# Patient Record
Sex: MALE | Race: BLACK OR AFRICAN AMERICAN | NOT HISPANIC OR LATINO | Employment: UNEMPLOYED | URBAN - METROPOLITAN AREA
[De-identification: names, ages, dates, MRNs, and addresses within clinical notes are randomized per-mention and may not be internally consistent; named-entity substitution may affect disease eponyms.]

---

## 2021-09-15 ENCOUNTER — HISTORICAL (OUTPATIENT)
Dept: RADIOLOGY | Facility: HOSPITAL | Age: 26
End: 2021-09-15

## 2023-05-15 ENCOUNTER — HOSPITAL ENCOUNTER (INPATIENT)
Facility: HOSPITAL | Age: 28
LOS: 24 days | Discharge: NURSING, REHAB OR LONG-TERM CARE | DRG: 003 | End: 2023-06-08
Attending: EMERGENCY MEDICINE | Admitting: INTERNAL MEDICINE
Payer: MEDICAID

## 2023-05-15 DIAGNOSIS — L89.154 STAGE IV PRESSURE ULCER OF SACRAL REGION: ICD-10-CM

## 2023-05-15 DIAGNOSIS — L89.314 STAGE IV PRESSURE ULCER OF RIGHT BUTTOCK: ICD-10-CM

## 2023-05-15 DIAGNOSIS — L89.324 STAGE IV PRESSURE ULCER OF LEFT BUTTOCK: ICD-10-CM

## 2023-05-15 DIAGNOSIS — A41.9 SEPSIS: ICD-10-CM

## 2023-05-15 DIAGNOSIS — I38 ENDOCARDITIS: ICD-10-CM

## 2023-05-15 DIAGNOSIS — E87.20 LACTIC ACIDOSIS: ICD-10-CM

## 2023-05-15 DIAGNOSIS — M86.60 CHRONIC OSTEOMYELITIS: ICD-10-CM

## 2023-05-15 DIAGNOSIS — R00.0 TACHYCARDIA: ICD-10-CM

## 2023-05-15 DIAGNOSIS — T80.818A: ICD-10-CM

## 2023-05-15 DIAGNOSIS — I46.9 CARDIAC ARREST: Primary | ICD-10-CM

## 2023-05-15 DIAGNOSIS — G82.50 QUADRIPLEGIA: ICD-10-CM

## 2023-05-15 DIAGNOSIS — E83.42 HYPOMAGNESEMIA: ICD-10-CM

## 2023-05-15 DIAGNOSIS — R53.1 WEAKNESS: ICD-10-CM

## 2023-05-15 DIAGNOSIS — S31.000S WOUND OF SACRAL REGION, SEQUELA: ICD-10-CM

## 2023-05-15 DIAGNOSIS — E83.51 HYPOCALCEMIA: ICD-10-CM

## 2023-05-15 DIAGNOSIS — S61.402A: ICD-10-CM

## 2023-05-15 DIAGNOSIS — D64.9 ANEMIA, UNSPECIFIED TYPE: ICD-10-CM

## 2023-05-15 DIAGNOSIS — J96.00 ACUTE RESPIRATORY FAILURE, UNSPECIFIED WHETHER WITH HYPOXIA OR HYPERCAPNIA: ICD-10-CM

## 2023-05-15 LAB
ABS NEUT (OLG): 21.5 X10(3)/MCL (ref 2.1–9.2)
ALBUMIN SERPL-MCNC: 1.6 G/DL (ref 3.5–5)
ALBUMIN SERPL-MCNC: 1.7 G/DL (ref 3.5–5)
ALBUMIN SERPL-MCNC: 1.7 G/DL (ref 3.5–5)
ALBUMIN/GLOB SERPL: 0.3 RATIO (ref 1.1–2)
ALBUMIN/GLOB SERPL: 0.3 RATIO (ref 1.1–2)
ALBUMIN/GLOB SERPL: 0.4 RATIO (ref 1.1–2)
ALLENS TEST BLOOD GAS (OHS): YES
ALP SERPL-CCNC: 179 UNIT/L (ref 40–150)
ALP SERPL-CCNC: 208 UNIT/L (ref 40–150)
ALP SERPL-CCNC: 231 UNIT/L (ref 40–150)
ALT SERPL-CCNC: 39 UNIT/L (ref 0–55)
ALT SERPL-CCNC: 42 UNIT/L (ref 0–55)
ALT SERPL-CCNC: 48 UNIT/L (ref 0–55)
ANISOCYTOSIS BLD QL SMEAR: ABNORMAL
ANISOCYTOSIS BLD QL SMEAR: ABNORMAL
APPEARANCE UR: ABNORMAL
APTT PPP: 31.1 SECONDS (ref 23.2–33.7)
AST SERPL-CCNC: 103 UNIT/L (ref 5–34)
AST SERPL-CCNC: 64 UNIT/L (ref 5–34)
AST SERPL-CCNC: 71 UNIT/L (ref 5–34)
AV INDEX (PROSTH): 0.79
AV MEAN GRADIENT: 2 MMHG
AV PEAK GRADIENT: 3 MMHG
AV VALVE AREA: 2.49 CM2
AV VELOCITY RATIO: 0.84
BACTERIA #/AREA URNS AUTO: ABNORMAL /HPF
BASE EXCESS BLD CALC-SCNC: -2.6 MMOL/L (ref -2–2)
BASE EXCESS BLD CALC-SCNC: -2.9 MMOL/L (ref -2–2)
BASOPHILS # BLD AUTO: 0.01 X10(3)/MCL
BASOPHILS # BLD AUTO: 0.04 X10(3)/MCL
BASOPHILS NFR BLD AUTO: 0.1 %
BASOPHILS NFR BLD AUTO: 0.2 %
BILIRUB UR QL STRIP.AUTO: NEGATIVE MG/DL
BILIRUBIN DIRECT+TOT PNL SERPL-MCNC: 0.2 MG/DL
BILIRUBIN DIRECT+TOT PNL SERPL-MCNC: 0.5 MG/DL
BILIRUBIN DIRECT+TOT PNL SERPL-MCNC: 0.6 MG/DL
BLOOD GAS SAMPLE TYPE (OHS): ABNORMAL
BLOOD GAS SAMPLE TYPE (OHS): ABNORMAL
BNP BLD-MCNC: 1533.2 PG/ML
BUN SERPL-MCNC: 30.8 MG/DL (ref 8.9–20.6)
BUN SERPL-MCNC: 32.6 MG/DL (ref 8.9–20.6)
BUN SERPL-MCNC: 34.1 MG/DL (ref 8.9–20.6)
BURR CELLS (OLG): ABNORMAL
BURR CELLS (OLG): ABNORMAL
CA-I BLD-SCNC: 0.4 MMOL/L (ref 1.12–1.23)
CA-I BLD-SCNC: 0.51 MMOL/L (ref 1.12–1.23)
CALCIUM SERPL-MCNC: 4 MG/DL (ref 8.4–10.2)
CALCIUM SERPL-MCNC: 4 MG/DL (ref 8.4–10.2)
CALCIUM SERPL-MCNC: 4.5 MG/DL (ref 8.4–10.2)
CHLORIDE SERPL-SCNC: 96 MMOL/L (ref 98–107)
CHLORIDE SERPL-SCNC: 96 MMOL/L (ref 98–107)
CHLORIDE SERPL-SCNC: 97 MMOL/L (ref 98–107)
CK SERPL-CCNC: 765 U/L (ref 30–200)
CO2 BLDA-SCNC: 22.2 MMOL/L
CO2 BLDA-SCNC: 22.2 MMOL/L
CO2 SERPL-SCNC: 18 MMOL/L (ref 22–29)
CO2 SERPL-SCNC: 18 MMOL/L (ref 22–29)
CO2 SERPL-SCNC: 19 MMOL/L (ref 22–29)
COHGB MFR BLDA: 1.2 % (ref 0.5–1.5)
COHGB MFR BLDA: 2.8 % (ref 0.5–1.5)
COLOR UR: YELLOW
CREAT SERPL-MCNC: 1.22 MG/DL (ref 0.73–1.18)
CREAT SERPL-MCNC: 1.29 MG/DL (ref 0.73–1.18)
CREAT SERPL-MCNC: 1.33 MG/DL (ref 0.73–1.18)
CV ECHO LV RWT: 0.25 CM
DOP CALC AO PEAK VEL: 0.87 M/S
DOP CALC AO VTI: 12 CM
DOP CALC LVOT AREA: 3.1 CM2
DOP CALC LVOT DIAMETER: 2 CM
DOP CALC LVOT PEAK VEL: 0.73 M/S
DOP CALC LVOT STROKE VOLUME: 29.83 CM3
DOP CALC MV VTI: 19.6 CM
DOP CALCLVOT PEAK VEL VTI: 9.5 CM
DRAWN BY BLOOD GAS (OHS): ABNORMAL
DRAWN BY BLOOD GAS (OHS): ABNORMAL
ECHO LV POSTERIOR WALL: 0.76 CM (ref 0.6–1.1)
EJECTION FRACTION: 20 %
EOSINOPHIL # BLD AUTO: 0 X10(3)/MCL (ref 0–0.9)
EOSINOPHIL # BLD AUTO: 0.03 X10(3)/MCL (ref 0–0.9)
EOSINOPHIL NFR BLD AUTO: 0 %
EOSINOPHIL NFR BLD AUTO: 0.3 %
ERYTHROCYTE [DISTWIDTH] IN BLOOD BY AUTOMATED COUNT: 19.3 % (ref 11.5–17)
ERYTHROCYTE [DISTWIDTH] IN BLOOD BY AUTOMATED COUNT: 19.5 % (ref 11.5–17)
ERYTHROCYTE [DISTWIDTH] IN BLOOD BY AUTOMATED COUNT: 19.7 % (ref 11.5–17)
FERRITIN SERPL-MCNC: 2751.23 NG/ML (ref 21.81–274.66)
FIO2 BLOOD GAS (OHS): 100 %
FLUAV AG UPPER RESP QL IA.RAPID: NOT DETECTED
FLUBV AG UPPER RESP QL IA.RAPID: NOT DETECTED
FOLATE SERPL-MCNC: 6.3 NG/ML (ref 7–31.4)
FRACTIONAL SHORTENING: 13 % (ref 28–44)
GFR SERPLBLD CREATININE-BSD FMLA CKD-EPI: >60 MLS/MIN/1.73/M2
GLOBULIN SER-MCNC: 4.6 GM/DL (ref 2.4–3.5)
GLOBULIN SER-MCNC: 4.9 GM/DL (ref 2.4–3.5)
GLOBULIN SER-MCNC: 5 GM/DL (ref 2.4–3.5)
GLUCOSE SERPL-MCNC: 130 MG/DL (ref 74–100)
GLUCOSE SERPL-MCNC: 165 MG/DL (ref 74–100)
GLUCOSE SERPL-MCNC: 91 MG/DL (ref 74–100)
GLUCOSE UR QL STRIP.AUTO: NEGATIVE MG/DL
GROUP & RH: NORMAL
HCO3 BLDA-SCNC: 21.2 MMOL/L (ref 22–26)
HCO3 BLDA-SCNC: 21.2 MMOL/L (ref 22–26)
HCT VFR BLD AUTO: 16.3 % (ref 42–52)
HCT VFR BLD AUTO: 24.4 % (ref 42–52)
HCT VFR BLD AUTO: 27.1 % (ref 42–52)
HGB BLD-MCNC: 5 G/DL (ref 14–18)
HGB BLD-MCNC: 8.1 G/DL (ref 14–18)
HGB BLD-MCNC: 9.2 G/DL (ref 14–18)
HYPOCHROMIA BLD QL SMEAR: ABNORMAL
IMM GRANULOCYTES # BLD AUTO: 0.14 X10(3)/MCL (ref 0–0.04)
IMM GRANULOCYTES # BLD AUTO: 0.22 X10(3)/MCL (ref 0–0.04)
IMM GRANULOCYTES NFR BLD AUTO: 1.1 %
IMM GRANULOCYTES NFR BLD AUTO: 1.5 %
INDIRECT COOMBS GEL: NORMAL
INR BLD: 1.44 (ref 0–1.3)
INSTRUMENT WBC (OLG): 23.63 X10(3)/MCL
INTERVENTRICULAR SEPTUM: 0.81 CM (ref 0.6–1.1)
IRON SATN MFR SERPL: 59 % (ref 20–50)
IRON SERPL-MCNC: 87 UG/DL (ref 65–175)
KETONES UR QL STRIP.AUTO: NEGATIVE MG/DL
LACTATE SERPL-SCNC: 0.9 MMOL/L (ref 0.5–2.2)
LACTATE SERPL-SCNC: 1.4 MMOL/L (ref 0.5–2.2)
LACTATE SERPL-SCNC: 3.1 MMOL/L (ref 0.5–2.2)
LACTATE SERPL-SCNC: 4.2 MMOL/L (ref 0.5–2.2)
LEFT ATRIUM SIZE: 4.4 CM
LEFT ATRIUM VOLUME INDEX MOD: 45.4 ML/M2
LEFT ATRIUM VOLUME MOD: 87.6 CM3
LEFT INTERNAL DIMENSION IN SYSTOLE: 5.3 CM (ref 2.1–4)
LEFT VENTRICLE DIASTOLIC VOLUME INDEX: 95.85 ML/M2
LEFT VENTRICLE DIASTOLIC VOLUME: 185 ML
LEFT VENTRICLE MASS INDEX: 96 G/M2
LEFT VENTRICLE SYSTOLIC VOLUME INDEX: 69.9 ML/M2
LEFT VENTRICLE SYSTOLIC VOLUME: 135 ML
LEFT VENTRICULAR INTERNAL DIMENSION IN DIASTOLE: 6.07 CM (ref 3.5–6)
LEFT VENTRICULAR MASS: 185.55 G
LEUKOCYTE ESTERASE UR QL STRIP.AUTO: 1 UNIT/L
LPM (OHS): 4
LVOT MG: 1 MMHG
LVOT MV: 0.49 CM/S
LYMPHOCYTES # BLD AUTO: 1.84 X10(3)/MCL (ref 0.6–4.6)
LYMPHOCYTES # BLD AUTO: 2.15 X10(3)/MCL (ref 0.6–4.6)
LYMPHOCYTES NFR BLD AUTO: 10.4 %
LYMPHOCYTES NFR BLD AUTO: 19.2 %
LYMPHOCYTES NFR BLD MANUAL: 1.65 X10(3)/MCL
LYMPHOCYTES NFR BLD MANUAL: 7 %
MACROCYTES BLD QL SMEAR: ABNORMAL
MACROCYTES BLD QL SMEAR: ABNORMAL
MAGNESIUM SERPL-MCNC: 1.1 MG/DL (ref 1.6–2.6)
MAGNESIUM SERPL-MCNC: 1.5 MG/DL (ref 1.6–2.6)
MCH RBC QN AUTO: 23.3 PG (ref 27–31)
MCH RBC QN AUTO: 25.1 PG (ref 27–31)
MCH RBC QN AUTO: 25.2 PG (ref 27–31)
MCHC RBC AUTO-ENTMCNC: 30.7 G/DL (ref 33–36)
MCHC RBC AUTO-ENTMCNC: 33.2 G/DL (ref 33–36)
MCHC RBC AUTO-ENTMCNC: 33.9 G/DL (ref 33–36)
MCV RBC AUTO: 74.2 FL (ref 80–94)
MCV RBC AUTO: 75.5 FL (ref 80–94)
MCV RBC AUTO: 75.8 FL (ref 80–94)
MECH RR (OHS): 24 B/MIN
MECH VT (OHS): 500 ML
METHGB MFR BLDA: 0.1 % (ref 0.4–1.5)
METHGB MFR BLDA: 0.4 % (ref 0.4–1.5)
MODE (OHS): AC
MONOCYTES # BLD AUTO: 0.49 X10(3)/MCL (ref 0.1–1.3)
MONOCYTES # BLD AUTO: 0.88 X10(3)/MCL (ref 0.1–1.3)
MONOCYTES NFR BLD AUTO: 4.3 %
MONOCYTES NFR BLD AUTO: 5.1 %
MONOCYTES NFR BLD MANUAL: 0.47 X10(3)/MCL (ref 0.1–1.3)
MONOCYTES NFR BLD MANUAL: 2 %
MR PISA EROA: 0.78 CM2
MV MEAN GRADIENT: 2 MMHG
MV PEAK GRADIENT: 4 MMHG
MV STENOSIS PRESSURE HALF TIME: 58 MS
MV VALVE AREA BY CONTINUITY EQUATION: 1.52 CM2
MV VALVE AREA P 1/2 METHOD: 3.79 CM2
NEUTROPHILS # BLD AUTO: 17.31 X10(3)/MCL (ref 2.1–9.2)
NEUTROPHILS # BLD AUTO: 7.07 X10(3)/MCL (ref 2.1–9.2)
NEUTROPHILS NFR BLD AUTO: 73.8 %
NEUTROPHILS NFR BLD AUTO: 84 %
NEUTROPHILS NFR BLD MANUAL: 91 %
NITRITE UR QL STRIP.AUTO: NEGATIVE
NRBC BLD AUTO-RTO: 0.3 %
NRBC BLD AUTO-RTO: 0.4 %
NRBC BLD AUTO-RTO: 0.5 %
NRBC BLD MANUAL-RTO: 2 %
O2 HB BLOOD GAS (OHS): 93 % (ref 94–97)
O2 HB BLOOD GAS (OHS): 96.3 % (ref 94–97)
OHS LV EJECTION FRACTION SIMPSONS BIPLANE MOD: 2 %
OXYGEN DEVICE BLOOD GAS (OHS): ABNORMAL
OXYHGB MFR BLDA: 5.1 G/DL (ref 12–16)
OXYHGB MFR BLDA: 8.5 G/DL (ref 12–16)
PCO2 BLDA: 32 MMHG (ref 35–45)
PCO2 BLDA: 32 MMHG (ref 35–45)
PCO2 BLOOD GAS TEMP CORR (OHS): 28 MMHG (ref 35–45)
PEEP (OHS): 5 CMH2O
PH BLDA: 7.43 [PH] (ref 7.35–7.45)
PH BLDA: 7.43 [PH] (ref 7.35–7.45)
PH BLOOD GAS TEMP CORR (OHS): 7.47 (ref 7.35–7.45)
PH UR STRIP.AUTO: 6 [PH]
PHOSPHATE SERPL-MCNC: 8.6 MG/DL (ref 2.3–4.7)
PHOSPHATE SERPL-MCNC: 9.4 MG/DL (ref 2.3–4.7)
PISA MRMAX VEL: 4.05 M/S
PISA RADIUS: 1.2 CM
PISA TR MAX VEL: 3.01 M/S
PISA VN NYQUIST MS: 0.35 M/S
PISA VN NYQUIST: 0.35 M/S
PLATELET # BLD AUTO: 534 X10(3)/MCL (ref 130–400)
PLATELET # BLD AUTO: 613 X10(3)/MCL (ref 130–400)
PLATELET # BLD AUTO: 663 X10(3)/MCL (ref 130–400)
PLATELET # BLD EST: ABNORMAL 10*3/UL
PLATELET # BLD EST: ABNORMAL 10*3/UL
PMV BLD AUTO: 8 FL (ref 7.4–10.4)
PMV BLD AUTO: 8.2 FL (ref 7.4–10.4)
PMV BLD AUTO: 8.4 FL (ref 7.4–10.4)
PO2 BLDA: 67 MMHG (ref 80–100)
PO2 BLDA: 85 MMHG (ref 80–100)
PO2 BLOOD GAS TEMP CORR (OHS): 55 MMHG (ref 80–100)
POIKILOCYTOSIS BLD QL SMEAR: ABNORMAL
POIKILOCYTOSIS BLD QL SMEAR: ABNORMAL
POLYCHROMASIA BLD QL SMEAR: SLIGHT
POTASSIUM BLOOD GAS (OHS): 4.3 MMOL/L (ref 3.5–5)
POTASSIUM BLOOD GAS (OHS): 4.6 MMOL/L (ref 3.5–5)
POTASSIUM SERPL-SCNC: 4.5 MMOL/L (ref 3.5–5.1)
POTASSIUM SERPL-SCNC: 4.7 MMOL/L (ref 3.5–5.1)
POTASSIUM SERPL-SCNC: 5.3 MMOL/L (ref 3.5–5.1)
PROLACTIN LEVEL (OHS): 29.11 NG/ML (ref 3.46–19.4)
PROT SERPL-MCNC: 6.3 GM/DL (ref 6.4–8.3)
PROT SERPL-MCNC: 6.5 GM/DL (ref 6.4–8.3)
PROT SERPL-MCNC: 6.7 GM/DL (ref 6.4–8.3)
PROT UR QL STRIP.AUTO: 3 MG/DL
PROTHROMBIN TIME: 17.3 SECONDS (ref 12.5–14.5)
PV PEAK VELOCITY: 0.73 CM/S
RBC # BLD AUTO: 2.15 X10(6)/MCL (ref 4.7–6.1)
RBC # BLD AUTO: 3.23 X10(6)/MCL (ref 4.7–6.1)
RBC # BLD AUTO: 3.65 X10(6)/MCL (ref 4.7–6.1)
RBC #/AREA URNS AUTO: 9 /HPF
RBC MORPH BLD: ABNORMAL
RBC MORPH BLD: ABNORMAL
RBC UR QL AUTO: 1 UNIT/L
RET# (OHS): 0.07 (ref 0.03–0.1)
RETICULOCYTE COUNT AUTOMATED (OLG): 2.3 % (ref 1.1–2.1)
ROULEAUX BLD QL SMEAR: ABNORMAL
SAMPLE SITE BLOOD GAS (OHS): ABNORMAL
SAMPLE SITE BLOOD GAS (OHS): ABNORMAL
SAO2 % BLDA: 93.6 %
SAO2 % BLDA: 96.7 %
SARS-COV-2 RNA RESP QL NAA+PROBE: NOT DETECTED
SODIUM BLOOD GAS (OHS): 126 MMOL/L (ref 137–145)
SODIUM BLOOD GAS (OHS): 127 MMOL/L (ref 137–145)
SODIUM SERPL-SCNC: 131 MMOL/L (ref 136–145)
SODIUM SERPL-SCNC: 131 MMOL/L (ref 136–145)
SODIUM SERPL-SCNC: 132 MMOL/L (ref 136–145)
SP GR UR STRIP.AUTO: 1.02 (ref 1–1.03)
SPECIMEN OUTDATE: NORMAL
SQUAMOUS #/AREA URNS AUTO: 11 /HPF
TDI LATERAL: 0.21 M/S
TDI SEPTAL: 0.09 M/S
TDI: 0.15 M/S
TEMPERATURE BLOOD GAS (OHS): 34 DEGREES CELSIUS
TIBC SERPL-MCNC: 148 UG/DL (ref 250–450)
TIBC SERPL-MCNC: 61 UG/DL (ref 69–240)
TR MAX PG: 36 MMHG
TRANSFERRIN SERPL-MCNC: 129 MG/DL (ref 174–364)
TRICUSPID ANNULAR PLANE SYSTOLIC EXCURSION: 1.87 CM
TROPONIN I SERPL-MCNC: 0.02 NG/ML (ref 0–0.04)
TROPONIN I SERPL-MCNC: 0.05 NG/ML (ref 0–0.04)
TROPONIN I SERPL-MCNC: 0.08 NG/ML (ref 0–0.04)
TROPONIN I SERPL-MCNC: <0.01 NG/ML (ref 0–0.04)
UROBILINOGEN UR STRIP-ACNC: 1 MG/DL
VIT B12 SERPL-MCNC: 801 PG/ML (ref 213–816)
WBC # SPEC AUTO: 20.6 X10(3)/MCL (ref 4.5–11.5)
WBC # SPEC AUTO: 23.63 X10(3)/MCL (ref 4.5–11.5)
WBC # SPEC AUTO: 9.58 X10(3)/MCL (ref 4.5–11.5)
WBC #/AREA URNS AUTO: 498 /HPF

## 2023-05-15 PROCEDURE — 25000003 PHARM REV CODE 250: Performed by: EMERGENCY MEDICINE

## 2023-05-15 PROCEDURE — 25000003 PHARM REV CODE 250

## 2023-05-15 PROCEDURE — 27000221 HC OXYGEN, UP TO 24 HOURS

## 2023-05-15 PROCEDURE — 83735 ASSAY OF MAGNESIUM: CPT | Performed by: EMERGENCY MEDICINE

## 2023-05-15 PROCEDURE — 82728 ASSAY OF FERRITIN: CPT

## 2023-05-15 PROCEDURE — 96365 THER/PROPH/DIAG IV INF INIT: CPT

## 2023-05-15 PROCEDURE — 63600175 PHARM REV CODE 636 W HCPCS: Performed by: INTERNAL MEDICINE

## 2023-05-15 PROCEDURE — 99291 CRITICAL CARE FIRST HOUR: CPT

## 2023-05-15 PROCEDURE — 93010 EKG 12-LEAD: ICD-10-PCS | Mod: ,,, | Performed by: STUDENT IN AN ORGANIZED HEALTH CARE EDUCATION/TRAINING PROGRAM

## 2023-05-15 PROCEDURE — 63600175 PHARM REV CODE 636 W HCPCS

## 2023-05-15 PROCEDURE — 85045 AUTOMATED RETICULOCYTE COUNT: CPT

## 2023-05-15 PROCEDURE — 86923 COMPATIBILITY TEST ELECTRIC: CPT | Mod: 91 | Performed by: EMERGENCY MEDICINE

## 2023-05-15 PROCEDURE — 83735 ASSAY OF MAGNESIUM: CPT

## 2023-05-15 PROCEDURE — 85025 COMPLETE CBC W/AUTO DIFF WBC: CPT | Performed by: EMERGENCY MEDICINE

## 2023-05-15 PROCEDURE — 87040 BLOOD CULTURE FOR BACTERIA: CPT | Performed by: EMERGENCY MEDICINE

## 2023-05-15 PROCEDURE — 25000003 PHARM REV CODE 250: Performed by: INTERNAL MEDICINE

## 2023-05-15 PROCEDURE — 83880 ASSAY OF NATRIURETIC PEPTIDE: CPT | Performed by: EMERGENCY MEDICINE

## 2023-05-15 PROCEDURE — 99900035 HC TECH TIME PER 15 MIN (STAT)

## 2023-05-15 PROCEDURE — 96367 TX/PROPH/DG ADDL SEQ IV INF: CPT

## 2023-05-15 PROCEDURE — 85027 COMPLETE CBC AUTOMATED: CPT | Performed by: EMERGENCY MEDICINE

## 2023-05-15 PROCEDURE — 83605 ASSAY OF LACTIC ACID: CPT | Performed by: EMERGENCY MEDICINE

## 2023-05-15 PROCEDURE — 36556 INSERT NON-TUNNEL CV CATH: CPT

## 2023-05-15 PROCEDURE — 27200966 HC CLOSED SUCTION SYSTEM

## 2023-05-15 PROCEDURE — 85025 COMPLETE CBC W/AUTO DIFF WBC: CPT

## 2023-05-15 PROCEDURE — 25000003 PHARM REV CODE 250: Performed by: STUDENT IN AN ORGANIZED HEALTH CARE EDUCATION/TRAINING PROGRAM

## 2023-05-15 PROCEDURE — 84484 ASSAY OF TROPONIN QUANT: CPT | Performed by: STUDENT IN AN ORGANIZED HEALTH CARE EDUCATION/TRAINING PROGRAM

## 2023-05-15 PROCEDURE — 84100 ASSAY OF PHOSPHORUS: CPT | Performed by: STUDENT IN AN ORGANIZED HEALTH CARE EDUCATION/TRAINING PROGRAM

## 2023-05-15 PROCEDURE — 63600175 PHARM REV CODE 636 W HCPCS: Performed by: STUDENT IN AN ORGANIZED HEALTH CARE EDUCATION/TRAINING PROGRAM

## 2023-05-15 PROCEDURE — 20000000 HC ICU ROOM

## 2023-05-15 PROCEDURE — 93010 ELECTROCARDIOGRAM REPORT: CPT | Mod: ,,, | Performed by: STUDENT IN AN ORGANIZED HEALTH CARE EDUCATION/TRAINING PROGRAM

## 2023-05-15 PROCEDURE — 85027 COMPLETE CBC AUTOMATED: CPT

## 2023-05-15 PROCEDURE — 81001 URINALYSIS AUTO W/SCOPE: CPT | Performed by: EMERGENCY MEDICINE

## 2023-05-15 PROCEDURE — 96361 HYDRATE IV INFUSION ADD-ON: CPT

## 2023-05-15 PROCEDURE — 82550 ASSAY OF CK (CPK): CPT

## 2023-05-15 PROCEDURE — 84100 ASSAY OF PHOSPHORUS: CPT

## 2023-05-15 PROCEDURE — 83550 IRON BINDING TEST: CPT

## 2023-05-15 PROCEDURE — 82746 ASSAY OF FOLIC ACID SERUM: CPT

## 2023-05-15 PROCEDURE — 80053 COMPREHEN METABOLIC PANEL: CPT | Performed by: EMERGENCY MEDICINE

## 2023-05-15 PROCEDURE — 85027 COMPLETE CBC AUTOMATED: CPT | Performed by: STUDENT IN AN ORGANIZED HEALTH CARE EDUCATION/TRAINING PROGRAM

## 2023-05-15 PROCEDURE — 82803 BLOOD GASES ANY COMBINATION: CPT

## 2023-05-15 PROCEDURE — 51702 INSERT TEMP BLADDER CATH: CPT

## 2023-05-15 PROCEDURE — 82607 VITAMIN B-12: CPT

## 2023-05-15 PROCEDURE — 85610 PROTHROMBIN TIME: CPT | Performed by: EMERGENCY MEDICINE

## 2023-05-15 PROCEDURE — 87088 URINE BACTERIA CULTURE: CPT | Performed by: EMERGENCY MEDICINE

## 2023-05-15 PROCEDURE — 0240U COVID/FLU A&B PCR: CPT | Performed by: EMERGENCY MEDICINE

## 2023-05-15 PROCEDURE — 36600 WITHDRAWAL OF ARTERIAL BLOOD: CPT

## 2023-05-15 PROCEDURE — 80053 COMPREHEN METABOLIC PANEL: CPT | Performed by: STUDENT IN AN ORGANIZED HEALTH CARE EDUCATION/TRAINING PROGRAM

## 2023-05-15 PROCEDURE — 84484 ASSAY OF TROPONIN QUANT: CPT

## 2023-05-15 PROCEDURE — 93005 ELECTROCARDIOGRAM TRACING: CPT

## 2023-05-15 PROCEDURE — 86900 BLOOD TYPING SEROLOGIC ABO: CPT | Performed by: EMERGENCY MEDICINE

## 2023-05-15 PROCEDURE — 87070 CULTURE OTHR SPECIMN AEROBIC: CPT

## 2023-05-15 PROCEDURE — 36430 TRANSFUSION BLD/BLD COMPNT: CPT

## 2023-05-15 PROCEDURE — 80053 COMPREHEN METABOLIC PANEL: CPT

## 2023-05-15 PROCEDURE — 37799 UNLISTED PX VASCULAR SURGERY: CPT

## 2023-05-15 PROCEDURE — 85730 THROMBOPLASTIN TIME PARTIAL: CPT | Performed by: EMERGENCY MEDICINE

## 2023-05-15 PROCEDURE — P9016 RBC LEUKOCYTES REDUCED: HCPCS | Performed by: EMERGENCY MEDICINE

## 2023-05-15 PROCEDURE — 96368 THER/DIAG CONCURRENT INF: CPT

## 2023-05-15 PROCEDURE — 83605 ASSAY OF LACTIC ACID: CPT

## 2023-05-15 PROCEDURE — 36620 INSERTION CATHETER ARTERY: CPT

## 2023-05-15 PROCEDURE — C1751 CATH, INF, PER/CENT/MIDLINE: HCPCS

## 2023-05-15 PROCEDURE — 63600175 PHARM REV CODE 636 W HCPCS: Performed by: EMERGENCY MEDICINE

## 2023-05-15 PROCEDURE — 84146 ASSAY OF PROLACTIN: CPT

## 2023-05-15 PROCEDURE — 86920 COMPATIBILITY TEST SPIN: CPT | Performed by: EMERGENCY MEDICINE

## 2023-05-15 PROCEDURE — 84484 ASSAY OF TROPONIN QUANT: CPT | Performed by: EMERGENCY MEDICINE

## 2023-05-15 RX ORDER — HYDROCODONE BITARTRATE AND ACETAMINOPHEN 500; 5 MG/1; MG/1
TABLET ORAL
Status: DISCONTINUED | OUTPATIENT
Start: 2023-05-15 | End: 2023-05-29 | Stop reason: SDUPTHER

## 2023-05-15 RX ORDER — ROCURONIUM BROMIDE 10 MG/ML
90 INJECTION, SOLUTION INTRAVENOUS ONCE
Status: COMPLETED | OUTPATIENT
Start: 2023-05-15 | End: 2023-05-15

## 2023-05-15 RX ORDER — NOREPINEPHRINE BITARTRATE/D5W 8 MG/250ML
0-3 PLASTIC BAG, INJECTION (ML) INTRAVENOUS CONTINUOUS
Status: DISCONTINUED | OUTPATIENT
Start: 2023-05-15 | End: 2023-06-08 | Stop reason: HOSPADM

## 2023-05-15 RX ORDER — PROPOFOL 10 MG/ML
0-50 INJECTION, EMULSION INTRAVENOUS CONTINUOUS
Status: DISCONTINUED | OUTPATIENT
Start: 2023-05-15 | End: 2023-05-31

## 2023-05-15 RX ORDER — SODIUM CHLORIDE, SODIUM LACTATE, POTASSIUM CHLORIDE, CALCIUM CHLORIDE 600; 310; 30; 20 MG/100ML; MG/100ML; MG/100ML; MG/100ML
INJECTION, SOLUTION INTRAVENOUS CONTINUOUS
Status: DISCONTINUED | OUTPATIENT
Start: 2023-05-15 | End: 2023-05-15

## 2023-05-15 RX ORDER — MAGNESIUM SULFATE 1 G/100ML
1 INJECTION INTRAVENOUS ONCE
Status: COMPLETED | OUTPATIENT
Start: 2023-05-15 | End: 2023-05-16

## 2023-05-15 RX ORDER — CALCIUM GLUCONATE 20 MG/ML
1 INJECTION, SOLUTION INTRAVENOUS
Status: COMPLETED | OUTPATIENT
Start: 2023-05-15 | End: 2023-05-15

## 2023-05-15 RX ORDER — LORAZEPAM 2 MG/ML
INJECTION INTRAMUSCULAR
Status: COMPLETED
Start: 2023-05-15 | End: 2023-05-15

## 2023-05-15 RX ORDER — PROPOFOL 10 MG/ML
100 VIAL (ML) INTRAVENOUS ONCE
Status: COMPLETED | OUTPATIENT
Start: 2023-05-15 | End: 2023-05-15

## 2023-05-15 RX ORDER — CALCIUM GLUCONATE 20 MG/ML
1 INJECTION, SOLUTION INTRAVENOUS
Status: COMPLETED | OUTPATIENT
Start: 2023-05-15 | End: 2023-05-16

## 2023-05-15 RX ORDER — NOREPINEPHRINE BITARTRATE/D5W 8 MG/250ML
0-3 PLASTIC BAG, INJECTION (ML) INTRAVENOUS CONTINUOUS
Status: DISCONTINUED | OUTPATIENT
Start: 2023-05-15 | End: 2023-05-15 | Stop reason: SDUPTHER

## 2023-05-15 RX ORDER — ROCURONIUM BROMIDE 10 MG/ML
90 INJECTION, SOLUTION INTRAVENOUS ONCE
Status: DISCONTINUED | OUTPATIENT
Start: 2023-05-15 | End: 2023-05-15

## 2023-05-15 RX ORDER — SODIUM CHLORIDE, SODIUM LACTATE, POTASSIUM CHLORIDE, CALCIUM CHLORIDE 600; 310; 30; 20 MG/100ML; MG/100ML; MG/100ML; MG/100ML
2000 INJECTION, SOLUTION INTRAVENOUS CONTINUOUS
Status: ACTIVE | OUTPATIENT
Start: 2023-05-15 | End: 2023-05-17

## 2023-05-15 RX ORDER — SODIUM CHLORIDE 0.9 % (FLUSH) 0.9 %
10 SYRINGE (ML) INJECTION
Status: DISCONTINUED | OUTPATIENT
Start: 2023-05-15 | End: 2023-05-31

## 2023-05-15 RX ORDER — ENOXAPARIN SODIUM 100 MG/ML
40 INJECTION SUBCUTANEOUS EVERY 24 HOURS
Status: DISCONTINUED | OUTPATIENT
Start: 2023-05-15 | End: 2023-05-30

## 2023-05-15 RX ORDER — LEVETIRACETAM 10 MG/ML
1000 INJECTION INTRAVASCULAR
Status: COMPLETED | OUTPATIENT
Start: 2023-05-15 | End: 2023-05-15

## 2023-05-15 RX ORDER — MAGNESIUM SULFATE HEPTAHYDRATE 40 MG/ML
2 INJECTION, SOLUTION INTRAVENOUS
Status: COMPLETED | OUTPATIENT
Start: 2023-05-15 | End: 2023-05-15

## 2023-05-15 RX ORDER — NOREPINEPHRINE BITARTRATE/D5W 8 MG/250ML
PLASTIC BAG, INJECTION (ML) INTRAVENOUS
Status: COMPLETED
Start: 2023-05-15 | End: 2023-05-15

## 2023-05-15 RX ADMIN — CALCIUM GLUCONATE 1 G: 20 INJECTION, SOLUTION INTRAVENOUS at 11:05

## 2023-05-15 RX ADMIN — PROPOFOL 100 MG: 10 INJECTION, EMULSION INTRAVENOUS at 01:05

## 2023-05-15 RX ADMIN — LEVETIRACETAM INJECTION 1000 MG: 10 INJECTION INTRAVENOUS at 12:05

## 2023-05-15 RX ADMIN — PROPOFOL 20 MCG/KG/MIN: 10 INJECTION, EMULSION INTRAVENOUS at 02:05

## 2023-05-15 RX ADMIN — SODIUM CHLORIDE, POTASSIUM CHLORIDE, SODIUM LACTATE AND CALCIUM CHLORIDE 2000 ML: 600; 310; 30; 20 INJECTION, SOLUTION INTRAVENOUS at 07:05

## 2023-05-15 RX ADMIN — CALCIUM GLUCONATE 1 G: 20 INJECTION, SOLUTION INTRAVENOUS at 05:05

## 2023-05-15 RX ADMIN — SODIUM CHLORIDE, POTASSIUM CHLORIDE, SODIUM LACTATE AND CALCIUM CHLORIDE: 600; 310; 30; 20 INJECTION, SOLUTION INTRAVENOUS at 04:05

## 2023-05-15 RX ADMIN — Medication 0.2 MCG/KG/MIN: at 12:05

## 2023-05-15 RX ADMIN — PIPERACILLIN AND TAZOBACTAM 4.5 G: 4; .5 INJECTION, POWDER, LYOPHILIZED, FOR SOLUTION INTRAVENOUS; PARENTERAL at 10:05

## 2023-05-15 RX ADMIN — MAGNESIUM SULFATE IN DEXTROSE 1 G: 10 INJECTION, SOLUTION INTRAVENOUS at 11:05

## 2023-05-15 RX ADMIN — ROCURONIUM BROMIDE 90 MG: 10 INJECTION, SOLUTION INTRAVENOUS at 01:05

## 2023-05-15 RX ADMIN — CALCIUM GLUCONATE 1 G: 20 INJECTION, SOLUTION INTRAVENOUS at 04:05

## 2023-05-15 RX ADMIN — MAGNESIUM SULFATE HEPTAHYDRATE 2 G: 40 INJECTION, SOLUTION INTRAVENOUS at 11:05

## 2023-05-15 RX ADMIN — SODIUM CHLORIDE, POTASSIUM CHLORIDE, SODIUM LACTATE AND CALCIUM CHLORIDE 1000 ML: 600; 310; 30; 20 INJECTION, SOLUTION INTRAVENOUS at 10:05

## 2023-05-15 RX ADMIN — SODIUM CHLORIDE, POTASSIUM CHLORIDE, SODIUM LACTATE AND CALCIUM CHLORIDE 2313 ML: 600; 310; 30; 20 INJECTION, SOLUTION INTRAVENOUS at 11:05

## 2023-05-15 RX ADMIN — PROPOFOL 40 MCG/KG/MIN: 10 INJECTION, EMULSION INTRAVENOUS at 07:05

## 2023-05-15 RX ADMIN — PIPERACILLIN AND TAZOBACTAM 4.5 G: 4; .5 INJECTION, POWDER, LYOPHILIZED, FOR SOLUTION INTRAVENOUS; PARENTERAL at 07:05

## 2023-05-15 RX ADMIN — VANCOMYCIN HYDROCHLORIDE 1500 MG: 1.5 INJECTION, POWDER, LYOPHILIZED, FOR SOLUTION INTRAVENOUS at 04:05

## 2023-05-15 RX ADMIN — NOREPINEPHRINE BITARTRATE 0.22 MCG/KG/MIN: 8 INJECTION, SOLUTION INTRAVENOUS at 02:05

## 2023-05-15 RX ADMIN — NOREPINEPHRINE BITARTRATE 0.2 MCG/KG/MIN: 8 INJECTION, SOLUTION INTRAVENOUS at 12:05

## 2023-05-15 RX ADMIN — ENOXAPARIN SODIUM 40 MG: 40 INJECTION SUBCUTANEOUS at 04:05

## 2023-05-15 NOTE — PROCEDURES
[facility]   Endotracheal Intubation  Procedure Note    SUMMARY     Date of Procedure: 5/15/2023    Procedure: Endotracheal Intubation    Perfomed by: Adama Love DO        Indication: impending respiratory failure and respiratory failure.    Pre-Procedure Diagnosis:  Hypoxemic respiratory failure    Post-Procedure Diagnosis:  Hypoxemic respiratory failure        Description of the Findings of the Procedure:     Sedation:  Propofol .  Paralytic: rocuronium.  Lidocaine: no.  Atropine: no.  Equipment:  Glidscope blade size 4 .  Cricoid Pressure: no.  Number of attempts: 1.  ETT location confirmed by by auscultation, by CXR, and ETCO2 monitor.        Complications: None; patient tolerated the procedure well.       Condition: Critical        Disposition: ICU - intubated and critically ill.    Attestation: I performed the procedure supervised by Dr. Townsend \\

## 2023-05-15 NOTE — ED PROVIDER NOTES
Encounter Date: 5/15/2023    SCRIBE #1 NOTE: I, Kimananth Cisneros, am scribing for, and in the presence of,  Ranulfo Minor MD. I have scribed the following portions of the note - the EKG reading. Other sections scribed: HPI, ROS, PE.     History     Chief Complaint   Patient presents with    Shortness of Breath     C/o SOB for the last 3 days. Denies cough. Mom reports giving nebs at home with no relief. Denies fever. Initial O2 sat - 91% RA with ems. 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  Pt's mother, at bedside, says that he has been having trouble breathing for about a week, but that yesterday it got a lot worse.  She also says that breathing treatments were not helping him.  Pt also has sacral wounds that he says have been there for about 2 years.  He sees Dr. Thurston for wound care.  Pt's mother denies any recent UTIs or pneumonia.    The history is provided by the patient and a parent.   Shortness of Breath  This is a new problem. The problem occurs continuously.The current episode started yesterday. The problem has not changed since onset.Pertinent negatives include no fever, no rhinorrhea, no neck pain, no wheezing, no chest pain, no vomiting, no abdominal pain and no rash.   Review of patient's allergies indicates:  No Known Allergies  History reviewed. No pertinent past medical history.  History reviewed. No pertinent surgical history.  History reviewed. No pertinent family history.     Review of Systems   Constitutional:  Negative for fatigue, fever and unexpected weight change.   HENT:  Negative for congestion and rhinorrhea.    Eyes:  Negative for pain.   Respiratory:  Positive for shortness of breath. Negative for chest tightness and wheezing.    Cardiovascular:  Negative for chest pain.   Gastrointestinal:  Negative for abdominal pain,  constipation, diarrhea, nausea and vomiting.   Genitourinary:  Negative for dysuria.   Musculoskeletal:  Negative for back pain and neck pain.   Skin:  Negative for rash.   Allergic/Immunologic: Negative for environmental allergies, food allergies and immunocompromised state.   Neurological:  Negative for dizziness and speech difficulty.   Hematological:  Does not bruise/bleed easily.   Psychiatric/Behavioral:  Negative for sleep disturbance and suicidal ideas.      Physical Exam     Initial Vitals [05/15/23 0900]   BP Pulse Resp Temp SpO2   118/80 88 16 96.8 °F (36 °C) 98 %      MAP       --         Physical Exam    Nursing note and vitals reviewed.  Constitutional: He appears distressed (moderate).   General malodorous smell when entering room   HENT:   Head: Normocephalic and atraumatic.   Right Ear: Tympanic membrane normal.   Left Ear: Tympanic membrane normal.   Mouth/Throat: Oropharynx is clear and moist.   Eyes: Conjunctivae and EOM are normal. Pupils are equal, round, and reactive to light.   Neck: Trachea normal. Neck supple. Carotid bruit is not present. No JVD present.   Normal range of motion.  Cardiovascular:  Normal rate and regular rhythm.           No murmur heard.  Pulmonary/Chest: Tachypnea noted. No respiratory distress. He exhibits no tenderness.   Abdominal breathing; diminished breathing on the right, fine crackles at the base on the left   Abdominal: Abdomen is soft. Bowel sounds are normal. He exhibits distension (mild). There is no abdominal tenderness.   Genitourinary:    Genitourinary Comments: Shen in place     Musculoskeletal:         General: Normal range of motion.      Cervical back: Normal range of motion and neck supple.      Lumbar back: Normal. No tenderness. Normal range of motion.     Neurological: He is alert and oriented to person, place, and time. No cranial nerve deficit or sensory deficit.   Partial bilateral paralysis of upper extremities; paralysis of bilateral lower  extremities   Skin:   Dressed wound on left wrist, sacral decubitus wounds   Psychiatric: He has a normal mood and affect. Judgment normal.       ED Course   Central Line    Date/Time: 5/15/2023 12:50 PM  Performed by: Ranulfo Minor MD  Authorized by: Adis العلي Jr., MD, [facility]     Location procedure was performed:  [facility] EMERGENCY DEPARTMENT  Consent Done ?:  Emergent Situation  Indications:  Vascular access  Anesthesia:  Local infiltration  Local anesthetic:  Lidocaine 1% without epinephrine  Preparation:  Skin prepped with chlorhexidine (without alcohol)  Skin prep agent dried: Skin prep agent completely dried prior to procedure    Sterile barriers: All five maximal sterile barriers used - gloves, gown, cap, mask and large sterile sheet    Hand hygiene: Hand hygiene performed immediately prior to central venous catheter insertion    Location:  Right femoral  Site selection rationale:  Emergent, patient with EJ IV access already  Catheter type:  Triple lumen  Catheter size:  7 Fr  Ultrasound guidance: Yes    Manometry: No    Number of attempts:  2  Securement:  Line sutured, sterile dressing applied and blood return through all ports  Adverse Events:  None  Critical Care    Date/Time: 5/15/2023 12:51 PM  Performed by: Ranulfo Minor MD  Authorized by: Adis العلي Jr., MD, [facility]   Direct patient critical care time: 40 minutes  Additional history critical care time: 10 minutes  Ordering / reviewing critical care time: 15 minutes  Documentation critical care time: 15 minutes  Consulting other physicians critical care time: 5 minutes  Consult with family critical care time: 5 minutes  Total critical care time (exclusive of procedural time) : 90 minutes  Critical care time was exclusive of separately billable procedures and treating other patients.  Critical care was necessary to treat or prevent imminent or life-threatening deterioration of the following conditions: circulatory failure and metabolic  crisis.  Critical care was time spent personally by me on the following activities: discussions with consultants, discussions with primary provider, evaluation of patient's response to treatment, examination of patient, obtaining history from patient or surrogate, ordering and performing treatments and interventions, ordering and review of laboratory studies, ordering and review of radiographic studies, pulse oximetry, re-evaluation of patient's condition and vascular access procedures.      Labs Reviewed   COMPREHENSIVE METABOLIC PANEL - Abnormal; Notable for the following components:       Result Value    Sodium Level 131 (*)     Chloride 96 (*)     Carbon Dioxide 18 (*)     Blood Urea Nitrogen 34.1 (*)     Creatinine 1.33 (*)     Calcium Level Total 4.0 (*)     Albumin Level 1.7 (*)     Globulin 5.0 (*)     Albumin/Globulin Ratio 0.3 (*)     Alkaline Phosphatase 179 (*)     Aspartate Aminotransferase 71 (*)     All other components within normal limits   B-TYPE NATRIURETIC PEPTIDE - Abnormal; Notable for the following components:    Natriuretic Peptide 1,533.2 (*)     All other components within normal limits   LACTIC ACID, PLASMA - Abnormal; Notable for the following components:    Lactic Acid Level 4.2 (*)     All other components within normal limits   CBC WITH DIFFERENTIAL - Abnormal; Notable for the following components:    RBC 2.15 (*)     Hgb 5.0 (*)     Hct 16.3 (*)     MCV 75.8 (*)     MCH 23.3 (*)     MCHC 30.7 (*)     RDW 19.7 (*)     Platelet 534 (*)     IG# 0.14 (*)     All other components within normal limits   BLOOD GAS - Abnormal; Notable for the following components:    pCO2, Blood gas 32.0 (*)     pO2, Blood gas 67.0 (*)     pH, Blood gas (Temp Mari) 7.470 (*)     pCO2, Blood gas (Temp Mari) 28.0 (*)     pO2, Blood gas (Temp Mari) 55.0 (*)     Sodium, Blood Gas 127 (*)     Calcium Level Ionized 0.40 (*)     Base Excess, Blood gas -2.90 (*)     HCO3, Blood gas 21.2 (*)     THb, Blood gas 5.1 (*)      O2 Hb, Blood Gas 93.0 (*)     Met Hgb 0.1 (*)     All other components within normal limits   MAGNESIUM - Abnormal; Notable for the following components:    Magnesium Level 1.10 (*)     All other components within normal limits   PROTIME-INR - Abnormal; Notable for the following components:    PT 17.3 (*)     INR 1.44 (*)     All other components within normal limits   BLOOD SMEAR MICROSCOPIC EXAM (OLG) - Abnormal; Notable for the following components:    RBC Morph Abnormal (*)     Anisocyte 2+ (*)     [place] Cells 1+ (*)     Hypochrom 2+ (*)     Macrocyte 1+ (*)     Poik 1+ (*)     Polychrom Slight (*)     Rouleaux 1+ (*)     Platelet Est Increased (*)     All other components within normal limits   LACTIC ACID, PLASMA - Abnormal; Notable for the following components:    Lactic Acid Level 3.1 (*)     All other components within normal limits   COVID/FLU A&B PCR - Normal    Narrative:     The Xpert Xpress SARS-CoV-2/FLU/RSV plus is a rapid, multiplexed real-time PCR test intended for the simultaneous qualitative detection and differentiation of SARS-CoV-2, Influenza A, Influenza B, and respiratory syncytial virus (RSV) viral RNA in either nasopharyngeal swab or nasal swab specimens.         TROPONIN I - Normal   APTT - Normal   BLOOD CULTURE [facility]   BLOOD CULTURE [facility]   CBC W/ AUTO DIFFERENTIAL    Narrative:     The following orders were created for panel order CBC auto differential.  Procedure                               Abnormality         Status                     ---------                               -----------         ------                     CBC with Differential[[id number]]        Abnormal            Final result                 Please view results for these tests on the individual orders.   URINALYSIS, REFLEX TO URINE CULTURE   TYPE & SCREEN   PREPARE RBC SOFT     EKG Readings: (Independently Interpreted)   Initial Reading: No STEMI. Rhythm: Normal Sinus Rhythm. Other Impression: non-specific ST  "changes, prologed QT   Time: 1000     Imaging Results    None          Medications   0.9%  NaCl infusion (for blood administration) (has no administration in time range)   NORepinephrine 8 mg in dextrose 5% 250 mL infusion (0.2 mcg/kg/min × 77.1 kg Intravenous Rate/Dose Change 5/15/23 1237)   levETIRAcetam in NaCl (iso-os) IVPB 1,000 mg (has no administration in time range)   LORazepam (ATIVAN) 2 mg/mL injection (has no administration in time range)   lactated ringers bolus 1,000 mL (0 mLs Intravenous Stopped 5/15/23 1045)   lactated ringers bolus 2,313 mL (0 mLs Intravenous Stopped 5/15/23 1111)   piperacillin-tazobactam (ZOSYN) 4.5 g in dextrose 5 % in water (D5W) 5 % 100 mL IVPB (MB+) (0 g Intravenous Stopped 5/15/23 1120)   calcium gluconate 1 g in NS IVPB (premixed) (0 g Intravenous Stopped 5/15/23 1250)   magnesium sulfate 2g in water 50mL IVPB (premix) (0 g Intravenous Stopped 5/15/23 1205)     Medical Decision Making:   Clinical Tests:   Sepsis Perfusion Assessment: "I attest a sepsis perfusion exam was performed within 6 hours of sepsis, severe sepsis, or septic shock presentation, following fluid resuscitation."        Scribe Attestation:   Scribe #1: I performed the above scribed service and the documentation accurately describes the services I performed. I attest to the accuracy of the note.    Attending Attestation:           Physician Attestation for Scribe:  Physician Attestation Statement for Scribe #1: I, Ranulfo Minor MD, reviewed documentation, as scribed by Kim Cisneros in my presence, and it is both accurate and complete.       Medical Decision Making  Sepsis, GI bleed, septic shock, cardiogenic shock, MI, PE, pneumonia, UTI, wound infection, metabolic disorder    Amount and/or Complexity of Data Reviewed  Independent Historian: parent     Details: Pt's mother, at bedside, says that he has been having trouble breathing for about a week, but that yesterday it got a lot worse.  She also " says that breathing treatments were not helping him.  Labs:  Decision-making details documented in ED Course.  Radiology: ordered.  ECG/medicine tests:  Decision-making details documented in ED Course.  Discussion of management or test interpretation with external provider(s): Discussed with ICU attending    Risk  Decision regarding hospitalization.  Risk Details: I discussed with the patient's mom after the patient had a cardiac arrest, but then became responsive again about his critical nature, including the rationale for not intubating at this time, but certainly still might be necessary she verbalizes understanding of how critical the patient has as well as all the ongoing resuscitation efforts    Critical Care  Total time providing critical care: 90 minutes         ED Course as of 05/15/23 1256   Mon May 15, 2023   1206 Consulted Dr. Munoz in ICU. [BP]   1254 Patient comes back severely anemic, will order blood transfusion   Patient with critical magnesium and calcium, replacement ordered.  At this point in time it might be patient's anemia as well as metabolic issues rather than sepsis, but he has already received IV antibiotics, not sure blood cultures were able to be obtained, secondary to the patient's poor access prior to administration of the antibiotics however.    Patient with elevated BNP, therefore patient's IV fluids pause he may not receive the full 30 cc/kilogram, but we are waiting to evaluate chest x-ray for volume overload.    Patient requiring central line placement please see procedure note   Patient had what mom describes as a seizure.  He does not have history of such was not able take his seizure medicines this morning.  Will order IV Keppra.    Nurses report patient became agonal, lost a pulse.  Please see nurse's notes, but patient received approximately 60-90 seconds of chest compressions when epinephrine, he then regained spontaneous pulse, along with open his eyes and answered  questions.  Had a long discussion with the mom about the events.  ICU residents present in the ED.  At this point in time will attempt to avoid intubation, and certainly with his tenuous blood pressures I do worry about this further worsening his low blood pressures.  Ordered Levophed is being hung as well. [MP]      ED Course User Index  [BP] Kim Kevinzach  [MP] Ranulfo Minor MD                 Clinical Impression:   Final diagnoses:  [R53.1] Weakness  [I46.9] Cardiac arrest (Primary)  [E83.51] Hypocalcemia  [E83.42] Hypomagnesemia  [D64.9] Anemia, unspecified type  [E87.20] Lactic acidosis        ED Disposition Condition    Admit Stable                Ranulfo Minor MD  05/15/23 1256       Ranulfo Minor MD  05/15/23 1311

## 2023-05-15 NOTE — PROGRESS NOTES
Pharmacokinetic Initial Assessment: IV Vancomycin    Assessment/Plan:    Initiate intravenous vancomycin with loading dose of 1500 mg once with subsequent doses when random concentrations are less than 25 mcg/mL  Desired empiric serum trough concentration is 15 to 20 mcg/mL  Draw vancomycin random level on 05/16 at 0500.  Pharmacy will continue to follow and monitor vancomycin.      Please contact pharmacy at extension [phone] with any questions regarding this assessment.     Thank you for the consult,   Noemy Billings       Patient brief summary:  Jyoti Mayberry is a 27 y.o. male initiated on antimicrobial therapy with IV Vancomycin for treatment of suspected bacteremia    Drug Allergies:   Review of patient's allergies indicates:  No Known Allergies    Actual Body Weight:   77.1 kg    Renal Function:   Estimated Creatinine Clearance: 84.2 mL/min (A) (based on SCr of 1.33 mg/dL (H)).,     Dialysis Method (if applicable):  N/A (Scr increased from 0.71--> 1.33)    CBC (last 72 hours):  Recent Labs   Lab Result Units 05/15/23  1027   WBC x10(3)/mcL 9.58   Hgb g/dL 5.0*   Hct % 16.3*   Platelet x10(3)/mcL 534*   Mono % % 5.1   Eos % % 0.3   Basophil % % 0.1       Metabolic Panel (last 72 hours):  Recent Labs   Lab Result Units 05/15/23  1027 05/15/23  1100   Sodium Level mmol/L 131*  --    Sodium, Blood Gas mmol/L  --  127*   Potassium Level mmol/L 4.5  --    Potassium, Blood Gas mmol/L  --  4.3   Chloride mmol/L 96*  --    Carbon Dioxide mmol/L 18*  --    Glucose Level mg/dL 91  --    Blood Urea Nitrogen mg/dL 34.1*  --    Creatinine mg/dL 1.33*  --    Albumin Level g/dL 1.7*  --    Bilirubin Total mg/dL 0.2  --    Alkaline Phosphatase unit/L 179*  --    Aspartate Aminotransferase unit/L 71*  --    Alanine Aminotransferase unit/L 39  --    Magnesium Level mg/dL 1.10*  --        Drug levels (last 3 results):  No results for input(s): VANCOMYCINRA, VANCORANDOM, VANCOMYCINPE, VANCOPEAK, VANCOMYCINTR, VANCOTROUGH in the last  72 hours.    Microbiologic Results:  Microbiology Results (last 7 days)       Procedure Component Value Units Date/Time    Blood culture #2 **CANNOT BE ORDERED STAT** [[id number]] Collected: 05/15/23 1027    Order Status: Resulted Specimen: Blood Updated: 05/15/23 1034    Blood culture #1 **CANNOT BE ORDERED STAT** [[id number]] Collected: 05/15/23 1027    Order Status: Resulted Specimen: Blood Updated: 05/15/23 1034

## 2023-05-15 NOTE — ED NOTES
Assumed care of pt. Monitors applied. Pt reports SOB x 3 days. Denies cough/congestion. Mom states has given multiple neb treatments at home with no relief. Pt not normally on O2 at home. Arrived to ED on 2L NC sat 98%. Pt is a quadriplegic/bed bound. Multiple wounds to sacral area, pictures documented in chart. Pt states sees wound care once a week.

## 2023-05-15 NOTE — ED NOTES
1215 pt became unresponsive, agonal breaths, mom at bedside verbalized suspicion of seizure, states hx seizure and has been unable to take seizure meds. no femoral pulse palpated, CPR intiated and Jerri DREW notified Dr. Minor. Dr. Minor at head of bed, jaw thrust and supporting airway. 1mg epi given IV to R femoral central line. Approx 1 min CPR and pt became responsive again. Continued difficulty obtaining BP. Pt has received 1.5L NS and 1LR, 2 units emergent release PRBCs completed. See chart for further critical care

## 2023-05-15 NOTE — OP NOTE
Arterial Catheter Insertion Procedure Note     Procedure: Insertion of Arterial Catheter     Indications: Hemodynamic Monitoring     Procedure Details     Maximum sterile technique was used including antiseptics, cap, sterile gloves, sterile gown, hand hygiene, mask and sterile maximum barrier drape.     Under sterile conditions the skin above the right radial  artery was prepped with Chloroprep and covered with a sterile drape. Local anesthesia was applied to the skin and subcutaneous tissues. Ultrasound guidance was used to identify the artery. A 20 -gauge catheter over a needle was then inserted into the artery. A guide wire was then passed easily through the needle. The needle was then withdrawn. A arterial catheter was then inserted into the vessel over the guide wire. The needle was then withdrawn and was connected to the monitor to ensure a proper waveform. The catheter was sutured into place and a sterile dressing was applied.     Ultrasound/Sonosite was used during the procedure.      Estimated blood loss:  Less than 1 ml    Patient tolerated procedure well.    Tish العلي MD, [facility]  5/15/2023
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
## 2023-05-15 NOTE — NURSING
Nurses Note -- 4 Eyes      5/15/2023   1300 PM      Skin assessed during: Admit      [] No Altered Skin Integrity Present    []Prevention Measures Documented      [x] Yes- Altered Skin Integrity Present or Discovered   [x] LDA Added if Not in Epic (Describe Wound)   [x] New Altered Skin Integrity was Present on Admit and Documented in LDA   [x] Wound Image Taken    Wound Care Consulted? Yes    Attending Nurse:  Ady Hearn RN     Second RN/Staff Member:  Bina Helms RN

## 2023-05-15 NOTE — NURSING
Attempted to visit regarding consult for sacrum and left hand, assigned nurse Torres RN at bedside reports not appropriate to redress wounds at this time, as wound care just completed per ICU staff, photos uploaded to EMR, recommended local wound care and Torres to discuss necrotic wounds with intensivist for potential surgical evaluation. Patient is currently resting on a low air loss mattress with pressure injury prevention measures in place.

## 2023-05-15 NOTE — PLAN OF CARE
Problem: Adult Inpatient Plan of Care  Goal: Plan of Care Review  Outcome: Ongoing, Progressing  Goal: Patient-Specific Goal (Individualized)  Outcome: Ongoing, Progressing  Goal: Absence of Hospital-Acquired Illness or Injury  Outcome: Ongoing, Progressing     Problem: Ventilator-Induced Lung Injury (Mechanical Ventilation, Invasive)  Goal: Absence of Ventilator-Induced Lung Injury  Outcome: Ongoing, Progressing     Problem: Device-Related Complication Risk (Artificial Airway)  Goal: Optimal Device Function  Outcome: Ongoing, Progressing
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
## 2023-05-16 LAB
ALBUMIN SERPL-MCNC: 1.5 G/DL (ref 3.5–5)
ALBUMIN SERPL-MCNC: 1.6 G/DL (ref 3.5–5)
ALBUMIN/GLOB SERPL: 0.3 RATIO (ref 1.1–2)
ALBUMIN/GLOB SERPL: 0.4 RATIO (ref 1.1–2)
ALP SERPL-CCNC: 196 UNIT/L (ref 40–150)
ALP SERPL-CCNC: 198 UNIT/L (ref 40–150)
ALT SERPL-CCNC: 36 UNIT/L (ref 0–55)
ALT SERPL-CCNC: 40 UNIT/L (ref 0–55)
ANION GAP SERPL CALC-SCNC: 17 MEQ/L
AST SERPL-CCNC: 36 UNIT/L (ref 5–34)
AST SERPL-CCNC: 53 UNIT/L (ref 5–34)
BASOPHILS # BLD AUTO: 0.02 X10(3)/MCL
BASOPHILS NFR BLD AUTO: 0.1 %
BILIRUBIN DIRECT+TOT PNL SERPL-MCNC: 0.5 MG/DL
BILIRUBIN DIRECT+TOT PNL SERPL-MCNC: 0.5 MG/DL
BUN SERPL-MCNC: 22.2 MG/DL (ref 8.9–20.6)
BUN SERPL-MCNC: 28 MG/DL (ref 8.9–20.6)
BUN SERPL-MCNC: 30.5 MG/DL (ref 8.9–20.6)
CALCIUM SERPL-MCNC: 5.1 MG/DL (ref 8.4–10.2)
CALCIUM SERPL-MCNC: 5.1 MG/DL (ref 8.4–10.2)
CALCIUM SERPL-MCNC: 5.3 MG/DL (ref 8.4–10.2)
CALCIUM UR-MCNC: <2 MG/DL
CHLORIDE SERPL-SCNC: 101 MMOL/L (ref 98–107)
CHLORIDE SERPL-SCNC: 97 MMOL/L (ref 98–107)
CHLORIDE SERPL-SCNC: 98 MMOL/L (ref 98–107)
CO2 SERPL-SCNC: 18 MMOL/L (ref 22–29)
CO2 SERPL-SCNC: 20 MMOL/L (ref 22–29)
CO2 SERPL-SCNC: 21 MMOL/L (ref 22–29)
CREAT SERPL-MCNC: 1.02 MG/DL (ref 0.73–1.18)
CREAT SERPL-MCNC: 1.24 MG/DL (ref 0.73–1.18)
CREAT SERPL-MCNC: 1.34 MG/DL (ref 0.73–1.18)
CREAT UR-MCNC: 8 MG/DL (ref 63–166)
CREAT/UREA NIT SERPL: 22
DEPRECATED CALCIDIOL+CALCIFEROL SERPL-MC: 22.1 NG/ML (ref 30–80)
EOSINOPHIL # BLD AUTO: 0.02 X10(3)/MCL (ref 0–0.9)
EOSINOPHIL NFR BLD AUTO: 0.1 %
ERYTHROCYTE [DISTWIDTH] IN BLOOD BY AUTOMATED COUNT: 19.7 % (ref 11.5–17)
GFR SERPLBLD CREATININE-BSD FMLA CKD-EPI: >60 MLS/MIN/1.73/M2
GLOBULIN SER-MCNC: 4.5 GM/DL (ref 2.4–3.5)
GLOBULIN SER-MCNC: 5.1 GM/DL (ref 2.4–3.5)
GLUCOSE SERPL-MCNC: 105 MG/DL (ref 74–100)
GLUCOSE SERPL-MCNC: 105 MG/DL (ref 74–100)
GLUCOSE SERPL-MCNC: 116 MG/DL (ref 74–100)
HCT VFR BLD AUTO: 27.9 % (ref 42–52)
HGB BLD-MCNC: 9.3 G/DL (ref 14–18)
IMM GRANULOCYTES # BLD AUTO: 0.2 X10(3)/MCL (ref 0–0.04)
IMM GRANULOCYTES NFR BLD AUTO: 1.3 %
LACTATE SERPL-SCNC: 0.8 MMOL/L (ref 0.5–2.2)
LYMPHOCYTES # BLD AUTO: 2.91 X10(3)/MCL (ref 0.6–4.6)
LYMPHOCYTES NFR BLD AUTO: 18.9 %
MAGNESIUM SERPL-MCNC: 1.6 MG/DL (ref 1.6–2.6)
MAGNESIUM SERPL-MCNC: 1.9 MG/DL (ref 1.6–2.6)
MAGNESIUM SERPL-MCNC: 2 MG/DL (ref 1.6–2.6)
MCH RBC QN AUTO: 24.7 PG (ref 27–31)
MCHC RBC AUTO-ENTMCNC: 33.3 G/DL (ref 33–36)
MCV RBC AUTO: 74 FL (ref 80–94)
MONOCYTES # BLD AUTO: 0.85 X10(3)/MCL (ref 0.1–1.3)
MONOCYTES NFR BLD AUTO: 5.5 %
MRSA PCR SCRN (OHS): NOT DETECTED
NEUTROPHILS # BLD AUTO: 11.38 X10(3)/MCL (ref 2.1–9.2)
NEUTROPHILS NFR BLD AUTO: 74.1 %
NRBC BLD AUTO-RTO: 0.8 %
OSMOLALITY SERPL: 289 MOSM/KG (ref 280–300)
OSMOLALITY UR: 113 MOSM/KG (ref 300–1300)
PHOSPHATE SERPL-MCNC: 8.6 MG/DL (ref 2.3–4.7)
PHOSPHATE SERPL-MCNC: 8.6 MG/DL (ref 2.3–4.7)
PHOSPHATE SERPL-MCNC: 8.9 MG/DL (ref 2.3–4.7)
PLATELET # BLD AUTO: 711 X10(3)/MCL (ref 130–400)
PMV BLD AUTO: 8.2 FL (ref 7.4–10.4)
POTASSIUM SERPL-SCNC: 3.3 MMOL/L (ref 3.5–5.1)
POTASSIUM SERPL-SCNC: 4.2 MMOL/L (ref 3.5–5.1)
POTASSIUM SERPL-SCNC: 4.3 MMOL/L (ref 3.5–5.1)
PREALB SERPL-MCNC: 12.7 MG/DL (ref 18–45)
PROT SERPL-MCNC: 6.1 GM/DL (ref 6.4–8.3)
PROT SERPL-MCNC: 6.6 GM/DL (ref 6.4–8.3)
PROT UR STRIP-MCNC: <6.8 MG/DL
PTH-INTACT SERPL-MCNC: <4 PG/ML (ref 8.7–77)
RBC # BLD AUTO: 3.77 X10(6)/MCL (ref 4.7–6.1)
SODIUM SERPL-SCNC: 131 MMOL/L (ref 136–145)
SODIUM SERPL-SCNC: 134 MMOL/L (ref 136–145)
SODIUM SERPL-SCNC: 139 MMOL/L (ref 136–145)
SODIUM UR-SCNC: 33 MMOL/L
SP GR UR STRIP.AUTO: 1.01 (ref 1–1.03)
T3FREE SERPL-MCNC: 1.18 PG/ML (ref 1.57–3.91)
T4 FREE SERPL-MCNC: 0.93 NG/DL (ref 0.7–1.48)
TROPONIN I SERPL-MCNC: 0.09 NG/ML (ref 0–0.04)
TSH SERPL-ACNC: 11.85 UIU/ML (ref 0.35–4.94)
VANCOMYCIN SERPL-MCNC: 21.2 UG/ML (ref 15–20)
WBC # SPEC AUTO: 15.38 X10(3)/MCL (ref 4.5–11.5)

## 2023-05-16 PROCEDURE — 85025 COMPLETE CBC W/AUTO DIFF WBC: CPT

## 2023-05-16 PROCEDURE — 84315 BODY FLUID SPECIFIC GRAVITY: CPT | Performed by: HOSPITALIST

## 2023-05-16 PROCEDURE — 20000000 HC ICU ROOM

## 2023-05-16 PROCEDURE — 83930 ASSAY OF BLOOD OSMOLALITY: CPT | Performed by: HOSPITALIST

## 2023-05-16 PROCEDURE — 84439 ASSAY OF FREE THYROXINE: CPT | Performed by: STUDENT IN AN ORGANIZED HEALTH CARE EDUCATION/TRAINING PROGRAM

## 2023-05-16 PROCEDURE — 82652 VIT D 1 25-DIHYDROXY: CPT | Mod: 90 | Performed by: STUDENT IN AN ORGANIZED HEALTH CARE EDUCATION/TRAINING PROGRAM

## 2023-05-16 PROCEDURE — 84443 ASSAY THYROID STIM HORMONE: CPT | Performed by: STUDENT IN AN ORGANIZED HEALTH CARE EDUCATION/TRAINING PROGRAM

## 2023-05-16 PROCEDURE — 99900025 HC BRONCHOSCOPY-ASST (STAT)

## 2023-05-16 PROCEDURE — 83970 ASSAY OF PARATHORMONE: CPT | Performed by: HOSPITALIST

## 2023-05-16 PROCEDURE — 25000003 PHARM REV CODE 250: Performed by: STUDENT IN AN ORGANIZED HEALTH CARE EDUCATION/TRAINING PROGRAM

## 2023-05-16 PROCEDURE — 27202055 HC BRONCHOSCOPE, DISP

## 2023-05-16 PROCEDURE — 83735 ASSAY OF MAGNESIUM: CPT | Performed by: STUDENT IN AN ORGANIZED HEALTH CARE EDUCATION/TRAINING PROGRAM

## 2023-05-16 PROCEDURE — 63600175 PHARM REV CODE 636 W HCPCS: Performed by: STUDENT IN AN ORGANIZED HEALTH CARE EDUCATION/TRAINING PROGRAM

## 2023-05-16 PROCEDURE — 84100 ASSAY OF PHOSPHORUS: CPT

## 2023-05-16 PROCEDURE — 63600175 PHARM REV CODE 636 W HCPCS: Performed by: INTERNAL MEDICINE

## 2023-05-16 PROCEDURE — 84100 ASSAY OF PHOSPHORUS: CPT | Performed by: STUDENT IN AN ORGANIZED HEALTH CARE EDUCATION/TRAINING PROGRAM

## 2023-05-16 PROCEDURE — 83935 ASSAY OF URINE OSMOLALITY: CPT | Performed by: HOSPITALIST

## 2023-05-16 PROCEDURE — 31622 DX BRONCHOSCOPE/WASH: CPT

## 2023-05-16 PROCEDURE — 80053 COMPREHEN METABOLIC PANEL: CPT

## 2023-05-16 PROCEDURE — 82306 VITAMIN D 25 HYDROXY: CPT | Performed by: STUDENT IN AN ORGANIZED HEALTH CARE EDUCATION/TRAINING PROGRAM

## 2023-05-16 PROCEDURE — 83735 ASSAY OF MAGNESIUM: CPT

## 2023-05-16 PROCEDURE — 83735 ASSAY OF MAGNESIUM: CPT | Performed by: HOSPITALIST

## 2023-05-16 PROCEDURE — 82340 ASSAY OF CALCIUM IN URINE: CPT

## 2023-05-16 PROCEDURE — 87641 MR-STAPH DNA AMP PROBE: CPT | Performed by: INTERNAL MEDICINE

## 2023-05-16 PROCEDURE — 25000003 PHARM REV CODE 250: Performed by: NURSE PRACTITIONER

## 2023-05-16 PROCEDURE — C9113 INJ PANTOPRAZOLE SODIUM, VIA: HCPCS | Performed by: NURSE PRACTITIONER

## 2023-05-16 PROCEDURE — 84484 ASSAY OF TROPONIN QUANT: CPT

## 2023-05-16 PROCEDURE — 83605 ASSAY OF LACTIC ACID: CPT

## 2023-05-16 PROCEDURE — 99900035 HC TECH TIME PER 15 MIN (STAT)

## 2023-05-16 PROCEDURE — 84100 ASSAY OF PHOSPHORUS: CPT | Performed by: HOSPITALIST

## 2023-05-16 PROCEDURE — 63600175 PHARM REV CODE 636 W HCPCS: Performed by: NURSE PRACTITIONER

## 2023-05-16 PROCEDURE — 63600175 PHARM REV CODE 636 W HCPCS

## 2023-05-16 PROCEDURE — 25000003 PHARM REV CODE 250

## 2023-05-16 PROCEDURE — 25000003 PHARM REV CODE 250: Performed by: INTERNAL MEDICINE

## 2023-05-16 PROCEDURE — 87070 CULTURE OTHR SPECIMN AEROBIC: CPT | Performed by: HOSPITALIST

## 2023-05-16 PROCEDURE — 94761 N-INVAS EAR/PLS OXIMETRY MLT: CPT

## 2023-05-16 PROCEDURE — 80053 COMPREHEN METABOLIC PANEL: CPT | Performed by: STUDENT IN AN ORGANIZED HEALTH CARE EDUCATION/TRAINING PROGRAM

## 2023-05-16 PROCEDURE — 99900026 HC AIRWAY MAINTENANCE (STAT)

## 2023-05-16 PROCEDURE — 94003 VENT MGMT INPAT SUBQ DAY: CPT

## 2023-05-16 PROCEDURE — 84481 FREE ASSAY (FT-3): CPT | Performed by: STUDENT IN AN ORGANIZED HEALTH CARE EDUCATION/TRAINING PROGRAM

## 2023-05-16 PROCEDURE — 82570 ASSAY OF URINE CREATININE: CPT

## 2023-05-16 PROCEDURE — 80202 ASSAY OF VANCOMYCIN: CPT | Performed by: INTERNAL MEDICINE

## 2023-05-16 PROCEDURE — 27000221 HC OXYGEN, UP TO 24 HOURS

## 2023-05-16 PROCEDURE — 84134 ASSAY OF PREALBUMIN: CPT | Performed by: SURGERY

## 2023-05-16 PROCEDURE — 84300 ASSAY OF URINE SODIUM: CPT | Performed by: HOSPITALIST

## 2023-05-16 RX ORDER — CALCIUM GLUCONATE 20 MG/ML
1 INJECTION, SOLUTION INTRAVENOUS
Status: COMPLETED | OUTPATIENT
Start: 2023-05-16 | End: 2023-05-16

## 2023-05-16 RX ORDER — MAGNESIUM SULFATE HEPTAHYDRATE 40 MG/ML
2 INJECTION, SOLUTION INTRAVENOUS ONCE
Status: COMPLETED | OUTPATIENT
Start: 2023-05-16 | End: 2023-05-16

## 2023-05-16 RX ORDER — PANTOPRAZOLE SODIUM 40 MG/10ML
40 INJECTION, POWDER, LYOPHILIZED, FOR SOLUTION INTRAVENOUS DAILY
Status: DISCONTINUED | OUTPATIENT
Start: 2023-05-16 | End: 2023-06-08 | Stop reason: HOSPADM

## 2023-05-16 RX ORDER — MAGNESIUM SULFATE 1 G/100ML
1 INJECTION INTRAVENOUS ONCE
Status: COMPLETED | OUTPATIENT
Start: 2023-05-16 | End: 2023-05-16

## 2023-05-16 RX ORDER — CALCITRIOL 0.25 UG/1
0.5 CAPSULE ORAL DAILY
Status: DISCONTINUED | OUTPATIENT
Start: 2023-05-16 | End: 2023-06-08 | Stop reason: HOSPADM

## 2023-05-16 RX ORDER — CALCIUM GLUCONATE 20 MG/ML
1 INJECTION, SOLUTION INTRAVENOUS ONCE
Status: COMPLETED | OUTPATIENT
Start: 2023-05-16 | End: 2023-05-16

## 2023-05-16 RX ORDER — CALCIUM ACETATE 667 MG/1
667 CAPSULE ORAL
Status: DISCONTINUED | OUTPATIENT
Start: 2023-05-16 | End: 2023-06-08 | Stop reason: HOSPADM

## 2023-05-16 RX ORDER — POTASSIUM CHLORIDE 7.45 MG/ML
10 INJECTION INTRAVENOUS
Status: DISCONTINUED | OUTPATIENT
Start: 2023-05-16 | End: 2023-05-16

## 2023-05-16 RX ORDER — POTASSIUM CHLORIDE 14.9 MG/ML
40 INJECTION INTRAVENOUS ONCE
Status: COMPLETED | OUTPATIENT
Start: 2023-05-16 | End: 2023-05-16

## 2023-05-16 RX ADMIN — PROPOFOL 50 MCG/KG/MIN: 10 INJECTION, EMULSION INTRAVENOUS at 11:05

## 2023-05-16 RX ADMIN — CALCIUM GLUCONATE 1 G: 20 INJECTION, SOLUTION INTRAVENOUS at 10:05

## 2023-05-16 RX ADMIN — CALCIUM GLUCONATE 1 G: 20 INJECTION, SOLUTION INTRAVENOUS at 09:05

## 2023-05-16 RX ADMIN — CALCIUM GLUCONATE 1 G: 20 INJECTION, SOLUTION INTRAVENOUS at 04:05

## 2023-05-16 RX ADMIN — NOREPINEPHRINE BITARTRATE 0.16 MCG/KG/MIN: 8 INJECTION, SOLUTION INTRAVENOUS at 05:05

## 2023-05-16 RX ADMIN — CALCITRIOL CAPSULES 0.25 MCG 0.5 MCG: 0.25 CAPSULE ORAL at 01:05

## 2023-05-16 RX ADMIN — PIPERACILLIN AND TAZOBACTAM 4.5 G: 4; .5 INJECTION, POWDER, LYOPHILIZED, FOR SOLUTION INTRAVENOUS; PARENTERAL at 11:05

## 2023-05-16 RX ADMIN — CALCIUM GLUCONATE 1 G: 20 INJECTION, SOLUTION INTRAVENOUS at 01:05

## 2023-05-16 RX ADMIN — VANCOMYCIN HYDROCHLORIDE 750 MG: 750 INJECTION, POWDER, LYOPHILIZED, FOR SOLUTION INTRAVENOUS at 03:05

## 2023-05-16 RX ADMIN — CALCIUM ACETATE 667 MG: 667 CAPSULE ORAL at 05:05

## 2023-05-16 RX ADMIN — NOREPINEPHRINE BITARTRATE 0.16 MCG/KG/MIN: 8 INJECTION, SOLUTION INTRAVENOUS at 03:05

## 2023-05-16 RX ADMIN — PIPERACILLIN AND TAZOBACTAM 4.5 G: 4; .5 INJECTION, POWDER, LYOPHILIZED, FOR SOLUTION INTRAVENOUS; PARENTERAL at 04:05

## 2023-05-16 RX ADMIN — ENOXAPARIN SODIUM 40 MG: 40 INJECTION SUBCUTANEOUS at 05:05

## 2023-05-16 RX ADMIN — MAGNESIUM SULFATE IN DEXTROSE 1 G: 10 INJECTION, SOLUTION INTRAVENOUS at 01:05

## 2023-05-16 RX ADMIN — PROPOFOL 40 MCG/KG/MIN: 10 INJECTION, EMULSION INTRAVENOUS at 05:05

## 2023-05-16 RX ADMIN — CALCIUM GLUCONATE 1 G: 20 INJECTION, SOLUTION INTRAVENOUS at 05:05

## 2023-05-16 RX ADMIN — PROPOFOL 70 MCG/KG/MIN: 10 INJECTION, EMULSION INTRAVENOUS at 10:05

## 2023-05-16 RX ADMIN — PIPERACILLIN AND TAZOBACTAM 4.5 G: 4; .5 INJECTION, POWDER, LYOPHILIZED, FOR SOLUTION INTRAVENOUS; PARENTERAL at 06:05

## 2023-05-16 RX ADMIN — PROPOFOL 50 MCG/KG/MIN: 10 INJECTION, EMULSION INTRAVENOUS at 06:05

## 2023-05-16 RX ADMIN — PANTOPRAZOLE SODIUM 40 MG: 40 INJECTION, POWDER, FOR SOLUTION INTRAVENOUS at 11:05

## 2023-05-16 RX ADMIN — MAGNESIUM SULFATE HEPTAHYDRATE 2 G: 40 INJECTION, SOLUTION INTRAVENOUS at 05:05

## 2023-05-16 RX ADMIN — POTASSIUM CHLORIDE 40 MEQ: 14.9 INJECTION, SOLUTION INTRAVENOUS at 10:05

## 2023-05-16 RX ADMIN — CALCIUM GLUCONATE 1 G: 20 INJECTION, SOLUTION INTRAVENOUS at 11:05

## 2023-05-16 RX ADMIN — PROPOFOL 40 MCG/KG/MIN: 10 INJECTION, EMULSION INTRAVENOUS at 01:05

## 2023-05-16 NOTE — PROCEDURES
[facility] - 7th Floor ICU  Bronchoscopy   Procedure Note    SUMMARY     Date of Procedure: 5/16/2023    Procedure: Flexible fiberoptic bronchoscopy    Performed by: Chester Velazquez MD    Pre-Procedure Diagnosis:  Left lung collapse    Post-Procedure Diagnosis:  Same    Anesthesia:  Propofol drip      Indications and Consent:  The patient is a 27 y.o. male with  left lung collapse    The risks, benefits, potential complications, treatment options and expected outcomes were discussed with the patient and/or family.  The possibilities of reaction to medication, pulmonary aspiration, perforation of a viscus, bleeding, failure to diagnose a condition and creating a complication requiring transfusion or operation were discussed.  Verbal consent was granted from mother.      Description of the Findings of the Procedure:   A Time Out was held and the above information confirmed.     The fiberoptic bronchoscope was inserted via the endotracheal tube.  Upper airway therefore was not examined.  The distal trachea appeared normal as did the main frances.  Scope was advanced to the left mainstem bronchus.  Immediately entering the left mainstem bronchus there was a moderate amount of thick white secretions.  This was lavaged until clear.  Scope was advanced and the left upper and left lower lung were visualized.  Moderate amount of thick yellow secretions were suctioned clear.  Postprocedure left airway system was patent.  Scope was brought back to the main frances and advanced to the right mainstem bronchus.  The right upper, right middle and right lower lung with patent free of lesions.  Bronchoscope was subsequently removed and cultures were obtained.    The patient recovered from the procedure and anesthesia in satisfactory condition.      Complications:  None    Estimated Blood Loss (EBL): Minimal    Specimens:  Bronchial wash for culture
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
## 2023-05-16 NOTE — PROGRESS NOTES
Pharmacokinetic Assessment Follow Up: IV Vancomycin    Vancomycin serum concentration assessment(s):    The trough level was drawn correctly and can be used to guide therapy at this time. The measurement is above the desired definitive target range of 15 to 20 mcg/mL.    Vancomycin Regimen Plan:    Change regimen to Vancomycin 750 mg IV every 24 hours with next serum trough concentration measured at 1500 on 05/18    Scheduled Administration Times    1600    Drug levels (last 3 results):  Recent Labs   Lab Result Units 05/16/23  0252   Vanc Lvl Random ug/ml 21.2*       Vancomycin Administrations:  vancomycin given in the last 96 hours                     vancomycin 1.5 g in dextrose 5 % 250 mL IVPB (ready to mix) (mg) 1,500 mg New Bag 05/15/23 1613                    Pharmacy will continue to follow and monitor vancomycin.    Please contact pharmacy at extension [phone] for questions regarding this assessment.    Thank you for the consult,   Darlin Messer       Patient brief summary:  Jyoti Mayberry is a 27 y.o. male initiated on antimicrobial therapy with IV Vancomycin for treatment of bacteremia    The patient's current regimen is 750 mg every 24 hours    Drug Allergies:   Review of patient's allergies indicates:  No Known Allergies    Actual Body Weight:   77.1 kg    Renal Function:   Estimated Creatinine Clearance: 83.5 mL/min (A) (based on SCr of 1.34 mg/dL (H)).,     Dialysis Method (if applicable):  N/A    CBC (last 72 hours):  Recent Labs   Lab Result Units 05/15/23  1027 05/15/23  1624 05/15/23  2123 05/16/23  0252   WBC x10(3)/mcL 9.58 23.63* 20.60* 15.38*   Hgb g/dL 5.0* 8.1* 9.2* 9.3*   Hct % 16.3* 24.4* 27.1* 27.9*   Platelet x10(3)/mcL 534* 613* 663* 711*   Mono % % 5.1  --  4.3 5.5   Monocyte Man %  --  2  --   --    Eos % % 0.3  --  0.0 0.1   Basophil % % 0.1  --  0.2 0.1       Metabolic Panel (last 72 hours):  Recent Labs   Lab Result Units 05/15/23  1027 05/15/23  1100 05/15/23  1434 05/15/23  1624  05/15/23  1841 05/15/23  2256 05/16/23  0252   Sodium Level mmol/L 131*  --   --  132*  --  131* 131*   Sodium, Blood Gas mmol/L  --  127*  --   --  126*  --   --    Potassium Level mmol/L 4.5  --   --  5.3*  --  4.7 4.3   Potassium, Blood Gas mmol/L  --  4.3  --   --  4.6  --   --    Chloride mmol/L 96*  --   --  96*  --  97* 97*   Carbon Dioxide mmol/L 18*  --   --  18*  --  19* 18*   Glucose Level mg/dL 91  --   --  165*  --  130* 116*   Glucose, UA mg/dL  --   --  Negative  --   --   --   --    Blood Urea Nitrogen mg/dL 34.1*  --   --  32.6*  --  30.8* 30.5*   Creatinine mg/dL 1.33*  --   --  1.29*  --  1.22* 1.34*   Albumin Level g/dL 1.7*  --   --  1.7*  --  1.6* 1.5*   Bilirubin Total mg/dL 0.2  --   --  0.6  --  0.5 0.5   Alkaline Phosphatase unit/L 179*  --   --  231*  --  208* 198*   Aspartate Aminotransferase unit/L 71*  --   --  103*  --  64* 53*   Alanine Aminotransferase unit/L 39  --   --  48  --  42 40   Magnesium Level mg/dL 1.10*  --   --  1.50*  --   --  1.60   Phosphorus Level mg/dL  --   --   --  9.4*  --  8.6* 8.6*       Microbiologic Results:  Microbiology Results (last 7 days)       Procedure Component Value Units Date/Time    Urine culture [[id number]] Collected: 05/15/23 1434    Order Status: Completed Specimen: Urine Updated: 05/16/23 0630     Urine Culture No Growth At 24 Hours    Respiratory Culture [[id number]] Collected: 05/15/23 1439    Order Status: Sent Specimen: Sputum from Endotracheal Aspirate Updated: 05/15/23 1446    Blood culture #2 **CANNOT BE ORDERED STAT** [[id number]] Collected: 05/15/23 1027    Order Status: Resulted Specimen: Blood Updated: 05/15/23 1034    Blood culture #1 **CANNOT BE ORDERED STAT** [[id number]] Collected: 05/15/23 1027    Order Status: Resulted Specimen: Blood Updated: 05/15/23 1034

## 2023-05-16 NOTE — PROGRESS NOTES
"   Acute Care Surgery   Progress Note  Admit Date: 5/15/2023  HD#1  POD#* No surgery found *    Subjective:   Interval history:  Hypothermic to 93.1, hypotensive to 70s/40s  Intubated, sedated, on levophed  Echo showed EF 15-20% yesterday  Left sided pleural effusion with white out lung on CXR. Plans for bronch today.   UTI on UA  Blood, urine, and respiratory cultures pending    Home Meds:No current outpatient medications   Scheduled Meds:   enoxaparin  40 mg Subcutaneous Daily    piperacillin-tazobactam (ZOSYN) IVPB  4.5 g Intravenous Q8H    vancomycin (VANCOCIN) IVPB  750 mg Intravenous Q24H     Continuous Infusions:   lactated ringers 2,000 mL (05/15/23 1950)    NORepinephrine bitartrate-D5W 0.18 mcg/kg/min (05/16/23 0545)    propofoL 50 mcg/kg/min (05/16/23 0646)     PRN Meds:sodium chloride, sodium chloride 0.9%, Pharmacy to dose Vancomycin consult **AND** vancomycin - pharmacy to dose     Objective:     VITAL SIGNS: 24 HR MIN & MAX LAST   Temp  Min: 93.1 °F (33.9 °C)  Max: 99 °F (37.2 °C)  98.9 °F (37.2 °C)   BP  Min: 75/42  Max: 161/84  (!) 86/71    Pulse  Min: 82  Max: 130  93    Resp  Min: 19  Max: 29  (!) 24    SpO2  Min: 29 %  Max: 100 %  100 %      HT: 5' 9.02" (175.3 cm)  WT: 77.1 kg (170 lb)  BMI:       Intake/output:  Intake/Output - Last 3 Shifts         05/14 0700  05/15 0659 05/15 0700 05/16 0659 05/16 0700 05/17 0659    I.V. (mL/kg)  2002 (26)     Total Intake(mL/kg)  2002 (26)     Urine (mL/kg/hr)  1130 400 (2.4)    Drains  700     Total Output  1830 400    Net  +172 -400                   Intake/Output Summary (Last 24 hours) at 5/16/2023 0910  Last data filed at 5/16/2023 0731  Gross per 24 hour   Intake 2002 ml   Output 2230 ml   Net -228 ml           Lines/drains/airway:  Percutaneous Central Line Insertion/Assessment - Triple Lumen  05/15/23 Femoral Vein Right;Femoral Right (Active)   Line Necessity Review Hemodynamic instability;Medication caustic to vasculature 05/16/23 0700   $ Central " Line Charges (Upon insertion) Bedside Insertion Performed Pt > 5 Years Old;Catheter - Triple Lumen (Supply) 05/15/23 1500   Site Assessment No drainage;No redness;No swelling;No warmth 05/16/23 0700   Line Securement Device Secured with sutures 05/16/23 0700   Dressing Type CHG impregnated dressing/sponge;Central line dressing 05/16/23 0700   Dressing Status Clean;Dry;Intact 05/16/23 0700   Dressing Intervention First dressing 05/16/23 0700   Date on Dressing 05/15/23 05/16/23 0700   Dressing Due to be Changed 05/17/23 05/16/23 0700   Distal Patency/Care flushed w/o difficulty;infusing 05/16/23 0700   Medial 1 Patency/Care flushed w/o difficulty;infusing 05/16/23 0700   Proximal 1 Patency/Care flushed w/o difficulty;infusing 05/16/23 0700   Waveform Not being transduced 05/16/23 0700   Number of days: 1            Peripheral IV - Single Lumen 05/15/23 0930 18 G Right  (Active)   Site Assessment Clean;Dry;Intact;No redness;No swelling;No warmth;No drainage 05/16/23 0700   Extremity Assessment Distal to IV No abnormal discoloration;No redness;No swelling;No warmth 05/16/23 0700   Line Status Blood return noted;Flushed;Saline locked 05/16/23 0700   Dressing Status Clean;Dry;Intact 05/16/23 0700   Dressing Intervention First dressing 05/16/23 0700   Number of days: 0       Arterial Line 05/15/23 1500 Right Radial (Active)   $ Arterial Line Charges (Upon Insertion) Bedside Insertion Performed 05/15/23 1500   Site Assessment Clean;Dry;Intact;No redness;No swelling;No warmth;No drainage 05/16/23 0700   Line Status Pulsatile blood flow 05/16/23 0700   Art Line Waveform Appropriate 05/16/23 0700   Arterial Line Interventions Zeroed and calibrated;Leveled 05/16/23 0700   Color/Movement/Sensation Capillary refill less than 3 sec 05/16/23 0700   Dressing Type CHG impregnated dressing/sponge;Central line dressing 05/16/23 0700   Dressing Status Clean;Dry;Intact 05/16/23 0700   Dressing Intervention First dressing 05/16/23 0700  "  Number of days: 0            NG/OG Tube 05/15/23 1325 Right nostril (Active)   Placement Check placement verified by aspirate characteristics;placement verified by distal tube length measurement 05/16/23 0700   Distal Tube Length (cm) 65 05/16/23 0700   Tolerance no signs/symptoms of discomfort 05/16/23 0700   Securement secured to nostril center w/ adhesive device 05/16/23 0700   Clamp Status/Tolerance unclamped;no abdominal discomfort;no emesis;no abdominal distention;no restlessness 05/16/23 0700   Suction Setting/Drainage Method suction initiated at;low;intermittent setting 05/16/23 0700   Insertion Site Appearance no redness, warmth, tenderness, skin breakdown, drainage 05/16/23 0700   Drainage Bile;Brown 05/16/23 0700   Tube Output(mL)(Include Discarded Residual) 700 mL 05/16/23 0600   Number of days: 0            Urethral Catheter 05/15/23 1015 Straight-tip (Active)   $ Shen Insertion Bedside Insertion Performed 05/15/23 1500   Site Assessment Clean;Intact;Dry 05/16/23 0700   Collection Container Urimeter 05/16/23 0700   Securement Method secured to top of thigh w/ adhesive device 05/16/23 0700   Catheter Care Performed yes 05/16/23 0700   Reason for Continuing Urinary Catheterization Critically ill in ICU and requiring hourly monitoring of intake/output 05/16/23 0700   CAUTI Prevention Bundle Securement Device in place with 1" slack;Drainage bag/urimeter off the floor;Intact seal between catheter & drainage tubing;Sheeting clip in use;No dependent loops or kinks;Drainage bag/urimeter not overfilled (<2/3 full);Drainage bag/urimeter below bladder 05/16/23 0700   Output (mL) 325 mL 05/16/23 0600   Number of days: 0       Physical examination:  Gen: Intubated and sedated  HEENT: Intubated  CV: RR  Resp: NWOB  Abd: S/NT/ND  Ext: R forearm wound with clean and dry bandage in place. Bilateral heel ulcers with bandages in place.   : Deferred  Neuro: CN II-XII grossly intact    Labs:  Renal:  Recent Labs     " 05/15/23  1027 05/15/23  1624 05/15/23  2256 05/16/23  0252   BUN 34.1* 32.6* 30.8* 30.5*   CREATININE 1.33* 1.29* 1.22* 1.34*     Recent Labs     05/15/23  1027 05/15/23  1149 05/15/23  1624 05/15/23  2256 05/16/23  0553   LACTIC 4.2* 3.1* 1.4 0.9 0.8     FENGI:  Recent Labs     05/15/23  1027 05/15/23  1624 05/15/23  2256 05/16/23  0252   * 132* 131* 131*   K 4.5 5.3* 4.7 4.3   CO2 18* 18* 19* 18*   CALCIUM 4.0* 4.0* 4.5* 5.1*   MG 1.10* 1.50*  --  1.60   PHOS  --  9.4* 8.6* 8.6*   ALBUMIN 1.7* 1.7* 1.6* 1.5*   BILITOT 0.2 0.6 0.5 0.5   AST 71* 103* 64* 53*   ALKPHOS 179* 231* 208* 198*   ALT 39 48 42 40     Heme:  Recent Labs     05/15/23  1027 05/15/23  1624 05/15/23  2123 05/16/23  0252   HGB 5.0* 8.1* 9.2* 9.3*   HCT 16.3* 24.4* 27.1* 27.9*   * 613* 663* 711*   PTT 31.1  --   --   --    INR 1.44*  --   --   --      ID:  Recent Labs     05/15/23  1027 05/15/23  1624 05/15/23  2123 05/16/23  0252   WBC 9.58 23.63* 20.60* 15.38*     CBG:  Recent Labs     05/15/23  1027 05/15/23  1624 05/15/23  2256 05/16/23  0252   GLUCOSE 91 165* 130* 116*      Cardiovascular:  Recent Labs   Lab 05/15/23  1027 05/15/23  1624 05/15/23  2019 05/15/23  2256 05/16/23  0252   TROPONINI <0.010   < > 0.020 0.076* 0.087*   BNP 1,533.2*  --   --   --   --     < > = values in this interval not displayed.     I have reviewed all pertinent lab results within the past 24 hours.    Imaging:  X-Ray Chest 1 View   Final Result      1. Endotracheal tube with tip 4.4 cm above the frances.   2. New complete opacification of the left hemithorax.         Electronically signed by: Eleanor Farley   Date:    05/16/2023   Time:    06:15      CT Chest Without Contrast   Final Result      Multifocal bilateral consolidation.  Pneumonia possible, but favor pulmonary edema given other findings.      Moderate bilateral pleural effusions with small volume ascites.         Electronically signed by: Jesús Corey   Date:    05/15/2023    Time:    17:31      X-Ray Chest AP Portable   Final Result      Cardiomegaly.      Increase interstitial and pulmonary vascular markings.      Bilateral pleural effusions.      Increased left retrocardiac density and silhouetting of the left hemidiaphragm as above         Electronically signed by: Bull Valentin   Date:    05/15/2023   Time:    13:45         I have reviewed all pertinent imaging results/findings within the past 24 hours.    Micro/Path/Other:  Microbiology Results (last 7 days)       Procedure Component Value Units Date/Time    Respiratory Culture [[id number]] Collected: 05/15/23 1439    Order Status: Completed Specimen: Sputum from Endotracheal Aspirate Updated: 05/16/23 0831     Respiratory Culture Normal respiratory diana     GRAM STAIN Quality 3+      No bacteria seen    Urine culture [[id number]] Collected: 05/15/23 1434    Order Status: Completed Specimen: Urine Updated: 05/16/23 0630     Urine Culture No Growth At 24 Hours    Blood culture #2 **CANNOT BE ORDERED STAT** [[id number]] Collected: 05/15/23 1027    Order Status: Resulted Specimen: Blood Updated: 05/15/23 1034    Blood culture #1 **CANNOT BE ORDERED STAT** [[id number]] Collected: 05/15/23 1027    Order Status: Resulted Specimen: Blood Updated: 05/15/23 1034           Specimen (168h ago, onward)      None             Assessment & Plan:   27 year old quadriplegic man with extensive decubitus ulcers of LUE, sacrum, bilateral ischia, and bilateral heels. Presented in sepsis with UTI and left sided pleural effusion.    - Recommend plastics/hand consult for evaluation of right forearm wound   - Recommend podiatry consult for evaluation of bilateral heel wounds   - Will tentatively schedule patient for excisional debridement of sacral decubitus ulcers tomorrow. Will discuss with patient's family members.   - NPO at midnight.  - Patient may benefit from diverting colostomy to heal extensive sacral wounds. Will discuss with family.  -  Remainder of plan of care per primary team     Mary Greene MD  [employer] General Surgery, PGY-1

## 2023-05-16 NOTE — NURSING
[facility] notified per my concern with the state of pt's wounds and his critical condition upon arrival of our ED. Please see notes and photos for further information.

## 2023-05-16 NOTE — PLAN OF CARE
Problem: Infection  Goal: Absence of Infection Signs and Symptoms  Outcome: Ongoing, Progressing     Problem: Adult Inpatient Plan of Care  Goal: Plan of Care Review  Outcome: Ongoing, Progressing  Goal: Patient-Specific Goal (Individualized)  Outcome: Ongoing, Progressing  Goal: Absence of Hospital-Acquired Illness or Injury  Outcome: Ongoing, Progressing     Problem: Communication Impairment (Mechanical Ventilation, Invasive)  Goal: Effective Communication  Outcome: Ongoing, Progressing     Problem: Device-Related Complication Risk (Mechanical Ventilation, Invasive)  Goal: Optimal Device Function  Outcome: Ongoing, Progressing     Problem: Inability to Wean (Mechanical Ventilation, Invasive)  Goal: Mechanical Ventilation Liberation  Outcome: Ongoing, Progressing     Problem: Ventilator-Induced Lung Injury (Mechanical Ventilation, Invasive)  Goal: Absence of Ventilator-Induced Lung Injury  Outcome: Ongoing, Progressing
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
## 2023-05-17 LAB
ALBUMIN SERPL-MCNC: 1.5 G/DL (ref 3.5–5)
ALBUMIN SERPL-MCNC: 1.5 G/DL (ref 3.5–5)
ALBUMIN/GLOB SERPL: 0.4 RATIO (ref 1.1–2)
ALBUMIN/GLOB SERPL: 0.4 RATIO (ref 1.1–2)
ALLENS TEST BLOOD GAS (OHS): ABNORMAL
ALP SERPL-CCNC: 155 UNIT/L (ref 40–150)
ALP SERPL-CCNC: 167 UNIT/L (ref 40–150)
ALT SERPL-CCNC: 24 UNIT/L (ref 0–55)
ALT SERPL-CCNC: 27 UNIT/L (ref 0–55)
AST SERPL-CCNC: 17 UNIT/L (ref 5–34)
AST SERPL-CCNC: 19 UNIT/L (ref 5–34)
BACTERIA SPT CULT: ABNORMAL
BASE EXCESS BLD CALC-SCNC: 1.9 MMOL/L (ref -2–2)
BASOPHILS # BLD AUTO: 0.05 X10(3)/MCL
BASOPHILS NFR BLD AUTO: 0.5 %
BILIRUBIN DIRECT+TOT PNL SERPL-MCNC: 0.4 MG/DL
BILIRUBIN DIRECT+TOT PNL SERPL-MCNC: 0.4 MG/DL
BLOOD GAS SAMPLE TYPE (OHS): ABNORMAL
BUN SERPL-MCNC: 19 MG/DL (ref 8.9–20.6)
BUN SERPL-MCNC: 20.5 MG/DL (ref 8.9–20.6)
CA-I BLD-SCNC: 0.62 MMOL/L (ref 1.12–1.23)
CALCIUM SERPL-MCNC: 5.3 MG/DL (ref 8.4–10.2)
CALCIUM SERPL-MCNC: 5.3 MG/DL (ref 8.4–10.2)
CHLORIDE SERPL-SCNC: 103 MMOL/L (ref 98–107)
CHLORIDE SERPL-SCNC: 105 MMOL/L (ref 98–107)
CO2 BLDA-SCNC: 23.7 MMOL/L
CO2 SERPL-SCNC: 19 MMOL/L (ref 22–29)
CO2 SERPL-SCNC: 22 MMOL/L (ref 22–29)
COHGB MFR BLDA: 1.8 % (ref 0.5–1.5)
CREAT SERPL-MCNC: 1.01 MG/DL (ref 0.73–1.18)
CREAT SERPL-MCNC: 1.07 MG/DL (ref 0.73–1.18)
DRAWN BY BLOOD GAS (OHS): ABNORMAL
EOSINOPHIL # BLD AUTO: 0.05 X10(3)/MCL (ref 0–0.9)
EOSINOPHIL NFR BLD AUTO: 0.5 %
ERYTHROCYTE [DISTWIDTH] IN BLOOD BY AUTOMATED COUNT: 21.1 % (ref 11.5–17)
FIO2 BLOOD GAS (OHS): 45 %
GFR SERPLBLD CREATININE-BSD FMLA CKD-EPI: >60 MLS/MIN/1.73/M2
GFR SERPLBLD CREATININE-BSD FMLA CKD-EPI: >60 MLS/MIN/1.73/M2
GLOBULIN SER-MCNC: 4.1 GM/DL (ref 2.4–3.5)
GLOBULIN SER-MCNC: 4.2 GM/DL (ref 2.4–3.5)
GLUCOSE SERPL-MCNC: 79 MG/DL (ref 74–100)
GLUCOSE SERPL-MCNC: 84 MG/DL (ref 74–100)
GRAM STN SPEC: ABNORMAL
GRAM STN SPEC: ABNORMAL
HCO3 BLDA-SCNC: 23 MMOL/L (ref 22–26)
HCT VFR BLD AUTO: 28 % (ref 42–52)
HGB BLD-MCNC: 9.1 G/DL (ref 14–18)
IMM GRANULOCYTES # BLD AUTO: 0.08 X10(3)/MCL (ref 0–0.04)
IMM GRANULOCYTES NFR BLD AUTO: 0.7 %
LYMPHOCYTES # BLD AUTO: 2.57 X10(3)/MCL (ref 0.6–4.6)
LYMPHOCYTES NFR BLD AUTO: 23.1 %
MAGNESIUM SERPL-MCNC: 1.7 MG/DL (ref 1.6–2.6)
MAGNESIUM SERPL-MCNC: 1.8 MG/DL (ref 1.6–2.6)
MCH RBC QN AUTO: 24.1 PG (ref 27–31)
MCHC RBC AUTO-ENTMCNC: 32.5 G/DL (ref 33–36)
MCV RBC AUTO: 74.3 FL (ref 80–94)
MECH RR (OHS): 24 B/MIN
MECH VT (OHS): 500 ML
METHGB MFR BLDA: 0.6 % (ref 0.4–1.5)
MONOCYTES # BLD AUTO: 0.79 X10(3)/MCL (ref 0.1–1.3)
MONOCYTES NFR BLD AUTO: 7.1 %
NEUTROPHILS # BLD AUTO: 7.57 X10(3)/MCL (ref 2.1–9.2)
NEUTROPHILS NFR BLD AUTO: 68.1 %
NRBC BLD AUTO-RTO: 0.5 %
O2 HB BLOOD GAS (OHS): 92.8 % (ref 94–97)
OXYHGB MFR BLDA: 9.2 G/DL (ref 12–16)
PCO2 BLDA: 24 MMHG (ref 35–45)
PEEP (OHS): 5 CMH2O
PH BLDA: 7.59 [PH] (ref 7.35–7.45)
PHOSPHATE SERPL-MCNC: 9.2 MG/DL (ref 2.3–4.7)
PHOSPHATE SERPL-MCNC: 9.3 MG/DL (ref 2.3–4.7)
PLATELET # BLD AUTO: 699 X10(3)/MCL (ref 130–400)
PMV BLD AUTO: 8 FL (ref 7.4–10.4)
PO2 BLDA: 68 MMHG (ref 80–100)
POTASSIUM BLOOD GAS (OHS): 3.7 MMOL/L (ref 3.5–5)
POTASSIUM SERPL-SCNC: 4 MMOL/L (ref 3.5–5.1)
POTASSIUM SERPL-SCNC: 4.3 MMOL/L (ref 3.5–5.1)
PROT SERPL-MCNC: 5.6 GM/DL (ref 6.4–8.3)
PROT SERPL-MCNC: 5.7 GM/DL (ref 6.4–8.3)
RBC # BLD AUTO: 3.77 X10(6)/MCL (ref 4.7–6.1)
SAMPLE SITE BLOOD GAS (OHS): ABNORMAL
SAO2 % BLDA: 96.1 %
SODIUM BLOOD GAS (OHS): 138 MMOL/L (ref 137–145)
SODIUM SERPL-SCNC: 141 MMOL/L (ref 136–145)
SODIUM SERPL-SCNC: 141 MMOL/L (ref 136–145)
VANCOMYCIN TROUGH SERPL-MCNC: 14.1 UG/ML (ref 15–20)
WBC # SPEC AUTO: 11.11 X10(3)/MCL (ref 4.5–11.5)

## 2023-05-17 PROCEDURE — 63600175 PHARM REV CODE 636 W HCPCS: Performed by: STUDENT IN AN ORGANIZED HEALTH CARE EDUCATION/TRAINING PROGRAM

## 2023-05-17 PROCEDURE — 94667 MNPJ CHEST WALL 1ST: CPT

## 2023-05-17 PROCEDURE — 83735 ASSAY OF MAGNESIUM: CPT

## 2023-05-17 PROCEDURE — 25000003 PHARM REV CODE 250: Performed by: STUDENT IN AN ORGANIZED HEALTH CARE EDUCATION/TRAINING PROGRAM

## 2023-05-17 PROCEDURE — 99900031 HC PATIENT EDUCATION (STAT)

## 2023-05-17 PROCEDURE — 25000003 PHARM REV CODE 250: Performed by: HOSPITALIST

## 2023-05-17 PROCEDURE — 94761 N-INVAS EAR/PLS OXIMETRY MLT: CPT

## 2023-05-17 PROCEDURE — 99900026 HC AIRWAY MAINTENANCE (STAT)

## 2023-05-17 PROCEDURE — 99900035 HC TECH TIME PER 15 MIN (STAT)

## 2023-05-17 PROCEDURE — 99223 1ST HOSP IP/OBS HIGH 75: CPT | Mod: ,,, | Performed by: SURGERY

## 2023-05-17 PROCEDURE — 85025 COMPLETE CBC W/AUTO DIFF WBC: CPT

## 2023-05-17 PROCEDURE — 63600175 PHARM REV CODE 636 W HCPCS: Performed by: INTERNAL MEDICINE

## 2023-05-17 PROCEDURE — 63600175 PHARM REV CODE 636 W HCPCS: Performed by: NURSE PRACTITIONER

## 2023-05-17 PROCEDURE — 25000003 PHARM REV CODE 250: Performed by: INTERNAL MEDICINE

## 2023-05-17 PROCEDURE — 27000221 HC OXYGEN, UP TO 24 HOURS

## 2023-05-17 PROCEDURE — 25000242 PHARM REV CODE 250 ALT 637 W/ HCPCS: Performed by: NURSE PRACTITIONER

## 2023-05-17 PROCEDURE — 94640 AIRWAY INHALATION TREATMENT: CPT

## 2023-05-17 PROCEDURE — 80053 COMPREHEN METABOLIC PANEL: CPT

## 2023-05-17 PROCEDURE — 84100 ASSAY OF PHOSPHORUS: CPT | Performed by: STUDENT IN AN ORGANIZED HEALTH CARE EDUCATION/TRAINING PROGRAM

## 2023-05-17 PROCEDURE — 99223 PR INITIAL HOSPITAL CARE,LEVL III: ICD-10-PCS | Mod: ,,, | Performed by: SURGERY

## 2023-05-17 PROCEDURE — 80202 ASSAY OF VANCOMYCIN: CPT | Performed by: HOSPITALIST

## 2023-05-17 PROCEDURE — C9113 INJ PANTOPRAZOLE SODIUM, VIA: HCPCS | Performed by: NURSE PRACTITIONER

## 2023-05-17 PROCEDURE — 37799 UNLISTED PX VASCULAR SURGERY: CPT

## 2023-05-17 PROCEDURE — 82803 BLOOD GASES ANY COMBINATION: CPT

## 2023-05-17 PROCEDURE — 63600175 PHARM REV CODE 636 W HCPCS

## 2023-05-17 PROCEDURE — 25000003 PHARM REV CODE 250

## 2023-05-17 PROCEDURE — 94003 VENT MGMT INPAT SUBQ DAY: CPT

## 2023-05-17 PROCEDURE — 80053 COMPREHEN METABOLIC PANEL: CPT | Performed by: STUDENT IN AN ORGANIZED HEALTH CARE EDUCATION/TRAINING PROGRAM

## 2023-05-17 PROCEDURE — 83735 ASSAY OF MAGNESIUM: CPT | Performed by: STUDENT IN AN ORGANIZED HEALTH CARE EDUCATION/TRAINING PROGRAM

## 2023-05-17 PROCEDURE — 20000000 HC ICU ROOM

## 2023-05-17 PROCEDURE — 84100 ASSAY OF PHOSPHORUS: CPT | Performed by: NURSE PRACTITIONER

## 2023-05-17 PROCEDURE — 27200966 HC CLOSED SUCTION SYSTEM

## 2023-05-17 RX ORDER — GUAIFENESIN 100 MG/5ML
400 SOLUTION ORAL EVERY 4 HOURS
Status: DISCONTINUED | OUTPATIENT
Start: 2023-05-17 | End: 2023-05-27

## 2023-05-17 RX ORDER — DEXMEDETOMIDINE HYDROCHLORIDE 4 UG/ML
0-1.4 INJECTION, SOLUTION INTRAVENOUS CONTINUOUS
Status: DISCONTINUED | OUTPATIENT
Start: 2023-05-17 | End: 2023-06-08 | Stop reason: HOSPADM

## 2023-05-17 RX ORDER — HYDROCODONE BITARTRATE AND ACETAMINOPHEN 10; 325 MG/1; MG/1
1 TABLET ORAL EVERY 6 HOURS PRN
Status: DISCONTINUED | OUTPATIENT
Start: 2023-05-18 | End: 2023-06-08 | Stop reason: HOSPADM

## 2023-05-17 RX ORDER — ACETYLCYSTEINE 200 MG/ML
2 SOLUTION ORAL; RESPIRATORY (INHALATION) 3 TIMES DAILY
Status: DISCONTINUED | OUTPATIENT
Start: 2023-05-17 | End: 2023-05-20

## 2023-05-17 RX ORDER — CALCIUM GLUCONATE 20 MG/ML
1 INJECTION, SOLUTION INTRAVENOUS ONCE
Status: COMPLETED | OUTPATIENT
Start: 2023-05-17 | End: 2023-05-17

## 2023-05-17 RX ADMIN — PROPOFOL 50 MCG/KG/MIN: 10 INJECTION, EMULSION INTRAVENOUS at 08:05

## 2023-05-17 RX ADMIN — PIPERACILLIN AND TAZOBACTAM 4.5 G: 4; .5 INJECTION, POWDER, LYOPHILIZED, FOR SOLUTION INTRAVENOUS; PARENTERAL at 06:05

## 2023-05-17 RX ADMIN — PROPOFOL 50 MCG/KG/MIN: 10 INJECTION, EMULSION INTRAVENOUS at 05:05

## 2023-05-17 RX ADMIN — CALCIUM GLUCONATE 1 G: 20 INJECTION, SOLUTION INTRAVENOUS at 04:05

## 2023-05-17 RX ADMIN — CALCITRIOL CAPSULES 0.25 MCG 0.5 MCG: 0.25 CAPSULE ORAL at 08:05

## 2023-05-17 RX ADMIN — CALCIUM ACETATE 667 MG: 667 CAPSULE ORAL at 05:05

## 2023-05-17 RX ADMIN — GUAIFENESIN 400 MG: 200 SOLUTION ORAL at 02:05

## 2023-05-17 RX ADMIN — NOREPINEPHRINE BITARTRATE 0.08 MCG/KG/MIN: 8 INJECTION, SOLUTION INTRAVENOUS at 10:05

## 2023-05-17 RX ADMIN — PIPERACILLIN AND TAZOBACTAM 4.5 G: 4; .5 INJECTION, POWDER, LYOPHILIZED, FOR SOLUTION INTRAVENOUS; PARENTERAL at 03:05

## 2023-05-17 RX ADMIN — CALCIUM ACETATE 667 MG: 667 CAPSULE ORAL at 08:05

## 2023-05-17 RX ADMIN — PROPOFOL 70 MCG/KG/MIN: 10 INJECTION, EMULSION INTRAVENOUS at 02:05

## 2023-05-17 RX ADMIN — PIPERACILLIN AND TAZOBACTAM 4.5 G: 4; .5 INJECTION, POWDER, LYOPHILIZED, FOR SOLUTION INTRAVENOUS; PARENTERAL at 11:05

## 2023-05-17 RX ADMIN — GUAIFENESIN 400 MG: 200 SOLUTION ORAL at 05:05

## 2023-05-17 RX ADMIN — PROPOFOL 50 MCG/KG/MIN: 10 INJECTION, EMULSION INTRAVENOUS at 11:05

## 2023-05-17 RX ADMIN — ACETYLCYSTEINE 2 ML: 200 INHALANT RESPIRATORY (INHALATION) at 02:05

## 2023-05-17 RX ADMIN — PANTOPRAZOLE SODIUM 40 MG: 40 INJECTION, POWDER, FOR SOLUTION INTRAVENOUS at 08:05

## 2023-05-17 RX ADMIN — ACETYLCYSTEINE 2 ML: 200 INHALANT RESPIRATORY (INHALATION) at 08:05

## 2023-05-17 RX ADMIN — DEXMEDETOMIDINE HYDROCHLORIDE 1 MCG/KG/HR: 400 INJECTION INTRAVENOUS at 10:05

## 2023-05-17 RX ADMIN — PROPOFOL 50 MCG/KG/MIN: 10 INJECTION, EMULSION INTRAVENOUS at 02:05

## 2023-05-17 RX ADMIN — ENOXAPARIN SODIUM 40 MG: 40 INJECTION SUBCUTANEOUS at 05:05

## 2023-05-17 RX ADMIN — CALCIUM ACETATE 667 MG: 667 CAPSULE ORAL at 11:05

## 2023-05-17 RX ADMIN — VANCOMYCIN HYDROCHLORIDE 750 MG: 750 INJECTION, POWDER, LYOPHILIZED, FOR SOLUTION INTRAVENOUS at 05:05

## 2023-05-17 RX ADMIN — DEXMEDETOMIDINE HYDROCHLORIDE 0.2 MCG/KG/HR: 400 INJECTION INTRAVENOUS at 08:05

## 2023-05-17 RX ADMIN — PROPOFOL 70 MCG/KG/MIN: 10 INJECTION, EMULSION INTRAVENOUS at 05:05

## 2023-05-17 RX ADMIN — NOREPINEPHRINE BITARTRATE 0.12 MCG/KG/MIN: 8 INJECTION, SOLUTION INTRAVENOUS at 05:05

## 2023-05-17 RX ADMIN — GUAIFENESIN 400 MG: 200 SOLUTION ORAL at 10:05

## 2023-05-17 RX ADMIN — LEVETIRACETAM 1000 MG: 100 INJECTION, SOLUTION INTRAVENOUS at 08:05

## 2023-05-17 RX ADMIN — HYDROCODONE BITARTRATE AND ACETAMINOPHEN 1 TABLET: 10; 325 TABLET ORAL at 11:05
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
## 2023-05-17 NOTE — PLAN OF CARE
Problem: Infection  Goal: Absence of Infection Signs and Symptoms  Outcome: Ongoing, Progressing     Problem: Adult Inpatient Plan of Care  Goal: Plan of Care Review  Outcome: Ongoing, Progressing  Goal: Patient-Specific Goal (Individualized)  Outcome: Ongoing, Progressing  Goal: Absence of Hospital-Acquired Illness or Injury  Outcome: Ongoing, Progressing  Goal: Optimal Comfort and Wellbeing  Outcome: Ongoing, Progressing  Goal: Readiness for Transition of Care  Outcome: Ongoing, Progressing     Problem: Communication Impairment (Mechanical Ventilation, Invasive)  Goal: Effective Communication  Outcome: Ongoing, Progressing     Problem: Device-Related Complication Risk (Mechanical Ventilation, Invasive)  Goal: Optimal Device Function  Outcome: Ongoing, Progressing     Problem: Inability to Wean (Mechanical Ventilation, Invasive)  Goal: Mechanical Ventilation Liberation  Outcome: Ongoing, Progressing     Problem: Nutrition Impairment (Mechanical Ventilation, Invasive)  Goal: Optimal Nutrition Delivery  Outcome: Ongoing, Progressing     Problem: Skin and Tissue Injury (Mechanical Ventilation, Invasive)  Goal: Absence of Device-Related Skin and Tissue Injury  Outcome: Ongoing, Progressing     Problem: Ventilator-Induced Lung Injury (Mechanical Ventilation, Invasive)  Goal: Absence of Ventilator-Induced Lung Injury  Outcome: Ongoing, Progressing     Problem: Communication Impairment (Artificial Airway)  Goal: Effective Communication  Outcome: Ongoing, Progressing     Problem: Device-Related Complication Risk (Artificial Airway)  Goal: Optimal Device Function  Outcome: Ongoing, Progressing     Problem: Skin and Tissue Injury (Artificial Airway)  Goal: Absence of Device-Related Skin or Tissue Injury  Outcome: Ongoing, Progressing
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
## 2023-05-17 NOTE — PROGRESS NOTES
"   Acute Care Surgery   Progress Note  Admit Date: 5/15/2023  HD#2  POD#Day of Surgery    Subjective:   Interval history:  Afebrile  Intubated, sedated, on levophed at 0.12  L pleural effusion resolved after bronchoscopy yesterday, however L lung once again jr out     Blood cx show NGTD at 48h  Respiratory cx with yeast  Urine cx with Gram negative rods    Home Meds:No current outpatient medications   Scheduled Meds:   acetylcysteine 200 mg/ml (20%)  2 mL Nebulization TID    calcitRIOL  0.5 mcg Oral Daily    calcium acetate(phosphat bind)  667 mg Oral TID WM    enoxaparin  40 mg Subcutaneous Daily    guaiFENesin 100 mg/5 ml  400 mg Oral Q4H    pantoprazole  40 mg Intravenous Daily    piperacillin-tazobactam (ZOSYN) IVPB  4.5 g Intravenous Q8H    vancomycin (VANCOCIN) IVPB  750 mg Intravenous Q24H     Continuous Infusions:   NORepinephrine bitartrate-D5W 0.12 mcg/kg/min (05/17/23 0527)    propofoL 50 mcg/kg/min (05/17/23 1157)     PRN Meds:sodium chloride, sodium chloride 0.9%, Pharmacy to dose Vancomycin consult **AND** vancomycin - pharmacy to dose     Objective:     VITAL SIGNS: 24 HR MIN & MAX LAST   Temp  Min: 97.6 °F (36.4 °C)  Max: 99.2 °F (37.3 °C)  99.2 °F (37.3 °C)   BP  Min: 88/47  Max: 106/61  103/68    Pulse  Min: 78  Max: 111  79    Resp  Min: 22  Max: 24  (!) 24    SpO2  Min: 66 %  Max: 100 %  97 %      HT: 5' 9" (175.3 cm)  WT: 77.1 kg (170 lb)  BMI: 25.1     Intake/output:  Intake/Output - Last 3 Shifts         05/15 0700  05/16 0659 05/16 0700 05/17 0659 05/17 0700 05/18 0659    I.V. (mL/kg) 2002 (26) 2835 (36.8)     Total Intake(mL/kg) 2002 (26) 2835 (36.8)     Urine (mL/kg/hr) 1130 6375 (3.4) 1000 (1.8)    Drains 700      Total Output 1830 6375 1000    Net +172 -3540 -1000                   Intake/Output Summary (Last 24 hours) at 5/17/2023 1409  Last data filed at 5/17/2023 1102  Gross per 24 hour   Intake 2835 ml   Output 5375 ml   Net -2540 ml       "       Lines/drains/airway:  Percutaneous Central Line Insertion/Assessment - Triple Lumen  05/15/23 Femoral Vein Right;Femoral Right (Active)   Line Necessity Review Hemodynamic instability;Medication caustic to vasculature 05/16/23 0700   $ Central Line Charges (Upon insertion) Bedside Insertion Performed Pt > 5 Years Old;Catheter - Triple Lumen (Supply) 05/15/23 1500   Site Assessment No drainage;No redness;No swelling;No warmth 05/16/23 0700   Line Securement Device Secured with sutures 05/16/23 0700   Dressing Type CHG impregnated dressing/sponge;Central line dressing 05/16/23 0700   Dressing Status Clean;Dry;Intact 05/16/23 0700   Dressing Intervention First dressing 05/16/23 0700   Date on Dressing 05/15/23 05/16/23 0700   Dressing Due to be Changed 05/17/23 05/16/23 0700   Distal Patency/Care flushed w/o difficulty;infusing 05/16/23 0700   Medial 1 Patency/Care flushed w/o difficulty;infusing 05/16/23 0700   Proximal 1 Patency/Care flushed w/o difficulty;infusing 05/16/23 0700   Waveform Not being transduced 05/16/23 0700   Number of days: 1            Peripheral IV - Single Lumen 05/15/23 0930 18 G Right  (Active)   Site Assessment Clean;Dry;Intact;No redness;No swelling;No warmth;No drainage 05/16/23 0700   Extremity Assessment Distal to IV No abnormal discoloration;No redness;No swelling;No warmth 05/16/23 0700   Line Status Blood return noted;Flushed;Saline locked 05/16/23 0700   Dressing Status Clean;Dry;Intact 05/16/23 0700   Dressing Intervention First dressing 05/16/23 0700   Number of days: 0       Arterial Line 05/15/23 1500 Right Radial (Active)   $ Arterial Line Charges (Upon Insertion) Bedside Insertion Performed 05/15/23 1500   Site Assessment Clean;Dry;Intact;No redness;No swelling;No warmth;No drainage 05/16/23 0700   Line Status Pulsatile blood flow 05/16/23 0700   Art Line Waveform Appropriate 05/16/23 0700   Arterial Line Interventions Zeroed and calibrated;Leveled 05/16/23 0700  "  Color/Movement/Sensation Capillary refill less than 3 sec 05/16/23 0700   Dressing Type CHG impregnated dressing/sponge;Central line dressing 05/16/23 0700   Dressing Status Clean;Dry;Intact 05/16/23 0700   Dressing Intervention First dressing 05/16/23 0700   Number of days: 0            NG/OG Tube 05/15/23 1325 Right nostril (Active)   Placement Check placement verified by aspirate characteristics;placement verified by distal tube length measurement 05/16/23 0700   Distal Tube Length (cm) 65 05/16/23 0700   Tolerance no signs/symptoms of discomfort 05/16/23 0700   Securement secured to nostril center w/ adhesive device 05/16/23 0700   Clamp Status/Tolerance unclamped;no abdominal discomfort;no emesis;no abdominal distention;no restlessness 05/16/23 0700   Suction Setting/Drainage Method suction initiated at;low;intermittent setting 05/16/23 0700   Insertion Site Appearance no redness, warmth, tenderness, skin breakdown, drainage 05/16/23 0700   Drainage Bile;Brown 05/16/23 0700   Tube Output(mL)(Include Discarded Residual) 700 mL 05/16/23 0600   Number of days: 0            Urethral Catheter 05/15/23 1015 Straight-tip (Active)   $ Shen Insertion Bedside Insertion Performed 05/15/23 1500   Site Assessment Clean;Intact;Dry 05/16/23 0700   Collection Container Urimeter 05/16/23 0700   Securement Method secured to top of thigh w/ adhesive device 05/16/23 0700   Catheter Care Performed yes 05/16/23 0700   Reason for Continuing Urinary Catheterization Critically ill in ICU and requiring hourly monitoring of intake/output 05/16/23 0700   CAUTI Prevention Bundle Securement Device in place with 1" slack;Drainage bag/urimeter off the floor;Intact seal between catheter & drainage tubing;Sheeting clip in use;No dependent loops or kinks;Drainage bag/urimeter not overfilled (<2/3 full);Drainage bag/urimeter below bladder 05/16/23 0700   Output (mL) 325 mL 05/16/23 0600   Number of days: 0       Physical examination:  Gen: " Intubated and sedated  HEENT: Intubated  CV: RR  Resp: NWOB on ventilator  Abd: S/NT/ND  Ext: R forearm wound with clean bandage in place. Bilateral heel ulcers with bandages in place.   : Deferred  Neuro: CN II-XII grossly intact    Labs:  Renal:  Recent Labs     05/16/23  0814 05/16/23  1836 05/17/23  0212 05/17/23  0813   BUN 28.0* 22.2* 20.5 19.0   CREATININE 1.24* 1.02 1.07 1.01       Recent Labs     05/15/23  1027 05/15/23  1149 05/15/23  1624 05/15/23  2256 05/16/23  0553   LACTIC 4.2* 3.1* 1.4 0.9 0.8       FENGI:  Recent Labs     05/16/23  0252 05/16/23  0814 05/16/23  1836 05/17/23  0212 05/17/23  0813   * 134* 139 141 141   K 4.3 4.2 3.3* 4.3 4.0   CO2 18* 20* 21* 19* 22   CALCIUM 5.1* 5.1* 5.3* 5.3* 5.3*   MG 1.60 1.90 2.00 1.80 1.70   PHOS 8.6* 8.9* 8.6* 9.2* 9.3*   ALBUMIN 1.5* 1.6*  --  1.5* 1.5*   BILITOT 0.5 0.5  --  0.4 0.4   AST 53* 36*  --  19 17   ALKPHOS 198* 196*  --  167* 155*   ALT 40 36  --  27 24       Heme:  Recent Labs     05/15/23  1027 05/15/23  1624 05/15/23  2123 05/16/23  0252 05/17/23  0212   HGB 5.0* 8.1* 9.2* 9.3* 9.1*   HCT 16.3* 24.4* 27.1* 27.9* 28.0*   * 613* 663* 711* 699*   PTT 31.1  --   --   --   --    INR 1.44*  --   --   --   --        ID:  Recent Labs     05/15/23  1624 05/15/23  2123 05/16/23  0252 05/17/23  0212   WBC 23.63* 20.60* 15.38* 11.11       CBG:  Recent Labs     05/16/23  0814 05/16/23  1836 05/17/23  0212 05/17/23  0813   GLUCOSE 105* 105* 79 84        Cardiovascular:  Recent Labs   Lab 05/15/23  1027 05/15/23  1624 05/15/23  2019 05/15/23  2256 05/16/23  0252   TROPONINI <0.010   < > 0.020 0.076* 0.087*   BNP 1,533.2*  --   --   --   --     < > = values in this interval not displayed.       I have reviewed all pertinent lab results within the past 24 hours.    Imaging:  X-Ray Chest 1 View   Final Result      Some worsening of opacification in the left hemithorax with only small aerated portion in the upper lobe.      Otherwise  pleuroparenchymal changes are unchanged as compared with previous exam      Support catheters remain in place         Electronically signed by: Bull Valentin   Date:    05/17/2023   Time:    07:08      X-Ray Chest 1 View   Final Result      Some resolution of the complete opacification of the left hemithorax with markedly improved aeration.      Persistent blunting of the left costophrenic angle indicating the presence of left-sided pleural effusion.      Increased left retrocardiac density and silhouetting of the left hemidiaphragm which might be related to pleural fluid and or represent an infiltrate/atelectasis.      No other significant change support catheters remain in place         Electronically signed by: Bull Valentin   Date:    05/17/2023   Time:    06:53      X-Ray Chest 1 View   Final Result      1. Endotracheal tube with tip 4.4 cm above the frances.   2. New complete opacification of the left hemithorax.         Electronically signed by: Eleanor Farley   Date:    05/16/2023   Time:    06:15      CT Chest Without Contrast   Final Result      Multifocal bilateral consolidation.  Pneumonia possible, but favor pulmonary edema given other findings.      Moderate bilateral pleural effusions with small volume ascites.         Electronically signed by: Jesús Corey   Date:    05/15/2023   Time:    17:31      X-Ray Chest AP Portable   Final Result      Cardiomegaly.      Increase interstitial and pulmonary vascular markings.      Bilateral pleural effusions.      Increased left retrocardiac density and silhouetting of the left hemidiaphragm as above         Electronically signed by: Bull Valentin   Date:    05/15/2023   Time:    13:45         I have reviewed all pertinent imaging results/findings within the past 24 hours.    Micro/Path/Other:  Microbiology Results (last 7 days)       Procedure Component Value Units Date/Time    Blood culture #1 **CANNOT BE ORDERED STAT** [[id number]]  (Normal)  Collected: 05/15/23 1027    Order Status: Completed Specimen: Blood Updated: 05/17/23 1100     CULTURE, BLOOD ([facility]) No Growth At 48 Hours    Blood culture #2 **CANNOT BE ORDERED STAT** [[id number]]  (Normal) Collected: 05/15/23 1027    Order Status: Completed Specimen: Blood Updated: 05/17/23 1100     CULTURE, BLOOD ([facility]) No Growth At 48 Hours    Respiratory Culture [[id number]]  (Abnormal) Collected: 05/15/23 1439    Order Status: Completed Specimen: Sputum from Endotracheal Aspirate Updated: 05/17/23 1042     Respiratory Culture Moderate Yeast     Comment: with normal respiratory diana        GRAM STAIN Quality 3+      No bacteria seen    Urine culture [[id number]]  (Abnormal) Collected: 05/15/23 1434    Order Status: Completed Specimen: Urine Updated: 05/17/23 1018     Urine Culture Less than 10,000 colonies/ml Gram-negative Rods      Less than 10,000 colonies/ml Gram-negative Rods     Comment: [place] counts <10,000/ml are of questionable significance and may or may not indicate contamination.  Therefore organisms are identified only.  If further workup is desired please notify Microbiology        Respiratory Culture [[id number]]  (Abnormal) Collected: 05/16/23 1138    Order Status: Completed Specimen: Bronchial Wash from Endotracheal Aspirate Updated: 05/17/23 0857     Respiratory Culture Moderate Yeast     Comment: with normal respiratory diana        GRAM STAIN Quality 2+      Many Gram positive cocci           Specimen (168h ago, onward)      None             Assessment & Plan:   27 year old quadriplegic man with extensive decubitus ulcers of LUE, sacrum, bilateral ischia, and bilateral heels. Presented in sepsis with UTI and left sided pleural effusion.    - Per plastics, no surgical intervention for L hand/wrist wound  - Per podiatry, can debride heel wounds at bedside or in conjunction with general surgery buttock wound debridement.   - Will plan for operative management of sacral decubitus ulcers once patient  is off of pressors  - Remainder of plan of care per primary team     Mary Greene MD  [employer] General Surgery, PGY-1

## 2023-05-17 NOTE — PROGRESS NOTES
Pharmacokinetic Assessment Follow Up: IV Vancomycin    Vancomycin serum concentration assessment(s):    Vancomycin trough = 14.1 mcg;ml    Vancomycin Regimen Plan:    Continue 750 mg q24h and check trough on 5/19/23 at 1400.    Drug levels (last 3 results):  Recent Labs   Lab Result Units 05/16/23  0252 05/17/23  1436   Vanc Lvl Random ug/ml 21.2*  --    Vancomycin Trough ug/ml  --  14.1*       Pharmacy will continue to follow and monitor vancomycin.      Thank you for the consult,   Abbey Jalloh    Drug Allergies:   Review of patient's allergies indicates:  No Known Allergies    Actual Body Weight:   77.1 kg    Renal Function:   Estimated Creatinine Clearance: 110.8 mL/min (based on SCr of 1.01 mg/dL).,     Dialysis Method (if applicable):  N/A    CBC (last 72 hours):  Recent Labs   Lab Result Units 05/15/23  1027 05/15/23  1624 05/15/23  2123 05/16/23  0252 05/17/23  0212   WBC x10(3)/mcL 9.58 23.63* 20.60* 15.38* 11.11   Hgb g/dL 5.0* 8.1* 9.2* 9.3* 9.1*   Hct % 16.3* 24.4* 27.1* 27.9* 28.0*   Platelet x10(3)/mcL 534* 613* 663* 711* 699*   Mono % % 5.1  --  4.3 5.5 7.1   Monocyte Man %  --  2  --   --   --    Eos % % 0.3  --  0.0 0.1 0.5   Basophil % % 0.1  --  0.2 0.1 0.5       Metabolic Panel (last 72 hours):  Recent Labs   Lab Result Units 05/15/23  1027 05/15/23  1100 05/15/23  1434 05/15/23  1624 05/15/23  1841 05/15/23  2256 05/16/23  0252 05/16/23  0814 05/16/23  1814 05/16/23  1836 05/17/23  0212 05/17/23  0741 05/17/23  0813   Sodium Level mmol/L 131*  --   --  132*  --  131* 131* 134*  --  139 141  --  141   Sodium, Blood Gas mmol/L  --  127*  --   --  126*  --   --   --   --   --   --  138  --    Urine Sodium mmol/L  --   --   --   --   --   --   --   --  33.0  --   --   --   --    Potassium Level mmol/L 4.5  --   --  5.3*  --  4.7 4.3 4.2  --  3.3* 4.3  --  4.0   Potassium, Blood Gas mmol/L  --  4.3  --   --  4.6  --   --   --   --   --   --  3.7  --    Chloride mmol/L 96*  --   --  96*  --  97* 97*  98  --  101 103  --  105   Carbon Dioxide mmol/L 18*  --   --  18*  --  19* 18* 20*  --  21* 19*  --  22   Glucose Level mg/dL 91  --   --  165*  --  130* 116* 105*  --  105* 79  --  84   Glucose, UA mg/dL  --   --  Negative  --   --   --   --   --   --   --   --   --   --    Blood Urea Nitrogen mg/dL 34.1*  --   --  32.6*  --  30.8* 30.5* 28.0*  --  22.2* 20.5  --  19.0   Creatinine mg/dL 1.33*  --   --  1.29*  --  1.22* 1.34* 1.24*  --  1.02 1.07  --  1.01   Urine Creatinine mg/dL  --   --   --   --   --   --   --   --  8.0*  --   --   --   --    Albumin Level g/dL 1.7*  --   --  1.7*  --  1.6* 1.5* 1.6*  --   --  1.5*  --  1.5*   Bilirubin Total mg/dL 0.2  --   --  0.6  --  0.5 0.5 0.5  --   --  0.4  --  0.4   Alkaline Phosphatase unit/L 179*  --   --  231*  --  208* 198* 196*  --   --  167*  --  155*   Aspartate Aminotransferase unit/L 71*  --   --  103*  --  64* 53* 36*  --   --  19  --  17   Alanine Aminotransferase unit/L 39  --   --  48  --  42 40 36  --   --  27  --  24   Magnesium Level mg/dL 1.10*  --   --  1.50*  --   --  1.60 1.90  --  2.00 1.80  --  1.70   Phosphorus Level mg/dL  --   --   --  9.4*  --  8.6* 8.6* 8.9*  --  8.6* 9.2*  --  9.3*       Vancomycin Administrations:  vancomycin given in the last 96 hours                     vancomycin 750 mg in dextrose 5 % 250 mL IVPB (ready to mix) (mg) 750 mg New Bag 05/16/23 1546    vancomycin 1.5 g in dextrose 5 % 250 mL IVPB (ready to mix) (mg) 1,500 mg New Bag 05/15/23 1613                    Microbiologic Results:  Microbiology Results (last 7 days)       Procedure Component Value Units Date/Time    Blood culture #1 **CANNOT BE ORDERED STAT** [[id number]]  (Normal) Collected: 05/15/23 1027    Order Status: Completed Specimen: Blood Updated: 05/17/23 1100     CULTURE, BLOOD ([facility]) No Growth At 48 Hours    Blood culture #2 **CANNOT BE ORDERED STAT** [[id number]]  (Normal) Collected: 05/15/23 1027    Order Status: Completed Specimen: Blood Updated: 05/17/23  1100     CULTURE, BLOOD ([facility]) No Growth At 48 Hours    Respiratory Culture [[id number]]  (Abnormal) Collected: 05/15/23 1439    Order Status: Completed Specimen: Sputum from Endotracheal Aspirate Updated: 05/17/23 1042     Respiratory Culture Moderate Yeast     Comment: with normal respiratory diana        GRAM STAIN Quality 3+      No bacteria seen    Urine culture [[id number]]  (Abnormal) Collected: 05/15/23 1434    Order Status: Completed Specimen: Urine Updated: 05/17/23 1018     Urine Culture Less than 10,000 colonies/ml Gram-negative Rods      Less than 10,000 colonies/ml Gram-negative Rods     Comment: [place] counts <10,000/ml are of questionable significance and may or may not indicate contamination.  Therefore organisms are identified only.  If further workup is desired please notify Microbiology        Respiratory Culture [[id number]]  (Abnormal) Collected: 05/16/23 1138    Order Status: Completed Specimen: Bronchial Wash from Endotracheal Aspirate Updated: 05/17/23 0857     Respiratory Culture Moderate Yeast     Comment: with normal respiratory diana        GRAM STAIN Quality 2+      Many Gram positive cocci
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
## 2023-05-17 NOTE — CONSULTS
Consult to start tube feeding, will order:  Peptamen Intense VHP goal rate 65 ml/hr to provide  1300 kcal/d, 107% needs with kcals from [facility] considered  120 g protein/d, 100% needs  1092 ml free water/d  (calculations based on estimated 20 hr/d run time)

## 2023-05-18 PROBLEM — L89.314 STAGE IV PRESSURE ULCER OF RIGHT BUTTOCK: Status: ACTIVE | Noted: 2023-05-18

## 2023-05-18 PROBLEM — L89.324 STAGE IV PRESSURE ULCER OF LEFT BUTTOCK: Status: ACTIVE | Noted: 2023-05-18

## 2023-05-18 PROBLEM — L89.154 STAGE IV PRESSURE ULCER OF SACRAL REGION: Status: ACTIVE | Noted: 2023-05-18

## 2023-05-18 PROBLEM — S61.402A: Status: ACTIVE | Noted: 2023-05-18

## 2023-05-18 PROBLEM — M86.60 CHRONIC OSTEOMYELITIS: Status: ACTIVE | Noted: 2023-05-18

## 2023-05-18 PROBLEM — G82.50 QUADRIPLEGIA: Status: ACTIVE | Noted: 2023-05-18

## 2023-05-18 PROBLEM — T80.818A: Status: ACTIVE | Noted: 2023-05-18

## 2023-05-18 LAB
ALBUMIN SERPL-MCNC: 1.4 G/DL (ref 3.5–5)
ALBUMIN/GLOB SERPL: 0.4 RATIO (ref 1.1–2)
ALP SERPL-CCNC: 151 UNIT/L (ref 40–150)
ALT SERPL-CCNC: 19 UNIT/L (ref 0–55)
AST SERPL-CCNC: 16 UNIT/L (ref 5–34)
BACTERIA SPT CULT: ABNORMAL
BACTERIA UR CULT: ABNORMAL
BACTERIA UR CULT: ABNORMAL
BASE EXCESS BLD CALC-SCNC: 3.5 MMOL/L
BASOPHILS # BLD AUTO: 0.07 X10(3)/MCL
BASOPHILS NFR BLD AUTO: 0.5 %
BILIRUBIN DIRECT+TOT PNL SERPL-MCNC: 0.4 MG/DL
BLOOD GAS SAMPLE TYPE (OHS): ABNORMAL
BSA FOR ECHO PROCEDURE: 1.94 M2
BUN SERPL-MCNC: 14 MG/DL (ref 8.9–20.6)
CA-I BLD-SCNC: 0.61 MMOL/L (ref 1.12–1.23)
CALCIUM SERPL-MCNC: 4.9 MG/DL (ref 8.4–10.2)
CHLORIDE SERPL-SCNC: 107 MMOL/L (ref 98–107)
CO2 BLDA-SCNC: 25.2 MMOL/L
CO2 SERPL-SCNC: 21 MMOL/L (ref 22–29)
CREAT SERPL-MCNC: 0.84 MG/DL (ref 0.73–1.18)
DRAWN BY BLOOD GAS (OHS): ABNORMAL
EOSINOPHIL # BLD AUTO: 0.15 X10(3)/MCL (ref 0–0.9)
EOSINOPHIL NFR BLD AUTO: 1.1 %
ERYTHROCYTE [DISTWIDTH] IN BLOOD BY AUTOMATED COUNT: 22 % (ref 11.5–17)
GFR SERPLBLD CREATININE-BSD FMLA CKD-EPI: >60 MLS/MIN/1.73/M2
GLOBULIN SER-MCNC: 4 GM/DL (ref 2.4–3.5)
GLUCOSE SERPL-MCNC: 91 MG/DL (ref 74–100)
GRAM STN SPEC: ABNORMAL
GRAM STN SPEC: ABNORMAL
HCO3 BLDA-SCNC: 24.4 MMOL/L
HCT VFR BLD AUTO: 24.3 % (ref 42–52)
HGB BLD-MCNC: 8 G/DL (ref 14–18)
IMM GRANULOCYTES # BLD AUTO: 0.06 X10(3)/MCL (ref 0–0.04)
IMM GRANULOCYTES NFR BLD AUTO: 0.4 %
LYMPHOCYTES # BLD AUTO: 1.69 X10(3)/MCL (ref 0.6–4.6)
LYMPHOCYTES NFR BLD AUTO: 12.7 %
MAGNESIUM SERPL-MCNC: 1.4 MG/DL (ref 1.6–2.6)
MCH RBC QN AUTO: 25 PG (ref 27–31)
MCHC RBC AUTO-ENTMCNC: 32.9 G/DL (ref 33–36)
MCV RBC AUTO: 75.9 FL (ref 80–94)
MONOCYTES # BLD AUTO: 0.44 X10(3)/MCL (ref 0.1–1.3)
MONOCYTES NFR BLD AUTO: 3.3 %
NEUTROPHILS # BLD AUTO: 10.94 X10(3)/MCL (ref 2.1–9.2)
NEUTROPHILS NFR BLD AUTO: 82 %
NRBC BLD AUTO-RTO: 0 %
PCO2 BLDA: 26 MMHG (ref 35–45)
PH BLDA: 7.58 [PH] (ref 7.35–7.45)
PHOSPHATE SERPL-MCNC: 9.6 MG/DL (ref 2.3–4.7)
PLATELET # BLD AUTO: 615 X10(3)/MCL (ref 130–400)
PMV BLD AUTO: 7.8 FL (ref 7.4–10.4)
PO2 BLDA: 85 MMHG (ref 80–100)
POTASSIUM BLOOD GAS (OHS): 3.4 MMOL/L (ref 3.5–5)
POTASSIUM SERPL-SCNC: 3.9 MMOL/L (ref 3.5–5.1)
PROT SERPL-MCNC: 5.4 GM/DL (ref 6.4–8.3)
RBC # BLD AUTO: 3.2 X10(6)/MCL (ref 4.7–6.1)
SAMPLE SITE BLOOD GAS (OHS): ABNORMAL
SAO2 % BLDA: 98 %
SODIUM BLOOD GAS (OHS): 140 MMOL/L (ref 137–145)
SODIUM SERPL-SCNC: 143 MMOL/L (ref 136–145)
VANCOMYCIN TROUGH SERPL-MCNC: 13.9 UG/ML (ref 15–20)
VANCOMYCIN TROUGH SERPL-MCNC: 18.6 UG/ML (ref 15–20)
WBC # SPEC AUTO: 13.35 X10(3)/MCL (ref 4.5–11.5)

## 2023-05-18 PROCEDURE — 84100 ASSAY OF PHOSPHORUS: CPT

## 2023-05-18 PROCEDURE — 99223 1ST HOSP IP/OBS HIGH 75: CPT | Mod: ,,, | Performed by: EMERGENCY MEDICINE

## 2023-05-18 PROCEDURE — 63600175 PHARM REV CODE 636 W HCPCS: Performed by: NURSE PRACTITIONER

## 2023-05-18 PROCEDURE — 63600175 PHARM REV CODE 636 W HCPCS: Performed by: INTERNAL MEDICINE

## 2023-05-18 PROCEDURE — 94761 N-INVAS EAR/PLS OXIMETRY MLT: CPT

## 2023-05-18 PROCEDURE — 99900031 HC PATIENT EDUCATION (STAT)

## 2023-05-18 PROCEDURE — 25000003 PHARM REV CODE 250: Performed by: STUDENT IN AN ORGANIZED HEALTH CARE EDUCATION/TRAINING PROGRAM

## 2023-05-18 PROCEDURE — 63600175 PHARM REV CODE 636 W HCPCS: Performed by: STUDENT IN AN ORGANIZED HEALTH CARE EDUCATION/TRAINING PROGRAM

## 2023-05-18 PROCEDURE — 63600175 PHARM REV CODE 636 W HCPCS

## 2023-05-18 PROCEDURE — 93005 ELECTROCARDIOGRAM TRACING: CPT

## 2023-05-18 PROCEDURE — 94668 MNPJ CHEST WALL SBSQ: CPT

## 2023-05-18 PROCEDURE — 85025 COMPLETE CBC W/AUTO DIFF WBC: CPT

## 2023-05-18 PROCEDURE — 25000003 PHARM REV CODE 250: Performed by: HOSPITALIST

## 2023-05-18 PROCEDURE — 80202 ASSAY OF VANCOMYCIN: CPT | Performed by: HOSPITALIST

## 2023-05-18 PROCEDURE — C9113 INJ PANTOPRAZOLE SODIUM, VIA: HCPCS | Performed by: NURSE PRACTITIONER

## 2023-05-18 PROCEDURE — 25000003 PHARM REV CODE 250

## 2023-05-18 PROCEDURE — 25000003 PHARM REV CODE 250: Performed by: INTERNAL MEDICINE

## 2023-05-18 PROCEDURE — 94640 AIRWAY INHALATION TREATMENT: CPT

## 2023-05-18 PROCEDURE — 99900026 HC AIRWAY MAINTENANCE (STAT)

## 2023-05-18 PROCEDURE — 83735 ASSAY OF MAGNESIUM: CPT

## 2023-05-18 PROCEDURE — 99900035 HC TECH TIME PER 15 MIN (STAT)

## 2023-05-18 PROCEDURE — 99223 PR INITIAL HOSPITAL CARE,LEVL III: ICD-10-PCS | Mod: ,,, | Performed by: EMERGENCY MEDICINE

## 2023-05-18 PROCEDURE — 94003 VENT MGMT INPAT SUBQ DAY: CPT

## 2023-05-18 PROCEDURE — 20000000 HC ICU ROOM

## 2023-05-18 PROCEDURE — 80053 COMPREHEN METABOLIC PANEL: CPT

## 2023-05-18 PROCEDURE — 25000242 PHARM REV CODE 250 ALT 637 W/ HCPCS: Performed by: NURSE PRACTITIONER

## 2023-05-18 PROCEDURE — 27000221 HC OXYGEN, UP TO 24 HOURS

## 2023-05-18 RX ORDER — FENTANYL CITRATE 50 UG/ML
50 INJECTION, SOLUTION INTRAMUSCULAR; INTRAVENOUS ONCE
Status: DISCONTINUED | OUTPATIENT
Start: 2023-05-18 | End: 2023-05-18

## 2023-05-18 RX ORDER — CALCIUM GLUCONATE 20 MG/ML
1 INJECTION, SOLUTION INTRAVENOUS
Status: COMPLETED | OUTPATIENT
Start: 2023-05-18 | End: 2023-05-18

## 2023-05-18 RX ORDER — FENTANYL CITRATE 50 UG/ML
100 INJECTION, SOLUTION INTRAMUSCULAR; INTRAVENOUS ONCE
Status: COMPLETED | OUTPATIENT
Start: 2023-05-18 | End: 2023-05-18

## 2023-05-18 RX ORDER — MIDAZOLAM HYDROCHLORIDE 1 MG/ML
INJECTION INTRAMUSCULAR; INTRAVENOUS
Status: COMPLETED
Start: 2023-05-18 | End: 2023-05-18

## 2023-05-18 RX ORDER — MIDAZOLAM HYDROCHLORIDE 1 MG/ML
2 INJECTION INTRAMUSCULAR; INTRAVENOUS ONCE
Status: DISCONTINUED | OUTPATIENT
Start: 2023-05-18 | End: 2023-05-18

## 2023-05-18 RX ORDER — FENTANYL CITRATE 50 UG/ML
INJECTION, SOLUTION INTRAMUSCULAR; INTRAVENOUS
Status: COMPLETED
Start: 2023-05-18 | End: 2023-05-18

## 2023-05-18 RX ORDER — MAGNESIUM SULFATE HEPTAHYDRATE 40 MG/ML
2 INJECTION, SOLUTION INTRAVENOUS ONCE
Status: COMPLETED | OUTPATIENT
Start: 2023-05-18 | End: 2023-05-18

## 2023-05-18 RX ORDER — MIDAZOLAM HYDROCHLORIDE 1 MG/ML
4 INJECTION INTRAMUSCULAR; INTRAVENOUS ONCE
Status: COMPLETED | OUTPATIENT
Start: 2023-05-18 | End: 2023-05-18

## 2023-05-18 RX ADMIN — PANTOPRAZOLE SODIUM 40 MG: 40 INJECTION, POWDER, FOR SOLUTION INTRAVENOUS at 09:05

## 2023-05-18 RX ADMIN — GUAIFENESIN 400 MG: 200 SOLUTION ORAL at 05:05

## 2023-05-18 RX ADMIN — CALCIUM ACETATE 667 MG: 667 CAPSULE ORAL at 12:05

## 2023-05-18 RX ADMIN — DEXMEDETOMIDINE HYDROCHLORIDE 1.4 MCG/KG/HR: 400 INJECTION INTRAVENOUS at 03:05

## 2023-05-18 RX ADMIN — DEXMEDETOMIDINE HYDROCHLORIDE 1.4 MCG/KG/HR: 400 INJECTION INTRAVENOUS at 05:05

## 2023-05-18 RX ADMIN — LEVETIRACETAM 1000 MG: 100 INJECTION, SOLUTION INTRAVENOUS at 09:05

## 2023-05-18 RX ADMIN — GUAIFENESIN 400 MG: 200 SOLUTION ORAL at 09:05

## 2023-05-18 RX ADMIN — VANCOMYCIN HYDROCHLORIDE 750 MG: 750 INJECTION, POWDER, LYOPHILIZED, FOR SOLUTION INTRAVENOUS at 03:05

## 2023-05-18 RX ADMIN — CALCIUM ACETATE 667 MG: 667 CAPSULE ORAL at 05:05

## 2023-05-18 RX ADMIN — PIPERACILLIN AND TAZOBACTAM 4.5 G: 4; .5 INJECTION, POWDER, LYOPHILIZED, FOR SOLUTION INTRAVENOUS; PARENTERAL at 06:05

## 2023-05-18 RX ADMIN — PIPERACILLIN AND TAZOBACTAM 4.5 G: 4; .5 INJECTION, POWDER, LYOPHILIZED, FOR SOLUTION INTRAVENOUS; PARENTERAL at 02:05

## 2023-05-18 RX ADMIN — PIPERACILLIN AND TAZOBACTAM 4.5 G: 4; .5 INJECTION, POWDER, LYOPHILIZED, FOR SOLUTION INTRAVENOUS; PARENTERAL at 11:05

## 2023-05-18 RX ADMIN — ACETYLCYSTEINE 2 ML: 200 INHALANT RESPIRATORY (INHALATION) at 08:05

## 2023-05-18 RX ADMIN — GUAIFENESIN 400 MG: 200 SOLUTION ORAL at 03:05

## 2023-05-18 RX ADMIN — DEXMEDETOMIDINE HYDROCHLORIDE 1.2 MCG/KG/HR: 400 INJECTION INTRAVENOUS at 12:05

## 2023-05-18 RX ADMIN — CALCIUM GLUCONATE 1 G: 20 INJECTION, SOLUTION INTRAVENOUS at 05:05

## 2023-05-18 RX ADMIN — MAGNESIUM SULFATE HEPTAHYDRATE 2 G: 40 INJECTION, SOLUTION INTRAVENOUS at 03:05

## 2023-05-18 RX ADMIN — DEXMEDETOMIDINE HYDROCHLORIDE 1.4 MCG/KG/HR: 400 INJECTION INTRAVENOUS at 09:05

## 2023-05-18 RX ADMIN — MIDAZOLAM HYDROCHLORIDE 4 MG: 1 INJECTION, SOLUTION INTRAMUSCULAR; INTRAVENOUS at 09:05

## 2023-05-18 RX ADMIN — CALCIUM GLUCONATE 1 G: 20 INJECTION, SOLUTION INTRAVENOUS at 03:05

## 2023-05-18 RX ADMIN — MIDAZOLAM HYDROCHLORIDE 4 MG: 1 INJECTION INTRAMUSCULAR; INTRAVENOUS at 09:05

## 2023-05-18 RX ADMIN — FENTANYL CITRATE 100 MCG: 50 INJECTION, SOLUTION INTRAMUSCULAR; INTRAVENOUS at 09:05

## 2023-05-18 RX ADMIN — CALCITRIOL CAPSULES 0.25 MCG 0.5 MCG: 0.25 CAPSULE ORAL at 09:05

## 2023-05-18 RX ADMIN — LEVETIRACETAM 1000 MG: 100 INJECTION, SOLUTION INTRAVENOUS at 08:05

## 2023-05-18 RX ADMIN — ACETYLCYSTEINE 2 ML: 200 INHALANT RESPIRATORY (INHALATION) at 01:05

## 2023-05-18 RX ADMIN — ENOXAPARIN SODIUM 40 MG: 40 INJECTION SUBCUTANEOUS at 05:05

## 2023-05-18 RX ADMIN — GUAIFENESIN 400 MG: 200 SOLUTION ORAL at 01:05

## 2023-05-18 NOTE — NURSING
Nurses Note -- 4 Eyes      5/18/2023   6:32 AM      Skin assessed during: Daily Assessment      [] No Altered Skin Integrity Present    []Prevention Measures Documented      [x] Yes- Altered Skin Integrity Present or Discovered   [] LDA Added if Not in Epic (Describe Wound)   [] New Altered Skin Integrity was Present on Admit and Documented in LDA   [] Wound Image Taken    Wound Care Consulted? Yes    Attending Nurse:  Maryanne Naylor RN     Second RN/Staff Member:  Kate Cruz RN
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
## 2023-05-18 NOTE — CONSULTS
[facility]                       [address] [place] [id number]    PATIENT NAME:       TOM GARCIA   YOB: 1995  CSN:                [id number]   MRN:                [id number]  ADMIT DATE:         05/15/2023 09:07:00  PHYSICIAN:          Darrell Layton DPM                            CONSULTATION    DATE OF CONSULT:  05/17/2023 00:00:00    HISTORY OF PRESENT ILLNESS:  The patient re-evaluated today.  He still remains   on pressors.  Chest x-ray shows the left lung white out.  No other issues at   this point.    PHYSICAL EXAMINATION:  VITAL SIGNS:  Reviewed.    Dressings are intact.  Extremities remain unchanged.  Wounds to the heels and   legs.  No angel necrosis to the sites.    ASSESSMENT:  Chronic lower extremity wounds, stable.    PLAN:  Continue with current care for now.  Debridements in the future.        ______________________________  Darrell Layton DPM    GAS/AQS  DD:  05/17/2023  Time:  04:16PM  DT:  05/17/2023  Time:  07:57PM  Job #:  [id number]      CONSULTATION

## 2023-05-18 NOTE — SUBJECTIVE & OBJECTIVE
Scheduled Meds:   acetylcysteine 200 mg/ml (20%)  2 mL Nebulization TID    calcitRIOL  0.5 mcg Oral Daily    calcium acetate(phosphat bind)  667 mg Oral TID WM    enoxaparin  40 mg Subcutaneous Daily    guaiFENesin 100 mg/5 ml  400 mg Oral Q4H    levetiracetam IV  1,000 mg Intravenous Q12H    pantoprazole  40 mg Intravenous Daily    piperacillin-tazobactam (ZOSYN) IVPB  4.5 g Intravenous Q8H    vancomycin (VANCOCIN) IVPB  750 mg Intravenous Q24H     Continuous Infusions:   dexmedeTOMIDine (Precedex) infusion (titrating) 1 mcg/kg/hr (05/18/23 1515)    NORepinephrine bitartrate-D5W 0.01 mcg/kg/min (05/18/23 1527)    propofoL Stopped (05/18/23 0054)     PRN Meds:sodium chloride, HYDROcodone-acetaminophen, sodium chloride 0.9%, Pharmacy to dose Vancomycin consult **AND** vancomycin - pharmacy to dose    Review of patient's allergies indicates:  No Known Allergies     History reviewed. No pertinent past medical history.  History reviewed. No pertinent surgical history.    Family History    None       Tobacco Use    Smoking status: Not on file    Smokeless tobacco: Not on file   Substance and Sexual Activity    Alcohol use: Not on file    Drug use: Not on file    Sexual activity: Not on file     Review of Systems   Unable to perform ROS: Intubated     Objective:     Vital Signs (Most Recent):  Temp: 99.2 °F (37.3 °C) (05/17/23 0000)  Pulse: 77 (05/18/23 1600)  Resp: 18 (05/18/23 1500)  BP: (!) 87/64 (05/18/23 1600)  SpO2: 99 % (05/18/23 1600) Vital Signs (24h Range):  Pulse:  [60-94] 77  Resp:  [0-25] 18  SpO2:  [87 %-100 %] 99 %  BP: ()/(47-82) 87/64  Arterial Line BP: ()/(47-72) 95/58     Weight: 77.1 kg (170 lb)  Body mass index is 25.1 kg/m².     Physical Exam  Vitals reviewed.   Constitutional:       Interventions: He is intubated.      Comments: He is awake and nods   In Envella air fluidized bed  Central line right groin; A line RUE   HENT:      Head: Normocephalic and atraumatic.      Nose:       Comments: NGT with feeds     Mouth/Throat:      Comments: Oral ETT  Pulmonary:      Effort: He is intubated.   Genitourinary:     Comments: +sterling    Musculoskeletal:        Hands:         Legs:       Comments: Both legs wrapped per podiatrist   Skin:     General: Skin is warm and dry.      Capillary Refill: Capillary refill takes less than 2 seconds.   Neurological:      Comments: Quadriplegic; arms with muscle atrophy, flexed at elbows but has ability to reach up to ETT  He is awake, intubated     Whole posterior view      sacrum:  9.5 x 18 x 4 cm with exposed bone.  Positive areas of necrosis of the tissue in various locations especially on the right lateral side.  No odor.  No gross signs of acute infectious process    Left buttock ulcer:  10 x 7.5 x 2.5 cm.  Some areas of a bruised wound bed.  No exposed bone.  No odor.     Right lower buttock pressure ulcer:  10.5 x 9 x 2.5 cm.  Not connected to the sacral wound: No exposed bone.  No angel drainage.  Smooth wound bed      Left dorsal hand and dorsal wrist: Hand wound measures 3.5 x 2.2 x 0.2 cm, wrist wound measures 6 x 3.5 x 0.1 cm with scant tendon exposure           Laboratory:  CBC:   Recent Labs   Lab 05/18/23  0200   WBC 13.35*   RBC 3.20*   HGB 8.0*   HCT 24.3*   *   MCV 75.9*   MCH 25.0*   MCHC 32.9*     CMP:   Recent Labs   Lab 05/18/23  0200   CALCIUM 4.9*   ALBUMIN 1.4*      K 3.9   CO2 21*   BUN 14.0   CREATININE 0.84   ALKPHOS 151*   ALT 19   AST 16   BILITOT 0.4      Latest Reference Range & Units 05/15/23 16:24 05/15/23 21:23 05/16/23 02:52 05/17/23 02:12 05/18/23 02:00   WBC 4.50 - 11.50 x10(3)/mcL 23.63 (H) 20.60 (H) 15.38 (H) 11.11 13.35 (H)      Latest Reference Range & Units 05/15/23 10:27   Hemoglobin 14.0 - 18.0 g/dL 5.0 (LL)   (LL): Data is critically low  (L): Data is abnormally low
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
## 2023-05-19 LAB
ABO + RH BLD: NORMAL
ALBUMIN SERPL-MCNC: 1.4 G/DL (ref 3.5–5)
ALBUMIN/GLOB SERPL: 0.3 RATIO (ref 1.1–2)
ALLENS TEST BLOOD GAS (OHS): ABNORMAL
ALP SERPL-CCNC: 142 UNIT/L (ref 40–150)
ALT SERPL-CCNC: 17 UNIT/L (ref 0–55)
AST SERPL-CCNC: 18 UNIT/L (ref 5–34)
BASE EXCESS BLD CALC-SCNC: 2.1 MMOL/L
BASOPHILS # BLD AUTO: 0.07 X10(3)/MCL
BASOPHILS NFR BLD AUTO: 0.5 %
BILIRUBIN DIRECT+TOT PNL SERPL-MCNC: 0.3 MG/DL
BLD PROD TYP BPU: NORMAL
BLOOD GAS SAMPLE TYPE (OHS): ABNORMAL
BLOOD UNIT EXPIRATION DATE: NORMAL
BLOOD UNIT TYPE CODE: 5100
BLOOD UNIT TYPE CODE: 5100
BLOOD UNIT TYPE CODE: 9500
BLOOD UNIT TYPE CODE: 9500
BUN SERPL-MCNC: 11.9 MG/DL (ref 8.9–20.6)
CA-I BLD-SCNC: 0.7 MMOL/L (ref 1.12–1.23)
CALCIUM SERPL-MCNC: 5.2 MG/DL (ref 8.4–10.2)
CHLORIDE SERPL-SCNC: 106 MMOL/L (ref 98–107)
CO2 BLDA-SCNC: 26.3 MMOL/L
CO2 SERPL-SCNC: 19 MMOL/L (ref 22–29)
CREAT SERPL-MCNC: 0.81 MG/DL (ref 0.73–1.18)
CROSSMATCH INTERPRETATION: NORMAL
DISPENSE STATUS: NORMAL
DRAWN BY BLOOD GAS (OHS): ABNORMAL
EOSINOPHIL # BLD AUTO: 0.48 X10(3)/MCL (ref 0–0.9)
EOSINOPHIL NFR BLD AUTO: 3.7 %
ERYTHROCYTE [DISTWIDTH] IN BLOOD BY AUTOMATED COUNT: 22.8 % (ref 11.5–17)
FIO2 BLOOD GAS (OHS): 40 %
FLOW (OHS): 50 LPM
GFR SERPLBLD CREATININE-BSD FMLA CKD-EPI: >60 MLS/MIN/1.73/M2
GLOBULIN SER-MCNC: 4.9 GM/DL (ref 2.4–3.5)
GLUCOSE SERPL-MCNC: 130 MG/DL (ref 74–100)
HCO3 BLDA-SCNC: 25.3 MMOL/L
HCT VFR BLD AUTO: 28.1 % (ref 42–52)
HGB BLD-MCNC: 8.9 G/DL (ref 14–18)
IMM GRANULOCYTES # BLD AUTO: 0.07 X10(3)/MCL (ref 0–0.04)
IMM GRANULOCYTES NFR BLD AUTO: 0.5 %
LYMPHOCYTES # BLD AUTO: 2.36 X10(3)/MCL (ref 0.6–4.6)
LYMPHOCYTES NFR BLD AUTO: 18 %
MAGNESIUM SERPL-MCNC: 1.3 MG/DL (ref 1.6–2.6)
MCH RBC QN AUTO: 25.2 PG (ref 27–31)
MCHC RBC AUTO-ENTMCNC: 31.7 G/DL (ref 33–36)
MCV RBC AUTO: 79.6 FL (ref 80–94)
MECH RR (OHS): 18 B/MIN
MECH VT (OHS): 450 ML
MODE (OHS): AC
MONOCYTES # BLD AUTO: 0.56 X10(3)/MCL (ref 0.1–1.3)
MONOCYTES NFR BLD AUTO: 4.3 %
NEUTROPHILS # BLD AUTO: 9.58 X10(3)/MCL (ref 2.1–9.2)
NEUTROPHILS NFR BLD AUTO: 73 %
NRBC BLD AUTO-RTO: 0.2 %
OXYGEN DEVICE BLOOD GAS (OHS): ABNORMAL
PCO2 BLDA: 34 MMHG (ref 35–45)
PEEP (OHS): 5 CMH2O
PH BLDA: 7.48 [PH] (ref 7.35–7.45)
PHOSPHATE SERPL-MCNC: 7.6 MG/DL (ref 2.3–4.7)
PLATELET # BLD AUTO: 591 X10(3)/MCL (ref 130–400)
PMV BLD AUTO: 7.8 FL (ref 7.4–10.4)
PO2 BLDA: 76 MMHG (ref 80–100)
POTASSIUM BLOOD GAS (OHS): 3.2 MMOL/L (ref 3.5–5)
POTASSIUM SERPL-SCNC: 3.6 MMOL/L (ref 3.5–5.1)
PREALB SERPL-MCNC: 10.5 MG/DL (ref 18–45)
PROT SERPL-MCNC: 6.3 GM/DL (ref 6.4–8.3)
RBC # BLD AUTO: 3.53 X10(6)/MCL (ref 4.7–6.1)
SAMPLE SITE BLOOD GAS (OHS): ABNORMAL
SAO2 % BLDA: 96 %
SODIUM BLOOD GAS (OHS): 141 MMOL/L (ref 137–145)
SODIUM SERPL-SCNC: 141 MMOL/L (ref 136–145)
UNIT NUMBER: NORMAL
VANCOMYCIN TROUGH SERPL-MCNC: 13.4 UG/ML (ref 15–20)
WBC # SPEC AUTO: 13.12 X10(3)/MCL (ref 4.5–11.5)

## 2023-05-19 PROCEDURE — 27000221 HC OXYGEN, UP TO 24 HOURS

## 2023-05-19 PROCEDURE — 25000003 PHARM REV CODE 250: Performed by: STUDENT IN AN ORGANIZED HEALTH CARE EDUCATION/TRAINING PROGRAM

## 2023-05-19 PROCEDURE — 36600 WITHDRAWAL OF ARTERIAL BLOOD: CPT

## 2023-05-19 PROCEDURE — 25000242 PHARM REV CODE 250 ALT 637 W/ HCPCS: Performed by: NURSE PRACTITIONER

## 2023-05-19 PROCEDURE — 20000000 HC ICU ROOM

## 2023-05-19 PROCEDURE — 25000003 PHARM REV CODE 250

## 2023-05-19 PROCEDURE — 63600175 PHARM REV CODE 636 W HCPCS: Performed by: NURSE PRACTITIONER

## 2023-05-19 PROCEDURE — 63600175 PHARM REV CODE 636 W HCPCS: Performed by: INTERNAL MEDICINE

## 2023-05-19 PROCEDURE — 27200966 HC CLOSED SUCTION SYSTEM

## 2023-05-19 PROCEDURE — 99900026 HC AIRWAY MAINTENANCE (STAT)

## 2023-05-19 PROCEDURE — 94003 VENT MGMT INPAT SUBQ DAY: CPT

## 2023-05-19 PROCEDURE — 63600175 PHARM REV CODE 636 W HCPCS: Performed by: STUDENT IN AN ORGANIZED HEALTH CARE EDUCATION/TRAINING PROGRAM

## 2023-05-19 PROCEDURE — 25000003 PHARM REV CODE 250: Performed by: HOSPITALIST

## 2023-05-19 PROCEDURE — 99900031 HC PATIENT EDUCATION (STAT)

## 2023-05-19 PROCEDURE — C9113 INJ PANTOPRAZOLE SODIUM, VIA: HCPCS | Performed by: NURSE PRACTITIONER

## 2023-05-19 PROCEDURE — 80202 ASSAY OF VANCOMYCIN: CPT | Performed by: HOSPITALIST

## 2023-05-19 PROCEDURE — 83735 ASSAY OF MAGNESIUM: CPT

## 2023-05-19 PROCEDURE — 63600175 PHARM REV CODE 636 W HCPCS

## 2023-05-19 PROCEDURE — 37799 UNLISTED PX VASCULAR SURGERY: CPT

## 2023-05-19 PROCEDURE — 82803 BLOOD GASES ANY COMBINATION: CPT

## 2023-05-19 PROCEDURE — 94640 AIRWAY INHALATION TREATMENT: CPT

## 2023-05-19 PROCEDURE — 84100 ASSAY OF PHOSPHORUS: CPT

## 2023-05-19 PROCEDURE — 85025 COMPLETE CBC W/AUTO DIFF WBC: CPT

## 2023-05-19 PROCEDURE — 80053 COMPREHEN METABOLIC PANEL: CPT

## 2023-05-19 PROCEDURE — 94761 N-INVAS EAR/PLS OXIMETRY MLT: CPT

## 2023-05-19 PROCEDURE — 99900035 HC TECH TIME PER 15 MIN (STAT)

## 2023-05-19 PROCEDURE — 25000003 PHARM REV CODE 250: Performed by: INTERNAL MEDICINE

## 2023-05-19 PROCEDURE — 84134 ASSAY OF PREALBUMIN: CPT | Performed by: EMERGENCY MEDICINE

## 2023-05-19 RX ORDER — MAGNESIUM SULFATE HEPTAHYDRATE 40 MG/ML
4 INJECTION, SOLUTION INTRAVENOUS ONCE
Status: COMPLETED | OUTPATIENT
Start: 2023-05-19 | End: 2023-05-19

## 2023-05-19 RX ORDER — CALCIUM GLUCONATE 20 MG/ML
1 INJECTION, SOLUTION INTRAVENOUS
Status: COMPLETED | OUTPATIENT
Start: 2023-05-19 | End: 2023-05-19

## 2023-05-19 RX ORDER — MIDODRINE HYDROCHLORIDE 5 MG/1
5 TABLET ORAL EVERY 12 HOURS
Status: DISCONTINUED | OUTPATIENT
Start: 2023-05-19 | End: 2023-05-19

## 2023-05-19 RX ORDER — MIDODRINE HYDROCHLORIDE 5 MG/1
5 TABLET ORAL EVERY 8 HOURS
Status: DISCONTINUED | OUTPATIENT
Start: 2023-05-19 | End: 2023-05-21

## 2023-05-19 RX ORDER — POTASSIUM CHLORIDE 14.9 MG/ML
60 INJECTION INTRAVENOUS ONCE
Status: COMPLETED | OUTPATIENT
Start: 2023-05-19 | End: 2023-05-19

## 2023-05-19 RX ORDER — ALPRAZOLAM 0.5 MG/1
1 TABLET ORAL 3 TIMES DAILY PRN
Status: DISCONTINUED | OUTPATIENT
Start: 2023-05-19 | End: 2023-06-08 | Stop reason: HOSPADM

## 2023-05-19 RX ORDER — MUPIROCIN 20 MG/G
OINTMENT TOPICAL 2 TIMES DAILY
Status: COMPLETED | OUTPATIENT
Start: 2023-05-19 | End: 2023-05-23

## 2023-05-19 RX ADMIN — GUAIFENESIN 400 MG: 200 SOLUTION ORAL at 03:05

## 2023-05-19 RX ADMIN — CALCITRIOL CAPSULES 0.25 MCG 0.5 MCG: 0.25 CAPSULE ORAL at 08:05

## 2023-05-19 RX ADMIN — LEVETIRACETAM 1000 MG: 100 INJECTION, SOLUTION INTRAVENOUS at 08:05

## 2023-05-19 RX ADMIN — CALCIUM ACETATE 667 MG: 667 CAPSULE ORAL at 12:05

## 2023-05-19 RX ADMIN — ACETYLCYSTEINE 2 ML: 200 INHALANT RESPIRATORY (INHALATION) at 12:05

## 2023-05-19 RX ADMIN — POTASSIUM CHLORIDE 60 MEQ: 14.9 INJECTION, SOLUTION INTRAVENOUS at 03:05

## 2023-05-19 RX ADMIN — MAGNESIUM SULFATE HEPTAHYDRATE 4 G: 40 INJECTION, SOLUTION INTRAVENOUS at 03:05

## 2023-05-19 RX ADMIN — VANCOMYCIN HYDROCHLORIDE 750 MG: 750 INJECTION, POWDER, LYOPHILIZED, FOR SOLUTION INTRAVENOUS at 05:05

## 2023-05-19 RX ADMIN — CALCIUM GLUCONATE 1 G: 20 INJECTION, SOLUTION INTRAVENOUS at 03:05

## 2023-05-19 RX ADMIN — GUAIFENESIN 400 MG: 200 SOLUTION ORAL at 11:05

## 2023-05-19 RX ADMIN — ALPRAZOLAM 1 MG: 0.5 TABLET ORAL at 07:05

## 2023-05-19 RX ADMIN — MIDODRINE HYDROCHLORIDE 5 MG: 5 TABLET ORAL at 09:05

## 2023-05-19 RX ADMIN — DEXMEDETOMIDINE HYDROCHLORIDE 1.4 MCG/KG/HR: 400 INJECTION INTRAVENOUS at 03:05

## 2023-05-19 RX ADMIN — ENOXAPARIN SODIUM 40 MG: 40 INJECTION SUBCUTANEOUS at 05:05

## 2023-05-19 RX ADMIN — PROPOFOL 25 MCG/KG/MIN: 10 INJECTION, EMULSION INTRAVENOUS at 12:05

## 2023-05-19 RX ADMIN — CALCIUM ACETATE 667 MG: 667 CAPSULE ORAL at 07:05

## 2023-05-19 RX ADMIN — DEXMEDETOMIDINE HYDROCHLORIDE 1.4 MCG/KG/HR: 400 INJECTION INTRAVENOUS at 05:05

## 2023-05-19 RX ADMIN — MUPIROCIN: 20 OINTMENT TOPICAL at 09:05

## 2023-05-19 RX ADMIN — GUAIFENESIN 400 MG: 200 SOLUTION ORAL at 09:05

## 2023-05-19 RX ADMIN — CALCIUM ACETATE 667 MG: 667 CAPSULE ORAL at 05:05

## 2023-05-19 RX ADMIN — CALCIUM GLUCONATE 1 G: 20 INJECTION, SOLUTION INTRAVENOUS at 06:05

## 2023-05-19 RX ADMIN — GUAIFENESIN 400 MG: 200 SOLUTION ORAL at 01:05

## 2023-05-19 RX ADMIN — MUPIROCIN: 20 OINTMENT TOPICAL at 12:05

## 2023-05-19 RX ADMIN — NOREPINEPHRINE BITARTRATE 0.03 MCG/KG/MIN: 8 INJECTION, SOLUTION INTRAVENOUS at 07:05

## 2023-05-19 RX ADMIN — LEVETIRACETAM 1000 MG: 100 INJECTION, SOLUTION INTRAVENOUS at 09:05

## 2023-05-19 RX ADMIN — GUAIFENESIN 400 MG: 200 SOLUTION ORAL at 06:05

## 2023-05-19 RX ADMIN — GUAIFENESIN 400 MG: 200 SOLUTION ORAL at 05:05

## 2023-05-19 RX ADMIN — DEXMEDETOMIDINE HYDROCHLORIDE 1.4 MCG/KG/HR: 400 INJECTION INTRAVENOUS at 01:05

## 2023-05-19 RX ADMIN — PANTOPRAZOLE SODIUM 40 MG: 40 INJECTION, POWDER, FOR SOLUTION INTRAVENOUS at 08:05

## 2023-05-19 RX ADMIN — DEXMEDETOMIDINE HYDROCHLORIDE 1.4 MCG/KG/HR: 400 INJECTION INTRAVENOUS at 12:05

## 2023-05-19 RX ADMIN — PIPERACILLIN AND TAZOBACTAM 4.5 G: 4; .5 INJECTION, POWDER, LYOPHILIZED, FOR SOLUTION INTRAVENOUS; PARENTERAL at 03:05

## 2023-05-19 RX ADMIN — ACETYLCYSTEINE 2 ML: 200 INHALANT RESPIRATORY (INHALATION) at 08:05

## 2023-05-19 RX ADMIN — PROPOFOL 50 MCG/KG/MIN: 10 INJECTION, EMULSION INTRAVENOUS at 09:05

## 2023-05-19 RX ADMIN — PROPOFOL 30 MCG/KG/MIN: 10 INJECTION, EMULSION INTRAVENOUS at 05:05

## 2023-05-19 RX ADMIN — PIPERACILLIN AND TAZOBACTAM 4.5 G: 4; .5 INJECTION, POWDER, LYOPHILIZED, FOR SOLUTION INTRAVENOUS; PARENTERAL at 12:05

## 2023-05-19 RX ADMIN — DEXMEDETOMIDINE HYDROCHLORIDE 1.4 MCG/KG/HR: 400 INJECTION INTRAVENOUS at 07:05

## 2023-05-19 RX ADMIN — MIDODRINE HYDROCHLORIDE 5 MG: 5 TABLET ORAL at 12:05

## 2023-05-19 RX ADMIN — DEXMEDETOMIDINE HYDROCHLORIDE 1.4 MCG/KG/HR: 400 INJECTION INTRAVENOUS at 09:05

## 2023-05-19 RX ADMIN — PIPERACILLIN AND TAZOBACTAM 4.5 G: 4; .5 INJECTION, POWDER, LYOPHILIZED, FOR SOLUTION INTRAVENOUS; PARENTERAL at 07:05

## 2023-05-19 NOTE — PLAN OF CARE
Problem: Infection  Goal: Absence of Infection Signs and Symptoms  Outcome: Ongoing, Progressing     Problem: Adult Inpatient Plan of Care  Goal: Plan of Care Review  Outcome: Ongoing, Progressing  Goal: Patient-Specific Goal (Individualized)  Outcome: Ongoing, Progressing  Goal: Absence of Hospital-Acquired Illness or Injury  Outcome: Ongoing, Progressing  Goal: Optimal Comfort and Wellbeing  Outcome: Ongoing, Progressing     Problem: Device-Related Complication Risk (Mechanical Ventilation, Invasive)  Goal: Optimal Device Function  Outcome: Ongoing, Progressing     Problem: Nutrition Impairment (Mechanical Ventilation, Invasive)  Goal: Optimal Nutrition Delivery  Outcome: Ongoing, Progressing     Problem: Skin and Tissue Injury (Mechanical Ventilation, Invasive)  Goal: Absence of Device-Related Skin and Tissue Injury  Outcome: Ongoing, Progressing     Problem: Ventilator-Induced Lung Injury (Mechanical Ventilation, Invasive)  Goal: Absence of Ventilator-Induced Lung Injury  Outcome: Ongoing, Progressing     Problem: Device-Related Complication Risk (Artificial Airway)  Goal: Optimal Device Function  Outcome: Ongoing, Progressing     Problem: Skin and Tissue Injury (Artificial Airway)  Goal: Absence of Device-Related Skin or Tissue Injury  Outcome: Ongoing, Progressing     Problem: Impaired Wound Healing  Goal: Optimal Wound Healing  Outcome: Ongoing, Progressing     Problem: Skin Injury Risk Increased  Goal: Skin Health and Integrity  Outcome: Ongoing, Progressing     Problem: Adult Inpatient Plan of Care  Goal: Readiness for Transition of Care  Outcome: Ongoing, Not Progressing     Problem: Communication Impairment (Mechanical Ventilation, Invasive)  Goal: Effective Communication  Outcome: Ongoing, Not Progressing     Problem: Inability to Wean (Mechanical Ventilation, Invasive)  Goal: Mechanical Ventilation Liberation  Outcome: Ongoing, Not Progressing     Problem: Communication Impairment (Artificial  Airway)  Goal: Effective Communication  Outcome: Ongoing, Not Progressing     Problem: Noninvasive Ventilation Acute  Goal: Effective Unassisted Ventilation and Oxygenation  Outcome: Ongoing, Not Progressing

## 2023-05-19 NOTE — NURSING
Nurses Note -- 4 Eyes      5/18/2023   7:01 PM      Skin assessed during: Daily Assessment      [] No Altered Skin Integrity Present    [x]Prevention Measures Documented      [x] Yes- Altered Skin Integrity Present or Discovered   [] LDA Added if Not in Epic (Describe Wound)   [] New Altered Skin Integrity was Present on Admit and Documented in LDA   [] Wound Image Taken    Wound Care Consulted? Yes    Attending Nurse:  Fide Bowman RN     Second RN/Staff Member:  peg Oswald
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
## 2023-05-19 NOTE — PLAN OF CARE
05/19/23 1538   Discharge Assessment   Assessment Type Discharge Planning Assessment   Confirmed/corrected address, phone number and insurance Yes   Confirmed Demographics Correct on Facesheet   Source of Information family   Does patient/caregiver understand observation status Yes   Communicated ANDRÉS with patient/caregiver Yes;Date not available/Unable to determine   People in Home parent(s)   Prior to hospitilization cognitive status: Unable to Assess   Current cognitive status: Unable to Assess   Walking or Climbing Stairs transferring difficulty, requires equipment   Dressing/Bathing bathing difficulty, assistance 1 person;dressing difficulty, assistance 1 person   Equipment Currently Used at Home power chair;hospital bed;shower chair   Readmission within 30 days? No   Do you currently have service(s) that help you manage your care at home? No   Do you take prescription medications? Yes   Do you have prescription coverage? Yes   Coverage Medicaid/[employer]   How do you get to doctors appointments? family or friend will provide   Are you on dialysis? No   Do you take coumadin? No   Discharge Plan A   (To be determined)   Discharge Plan B   (To be determined)   Discharge Plan discussed with: Parent(s)   Name(s) and Number(s) Ann Mayberry   Mother   [phone]

## 2023-05-19 NOTE — PROGRESS NOTES
Pharmacokinetic Assessment Follow Up: IV Vancomycin    Vancomycin serum concentration assessment(s):    The trough level was drawn correctly and can be used to guide therapy at this time. The measurement is within the desired definitive target range of 10 to 20 mcg/mL.    Vancomycin Regimen Plan:    Continue 750 mg q24h and check trough on 5/22/23 at 1500.    Drug levels (last 3 results):  Recent Labs   Lab Result Units 05/18/23  0200 05/18/23  1504 05/19/23  1545   Vancomycin Trough ug/ml 18.6 13.9* 13.4*       Pharmacy will continue to follow and monitor vancomycin.    Please contact pharmacy at extension [phone] for questions regarding this assessment.    Thank you for the consult,   Abbey Jalloh       Patient brief summary:  Jyoti Mayberry is a 27 y.o. male initiated on antimicrobial therapy with IV Vancomycin for treatment of bacteremia      Drug Allergies:   Review of patient's allergies indicates:  No Known Allergies    Actual Body Weight:   77.1 kg    Renal Function:   Estimated Creatinine Clearance: 138.2 mL/min (based on SCr of 0.81 mg/dL).,     Dialysis Method (if applicable):  N/A    CBC (last 72 hours):  Recent Labs   Lab Result Units 05/17/23  0212 05/18/23  0200 05/19/23  0147   WBC x10(3)/mcL 11.11 13.35* 13.12*   Hgb g/dL 9.1* 8.0* 8.9*   Hct % 28.0* 24.3* 28.1*   Platelet x10(3)/mcL 699* 615* 591*   Mono % % 7.1 3.3 4.3   Eos % % 0.5 1.1 3.7   Basophil % % 0.5 0.5 0.5       Metabolic Panel (last 72 hours):  Recent Labs   Lab Result Units 05/16/23  1814 05/16/23  1836 05/17/23  0212 05/17/23  0741 05/17/23  0813 05/18/23  0200 05/18/23  0454 05/19/23  0147 05/19/23  1423   Sodium Level mmol/L  --  139 141  --  141 143  --  141  --    Sodium, Blood Gas mmol/L  --   --   --  138  --   --  140  --  141   Urine Sodium mmol/L 33.0  --   --   --   --   --   --   --   --    Potassium Level mmol/L  --  3.3* 4.3  --  4.0 3.9  --  3.6  --    Potassium, Blood Gas mmol/L  --   --   --  3.7  --   --  3.4*  --   3.2*   Chloride mmol/L  --  101 103  --  105 107  --  106  --    Carbon Dioxide mmol/L  --  21* 19*  --  22 21*  --  19*  --    Glucose Level mg/dL  --  105* 79  --  84 91  --  130*  --    Blood Urea Nitrogen mg/dL  --  22.2* 20.5  --  19.0 14.0  --  11.9  --    Creatinine mg/dL  --  1.02 1.07  --  1.01 0.84  --  0.81  --    Urine Creatinine mg/dL 8.0*  --   --   --   --   --   --   --   --    Albumin Level g/dL  --   --  1.5*  --  1.5* 1.4*  --  1.4*  --    Bilirubin Total mg/dL  --   --  0.4  --  0.4 0.4  --  0.3  --    Alkaline Phosphatase unit/L  --   --  167*  --  155* 151*  --  142  --    Aspartate Aminotransferase unit/L  --   --  19  --  17 16 --  18  --    Alanine Aminotransferase unit/L  --   --  27  --  24 19 -- 17  --    Magnesium Level mg/dL  --  2.00 1.80  --  1.70 1.40*  --  1.30*  --    Phosphorus Level mg/dL  --  8.6* 9.2*  --  9.3* 9.6*  --  7.6*  --        Vancomycin Administrations:  vancomycin given in the last 96 hours                     vancomycin 750 mg in dextrose 5 % 250 mL IVPB (ready to mix) (mg) 750 mg New Bag 05/18/23 1526     750 mg New Bag 05/17/23 1701     750 mg New Bag 05/16/23 1546                    Microbiologic Results:  Microbiology Results (last 7 days)       Procedure Component Value Units Date/Time    Blood culture #1 **CANNOT BE ORDERED STAT** [[id number]]  (Normal) Collected: 05/15/23 1027    Order Status: Completed Specimen: Blood Updated: 05/19/23 1100     CULTURE, BLOOD ([facility]) No Growth At 96 Hours    Blood culture #2 **CANNOT BE ORDERED STAT** [[id number]]  (Normal) Collected: 05/15/23 1027    Order Status: Completed Specimen: Blood Updated: 05/19/23 1100     CULTURE, BLOOD ([facility]) No Growth At 96 Hours    Respiratory Culture [[id number]]  (Abnormal) Collected: 05/16/23 1138    Order Status: Completed Specimen: Bronchial Wash from Endotracheal Aspirate Updated: 05/18/23 0800     Respiratory Culture Moderate Yeast     Comment: with normal respiratory diana        GRAM  STAIN Quality 2+      Many Gram positive cocci    Urine culture [[id number]]  (Abnormal) Collected: 05/15/23 1434    Order Status: Completed Specimen: Urine Updated: 05/18/23 0654     Urine Culture Less than 10,000 colonies/ml Klebsiella pneumoniae ssp pneumoniae      Less than 10,000 colonies/ml Providencia stuartii     Comment: [place] counts <10,000/ml are of questionable significance and may or may not indicate contamination.  Therefore organisms are identified only.  If further workup is desired please notify Microbiology        Respiratory Culture [[id number]]  (Abnormal) Collected: 05/15/23 1439    Order Status: Completed Specimen: Sputum from Endotracheal Aspirate Updated: 05/17/23 1042     Respiratory Culture Moderate Yeast     Comment: with normal respiratory diana        GRAM STAIN Quality 3+      No bacteria seen

## 2023-05-19 NOTE — NURSING
Nurses Note -- 4 Eyes      5/19/2023   6:14 PM      Skin assessed during: Q Shift Change      [] No Altered Skin Integrity Present    []Prevention Measures Documented      [x] Yes- Altered Skin Integrity Present or Discovered   [] LDA Added if Not in Epic (Describe Wound)   [] New Altered Skin Integrity was Present on Admit and Documented in LDA   [] Wound Image Taken    Wound Care Consulted? Yes    Attending Nurse:  Jerri Valle RN     Second RN/Staff Member:  Lilliam Fry CNA
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
## 2023-05-20 LAB
ALBUMIN SERPL-MCNC: 1.6 G/DL (ref 3.5–5)
ALBUMIN/GLOB SERPL: 0.3 RATIO (ref 1.1–2)
ALLENS TEST BLOOD GAS (OHS): ABNORMAL
ALP SERPL-CCNC: 140 UNIT/L (ref 40–150)
ALT SERPL-CCNC: 15 UNIT/L (ref 0–55)
AST SERPL-CCNC: 19 UNIT/L (ref 5–34)
BACTERIA BLD CULT: NORMAL
BACTERIA BLD CULT: NORMAL
BASE EXCESS BLD CALC-SCNC: 0.4 MMOL/L (ref -2–2)
BASE EXCESS BLD CALC-SCNC: 0.7 MMOL/L
BASE EXCESS BLD CALC-SCNC: 1.8 MMOL/L (ref -2–2)
BASOPHILS # BLD AUTO: 0.06 X10(3)/MCL
BASOPHILS NFR BLD AUTO: 0.3 %
BILIRUBIN DIRECT+TOT PNL SERPL-MCNC: 0.2 MG/DL
BLOOD GAS SAMPLE TYPE (OHS): ABNORMAL
BUN SERPL-MCNC: 9.9 MG/DL (ref 8.9–20.6)
CA-I BLD-SCNC: 0.74 MMOL/L (ref 1.12–1.23)
CA-I BLD-SCNC: 0.75 MMOL/L (ref 1.12–1.23)
CA-I BLD-SCNC: 0.76 MMOL/L (ref 1.12–1.23)
CALCIUM SERPL-MCNC: 5.7 MG/DL (ref 8.4–10.2)
CHLORIDE SERPL-SCNC: 108 MMOL/L (ref 98–107)
CO2 BLDA-SCNC: 24.4 MMOL/L
CO2 BLDA-SCNC: 25.4 MMOL/L
CO2 BLDA-SCNC: 26.6 MMOL/L
CO2 SERPL-SCNC: 20 MMOL/L (ref 22–29)
COHGB MFR BLDA: 1.7 % (ref 0.5–1.5)
CREAT SERPL-MCNC: 0.73 MG/DL (ref 0.73–1.18)
DRAWN BY BLOOD GAS (OHS): ABNORMAL
DRAWN BY BLOOD GAS (OHS): ABNORMAL
EOSINOPHIL # BLD AUTO: 0.42 X10(3)/MCL (ref 0–0.9)
EOSINOPHIL NFR BLD AUTO: 2 %
ERYTHROCYTE [DISTWIDTH] IN BLOOD BY AUTOMATED COUNT: 23.2 % (ref 11.5–17)
FIO2 BLOOD GAS (OHS): 100 %
FIO2 BLOOD GAS (OHS): 40 %
FIO2 BLOOD GAS (OHS): 60 %
FLOW (OHS): 2 LPM
GFR SERPLBLD CREATININE-BSD FMLA CKD-EPI: >60 MLS/MIN/1.73/M2
GLOBULIN SER-MCNC: 4.6 GM/DL (ref 2.4–3.5)
GLUCOSE SERPL-MCNC: 99 MG/DL (ref 74–100)
HCO3 BLDA-SCNC: 23.6 MMOL/L (ref 22–26)
HCO3 BLDA-SCNC: 24.3 MMOL/L (ref 22–26)
HCO3 BLDA-SCNC: 25.4 MMOL/L
HCT VFR BLD AUTO: 31.2 % (ref 42–52)
HGB BLD-MCNC: 9.7 G/DL (ref 14–18)
IMM GRANULOCYTES # BLD AUTO: 0.09 X10(3)/MCL (ref 0–0.04)
IMM GRANULOCYTES NFR BLD AUTO: 0.4 %
LYMPHOCYTES # BLD AUTO: 2.09 X10(3)/MCL (ref 0.6–4.6)
LYMPHOCYTES NFR BLD AUTO: 9.8 %
MAGNESIUM SERPL-MCNC: 1.8 MG/DL (ref 1.6–2.6)
MCH RBC QN AUTO: 25.1 PG (ref 27–31)
MCHC RBC AUTO-ENTMCNC: 31.1 G/DL (ref 33–36)
MCV RBC AUTO: 80.6 FL (ref 80–94)
MECH RR (OHS): 18 B/MIN
MECH VT (OHS): 450 ML
METHGB MFR BLDA: 0.4 % (ref 0.4–1.5)
MODE (OHS): AC
MONOCYTES # BLD AUTO: 0.46 X10(3)/MCL (ref 0.1–1.3)
MONOCYTES NFR BLD AUTO: 2.1 %
NEUTROPHILS # BLD AUTO: 18.3 X10(3)/MCL (ref 2.1–9.2)
NEUTROPHILS NFR BLD AUTO: 85.4 %
NRBC BLD AUTO-RTO: 0 %
O2 HB BLOOD GAS (OHS): 97.4 % (ref 94–97)
OXYGEN DEVICE BLOOD GAS (OHS): ABNORMAL
OXYHGB MFR BLDA: 8.2 G/DL (ref 12–16)
PCO2 BLDA: 27 MMHG (ref 35–45)
PCO2 BLDA: 35 MMHG (ref 35–45)
PCO2 BLDA: 40 MMHG (ref 35–45)
PEEP (OHS): 10 CMH2O
PEEP (OHS): 10 CMH2O
PEEP (OHS): 5 CMH2O
PH BLDA: 7.41 [PH] (ref 7.35–7.45)
PH BLDA: 7.45 [PH] (ref 7.35–7.45)
PH BLDA: 7.55 [PH] (ref 7.35–7.45)
PHOSPHATE SERPL-MCNC: 5.7 MG/DL (ref 2.3–4.7)
PLATELET # BLD AUTO: 640 X10(3)/MCL (ref 130–400)
PMV BLD AUTO: 8 FL (ref 7.4–10.4)
PO2 BLDA: 141 MMHG (ref 80–100)
PO2 BLDA: 151 MMHG (ref 80–100)
PO2 BLDA: 50 MMHG (ref 80–100)
POTASSIUM BLOOD GAS (OHS): 3.7 MMOL/L (ref 3.5–5)
POTASSIUM BLOOD GAS (OHS): 3.8 MMOL/L (ref 3.5–5)
POTASSIUM BLOOD GAS (OHS): 4.2 MMOL/L (ref 3.5–5)
POTASSIUM SERPL-SCNC: 4.4 MMOL/L (ref 3.5–5.1)
PROT SERPL-MCNC: 6.2 GM/DL (ref 6.4–8.3)
RBC # BLD AUTO: 3.87 X10(6)/MCL (ref 4.7–6.1)
SAMPLE SITE BLOOD GAS (OHS): ABNORMAL
SAMPLE SITE BLOOD GAS (OHS): ABNORMAL
SAO2 % BLDA: 90 %
SAO2 % BLDA: 99 %
SAO2 % BLDA: 99.4 %
SODIUM BLOOD GAS (OHS): 138 MMOL/L (ref 137–145)
SODIUM BLOOD GAS (OHS): 140 MMOL/L (ref 137–145)
SODIUM BLOOD GAS (OHS): 143 MMOL/L (ref 137–145)
SODIUM SERPL-SCNC: 143 MMOL/L (ref 136–145)
WBC # SPEC AUTO: 21.42 X10(3)/MCL (ref 4.5–11.5)

## 2023-05-20 PROCEDURE — 63600175 PHARM REV CODE 636 W HCPCS: Performed by: INTERNAL MEDICINE

## 2023-05-20 PROCEDURE — 94640 AIRWAY INHALATION TREATMENT: CPT

## 2023-05-20 PROCEDURE — 84100 ASSAY OF PHOSPHORUS: CPT

## 2023-05-20 PROCEDURE — C9113 INJ PANTOPRAZOLE SODIUM, VIA: HCPCS | Performed by: NURSE PRACTITIONER

## 2023-05-20 PROCEDURE — 27202055 HC BRONCHOSCOPE, DISP

## 2023-05-20 PROCEDURE — 83735 ASSAY OF MAGNESIUM: CPT

## 2023-05-20 PROCEDURE — 63600175 PHARM REV CODE 636 W HCPCS

## 2023-05-20 PROCEDURE — 25000003 PHARM REV CODE 250: Performed by: INTERNAL MEDICINE

## 2023-05-20 PROCEDURE — 25000003 PHARM REV CODE 250: Performed by: STUDENT IN AN ORGANIZED HEALTH CARE EDUCATION/TRAINING PROGRAM

## 2023-05-20 PROCEDURE — 82803 BLOOD GASES ANY COMBINATION: CPT

## 2023-05-20 PROCEDURE — 99900025 HC BRONCHOSCOPY-ASST (STAT)

## 2023-05-20 PROCEDURE — 25000003 PHARM REV CODE 250: Performed by: HOSPITALIST

## 2023-05-20 PROCEDURE — 63600175 PHARM REV CODE 636 W HCPCS: Performed by: STUDENT IN AN ORGANIZED HEALTH CARE EDUCATION/TRAINING PROGRAM

## 2023-05-20 PROCEDURE — 20000000 HC ICU ROOM

## 2023-05-20 PROCEDURE — 25000003 PHARM REV CODE 250

## 2023-05-20 PROCEDURE — 99900035 HC TECH TIME PER 15 MIN (STAT)

## 2023-05-20 PROCEDURE — 99900026 HC AIRWAY MAINTENANCE (STAT)

## 2023-05-20 PROCEDURE — 99900031 HC PATIENT EDUCATION (STAT)

## 2023-05-20 PROCEDURE — 94761 N-INVAS EAR/PLS OXIMETRY MLT: CPT

## 2023-05-20 PROCEDURE — 94003 VENT MGMT INPAT SUBQ DAY: CPT

## 2023-05-20 PROCEDURE — 27000221 HC OXYGEN, UP TO 24 HOURS

## 2023-05-20 PROCEDURE — 85025 COMPLETE CBC W/AUTO DIFF WBC: CPT

## 2023-05-20 PROCEDURE — 27200966 HC CLOSED SUCTION SYSTEM

## 2023-05-20 PROCEDURE — 80053 COMPREHEN METABOLIC PANEL: CPT

## 2023-05-20 PROCEDURE — 25000242 PHARM REV CODE 250 ALT 637 W/ HCPCS: Performed by: INTERNAL MEDICINE

## 2023-05-20 PROCEDURE — 37799 UNLISTED PX VASCULAR SURGERY: CPT

## 2023-05-20 PROCEDURE — 36600 WITHDRAWAL OF ARTERIAL BLOOD: CPT

## 2023-05-20 PROCEDURE — 63600175 PHARM REV CODE 636 W HCPCS: Performed by: NURSE PRACTITIONER

## 2023-05-20 RX ORDER — SODIUM CHLORIDE FOR INHALATION 3 %
4 VIAL, NEBULIZER (ML) INHALATION EVERY 6 HOURS
Status: DISCONTINUED | OUTPATIENT
Start: 2023-05-20 | End: 2023-05-29

## 2023-05-20 RX ORDER — FENTANYL CITRATE 50 UG/ML
INJECTION, SOLUTION INTRAMUSCULAR; INTRAVENOUS
Status: COMPLETED
Start: 2023-05-20 | End: 2023-05-20

## 2023-05-20 RX ORDER — CALCIUM GLUCONATE 20 MG/ML
1 INJECTION, SOLUTION INTRAVENOUS ONCE
Status: COMPLETED | OUTPATIENT
Start: 2023-05-20 | End: 2023-05-20

## 2023-05-20 RX ORDER — FENTANYL CITRATE 50 UG/ML
200 INJECTION, SOLUTION INTRAMUSCULAR; INTRAVENOUS ONCE
Status: COMPLETED | OUTPATIENT
Start: 2023-05-20 | End: 2023-05-20

## 2023-05-20 RX ADMIN — CALCIUM GLUCONATE 1 G: 20 INJECTION, SOLUTION INTRAVENOUS at 05:05

## 2023-05-20 RX ADMIN — DEXMEDETOMIDINE HYDROCHLORIDE 1.4 MCG/KG/HR: 400 INJECTION INTRAVENOUS at 10:05

## 2023-05-20 RX ADMIN — LEVETIRACETAM 1000 MG: 100 INJECTION, SOLUTION INTRAVENOUS at 09:05

## 2023-05-20 RX ADMIN — CALCIUM ACETATE 667 MG: 667 CAPSULE ORAL at 12:05

## 2023-05-20 RX ADMIN — MIDODRINE HYDROCHLORIDE 5 MG: 5 TABLET ORAL at 05:05

## 2023-05-20 RX ADMIN — ENOXAPARIN SODIUM 40 MG: 40 INJECTION SUBCUTANEOUS at 04:05

## 2023-05-20 RX ADMIN — FENTANYL CITRATE 200 MCG: 50 INJECTION, SOLUTION INTRAMUSCULAR; INTRAVENOUS at 08:05

## 2023-05-20 RX ADMIN — GUAIFENESIN 400 MG: 200 SOLUTION ORAL at 09:05

## 2023-05-20 RX ADMIN — GUAIFENESIN 400 MG: 200 SOLUTION ORAL at 02:05

## 2023-05-20 RX ADMIN — PROPOFOL 50 MCG/KG/MIN: 10 INJECTION, EMULSION INTRAVENOUS at 10:05

## 2023-05-20 RX ADMIN — PROPOFOL 50 MCG/KG/MIN: 10 INJECTION, EMULSION INTRAVENOUS at 01:05

## 2023-05-20 RX ADMIN — DEXMEDETOMIDINE HYDROCHLORIDE 1.4 MCG/KG/HR: 400 INJECTION INTRAVENOUS at 06:05

## 2023-05-20 RX ADMIN — GUAIFENESIN 400 MG: 200 SOLUTION ORAL at 01:05

## 2023-05-20 RX ADMIN — PIPERACILLIN AND TAZOBACTAM 4.5 G: 4; .5 INJECTION, POWDER, LYOPHILIZED, FOR SOLUTION INTRAVENOUS; PARENTERAL at 10:05

## 2023-05-20 RX ADMIN — DEXMEDETOMIDINE HYDROCHLORIDE 1.4 MCG/KG/HR: 400 INJECTION INTRAVENOUS at 04:05

## 2023-05-20 RX ADMIN — PROPOFOL 50 MCG/KG/MIN: 10 INJECTION, EMULSION INTRAVENOUS at 08:05

## 2023-05-20 RX ADMIN — CALCITRIOL CAPSULES 0.25 MCG 0.5 MCG: 0.25 CAPSULE ORAL at 09:05

## 2023-05-20 RX ADMIN — SODIUM CHLORIDE 30 MG/ML INHALATION SOLUTION 4 ML: 30 SOLUTION INHALANT at 08:05

## 2023-05-20 RX ADMIN — PIPERACILLIN AND TAZOBACTAM 4.5 G: 4; .5 INJECTION, POWDER, LYOPHILIZED, FOR SOLUTION INTRAVENOUS; PARENTERAL at 06:05

## 2023-05-20 RX ADMIN — VANCOMYCIN HYDROCHLORIDE 750 MG: 750 INJECTION, POWDER, LYOPHILIZED, FOR SOLUTION INTRAVENOUS at 05:05

## 2023-05-20 RX ADMIN — GUAIFENESIN 400 MG: 200 SOLUTION ORAL at 05:05

## 2023-05-20 RX ADMIN — PANTOPRAZOLE SODIUM 40 MG: 40 INJECTION, POWDER, FOR SOLUTION INTRAVENOUS at 09:05

## 2023-05-20 RX ADMIN — CALCIUM ACETATE 667 MG: 667 CAPSULE ORAL at 04:05

## 2023-05-20 RX ADMIN — CALCIUM ACETATE 667 MG: 667 CAPSULE ORAL at 08:05

## 2023-05-20 RX ADMIN — MUPIROCIN: 20 OINTMENT TOPICAL at 08:05

## 2023-05-20 RX ADMIN — DEXMEDETOMIDINE HYDROCHLORIDE 1.4 MCG/KG/HR: 400 INJECTION INTRAVENOUS at 01:05

## 2023-05-20 RX ADMIN — PIPERACILLIN AND TAZOBACTAM 4.5 G: 4; .5 INJECTION, POWDER, LYOPHILIZED, FOR SOLUTION INTRAVENOUS; PARENTERAL at 03:05

## 2023-05-20 RX ADMIN — PROPOFOL 50 MCG/KG/MIN: 10 INJECTION, EMULSION INTRAVENOUS at 04:05

## 2023-05-20 RX ADMIN — MIDODRINE HYDROCHLORIDE 5 MG: 5 TABLET ORAL at 01:05

## 2023-05-20 RX ADMIN — SODIUM CHLORIDE 30 MG/ML INHALATION SOLUTION 4 ML: 30 SOLUTION INHALANT at 01:05

## 2023-05-20 RX ADMIN — DEXMEDETOMIDINE HYDROCHLORIDE 1.4 MCG/KG/HR: 400 INJECTION INTRAVENOUS at 02:05

## 2023-05-20 RX ADMIN — LEVETIRACETAM 1000 MG: 100 INJECTION, SOLUTION INTRAVENOUS at 08:05

## 2023-05-20 RX ADMIN — NOREPINEPHRINE BITARTRATE 0.06 MCG/KG/MIN: 8 INJECTION, SOLUTION INTRAVENOUS at 08:05

## 2023-05-20 RX ADMIN — MIDODRINE HYDROCHLORIDE 5 MG: 5 TABLET ORAL at 09:05

## 2023-05-20 NOTE — PLAN OF CARE
Problem: Adult Inpatient Plan of Care  Goal: Plan of Care Review  Outcome: Ongoing, Progressing  Goal: Patient-Specific Goal (Individualized)  Outcome: Ongoing, Progressing  Goal: Absence of Hospital-Acquired Illness or Injury  Outcome: Ongoing, Progressing  Goal: Optimal Comfort and Wellbeing  Outcome: Ongoing, Progressing     Problem: Device-Related Complication Risk (Mechanical Ventilation, Invasive)  Goal: Optimal Device Function  Outcome: Ongoing, Progressing     Problem: Nutrition Impairment (Mechanical Ventilation, Invasive)  Goal: Optimal Nutrition Delivery  Outcome: Ongoing, Progressing     Problem: Skin and Tissue Injury (Mechanical Ventilation, Invasive)  Goal: Absence of Device-Related Skin and Tissue Injury  Outcome: Ongoing, Progressing     Problem: Ventilator-Induced Lung Injury (Mechanical Ventilation, Invasive)  Goal: Absence of Ventilator-Induced Lung Injury  Outcome: Ongoing, Progressing     Problem: Device-Related Complication Risk (Artificial Airway)  Goal: Optimal Device Function  Outcome: Ongoing, Progressing     Problem: Skin and Tissue Injury (Artificial Airway)  Goal: Absence of Device-Related Skin or Tissue Injury  Outcome: Ongoing, Progressing     Problem: Impaired Wound Healing  Goal: Optimal Wound Healing  Outcome: Ongoing, Progressing     Problem: Skin Injury Risk Increased  Goal: Skin Health and Integrity  Outcome: Ongoing, Progressing     Problem: Sleep Disturbance (Delirium)  Goal: Improved Sleep  Outcome: Ongoing, Progressing     Problem: Infection  Goal: Absence of Infection Signs and Symptoms  Outcome: Ongoing, Not Progressing     Problem: Adult Inpatient Plan of Care  Goal: Readiness for Transition of Care  Outcome: Ongoing, Not Progressing     Problem: Communication Impairment (Mechanical Ventilation, Invasive)  Goal: Effective Communication  Outcome: Ongoing, Not Progressing     Problem: Inability to Wean (Mechanical Ventilation, Invasive)  Goal: Mechanical Ventilation  Liberation  Outcome: Ongoing, Not Progressing     Problem: Communication Impairment (Artificial Airway)  Goal: Effective Communication  Outcome: Ongoing, Not Progressing     Problem: Noninvasive Ventilation Acute  Goal: Effective Unassisted Ventilation and Oxygenation  Outcome: Ongoing, Not Progressing     Problem: Adjustment to Illness (Delirium)  Goal: Optimal Coping  Outcome: Ongoing, Not Progressing     Problem: Altered Behavior (Delirium)  Goal: Improved Behavioral Control  Outcome: Ongoing, Not Progressing     Problem: Attention and Thought Clarity Impairment (Delirium)  Goal: Improved Attention and Thought Clarity  Outcome: Ongoing, Not Progressing

## 2023-05-20 NOTE — PROCEDURES
"Jyoti Mayberry is a 27 y.o. male patient.    Temp: 98.2 °F (36.8 °C) (05/20/23 0400)  Pulse: 86 (05/20/23 1215)  Resp: 20 (05/20/23 1215)  BP: 104/71 (05/20/23 1200)  SpO2: 100 % (05/20/23 1215)  Weight: 77.1 kg (170 lb) (05/16/23 1028)  Height: 5' 9" (175.3 cm) (05/16/23 1028)       Procedures    5/20/2023    [facility]  Bronchoscopy Procedure Note    SUMMARY     Date of Procedure: 5/20/2023    Procedure: Bronchoscopy, Diagnostic  Bronchoscopy, Therapeutic  Bronchoalveolar lavage, BAL    Performed by: Ranulfo Miguel MD    Pre-Procedure Diagnosis:  Mucous plugging of the right middle and right lower lobe with worsening acute hypoxemic respiratory failure on mechanical ventilation, oxygen saturations is close the 60% range requiring manual bagging with PEEP valve    Post-Procedure Diagnosis:  Same    Anesthesia:  Patient intubated on mechanical ventilation on propofol drip, 200 mcg fentanyl used for procedure        Description of the Findings of the Procedure:     Patient was not consented for the procedure due to the emergent nature of his level hypoxia.   The bronchocope was inserted into the main airway via the endotracheal tube. An anatomical survey was done of the main airways and the subsegmental bronchus.  Distal trachea was visualized to be widely patent without any signs of mucosal abnormalities or obstructions.  Zuleika was visualized to be sharp without any signs of mucosal abnormalities or obstructions.  Left mainstem bronchus was visualized to be widely patent without any signs of mucosal abnormalities or obstructions.  Complete survey of the left lung including left upper lobe lingula lower lobe and all subsegments was performed demonstrating widely patent airways without any mucosal abnormalities or obstructions.  Bronchoscope was then placed into the right mainstem bronchus with visualization of complete occlusion of the bronchus intermedius and right lower lobe takeoff with partial " occlusion of the right upper lobe with thick mucus plugging.  Vigorous aspiration was performed in all segments of the right lung including right upper lobe right middle lobe bronchus intermedius and right lower lobe bronchus with removal of significant tenacious mucus plugging with continued suctioning required in the superior segment right lower lobe with removal of thick mucus plugging.  Attended procedure the right lung was visualized to be widely patent with the subsegments free of any occlusions or mucus plugging.  Bronchoscope was withdrawn from the airway with no signs of active bleeding seen.  Patient was placed back on mechanical ventilatory support via closed circuit.  Oxygenation and ventilation provided during the procedure upper with a Ambu bag with PEEP valve attached with setting of 20 cm of water.      Complications: None; patient tolerated the procedure well.    Estimated Blood Loss (EBL):  0           Specimens: None       Condition: Critical        Disposition: ICU - intubated and critically ill.    Ranulfo Miguel MD  [facility]

## 2023-05-20 NOTE — NURSING
0810: pts o2 saturation declined to 62%, pt was in-line suctioned with scant secretions returned and was put to 100% FIO2 on ventilator with minimal increase in pt's O2 sat. began bagging pt with ambu bag. Dr Romo at bedside, 200mcg fentanyl iv push ordered and given at 0822, levophed increased accordingly, bronch performed at bedside by Dr Romo. After bronch, ventilator settings adjusted and pt's O2 sat is currently 95%. VSS. I called pt's mom, Ann, and updated her on pt's status-all questions asked and answered.

## 2023-05-20 NOTE — NURSING
Nurses Note -- 4 Eyes      5/20/2023   9:11 AM      Skin assessed during: Daily Assessment      [] No Altered Skin Integrity Present    [x]Prevention Measures Documented      [x] Yes- Altered Skin Integrity Present or Discovered   [] LDA Added if Not in Epic (Describe Wound)   [] New Altered Skin Integrity was Present on Admit and Documented in LDA   [] Wound Image Taken    Wound Care Consulted? Yes    Attending Nurse:  Fide Bowman RN     Second RN/Staff Member:  Naomi ARMSTRONG RN
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
## 2023-05-20 NOTE — PLAN OF CARE
Plan of Care    Mr. Mayberry had 2 episodes of desatting to the 60s overnight, requiring bagging. Currently on 100% FiO2 and levo 0.06.     Given his current clinical status and need to prone patient in order to debride sacral wound, we will cancel his case for today and reschedule at a later date.     Okay to resume tube feeds.     Mary Greene MD   [facility], PGY-1

## 2023-05-21 LAB
ALBUMIN SERPL-MCNC: 1.6 G/DL (ref 3.5–5)
ALBUMIN/GLOB SERPL: 0.4 RATIO (ref 1.1–2)
ALP SERPL-CCNC: 142 UNIT/L (ref 40–150)
ALT SERPL-CCNC: 13 UNIT/L (ref 0–55)
AST SERPL-CCNC: 14 UNIT/L (ref 5–34)
BASOPHILS # BLD AUTO: 0.06 X10(3)/MCL
BASOPHILS NFR BLD AUTO: 0.5 %
BILIRUBIN DIRECT+TOT PNL SERPL-MCNC: 0.2 MG/DL
BUN SERPL-MCNC: 6.3 MG/DL (ref 8.9–20.6)
CALCIUM SERPL-MCNC: 5.9 MG/DL (ref 8.4–10.2)
CHLORIDE SERPL-SCNC: 110 MMOL/L (ref 98–107)
CO2 SERPL-SCNC: 21 MMOL/L (ref 22–29)
CREAT SERPL-MCNC: 0.73 MG/DL (ref 0.73–1.18)
EOSINOPHIL # BLD AUTO: 0.57 X10(3)/MCL (ref 0–0.9)
EOSINOPHIL NFR BLD AUTO: 4.4 %
ERYTHROCYTE [DISTWIDTH] IN BLOOD BY AUTOMATED COUNT: 23.4 % (ref 11.5–17)
GFR SERPLBLD CREATININE-BSD FMLA CKD-EPI: >60 MLS/MIN/1.73/M2
GLOBULIN SER-MCNC: 4.5 GM/DL (ref 2.4–3.5)
GLUCOSE SERPL-MCNC: 116 MG/DL (ref 74–100)
HCT VFR BLD AUTO: 28.8 % (ref 42–52)
HCT VFR BLD AUTO: 29.7 % (ref 42–52)
HGB BLD-MCNC: 8.9 G/DL (ref 14–18)
HGB BLD-MCNC: 9 G/DL (ref 14–18)
IMM GRANULOCYTES # BLD AUTO: 0.07 X10(3)/MCL (ref 0–0.04)
IMM GRANULOCYTES NFR BLD AUTO: 0.5 %
LYMPHOCYTES # BLD AUTO: 2.86 X10(3)/MCL (ref 0.6–4.6)
LYMPHOCYTES NFR BLD AUTO: 22.3 %
MAGNESIUM SERPL-MCNC: 1.4 MG/DL (ref 1.6–2.6)
MCH RBC QN AUTO: 24.9 PG (ref 27–31)
MCHC RBC AUTO-ENTMCNC: 30.9 G/DL (ref 33–36)
MCV RBC AUTO: 80.7 FL (ref 80–94)
MONOCYTES # BLD AUTO: 0.58 X10(3)/MCL (ref 0.1–1.3)
MONOCYTES NFR BLD AUTO: 4.5 %
NEUTROPHILS # BLD AUTO: 8.67 X10(3)/MCL (ref 2.1–9.2)
NEUTROPHILS NFR BLD AUTO: 67.8 %
NRBC BLD AUTO-RTO: 0 %
PHOSPHATE SERPL-MCNC: 5.3 MG/DL (ref 2.3–4.7)
PLATELET # BLD AUTO: 493 X10(3)/MCL (ref 130–400)
PMV BLD AUTO: 7.8 FL (ref 7.4–10.4)
POTASSIUM SERPL-SCNC: 4 MMOL/L (ref 3.5–5.1)
PROT SERPL-MCNC: 6.1 GM/DL (ref 6.4–8.3)
RBC # BLD AUTO: 3.57 X10(6)/MCL (ref 4.7–6.1)
SODIUM SERPL-SCNC: 145 MMOL/L (ref 136–145)
VANCOMYCIN TROUGH SERPL-MCNC: 18.1 UG/ML (ref 15–20)
WBC # SPEC AUTO: 12.81 X10(3)/MCL (ref 4.5–11.5)

## 2023-05-21 PROCEDURE — 25000003 PHARM REV CODE 250: Performed by: INTERNAL MEDICINE

## 2023-05-21 PROCEDURE — 63600175 PHARM REV CODE 636 W HCPCS: Performed by: STUDENT IN AN ORGANIZED HEALTH CARE EDUCATION/TRAINING PROGRAM

## 2023-05-21 PROCEDURE — 94003 VENT MGMT INPAT SUBQ DAY: CPT

## 2023-05-21 PROCEDURE — 63600175 PHARM REV CODE 636 W HCPCS: Performed by: NURSE PRACTITIONER

## 2023-05-21 PROCEDURE — 85025 COMPLETE CBC W/AUTO DIFF WBC: CPT

## 2023-05-21 PROCEDURE — 80053 COMPREHEN METABOLIC PANEL: CPT

## 2023-05-21 PROCEDURE — 99900031 HC PATIENT EDUCATION (STAT)

## 2023-05-21 PROCEDURE — 25000003 PHARM REV CODE 250

## 2023-05-21 PROCEDURE — 63600175 PHARM REV CODE 636 W HCPCS

## 2023-05-21 PROCEDURE — 85014 HEMATOCRIT: CPT | Performed by: INTERNAL MEDICINE

## 2023-05-21 PROCEDURE — C9113 INJ PANTOPRAZOLE SODIUM, VIA: HCPCS | Performed by: NURSE PRACTITIONER

## 2023-05-21 PROCEDURE — 27000221 HC OXYGEN, UP TO 24 HOURS

## 2023-05-21 PROCEDURE — 80202 ASSAY OF VANCOMYCIN: CPT | Performed by: HOSPITALIST

## 2023-05-21 PROCEDURE — 99900026 HC AIRWAY MAINTENANCE (STAT)

## 2023-05-21 PROCEDURE — 20000000 HC ICU ROOM

## 2023-05-21 PROCEDURE — 99900035 HC TECH TIME PER 15 MIN (STAT)

## 2023-05-21 PROCEDURE — 83735 ASSAY OF MAGNESIUM: CPT | Performed by: STUDENT IN AN ORGANIZED HEALTH CARE EDUCATION/TRAINING PROGRAM

## 2023-05-21 PROCEDURE — 25000242 PHARM REV CODE 250 ALT 637 W/ HCPCS: Performed by: INTERNAL MEDICINE

## 2023-05-21 PROCEDURE — 25000003 PHARM REV CODE 250: Performed by: STUDENT IN AN ORGANIZED HEALTH CARE EDUCATION/TRAINING PROGRAM

## 2023-05-21 PROCEDURE — 84100 ASSAY OF PHOSPHORUS: CPT

## 2023-05-21 PROCEDURE — 94640 AIRWAY INHALATION TREATMENT: CPT

## 2023-05-21 PROCEDURE — 94761 N-INVAS EAR/PLS OXIMETRY MLT: CPT

## 2023-05-21 PROCEDURE — 63600175 PHARM REV CODE 636 W HCPCS: Performed by: INTERNAL MEDICINE

## 2023-05-21 PROCEDURE — 25000003 PHARM REV CODE 250: Performed by: HOSPITALIST

## 2023-05-21 RX ORDER — CALCIUM GLUCONATE 20 MG/ML
1 INJECTION, SOLUTION INTRAVENOUS ONCE
Status: COMPLETED | OUTPATIENT
Start: 2023-05-21 | End: 2023-05-21

## 2023-05-21 RX ORDER — MIDODRINE HYDROCHLORIDE 5 MG/1
10 TABLET ORAL EVERY 8 HOURS
Status: DISCONTINUED | OUTPATIENT
Start: 2023-05-21 | End: 2023-05-23

## 2023-05-21 RX ORDER — MAGNESIUM SULFATE HEPTAHYDRATE 40 MG/ML
2 INJECTION, SOLUTION INTRAVENOUS ONCE
Status: COMPLETED | OUTPATIENT
Start: 2023-05-21 | End: 2023-05-21

## 2023-05-21 RX ADMIN — NOREPINEPHRINE BITARTRATE 0.08 MCG/KG/MIN: 8 INJECTION, SOLUTION INTRAVENOUS at 11:05

## 2023-05-21 RX ADMIN — GUAIFENESIN 400 MG: 200 SOLUTION ORAL at 02:05

## 2023-05-21 RX ADMIN — SODIUM CHLORIDE 30 MG/ML INHALATION SOLUTION 4 ML: 30 SOLUTION INHALANT at 08:05

## 2023-05-21 RX ADMIN — MIDODRINE HYDROCHLORIDE 5 MG: 5 TABLET ORAL at 05:05

## 2023-05-21 RX ADMIN — PIPERACILLIN AND TAZOBACTAM 4.5 G: 4; .5 INJECTION, POWDER, LYOPHILIZED, FOR SOLUTION INTRAVENOUS; PARENTERAL at 11:05

## 2023-05-21 RX ADMIN — PANTOPRAZOLE SODIUM 40 MG: 40 INJECTION, POWDER, FOR SOLUTION INTRAVENOUS at 09:05

## 2023-05-21 RX ADMIN — MIDODRINE HYDROCHLORIDE 10 MG: 5 TABLET ORAL at 03:05

## 2023-05-21 RX ADMIN — DEXMEDETOMIDINE HYDROCHLORIDE 1.4 MCG/KG/HR: 400 INJECTION INTRAVENOUS at 03:05

## 2023-05-21 RX ADMIN — CALCITRIOL CAPSULES 0.25 MCG 0.5 MCG: 0.25 CAPSULE ORAL at 09:05

## 2023-05-21 RX ADMIN — PROPOFOL 50 MCG/KG/MIN: 10 INJECTION, EMULSION INTRAVENOUS at 06:05

## 2023-05-21 RX ADMIN — CALCIUM ACETATE 667 MG: 667 CAPSULE ORAL at 01:05

## 2023-05-21 RX ADMIN — GUAIFENESIN 400 MG: 200 SOLUTION ORAL at 09:05

## 2023-05-21 RX ADMIN — VANCOMYCIN HYDROCHLORIDE 750 MG: 750 INJECTION, POWDER, LYOPHILIZED, FOR SOLUTION INTRAVENOUS at 05:05

## 2023-05-21 RX ADMIN — PIPERACILLIN AND TAZOBACTAM 4.5 G: 4; .5 INJECTION, POWDER, LYOPHILIZED, FOR SOLUTION INTRAVENOUS; PARENTERAL at 02:05

## 2023-05-21 RX ADMIN — PROPOFOL 50 MCG/KG/MIN: 10 INJECTION, EMULSION INTRAVENOUS at 11:05

## 2023-05-21 RX ADMIN — SODIUM CHLORIDE 30 MG/ML INHALATION SOLUTION 4 ML: 30 SOLUTION INHALANT at 12:05

## 2023-05-21 RX ADMIN — CALCIUM ACETATE 667 MG: 667 CAPSULE ORAL at 06:05

## 2023-05-21 RX ADMIN — DEXMEDETOMIDINE HYDROCHLORIDE 1.4 MCG/KG/HR: 400 INJECTION INTRAVENOUS at 02:05

## 2023-05-21 RX ADMIN — MUPIROCIN: 20 OINTMENT TOPICAL at 09:05

## 2023-05-21 RX ADMIN — MAGNESIUM SULFATE HEPTAHYDRATE 2 G: 40 INJECTION, SOLUTION INTRAVENOUS at 08:05

## 2023-05-21 RX ADMIN — PIPERACILLIN AND TAZOBACTAM 4.5 G: 4; .5 INJECTION, POWDER, LYOPHILIZED, FOR SOLUTION INTRAVENOUS; PARENTERAL at 06:05

## 2023-05-21 RX ADMIN — PROPOFOL 50 MCG/KG/MIN: 10 INJECTION, EMULSION INTRAVENOUS at 08:05

## 2023-05-21 RX ADMIN — PROPOFOL 50 MCG/KG/MIN: 10 INJECTION, EMULSION INTRAVENOUS at 03:05

## 2023-05-21 RX ADMIN — DEXMEDETOMIDINE HYDROCHLORIDE 1.4 MCG/KG/HR: 400 INJECTION INTRAVENOUS at 06:05

## 2023-05-21 RX ADMIN — LEVETIRACETAM 1000 MG: 100 INJECTION, SOLUTION INTRAVENOUS at 08:05

## 2023-05-21 RX ADMIN — MIDODRINE HYDROCHLORIDE 10 MG: 5 TABLET ORAL at 09:05

## 2023-05-21 RX ADMIN — MUPIROCIN: 20 OINTMENT TOPICAL at 08:05

## 2023-05-21 RX ADMIN — GUAIFENESIN 400 MG: 200 SOLUTION ORAL at 03:05

## 2023-05-21 RX ADMIN — CALCIUM ACETATE 667 MG: 667 CAPSULE ORAL at 08:05

## 2023-05-21 RX ADMIN — GUAIFENESIN 400 MG: 200 SOLUTION ORAL at 05:05

## 2023-05-21 RX ADMIN — DEXMEDETOMIDINE HYDROCHLORIDE 1.4 MCG/KG/HR: 400 INJECTION INTRAVENOUS at 10:05

## 2023-05-21 RX ADMIN — GUAIFENESIN 400 MG: 200 SOLUTION ORAL at 06:05

## 2023-05-21 RX ADMIN — PROPOFOL 50 MCG/KG/MIN: 10 INJECTION, EMULSION INTRAVENOUS at 02:05

## 2023-05-21 RX ADMIN — LEVETIRACETAM 1000 MG: 100 INJECTION, SOLUTION INTRAVENOUS at 09:05

## 2023-05-21 RX ADMIN — ENOXAPARIN SODIUM 40 MG: 40 INJECTION SUBCUTANEOUS at 06:05

## 2023-05-21 RX ADMIN — CALCIUM GLUCONATE 1 G: 20 INJECTION, SOLUTION INTRAVENOUS at 05:05

## 2023-05-21 RX ADMIN — DEXMEDETOMIDINE HYDROCHLORIDE 1.4 MCG/KG/HR: 400 INJECTION INTRAVENOUS at 11:05

## 2023-05-21 NOTE — PLAN OF CARE
Problem: Infection  Goal: Absence of Infection Signs and Symptoms  Outcome: Ongoing, Progressing     Problem: Adult Inpatient Plan of Care  Goal: Plan of Care Review  Outcome: Ongoing, Progressing  Goal: Patient-Specific Goal (Individualized)  Outcome: Ongoing, Progressing  Goal: Absence of Hospital-Acquired Illness or Injury  Outcome: Ongoing, Progressing  Goal: Optimal Comfort and Wellbeing  Outcome: Ongoing, Progressing     Problem: Device-Related Complication Risk (Mechanical Ventilation, Invasive)  Goal: Optimal Device Function  Outcome: Ongoing, Progressing     Problem: Nutrition Impairment (Mechanical Ventilation, Invasive)  Goal: Optimal Nutrition Delivery  Outcome: Ongoing, Progressing     Problem: Skin and Tissue Injury (Mechanical Ventilation, Invasive)  Goal: Absence of Device-Related Skin and Tissue Injury  Outcome: Ongoing, Progressing     Problem: Ventilator-Induced Lung Injury (Mechanical Ventilation, Invasive)  Goal: Absence of Ventilator-Induced Lung Injury  Outcome: Ongoing, Progressing     Problem: Device-Related Complication Risk (Artificial Airway)  Goal: Optimal Device Function  Outcome: Ongoing, Progressing     Problem: Skin and Tissue Injury (Artificial Airway)  Goal: Absence of Device-Related Skin or Tissue Injury  Outcome: Ongoing, Progressing     Problem: Impaired Wound Healing  Goal: Optimal Wound Healing  Outcome: Ongoing, Progressing     Problem: Skin Injury Risk Increased  Goal: Skin Health and Integrity  Outcome: Ongoing, Progressing     Problem: Adjustment to Illness (Delirium)  Goal: Optimal Coping  Outcome: Ongoing, Progressing     Problem: Altered Behavior (Delirium)  Goal: Improved Behavioral Control  Outcome: Ongoing, Progressing     Problem: Sleep Disturbance (Delirium)  Goal: Improved Sleep  Outcome: Ongoing, Progressing     Problem: Coping Ineffective  Goal: Effective Coping  Outcome: Ongoing, Progressing     Problem: Adult Inpatient Plan of Care  Goal: Readiness for  Transition of Care  Outcome: Ongoing, Not Progressing     Problem: Communication Impairment (Mechanical Ventilation, Invasive)  Goal: Effective Communication  Outcome: Ongoing, Not Progressing     Problem: Inability to Wean (Mechanical Ventilation, Invasive)  Goal: Mechanical Ventilation Liberation  Outcome: Ongoing, Not Progressing     Problem: Communication Impairment (Artificial Airway)  Goal: Effective Communication  Outcome: Ongoing, Not Progressing     Problem: Noninvasive Ventilation Acute  Goal: Effective Unassisted Ventilation and Oxygenation  Outcome: Ongoing, Not Progressing     Problem: Attention and Thought Clarity Impairment (Delirium)  Goal: Improved Attention and Thought Clarity  Outcome: Ongoing, Not Progressing

## 2023-05-21 NOTE — NURSING
Nurses Note -- 4 Eyes      5/21/2023   6:23 PM      Skin assessed during: Daily Assessment      [] No Altered Skin Integrity Present    [x]Prevention Measures Documented      [x] Yes- Altered Skin Integrity Present or Discovered   [] LDA Added if Not in Epic (Describe Wound)   [] New Altered Skin Integrity was Present on Admit and Documented in LDA   [] Wound Image Taken    Wound Care Consulted? Yes    Attending Nurse:  Fide Bowman RN     Second RN/Staff Member:  peg Pride
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
## 2023-05-22 LAB
1,25(OH)2D SERPL-MCNC: 25 PG/ML (ref 18–64)
ALBUMIN SERPL-MCNC: 1.7 G/DL (ref 3.5–5)
ALBUMIN/GLOB SERPL: 0.4 RATIO (ref 1.1–2)
ALP SERPL-CCNC: 147 UNIT/L (ref 40–150)
ALT SERPL-CCNC: 11 UNIT/L (ref 0–55)
AST SERPL-CCNC: 13 UNIT/L (ref 5–34)
BASOPHILS # BLD AUTO: 0.07 X10(3)/MCL
BASOPHILS NFR BLD AUTO: 0.4 %
BILIRUBIN DIRECT+TOT PNL SERPL-MCNC: 0.2 MG/DL
BUN SERPL-MCNC: 8.6 MG/DL (ref 8.9–20.6)
CALCIUM SERPL-MCNC: 6.1 MG/DL (ref 8.4–10.2)
CHLORIDE SERPL-SCNC: 110 MMOL/L (ref 98–107)
CO2 SERPL-SCNC: 23 MMOL/L (ref 22–29)
CREAT SERPL-MCNC: 0.63 MG/DL (ref 0.73–1.18)
EOSINOPHIL # BLD AUTO: 0.65 X10(3)/MCL (ref 0–0.9)
EOSINOPHIL NFR BLD AUTO: 3.7 %
ERYTHROCYTE [DISTWIDTH] IN BLOOD BY AUTOMATED COUNT: 23.5 % (ref 11.5–17)
GFR SERPLBLD CREATININE-BSD FMLA CKD-EPI: >60 MLS/MIN/1.73/M2
GLOBULIN SER-MCNC: 4.4 GM/DL (ref 2.4–3.5)
GLUCOSE SERPL-MCNC: 110 MG/DL (ref 74–100)
HCT VFR BLD AUTO: 26.9 % (ref 42–52)
HGB BLD-MCNC: 8.4 G/DL (ref 14–18)
IMM GRANULOCYTES # BLD AUTO: 0.08 X10(3)/MCL (ref 0–0.04)
IMM GRANULOCYTES NFR BLD AUTO: 0.5 %
LYMPHOCYTES # BLD AUTO: 2.65 X10(3)/MCL (ref 0.6–4.6)
LYMPHOCYTES NFR BLD AUTO: 15.3 %
MAGNESIUM SERPL-MCNC: 1.6 MG/DL (ref 1.6–2.6)
MCH RBC QN AUTO: 25.4 PG (ref 27–31)
MCHC RBC AUTO-ENTMCNC: 31.2 G/DL (ref 33–36)
MCV RBC AUTO: 81.3 FL (ref 80–94)
MONOCYTES # BLD AUTO: 0.86 X10(3)/MCL (ref 0.1–1.3)
MONOCYTES NFR BLD AUTO: 5 %
NEUTROPHILS # BLD AUTO: 13.03 X10(3)/MCL (ref 2.1–9.2)
NEUTROPHILS NFR BLD AUTO: 75.1 %
NRBC BLD AUTO-RTO: 0 %
PHOSPHATE SERPL-MCNC: 5.3 MG/DL (ref 2.3–4.7)
PLATELET # BLD AUTO: 433 X10(3)/MCL (ref 130–400)
PMV BLD AUTO: 8 FL (ref 7.4–10.4)
POCT GLUCOSE: 111 MG/DL (ref 70–110)
POCT GLUCOSE: 79 MG/DL (ref 70–110)
POTASSIUM SERPL-SCNC: 4.1 MMOL/L (ref 3.5–5.1)
PROT SERPL-MCNC: 6.1 GM/DL (ref 6.4–8.3)
RBC # BLD AUTO: 3.31 X10(6)/MCL (ref 4.7–6.1)
SODIUM SERPL-SCNC: 145 MMOL/L (ref 136–145)
WBC # SPEC AUTO: 17.34 X10(3)/MCL (ref 4.5–11.5)

## 2023-05-22 PROCEDURE — 27200966 HC CLOSED SUCTION SYSTEM

## 2023-05-22 PROCEDURE — 27000221 HC OXYGEN, UP TO 24 HOURS

## 2023-05-22 PROCEDURE — 94640 AIRWAY INHALATION TREATMENT: CPT

## 2023-05-22 PROCEDURE — 25000003 PHARM REV CODE 250: Performed by: STUDENT IN AN ORGANIZED HEALTH CARE EDUCATION/TRAINING PROGRAM

## 2023-05-22 PROCEDURE — 85025 COMPLETE CBC W/AUTO DIFF WBC: CPT

## 2023-05-22 PROCEDURE — 63600175 PHARM REV CODE 636 W HCPCS: Performed by: NURSE PRACTITIONER

## 2023-05-22 PROCEDURE — 94761 N-INVAS EAR/PLS OXIMETRY MLT: CPT

## 2023-05-22 PROCEDURE — 25000003 PHARM REV CODE 250: Performed by: HOSPITALIST

## 2023-05-22 PROCEDURE — 25000003 PHARM REV CODE 250

## 2023-05-22 PROCEDURE — 99222 PR INITIAL HOSPITAL CARE,LEVL II: ICD-10-PCS | Mod: ,,, | Performed by: NURSE PRACTITIONER

## 2023-05-22 PROCEDURE — 94003 VENT MGMT INPAT SUBQ DAY: CPT

## 2023-05-22 PROCEDURE — 20000000 HC ICU ROOM

## 2023-05-22 PROCEDURE — 99900035 HC TECH TIME PER 15 MIN (STAT)

## 2023-05-22 PROCEDURE — 99222 1ST HOSP IP/OBS MODERATE 55: CPT | Mod: ,,, | Performed by: NURSE PRACTITIONER

## 2023-05-22 PROCEDURE — 80053 COMPREHEN METABOLIC PANEL: CPT

## 2023-05-22 PROCEDURE — 99900031 HC PATIENT EDUCATION (STAT)

## 2023-05-22 PROCEDURE — 83735 ASSAY OF MAGNESIUM: CPT | Performed by: STUDENT IN AN ORGANIZED HEALTH CARE EDUCATION/TRAINING PROGRAM

## 2023-05-22 PROCEDURE — 84100 ASSAY OF PHOSPHORUS: CPT

## 2023-05-22 PROCEDURE — C9113 INJ PANTOPRAZOLE SODIUM, VIA: HCPCS | Performed by: NURSE PRACTITIONER

## 2023-05-22 PROCEDURE — 25000242 PHARM REV CODE 250 ALT 637 W/ HCPCS: Performed by: INTERNAL MEDICINE

## 2023-05-22 PROCEDURE — 63600175 PHARM REV CODE 636 W HCPCS

## 2023-05-22 PROCEDURE — 63600175 PHARM REV CODE 636 W HCPCS: Performed by: STUDENT IN AN ORGANIZED HEALTH CARE EDUCATION/TRAINING PROGRAM

## 2023-05-22 PROCEDURE — 25000003 PHARM REV CODE 250: Performed by: INTERNAL MEDICINE

## 2023-05-22 RX ADMIN — DEXMEDETOMIDINE HYDROCHLORIDE 1.4 MCG/KG/HR: 400 INJECTION INTRAVENOUS at 02:05

## 2023-05-22 RX ADMIN — CALCIUM ACETATE 667 MG: 667 CAPSULE ORAL at 05:05

## 2023-05-22 RX ADMIN — GUAIFENESIN 400 MG: 200 SOLUTION ORAL at 02:05

## 2023-05-22 RX ADMIN — GUAIFENESIN 400 MG: 200 SOLUTION ORAL at 05:05

## 2023-05-22 RX ADMIN — CALCIUM ACETATE 667 MG: 667 CAPSULE ORAL at 11:05

## 2023-05-22 RX ADMIN — DEXMEDETOMIDINE HYDROCHLORIDE 1.4 MCG/KG/HR: 400 INJECTION INTRAVENOUS at 11:05

## 2023-05-22 RX ADMIN — PROPOFOL 50 MCG/KG/MIN: 10 INJECTION, EMULSION INTRAVENOUS at 06:05

## 2023-05-22 RX ADMIN — GUAIFENESIN 400 MG: 200 SOLUTION ORAL at 01:05

## 2023-05-22 RX ADMIN — MIDODRINE HYDROCHLORIDE 10 MG: 5 TABLET ORAL at 05:05

## 2023-05-22 RX ADMIN — NOREPINEPHRINE BITARTRATE 0.04 MCG/KG/MIN: 8 INJECTION, SOLUTION INTRAVENOUS at 01:05

## 2023-05-22 RX ADMIN — PROPOFOL 50 MCG/KG/MIN: 10 INJECTION, EMULSION INTRAVENOUS at 12:05

## 2023-05-22 RX ADMIN — LEVETIRACETAM 1000 MG: 100 INJECTION, SOLUTION INTRAVENOUS at 08:05

## 2023-05-22 RX ADMIN — MUPIROCIN: 20 OINTMENT TOPICAL at 08:05

## 2023-05-22 RX ADMIN — PIPERACILLIN AND TAZOBACTAM 4.5 G: 4; .5 INJECTION, POWDER, LYOPHILIZED, FOR SOLUTION INTRAVENOUS; PARENTERAL at 10:05

## 2023-05-22 RX ADMIN — PROPOFOL 50 MCG/KG/MIN: 10 INJECTION, EMULSION INTRAVENOUS at 09:05

## 2023-05-22 RX ADMIN — SODIUM CHLORIDE 30 MG/ML INHALATION SOLUTION 4 ML: 30 SOLUTION INHALANT at 08:05

## 2023-05-22 RX ADMIN — PROPOFOL 50 MCG/KG/MIN: 10 INJECTION, EMULSION INTRAVENOUS at 11:05

## 2023-05-22 RX ADMIN — DEXMEDETOMIDINE HYDROCHLORIDE 1.4 MCG/KG/HR: 400 INJECTION INTRAVENOUS at 05:05

## 2023-05-22 RX ADMIN — GUAIFENESIN 400 MG: 200 SOLUTION ORAL at 10:05

## 2023-05-22 RX ADMIN — PROPOFOL 50 MCG/KG/MIN: 10 INJECTION, EMULSION INTRAVENOUS at 02:05

## 2023-05-22 RX ADMIN — PIPERACILLIN AND TAZOBACTAM 4.5 G: 4; .5 INJECTION, POWDER, LYOPHILIZED, FOR SOLUTION INTRAVENOUS; PARENTERAL at 02:05

## 2023-05-22 RX ADMIN — HYDROCODONE BITARTRATE AND ACETAMINOPHEN 1 TABLET: 10; 325 TABLET ORAL at 08:05

## 2023-05-22 RX ADMIN — GUAIFENESIN 400 MG: 200 SOLUTION ORAL at 09:05

## 2023-05-22 RX ADMIN — PANTOPRAZOLE SODIUM 40 MG: 40 INJECTION, POWDER, FOR SOLUTION INTRAVENOUS at 08:05

## 2023-05-22 RX ADMIN — DEXMEDETOMIDINE HYDROCHLORIDE 1 MCG/KG/HR: 400 INJECTION INTRAVENOUS at 09:05

## 2023-05-22 RX ADMIN — PROPOFOL 50 MCG/KG/MIN: 10 INJECTION, EMULSION INTRAVENOUS at 05:05

## 2023-05-22 RX ADMIN — CALCITRIOL CAPSULES 0.25 MCG 0.5 MCG: 0.25 CAPSULE ORAL at 08:05

## 2023-05-22 RX ADMIN — SODIUM CHLORIDE 30 MG/ML INHALATION SOLUTION 4 ML: 30 SOLUTION INHALANT at 02:05

## 2023-05-22 RX ADMIN — PROPOFOL 50 MCG/KG/MIN: 10 INJECTION, EMULSION INTRAVENOUS at 01:05

## 2023-05-22 RX ADMIN — MIDODRINE HYDROCHLORIDE 10 MG: 5 TABLET ORAL at 01:05

## 2023-05-22 RX ADMIN — ENOXAPARIN SODIUM 40 MG: 40 INJECTION SUBCUTANEOUS at 05:05

## 2023-05-22 RX ADMIN — DEXMEDETOMIDINE HYDROCHLORIDE 1.4 MCG/KG/HR: 400 INJECTION INTRAVENOUS at 06:05

## 2023-05-22 RX ADMIN — ALPRAZOLAM 1 MG: 0.5 TABLET ORAL at 08:05

## 2023-05-22 RX ADMIN — SODIUM CHLORIDE 30 MG/ML INHALATION SOLUTION 4 ML: 30 SOLUTION INHALANT at 12:05

## 2023-05-22 RX ADMIN — MIDODRINE HYDROCHLORIDE 10 MG: 5 TABLET ORAL at 09:05
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
## 2023-05-22 NOTE — NURSING
Nurses Note -- 4 Eyes      5/22/2023   4:05 PM      Skin assessed during: Daily Assessment      [] No Altered Skin Integrity Present    []Prevention Measures Documented      [x] Yes- Altered Skin Integrity Present or Discovered   [] LDA Added if Not in Epic (Describe Wound)   [] New Altered Skin Integrity was Present on Admit and Documented in LDA   [] Wound Image Taken    Wound Care Consulted? Yes    Attending Nurse:  Derek Lazar RN     Second RN/Staff Member:  Angélica Chaney RN

## 2023-05-22 NOTE — PLAN OF CARE
Problem: Infection  Goal: Absence of Infection Signs and Symptoms  Outcome: Ongoing, Progressing     Problem: Adult Inpatient Plan of Care  Goal: Plan of Care Review  Outcome: Ongoing, Progressing  Goal: Patient-Specific Goal (Individualized)  Outcome: Ongoing, Progressing  Goal: Absence of Hospital-Acquired Illness or Injury  Outcome: Ongoing, Progressing  Goal: Optimal Comfort and Wellbeing  Outcome: Ongoing, Progressing     Problem: Communication Impairment (Mechanical Ventilation, Invasive)  Goal: Effective Communication  Outcome: Ongoing, Progressing     Problem: Device-Related Complication Risk (Mechanical Ventilation, Invasive)  Goal: Optimal Device Function  Outcome: Ongoing, Progressing     Problem: Nutrition Impairment (Mechanical Ventilation, Invasive)  Goal: Optimal Nutrition Delivery  Outcome: Ongoing, Progressing     Problem: Ventilator-Induced Lung Injury (Mechanical Ventilation, Invasive)  Goal: Absence of Ventilator-Induced Lung Injury  Outcome: Ongoing, Progressing     Problem: Communication Impairment (Artificial Airway)  Goal: Effective Communication  Outcome: Ongoing, Progressing     Problem: Device-Related Complication Risk (Artificial Airway)  Goal: Optimal Device Function  Outcome: Ongoing, Progressing     Problem: Skin and Tissue Injury (Artificial Airway)  Goal: Absence of Device-Related Skin or Tissue Injury  Outcome: Ongoing, Progressing     Problem: Impaired Wound Healing  Goal: Optimal Wound Healing  Outcome: Ongoing, Progressing     Problem: Adjustment to Illness (Delirium)  Goal: Optimal Coping  Outcome: Ongoing, Progressing
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
## 2023-05-23 LAB
ALBUMIN SERPL-MCNC: 1.6 G/DL (ref 3.5–5)
ALBUMIN/GLOB SERPL: 0.3 RATIO (ref 1.1–2)
ALP SERPL-CCNC: 128 UNIT/L (ref 40–150)
ALT SERPL-CCNC: 10 UNIT/L (ref 0–55)
AST SERPL-CCNC: 13 UNIT/L (ref 5–34)
BILIRUBIN DIRECT+TOT PNL SERPL-MCNC: 0.2 MG/DL
BUN SERPL-MCNC: 12 MG/DL (ref 8.9–20.6)
CALCIUM SERPL-MCNC: 6.1 MG/DL (ref 8.4–10.2)
CHLORIDE SERPL-SCNC: 108 MMOL/L (ref 98–107)
CO2 SERPL-SCNC: 24 MMOL/L (ref 22–29)
CREAT SERPL-MCNC: 0.63 MG/DL (ref 0.73–1.18)
GFR SERPLBLD CREATININE-BSD FMLA CKD-EPI: >60 MLS/MIN/1.73/M2
GLOBULIN SER-MCNC: 4.7 GM/DL (ref 2.4–3.5)
GLUCOSE SERPL-MCNC: 123 MG/DL (ref 74–100)
MAGNESIUM SERPL-MCNC: 1.3 MG/DL (ref 1.6–2.6)
PHOSPHATE SERPL-MCNC: 5.3 MG/DL (ref 2.3–4.7)
POCT GLUCOSE: 116 MG/DL (ref 70–110)
POCT GLUCOSE: 116 MG/DL (ref 70–110)
POTASSIUM SERPL-SCNC: 3.9 MMOL/L (ref 3.5–5.1)
PROT SERPL-MCNC: 6.3 GM/DL (ref 6.4–8.3)
SODIUM SERPL-SCNC: 141 MMOL/L (ref 136–145)

## 2023-05-23 PROCEDURE — 25000242 PHARM REV CODE 250 ALT 637 W/ HCPCS: Performed by: INTERNAL MEDICINE

## 2023-05-23 PROCEDURE — 84100 ASSAY OF PHOSPHORUS: CPT

## 2023-05-23 PROCEDURE — 25000003 PHARM REV CODE 250: Performed by: STUDENT IN AN ORGANIZED HEALTH CARE EDUCATION/TRAINING PROGRAM

## 2023-05-23 PROCEDURE — 83735 ASSAY OF MAGNESIUM: CPT

## 2023-05-23 PROCEDURE — 25000003 PHARM REV CODE 250: Performed by: HOSPITALIST

## 2023-05-23 PROCEDURE — 63600175 PHARM REV CODE 636 W HCPCS: Performed by: STUDENT IN AN ORGANIZED HEALTH CARE EDUCATION/TRAINING PROGRAM

## 2023-05-23 PROCEDURE — 25000003 PHARM REV CODE 250: Performed by: INTERNAL MEDICINE

## 2023-05-23 PROCEDURE — C9113 INJ PANTOPRAZOLE SODIUM, VIA: HCPCS | Performed by: NURSE PRACTITIONER

## 2023-05-23 PROCEDURE — 80053 COMPREHEN METABOLIC PANEL: CPT

## 2023-05-23 PROCEDURE — 63600175 PHARM REV CODE 636 W HCPCS: Performed by: NURSE PRACTITIONER

## 2023-05-23 PROCEDURE — 94003 VENT MGMT INPAT SUBQ DAY: CPT

## 2023-05-23 PROCEDURE — 63600175 PHARM REV CODE 636 W HCPCS

## 2023-05-23 PROCEDURE — 25000003 PHARM REV CODE 250

## 2023-05-23 PROCEDURE — 99900035 HC TECH TIME PER 15 MIN (STAT)

## 2023-05-23 PROCEDURE — 27000221 HC OXYGEN, UP TO 24 HOURS

## 2023-05-23 PROCEDURE — 94640 AIRWAY INHALATION TREATMENT: CPT

## 2023-05-23 PROCEDURE — 94761 N-INVAS EAR/PLS OXIMETRY MLT: CPT

## 2023-05-23 PROCEDURE — 27200966 HC CLOSED SUCTION SYSTEM

## 2023-05-23 PROCEDURE — 99900026 HC AIRWAY MAINTENANCE (STAT)

## 2023-05-23 PROCEDURE — 20000000 HC ICU ROOM

## 2023-05-23 RX ORDER — MAGNESIUM SULFATE HEPTAHYDRATE 40 MG/ML
4 INJECTION, SOLUTION INTRAVENOUS ONCE
Status: COMPLETED | OUTPATIENT
Start: 2023-05-23 | End: 2023-05-23

## 2023-05-23 RX ORDER — MIDODRINE HYDROCHLORIDE 5 MG/1
15 TABLET ORAL EVERY 8 HOURS
Status: DISCONTINUED | OUTPATIENT
Start: 2023-05-23 | End: 2023-05-29

## 2023-05-23 RX ADMIN — DEXMEDETOMIDINE HYDROCHLORIDE 1.4 MCG/KG/HR: 400 INJECTION INTRAVENOUS at 08:05

## 2023-05-23 RX ADMIN — LEVETIRACETAM 1000 MG: 100 INJECTION, SOLUTION INTRAVENOUS at 08:05

## 2023-05-23 RX ADMIN — GUAIFENESIN 400 MG: 200 SOLUTION ORAL at 01:05

## 2023-05-23 RX ADMIN — ALPRAZOLAM 1 MG: 0.5 TABLET ORAL at 01:05

## 2023-05-23 RX ADMIN — PROPOFOL 50 MCG/KG/MIN: 10 INJECTION, EMULSION INTRAVENOUS at 08:05

## 2023-05-23 RX ADMIN — MIDODRINE HYDROCHLORIDE 15 MG: 5 TABLET ORAL at 10:05

## 2023-05-23 RX ADMIN — ENOXAPARIN SODIUM 40 MG: 40 INJECTION SUBCUTANEOUS at 04:05

## 2023-05-23 RX ADMIN — MUPIROCIN: 20 OINTMENT TOPICAL at 09:05

## 2023-05-23 RX ADMIN — CALCITRIOL CAPSULES 0.25 MCG 0.5 MCG: 0.25 CAPSULE ORAL at 09:05

## 2023-05-23 RX ADMIN — GUAIFENESIN 400 MG: 200 SOLUTION ORAL at 09:05

## 2023-05-23 RX ADMIN — GUAIFENESIN 400 MG: 200 SOLUTION ORAL at 05:05

## 2023-05-23 RX ADMIN — MIDODRINE HYDROCHLORIDE 15 MG: 5 TABLET ORAL at 01:05

## 2023-05-23 RX ADMIN — DEXMEDETOMIDINE HYDROCHLORIDE 1.2 MCG/KG/HR: 400 INJECTION INTRAVENOUS at 12:05

## 2023-05-23 RX ADMIN — PROPOFOL 40 MCG/KG/MIN: 10 INJECTION, EMULSION INTRAVENOUS at 07:05

## 2023-05-23 RX ADMIN — DEXMEDETOMIDINE HYDROCHLORIDE 1 MCG/KG/HR: 400 INJECTION INTRAVENOUS at 01:05

## 2023-05-23 RX ADMIN — GUAIFENESIN 400 MG: 200 SOLUTION ORAL at 10:05

## 2023-05-23 RX ADMIN — ALPRAZOLAM 1 MG: 0.5 TABLET ORAL at 02:05

## 2023-05-23 RX ADMIN — MIDODRINE HYDROCHLORIDE 10 MG: 5 TABLET ORAL at 05:05

## 2023-05-23 RX ADMIN — HYDROCODONE BITARTRATE AND ACETAMINOPHEN 1 TABLET: 10; 325 TABLET ORAL at 08:05

## 2023-05-23 RX ADMIN — MAGNESIUM SULFATE 4 G: 2 INJECTION INTRAVENOUS at 05:05

## 2023-05-23 RX ADMIN — GUAIFENESIN 400 MG: 200 SOLUTION ORAL at 04:05

## 2023-05-23 RX ADMIN — SODIUM CHLORIDE 30 MG/ML INHALATION SOLUTION 4 ML: 30 SOLUTION INHALANT at 08:05

## 2023-05-23 RX ADMIN — DEXMEDETOMIDINE HYDROCHLORIDE 1.4 MCG/KG/HR: 400 INJECTION INTRAVENOUS at 05:05

## 2023-05-23 RX ADMIN — DEXMEDETOMIDINE HYDROCHLORIDE 1 MCG/KG/HR: 400 INJECTION INTRAVENOUS at 09:05

## 2023-05-23 RX ADMIN — PROPOFOL 50 MCG/KG/MIN: 10 INJECTION, EMULSION INTRAVENOUS at 05:05

## 2023-05-23 RX ADMIN — PROPOFOL 50 MCG/KG/MIN: 10 INJECTION, EMULSION INTRAVENOUS at 12:05

## 2023-05-23 RX ADMIN — MUPIROCIN: 20 OINTMENT TOPICAL at 08:05

## 2023-05-23 RX ADMIN — SODIUM CHLORIDE 30 MG/ML INHALATION SOLUTION 4 ML: 30 SOLUTION INHALANT at 02:05

## 2023-05-23 RX ADMIN — PANTOPRAZOLE SODIUM 40 MG: 40 INJECTION, POWDER, FOR SOLUTION INTRAVENOUS at 09:05

## 2023-05-23 RX ADMIN — PROPOFOL 50 MCG/KG/MIN: 10 INJECTION, EMULSION INTRAVENOUS at 01:05

## 2023-05-23 RX ADMIN — HYDROCODONE BITARTRATE AND ACETAMINOPHEN 1 TABLET: 10; 325 TABLET ORAL at 01:05

## 2023-05-23 RX ADMIN — SODIUM CHLORIDE 30 MG/ML INHALATION SOLUTION 4 ML: 30 SOLUTION INHALANT at 12:05

## 2023-05-23 RX ADMIN — LEVETIRACETAM 1000 MG: 100 INJECTION, SOLUTION INTRAVENOUS at 09:05

## 2023-05-23 RX ADMIN — CALCIUM ACETATE 667 MG: 667 CAPSULE ORAL at 11:05

## 2023-05-23 RX ADMIN — CALCIUM ACETATE 667 MG: 667 CAPSULE ORAL at 04:05

## 2023-05-23 RX ADMIN — ALPRAZOLAM 1 MG: 0.5 TABLET ORAL at 08:05

## 2023-05-23 RX ADMIN — CALCIUM ACETATE 667 MG: 667 CAPSULE ORAL at 07:05

## 2023-05-23 NOTE — NURSING
Nurses Note -- 4 Eyes      5/23/2023   3:06 PM      Skin assessed during: Daily Assessment      [] No Altered Skin Integrity Present    []Prevention Measures Documented      [x] Yes- Altered Skin Integrity Present or Discovered   [] LDA Added if Not in Epic (Describe Wound)   [] New Altered Skin Integrity was Present on Admit and Documented in LDA   [] Wound Image Taken    Wound Care Consulted? Yes    Attending Nurse:  Derek Lazar RN     Second RN/Staff Member:  Angélica Chaney RN

## 2023-05-23 NOTE — NURSING
Nurses Note -- 4 Eyes      5/23/2023   3:49 AM      Skin assessed during: Q Shift Change      [] No Altered Skin Integrity Present    []Prevention Measures Documented      [x] Yes- Altered Skin Integrity Present or Discovered   [] LDA Added if Not in Epic (Describe Wound)   [] New Altered Skin Integrity was Present on Admit and Documented in LDA   [] Wound Image Taken    Wound Care Consulted? Yes    Attending Nurse:  Aileen Abbott RN     Second RN/Staff Member:  JANETT Sanchez
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
## 2023-05-24 LAB
ALBUMIN SERPL-MCNC: 1.6 G/DL (ref 3.5–5)
ALBUMIN/GLOB SERPL: 0.3 RATIO (ref 1.1–2)
ALP SERPL-CCNC: 146 UNIT/L (ref 40–150)
ALT SERPL-CCNC: 10 UNIT/L (ref 0–55)
AST SERPL-CCNC: 12 UNIT/L (ref 5–34)
BASE EXCESS BLD CALC-SCNC: 2.3 MMOL/L (ref -2–2)
BASOPHILS # BLD AUTO: 0.1 X10(3)/MCL
BASOPHILS NFR BLD AUTO: 1 %
BILIRUBIN DIRECT+TOT PNL SERPL-MCNC: 0.2 MG/DL
BLOOD GAS SAMPLE TYPE (OHS): ABNORMAL
BUN SERPL-MCNC: 16.4 MG/DL (ref 8.9–20.6)
CA-I BLD-SCNC: 0.79 MMOL/L (ref 1.12–1.23)
CALCIUM SERPL-MCNC: 5.9 MG/DL (ref 8.4–10.2)
CHLORIDE SERPL-SCNC: 108 MMOL/L (ref 98–107)
CO2 BLDA-SCNC: 29.2 MMOL/L
CO2 SERPL-SCNC: 22 MMOL/L (ref 22–29)
COHGB MFR BLDA: 1.3 % (ref 0.5–1.5)
CREAT SERPL-MCNC: 0.62 MG/DL (ref 0.73–1.18)
DRAWN BY BLOOD GAS (OHS): ABNORMAL
EOSINOPHIL # BLD AUTO: 0.44 X10(3)/MCL (ref 0–0.9)
EOSINOPHIL NFR BLD AUTO: 4.2 %
ERYTHROCYTE [DISTWIDTH] IN BLOOD BY AUTOMATED COUNT: 23.9 % (ref 11.5–17)
FIO2 BLOOD GAS (OHS): 100 %
GFR SERPLBLD CREATININE-BSD FMLA CKD-EPI: >60 MLS/MIN/1.73/M2
GLOBULIN SER-MCNC: 4.8 GM/DL (ref 2.4–3.5)
GLUCOSE SERPL-MCNC: 123 MG/DL (ref 74–100)
HCO3 BLDA-SCNC: 27.8 MMOL/L (ref 22–26)
HCT VFR BLD AUTO: 23.6 % (ref 42–52)
HGB BLD-MCNC: 7.1 G/DL (ref 14–18)
IMM GRANULOCYTES # BLD AUTO: 0.07 X10(3)/MCL (ref 0–0.04)
IMM GRANULOCYTES NFR BLD AUTO: 0.7 %
ITIME (SEC) (OHS): 0.8 SEC
LYMPHOCYTES # BLD AUTO: 2.71 X10(3)/MCL (ref 0.6–4.6)
LYMPHOCYTES NFR BLD AUTO: 25.8 %
MAGNESIUM SERPL-MCNC: 1.6 MG/DL (ref 1.6–2.6)
MCH RBC QN AUTO: 24.8 PG (ref 27–31)
MCHC RBC AUTO-ENTMCNC: 30.1 G/DL (ref 33–36)
MCV RBC AUTO: 82.5 FL (ref 80–94)
MECH RR (OHS): 18 B/MIN
MECH VT (OHS): 450 ML
METHGB MFR BLDA: 0.6 % (ref 0.4–1.5)
MODE (OHS): AC
MONOCYTES # BLD AUTO: 0.73 X10(3)/MCL (ref 0.1–1.3)
MONOCYTES NFR BLD AUTO: 6.9 %
NEUTROPHILS # BLD AUTO: 6.46 X10(3)/MCL (ref 2.1–9.2)
NEUTROPHILS NFR BLD AUTO: 61.4 %
NRBC BLD AUTO-RTO: 0 %
O2 HB BLOOD GAS (OHS): 97 % (ref 94–97)
OXYGEN DEVICE BLOOD GAS (OHS): ABNORMAL
OXYHGB MFR BLDA: 8.5 G/DL (ref 12–16)
PCO2 BLDA: 47 MMHG (ref 35–45)
PEEP (OHS): 5 CMH2O
PH BLDA: 7.38 [PH] (ref 7.35–7.45)
PHOSPHATE SERPL-MCNC: 4.8 MG/DL (ref 2.3–4.7)
PLATELET # BLD AUTO: 410 X10(3)/MCL (ref 130–400)
PMV BLD AUTO: 8.3 FL (ref 7.4–10.4)
PO2 BLDA: 147 MMHG (ref 80–100)
POTASSIUM BLOOD GAS (OHS): 3.9 MMOL/L (ref 3.5–5)
POTASSIUM SERPL-SCNC: 4.2 MMOL/L (ref 3.5–5.1)
PROT SERPL-MCNC: 6.4 GM/DL (ref 6.4–8.3)
RBC # BLD AUTO: 2.86 X10(6)/MCL (ref 4.7–6.1)
SAMPLE SITE BLOOD GAS (OHS): ABNORMAL
SAO2 % BLDA: 99.2 %
SODIUM BLOOD GAS (OHS): 137 MMOL/L (ref 137–145)
SODIUM SERPL-SCNC: 140 MMOL/L (ref 136–145)
WBC # SPEC AUTO: 10.51 X10(3)/MCL (ref 4.5–11.5)

## 2023-05-24 PROCEDURE — 63600175 PHARM REV CODE 636 W HCPCS: Performed by: INTERNAL MEDICINE

## 2023-05-24 PROCEDURE — 27200966 HC CLOSED SUCTION SYSTEM

## 2023-05-24 PROCEDURE — 37799 UNLISTED PX VASCULAR SURGERY: CPT

## 2023-05-24 PROCEDURE — 99900026 HC AIRWAY MAINTENANCE (STAT)

## 2023-05-24 PROCEDURE — 83735 ASSAY OF MAGNESIUM: CPT

## 2023-05-24 PROCEDURE — 99900035 HC TECH TIME PER 15 MIN (STAT)

## 2023-05-24 PROCEDURE — 25000003 PHARM REV CODE 250: Performed by: HOSPITALIST

## 2023-05-24 PROCEDURE — 94003 VENT MGMT INPAT SUBQ DAY: CPT

## 2023-05-24 PROCEDURE — 63600175 PHARM REV CODE 636 W HCPCS: Performed by: STUDENT IN AN ORGANIZED HEALTH CARE EDUCATION/TRAINING PROGRAM

## 2023-05-24 PROCEDURE — 25000003 PHARM REV CODE 250

## 2023-05-24 PROCEDURE — C9113 INJ PANTOPRAZOLE SODIUM, VIA: HCPCS | Performed by: NURSE PRACTITIONER

## 2023-05-24 PROCEDURE — 80053 COMPREHEN METABOLIC PANEL: CPT

## 2023-05-24 PROCEDURE — 99900031 HC PATIENT EDUCATION (STAT)

## 2023-05-24 PROCEDURE — 25000242 PHARM REV CODE 250 ALT 637 W/ HCPCS: Performed by: INTERNAL MEDICINE

## 2023-05-24 PROCEDURE — 27000221 HC OXYGEN, UP TO 24 HOURS

## 2023-05-24 PROCEDURE — 63600175 PHARM REV CODE 636 W HCPCS

## 2023-05-24 PROCEDURE — 20000000 HC ICU ROOM

## 2023-05-24 PROCEDURE — 94761 N-INVAS EAR/PLS OXIMETRY MLT: CPT

## 2023-05-24 PROCEDURE — 25000003 PHARM REV CODE 250: Performed by: STUDENT IN AN ORGANIZED HEALTH CARE EDUCATION/TRAINING PROGRAM

## 2023-05-24 PROCEDURE — 63600175 PHARM REV CODE 636 W HCPCS: Performed by: NURSE PRACTITIONER

## 2023-05-24 PROCEDURE — 84100 ASSAY OF PHOSPHORUS: CPT

## 2023-05-24 PROCEDURE — 82803 BLOOD GASES ANY COMBINATION: CPT

## 2023-05-24 PROCEDURE — 25000003 PHARM REV CODE 250: Performed by: INTERNAL MEDICINE

## 2023-05-24 PROCEDURE — 85025 COMPLETE CBC W/AUTO DIFF WBC: CPT | Performed by: INTERNAL MEDICINE

## 2023-05-24 PROCEDURE — 94640 AIRWAY INHALATION TREATMENT: CPT

## 2023-05-24 RX ORDER — CALCIUM GLUCONATE 20 MG/ML
1 INJECTION, SOLUTION INTRAVENOUS ONCE
Status: COMPLETED | OUTPATIENT
Start: 2023-05-24 | End: 2023-05-24

## 2023-05-24 RX ORDER — MAGNESIUM SULFATE HEPTAHYDRATE 40 MG/ML
2 INJECTION, SOLUTION INTRAVENOUS ONCE
Status: COMPLETED | OUTPATIENT
Start: 2023-05-24 | End: 2023-05-24

## 2023-05-24 RX ORDER — CALCIUM GLUCONATE 20 MG/ML
1 INJECTION, SOLUTION INTRAVENOUS
Status: COMPLETED | OUTPATIENT
Start: 2023-05-24 | End: 2023-05-24

## 2023-05-24 RX ADMIN — MAGNESIUM SULFATE HEPTAHYDRATE 2 G: 40 INJECTION, SOLUTION INTRAVENOUS at 01:05

## 2023-05-24 RX ADMIN — PANTOPRAZOLE SODIUM 40 MG: 40 INJECTION, POWDER, FOR SOLUTION INTRAVENOUS at 08:05

## 2023-05-24 RX ADMIN — PROPOFOL 50 MCG/KG/MIN: 10 INJECTION, EMULSION INTRAVENOUS at 07:05

## 2023-05-24 RX ADMIN — MIDODRINE HYDROCHLORIDE 15 MG: 5 TABLET ORAL at 01:05

## 2023-05-24 RX ADMIN — GUAIFENESIN 400 MG: 200 SOLUTION ORAL at 01:05

## 2023-05-24 RX ADMIN — GUAIFENESIN 400 MG: 200 SOLUTION ORAL at 05:05

## 2023-05-24 RX ADMIN — ALPRAZOLAM 1 MG: 0.5 TABLET ORAL at 08:05

## 2023-05-24 RX ADMIN — PROPOFOL 50 MCG/KG/MIN: 10 INJECTION, EMULSION INTRAVENOUS at 05:05

## 2023-05-24 RX ADMIN — HYDROCODONE BITARTRATE AND ACETAMINOPHEN 1 TABLET: 10; 325 TABLET ORAL at 08:05

## 2023-05-24 RX ADMIN — GUAIFENESIN 400 MG: 200 SOLUTION ORAL at 10:05

## 2023-05-24 RX ADMIN — CALCIUM GLUCONATE 1 G: 20 INJECTION, SOLUTION INTRAVENOUS at 03:05

## 2023-05-24 RX ADMIN — CALCIUM ACETATE 667 MG: 667 CAPSULE ORAL at 07:05

## 2023-05-24 RX ADMIN — LEVETIRACETAM 1000 MG: 100 INJECTION, SOLUTION INTRAVENOUS at 08:05

## 2023-05-24 RX ADMIN — DEXMEDETOMIDINE HYDROCHLORIDE 1.4 MCG/KG/HR: 400 INJECTION INTRAVENOUS at 11:05

## 2023-05-24 RX ADMIN — CALCITRIOL CAPSULES 0.25 MCG 0.5 MCG: 0.25 CAPSULE ORAL at 08:05

## 2023-05-24 RX ADMIN — CALCIUM GLUCONATE 1 G: 20 INJECTION, SOLUTION INTRAVENOUS at 01:05

## 2023-05-24 RX ADMIN — CALCIUM GLUCONATE 1 G: 20 INJECTION, SOLUTION INTRAVENOUS at 10:05

## 2023-05-24 RX ADMIN — SODIUM CHLORIDE 30 MG/ML INHALATION SOLUTION 4 ML: 30 SOLUTION INHALANT at 12:05

## 2023-05-24 RX ADMIN — DEXMEDETOMIDINE HYDROCHLORIDE 1.4 MCG/KG/HR: 400 INJECTION INTRAVENOUS at 07:05

## 2023-05-24 RX ADMIN — DEXMEDETOMIDINE HYDROCHLORIDE 1.4 MCG/KG/HR: 400 INJECTION INTRAVENOUS at 05:05

## 2023-05-24 RX ADMIN — HYDROCODONE BITARTRATE AND ACETAMINOPHEN 1 TABLET: 10; 325 TABLET ORAL at 01:05

## 2023-05-24 RX ADMIN — PROPOFOL 50 MCG/KG/MIN: 10 INJECTION, EMULSION INTRAVENOUS at 01:05

## 2023-05-24 RX ADMIN — MIDODRINE HYDROCHLORIDE 15 MG: 5 TABLET ORAL at 05:05

## 2023-05-24 RX ADMIN — NOREPINEPHRINE BITARTRATE 0.02 MCG/KG/MIN: 8 INJECTION, SOLUTION INTRAVENOUS at 05:05

## 2023-05-24 RX ADMIN — DEXMEDETOMIDINE HYDROCHLORIDE 1.4 MCG/KG/HR: 400 INJECTION INTRAVENOUS at 03:05

## 2023-05-24 RX ADMIN — GUAIFENESIN 400 MG: 200 SOLUTION ORAL at 09:05

## 2023-05-24 RX ADMIN — SODIUM CHLORIDE 30 MG/ML INHALATION SOLUTION 4 ML: 30 SOLUTION INHALANT at 08:05

## 2023-05-24 RX ADMIN — MIDODRINE HYDROCHLORIDE 15 MG: 5 TABLET ORAL at 10:05

## 2023-05-24 RX ADMIN — ALPRAZOLAM 1 MG: 0.5 TABLET ORAL at 02:05

## 2023-05-24 RX ADMIN — SODIUM CHLORIDE 30 MG/ML INHALATION SOLUTION 4 ML: 30 SOLUTION INHALANT at 07:05

## 2023-05-24 RX ADMIN — CALCIUM ACETATE 667 MG: 667 CAPSULE ORAL at 05:05

## 2023-05-24 RX ADMIN — PROPOFOL 50 MCG/KG/MIN: 10 INJECTION, EMULSION INTRAVENOUS at 03:05

## 2023-05-24 RX ADMIN — SODIUM CHLORIDE 30 MG/ML INHALATION SOLUTION 4 ML: 30 SOLUTION INHALANT at 01:05

## 2023-05-24 RX ADMIN — MAGNESIUM SULFATE HEPTAHYDRATE 2 G: 40 INJECTION, SOLUTION INTRAVENOUS at 10:05

## 2023-05-24 RX ADMIN — DEXMEDETOMIDINE HYDROCHLORIDE 1.4 MCG/KG/HR: 400 INJECTION INTRAVENOUS at 01:05

## 2023-05-24 RX ADMIN — ENOXAPARIN SODIUM 40 MG: 40 INJECTION SUBCUTANEOUS at 05:05

## 2023-05-24 RX ADMIN — ALPRAZOLAM 1 MG: 0.5 TABLET ORAL at 09:05

## 2023-05-24 RX ADMIN — HYDROCODONE BITARTRATE AND ACETAMINOPHEN 1 TABLET: 10; 325 TABLET ORAL at 02:05

## 2023-05-24 RX ADMIN — CALCIUM GLUCONATE 1 G: 20 INJECTION, SOLUTION INTRAVENOUS at 12:05

## 2023-05-24 RX ADMIN — CALCIUM ACETATE 667 MG: 667 CAPSULE ORAL at 11:05

## 2023-05-24 NOTE — NURSING
Nurses Note -- 4 Eyes      5/24/2023   4:15 PM      Skin assessed during: Daily Assessment      [] No Altered Skin Integrity Present    []Prevention Measures Documented      [x] Yes- Altered Skin Integrity Present or Discovered   [] LDA Added if Not in Epic (Describe Wound)   [] New Altered Skin Integrity was Present on Admit and Documented in LDA   [] Wound Image Taken    Wound Care Consulted? Yes    Attending Nurse:  Deepthi Jernigan RN     Second RN/Staff Member:  Andreia Wheeler RN

## 2023-05-24 NOTE — PLAN OF CARE
Problem: Infection  Goal: Absence of Infection Signs and Symptoms  Outcome: Ongoing, Progressing     Problem: Adult Inpatient Plan of Care  Goal: Plan of Care Review  Outcome: Ongoing, Progressing  Goal: Patient-Specific Goal (Individualized)  Outcome: Ongoing, Progressing  Goal: Absence of Hospital-Acquired Illness or Injury  Outcome: Ongoing, Progressing  Goal: Optimal Comfort and Wellbeing  Outcome: Ongoing, Progressing  Goal: Readiness for Transition of Care  Outcome: Ongoing, Progressing     Problem: Communication Impairment (Mechanical Ventilation, Invasive)  Goal: Effective Communication  Outcome: Ongoing, Progressing     Problem: Device-Related Complication Risk (Mechanical Ventilation, Invasive)  Goal: Optimal Device Function  Outcome: Ongoing, Progressing     Problem: Inability to Wean (Mechanical Ventilation, Invasive)  Goal: Mechanical Ventilation Liberation  Outcome: Ongoing, Progressing     Problem: Nutrition Impairment (Mechanical Ventilation, Invasive)  Goal: Optimal Nutrition Delivery  Outcome: Ongoing, Progressing     Problem: Skin and Tissue Injury (Mechanical Ventilation, Invasive)  Goal: Absence of Device-Related Skin and Tissue Injury  Outcome: Ongoing, Progressing     Problem: Ventilator-Induced Lung Injury (Mechanical Ventilation, Invasive)  Goal: Absence of Ventilator-Induced Lung Injury  Outcome: Ongoing, Progressing     Problem: Communication Impairment (Artificial Airway)  Goal: Effective Communication  Outcome: Ongoing, Progressing     Problem: Device-Related Complication Risk (Artificial Airway)  Goal: Optimal Device Function  Outcome: Ongoing, Progressing     Problem: Skin and Tissue Injury (Artificial Airway)  Goal: Absence of Device-Related Skin or Tissue Injury  Outcome: Ongoing, Progressing     Problem: Noninvasive Ventilation Acute  Goal: Effective Unassisted Ventilation and Oxygenation  Outcome: Ongoing, Progressing     Problem: Impaired Wound Healing  Goal: Optimal Wound  Healing  Outcome: Ongoing, Progressing     Problem: Skin Injury Risk Increased  Goal: Skin Health and Integrity  Outcome: Ongoing, Progressing     Problem: Adjustment to Illness (Delirium)  Goal: Optimal Coping  Outcome: Ongoing, Progressing     Problem: Altered Behavior (Delirium)  Goal: Improved Behavioral Control  Outcome: Ongoing, Progressing     Problem: Attention and Thought Clarity Impairment (Delirium)  Goal: Improved Attention and Thought Clarity  Outcome: Ongoing, Progressing     Problem: Sleep Disturbance (Delirium)  Goal: Improved Sleep  Outcome: Ongoing, Progressing     Problem: Coping Ineffective  Goal: Effective Coping  Outcome: Ongoing, Progressing     Problem: Fall Injury Risk  Goal: Absence of Fall and Fall-Related Injury  Outcome: Ongoing, Progressing
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
## 2023-05-25 LAB
ABO + RH BLD: NORMAL
ABO + RH BLD: NORMAL
ALBUMIN SERPL-MCNC: 1.8 G/DL (ref 3.5–5)
ALBUMIN/GLOB SERPL: 0.4 RATIO (ref 1.1–2)
ALP SERPL-CCNC: 184 UNIT/L (ref 40–150)
ALT SERPL-CCNC: 10 UNIT/L (ref 0–55)
AST SERPL-CCNC: 14 UNIT/L (ref 5–34)
BASOPHILS # BLD AUTO: 0.09 X10(3)/MCL
BASOPHILS NFR BLD AUTO: 0.7 %
BILIRUBIN DIRECT+TOT PNL SERPL-MCNC: 0.2 MG/DL
BLD PROD TYP BPU: NORMAL
BLD PROD TYP BPU: NORMAL
BLOOD UNIT EXPIRATION DATE: NORMAL
BLOOD UNIT EXPIRATION DATE: NORMAL
BLOOD UNIT TYPE CODE: 5100
BLOOD UNIT TYPE CODE: 5100
BUN SERPL-MCNC: 20.2 MG/DL (ref 8.9–20.6)
CALCIUM SERPL-MCNC: 6.3 MG/DL (ref 8.4–10.2)
CHLORIDE SERPL-SCNC: 105 MMOL/L (ref 98–107)
CO2 SERPL-SCNC: 23 MMOL/L (ref 22–29)
CREAT SERPL-MCNC: 0.57 MG/DL (ref 0.73–1.18)
CROSSMATCH INTERPRETATION: NORMAL
CROSSMATCH INTERPRETATION: NORMAL
DISPENSE STATUS: NORMAL
DISPENSE STATUS: NORMAL
EOSINOPHIL # BLD AUTO: 0.35 X10(3)/MCL (ref 0–0.9)
EOSINOPHIL NFR BLD AUTO: 2.5 %
ERYTHROCYTE [DISTWIDTH] IN BLOOD BY AUTOMATED COUNT: 23.3 % (ref 11.5–17)
GFR SERPLBLD CREATININE-BSD FMLA CKD-EPI: >60 MLS/MIN/1.73/M2
GLOBULIN SER-MCNC: 4.5 GM/DL (ref 2.4–3.5)
GLUCOSE SERPL-MCNC: 97 MG/DL (ref 74–100)
GROUP & RH: NORMAL
HCT VFR BLD AUTO: 22.5 % (ref 42–52)
HGB BLD-MCNC: 6.8 G/DL (ref 14–18)
IMM GRANULOCYTES # BLD AUTO: 0.08 X10(3)/MCL (ref 0–0.04)
IMM GRANULOCYTES NFR BLD AUTO: 0.6 %
INDIRECT COOMBS GEL: NORMAL
LYMPHOCYTES # BLD AUTO: 2.76 X10(3)/MCL (ref 0.6–4.6)
LYMPHOCYTES NFR BLD AUTO: 20 %
MAGNESIUM SERPL-MCNC: 1.9 MG/DL (ref 1.6–2.6)
MCH RBC QN AUTO: 25.4 PG (ref 27–31)
MCHC RBC AUTO-ENTMCNC: 30.2 G/DL (ref 33–36)
MCV RBC AUTO: 84 FL (ref 80–94)
MONOCYTES # BLD AUTO: 0.91 X10(3)/MCL (ref 0.1–1.3)
MONOCYTES NFR BLD AUTO: 6.6 %
NEUTROPHILS # BLD AUTO: 9.61 X10(3)/MCL (ref 2.1–9.2)
NEUTROPHILS NFR BLD AUTO: 69.6 %
NRBC BLD AUTO-RTO: 0 %
PHOSPHATE SERPL-MCNC: 5.3 MG/DL (ref 2.3–4.7)
PLATELET # BLD AUTO: 427 X10(3)/MCL (ref 130–400)
PMV BLD AUTO: 8.1 FL (ref 7.4–10.4)
POTASSIUM SERPL-SCNC: 4.4 MMOL/L (ref 3.5–5.1)
PROT SERPL-MCNC: 6.3 GM/DL (ref 6.4–8.3)
RBC # BLD AUTO: 2.68 X10(6)/MCL (ref 4.7–6.1)
SODIUM SERPL-SCNC: 139 MMOL/L (ref 136–145)
SPECIMEN OUTDATE: NORMAL
UNIT NUMBER: NORMAL
UNIT NUMBER: NORMAL
WBC # SPEC AUTO: 13.8 X10(3)/MCL (ref 4.5–11.5)

## 2023-05-25 PROCEDURE — C9113 INJ PANTOPRAZOLE SODIUM, VIA: HCPCS | Performed by: NURSE PRACTITIONER

## 2023-05-25 PROCEDURE — 94640 AIRWAY INHALATION TREATMENT: CPT

## 2023-05-25 PROCEDURE — 83735 ASSAY OF MAGNESIUM: CPT

## 2023-05-25 PROCEDURE — 85025 COMPLETE CBC W/AUTO DIFF WBC: CPT | Performed by: STUDENT IN AN ORGANIZED HEALTH CARE EDUCATION/TRAINING PROGRAM

## 2023-05-25 PROCEDURE — 99900025 HC BRONCHOSCOPY-ASST (STAT)

## 2023-05-25 PROCEDURE — 27200966 HC CLOSED SUCTION SYSTEM

## 2023-05-25 PROCEDURE — 94761 N-INVAS EAR/PLS OXIMETRY MLT: CPT

## 2023-05-25 PROCEDURE — 20000000 HC ICU ROOM

## 2023-05-25 PROCEDURE — 63600175 PHARM REV CODE 636 W HCPCS: Performed by: NURSE PRACTITIONER

## 2023-05-25 PROCEDURE — 27201112

## 2023-05-25 PROCEDURE — 99900022

## 2023-05-25 PROCEDURE — 25000242 PHARM REV CODE 250 ALT 637 W/ HCPCS: Performed by: INTERNAL MEDICINE

## 2023-05-25 PROCEDURE — 84100 ASSAY OF PHOSPHORUS: CPT

## 2023-05-25 PROCEDURE — 27202055 HC BRONCHOSCOPE, DISP

## 2023-05-25 PROCEDURE — 63600175 PHARM REV CODE 636 W HCPCS: Performed by: STUDENT IN AN ORGANIZED HEALTH CARE EDUCATION/TRAINING PROGRAM

## 2023-05-25 PROCEDURE — 80053 COMPREHEN METABOLIC PANEL: CPT

## 2023-05-25 PROCEDURE — 25000003 PHARM REV CODE 250: Performed by: INTERNAL MEDICINE

## 2023-05-25 PROCEDURE — 63600175 PHARM REV CODE 636 W HCPCS: Performed by: INTERNAL MEDICINE

## 2023-05-25 PROCEDURE — 25000003 PHARM REV CODE 250: Performed by: STUDENT IN AN ORGANIZED HEALTH CARE EDUCATION/TRAINING PROGRAM

## 2023-05-25 PROCEDURE — P9016 RBC LEUKOCYTES REDUCED: HCPCS

## 2023-05-25 PROCEDURE — 94003 VENT MGMT INPAT SUBQ DAY: CPT

## 2023-05-25 PROCEDURE — 86900 BLOOD TYPING SEROLOGIC ABO: CPT

## 2023-05-25 PROCEDURE — 63600175 PHARM REV CODE 636 W HCPCS

## 2023-05-25 PROCEDURE — 99900026 HC AIRWAY MAINTENANCE (STAT)

## 2023-05-25 PROCEDURE — 99900031 HC PATIENT EDUCATION (STAT)

## 2023-05-25 PROCEDURE — 36430 TRANSFUSION BLD/BLD COMPNT: CPT

## 2023-05-25 PROCEDURE — 25000003 PHARM REV CODE 250

## 2023-05-25 PROCEDURE — 25000003 PHARM REV CODE 250: Performed by: HOSPITALIST

## 2023-05-25 PROCEDURE — 99900035 HC TECH TIME PER 15 MIN (STAT)

## 2023-05-25 PROCEDURE — 86923 COMPATIBILITY TEST ELECTRIC: CPT | Mod: 91

## 2023-05-25 PROCEDURE — 27000221 HC OXYGEN, UP TO 24 HOURS

## 2023-05-25 RX ORDER — LIDOCAINE HYDROCHLORIDE 20 MG/ML
100 INJECTION, SOLUTION INFILTRATION; PERINEURAL ONCE
Status: DISCONTINUED | OUTPATIENT
Start: 2023-05-25 | End: 2023-06-06

## 2023-05-25 RX ORDER — ROCURONIUM BROMIDE 10 MG/ML
100 INJECTION, SOLUTION INTRAVENOUS ONCE
Status: COMPLETED | OUTPATIENT
Start: 2023-05-25 | End: 2023-05-25

## 2023-05-25 RX ORDER — HYDROCODONE BITARTRATE AND ACETAMINOPHEN 500; 5 MG/1; MG/1
TABLET ORAL
Status: DISCONTINUED | OUTPATIENT
Start: 2023-05-25 | End: 2023-06-08 | Stop reason: HOSPADM

## 2023-05-25 RX ORDER — MIDAZOLAM HYDROCHLORIDE 1 MG/ML
4 INJECTION INTRAMUSCULAR; INTRAVENOUS ONCE
Status: COMPLETED | OUTPATIENT
Start: 2023-05-25 | End: 2023-05-25

## 2023-05-25 RX ORDER — FENTANYL CITRATE-0.9 % NACL/PF 10 MCG/ML
0-250 PLASTIC BAG, INJECTION (ML) INTRAVENOUS CONTINUOUS
Status: DISCONTINUED | OUTPATIENT
Start: 2023-05-25 | End: 2023-05-31

## 2023-05-25 RX ORDER — CALCIUM GLUCONATE 20 MG/ML
1 INJECTION, SOLUTION INTRAVENOUS ONCE
Status: COMPLETED | OUTPATIENT
Start: 2023-05-25 | End: 2023-05-25

## 2023-05-25 RX ORDER — LIDOCAINE HYDROCHLORIDE 20 MG/ML
INJECTION, SOLUTION EPIDURAL; INFILTRATION; INTRACAUDAL; PERINEURAL
Status: COMPLETED
Start: 2023-05-25 | End: 2023-05-25

## 2023-05-25 RX ORDER — MAGNESIUM SULFATE 1 G/100ML
1 INJECTION INTRAVENOUS ONCE
Status: COMPLETED | OUTPATIENT
Start: 2023-05-25 | End: 2023-05-25

## 2023-05-25 RX ORDER — FENTANYL CITRATE 50 UG/ML
200 INJECTION, SOLUTION INTRAMUSCULAR; INTRAVENOUS ONCE
Status: COMPLETED | OUTPATIENT
Start: 2023-05-25 | End: 2023-05-28

## 2023-05-25 RX ADMIN — MIDAZOLAM HYDROCHLORIDE 4 MG: 1 INJECTION, SOLUTION INTRAMUSCULAR; INTRAVENOUS at 11:05

## 2023-05-25 RX ADMIN — DEXMEDETOMIDINE HYDROCHLORIDE 1.4 MCG/KG/HR: 400 INJECTION INTRAVENOUS at 08:05

## 2023-05-25 RX ADMIN — PROPOFOL 40 MCG/KG/MIN: 10 INJECTION, EMULSION INTRAVENOUS at 02:05

## 2023-05-25 RX ADMIN — CALCIUM GLUCONATE 1 G: 20 INJECTION, SOLUTION INTRAVENOUS at 04:05

## 2023-05-25 RX ADMIN — ALPRAZOLAM 1 MG: 0.5 TABLET ORAL at 08:05

## 2023-05-25 RX ADMIN — GUAIFENESIN 400 MG: 200 SOLUTION ORAL at 09:05

## 2023-05-25 RX ADMIN — Medication 50 MCG/HR: at 10:05

## 2023-05-25 RX ADMIN — LEVETIRACETAM 1000 MG: 100 INJECTION, SOLUTION INTRAVENOUS at 08:05

## 2023-05-25 RX ADMIN — PROPOFOL 50 MCG/KG/MIN: 10 INJECTION, EMULSION INTRAVENOUS at 12:05

## 2023-05-25 RX ADMIN — DEXMEDETOMIDINE HYDROCHLORIDE 1.4 MCG/KG/HR: 400 INJECTION INTRAVENOUS at 12:05

## 2023-05-25 RX ADMIN — PROPOFOL 50 MCG/KG/MIN: 10 INJECTION, EMULSION INTRAVENOUS at 06:05

## 2023-05-25 RX ADMIN — LIDOCAINE HYDROCHLORIDE 100 MG: 20 INJECTION, SOLUTION EPIDURAL; INFILTRATION; INTRACAUDAL; PERINEURAL at 11:05

## 2023-05-25 RX ADMIN — SODIUM CHLORIDE 30 MG/ML INHALATION SOLUTION 4 ML: 30 SOLUTION INHALANT at 08:05

## 2023-05-25 RX ADMIN — PROPOFOL 50 MCG/KG/MIN: 10 INJECTION, EMULSION INTRAVENOUS at 03:05

## 2023-05-25 RX ADMIN — MIDODRINE HYDROCHLORIDE 15 MG: 5 TABLET ORAL at 01:05

## 2023-05-25 RX ADMIN — PROPOFOL 20 MCG/KG/MIN: 10 INJECTION, EMULSION INTRAVENOUS at 11:05

## 2023-05-25 RX ADMIN — GUAIFENESIN 400 MG: 200 SOLUTION ORAL at 01:05

## 2023-05-25 RX ADMIN — SODIUM CHLORIDE 30 MG/ML INHALATION SOLUTION 4 ML: 30 SOLUTION INHALANT at 01:05

## 2023-05-25 RX ADMIN — MIDODRINE HYDROCHLORIDE 15 MG: 5 TABLET ORAL at 09:05

## 2023-05-25 RX ADMIN — DEXMEDETOMIDINE HYDROCHLORIDE 1.4 MCG/KG/HR: 400 INJECTION INTRAVENOUS at 02:05

## 2023-05-25 RX ADMIN — MAGNESIUM SULFATE IN DEXTROSE 1 G: 10 INJECTION, SOLUTION INTRAVENOUS at 09:05

## 2023-05-25 RX ADMIN — GUAIFENESIN 400 MG: 200 SOLUTION ORAL at 05:05

## 2023-05-25 RX ADMIN — ROCURONIUM BROMIDE 100 MG: 10 INJECTION, SOLUTION INTRAVENOUS at 11:05

## 2023-05-25 RX ADMIN — DEXMEDETOMIDINE HYDROCHLORIDE 1.4 MCG/KG/HR: 400 INJECTION INTRAVENOUS at 06:05

## 2023-05-25 RX ADMIN — DEXMEDETOMIDINE HYDROCHLORIDE 1.4 MCG/KG/HR: 400 INJECTION INTRAVENOUS at 03:05

## 2023-05-25 RX ADMIN — ENOXAPARIN SODIUM 40 MG: 40 INJECTION SUBCUTANEOUS at 05:05

## 2023-05-25 RX ADMIN — PANTOPRAZOLE SODIUM 40 MG: 40 INJECTION, POWDER, FOR SOLUTION INTRAVENOUS at 08:05

## 2023-05-25 NOTE — PROGRESS NOTES
"   Acute Care Surgery   Progress Note  Admit Date: 5/15/2023  HD#9  POD#7 Days Post-Op    Subjective:   Interval history:  Surgery went to bedside to examine sacral wound. Wound bed is clean with no necrotic areas.    Surgery team was also asked to evaluate for PEG placement. Medicine team plans to place trach at bedside tomorrow. Patient has failed SBTs and has not been able to be intubated. Trach was placed on 5/15.       Home Meds:No current outpatient medications   Scheduled Meds:   calcitRIOL  0.5 mcg Oral Daily    calcium acetate(phosphat bind)  667 mg Oral TID WM    enoxaparin  40 mg Subcutaneous Daily    guaiFENesin 100 mg/5 ml  400 mg Oral Q4H    levetiracetam IV  1,000 mg Intravenous Q12H    midodrine  15 mg Oral Q8H    pantoprazole  40 mg Intravenous Daily    sodium chloride 3%  4 mL Nebulization Q6H     Continuous Infusions:   dexmedeTOMIDine (Precedex) infusion (titrating) 1.4 mcg/kg/hr (05/24/23 1721)    NORepinephrine bitartrate-D5W 0.02 mcg/kg/min (05/24/23 1721)    propofoL 50 mcg/kg/min (05/24/23 1721)     PRN Meds:sodium chloride, ALPRAZolam, HYDROcodone-acetaminophen, sodium chloride 0.9%     Objective:     VITAL SIGNS: 24 HR MIN & MAX LAST   No data recorded  98.9 °F (37.2 °C)   BP  Min: 76/44  Max: 108/72  (!) 86/55    Pulse  Min: 65  Max: 97  88    Resp  Min: 16  Max: 31  (!) 22    SpO2  Min: 85 %  Max: 100 %  100 %      HT: 5' 9" (175.3 cm)  WT: 66.8 kg (147 lb 4.3 oz)  BMI: 21.7     Intake/output:  Intake/Output - Last 3 Shifts         05/22 0700 05/23 0659 05/23 0700 05/24 0659 05/24 0700 05/25 0659    P.O.  0     I.V. (mL/kg) 1196.3 (15.5) 1934.7 (29) 485.2 (7.3)    NG/GT 1417 2215 1003    IV Piggyback 400 300 110.8    Total Intake(mL/kg) 3013.3 (39.1) 4449.7 (66.6) 1599 (23.9)    Urine (mL/kg/hr) 3525 (1.9) 3875 (2.4) 1750 (2.2)    Stool 400 500 300    Total Output 3925 4375 2050    Net -911.8 +74.7 -451           Urine Occurrence 0 x      Stool Occurrence 3 x        "       Intake/Output Summary (Last 24 hours) at 5/24/2023 1906  Last data filed at 5/24/2023 1750  Gross per 24 hour   Intake 3845.68 ml   Output 4800 ml   Net -954.32 ml             Lines/drains/airway:  Percutaneous Central Line Insertion/Assessment - Triple Lumen  05/15/23 Femoral Vein Right;Femoral Right (Active)   Line Necessity Review Hemodynamic instability;Medication caustic to vasculature 05/16/23 0700   $ Central Line Charges (Upon insertion) Bedside Insertion Performed Pt > 5 Years Old;Catheter - Triple Lumen (Supply) 05/15/23 1500   Site Assessment No drainage;No redness;No swelling;No warmth 05/16/23 0700   Line Securement Device Secured with sutures 05/16/23 0700   Dressing Type CHG impregnated dressing/sponge;Central line dressing 05/16/23 0700   Dressing Status Clean;Dry;Intact 05/16/23 0700   Dressing Intervention First dressing 05/16/23 0700   Date on Dressing 05/15/23 05/16/23 0700   Dressing Due to be Changed 05/17/23 05/16/23 0700   Distal Patency/Care flushed w/o difficulty;infusing 05/16/23 0700   Medial 1 Patency/Care flushed w/o difficulty;infusing 05/16/23 0700   Proximal 1 Patency/Care flushed w/o difficulty;infusing 05/16/23 0700   Waveform Not being transduced 05/16/23 0700   Number of days: 1            Peripheral IV - Single Lumen 05/15/23 0930 18 G Right  (Active)   Site Assessment Clean;Dry;Intact;No redness;No swelling;No warmth;No drainage 05/16/23 0700   Extremity Assessment Distal to IV No abnormal discoloration;No redness;No swelling;No warmth 05/16/23 0700   Line Status Blood return noted;Flushed;Saline locked 05/16/23 0700   Dressing Status Clean;Dry;Intact 05/16/23 0700   Dressing Intervention First dressing 05/16/23 0700   Number of days: 0       Arterial Line 05/15/23 1500 Right Radial (Active)   $ Arterial Line Charges (Upon Insertion) Bedside Insertion Performed 05/15/23 1500   Site Assessment Clean;Dry;Intact;No redness;No swelling;No warmth;No drainage 05/16/23 0700  "  Line Status Pulsatile blood flow 05/16/23 0700   Art Line Waveform Appropriate 05/16/23 0700   Arterial Line Interventions Zeroed and calibrated;Leveled 05/16/23 0700   Color/Movement/Sensation Capillary refill less than 3 sec 05/16/23 0700   Dressing Type CHG impregnated dressing/sponge;Central line dressing 05/16/23 0700   Dressing Status Clean;Dry;Intact 05/16/23 0700   Dressing Intervention First dressing 05/16/23 0700   Number of days: 0            NG/OG Tube 05/15/23 1325 Right nostril (Active)   Placement Check placement verified by aspirate characteristics;placement verified by distal tube length measurement 05/16/23 0700   Distal Tube Length (cm) 65 05/16/23 0700   Tolerance no signs/symptoms of discomfort 05/16/23 0700   Securement secured to nostril center w/ adhesive device 05/16/23 0700   Clamp Status/Tolerance unclamped;no abdominal discomfort;no emesis;no abdominal distention;no restlessness 05/16/23 0700   Suction Setting/Drainage Method suction initiated at;low;intermittent setting 05/16/23 0700   Insertion Site Appearance no redness, warmth, tenderness, skin breakdown, drainage 05/16/23 0700   Drainage Bile;Brown 05/16/23 0700   Tube Output(mL)(Include Discarded Residual) 700 mL 05/16/23 0600   Number of days: 0            Urethral Catheter 05/15/23 1015 Straight-tip (Active)   $ Shen Insertion Bedside Insertion Performed 05/15/23 1500   Site Assessment Clean;Intact;Dry 05/16/23 0700   Collection Container Urimeter 05/16/23 0700   Securement Method secured to top of thigh w/ adhesive device 05/16/23 0700   Catheter Care Performed yes 05/16/23 0700   Reason for Continuing Urinary Catheterization Critically ill in ICU and requiring hourly monitoring of intake/output 05/16/23 0700   CAUTI Prevention Bundle Securement Device in place with 1" slack;Drainage bag/urimeter off the floor;Intact seal between catheter & drainage tubing;Sheeting clip in use;No dependent loops or kinks;Drainage bag/urimeter " not overfilled (<2/3 full);Drainage bag/urimeter below bladder 05/16/23 0700   Output (mL) 325 mL 05/16/23 0600   Number of days: 0       Physical examination:  Gen: Intubated and sedated  HEENT: Intubated  CV: RR  Resp: NWOB on ventilator  Abd: S/NT/ND  Ext: R forearm wound with clean bandage in place. Bilateral heel ulcers with bandages in place.   : Buttock wound with clean base, some fibrinous material overlying base. No purulence or necrotic tissue noted. No eschar.   Neuro: CN II-XII grossly intact    Labs:  Renal:  Recent Labs     05/22/23  0124 05/23/23  0154 05/24/23  0205   BUN 8.6* 12.0 16.4   CREATININE 0.63* 0.63* 0.62*       No results for input(s): LACTIC in the last 72 hours.    FENGI:  Recent Labs     05/22/23  0124 05/23/23  0154 05/24/23  0205    141 140   K 4.1 3.9 4.2   CO2 23 24 22   CALCIUM 6.1* 6.1* 5.9*   MG 1.60 1.30* 1.60   PHOS 5.3* 5.3* 4.8*   ALBUMIN 1.7* 1.6* 1.6*   BILITOT 0.2 0.2 0.2   AST 13 13 12   ALKPHOS 147 128 146   ALT 11 10 10       Heme:  Recent Labs     05/22/23  0124 05/24/23  0934   HGB 8.4* 7.1*   HCT 26.9* 23.6*   * 410*       ID:  Recent Labs     05/22/23  0124 05/24/23  0934   WBC 17.34* 10.51       CBG:  Recent Labs     05/22/23  0124 05/23/23  0154 05/24/23  0205   GLUCOSE 110* 123* 123*        Cardiovascular:  No results for input(s): TROPONINI, CKTOTAL, CKMB, BNP in the last 168 hours.    I have reviewed all pertinent lab results within the past 24 hours.    Imaging:  X-Ray Chest 1 View   Final Result      No significant change.         Electronically signed by: Declan Perez   Date:    05/24/2023   Time:    06:13      X-Ray Chest 1 View   Final Result      1. Endotracheal tube tip near the thoracic inlet.   2. Hazy bibasilar opacities with pleural effusions.         Electronically signed by: Declan Perez   Date:    05/23/2023   Time:    16:49      X-Ray Chest 1 View   Final Result      Stable exam without significant interval change.          Electronically signed by: Eleanor Farley   Date:    05/20/2023   Time:    08:00      X-Ray Chest 1 View   Final Result      X-Ray Chest 1 View   Final Result      Improved left effusion.  Interval development of right upper lobe opacification.  Recommend continued follow-up.  Findings possibly represent atelectasis.  Infectious process is not excluded.         Electronically signed by: Abraham Bajwa   Date:    05/19/2023   Time:    14:20      X-Ray Chest 1 View   Final Result      Improved lungs aeration, congestive changes and right pleural effusion.  Left lower pleuroparenchymal findings are similar.         Electronically signed by: Salomon Vidal   Date:    05/18/2023   Time:    09:00      X-Ray Chest 1 View   Final Result      Some worsening of opacification in the left hemithorax with only small aerated portion in the upper lobe.      Otherwise pleuroparenchymal changes are unchanged as compared with previous exam      Support catheters remain in place         Electronically signed by: Bull Valentin   Date:    05/17/2023   Time:    07:08      X-Ray Chest 1 View   Final Result      Some resolution of the complete opacification of the left hemithorax with markedly improved aeration.      Persistent blunting of the left costophrenic angle indicating the presence of left-sided pleural effusion.      Increased left retrocardiac density and silhouetting of the left hemidiaphragm which might be related to pleural fluid and or represent an infiltrate/atelectasis.      No other significant change support catheters remain in place         Electronically signed by: Bull Valentin   Date:    05/17/2023   Time:    06:53      X-Ray Chest 1 View   Final Result      1. Endotracheal tube with tip 4.4 cm above the frances.   2. New complete opacification of the left hemithorax.         Electronically signed by: Eleanor Farley   Date:    05/16/2023   Time:    06:15      CT Chest Without Contrast   Final Result       Multifocal bilateral consolidation.  Pneumonia possible, but favor pulmonary edema given other findings.      Moderate bilateral pleural effusions with small volume ascites.         Electronically signed by: Jesús Corey   Date:    05/15/2023   Time:    17:31      X-Ray Chest AP Portable   Final Result      Cardiomegaly.      Increase interstitial and pulmonary vascular markings.      Bilateral pleural effusions.      Increased left retrocardiac density and silhouetting of the left hemidiaphragm as above         Electronically signed by: Bull Valentin   Date:    05/15/2023   Time:    13:45         I have reviewed all pertinent imaging results/findings within the past 24 hours.    Micro/Path/Other:  Microbiology Results (last 7 days)       Procedure Component Value Units Date/Time    Blood culture #1 **CANNOT BE ORDERED STAT** [[id number]]  (Normal) Collected: 05/15/23 1027    Order Status: Completed Specimen: Blood Updated: 05/20/23 1100     CULTURE, BLOOD ([facility]) No Growth at 5 days    Blood culture #2 **CANNOT BE ORDERED STAT** [[id number]]  (Normal) Collected: 05/15/23 1027    Order Status: Completed Specimen: Blood Updated: 05/20/23 1100     CULTURE, BLOOD ([facility]) No Growth at 5 days    Respiratory Culture [[id number]]  (Abnormal) Collected: 05/16/23 1138    Order Status: Completed Specimen: Bronchial Wash from Endotracheal Aspirate Updated: 05/18/23 0800     Respiratory Culture Moderate Yeast     Comment: with normal respiratory diana        GRAM STAIN Quality 2+      Many Gram positive cocci    Urine culture [[id number]]  (Abnormal) Collected: 05/15/23 1434    Order Status: Completed Specimen: Urine Updated: 05/18/23 0654     Urine Culture Less than 10,000 colonies/ml Klebsiella pneumoniae ssp pneumoniae      Less than 10,000 colonies/ml Providencia stuartii     Comment: [place] counts <10,000/ml are of questionable significance and may or may not indicate contamination.  Therefore organisms are identified only.   If further workup is desired please notify Microbiology               Specimen (168h ago, onward)      None             Assessment & Plan:   27 year old quadriplegic man with extensive decubitus ulcers of LUE, sacrum, bilateral ischia, and bilateral heels. Presented in sepsis with UTI and left sided pleural effusion. General surgery consulted for sacral decubitus ulcer and PEG placement.     - Recommend GI consult for PEG tube placement prior to consideration for surgical PEG   - Sacral decubitus ulcer appearance much improved. Clean base with no necrotic tissue or purulence. Continue local wound care. Patient does not require surgical debridement at this time.   - Given improvement in sacral wound, do not believe that patient requires colostomy for wound healing at this time  - Remainder of plan of care per primary team     Mary Greene MD  [employer] General Surgery, PGY-1
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
## 2023-05-25 NOTE — NURSING
Nurses Note -- 4 Eyes      5/25/2023   1:28 AM      Skin assessed during: Q Shift Change      [] No Altered Skin Integrity Present    []Prevention Measures Documented      [x] Yes- Altered Skin Integrity Present or Discovered   [] LDA Added if Not in Epic (Describe Wound)   [] New Altered Skin Integrity was Present on Admit and Documented in LDA   [] Wound Image Taken    Wound Care Consulted? Yes    Attending Nurse:  Aileen Abbott RN     Second RN/Staff Member:  JANETT Sanchez

## 2023-05-25 NOTE — PROCEDURES
"Jyoti Mayberry is a 27 y.o. male patient.    Temp: 98.9 °F (37.2 °C) (23 1600)  Pulse: (!) 56 (23 1205)  Resp: 18 (23 1205)  BP: 98/65 (23 1204)  SpO2: 100 % (23 1205)  Weight: 66.8 kg (147 lb 4.3 oz) (23 0600)  Height: 5' 9" (175.3 cm) (23 1117)       Percutaneous tracheostomy    Date/Time: 2023 12:27 PM  Location procedure was performed: [facility]  Performed by: Ranulfo Miguel MD  Authorized by: Ranulfo Miguel MD   Assisting provider: Juan Jose Briggs MD  Pre-operative diagnosis: prolonged mechanical ventilation, acute hypoxemic respiratory failure  Post-operative diagnosis: same  Consent Done: Yes  Consent: Written consent obtained.  Risks and benefits: risks, benefits and alternatives were discussed  Consent given by: mother  Procedure consent: procedure consent matches procedure scheduled  Relevant documents: relevant documents present and verified  Test results: test results available and properly labeled  Site marked: the operative site was marked  Imaging studies: imaging studies available  Required items: required blood products, implants, devices, and special equipment available  Patient identity confirmed: , MRN, name, provided demographic data and verbally with patient  Time out: Immediately prior to procedure a "time out" was called to verify the correct patient, procedure, equipment, support staff and site/side marked as required.  Tracheostomy replacement indications: Placement of percutaneous tracheostomy.  Tracheostomy status: fistula tract not established  Local anesthesia used: yes  Anesthesia: local infiltration    Anesthesia:  Local anesthesia used: yes  Local Anesthetic: lidocaine 1% without epinephrine  Anesthetic total: 3 mL    Patient sedated: yes  Sedation type: deep sedation    Sedatives: fentanyl and midazolam (Fentanyl drip, 4 mg of Versed given)  Analgesia: fentanyl and see MAR for details  Sedation start date/time: " 5/25/2023 10:45 PM  Sedation end date/time: 5/25/2023 11:30 AM  Vitals: Vital signs were monitored during sedation.  Preparation: Patient was prepped and draped in the usual sterile fashion.  Tube type: single cannula  Tube cuff: single cuff  Tube size: 8.0 mm  Cuff inflation: inflated  Cuff type: air  Cuff inflation technique: minimal leak technique used  Seldinger technique: Seldinger technique used  Technical procedures used: seldinger  Complications: No  Estimated blood loss (mL): 5  Specimens: No  Implants: No  Patient tolerance: Patient tolerated the procedure well with no immediate complications  Comments: Patient sedated with Versed fentanyl propofol Precedex drips due to refractory status to sedation and was given neuromuscular blockade with 100 mg of rocuronium for the procedure    After tracheostomy was placed the bronchoscope was placed into the tracheostomy lumen with visualization of the distal tip well above the frances at about 3-4 cm.  Tracheostomy was sutured in place with Prolene and reconnected back to mechanical ventilation via closed circuit.  Significant surgical tasks conducted by the assistant(s): bronchoscopy - see separate note        5/25/2023

## 2023-05-25 NOTE — PROCEDURES
[facility]    SUMMARY     Date of Procedure: 5/25/2023    Procedure: Bronchoscopy, Therapeutic    Performed by: Juan Jose Briggs MD    Pre-Procedure Diagnosis:  Quadriplegia, persistent respiratory failure    Post-Procedure Diagnosis:  Same    Anesthesia:  Deep sedation in the intensive care unit        Description of the Findings of the Procedure:     Patient was consented for the procedure with all risk and benefit of the procedure explained in detail.  Patient was given the opportunity to ask questions and all concerns were answered.  The bronchocope was inserted into the main airway via the endotracheal tube. An anatomical survey was done of the main airways and the subsegmental bronchus.      There were no endobronchial lesions identified within either right or left tracheobronchial trees.  There were significant thick white/tan secretions within the right tracheobronchial tree, obstructing the right middle lobe, right upper lobe, and right lower lobe.  These required lavage with saline, but were ultimately cleared.  There were scant secretions located within the left tracheobronchial tree, easily suctioned.  The endotracheal tube cuff was deflated and the tube was withdrawn to approximately the level of the conus.  There was a small area of mucosal abnormality on the anterior tracheal wall between the 1st and 2nd tracheal rings, suspect scarring from site of previous tracheostomy placement.  This bronchoscopy was performed in support of percutaneous tracheostomy placement, see separately dictated procedure note for further details.  Patient tolerated procedure well, there were no complications.  Placement of tracheostomy was confirmed by repeat bronchoscopy, confirming location with the endotracheal lumen above the level of the frances.  There were no significant bloody secretions noted, airways were clear of secretions at the conclusion of the  procedure.      Complications: None; patient tolerated the procedure well.    Estimated Blood Loss (EBL): Minimal           Specimens: None       Condition: Good        Disposition: ICU - intubated and hemodynamically stable.          Juan Jose Briggs MD  [facility]

## 2023-05-26 LAB
ALBUMIN SERPL-MCNC: 1.9 G/DL (ref 3.5–5)
ALBUMIN/GLOB SERPL: 0.4 RATIO (ref 1.1–2)
ALP SERPL-CCNC: 222 UNIT/L (ref 40–150)
ALT SERPL-CCNC: 10 UNIT/L (ref 0–55)
AST SERPL-CCNC: 17 UNIT/L (ref 5–34)
BASOPHILS # BLD AUTO: 0.13 X10(3)/MCL
BASOPHILS NFR BLD AUTO: 1 %
BILIRUBIN DIRECT+TOT PNL SERPL-MCNC: 0.3 MG/DL
BUN SERPL-MCNC: 17.1 MG/DL (ref 8.9–20.6)
CALCIUM SERPL-MCNC: 6 MG/DL (ref 8.4–10.2)
CHLORIDE SERPL-SCNC: 103 MMOL/L (ref 98–107)
CO2 SERPL-SCNC: 19 MMOL/L (ref 22–29)
CREAT SERPL-MCNC: 0.55 MG/DL (ref 0.73–1.18)
EOSINOPHIL # BLD AUTO: 0.35 X10(3)/MCL (ref 0–0.9)
EOSINOPHIL NFR BLD AUTO: 2.6 %
ERYTHROCYTE [DISTWIDTH] IN BLOOD BY AUTOMATED COUNT: 21 % (ref 11.5–17)
GFR SERPLBLD CREATININE-BSD FMLA CKD-EPI: >60 MLS/MIN/1.73/M2
GLOBULIN SER-MCNC: 4.5 GM/DL (ref 2.4–3.5)
GLUCOSE SERPL-MCNC: 91 MG/DL (ref 74–100)
HCT VFR BLD AUTO: 29.4 % (ref 42–52)
HGB BLD-MCNC: 9.5 G/DL (ref 14–18)
IMM GRANULOCYTES # BLD AUTO: 0.09 X10(3)/MCL (ref 0–0.04)
IMM GRANULOCYTES NFR BLD AUTO: 0.7 %
LYMPHOCYTES # BLD AUTO: 1.76 X10(3)/MCL (ref 0.6–4.6)
LYMPHOCYTES NFR BLD AUTO: 13 %
MAGNESIUM SERPL-MCNC: 1.6 MG/DL (ref 1.6–2.6)
MCH RBC QN AUTO: 27.1 PG (ref 27–31)
MCHC RBC AUTO-ENTMCNC: 32.3 G/DL (ref 33–36)
MCV RBC AUTO: 83.8 FL (ref 80–94)
MONOCYTES # BLD AUTO: 0.8 X10(3)/MCL (ref 0.1–1.3)
MONOCYTES NFR BLD AUTO: 5.9 %
NEUTROPHILS # BLD AUTO: 10.41 X10(3)/MCL (ref 2.1–9.2)
NEUTROPHILS NFR BLD AUTO: 76.8 %
NRBC BLD AUTO-RTO: 0 %
PHOSPHATE SERPL-MCNC: 6.2 MG/DL (ref 2.3–4.7)
PLATELET # BLD AUTO: 406 X10(3)/MCL (ref 130–400)
PMV BLD AUTO: 8.2 FL (ref 7.4–10.4)
POTASSIUM SERPL-SCNC: 4.6 MMOL/L (ref 3.5–5.1)
PROT SERPL-MCNC: 6.4 GM/DL (ref 6.4–8.3)
RBC # BLD AUTO: 3.51 X10(6)/MCL (ref 4.7–6.1)
SODIUM SERPL-SCNC: 136 MMOL/L (ref 136–145)
WBC # SPEC AUTO: 13.54 X10(3)/MCL (ref 4.5–11.5)

## 2023-05-26 PROCEDURE — 85025 COMPLETE CBC W/AUTO DIFF WBC: CPT | Performed by: STUDENT IN AN ORGANIZED HEALTH CARE EDUCATION/TRAINING PROGRAM

## 2023-05-26 PROCEDURE — 25000003 PHARM REV CODE 250: Performed by: INTERNAL MEDICINE

## 2023-05-26 PROCEDURE — 94640 AIRWAY INHALATION TREATMENT: CPT

## 2023-05-26 PROCEDURE — 27200966 HC CLOSED SUCTION SYSTEM

## 2023-05-26 PROCEDURE — 94761 N-INVAS EAR/PLS OXIMETRY MLT: CPT

## 2023-05-26 PROCEDURE — 94003 VENT MGMT INPAT SUBQ DAY: CPT

## 2023-05-26 PROCEDURE — 80053 COMPREHEN METABOLIC PANEL: CPT

## 2023-05-26 PROCEDURE — 25000003 PHARM REV CODE 250: Performed by: STUDENT IN AN ORGANIZED HEALTH CARE EDUCATION/TRAINING PROGRAM

## 2023-05-26 PROCEDURE — 20000000 HC ICU ROOM

## 2023-05-26 PROCEDURE — 25000242 PHARM REV CODE 250 ALT 637 W/ HCPCS: Performed by: INTERNAL MEDICINE

## 2023-05-26 PROCEDURE — 63600175 PHARM REV CODE 636 W HCPCS: Performed by: NURSE PRACTITIONER

## 2023-05-26 PROCEDURE — C9113 INJ PANTOPRAZOLE SODIUM, VIA: HCPCS | Performed by: NURSE PRACTITIONER

## 2023-05-26 PROCEDURE — 99233 PR SUBSEQUENT HOSPITAL CARE,LEVL III: ICD-10-PCS | Mod: ,,, | Performed by: EMERGENCY MEDICINE

## 2023-05-26 PROCEDURE — 63600175 PHARM REV CODE 636 W HCPCS

## 2023-05-26 PROCEDURE — 99900035 HC TECH TIME PER 15 MIN (STAT)

## 2023-05-26 PROCEDURE — 99900026 HC AIRWAY MAINTENANCE (STAT)

## 2023-05-26 PROCEDURE — 25000003 PHARM REV CODE 250

## 2023-05-26 PROCEDURE — 99900022

## 2023-05-26 PROCEDURE — 25000003 PHARM REV CODE 250: Performed by: HOSPITALIST

## 2023-05-26 PROCEDURE — 99233 SBSQ HOSP IP/OBS HIGH 50: CPT | Mod: ,,, | Performed by: EMERGENCY MEDICINE

## 2023-05-26 PROCEDURE — 63600175 PHARM REV CODE 636 W HCPCS: Performed by: STUDENT IN AN ORGANIZED HEALTH CARE EDUCATION/TRAINING PROGRAM

## 2023-05-26 PROCEDURE — 83735 ASSAY OF MAGNESIUM: CPT

## 2023-05-26 PROCEDURE — 27000221 HC OXYGEN, UP TO 24 HOURS

## 2023-05-26 PROCEDURE — 84100 ASSAY OF PHOSPHORUS: CPT

## 2023-05-26 RX ORDER — SERTRALINE HYDROCHLORIDE 50 MG/1
50 TABLET, FILM COATED ORAL DAILY
Status: DISCONTINUED | OUTPATIENT
Start: 2023-05-26 | End: 2023-06-08 | Stop reason: HOSPADM

## 2023-05-26 RX ORDER — CALCIUM GLUCONATE 20 MG/ML
1 INJECTION, SOLUTION INTRAVENOUS
Status: COMPLETED | OUTPATIENT
Start: 2023-05-26 | End: 2023-05-26

## 2023-05-26 RX ORDER — CLONAZEPAM 1 MG/1
1 TABLET ORAL 2 TIMES DAILY
Status: DISCONTINUED | OUTPATIENT
Start: 2023-05-26 | End: 2023-05-28

## 2023-05-26 RX ADMIN — DEXMEDETOMIDINE HYDROCHLORIDE 1 MCG/KG/HR: 400 INJECTION INTRAVENOUS at 12:05

## 2023-05-26 RX ADMIN — MIDODRINE HYDROCHLORIDE 15 MG: 5 TABLET ORAL at 01:05

## 2023-05-26 RX ADMIN — CALCIUM GLUCONATE 1 G: 20 INJECTION, SOLUTION INTRAVENOUS at 07:05

## 2023-05-26 RX ADMIN — SODIUM CHLORIDE 30 MG/ML INHALATION SOLUTION 4 ML: 30 SOLUTION INHALANT at 08:05

## 2023-05-26 RX ADMIN — MIDODRINE HYDROCHLORIDE 15 MG: 5 TABLET ORAL at 09:05

## 2023-05-26 RX ADMIN — SERTRALINE HYDROCHLORIDE 50 MG: 50 TABLET ORAL at 09:05

## 2023-05-26 RX ADMIN — GUAIFENESIN 400 MG: 200 SOLUTION ORAL at 05:05

## 2023-05-26 RX ADMIN — PANTOPRAZOLE SODIUM 40 MG: 40 INJECTION, POWDER, FOR SOLUTION INTRAVENOUS at 09:05

## 2023-05-26 RX ADMIN — DEXMEDETOMIDINE HYDROCHLORIDE 1 MCG/KG/HR: 400 INJECTION INTRAVENOUS at 07:05

## 2023-05-26 RX ADMIN — GUAIFENESIN 400 MG: 200 SOLUTION ORAL at 06:05

## 2023-05-26 RX ADMIN — MIDODRINE HYDROCHLORIDE 15 MG: 5 TABLET ORAL at 05:05

## 2023-05-26 RX ADMIN — DEXMEDETOMIDINE HYDROCHLORIDE 1 MCG/KG/HR: 400 INJECTION INTRAVENOUS at 02:05

## 2023-05-26 RX ADMIN — CALCIUM GLUCONATE 1 G: 20 INJECTION, SOLUTION INTRAVENOUS at 05:05

## 2023-05-26 RX ADMIN — CLONAZEPAM 1 MG: 1 TABLET ORAL at 09:05

## 2023-05-26 RX ADMIN — GUAIFENESIN 400 MG: 200 SOLUTION ORAL at 01:05

## 2023-05-26 RX ADMIN — LEVETIRACETAM 1000 MG: 100 INJECTION, SOLUTION INTRAVENOUS at 09:05

## 2023-05-26 RX ADMIN — CALCITRIOL CAPSULES 0.25 MCG 0.5 MCG: 0.25 CAPSULE ORAL at 09:05

## 2023-05-26 RX ADMIN — CALCIUM ACETATE 667 MG: 667 CAPSULE ORAL at 12:05

## 2023-05-26 RX ADMIN — HYDROCODONE BITARTRATE AND ACETAMINOPHEN 1 TABLET: 10; 325 TABLET ORAL at 04:05

## 2023-05-26 RX ADMIN — GUAIFENESIN 400 MG: 200 SOLUTION ORAL at 11:05

## 2023-05-26 RX ADMIN — CALCIUM ACETATE 667 MG: 667 CAPSULE ORAL at 08:05

## 2023-05-26 RX ADMIN — ALPRAZOLAM 1 MG: 0.5 TABLET ORAL at 02:05

## 2023-05-26 RX ADMIN — SODIUM CHLORIDE 30 MG/ML INHALATION SOLUTION 4 ML: 30 SOLUTION INHALANT at 12:05

## 2023-05-26 RX ADMIN — ALPRAZOLAM 1 MG: 0.5 TABLET ORAL at 04:05

## 2023-05-26 RX ADMIN — CALCIUM ACETATE 667 MG: 667 CAPSULE ORAL at 05:05

## 2023-05-26 RX ADMIN — ENOXAPARIN SODIUM 40 MG: 40 INJECTION SUBCUTANEOUS at 05:05

## 2023-05-26 RX ADMIN — GUAIFENESIN 400 MG: 200 SOLUTION ORAL at 09:05

## 2023-05-26 RX ADMIN — Medication 100 MCG/HR: at 07:05

## 2023-05-26 RX ADMIN — SODIUM CHLORIDE 30 MG/ML INHALATION SOLUTION 4 ML: 30 SOLUTION INHALANT at 02:05
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
## 2023-05-26 NOTE — NURSING
Nurses Note -- 4 Eyes      5/26/2023   12:51 AM      Skin assessed during: Q Shift Change      [] No Altered Skin Integrity Present    []Prevention Measures Documented      [x] Yes- Altered Skin Integrity Present or Discovered   [] LDA Added if Not in Epic (Describe Wound)   [] New Altered Skin Integrity was Present on Admit and Documented in LDA   [] Wound Image Taken    Wound Care Consulted? Yes    Attending Nurse:  Aileen Abbott RN     Second RN/Staff Member:  JANETT Lacey
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
## 2023-05-26 NOTE — PLAN OF CARE
Problem: Infection  Goal: Absence of Infection Signs and Symptoms  Outcome: Ongoing, Progressing     Problem: Adult Inpatient Plan of Care  Goal: Plan of Care Review  Outcome: Ongoing, Progressing  Goal: Absence of Hospital-Acquired Illness or Injury  Outcome: Ongoing, Progressing  Goal: Optimal Comfort and Wellbeing  Outcome: Ongoing, Progressing  Goal: Readiness for Transition of Care  Outcome: Ongoing, Progressing     Problem: Communication Impairment (Mechanical Ventilation, Invasive)  Goal: Effective Communication  Outcome: Ongoing, Progressing     Problem: Device-Related Complication Risk (Mechanical Ventilation, Invasive)  Goal: Optimal Device Function  Outcome: Ongoing, Progressing     Problem: Inability to Wean (Mechanical Ventilation, Invasive)  Goal: Mechanical Ventilation Liberation  Outcome: Ongoing, Progressing     Problem: Nutrition Impairment (Mechanical Ventilation, Invasive)  Goal: Optimal Nutrition Delivery  Outcome: Ongoing, Progressing     Problem: Skin and Tissue Injury (Mechanical Ventilation, Invasive)  Goal: Absence of Device-Related Skin and Tissue Injury  Outcome: Ongoing, Progressing     Problem: Ventilator-Induced Lung Injury (Mechanical Ventilation, Invasive)  Goal: Absence of Ventilator-Induced Lung Injury  Outcome: Ongoing, Progressing     Problem: Communication Impairment (Artificial Airway)  Goal: Effective Communication  Outcome: Ongoing, Progressing     Problem: Device-Related Complication Risk (Artificial Airway)  Goal: Optimal Device Function  Outcome: Ongoing, Progressing     Problem: Skin and Tissue Injury (Artificial Airway)  Goal: Absence of Device-Related Skin or Tissue Injury  Outcome: Ongoing, Progressing     Problem: Noninvasive Ventilation Acute  Goal: Effective Unassisted Ventilation and Oxygenation  Outcome: Ongoing, Progressing     Problem: Impaired Wound Healing  Goal: Optimal Wound Healing  Outcome: Ongoing, Progressing     Problem: Skin Injury Risk  Increased  Goal: Skin Health and Integrity  Outcome: Ongoing, Progressing     Problem: Coping Ineffective  Goal: Effective Coping  Outcome: Ongoing, Progressing     Problem: Fall Injury Risk  Goal: Absence of Fall and Fall-Related Injury  Outcome: Ongoing, Progressing

## 2023-05-26 NOTE — NURSING
Nurses Note -- 4 Eyes      5/26/2023   10:24 AM      Skin assessed during: Q Shift Change      [] No Altered Skin Integrity Present    []Prevention Measures Documented      [x] Yes- Altered Skin Integrity Present or Discovered   [] LDA Added if Not in Epic (Describe Wound)   [] New Altered Skin Integrity was Present on Admit and Documented in LDA   [] Wound Image Taken    Wound Care Consulted? Yes    Attending Nurse:  Becca Chatterjee RN     Second RN/Staff Member:  Arabella KEMP RN
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
## 2023-05-27 LAB
ALBUMIN SERPL-MCNC: 1.9 G/DL (ref 3.5–5)
ALBUMIN/GLOB SERPL: 0.4 RATIO (ref 1.1–2)
ALP SERPL-CCNC: 284 UNIT/L (ref 40–150)
ALT SERPL-CCNC: 23 UNIT/L (ref 0–55)
AST SERPL-CCNC: 38 UNIT/L (ref 5–34)
BASOPHILS # BLD AUTO: 0.08 X10(3)/MCL
BASOPHILS # BLD AUTO: 0.1 X10(3)/MCL
BASOPHILS NFR BLD AUTO: 0.4 %
BASOPHILS NFR BLD AUTO: 0.6 %
BILIRUBIN DIRECT+TOT PNL SERPL-MCNC: 0.5 MG/DL
BUN SERPL-MCNC: 22.1 MG/DL (ref 8.9–20.6)
CALCIUM SERPL-MCNC: 5.8 MG/DL (ref 8.4–10.2)
CHLORIDE SERPL-SCNC: 100 MMOL/L (ref 98–107)
CO2 SERPL-SCNC: 20 MMOL/L (ref 22–29)
CREAT SERPL-MCNC: 0.6 MG/DL (ref 0.73–1.18)
EOSINOPHIL # BLD AUTO: 0.04 X10(3)/MCL (ref 0–0.9)
EOSINOPHIL # BLD AUTO: 0.13 X10(3)/MCL (ref 0–0.9)
EOSINOPHIL NFR BLD AUTO: 0.2 %
EOSINOPHIL NFR BLD AUTO: 0.7 %
ERYTHROCYTE [DISTWIDTH] IN BLOOD BY AUTOMATED COUNT: 21.1 % (ref 11.5–17)
ERYTHROCYTE [DISTWIDTH] IN BLOOD BY AUTOMATED COUNT: 21.2 % (ref 11.5–17)
GFR SERPLBLD CREATININE-BSD FMLA CKD-EPI: >60 MLS/MIN/1.73/M2
GLOBULIN SER-MCNC: 4.4 GM/DL (ref 2.4–3.5)
GLUCOSE SERPL-MCNC: 113 MG/DL (ref 74–100)
HCT VFR BLD AUTO: 26.1 % (ref 42–52)
HCT VFR BLD AUTO: 27.3 % (ref 42–52)
HGB BLD-MCNC: 8.7 G/DL (ref 14–18)
HGB BLD-MCNC: 8.9 G/DL (ref 14–18)
IMM GRANULOCYTES # BLD AUTO: 0.08 X10(3)/MCL (ref 0–0.04)
IMM GRANULOCYTES # BLD AUTO: 0.1 X10(3)/MCL (ref 0–0.04)
IMM GRANULOCYTES NFR BLD AUTO: 0.5 %
IMM GRANULOCYTES NFR BLD AUTO: 0.6 %
LYMPHOCYTES # BLD AUTO: 1.96 X10(3)/MCL (ref 0.6–4.6)
LYMPHOCYTES # BLD AUTO: 2.03 X10(3)/MCL (ref 0.6–4.6)
LYMPHOCYTES NFR BLD AUTO: 11 %
LYMPHOCYTES NFR BLD AUTO: 11.6 %
MAGNESIUM SERPL-MCNC: 1.6 MG/DL (ref 1.6–2.6)
MCH RBC QN AUTO: 27.2 PG (ref 27–31)
MCH RBC QN AUTO: 27.7 PG (ref 27–31)
MCHC RBC AUTO-ENTMCNC: 32.6 G/DL (ref 33–36)
MCHC RBC AUTO-ENTMCNC: 33.3 G/DL (ref 33–36)
MCV RBC AUTO: 83.1 FL (ref 80–94)
MCV RBC AUTO: 83.5 FL (ref 80–94)
MONOCYTES # BLD AUTO: 1.37 X10(3)/MCL (ref 0.1–1.3)
MONOCYTES # BLD AUTO: 1.39 X10(3)/MCL (ref 0.1–1.3)
MONOCYTES NFR BLD AUTO: 7.8 %
MONOCYTES NFR BLD AUTO: 7.9 %
NEUTROPHILS # BLD AUTO: 13.83 X10(3)/MCL (ref 2.1–9.2)
NEUTROPHILS # BLD AUTO: 14.14 X10(3)/MCL (ref 2.1–9.2)
NEUTROPHILS NFR BLD AUTO: 79.2 %
NEUTROPHILS NFR BLD AUTO: 79.5 %
NRBC BLD AUTO-RTO: 0 %
NRBC BLD AUTO-RTO: 0 %
PHOSPHATE SERPL-MCNC: 4.5 MG/DL (ref 2.3–4.7)
PLATELET # BLD AUTO: 406 X10(3)/MCL (ref 130–400)
PLATELET # BLD AUTO: 413 X10(3)/MCL (ref 130–400)
PMV BLD AUTO: 8.1 FL (ref 7.4–10.4)
PMV BLD AUTO: 8.3 FL (ref 7.4–10.4)
POTASSIUM SERPL-SCNC: 4 MMOL/L (ref 3.5–5.1)
PROT SERPL-MCNC: 6.3 GM/DL (ref 6.4–8.3)
RBC # BLD AUTO: 3.14 X10(6)/MCL (ref 4.7–6.1)
RBC # BLD AUTO: 3.27 X10(6)/MCL (ref 4.7–6.1)
SODIUM SERPL-SCNC: 133 MMOL/L (ref 136–145)
WBC # SPEC AUTO: 17.45 X10(3)/MCL (ref 4.5–11.5)
WBC # SPEC AUTO: 17.8 X10(3)/MCL (ref 4.5–11.5)

## 2023-05-27 PROCEDURE — 87077 CULTURE AEROBIC IDENTIFY: CPT | Performed by: INTERNAL MEDICINE

## 2023-05-27 PROCEDURE — 83735 ASSAY OF MAGNESIUM: CPT

## 2023-05-27 PROCEDURE — 94640 AIRWAY INHALATION TREATMENT: CPT

## 2023-05-27 PROCEDURE — 25000003 PHARM REV CODE 250: Performed by: OTOLARYNGOLOGY

## 2023-05-27 PROCEDURE — 84100 ASSAY OF PHOSPHORUS: CPT

## 2023-05-27 PROCEDURE — 25000003 PHARM REV CODE 250: Performed by: INTERNAL MEDICINE

## 2023-05-27 PROCEDURE — 63600175 PHARM REV CODE 636 W HCPCS: Performed by: NURSE PRACTITIONER

## 2023-05-27 PROCEDURE — 99900026 HC AIRWAY MAINTENANCE (STAT)

## 2023-05-27 PROCEDURE — 27200966 HC CLOSED SUCTION SYSTEM

## 2023-05-27 PROCEDURE — 25000242 PHARM REV CODE 250 ALT 637 W/ HCPCS: Performed by: INTERNAL MEDICINE

## 2023-05-27 PROCEDURE — 63600175 PHARM REV CODE 636 W HCPCS: Performed by: STUDENT IN AN ORGANIZED HEALTH CARE EDUCATION/TRAINING PROGRAM

## 2023-05-27 PROCEDURE — 80053 COMPREHEN METABOLIC PANEL: CPT

## 2023-05-27 PROCEDURE — 25000003 PHARM REV CODE 250: Performed by: STUDENT IN AN ORGANIZED HEALTH CARE EDUCATION/TRAINING PROGRAM

## 2023-05-27 PROCEDURE — 25000003 PHARM REV CODE 250: Performed by: HOSPITALIST

## 2023-05-27 PROCEDURE — 94761 N-INVAS EAR/PLS OXIMETRY MLT: CPT

## 2023-05-27 PROCEDURE — 63600175 PHARM REV CODE 636 W HCPCS

## 2023-05-27 PROCEDURE — 20000000 HC ICU ROOM

## 2023-05-27 PROCEDURE — 25000003 PHARM REV CODE 250

## 2023-05-27 PROCEDURE — A4216 STERILE WATER/SALINE, 10 ML: HCPCS

## 2023-05-27 PROCEDURE — 27000221 HC OXYGEN, UP TO 24 HOURS

## 2023-05-27 PROCEDURE — C9113 INJ PANTOPRAZOLE SODIUM, VIA: HCPCS | Performed by: NURSE PRACTITIONER

## 2023-05-27 PROCEDURE — 99900035 HC TECH TIME PER 15 MIN (STAT)

## 2023-05-27 PROCEDURE — 87040 BLOOD CULTURE FOR BACTERIA: CPT | Performed by: INTERNAL MEDICINE

## 2023-05-27 PROCEDURE — 85025 COMPLETE CBC W/AUTO DIFF WBC: CPT | Performed by: STUDENT IN AN ORGANIZED HEALTH CARE EDUCATION/TRAINING PROGRAM

## 2023-05-27 RX ORDER — CALCIUM GLUCONATE 20 MG/ML
1 INJECTION, SOLUTION INTRAVENOUS
Status: COMPLETED | OUTPATIENT
Start: 2023-05-27 | End: 2023-05-27

## 2023-05-27 RX ORDER — CALCIUM GLUCONATE 20 MG/ML
1 INJECTION, SOLUTION INTRAVENOUS ONCE
Status: COMPLETED | OUTPATIENT
Start: 2023-05-27 | End: 2023-05-27

## 2023-05-27 RX ORDER — ACETAMINOPHEN 325 MG/1
650 TABLET ORAL EVERY 8 HOURS PRN
Status: DISCONTINUED | OUTPATIENT
Start: 2023-05-27 | End: 2023-05-28

## 2023-05-27 RX ADMIN — DEXMEDETOMIDINE HYDROCHLORIDE 1 MCG/KG/HR: 400 INJECTION INTRAVENOUS at 11:05

## 2023-05-27 RX ADMIN — MIDODRINE HYDROCHLORIDE 15 MG: 5 TABLET ORAL at 03:05

## 2023-05-27 RX ADMIN — MIDODRINE HYDROCHLORIDE 15 MG: 5 TABLET ORAL at 09:05

## 2023-05-27 RX ADMIN — CALCIUM GLUCONATE 1 G: 20 INJECTION, SOLUTION INTRAVENOUS at 04:05

## 2023-05-27 RX ADMIN — DEXMEDETOMIDINE HYDROCHLORIDE 1 MCG/KG/HR: 400 INJECTION INTRAVENOUS at 12:05

## 2023-05-27 RX ADMIN — DEXMEDETOMIDINE HYDROCHLORIDE 1 MCG/KG/HR: 400 INJECTION INTRAVENOUS at 04:05

## 2023-05-27 RX ADMIN — TRANEXAMIC ACID 500 MG: 100 INJECTION, SOLUTION INTRAVENOUS at 08:05

## 2023-05-27 RX ADMIN — PANTOPRAZOLE SODIUM 40 MG: 40 INJECTION, POWDER, FOR SOLUTION INTRAVENOUS at 08:05

## 2023-05-27 RX ADMIN — GUAIFENESIN 400 MG: 200 SOLUTION ORAL at 05:05

## 2023-05-27 RX ADMIN — TRANEXAMIC ACID 1000 MG: 100 INJECTION, SOLUTION INTRAVENOUS at 10:05

## 2023-05-27 RX ADMIN — GUAIFENESIN 400 MG: 200 SOLUTION ORAL at 01:05

## 2023-05-27 RX ADMIN — MIDODRINE HYDROCHLORIDE 15 MG: 5 TABLET ORAL at 05:05

## 2023-05-27 RX ADMIN — CALCIUM GLUCONATE 1 G: 20 INJECTION, SOLUTION INTRAVENOUS at 08:05

## 2023-05-27 RX ADMIN — SODIUM CHLORIDE 30 MG/ML INHALATION SOLUTION 4 ML: 30 SOLUTION INHALANT at 07:05

## 2023-05-27 RX ADMIN — CALCIUM ACETATE 667 MG: 667 CAPSULE ORAL at 04:05

## 2023-05-27 RX ADMIN — SODIUM CHLORIDE 30 MG/ML INHALATION SOLUTION 4 ML: 30 SOLUTION INHALANT at 01:05

## 2023-05-27 RX ADMIN — LEVETIRACETAM 1000 MG: 100 INJECTION, SOLUTION INTRAVENOUS at 09:05

## 2023-05-27 RX ADMIN — CALCIUM ACETATE 667 MG: 667 CAPSULE ORAL at 12:05

## 2023-05-27 RX ADMIN — SODIUM CHLORIDE 30 MG/ML INHALATION SOLUTION 4 ML: 30 SOLUTION INHALANT at 12:05

## 2023-05-27 RX ADMIN — CALCIUM GLUCONATE 1 G: 20 INJECTION, SOLUTION INTRAVENOUS at 03:05

## 2023-05-27 RX ADMIN — ACETAMINOPHEN 650 MG: 325 TABLET ORAL at 03:05

## 2023-05-27 RX ADMIN — ENOXAPARIN SODIUM 40 MG: 40 INJECTION SUBCUTANEOUS at 04:05

## 2023-05-27 RX ADMIN — SERTRALINE HYDROCHLORIDE 50 MG: 50 TABLET ORAL at 08:05

## 2023-05-27 RX ADMIN — CLONAZEPAM 1 MG: 1 TABLET ORAL at 09:05

## 2023-05-27 RX ADMIN — CALCITRIOL CAPSULES 0.25 MCG 0.5 MCG: 0.25 CAPSULE ORAL at 08:05

## 2023-05-27 RX ADMIN — CALCIUM ACETATE 667 MG: 667 CAPSULE ORAL at 08:05

## 2023-05-27 RX ADMIN — LEVETIRACETAM 1000 MG: 100 INJECTION, SOLUTION INTRAVENOUS at 08:05

## 2023-05-27 RX ADMIN — Medication 10 ML: at 05:05

## 2023-05-27 RX ADMIN — CLONAZEPAM 1 MG: 1 TABLET ORAL at 08:05

## 2023-05-27 RX ADMIN — ALPRAZOLAM 1 MG: 0.5 TABLET ORAL at 03:05
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
## 2023-05-27 NOTE — PLAN OF CARE
Plan of Care    Surgery re-consulted for diverting ostomy following wound care evaluation of patient. Given improvement in sacral wounds and patient's high risk for surgery given recent instability secondary to respiratory status and hypotension, patient was not deemed appropriate for surgery.     Patient is now afebrile. WBC is 13.5. He has been hypotensive to 80s/50s in the past day, however is not currently requiring pressor support.     Will reevaluate this weekend.     Mary Greene MD   [facility] Surgery, PGY-1

## 2023-05-28 LAB
ALBUMIN SERPL-MCNC: 1.8 G/DL (ref 3.5–5)
ALBUMIN/GLOB SERPL: 0.4 RATIO (ref 1.1–2)
ALLENS TEST BLOOD GAS (OHS): ABNORMAL
ALP SERPL-CCNC: 296 UNIT/L (ref 40–150)
ALT SERPL-CCNC: 27 UNIT/L (ref 0–55)
AST SERPL-CCNC: 38 UNIT/L (ref 5–34)
BASE EXCESS BLD CALC-SCNC: 0.6 MMOL/L (ref -2–2)
BASOPHILS # BLD AUTO: 0.07 X10(3)/MCL
BASOPHILS NFR BLD AUTO: 0.4 %
BILIRUBIN DIRECT+TOT PNL SERPL-MCNC: 0.3 MG/DL
BLOOD GAS SAMPLE TYPE (OHS): ABNORMAL
BUN SERPL-MCNC: 24.5 MG/DL (ref 8.9–20.6)
CA-I BLD-SCNC: 0.79 MMOL/L (ref 1.12–1.23)
CALCIUM SERPL-MCNC: 6.1 MG/DL (ref 8.4–10.2)
CHLORIDE SERPL-SCNC: 97 MMOL/L (ref 98–107)
CO2 BLDA-SCNC: 24.3 MMOL/L
CO2 SERPL-SCNC: 21 MMOL/L (ref 22–29)
COHGB MFR BLDA: 2.1 % (ref 0.5–1.5)
CREAT SERPL-MCNC: 0.63 MG/DL (ref 0.73–1.18)
DRAWN BY BLOOD GAS (OHS): ABNORMAL
EOSINOPHIL # BLD AUTO: 0.03 X10(3)/MCL (ref 0–0.9)
EOSINOPHIL NFR BLD AUTO: 0.2 %
ERYTHROCYTE [DISTWIDTH] IN BLOOD BY AUTOMATED COUNT: 21.3 % (ref 11.5–17)
FIO2 BLOOD GAS (OHS): 40 %
GFR SERPLBLD CREATININE-BSD FMLA CKD-EPI: >60 MLS/MIN/1.73/M2
GLOBULIN SER-MCNC: 5 GM/DL (ref 2.4–3.5)
GLUCOSE SERPL-MCNC: 139 MG/DL (ref 74–100)
HCO3 BLDA-SCNC: 23.4 MMOL/L (ref 22–26)
HCT VFR BLD AUTO: 26.4 % (ref 42–52)
HGB BLD-MCNC: 8.5 G/DL (ref 14–18)
IMM GRANULOCYTES # BLD AUTO: 0.11 X10(3)/MCL (ref 0–0.04)
IMM GRANULOCYTES NFR BLD AUTO: 0.6 %
INR BLD: 1.14 (ref 0–1.3)
LYMPHOCYTES # BLD AUTO: 1.65 X10(3)/MCL (ref 0.6–4.6)
LYMPHOCYTES NFR BLD AUTO: 9.3 %
MAGNESIUM SERPL-MCNC: 1.5 MG/DL (ref 1.6–2.6)
MCH RBC QN AUTO: 27 PG (ref 27–31)
MCHC RBC AUTO-ENTMCNC: 32.2 G/DL (ref 33–36)
MCV RBC AUTO: 83.8 FL (ref 80–94)
MECH RR (OHS): 18 B/MIN
MECH VT (OHS): 450 ML
METHGB MFR BLDA: 0.5 % (ref 0.4–1.5)
MODE (OHS): AC
MONOCYTES # BLD AUTO: 1.13 X10(3)/MCL (ref 0.1–1.3)
MONOCYTES NFR BLD AUTO: 6.4 %
NEUTROPHILS # BLD AUTO: 14.73 X10(3)/MCL (ref 2.1–9.2)
NEUTROPHILS NFR BLD AUTO: 83.1 %
NRBC BLD AUTO-RTO: 0 %
O2 HB BLOOD GAS (OHS): 93.8 % (ref 94–97)
OXYGEN DEVICE BLOOD GAS (OHS): ABNORMAL
OXYHGB MFR BLDA: 8.8 G/DL (ref 12–16)
PCO2 BLDA: 30 MMHG (ref 35–45)
PEEP (OHS): 8 CMH2O
PH BLDA: 7.5 [PH] (ref 7.35–7.45)
PHOSPHATE SERPL-MCNC: 4.4 MG/DL (ref 2.3–4.7)
PLATELET # BLD AUTO: 461 X10(3)/MCL (ref 130–400)
PMV BLD AUTO: 8.6 FL (ref 7.4–10.4)
PO2 BLDA: 65 MMHG (ref 80–100)
POTASSIUM BLOOD GAS (OHS): 4.1 MMOL/L (ref 3.5–5)
POTASSIUM SERPL-SCNC: 4 MMOL/L (ref 3.5–5.1)
PROT SERPL-MCNC: 6.8 GM/DL (ref 6.4–8.3)
PROTHROMBIN TIME: 14.5 SECONDS (ref 12.5–14.5)
RBC # BLD AUTO: 3.15 X10(6)/MCL (ref 4.7–6.1)
SAMPLE SITE BLOOD GAS (OHS): ABNORMAL
SAO2 % BLDA: 94.3 %
SODIUM BLOOD GAS (OHS): 124 MMOL/L (ref 137–145)
SODIUM SERPL-SCNC: 130 MMOL/L (ref 136–145)
WBC # SPEC AUTO: 17.72 X10(3)/MCL (ref 4.5–11.5)

## 2023-05-28 PROCEDURE — 25000003 PHARM REV CODE 250

## 2023-05-28 PROCEDURE — 63600175 PHARM REV CODE 636 W HCPCS

## 2023-05-28 PROCEDURE — 63600175 PHARM REV CODE 636 W HCPCS: Performed by: STUDENT IN AN ORGANIZED HEALTH CARE EDUCATION/TRAINING PROGRAM

## 2023-05-28 PROCEDURE — 94003 VENT MGMT INPAT SUBQ DAY: CPT

## 2023-05-28 PROCEDURE — 27000221 HC OXYGEN, UP TO 24 HOURS

## 2023-05-28 PROCEDURE — 25000003 PHARM REV CODE 250: Performed by: INTERNAL MEDICINE

## 2023-05-28 PROCEDURE — 25000003 PHARM REV CODE 250: Performed by: STUDENT IN AN ORGANIZED HEALTH CARE EDUCATION/TRAINING PROGRAM

## 2023-05-28 PROCEDURE — 80053 COMPREHEN METABOLIC PANEL: CPT

## 2023-05-28 PROCEDURE — 63600175 PHARM REV CODE 636 W HCPCS: Performed by: NURSE PRACTITIONER

## 2023-05-28 PROCEDURE — 85025 COMPLETE CBC W/AUTO DIFF WBC: CPT | Performed by: STUDENT IN AN ORGANIZED HEALTH CARE EDUCATION/TRAINING PROGRAM

## 2023-05-28 PROCEDURE — 94640 AIRWAY INHALATION TREATMENT: CPT

## 2023-05-28 PROCEDURE — 99900035 HC TECH TIME PER 15 MIN (STAT)

## 2023-05-28 PROCEDURE — 99900026 HC AIRWAY MAINTENANCE (STAT)

## 2023-05-28 PROCEDURE — 84100 ASSAY OF PHOSPHORUS: CPT

## 2023-05-28 PROCEDURE — A4216 STERILE WATER/SALINE, 10 ML: HCPCS

## 2023-05-28 PROCEDURE — C9113 INJ PANTOPRAZOLE SODIUM, VIA: HCPCS | Performed by: NURSE PRACTITIONER

## 2023-05-28 PROCEDURE — 25000003 PHARM REV CODE 250: Performed by: OTOLARYNGOLOGY

## 2023-05-28 PROCEDURE — 63600175 PHARM REV CODE 636 W HCPCS: Performed by: INTERNAL MEDICINE

## 2023-05-28 PROCEDURE — 20000000 HC ICU ROOM

## 2023-05-28 PROCEDURE — 99900022

## 2023-05-28 PROCEDURE — 25000242 PHARM REV CODE 250 ALT 637 W/ HCPCS: Performed by: INTERNAL MEDICINE

## 2023-05-28 PROCEDURE — 85610 PROTHROMBIN TIME: CPT | Performed by: PHYSICIAN ASSISTANT

## 2023-05-28 PROCEDURE — 94761 N-INVAS EAR/PLS OXIMETRY MLT: CPT

## 2023-05-28 PROCEDURE — 82803 BLOOD GASES ANY COMBINATION: CPT

## 2023-05-28 PROCEDURE — 37799 UNLISTED PX VASCULAR SURGERY: CPT

## 2023-05-28 PROCEDURE — 83735 ASSAY OF MAGNESIUM: CPT

## 2023-05-28 RX ORDER — FUROSEMIDE 10 MG/ML
20 INJECTION INTRAMUSCULAR; INTRAVENOUS ONCE
Status: COMPLETED | OUTPATIENT
Start: 2023-05-28 | End: 2023-05-28

## 2023-05-28 RX ORDER — ACETAMINOPHEN 325 MG/1
650 TABLET ORAL EVERY 6 HOURS PRN
Status: DISCONTINUED | OUTPATIENT
Start: 2023-05-28 | End: 2023-06-08 | Stop reason: HOSPADM

## 2023-05-28 RX ORDER — CALCIUM GLUCONATE 20 MG/ML
1 INJECTION, SOLUTION INTRAVENOUS ONCE
Status: COMPLETED | OUTPATIENT
Start: 2023-05-28 | End: 2023-05-28

## 2023-05-28 RX ORDER — CLONAZEPAM 1 MG/1
1 TABLET ORAL 3 TIMES DAILY
Status: DISCONTINUED | OUTPATIENT
Start: 2023-05-28 | End: 2023-05-29

## 2023-05-28 RX ORDER — FENTANYL CITRATE 50 UG/ML
50 INJECTION, SOLUTION INTRAMUSCULAR; INTRAVENOUS
Status: DISPENSED | OUTPATIENT
Start: 2023-05-28 | End: 2023-05-28

## 2023-05-28 RX ORDER — CALCIUM GLUCONATE 20 MG/ML
INJECTION, SOLUTION INTRAVENOUS
Status: COMPLETED
Start: 2023-05-28 | End: 2023-05-28

## 2023-05-28 RX ORDER — MAGNESIUM SULFATE 1 G/100ML
1 INJECTION INTRAVENOUS ONCE
Status: COMPLETED | OUTPATIENT
Start: 2023-05-28 | End: 2023-05-28

## 2023-05-28 RX ORDER — CALCIUM GLUCONATE 20 MG/ML
1 INJECTION, SOLUTION INTRAVENOUS
Status: DISPENSED | OUTPATIENT
Start: 2023-05-28 | End: 2023-05-28

## 2023-05-28 RX ORDER — FENTANYL CITRATE 50 UG/ML
50 INJECTION, SOLUTION INTRAMUSCULAR; INTRAVENOUS
Status: DISCONTINUED | OUTPATIENT
Start: 2023-05-28 | End: 2023-06-08 | Stop reason: HOSPADM

## 2023-05-28 RX ADMIN — CALCITRIOL CAPSULES 0.25 MCG 0.5 MCG: 0.25 CAPSULE ORAL at 08:05

## 2023-05-28 RX ADMIN — ALPRAZOLAM 1 MG: 0.5 TABLET ORAL at 08:05

## 2023-05-28 RX ADMIN — LEVETIRACETAM 1000 MG: 100 INJECTION, SOLUTION INTRAVENOUS at 08:05

## 2023-05-28 RX ADMIN — ACETAMINOPHEN 650 MG: 325 TABLET ORAL at 10:05

## 2023-05-28 RX ADMIN — CLONAZEPAM 1 MG: 1 TABLET ORAL at 08:05

## 2023-05-28 RX ADMIN — DEXMEDETOMIDINE HYDROCHLORIDE 1.4 MCG/KG/HR: 400 INJECTION INTRAVENOUS at 06:05

## 2023-05-28 RX ADMIN — SODIUM CHLORIDE 30 MG/ML INHALATION SOLUTION 4 ML: 30 SOLUTION INHALANT at 09:05

## 2023-05-28 RX ADMIN — MAGNESIUM SULFATE IN DEXTROSE 1 G: 10 INJECTION, SOLUTION INTRAVENOUS at 03:05

## 2023-05-28 RX ADMIN — FUROSEMIDE 20 MG: 10 INJECTION, SOLUTION INTRAMUSCULAR; INTRAVENOUS at 08:05

## 2023-05-28 RX ADMIN — ALPRAZOLAM 1 MG: 0.5 TABLET ORAL at 03:05

## 2023-05-28 RX ADMIN — CALCIUM GLUCONATE 1 G: 20 INJECTION, SOLUTION INTRAVENOUS at 08:05

## 2023-05-28 RX ADMIN — CALCIUM GLUCONATE 1 G: 20 INJECTION, SOLUTION INTRAVENOUS at 03:05

## 2023-05-28 RX ADMIN — FENTANYL CITRATE 200 MCG: 50 INJECTION, SOLUTION INTRAMUSCULAR; INTRAVENOUS at 04:05

## 2023-05-28 RX ADMIN — DEXMEDETOMIDINE HYDROCHLORIDE 1.4 MCG/KG/HR: 400 INJECTION INTRAVENOUS at 10:05

## 2023-05-28 RX ADMIN — MIDODRINE HYDROCHLORIDE 15 MG: 5 TABLET ORAL at 01:05

## 2023-05-28 RX ADMIN — SODIUM CHLORIDE 30 MG/ML INHALATION SOLUTION 4 ML: 30 SOLUTION INHALANT at 08:05

## 2023-05-28 RX ADMIN — CALCIUM ACETATE 667 MG: 667 CAPSULE ORAL at 06:05

## 2023-05-28 RX ADMIN — CALCIUM GLUCONATE 1 G: 20 INJECTION, SOLUTION INTRAVENOUS at 04:05

## 2023-05-28 RX ADMIN — CALCIUM ACETATE 667 MG: 667 CAPSULE ORAL at 01:05

## 2023-05-28 RX ADMIN — MIDODRINE HYDROCHLORIDE 15 MG: 5 TABLET ORAL at 09:05

## 2023-05-28 RX ADMIN — PIPERACILLIN AND TAZOBACTAM 4.5 G: 4; .5 INJECTION, POWDER, LYOPHILIZED, FOR SOLUTION INTRAVENOUS; PARENTERAL at 06:05

## 2023-05-28 RX ADMIN — DEXMEDETOMIDINE HYDROCHLORIDE 1.4 MCG/KG/HR: 400 INJECTION INTRAVENOUS at 02:05

## 2023-05-28 RX ADMIN — ACETAMINOPHEN 650 MG: 325 TABLET ORAL at 04:05

## 2023-05-28 RX ADMIN — ACETAMINOPHEN 650 MG: 325 TABLET ORAL at 06:05

## 2023-05-28 RX ADMIN — PIPERACILLIN AND TAZOBACTAM 4.5 G: 4; .5 INJECTION, POWDER, LYOPHILIZED, FOR SOLUTION INTRAVENOUS; PARENTERAL at 10:05

## 2023-05-28 RX ADMIN — TRANEXAMIC ACID 1000 MG: 100 INJECTION, SOLUTION INTRAVENOUS at 02:05

## 2023-05-28 RX ADMIN — DEXMEDETOMIDINE HYDROCHLORIDE 1 MCG/KG/HR: 400 INJECTION INTRAVENOUS at 05:05

## 2023-05-28 RX ADMIN — Medication 10 ML: at 02:05

## 2023-05-28 RX ADMIN — CALCIUM ACETATE 667 MG: 667 CAPSULE ORAL at 08:05

## 2023-05-28 RX ADMIN — MIDODRINE HYDROCHLORIDE 15 MG: 5 TABLET ORAL at 05:05

## 2023-05-28 RX ADMIN — PROPOFOL 5 MCG/KG/MIN: 10 INJECTION, EMULSION INTRAVENOUS at 08:05

## 2023-05-28 RX ADMIN — PANTOPRAZOLE SODIUM 40 MG: 40 INJECTION, POWDER, FOR SOLUTION INTRAVENOUS at 08:05

## 2023-05-28 RX ADMIN — SODIUM CHLORIDE 30 MG/ML INHALATION SOLUTION 4 ML: 30 SOLUTION INHALANT at 03:05

## 2023-05-28 RX ADMIN — FENTANYL CITRATE 50 MCG: 50 INJECTION, SOLUTION INTRAMUSCULAR; INTRAVENOUS at 06:05

## 2023-05-28 RX ADMIN — SERTRALINE HYDROCHLORIDE 50 MG: 50 TABLET ORAL at 08:05

## 2023-05-28 RX ADMIN — CLONAZEPAM 1 MG: 1 TABLET ORAL at 09:05
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
## 2023-05-28 NOTE — PLAN OF CARE
Problem: Infection  Goal: Absence of Infection Signs and Symptoms  Outcome: Ongoing, Progressing     Problem: Adult Inpatient Plan of Care  Goal: Plan of Care Review  Outcome: Ongoing, Progressing  Goal: Patient-Specific Goal (Individualized)  Outcome: Ongoing, Progressing  Goal: Absence of Hospital-Acquired Illness or Injury  Outcome: Ongoing, Progressing  Goal: Optimal Comfort and Wellbeing  Outcome: Ongoing, Progressing  Goal: Readiness for Transition of Care  Outcome: Ongoing, Progressing     Problem: Communication Impairment (Mechanical Ventilation, Invasive)  Goal: Effective Communication  Outcome: Ongoing, Progressing     Problem: Device-Related Complication Risk (Mechanical Ventilation, Invasive)  Goal: Optimal Device Function  Outcome: Ongoing, Progressing     Problem: Inability to Wean (Mechanical Ventilation, Invasive)  Goal: Mechanical Ventilation Liberation  Outcome: Ongoing, Progressing     Problem: Nutrition Impairment (Mechanical Ventilation, Invasive)  Goal: Optimal Nutrition Delivery  Outcome: Ongoing, Progressing     Problem: Skin and Tissue Injury (Mechanical Ventilation, Invasive)  Goal: Absence of Device-Related Skin and Tissue Injury  Outcome: Ongoing, Progressing     Problem: Ventilator-Induced Lung Injury (Mechanical Ventilation, Invasive)  Goal: Absence of Ventilator-Induced Lung Injury  Outcome: Ongoing, Progressing     Problem: Communication Impairment (Artificial Airway)  Goal: Effective Communication  Outcome: Ongoing, Progressing     Problem: Device-Related Complication Risk (Artificial Airway)  Goal: Optimal Device Function  Outcome: Ongoing, Progressing     Problem: Skin and Tissue Injury (Artificial Airway)  Goal: Absence of Device-Related Skin or Tissue Injury  Outcome: Ongoing, Progressing     Problem: Noninvasive Ventilation Acute  Goal: Effective Unassisted Ventilation and Oxygenation  Outcome: Ongoing, Progressing     Problem: Impaired Wound Healing  Goal: Optimal Wound  Healing  Outcome: Ongoing, Progressing     Problem: Skin Injury Risk Increased  Goal: Skin Health and Integrity  Outcome: Ongoing, Progressing     Problem: Adjustment to Illness (Delirium)  Goal: Optimal Coping  Outcome: Ongoing, Progressing     Problem: Altered Behavior (Delirium)  Goal: Improved Behavioral Control  Outcome: Ongoing, Progressing     Problem: Sleep Disturbance (Delirium)  Goal: Improved Sleep  Outcome: Ongoing, Progressing     Problem: Fall Injury Risk  Goal: Absence of Fall and Fall-Related Injury  Outcome: Ongoing, Progressing

## 2023-05-28 NOTE — NURSING
Nurses Note -- 4 Eyes      5/28/2023   7:48 AM      Skin assessed during: Q Shift Change      [] No Altered Skin Integrity Present    []Prevention Measures Documented      [x] Yes- Altered Skin Integrity Present or Discovered   [] LDA Added if Not in Epic (Describe Wound)   [] New Altered Skin Integrity was Present on Admit and Documented in LDA   [] Wound Image Taken    Wound Care Consulted? Yes    Attending Nurse:  Becca Chatterjee RN     Second RN/Staff Member:  Hernando Recinos RN
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
## 2023-05-28 NOTE — NURSING
Nurses Note -- 4 Eyes      5/27/2023   7:17 PM      Skin assessed during: Q Shift Change      [] No Altered Skin Integrity Present    []Prevention Measures Documented      [x] Yes- Altered Skin Integrity Present or Discovered   [] LDA Added if Not in Epic (Describe Wound)   [] New Altered Skin Integrity was Present on Admit and Documented in LDA   [] Wound Image Taken    Wound Care Consulted? Yes    Attending Nurse:  Becca Chatterjee RN     Second RN/Staff Member:  Hernando FOWLER RN

## 2023-05-29 LAB
ALBUMIN SERPL-MCNC: 1.8 G/DL (ref 3.5–5)
ALBUMIN/GLOB SERPL: 0.4 RATIO (ref 1.1–2)
ALLENS TEST BLOOD GAS (OHS): YES
ALP SERPL-CCNC: 363 UNIT/L (ref 40–150)
ALT SERPL-CCNC: 53 UNIT/L (ref 0–55)
AST SERPL-CCNC: 55 UNIT/L (ref 5–34)
BACTERIA SPT CULT: ABNORMAL
BASE EXCESS BLD CALC-SCNC: 1.7 MMOL/L (ref -2–2)
BASOPHILS # BLD AUTO: 0.1 X10(3)/MCL
BASOPHILS NFR BLD AUTO: 0.5 %
BILIRUBIN DIRECT+TOT PNL SERPL-MCNC: 0.4 MG/DL
BLOOD GAS SAMPLE TYPE (OHS): ABNORMAL
BUN SERPL-MCNC: 31.9 MG/DL (ref 8.9–20.6)
CA-I BLD-SCNC: 0.88 MMOL/L (ref 1.12–1.23)
CALCIUM SERPL-MCNC: 6.1 MG/DL (ref 8.4–10.2)
CHLORIDE SERPL-SCNC: 97 MMOL/L (ref 98–107)
CO2 BLDA-SCNC: 29.8 MMOL/L
CO2 SERPL-SCNC: 22 MMOL/L (ref 22–29)
CORTIS SERPL-SCNC: 19.9 UG/DL
CREAT SERPL-MCNC: 0.65 MG/DL (ref 0.73–1.18)
DRAWN BY BLOOD GAS (OHS): ABNORMAL
EOSINOPHIL # BLD AUTO: 0.03 X10(3)/MCL (ref 0–0.9)
EOSINOPHIL NFR BLD AUTO: 0.2 %
ERYTHROCYTE [DISTWIDTH] IN BLOOD BY AUTOMATED COUNT: 21.2 % (ref 11.5–17)
FIO2 BLOOD GAS (OHS): 40 %
GFR SERPLBLD CREATININE-BSD FMLA CKD-EPI: >60 MLS/MIN/1.73/M2
GLOBULIN SER-MCNC: 4.4 GM/DL (ref 2.4–3.5)
GLUCOSE SERPL-MCNC: 122 MG/DL (ref 74–100)
GRAM STN SPEC: ABNORMAL
HCO3 BLDA-SCNC: 28.2 MMOL/L (ref 22–26)
HCT VFR BLD AUTO: 26.6 % (ref 42–52)
HGB BLD-MCNC: 8.5 G/DL (ref 14–18)
IMM GRANULOCYTES # BLD AUTO: 0.08 X10(3)/MCL (ref 0–0.04)
IMM GRANULOCYTES NFR BLD AUTO: 0.4 %
LYMPHOCYTES # BLD AUTO: 1.82 X10(3)/MCL (ref 0.6–4.6)
LYMPHOCYTES NFR BLD AUTO: 9.6 %
MAGNESIUM SERPL-MCNC: 1.6 MG/DL (ref 1.6–2.6)
MCH RBC QN AUTO: 27.2 PG (ref 27–31)
MCHC RBC AUTO-ENTMCNC: 32 G/DL (ref 33–36)
MCV RBC AUTO: 85.3 FL (ref 80–94)
MECH RR (OHS): 18 B/MIN
MECH VT (OHS): 450 ML
MODE (OHS): AC
MONOCYTES # BLD AUTO: 0.89 X10(3)/MCL (ref 0.1–1.3)
MONOCYTES NFR BLD AUTO: 4.7 %
NEUTROPHILS # BLD AUTO: 15.97 X10(3)/MCL (ref 2.1–9.2)
NEUTROPHILS NFR BLD AUTO: 84.6 %
NRBC BLD AUTO-RTO: 0 %
OXYGEN DEVICE BLOOD GAS (OHS): ABNORMAL
PCO2 BLDA: 51 MMHG (ref 35–45)
PEEP (OHS): 8 CMH2O
PH BLDA: 7.35 [PH] (ref 7.35–7.45)
PHOSPHATE SERPL-MCNC: 4.9 MG/DL (ref 2.3–4.7)
PLATELET # BLD AUTO: 580 X10(3)/MCL (ref 130–400)
PMV BLD AUTO: 8.9 FL (ref 7.4–10.4)
PO2 BLDA: 78 MMHG (ref 80–100)
POCT GLUCOSE: 133 MG/DL (ref 70–110)
POTASSIUM BLOOD GAS (OHS): 3 MMOL/L (ref 3.5–5)
POTASSIUM SERPL-SCNC: 3.7 MMOL/L (ref 3.5–5.1)
PROT SERPL-MCNC: 6.2 GM/DL (ref 6.4–8.3)
RBC # BLD AUTO: 3.12 X10(6)/MCL (ref 4.7–6.1)
SAMPLE SITE BLOOD GAS (OHS): ABNORMAL
SAO2 % BLDA: 94.7 %
SODIUM BLOOD GAS (OHS): 130 MMOL/L (ref 137–145)
SODIUM SERPL-SCNC: 130 MMOL/L (ref 136–145)
T3FREE SERPL-MCNC: 1.16 PG/ML (ref 1.57–3.91)
T4 FREE SERPL-MCNC: 0.78 NG/DL (ref 0.7–1.48)
TSH SERPL-ACNC: 17.11 UIU/ML (ref 0.35–4.94)
WBC # SPEC AUTO: 18.89 X10(3)/MCL (ref 4.5–11.5)

## 2023-05-29 PROCEDURE — 63600175 PHARM REV CODE 636 W HCPCS: Performed by: INTERNAL MEDICINE

## 2023-05-29 PROCEDURE — 63600175 PHARM REV CODE 636 W HCPCS

## 2023-05-29 PROCEDURE — 99900026 HC AIRWAY MAINTENANCE (STAT)

## 2023-05-29 PROCEDURE — 99900035 HC TECH TIME PER 15 MIN (STAT)

## 2023-05-29 PROCEDURE — 82803 BLOOD GASES ANY COMBINATION: CPT

## 2023-05-29 PROCEDURE — 99900022

## 2023-05-29 PROCEDURE — 94003 VENT MGMT INPAT SUBQ DAY: CPT

## 2023-05-29 PROCEDURE — 27200966 HC CLOSED SUCTION SYSTEM

## 2023-05-29 PROCEDURE — 25000003 PHARM REV CODE 250: Performed by: STUDENT IN AN ORGANIZED HEALTH CARE EDUCATION/TRAINING PROGRAM

## 2023-05-29 PROCEDURE — 63600175 PHARM REV CODE 636 W HCPCS: Performed by: NURSE PRACTITIONER

## 2023-05-29 PROCEDURE — 84443 ASSAY THYROID STIM HORMONE: CPT | Performed by: STUDENT IN AN ORGANIZED HEALTH CARE EDUCATION/TRAINING PROGRAM

## 2023-05-29 PROCEDURE — 99900031 HC PATIENT EDUCATION (STAT)

## 2023-05-29 PROCEDURE — 85025 COMPLETE CBC W/AUTO DIFF WBC: CPT | Performed by: STUDENT IN AN ORGANIZED HEALTH CARE EDUCATION/TRAINING PROGRAM

## 2023-05-29 PROCEDURE — 94640 AIRWAY INHALATION TREATMENT: CPT

## 2023-05-29 PROCEDURE — 94761 N-INVAS EAR/PLS OXIMETRY MLT: CPT

## 2023-05-29 PROCEDURE — 63600175 PHARM REV CODE 636 W HCPCS: Performed by: STUDENT IN AN ORGANIZED HEALTH CARE EDUCATION/TRAINING PROGRAM

## 2023-05-29 PROCEDURE — C9113 INJ PANTOPRAZOLE SODIUM, VIA: HCPCS | Performed by: NURSE PRACTITIONER

## 2023-05-29 PROCEDURE — 83735 ASSAY OF MAGNESIUM: CPT

## 2023-05-29 PROCEDURE — 25000003 PHARM REV CODE 250: Performed by: INTERNAL MEDICINE

## 2023-05-29 PROCEDURE — 82533 TOTAL CORTISOL: CPT | Performed by: STUDENT IN AN ORGANIZED HEALTH CARE EDUCATION/TRAINING PROGRAM

## 2023-05-29 PROCEDURE — 80053 COMPREHEN METABOLIC PANEL: CPT

## 2023-05-29 PROCEDURE — 27000221 HC OXYGEN, UP TO 24 HOURS

## 2023-05-29 PROCEDURE — 37799 UNLISTED PX VASCULAR SURGERY: CPT

## 2023-05-29 PROCEDURE — 20000000 HC ICU ROOM

## 2023-05-29 PROCEDURE — 84100 ASSAY OF PHOSPHORUS: CPT

## 2023-05-29 PROCEDURE — 84439 ASSAY OF FREE THYROXINE: CPT | Performed by: STUDENT IN AN ORGANIZED HEALTH CARE EDUCATION/TRAINING PROGRAM

## 2023-05-29 PROCEDURE — 25000242 PHARM REV CODE 250 ALT 637 W/ HCPCS: Performed by: INTERNAL MEDICINE

## 2023-05-29 PROCEDURE — 84481 FREE ASSAY (FT-3): CPT | Performed by: STUDENT IN AN ORGANIZED HEALTH CARE EDUCATION/TRAINING PROGRAM

## 2023-05-29 RX ORDER — ONDANSETRON 2 MG/ML
4 INJECTION INTRAMUSCULAR; INTRAVENOUS ONCE
Status: COMPLETED | OUTPATIENT
Start: 2023-05-29 | End: 2023-05-29

## 2023-05-29 RX ORDER — CALCIUM GLUCONATE 20 MG/ML
1 INJECTION, SOLUTION INTRAVENOUS ONCE
Status: COMPLETED | OUTPATIENT
Start: 2023-05-29 | End: 2023-05-29

## 2023-05-29 RX ORDER — ONDANSETRON HYDROCHLORIDE 4 MG/5ML
4 SOLUTION ORAL EVERY 6 HOURS PRN
Status: DISCONTINUED | OUTPATIENT
Start: 2023-05-29 | End: 2023-06-08 | Stop reason: HOSPADM

## 2023-05-29 RX ORDER — CLONAZEPAM 1 MG/1
1 TABLET ORAL 2 TIMES DAILY
Status: DISCONTINUED | OUTPATIENT
Start: 2023-05-29 | End: 2023-06-08 | Stop reason: HOSPADM

## 2023-05-29 RX ORDER — CALCIUM GLUCONATE 20 MG/ML
INJECTION, SOLUTION INTRAVENOUS
Status: DISPENSED
Start: 2023-05-29 | End: 2023-05-29

## 2023-05-29 RX ORDER — ONDANSETRON 2 MG/ML
INJECTION INTRAMUSCULAR; INTRAVENOUS
Status: DISPENSED
Start: 2023-05-29 | End: 2023-05-29

## 2023-05-29 RX ADMIN — SERTRALINE HYDROCHLORIDE 50 MG: 50 TABLET ORAL at 08:05

## 2023-05-29 RX ADMIN — CALCITRIOL CAPSULES 0.25 MCG 0.5 MCG: 0.25 CAPSULE ORAL at 08:05

## 2023-05-29 RX ADMIN — CALCIUM ACETATE 667 MG: 667 CAPSULE ORAL at 12:05

## 2023-05-29 RX ADMIN — CALCIUM ACETATE 667 MG: 667 CAPSULE ORAL at 06:05

## 2023-05-29 RX ADMIN — CLONAZEPAM 1 MG: 1 TABLET ORAL at 08:05

## 2023-05-29 RX ADMIN — ONDANSETRON 4 MG: 2 INJECTION INTRAMUSCULAR; INTRAVENOUS at 12:05

## 2023-05-29 RX ADMIN — ENOXAPARIN SODIUM 40 MG: 40 INJECTION SUBCUTANEOUS at 06:05

## 2023-05-29 RX ADMIN — MIDODRINE HYDROCHLORIDE 15 MG: 5 TABLET ORAL at 05:05

## 2023-05-29 RX ADMIN — PANTOPRAZOLE SODIUM 40 MG: 40 INJECTION, POWDER, FOR SOLUTION INTRAVENOUS at 08:05

## 2023-05-29 RX ADMIN — CALCIUM GLUCONATE 1 G: 20 INJECTION, SOLUTION INTRAVENOUS at 04:05

## 2023-05-29 RX ADMIN — SODIUM CHLORIDE 30 MG/ML INHALATION SOLUTION 4 ML: 30 SOLUTION INHALANT at 02:05

## 2023-05-29 RX ADMIN — LEVETIRACETAM 1000 MG: 100 INJECTION, SOLUTION INTRAVENOUS at 08:05

## 2023-05-29 RX ADMIN — PIPERACILLIN AND TAZOBACTAM 4.5 G: 4; .5 INJECTION, POWDER, LYOPHILIZED, FOR SOLUTION INTRAVENOUS; PARENTERAL at 02:05

## 2023-05-29 RX ADMIN — CALCIUM ACETATE 667 MG: 667 CAPSULE ORAL at 08:05

## 2023-05-29 NOTE — NURSING
Nurses Note -- 4 Eyes      5/29/2023   6:23 AM      Skin assessed during: Q Shift Change      [] No Altered Skin Integrity Present    []Prevention Measures Documented      [x] Yes- Altered Skin Integrity Present or Discovered   [] LDA Added if Not in Epic (Describe Wound)   [] New Altered Skin Integrity was Present on Admit and Documented in LDA   [] Wound Image Taken    Wound Care Consulted? Yes    Attending Nurse:  Marcelo Ng RN     Second RN/Staff Member:  Stephy Collins RN
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
## 2023-05-30 LAB
ALBUMIN SERPL-MCNC: 1.9 G/DL (ref 3.5–5)
ALBUMIN/GLOB SERPL: 0.4 RATIO (ref 1.1–2)
ALP SERPL-CCNC: 261 UNIT/L (ref 40–150)
ALT SERPL-CCNC: 35 UNIT/L (ref 0–55)
AST SERPL-CCNC: 21 UNIT/L (ref 5–34)
BASOPHILS # BLD AUTO: 0.06 X10(3)/MCL
BASOPHILS NFR BLD AUTO: 0.5 %
BILIRUBIN DIRECT+TOT PNL SERPL-MCNC: 0.3 MG/DL
BUN SERPL-MCNC: 24.8 MG/DL (ref 8.9–20.6)
CALCIUM SERPL-MCNC: 6.4 MG/DL (ref 8.4–10.2)
CHLORIDE SERPL-SCNC: 100 MMOL/L (ref 98–107)
CO2 SERPL-SCNC: 21 MMOL/L (ref 22–29)
CREAT SERPL-MCNC: 0.64 MG/DL (ref 0.73–1.18)
EOSINOPHIL # BLD AUTO: 0.17 X10(3)/MCL (ref 0–0.9)
EOSINOPHIL NFR BLD AUTO: 1.4 %
ERYTHROCYTE [DISTWIDTH] IN BLOOD BY AUTOMATED COUNT: 20.6 % (ref 11.5–17)
GFR SERPLBLD CREATININE-BSD FMLA CKD-EPI: >60 MLS/MIN/1.73/M2
GLOBULIN SER-MCNC: 4.6 GM/DL (ref 2.4–3.5)
GLUCOSE SERPL-MCNC: 77 MG/DL (ref 74–100)
HCT VFR BLD AUTO: 27.1 % (ref 42–52)
HGB BLD-MCNC: 8.7 G/DL (ref 14–18)
IMM GRANULOCYTES # BLD AUTO: 0.05 X10(3)/MCL (ref 0–0.04)
IMM GRANULOCYTES NFR BLD AUTO: 0.4 %
LYMPHOCYTES # BLD AUTO: 1.69 X10(3)/MCL (ref 0.6–4.6)
LYMPHOCYTES NFR BLD AUTO: 14.3 %
MAGNESIUM SERPL-MCNC: 1.6 MG/DL (ref 1.6–2.6)
MCH RBC QN AUTO: 27.4 PG (ref 27–31)
MCHC RBC AUTO-ENTMCNC: 32.1 G/DL (ref 33–36)
MCV RBC AUTO: 85.5 FL (ref 80–94)
MONOCYTES # BLD AUTO: 0.55 X10(3)/MCL (ref 0.1–1.3)
MONOCYTES NFR BLD AUTO: 4.7 %
NEUTROPHILS # BLD AUTO: 9.28 X10(3)/MCL (ref 2.1–9.2)
NEUTROPHILS NFR BLD AUTO: 78.7 %
NRBC BLD AUTO-RTO: 0 %
PHOSPHATE SERPL-MCNC: 5.8 MG/DL (ref 2.3–4.7)
PLATELET # BLD AUTO: 593 X10(3)/MCL (ref 130–400)
PMV BLD AUTO: 8.8 FL (ref 7.4–10.4)
POTASSIUM SERPL-SCNC: 3.6 MMOL/L (ref 3.5–5.1)
PROT SERPL-MCNC: 6.5 GM/DL (ref 6.4–8.3)
RBC # BLD AUTO: 3.17 X10(6)/MCL (ref 4.7–6.1)
SODIUM SERPL-SCNC: 137 MMOL/L (ref 136–145)
WBC # SPEC AUTO: 11.8 X10(3)/MCL (ref 4.5–11.5)

## 2023-05-30 PROCEDURE — 85025 COMPLETE CBC W/AUTO DIFF WBC: CPT | Performed by: STUDENT IN AN ORGANIZED HEALTH CARE EDUCATION/TRAINING PROGRAM

## 2023-05-30 PROCEDURE — C9113 INJ PANTOPRAZOLE SODIUM, VIA: HCPCS | Performed by: NURSE PRACTITIONER

## 2023-05-30 PROCEDURE — 99900026 HC AIRWAY MAINTENANCE (STAT)

## 2023-05-30 PROCEDURE — 27000221 HC OXYGEN, UP TO 24 HOURS

## 2023-05-30 PROCEDURE — 94003 VENT MGMT INPAT SUBQ DAY: CPT

## 2023-05-30 PROCEDURE — 63600175 PHARM REV CODE 636 W HCPCS

## 2023-05-30 PROCEDURE — 63600175 PHARM REV CODE 636 W HCPCS: Performed by: INTERNAL MEDICINE

## 2023-05-30 PROCEDURE — 80053 COMPREHEN METABOLIC PANEL: CPT

## 2023-05-30 PROCEDURE — 94761 N-INVAS EAR/PLS OXIMETRY MLT: CPT

## 2023-05-30 PROCEDURE — 83735 ASSAY OF MAGNESIUM: CPT

## 2023-05-30 PROCEDURE — 84100 ASSAY OF PHOSPHORUS: CPT

## 2023-05-30 PROCEDURE — 43246 EGD PLACE GASTROSTOMY TUBE: CPT

## 2023-05-30 PROCEDURE — 99900035 HC TECH TIME PER 15 MIN (STAT)

## 2023-05-30 PROCEDURE — 43246 EGD PLACE GASTROSTOMY TUBE: CPT | Performed by: INTERNAL MEDICINE

## 2023-05-30 PROCEDURE — 20000000 HC ICU ROOM

## 2023-05-30 PROCEDURE — 27200966 HC CLOSED SUCTION SYSTEM

## 2023-05-30 PROCEDURE — 27201423 OPTIME MED/SURG SUP & DEVICES STERILE SUPPLY: Performed by: INTERNAL MEDICINE

## 2023-05-30 PROCEDURE — 25000003 PHARM REV CODE 250

## 2023-05-30 PROCEDURE — 25000003 PHARM REV CODE 250: Performed by: INTERNAL MEDICINE

## 2023-05-30 PROCEDURE — 63600175 PHARM REV CODE 636 W HCPCS: Performed by: STUDENT IN AN ORGANIZED HEALTH CARE EDUCATION/TRAINING PROGRAM

## 2023-05-30 PROCEDURE — 63600175 PHARM REV CODE 636 W HCPCS: Performed by: NURSE PRACTITIONER

## 2023-05-30 PROCEDURE — 25000003 PHARM REV CODE 250: Performed by: STUDENT IN AN ORGANIZED HEALTH CARE EDUCATION/TRAINING PROGRAM

## 2023-05-30 PROCEDURE — 99900031 HC PATIENT EDUCATION (STAT)

## 2023-05-30 RX ORDER — CEFAZOLIN SODIUM 1 G/3ML
1 INJECTION, POWDER, FOR SOLUTION INTRAMUSCULAR; INTRAVENOUS ONCE
Status: COMPLETED | OUTPATIENT
Start: 2023-05-30 | End: 2023-05-30

## 2023-05-30 RX ORDER — ENOXAPARIN SODIUM 100 MG/ML
40 INJECTION SUBCUTANEOUS EVERY 24 HOURS
Status: DISCONTINUED | OUTPATIENT
Start: 2023-05-31 | End: 2023-06-08 | Stop reason: HOSPADM

## 2023-05-30 RX ORDER — CALCIUM GLUCONATE 20 MG/ML
1 INJECTION, SOLUTION INTRAVENOUS ONCE
Status: COMPLETED | OUTPATIENT
Start: 2023-05-30 | End: 2023-05-30

## 2023-05-30 RX ADMIN — CALCIUM GLUCONATE 1 G: 20 INJECTION, SOLUTION INTRAVENOUS at 05:05

## 2023-05-30 RX ADMIN — PROPOFOL 5 MCG/KG/MIN: 10 INJECTION, EMULSION INTRAVENOUS at 02:05

## 2023-05-30 RX ADMIN — CEFAZOLIN 1 G: 330 INJECTION, POWDER, FOR SOLUTION INTRAMUSCULAR; INTRAVENOUS at 03:05

## 2023-05-30 RX ADMIN — LEVETIRACETAM 1000 MG: 100 INJECTION, SOLUTION INTRAVENOUS at 08:05

## 2023-05-30 RX ADMIN — PANTOPRAZOLE SODIUM 40 MG: 40 INJECTION, POWDER, FOR SOLUTION INTRAVENOUS at 09:05

## 2023-05-30 RX ADMIN — SERTRALINE HYDROCHLORIDE 50 MG: 50 TABLET ORAL at 09:05

## 2023-05-30 RX ADMIN — CLONAZEPAM 1 MG: 1 TABLET ORAL at 08:05

## 2023-05-30 RX ADMIN — LEVETIRACETAM 1000 MG: 100 INJECTION, SOLUTION INTRAVENOUS at 09:05

## 2023-05-30 RX ADMIN — NOREPINEPHRINE BITARTRATE 0.05 MCG/KG/MIN: 8 INJECTION, SOLUTION INTRAVENOUS at 03:05

## 2023-05-30 RX ADMIN — CALCITRIOL CAPSULES 0.25 MCG 0.5 MCG: 0.25 CAPSULE ORAL at 09:05

## 2023-05-30 RX ADMIN — CALCIUM ACETATE 667 MG: 667 CAPSULE ORAL at 01:05

## 2023-05-30 RX ADMIN — CALCIUM ACETATE 667 MG: 667 CAPSULE ORAL at 09:05

## 2023-05-30 RX ADMIN — CLONAZEPAM 1 MG: 1 TABLET ORAL at 09:05

## 2023-05-30 RX ADMIN — CALCIUM ACETATE 667 MG: 667 CAPSULE ORAL at 08:05
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
## 2023-05-30 NOTE — NURSING
Nurses Note -- 4 Eyes      5/30/2023   4:59 PM      Skin assessed during: Q Shift Change      [] No Altered Skin Integrity Present    []Prevention Measures Documented      [x] Yes- Altered Skin Integrity Present or Discovered   [] LDA Added if Not in Epic (Describe Wound)   [] New Altered Skin Integrity was Present on Admit and Documented in LDA   [] Wound Image Taken    Wound Care Consulted? Yes    Attending Nurse:  Becca Chatterjee RN     Second RN/Staff Member:  JANETT Hutson

## 2023-05-30 NOTE — PROVATION PATIENT INSTRUCTIONS
Discharge Summary/Instructions after an Endoscopic Procedure  Patient Name: Jyoti Mayberry  Patient MRN: [id number]  Patient YOB: 1995  Tuesday, May 30, 2023  KANCHAN Devine MD  Dear patient,  As a result of recent federal legislation (The Federal Cures Act), you may   receive lab or pathology results from your procedure in your [facility]   account before your physician is able to contact you. Your physician or   their representative will relay the results to you with their   recommendations at their soonest availability.  Thank you,  RESTRICTIONS:  During your procedure today, you received medications for sedation.  These   medications may affect your judgment, balance and coordination.  Therefore,   for 24 hours, you have the following restrictions:   - DO NOT drive a car, operate machinery, make legal/financial decisions,   sign important papers or drink alcohol.    ACTIVITY:  Today: no heavy lifting, straining or running due to procedural   sedation/anesthesia.  The following day: return to full activity including work.  DIET:  Eat and drink normally unless instructed otherwise.     TREATMENT FOR COMMON SIDE EFFECTS:  - Mild abdominal pain, nausea, belching, bloating or excessive gas:  rest,   eat lightly and use a heating pad.  - Sore Throat: treat with throat lozenges and/or gargle with warm salt   water.  - Because air was used during the procedure, expelling large amounts of air   from your rectum or belching is normal.  - If a bowel prep was taken, you may not have a bowel movement for 1-3 days.    This is normal.  SYMPTOMS TO WATCH FOR AND REPORT TO YOUR PHYSICIAN:  1. Abdominal pain or bloating, other than gas cramps.  2. Chest pain.  3. Back pain.  4. Signs of infection such as: chills or fever occurring within 24 hours   after the procedure.  5. Rectal bleeding, which would show as bright red, maroon, or black stools.   (A tablespoon of blood from the rectum is not serious, especially  if   hemorrhoids are present.)  6. Vomiting.  7. Weakness or dizziness.  GO DIRECTLY TO THE NEAREST EMERGENCY ROOM IF YOU HAVE ANY OF THE FOLLOWING:      Difficulty breathing              Chills and/or fever over 101 F   Persistent vomiting and/or vomiting blood   Severe abdominal pain   Severe chest pain   Black, tarry stools   Bleeding- more than one tablespoon   Any other symptom or condition that you feel may need urgent attention  Your doctor recommends these additional instructions:  If any biopsies were taken, your doctors clinic will contact you in 1 to 2   weeks with any results.  - Please follow the post-PEG recommendations.   - Return patient to ICU for ongoing care.  For questions, problems or results please call your physician - KANCHAN Devine MD at Work:  [phone].  [facility], EMERGENCY ROOM PHONE NUMBER: [phone]  IF A COMPLICATION OR EMERGENCY SITUATION ARISES AND YOU ARE UNABLE TO REACH   YOUR PHYSICIAN - GO DIRECTLY TO THE EMERGENCY ROOM.  MD KANCHAN Singh MD  5/30/2023 4:18:49 PM  This report has been verified and signed electronically.  Dear patient,  As a result of recent federal legislation (The Federal Cures Act), you may   receive lab or pathology results from your procedure in your [facility]   account before your physician is able to contact you. Your physician or   their representative will relay the results to you with their   recommendations at their soonest availability.  Thank you,  PROVATION
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
## 2023-05-30 NOTE — NURSING
Nurses Note -- 4 Eyes      5/29/2023   8:11 PM      Skin assessed during: Q Shift Change      [] No Altered Skin Integrity Present    []Prevention Measures Documented      [x] Yes- Altered Skin Integrity Present or Discovered   [] LDA Added if Not in Epic (Describe Wound)   [] New Altered Skin Integrity was Present on Admit and Documented in LDA   [] Wound Image Taken    Wound Care Consulted? Yes    Attending Nurse:  Becca Chatterjee RN     Second RN/Staff Member:  Hernando Recinos RN

## 2023-05-31 LAB
ALBUMIN SERPL-MCNC: 1.9 G/DL (ref 3.5–5)
ALBUMIN/GLOB SERPL: 0.4 RATIO (ref 1.1–2)
ALP SERPL-CCNC: 219 UNIT/L (ref 40–150)
ALT SERPL-CCNC: 28 UNIT/L (ref 0–55)
AST SERPL-CCNC: 19 UNIT/L (ref 5–34)
BASOPHILS # BLD AUTO: 0.09 X10(3)/MCL
BASOPHILS NFR BLD AUTO: 0.8 %
BILIRUBIN DIRECT+TOT PNL SERPL-MCNC: 0.2 MG/DL
BUN SERPL-MCNC: 13.6 MG/DL (ref 8.9–20.6)
CALCIUM SERPL-MCNC: 6.3 MG/DL (ref 8.4–10.2)
CHLORIDE SERPL-SCNC: 102 MMOL/L (ref 98–107)
CO2 SERPL-SCNC: 20 MMOL/L (ref 22–29)
CREAT SERPL-MCNC: 0.5 MG/DL (ref 0.73–1.18)
EOSINOPHIL # BLD AUTO: 0.18 X10(3)/MCL (ref 0–0.9)
EOSINOPHIL NFR BLD AUTO: 1.5 %
ERYTHROCYTE [DISTWIDTH] IN BLOOD BY AUTOMATED COUNT: 20.5 % (ref 11.5–17)
GFR SERPLBLD CREATININE-BSD FMLA CKD-EPI: >60 MLS/MIN/1.73/M2
GLOBULIN SER-MCNC: 4.4 GM/DL (ref 2.4–3.5)
GLUCOSE SERPL-MCNC: 69 MG/DL (ref 74–100)
HCT VFR BLD AUTO: 28 % (ref 42–52)
HGB BLD-MCNC: 8.9 G/DL (ref 14–18)
IMM GRANULOCYTES # BLD AUTO: 0.05 X10(3)/MCL (ref 0–0.04)
IMM GRANULOCYTES NFR BLD AUTO: 0.4 %
LYMPHOCYTES # BLD AUTO: 1.52 X10(3)/MCL (ref 0.6–4.6)
LYMPHOCYTES NFR BLD AUTO: 12.7 %
MAGNESIUM SERPL-MCNC: 1.3 MG/DL (ref 1.6–2.6)
MCH RBC QN AUTO: 27.1 PG (ref 27–31)
MCHC RBC AUTO-ENTMCNC: 31.8 G/DL (ref 33–36)
MCV RBC AUTO: 85.1 FL (ref 80–94)
MONOCYTES # BLD AUTO: 0.72 X10(3)/MCL (ref 0.1–1.3)
MONOCYTES NFR BLD AUTO: 6 %
NEUTROPHILS # BLD AUTO: 9.4 X10(3)/MCL (ref 2.1–9.2)
NEUTROPHILS NFR BLD AUTO: 78.6 %
NRBC BLD AUTO-RTO: 0 %
PHOSPHATE SERPL-MCNC: 5.3 MG/DL (ref 2.3–4.7)
PLATELET # BLD AUTO: 649 X10(3)/MCL (ref 130–400)
PMV BLD AUTO: 8.2 FL (ref 7.4–10.4)
POTASSIUM SERPL-SCNC: 3.1 MMOL/L (ref 3.5–5.1)
PROT SERPL-MCNC: 6.3 GM/DL (ref 6.4–8.3)
RBC # BLD AUTO: 3.29 X10(6)/MCL (ref 4.7–6.1)
SODIUM SERPL-SCNC: 137 MMOL/L (ref 136–145)
WBC # SPEC AUTO: 11.96 X10(3)/MCL (ref 4.5–11.5)

## 2023-05-31 PROCEDURE — 20000000 HC ICU ROOM

## 2023-05-31 PROCEDURE — 25000003 PHARM REV CODE 250: Performed by: STUDENT IN AN ORGANIZED HEALTH CARE EDUCATION/TRAINING PROGRAM

## 2023-05-31 PROCEDURE — 94003 VENT MGMT INPAT SUBQ DAY: CPT

## 2023-05-31 PROCEDURE — C9113 INJ PANTOPRAZOLE SODIUM, VIA: HCPCS | Performed by: NURSE PRACTITIONER

## 2023-05-31 PROCEDURE — 99900035 HC TECH TIME PER 15 MIN (STAT)

## 2023-05-31 PROCEDURE — 84100 ASSAY OF PHOSPHORUS: CPT

## 2023-05-31 PROCEDURE — 85025 COMPLETE CBC W/AUTO DIFF WBC: CPT | Performed by: STUDENT IN AN ORGANIZED HEALTH CARE EDUCATION/TRAINING PROGRAM

## 2023-05-31 PROCEDURE — 80053 COMPREHEN METABOLIC PANEL: CPT

## 2023-05-31 PROCEDURE — 63600175 PHARM REV CODE 636 W HCPCS

## 2023-05-31 PROCEDURE — 99900026 HC AIRWAY MAINTENANCE (STAT)

## 2023-05-31 PROCEDURE — 94761 N-INVAS EAR/PLS OXIMETRY MLT: CPT

## 2023-05-31 PROCEDURE — 25000003 PHARM REV CODE 250: Performed by: INTERNAL MEDICINE

## 2023-05-31 PROCEDURE — 63600175 PHARM REV CODE 636 W HCPCS: Performed by: STUDENT IN AN ORGANIZED HEALTH CARE EDUCATION/TRAINING PROGRAM

## 2023-05-31 PROCEDURE — 27000221 HC OXYGEN, UP TO 24 HOURS

## 2023-05-31 PROCEDURE — 83735 ASSAY OF MAGNESIUM: CPT

## 2023-05-31 PROCEDURE — 63600175 PHARM REV CODE 636 W HCPCS: Performed by: NURSE PRACTITIONER

## 2023-05-31 PROCEDURE — 25000003 PHARM REV CODE 250

## 2023-05-31 RX ORDER — POTASSIUM CHLORIDE 14.9 MG/ML
10 INJECTION INTRAVENOUS
Status: COMPLETED | OUTPATIENT
Start: 2023-05-31 | End: 2023-05-31

## 2023-05-31 RX ORDER — CALCIUM CHLORIDE INJECTION 100 MG/ML
1 INJECTION, SOLUTION INTRAVENOUS ONCE
Status: COMPLETED | OUTPATIENT
Start: 2023-05-31 | End: 2023-05-31

## 2023-05-31 RX ORDER — MAGNESIUM SULFATE HEPTAHYDRATE 40 MG/ML
2 INJECTION, SOLUTION INTRAVENOUS ONCE
Status: COMPLETED | OUTPATIENT
Start: 2023-05-31 | End: 2023-05-31

## 2023-05-31 RX ORDER — LEVETIRACETAM 500 MG/1
1000 TABLET ORAL 2 TIMES DAILY
Status: DISCONTINUED | OUTPATIENT
Start: 2023-05-31 | End: 2023-06-08 | Stop reason: HOSPADM

## 2023-05-31 RX ADMIN — CALCITRIOL CAPSULES 0.25 MCG 0.5 MCG: 0.25 CAPSULE ORAL at 08:05

## 2023-05-31 RX ADMIN — SERTRALINE HYDROCHLORIDE 50 MG: 50 TABLET ORAL at 08:05

## 2023-05-31 RX ADMIN — POTASSIUM CHLORIDE 10 MEQ: 14.9 INJECTION, SOLUTION INTRAVENOUS at 04:05

## 2023-05-31 RX ADMIN — CLONAZEPAM 1 MG: 1 TABLET ORAL at 08:05

## 2023-05-31 RX ADMIN — HYDROCODONE BITARTRATE AND ACETAMINOPHEN 1 TABLET: 10; 325 TABLET ORAL at 10:05

## 2023-05-31 RX ADMIN — ENOXAPARIN SODIUM 40 MG: 40 INJECTION SUBCUTANEOUS at 08:05

## 2023-05-31 RX ADMIN — CALCIUM ACETATE 667 MG: 667 CAPSULE ORAL at 05:05

## 2023-05-31 RX ADMIN — CALCIUM ACETATE 667 MG: 667 CAPSULE ORAL at 08:05

## 2023-05-31 RX ADMIN — CALCIUM ACETATE 667 MG: 667 CAPSULE ORAL at 12:05

## 2023-05-31 RX ADMIN — PANTOPRAZOLE SODIUM 40 MG: 40 INJECTION, POWDER, FOR SOLUTION INTRAVENOUS at 08:05

## 2023-05-31 RX ADMIN — LEVETIRACETAM 1000 MG: 500 TABLET, FILM COATED ORAL at 08:05

## 2023-05-31 RX ADMIN — POTASSIUM CHLORIDE 10 MEQ: 14.9 INJECTION, SOLUTION INTRAVENOUS at 06:05

## 2023-05-31 RX ADMIN — MAGNESIUM SULFATE HEPTAHYDRATE 2 G: 40 INJECTION, SOLUTION INTRAVENOUS at 03:05

## 2023-05-31 RX ADMIN — CALCIUM CHLORIDE INJECTION 1 G: 100 INJECTION, SOLUTION INTRAVENOUS at 04:05
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
## 2023-05-31 NOTE — NURSING
Nurses Note -- 4 Eyes      5/31/2023   7:17 AM      Skin assessed during: Daily Assessment      [] No Altered Skin Integrity Present    []Prevention Measures Documented      [x] Yes- Altered Skin Integrity Present or Discovered   [] LDA Added if Not in Epic (Describe Wound)   [] New Altered Skin Integrity was Present on Admit and Documented in LDA   [] Wound Image Taken    Wound Care Consulted? Yes    Attending Nurse:  Aileen Dalton RN     Second RN/Staff Member:  Bella Smith Rn
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
## 2023-05-31 NOTE — PLAN OF CARE
Problem: Infection  Goal: Absence of Infection Signs and Symptoms  Outcome: Ongoing, Progressing     Problem: Adult Inpatient Plan of Care  Goal: Plan of Care Review  Outcome: Ongoing, Progressing  Goal: Patient-Specific Goal (Individualized)  Outcome: Ongoing, Progressing  Goal: Absence of Hospital-Acquired Illness or Injury  Outcome: Ongoing, Progressing  Goal: Optimal Comfort and Wellbeing  Outcome: Ongoing, Progressing  Goal: Readiness for Transition of Care  Outcome: Ongoing, Progressing     Problem: Communication Impairment (Mechanical Ventilation, Invasive)  Goal: Effective Communication  Outcome: Ongoing, Progressing     Problem: Device-Related Complication Risk (Mechanical Ventilation, Invasive)  Goal: Optimal Device Function  Outcome: Ongoing, Progressing     Problem: Inability to Wean (Mechanical Ventilation, Invasive)  Goal: Mechanical Ventilation Liberation  Outcome: Ongoing, Progressing     Problem: Nutrition Impairment (Mechanical Ventilation, Invasive)  Goal: Optimal Nutrition Delivery  Outcome: Ongoing, Progressing     Problem: Skin and Tissue Injury (Mechanical Ventilation, Invasive)  Goal: Absence of Device-Related Skin and Tissue Injury  Outcome: Ongoing, Progressing     Problem: Ventilator-Induced Lung Injury (Mechanical Ventilation, Invasive)  Goal: Absence of Ventilator-Induced Lung Injury  Outcome: Ongoing, Progressing     Problem: Communication Impairment (Artificial Airway)  Goal: Effective Communication  Outcome: Ongoing, Progressing     Problem: Device-Related Complication Risk (Artificial Airway)  Goal: Optimal Device Function  Outcome: Ongoing, Progressing     Problem: Skin and Tissue Injury (Artificial Airway)  Goal: Absence of Device-Related Skin or Tissue Injury  Outcome: Ongoing, Progressing     Problem: Noninvasive Ventilation Acute  Goal: Effective Unassisted Ventilation and Oxygenation  Outcome: Ongoing, Progressing     Problem: Impaired Wound Healing  Goal: Optimal Wound  Healing  Outcome: Ongoing, Progressing     Problem: Adjustment to Illness (Delirium)  Goal: Optimal Coping  Outcome: Ongoing, Progressing     Problem: Altered Behavior (Delirium)  Goal: Improved Behavioral Control  Outcome: Ongoing, Progressing     Problem: Attention and Thought Clarity Impairment (Delirium)  Goal: Improved Attention and Thought Clarity  Outcome: Ongoing, Progressing     Problem: Sleep Disturbance (Delirium)  Goal: Improved Sleep  Outcome: Ongoing, Progressing     Problem: Coping Ineffective  Goal: Effective Coping  Outcome: Ongoing, Progressing     Problem: Fall Injury Risk  Goal: Absence of Fall and Fall-Related Injury  Outcome: Ongoing, Progressing

## 2023-06-01 LAB
ALBUMIN SERPL-MCNC: 1.8 G/DL (ref 3.5–5)
ALBUMIN/GLOB SERPL: 0.4 RATIO (ref 1.1–2)
ALP SERPL-CCNC: 205 UNIT/L (ref 40–150)
ALT SERPL-CCNC: 22 UNIT/L (ref 0–55)
AST SERPL-CCNC: 17 UNIT/L (ref 5–34)
BACTERIA BLD CULT: NORMAL
BACTERIA BLD CULT: NORMAL
BASOPHILS # BLD AUTO: 0.13 X10(3)/MCL
BASOPHILS NFR BLD AUTO: 1.2 %
BILIRUBIN DIRECT+TOT PNL SERPL-MCNC: 0.2 MG/DL
BUN SERPL-MCNC: 20.3 MG/DL (ref 8.9–20.6)
CALCIUM SERPL-MCNC: 6.8 MG/DL (ref 8.4–10.2)
CHLORIDE SERPL-SCNC: 102 MMOL/L (ref 98–107)
CO2 SERPL-SCNC: 25 MMOL/L (ref 22–29)
CREAT SERPL-MCNC: 0.57 MG/DL (ref 0.73–1.18)
EOSINOPHIL # BLD AUTO: 0.12 X10(3)/MCL (ref 0–0.9)
EOSINOPHIL NFR BLD AUTO: 1.1 %
ERYTHROCYTE [DISTWIDTH] IN BLOOD BY AUTOMATED COUNT: 20.7 % (ref 11.5–17)
GFR SERPLBLD CREATININE-BSD FMLA CKD-EPI: >60 MLS/MIN/1.73/M2
GLOBULIN SER-MCNC: 4.8 GM/DL (ref 2.4–3.5)
GLUCOSE SERPL-MCNC: 117 MG/DL (ref 74–100)
HCT VFR BLD AUTO: 27.3 % (ref 42–52)
HGB BLD-MCNC: 8.5 G/DL (ref 14–18)
IMM GRANULOCYTES # BLD AUTO: 0.05 X10(3)/MCL (ref 0–0.04)
IMM GRANULOCYTES NFR BLD AUTO: 0.4 %
LYMPHOCYTES # BLD AUTO: 2.11 X10(3)/MCL (ref 0.6–4.6)
LYMPHOCYTES NFR BLD AUTO: 18.8 %
MAGNESIUM SERPL-MCNC: 1.7 MG/DL (ref 1.6–2.6)
MCH RBC QN AUTO: 26.9 PG (ref 27–31)
MCHC RBC AUTO-ENTMCNC: 31.1 G/DL (ref 33–36)
MCV RBC AUTO: 86.4 FL (ref 80–94)
MONOCYTES # BLD AUTO: 0.71 X10(3)/MCL (ref 0.1–1.3)
MONOCYTES NFR BLD AUTO: 6.3 %
NEUTROPHILS # BLD AUTO: 8.08 X10(3)/MCL (ref 2.1–9.2)
NEUTROPHILS NFR BLD AUTO: 72.2 %
NRBC BLD AUTO-RTO: 0 %
PHOSPHATE SERPL-MCNC: 3.6 MG/DL (ref 2.3–4.7)
PLATELET # BLD AUTO: 733 X10(3)/MCL (ref 130–400)
PMV BLD AUTO: 8.5 FL (ref 7.4–10.4)
POTASSIUM SERPL-SCNC: 3.8 MMOL/L (ref 3.5–5.1)
PROT SERPL-MCNC: 6.6 GM/DL (ref 6.4–8.3)
RBC # BLD AUTO: 3.16 X10(6)/MCL (ref 4.7–6.1)
SODIUM SERPL-SCNC: 138 MMOL/L (ref 136–145)
WBC # SPEC AUTO: 11.2 X10(3)/MCL (ref 4.5–11.5)

## 2023-06-01 PROCEDURE — 99900031 HC PATIENT EDUCATION (STAT)

## 2023-06-01 PROCEDURE — 80053 COMPREHEN METABOLIC PANEL: CPT

## 2023-06-01 PROCEDURE — 63600175 PHARM REV CODE 636 W HCPCS

## 2023-06-01 PROCEDURE — 25000003 PHARM REV CODE 250: Performed by: INTERNAL MEDICINE

## 2023-06-01 PROCEDURE — 99900026 HC AIRWAY MAINTENANCE (STAT)

## 2023-06-01 PROCEDURE — 20000000 HC ICU ROOM

## 2023-06-01 PROCEDURE — 85025 COMPLETE CBC W/AUTO DIFF WBC: CPT | Performed by: STUDENT IN AN ORGANIZED HEALTH CARE EDUCATION/TRAINING PROGRAM

## 2023-06-01 PROCEDURE — 99900035 HC TECH TIME PER 15 MIN (STAT)

## 2023-06-01 PROCEDURE — 27200966 HC CLOSED SUCTION SYSTEM

## 2023-06-01 PROCEDURE — 83735 ASSAY OF MAGNESIUM: CPT

## 2023-06-01 PROCEDURE — 25000003 PHARM REV CODE 250: Performed by: STUDENT IN AN ORGANIZED HEALTH CARE EDUCATION/TRAINING PROGRAM

## 2023-06-01 PROCEDURE — 94761 N-INVAS EAR/PLS OXIMETRY MLT: CPT

## 2023-06-01 PROCEDURE — 63600175 PHARM REV CODE 636 W HCPCS: Performed by: NURSE PRACTITIONER

## 2023-06-01 PROCEDURE — 25000003 PHARM REV CODE 250

## 2023-06-01 PROCEDURE — C9113 INJ PANTOPRAZOLE SODIUM, VIA: HCPCS | Performed by: NURSE PRACTITIONER

## 2023-06-01 PROCEDURE — 94003 VENT MGMT INPAT SUBQ DAY: CPT

## 2023-06-01 PROCEDURE — 27000221 HC OXYGEN, UP TO 24 HOURS

## 2023-06-01 PROCEDURE — 63600175 PHARM REV CODE 636 W HCPCS: Performed by: STUDENT IN AN ORGANIZED HEALTH CARE EDUCATION/TRAINING PROGRAM

## 2023-06-01 PROCEDURE — 84100 ASSAY OF PHOSPHORUS: CPT

## 2023-06-01 RX ORDER — CALCIUM GLUCONATE 20 MG/ML
1 INJECTION, SOLUTION INTRAVENOUS ONCE
Status: COMPLETED | OUTPATIENT
Start: 2023-06-01 | End: 2023-06-01

## 2023-06-01 RX ADMIN — CLONAZEPAM 1 MG: 1 TABLET ORAL at 08:06

## 2023-06-01 RX ADMIN — SERTRALINE HYDROCHLORIDE 50 MG: 50 TABLET ORAL at 09:06

## 2023-06-01 RX ADMIN — LEVETIRACETAM 1000 MG: 500 TABLET, FILM COATED ORAL at 08:06

## 2023-06-01 RX ADMIN — SODIUM CHLORIDE, POTASSIUM CHLORIDE, SODIUM LACTATE AND CALCIUM CHLORIDE 250 ML: 600; 310; 30; 20 INJECTION, SOLUTION INTRAVENOUS at 02:06

## 2023-06-01 RX ADMIN — ENOXAPARIN SODIUM 40 MG: 40 INJECTION SUBCUTANEOUS at 05:06

## 2023-06-01 RX ADMIN — CALCITRIOL CAPSULES 0.25 MCG 0.5 MCG: 0.25 CAPSULE ORAL at 09:06

## 2023-06-01 RX ADMIN — CALCIUM ACETATE 667 MG: 667 CAPSULE ORAL at 07:06

## 2023-06-01 RX ADMIN — CALCIUM ACETATE 667 MG: 667 CAPSULE ORAL at 12:06

## 2023-06-01 RX ADMIN — CALCIUM ACETATE 667 MG: 667 CAPSULE ORAL at 05:06

## 2023-06-01 RX ADMIN — LEVETIRACETAM 1000 MG: 500 TABLET, FILM COATED ORAL at 09:06

## 2023-06-01 RX ADMIN — CLONAZEPAM 1 MG: 1 TABLET ORAL at 09:06

## 2023-06-01 RX ADMIN — PANTOPRAZOLE SODIUM 40 MG: 40 INJECTION, POWDER, FOR SOLUTION INTRAVENOUS at 09:06

## 2023-06-01 RX ADMIN — CALCIUM GLUCONATE 1 G: 20 INJECTION, SOLUTION INTRAVENOUS at 06:06
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
## 2023-06-02 LAB
ALBUMIN SERPL-MCNC: 2 G/DL (ref 3.5–5)
ALBUMIN/GLOB SERPL: 0.4 RATIO (ref 1.1–2)
ALP SERPL-CCNC: 186 UNIT/L (ref 40–150)
ALT SERPL-CCNC: 17 UNIT/L (ref 0–55)
AST SERPL-CCNC: 17 UNIT/L (ref 5–34)
BASOPHILS # BLD AUTO: 0.1 X10(3)/MCL
BASOPHILS NFR BLD AUTO: 0.9 %
BILIRUBIN DIRECT+TOT PNL SERPL-MCNC: 0.2 MG/DL
BUN SERPL-MCNC: 28.9 MG/DL (ref 8.9–20.6)
CALCIUM SERPL-MCNC: 6.6 MG/DL (ref 8.4–10.2)
CHLORIDE SERPL-SCNC: 101 MMOL/L (ref 98–107)
CO2 SERPL-SCNC: 24 MMOL/L (ref 22–29)
CREAT SERPL-MCNC: 0.54 MG/DL (ref 0.73–1.18)
EOSINOPHIL # BLD AUTO: 0.2 X10(3)/MCL (ref 0–0.9)
EOSINOPHIL NFR BLD AUTO: 1.7 %
ERYTHROCYTE [DISTWIDTH] IN BLOOD BY AUTOMATED COUNT: 20.9 % (ref 11.5–17)
GFR SERPLBLD CREATININE-BSD FMLA CKD-EPI: >60 MLS/MIN/1.73/M2
GLOBULIN SER-MCNC: 4.6 GM/DL (ref 2.4–3.5)
GLUCOSE SERPL-MCNC: 95 MG/DL (ref 74–100)
HCT VFR BLD AUTO: 30.9 % (ref 42–52)
HGB BLD-MCNC: 9.6 G/DL (ref 14–18)
IMM GRANULOCYTES # BLD AUTO: 0.07 X10(3)/MCL (ref 0–0.04)
IMM GRANULOCYTES NFR BLD AUTO: 0.6 %
LYMPHOCYTES # BLD AUTO: 2.12 X10(3)/MCL (ref 0.6–4.6)
LYMPHOCYTES NFR BLD AUTO: 18.5 %
MAGNESIUM SERPL-MCNC: 1.4 MG/DL (ref 1.6–2.6)
MCH RBC QN AUTO: 27 PG (ref 27–31)
MCHC RBC AUTO-ENTMCNC: 31.1 G/DL (ref 33–36)
MCV RBC AUTO: 87 FL (ref 80–94)
MONOCYTES # BLD AUTO: 0.82 X10(3)/MCL (ref 0.1–1.3)
MONOCYTES NFR BLD AUTO: 7.2 %
NEUTROPHILS # BLD AUTO: 8.14 X10(3)/MCL (ref 2.1–9.2)
NEUTROPHILS NFR BLD AUTO: 71.1 %
NRBC BLD AUTO-RTO: 0 %
PHOSPHATE SERPL-MCNC: 3.4 MG/DL (ref 2.3–4.7)
PLATELET # BLD AUTO: 807 X10(3)/MCL (ref 130–400)
PMV BLD AUTO: 8.4 FL (ref 7.4–10.4)
POTASSIUM SERPL-SCNC: 4.9 MMOL/L (ref 3.5–5.1)
PROT SERPL-MCNC: 6.6 GM/DL (ref 6.4–8.3)
RBC # BLD AUTO: 3.55 X10(6)/MCL (ref 4.7–6.1)
SODIUM SERPL-SCNC: 134 MMOL/L (ref 136–145)
WBC # SPEC AUTO: 11.45 X10(3)/MCL (ref 4.5–11.5)

## 2023-06-02 PROCEDURE — C9113 INJ PANTOPRAZOLE SODIUM, VIA: HCPCS | Performed by: NURSE PRACTITIONER

## 2023-06-02 PROCEDURE — 63600175 PHARM REV CODE 636 W HCPCS: Performed by: STUDENT IN AN ORGANIZED HEALTH CARE EDUCATION/TRAINING PROGRAM

## 2023-06-02 PROCEDURE — 99233 SBSQ HOSP IP/OBS HIGH 50: CPT | Mod: ,,, | Performed by: EMERGENCY MEDICINE

## 2023-06-02 PROCEDURE — 20000000 HC ICU ROOM

## 2023-06-02 PROCEDURE — 85025 COMPLETE CBC W/AUTO DIFF WBC: CPT | Performed by: STUDENT IN AN ORGANIZED HEALTH CARE EDUCATION/TRAINING PROGRAM

## 2023-06-02 PROCEDURE — 80053 COMPREHEN METABOLIC PANEL: CPT

## 2023-06-02 PROCEDURE — 99900031 HC PATIENT EDUCATION (STAT)

## 2023-06-02 PROCEDURE — 99233 PR SUBSEQUENT HOSPITAL CARE,LEVL III: ICD-10-PCS | Mod: ,,, | Performed by: EMERGENCY MEDICINE

## 2023-06-02 PROCEDURE — 25000003 PHARM REV CODE 250: Performed by: STUDENT IN AN ORGANIZED HEALTH CARE EDUCATION/TRAINING PROGRAM

## 2023-06-02 PROCEDURE — 63600175 PHARM REV CODE 636 W HCPCS: Performed by: NURSE PRACTITIONER

## 2023-06-02 PROCEDURE — 25000003 PHARM REV CODE 250: Performed by: INTERNAL MEDICINE

## 2023-06-02 PROCEDURE — 84100 ASSAY OF PHOSPHORUS: CPT

## 2023-06-02 PROCEDURE — 27200966 HC CLOSED SUCTION SYSTEM

## 2023-06-02 PROCEDURE — 94761 N-INVAS EAR/PLS OXIMETRY MLT: CPT

## 2023-06-02 PROCEDURE — 27000221 HC OXYGEN, UP TO 24 HOURS

## 2023-06-02 PROCEDURE — 99900022

## 2023-06-02 PROCEDURE — 83735 ASSAY OF MAGNESIUM: CPT

## 2023-06-02 PROCEDURE — 99900026 HC AIRWAY MAINTENANCE (STAT)

## 2023-06-02 PROCEDURE — 99900035 HC TECH TIME PER 15 MIN (STAT)

## 2023-06-02 PROCEDURE — 94003 VENT MGMT INPAT SUBQ DAY: CPT

## 2023-06-02 RX ADMIN — HYDROCODONE BITARTRATE AND ACETAMINOPHEN 1 TABLET: 10; 325 TABLET ORAL at 04:06

## 2023-06-02 RX ADMIN — ENOXAPARIN SODIUM 40 MG: 40 INJECTION SUBCUTANEOUS at 04:06

## 2023-06-02 RX ADMIN — SODIUM CHLORIDE 250 ML: 9 INJECTION, SOLUTION INTRAVENOUS at 09:06

## 2023-06-02 RX ADMIN — CLONAZEPAM 1 MG: 1 TABLET ORAL at 08:06

## 2023-06-02 RX ADMIN — PANTOPRAZOLE SODIUM 40 MG: 40 INJECTION, POWDER, FOR SOLUTION INTRAVENOUS at 08:06

## 2023-06-02 RX ADMIN — CALCITRIOL CAPSULES 0.25 MCG 0.5 MCG: 0.25 CAPSULE ORAL at 08:06

## 2023-06-02 RX ADMIN — LEVETIRACETAM 1000 MG: 500 TABLET, FILM COATED ORAL at 08:06

## 2023-06-02 RX ADMIN — CALCIUM ACETATE 667 MG: 667 CAPSULE ORAL at 04:06

## 2023-06-02 RX ADMIN — SERTRALINE HYDROCHLORIDE 50 MG: 50 TABLET ORAL at 08:06

## 2023-06-02 RX ADMIN — CALCIUM ACETATE 667 MG: 667 CAPSULE ORAL at 08:06

## 2023-06-02 NOTE — NURSING
Spoke with mom (valentin) at 0615. Updated on plan of care and patient status. All questions and concerns answered.

## 2023-06-02 NOTE — CARE UPDATE
[id number] Ally with [facility] reports they are unable to take pt since it is a medicaid pt and pt will be a high cost
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
## 2023-06-02 NOTE — PLAN OF CARE
Problem: Infection  Goal: Absence of Infection Signs and Symptoms  Outcome: Ongoing, Progressing     Problem: Adult Inpatient Plan of Care  Goal: Plan of Care Review  Outcome: Ongoing, Progressing  Goal: Patient-Specific Goal (Individualized)  Outcome: Ongoing, Progressing  Goal: Optimal Comfort and Wellbeing  Outcome: Ongoing, Progressing  Goal: Readiness for Transition of Care  Outcome: Ongoing, Progressing     Problem: Device-Related Complication Risk (Mechanical Ventilation, Invasive)  Goal: Optimal Device Function  Outcome: Ongoing, Progressing     Problem: Inability to Wean (Mechanical Ventilation, Invasive)  Goal: Mechanical Ventilation Liberation  Outcome: Ongoing, Progressing     Problem: Communication Impairment (Mechanical Ventilation, Invasive)  Goal: Effective Communication  Outcome: Ongoing, Not Progressing
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
## 2023-06-02 NOTE — NURSING
Nurses Note -- 4 Eyes      6/2/2023   4:40 AM      Skin assessed during: Q Shift Change      [] No Altered Skin Integrity Present    [x]Prevention Measures Documented      [x] Yes- Altered Skin Integrity Present or Discovered   [] LDA Added if Not in Epic (Describe Wound)   [] New Altered Skin Integrity was Present on Admit and Documented in LDA   [] Wound Image Taken    Wound Care Consulted? Yes    Attending Nurse:  Catarina Hinson RN     Second RN/Staff Member:  RAYMUNDO Rivas
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
## 2023-06-03 LAB
ALBUMIN SERPL-MCNC: 1.9 G/DL (ref 3.5–5)
ALBUMIN/GLOB SERPL: 0.4 RATIO (ref 1.1–2)
ALP SERPL-CCNC: 165 UNIT/L (ref 40–150)
ALT SERPL-CCNC: 14 UNIT/L (ref 0–55)
AST SERPL-CCNC: 18 UNIT/L (ref 5–34)
BASOPHILS # BLD AUTO: 0.07 X10(3)/MCL
BASOPHILS NFR BLD AUTO: 0.7 %
BILIRUBIN DIRECT+TOT PNL SERPL-MCNC: 0.2 MG/DL
BUN SERPL-MCNC: 32.7 MG/DL (ref 8.9–20.6)
CALCIUM SERPL-MCNC: 6.6 MG/DL (ref 8.4–10.2)
CHLORIDE SERPL-SCNC: 104 MMOL/L (ref 98–107)
CO2 SERPL-SCNC: 24 MMOL/L (ref 22–29)
CREAT SERPL-MCNC: 0.54 MG/DL (ref 0.73–1.18)
EOSINOPHIL # BLD AUTO: 0.41 X10(3)/MCL (ref 0–0.9)
EOSINOPHIL NFR BLD AUTO: 3.8 %
ERYTHROCYTE [DISTWIDTH] IN BLOOD BY AUTOMATED COUNT: 20.8 % (ref 11.5–17)
GFR SERPLBLD CREATININE-BSD FMLA CKD-EPI: >60 MLS/MIN/1.73/M2
GLOBULIN SER-MCNC: 4.9 GM/DL (ref 2.4–3.5)
GLUCOSE SERPL-MCNC: 95 MG/DL (ref 74–100)
HCT VFR BLD AUTO: 26.5 % (ref 42–52)
HGB BLD-MCNC: 8.3 G/DL (ref 14–18)
IMM GRANULOCYTES # BLD AUTO: 0.09 X10(3)/MCL (ref 0–0.04)
IMM GRANULOCYTES NFR BLD AUTO: 0.8 %
LYMPHOCYTES # BLD AUTO: 2.23 X10(3)/MCL (ref 0.6–4.6)
LYMPHOCYTES NFR BLD AUTO: 20.9 %
MAGNESIUM SERPL-MCNC: 1.3 MG/DL (ref 1.6–2.6)
MCH RBC QN AUTO: 27.4 PG (ref 27–31)
MCHC RBC AUTO-ENTMCNC: 31.3 G/DL (ref 33–36)
MCV RBC AUTO: 87.5 FL (ref 80–94)
MONOCYTES # BLD AUTO: 0.97 X10(3)/MCL (ref 0.1–1.3)
MONOCYTES NFR BLD AUTO: 9.1 %
NEUTROPHILS # BLD AUTO: 6.88 X10(3)/MCL (ref 2.1–9.2)
NEUTROPHILS NFR BLD AUTO: 64.7 %
NRBC BLD AUTO-RTO: 0 %
PHOSPHATE SERPL-MCNC: 3.1 MG/DL (ref 2.3–4.7)
PLATELET # BLD AUTO: 795 X10(3)/MCL (ref 130–400)
PMV BLD AUTO: 8.5 FL (ref 7.4–10.4)
POTASSIUM SERPL-SCNC: 4.8 MMOL/L (ref 3.5–5.1)
PREALB SERPL-MCNC: 19.7 MG/DL (ref 18–45)
PROT SERPL-MCNC: 6.8 GM/DL (ref 6.4–8.3)
RBC # BLD AUTO: 3.03 X10(6)/MCL (ref 4.7–6.1)
SODIUM SERPL-SCNC: 137 MMOL/L (ref 136–145)
WBC # SPEC AUTO: 10.65 X10(3)/MCL (ref 4.5–11.5)

## 2023-06-03 PROCEDURE — 25000003 PHARM REV CODE 250: Performed by: STUDENT IN AN ORGANIZED HEALTH CARE EDUCATION/TRAINING PROGRAM

## 2023-06-03 PROCEDURE — 63600175 PHARM REV CODE 636 W HCPCS: Performed by: NURSE PRACTITIONER

## 2023-06-03 PROCEDURE — 25000003 PHARM REV CODE 250: Performed by: INTERNAL MEDICINE

## 2023-06-03 PROCEDURE — C9113 INJ PANTOPRAZOLE SODIUM, VIA: HCPCS | Performed by: NURSE PRACTITIONER

## 2023-06-03 PROCEDURE — 99900022

## 2023-06-03 PROCEDURE — 83735 ASSAY OF MAGNESIUM: CPT

## 2023-06-03 PROCEDURE — 84100 ASSAY OF PHOSPHORUS: CPT

## 2023-06-03 PROCEDURE — 84134 ASSAY OF PREALBUMIN: CPT | Performed by: EMERGENCY MEDICINE

## 2023-06-03 PROCEDURE — 99900035 HC TECH TIME PER 15 MIN (STAT)

## 2023-06-03 PROCEDURE — 99900026 HC AIRWAY MAINTENANCE (STAT)

## 2023-06-03 PROCEDURE — 20000000 HC ICU ROOM

## 2023-06-03 PROCEDURE — 85025 COMPLETE CBC W/AUTO DIFF WBC: CPT | Performed by: NURSE PRACTITIONER

## 2023-06-03 PROCEDURE — 80053 COMPREHEN METABOLIC PANEL: CPT

## 2023-06-03 PROCEDURE — 27000221 HC OXYGEN, UP TO 24 HOURS

## 2023-06-03 PROCEDURE — 94761 N-INVAS EAR/PLS OXIMETRY MLT: CPT

## 2023-06-03 RX ORDER — TALC
6 POWDER (GRAM) TOPICAL NIGHTLY PRN
Status: DISCONTINUED | OUTPATIENT
Start: 2023-06-03 | End: 2023-06-08 | Stop reason: HOSPADM

## 2023-06-03 RX ADMIN — HYDROCODONE BITARTRATE AND ACETAMINOPHEN 1 TABLET: 10; 325 TABLET ORAL at 07:06

## 2023-06-03 RX ADMIN — LEVETIRACETAM 1000 MG: 500 TABLET, FILM COATED ORAL at 08:06

## 2023-06-03 RX ADMIN — CALCITRIOL CAPSULES 0.25 MCG 0.5 MCG: 0.25 CAPSULE ORAL at 10:06

## 2023-06-03 RX ADMIN — LEVETIRACETAM 1000 MG: 500 TABLET, FILM COATED ORAL at 10:06

## 2023-06-03 RX ADMIN — CLONAZEPAM 1 MG: 1 TABLET ORAL at 10:06

## 2023-06-03 RX ADMIN — SERTRALINE HYDROCHLORIDE 50 MG: 50 TABLET ORAL at 10:06

## 2023-06-03 RX ADMIN — CALCIUM ACETATE 667 MG: 667 CAPSULE ORAL at 01:06

## 2023-06-03 RX ADMIN — PANTOPRAZOLE SODIUM 40 MG: 40 INJECTION, POWDER, FOR SOLUTION INTRAVENOUS at 10:06

## 2023-06-03 RX ADMIN — CALCIUM ACETATE 667 MG: 667 CAPSULE ORAL at 05:06

## 2023-06-03 RX ADMIN — Medication 6 MG: at 08:06

## 2023-06-03 RX ADMIN — CALCIUM ACETATE 667 MG: 667 CAPSULE ORAL at 07:06

## 2023-06-03 RX ADMIN — CLONAZEPAM 1 MG: 1 TABLET ORAL at 08:06

## 2023-06-03 NOTE — PLAN OF CARE
Problem: Infection  Goal: Absence of Infection Signs and Symptoms  Outcome: Ongoing, Progressing     Problem: Adult Inpatient Plan of Care  Goal: Plan of Care Review  Outcome: Ongoing, Progressing  Goal: Patient-Specific Goal (Individualized)  Outcome: Ongoing, Progressing  Goal: Absence of Hospital-Acquired Illness or Injury  Outcome: Ongoing, Progressing  Goal: Optimal Comfort and Wellbeing  Outcome: Ongoing, Progressing  Goal: Readiness for Transition of Care  Outcome: Ongoing, Progressing     Problem: Communication Impairment (Mechanical Ventilation, Invasive)  Goal: Effective Communication  Outcome: Ongoing, Progressing     Problem: Device-Related Complication Risk (Mechanical Ventilation, Invasive)  Goal: Optimal Device Function  Outcome: Ongoing, Progressing     Problem: Device-Related Complication Risk (Artificial Airway)  Goal: Optimal Device Function  Outcome: Ongoing, Progressing     Problem: Communication Impairment (Artificial Airway)  Goal: Effective Communication  Outcome: Ongoing, Progressing     Problem: Skin and Tissue Injury (Artificial Airway)  Goal: Absence of Device-Related Skin or Tissue Injury  Outcome: Ongoing, Progressing       Problem: Impaired Wound Healing  Goal: Optimal Wound Healing  Outcome: Ongoing, Progressing     Problem: Skin Injury Risk Increased  Goal: Skin Health and Integrity  Outcome: Ongoing, Progressing     Problem: Altered Behavior (Delirium)  Goal: Improved Behavioral Control  Outcome: Ongoing, Progressing     Problem: Adjustment to Illness (Delirium)  Goal: Optimal Coping  Outcome: Ongoing, Progressing

## 2023-06-03 NOTE — NURSING
Nurses Note -- 4 Eyes      6/3/2023   5:11 AM      Skin assessed during: Q Shift Change      [] No Altered Skin Integrity Present    [x]Prevention Measures Documented      [x] Yes- Altered Skin Integrity Present or Discovered   [] LDA Added if Not in Epic (Describe Wound)   [] New Altered Skin Integrity was Present on Admit and Documented in LDA   [] Wound Image Taken    Wound Care Consulted? Yes    Attending Nurse:  Catarina Hinson RN     Second RN/Staff Member:  RAYMUNDO Rivas
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
## 2023-06-03 NOTE — PLAN OF CARE
Problem: Infection  Goal: Absence of Infection Signs and Symptoms  Outcome: Ongoing, Progressing     Problem: Adult Inpatient Plan of Care  Goal: Plan of Care Review  Outcome: Ongoing, Progressing  Goal: Patient-Specific Goal (Individualized)  Outcome: Ongoing, Progressing  Goal: Readiness for Transition of Care  Outcome: Ongoing, Progressing     Problem: Communication Impairment (Mechanical Ventilation, Invasive)  Goal: Effective Communication  Outcome: Ongoing, Progressing

## 2023-06-04 LAB
ALBUMIN SERPL-MCNC: 2.1 G/DL (ref 3.5–5)
ALBUMIN/GLOB SERPL: 0.5 RATIO (ref 1.1–2)
ALP SERPL-CCNC: 163 UNIT/L (ref 40–150)
ALT SERPL-CCNC: 13 UNIT/L (ref 0–55)
AST SERPL-CCNC: 16 UNIT/L (ref 5–34)
BASOPHILS # BLD AUTO: 0.07 X10(3)/MCL
BASOPHILS NFR BLD AUTO: 0.8 %
BILIRUBIN DIRECT+TOT PNL SERPL-MCNC: 0.2 MG/DL
BUN SERPL-MCNC: 34 MG/DL (ref 8.9–20.6)
CALCIUM SERPL-MCNC: 6.7 MG/DL (ref 8.4–10.2)
CHLORIDE SERPL-SCNC: 103 MMOL/L (ref 98–107)
CO2 SERPL-SCNC: 26 MMOL/L (ref 22–29)
CREAT SERPL-MCNC: 0.52 MG/DL (ref 0.73–1.18)
EOSINOPHIL # BLD AUTO: 0.42 X10(3)/MCL (ref 0–0.9)
EOSINOPHIL NFR BLD AUTO: 4.9 %
ERYTHROCYTE [DISTWIDTH] IN BLOOD BY AUTOMATED COUNT: 21.2 % (ref 11.5–17)
GFR SERPLBLD CREATININE-BSD FMLA CKD-EPI: >60 MLS/MIN/1.73/M2
GLOBULIN SER-MCNC: 4.5 GM/DL (ref 2.4–3.5)
GLUCOSE SERPL-MCNC: 83 MG/DL (ref 74–100)
HCT VFR BLD AUTO: 26.7 % (ref 42–52)
HGB BLD-MCNC: 8.3 G/DL (ref 14–18)
IMM GRANULOCYTES # BLD AUTO: 0.16 X10(3)/MCL (ref 0–0.04)
IMM GRANULOCYTES NFR BLD AUTO: 1.9 %
LYMPHOCYTES # BLD AUTO: 2.39 X10(3)/MCL (ref 0.6–4.6)
LYMPHOCYTES NFR BLD AUTO: 28 %
MAGNESIUM SERPL-MCNC: 1.3 MG/DL (ref 1.6–2.6)
MCH RBC QN AUTO: 26.8 PG (ref 27–31)
MCHC RBC AUTO-ENTMCNC: 31.1 G/DL (ref 33–36)
MCV RBC AUTO: 86.1 FL (ref 80–94)
MONOCYTES # BLD AUTO: 0.95 X10(3)/MCL (ref 0.1–1.3)
MONOCYTES NFR BLD AUTO: 11.1 %
NEUTROPHILS # BLD AUTO: 4.55 X10(3)/MCL (ref 2.1–9.2)
NEUTROPHILS NFR BLD AUTO: 53.3 %
NRBC BLD AUTO-RTO: 0 %
PHOSPHATE SERPL-MCNC: 3.3 MG/DL (ref 2.3–4.7)
PLATELET # BLD AUTO: 823 X10(3)/MCL (ref 130–400)
PMV BLD AUTO: 8.4 FL (ref 7.4–10.4)
POTASSIUM SERPL-SCNC: 5.4 MMOL/L (ref 3.5–5.1)
PROT SERPL-MCNC: 6.6 GM/DL (ref 6.4–8.3)
RBC # BLD AUTO: 3.1 X10(6)/MCL (ref 4.7–6.1)
SODIUM SERPL-SCNC: 139 MMOL/L (ref 136–145)
WBC # SPEC AUTO: 8.54 X10(3)/MCL (ref 4.5–11.5)

## 2023-06-04 PROCEDURE — 99900031 HC PATIENT EDUCATION (STAT)

## 2023-06-04 PROCEDURE — 27000221 HC OXYGEN, UP TO 24 HOURS

## 2023-06-04 PROCEDURE — 63600175 PHARM REV CODE 636 W HCPCS: Performed by: NURSE PRACTITIONER

## 2023-06-04 PROCEDURE — 25000003 PHARM REV CODE 250: Performed by: STUDENT IN AN ORGANIZED HEALTH CARE EDUCATION/TRAINING PROGRAM

## 2023-06-04 PROCEDURE — 63600175 PHARM REV CODE 636 W HCPCS: Performed by: STUDENT IN AN ORGANIZED HEALTH CARE EDUCATION/TRAINING PROGRAM

## 2023-06-04 PROCEDURE — 94003 VENT MGMT INPAT SUBQ DAY: CPT

## 2023-06-04 PROCEDURE — 94761 N-INVAS EAR/PLS OXIMETRY MLT: CPT

## 2023-06-04 PROCEDURE — 99900022

## 2023-06-04 PROCEDURE — 25000003 PHARM REV CODE 250: Performed by: INTERNAL MEDICINE

## 2023-06-04 PROCEDURE — C9113 INJ PANTOPRAZOLE SODIUM, VIA: HCPCS | Performed by: NURSE PRACTITIONER

## 2023-06-04 PROCEDURE — 84100 ASSAY OF PHOSPHORUS: CPT

## 2023-06-04 PROCEDURE — 80053 COMPREHEN METABOLIC PANEL: CPT

## 2023-06-04 PROCEDURE — 85025 COMPLETE CBC W/AUTO DIFF WBC: CPT | Performed by: STUDENT IN AN ORGANIZED HEALTH CARE EDUCATION/TRAINING PROGRAM

## 2023-06-04 PROCEDURE — 20000000 HC ICU ROOM

## 2023-06-04 PROCEDURE — 99900035 HC TECH TIME PER 15 MIN (STAT)

## 2023-06-04 PROCEDURE — 83735 ASSAY OF MAGNESIUM: CPT

## 2023-06-04 PROCEDURE — 99900026 HC AIRWAY MAINTENANCE (STAT)

## 2023-06-04 RX ORDER — ALBUTEROL SULFATE 0.83 MG/ML
2.5 SOLUTION RESPIRATORY (INHALATION) EVERY 4 HOURS PRN
Status: DISCONTINUED | OUTPATIENT
Start: 2023-06-04 | End: 2023-06-08 | Stop reason: HOSPADM

## 2023-06-04 RX ADMIN — Medication 6 MG: at 08:06

## 2023-06-04 RX ADMIN — CALCITRIOL CAPSULES 0.25 MCG 0.5 MCG: 0.25 CAPSULE ORAL at 08:06

## 2023-06-04 RX ADMIN — PANTOPRAZOLE SODIUM 40 MG: 40 INJECTION, POWDER, FOR SOLUTION INTRAVENOUS at 08:06

## 2023-06-04 RX ADMIN — CALCIUM ACETATE 667 MG: 667 CAPSULE ORAL at 04:06

## 2023-06-04 RX ADMIN — CLONAZEPAM 1 MG: 1 TABLET ORAL at 08:06

## 2023-06-04 RX ADMIN — SERTRALINE HYDROCHLORIDE 50 MG: 50 TABLET ORAL at 08:06

## 2023-06-04 RX ADMIN — CALCIUM ACETATE 667 MG: 667 CAPSULE ORAL at 08:06

## 2023-06-04 RX ADMIN — LEVETIRACETAM 1000 MG: 500 TABLET, FILM COATED ORAL at 08:06

## 2023-06-04 RX ADMIN — ACETAMINOPHEN 650 MG: 325 TABLET ORAL at 08:06

## 2023-06-04 RX ADMIN — ENOXAPARIN SODIUM 40 MG: 40 INJECTION SUBCUTANEOUS at 04:06

## 2023-06-04 RX ADMIN — CALCIUM ACETATE 667 MG: 667 CAPSULE ORAL at 11:06
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
## 2023-06-04 NOTE — NURSING
Nurses Note -- 4 Eyes      6/4/2023   2:27 AM      Skin assessed during: Q Shift Change      [] No Altered Skin Integrity Present    []Prevention Measures Documented      [x] Yes- Altered Skin Integrity Present or Discovered   [x] LDA Added if Not in Epic (Describe Wound)   [x] New Altered Skin Integrity was Present on Admit and Documented in LDA   [x] Wound Image Taken    Wound Care Consulted? Yes    Attending Nurse:  Catarina Hinson RN     Second RN/Staff Member:  Qian Swanson. RN         <-- Click to add NO pertinent Past Medical History

## 2023-06-04 NOTE — NURSING
Nurses Note -- 4 Eyes      6/4/2023   1:54 PM      Skin assessed during: Q Shift Change      [] No Altered Skin Integrity Present    []Prevention Measures Documented      [x] Yes- Altered Skin Integrity Present or Discovered   [] LDA Added if Not in Epic (Describe Wound)   [x] New Altered Skin Integrity was Present on Admit and Documented in LDA   [x] Wound Image Taken    Wound Care Consulted? Yes    Attending Nurse:  Ady Doran RN     Second RN/Staff Member:  Placido Garsia RN

## 2023-06-04 NOTE — PLAN OF CARE
Problem: Infection  Goal: Absence of Infection Signs and Symptoms  Outcome: Ongoing, Progressing     Problem: Adult Inpatient Plan of Care  Goal: Plan of Care Review  Outcome: Ongoing, Progressing  Goal: Patient-Specific Goal (Individualized)  Outcome: Ongoing, Progressing  Goal: Absence of Hospital-Acquired Illness or Injury  Outcome: Ongoing, Progressing  Goal: Optimal Comfort and Wellbeing  Outcome: Ongoing, Progressing  Goal: Readiness for Transition of Care  Outcome: Ongoing, Progressing     Problem: Inability to Wean (Mechanical Ventilation, Invasive)  Goal: Mechanical Ventilation Liberation  Outcome: Ongoing, Progressing

## 2023-06-05 LAB
ALBUMIN SERPL-MCNC: 2.1 G/DL (ref 3.5–5)
ALBUMIN/GLOB SERPL: 0.5 RATIO (ref 1.1–2)
ALP SERPL-CCNC: 164 UNIT/L (ref 40–150)
ALT SERPL-CCNC: 12 UNIT/L (ref 0–55)
AST SERPL-CCNC: 19 UNIT/L (ref 5–34)
BASOPHILS # BLD AUTO: 0.08 X10(3)/MCL
BASOPHILS NFR BLD AUTO: 0.8 %
BILIRUBIN DIRECT+TOT PNL SERPL-MCNC: 0.2 MG/DL
BUN SERPL-MCNC: 35.7 MG/DL (ref 8.9–20.6)
CALCIUM SERPL-MCNC: 6.6 MG/DL (ref 8.4–10.2)
CHLORIDE SERPL-SCNC: 104 MMOL/L (ref 98–107)
CO2 SERPL-SCNC: 22 MMOL/L (ref 22–29)
CREAT SERPL-MCNC: 0.55 MG/DL (ref 0.73–1.18)
EOSINOPHIL # BLD AUTO: 0.45 X10(3)/MCL (ref 0–0.9)
EOSINOPHIL NFR BLD AUTO: 4.7 %
ERYTHROCYTE [DISTWIDTH] IN BLOOD BY AUTOMATED COUNT: 21.1 % (ref 11.5–17)
GFR SERPLBLD CREATININE-BSD FMLA CKD-EPI: >60 MLS/MIN/1.73/M2
GLOBULIN SER-MCNC: 4.4 GM/DL (ref 2.4–3.5)
GLUCOSE SERPL-MCNC: 101 MG/DL (ref 74–100)
HCT VFR BLD AUTO: 27.1 % (ref 42–52)
HGB BLD-MCNC: 8.5 G/DL (ref 14–18)
IMM GRANULOCYTES # BLD AUTO: 0.11 X10(3)/MCL (ref 0–0.04)
IMM GRANULOCYTES NFR BLD AUTO: 1.1 %
LYMPHOCYTES # BLD AUTO: 1.92 X10(3)/MCL (ref 0.6–4.6)
LYMPHOCYTES NFR BLD AUTO: 20 %
MAGNESIUM SERPL-MCNC: 1.2 MG/DL (ref 1.6–2.6)
MCH RBC QN AUTO: 27.1 PG (ref 27–31)
MCHC RBC AUTO-ENTMCNC: 31.4 G/DL (ref 33–36)
MCV RBC AUTO: 86.3 FL (ref 80–94)
MONOCYTES # BLD AUTO: 1.26 X10(3)/MCL (ref 0.1–1.3)
MONOCYTES NFR BLD AUTO: 13.1 %
NEUTROPHILS # BLD AUTO: 5.79 X10(3)/MCL (ref 2.1–9.2)
NEUTROPHILS NFR BLD AUTO: 60.3 %
NRBC BLD AUTO-RTO: 0 %
PHOSPHATE SERPL-MCNC: 3.3 MG/DL (ref 2.3–4.7)
PLATELET # BLD AUTO: 763 X10(3)/MCL (ref 130–400)
PMV BLD AUTO: 8.2 FL (ref 7.4–10.4)
POTASSIUM SERPL-SCNC: 5 MMOL/L (ref 3.5–5.1)
PROT SERPL-MCNC: 6.5 GM/DL (ref 6.4–8.3)
RBC # BLD AUTO: 3.14 X10(6)/MCL (ref 4.7–6.1)
SODIUM SERPL-SCNC: 135 MMOL/L (ref 136–145)
WBC # SPEC AUTO: 9.61 X10(3)/MCL (ref 4.5–11.5)

## 2023-06-05 PROCEDURE — 25000003 PHARM REV CODE 250: Performed by: STUDENT IN AN ORGANIZED HEALTH CARE EDUCATION/TRAINING PROGRAM

## 2023-06-05 PROCEDURE — 63600175 PHARM REV CODE 636 W HCPCS

## 2023-06-05 PROCEDURE — C9113 INJ PANTOPRAZOLE SODIUM, VIA: HCPCS | Performed by: NURSE PRACTITIONER

## 2023-06-05 PROCEDURE — 27000221 HC OXYGEN, UP TO 24 HOURS

## 2023-06-05 PROCEDURE — 63600175 PHARM REV CODE 636 W HCPCS: Performed by: NURSE PRACTITIONER

## 2023-06-05 PROCEDURE — 25000003 PHARM REV CODE 250: Performed by: INTERNAL MEDICINE

## 2023-06-05 PROCEDURE — 99900026 HC AIRWAY MAINTENANCE (STAT)

## 2023-06-05 PROCEDURE — 99900035 HC TECH TIME PER 15 MIN (STAT)

## 2023-06-05 PROCEDURE — 63600175 PHARM REV CODE 636 W HCPCS: Performed by: STUDENT IN AN ORGANIZED HEALTH CARE EDUCATION/TRAINING PROGRAM

## 2023-06-05 PROCEDURE — 94761 N-INVAS EAR/PLS OXIMETRY MLT: CPT

## 2023-06-05 PROCEDURE — 83735 ASSAY OF MAGNESIUM: CPT

## 2023-06-05 PROCEDURE — 20000000 HC ICU ROOM

## 2023-06-05 PROCEDURE — 84100 ASSAY OF PHOSPHORUS: CPT

## 2023-06-05 PROCEDURE — 80053 COMPREHEN METABOLIC PANEL: CPT

## 2023-06-05 PROCEDURE — 25000003 PHARM REV CODE 250

## 2023-06-05 PROCEDURE — 99900031 HC PATIENT EDUCATION (STAT)

## 2023-06-05 PROCEDURE — 25000003 PHARM REV CODE 250: Performed by: HOSPITALIST

## 2023-06-05 PROCEDURE — 85025 COMPLETE CBC W/AUTO DIFF WBC: CPT | Performed by: STUDENT IN AN ORGANIZED HEALTH CARE EDUCATION/TRAINING PROGRAM

## 2023-06-05 RX ORDER — MIDODRINE HYDROCHLORIDE 2.5 MG/1
2.5 TABLET ORAL 2 TIMES DAILY WITH MEALS
Status: DISCONTINUED | OUTPATIENT
Start: 2023-06-05 | End: 2023-06-05

## 2023-06-05 RX ORDER — MIDODRINE HYDROCHLORIDE 5 MG/1
5 TABLET ORAL ONCE
Status: COMPLETED | OUTPATIENT
Start: 2023-06-05 | End: 2023-06-05

## 2023-06-05 RX ORDER — MAGNESIUM SULFATE HEPTAHYDRATE 40 MG/ML
2 INJECTION, SOLUTION INTRAVENOUS
Status: COMPLETED | OUTPATIENT
Start: 2023-06-05 | End: 2023-06-05

## 2023-06-05 RX ADMIN — MAGNESIUM SULFATE HEPTAHYDRATE 2 G: 40 INJECTION, SOLUTION INTRAVENOUS at 02:06

## 2023-06-05 RX ADMIN — ENOXAPARIN SODIUM 40 MG: 40 INJECTION SUBCUTANEOUS at 05:06

## 2023-06-05 RX ADMIN — PANTOPRAZOLE SODIUM 40 MG: 40 INJECTION, POWDER, FOR SOLUTION INTRAVENOUS at 08:06

## 2023-06-05 RX ADMIN — MIDODRINE HYDROCHLORIDE 5 MG: 5 TABLET ORAL at 10:06

## 2023-06-05 RX ADMIN — LEVETIRACETAM 1000 MG: 500 TABLET, FILM COATED ORAL at 08:06

## 2023-06-05 RX ADMIN — CALCITRIOL CAPSULES 0.25 MCG 0.5 MCG: 0.25 CAPSULE ORAL at 08:06

## 2023-06-05 RX ADMIN — SODIUM CHLORIDE 1000 ML: 9 INJECTION, SOLUTION INTRAVENOUS at 09:06

## 2023-06-05 RX ADMIN — SERTRALINE HYDROCHLORIDE 50 MG: 50 TABLET ORAL at 08:06

## 2023-06-05 RX ADMIN — Medication 6 MG: at 08:06

## 2023-06-05 RX ADMIN — CLONAZEPAM 1 MG: 1 TABLET ORAL at 08:06

## 2023-06-05 RX ADMIN — MAGNESIUM SULFATE HEPTAHYDRATE 2 G: 40 INJECTION, SOLUTION INTRAVENOUS at 11:06

## 2023-06-05 RX ADMIN — SODIUM CHLORIDE 1000 ML: 9 INJECTION, SOLUTION INTRAVENOUS at 04:06

## 2023-06-05 RX ADMIN — CALCIUM ACETATE 667 MG: 667 CAPSULE ORAL at 05:06

## 2023-06-05 RX ADMIN — CALCIUM ACETATE 667 MG: 667 CAPSULE ORAL at 08:06

## 2023-06-05 RX ADMIN — MIDODRINE HYDROCHLORIDE 2.5 MG: 2.5 TABLET ORAL at 08:06

## 2023-06-05 RX ADMIN — CALCIUM ACETATE 667 MG: 667 CAPSULE ORAL at 11:06

## 2023-06-05 NOTE — NURSING
Nurses Note -- 4 Eyes      6/5/2023   10:55 AM      Skin assessed during: Daily Assessment      [] No Altered Skin Integrity Present    []Prevention Measures Documented      [x] Yes- Altered Skin Integrity Present or Discovered   [] LDA Added if Not in Epic (Describe Wound)   [x] New Altered Skin Integrity was Present on Admit and Documented in LDA   [x] Wound Image Taken    Wound Care Consulted? Yes    Attending Nurse:  Shantelle Rivers RN     Second RN/Staff Member:  Jenn German RN
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
## 2023-06-05 NOTE — PLAN OF CARE
Problem: Infection  Goal: Absence of Infection Signs and Symptoms  Outcome: Ongoing, Progressing     Problem: Adult Inpatient Plan of Care  Goal: Plan of Care Review  Outcome: Ongoing, Progressing  Goal: Patient-Specific Goal (Individualized)  Outcome: Ongoing, Progressing  Goal: Absence of Hospital-Acquired Illness or Injury  Outcome: Ongoing, Progressing  Goal: Optimal Comfort and Wellbeing  Outcome: Ongoing, Progressing  Goal: Readiness for Transition of Care  Outcome: Ongoing, Progressing     Problem: Communication Impairment (Mechanical Ventilation, Invasive)  Goal: Effective Communication  Outcome: Ongoing, Progressing     Problem: Device-Related Complication Risk (Mechanical Ventilation, Invasive)  Goal: Optimal Device Function  Outcome: Ongoing, Progressing

## 2023-06-05 NOTE — NURSING
Nurses Note -- 4 Eyes      6/5/2023   12:31 AM      Skin assessed during: Q Shift Change      [] No Altered Skin Integrity Present    []Prevention Measures Documented      [x] Yes- Altered Skin Integrity Present or Discovered   [x] LDA Added if Not in Epic (Describe Wound)   [x] New Altered Skin Integrity was Present on Admit and Documented in LDA   [x] Wound Image Taken    Wound Care Consulted? Yes    Attending Nurse:  Catarina Hinson RN     Second RN/Staff Member:  Qian Swanson RN

## 2023-06-06 LAB
ALBUMIN SERPL-MCNC: 1.9 G/DL (ref 3.5–5)
ALBUMIN/GLOB SERPL: 0.4 RATIO (ref 1.1–2)
ALP SERPL-CCNC: 147 UNIT/L (ref 40–150)
ALT SERPL-CCNC: 11 UNIT/L (ref 0–55)
AST SERPL-CCNC: 25 UNIT/L (ref 5–34)
BASOPHILS # BLD AUTO: 0.08 X10(3)/MCL
BASOPHILS NFR BLD AUTO: 0.8 %
BILIRUBIN DIRECT+TOT PNL SERPL-MCNC: 0.1 MG/DL
BUN SERPL-MCNC: 34.5 MG/DL (ref 8.9–20.6)
CALCIUM SERPL-MCNC: 6.7 MG/DL (ref 8.4–10.2)
CHLORIDE SERPL-SCNC: 105 MMOL/L (ref 98–107)
CO2 SERPL-SCNC: 23 MMOL/L (ref 22–29)
CREAT SERPL-MCNC: 0.53 MG/DL (ref 0.73–1.18)
EOSINOPHIL # BLD AUTO: 0.47 X10(3)/MCL (ref 0–0.9)
EOSINOPHIL NFR BLD AUTO: 4.8 %
ERYTHROCYTE [DISTWIDTH] IN BLOOD BY AUTOMATED COUNT: 21.4 % (ref 11.5–17)
GFR SERPLBLD CREATININE-BSD FMLA CKD-EPI: >60 MLS/MIN/1.73/M2
GLOBULIN SER-MCNC: 5.3 GM/DL (ref 2.4–3.5)
GLUCOSE SERPL-MCNC: 99 MG/DL (ref 74–100)
HCT VFR BLD AUTO: 26.1 % (ref 42–52)
HGB BLD-MCNC: 8.1 G/DL (ref 14–18)
IMM GRANULOCYTES # BLD AUTO: 0.12 X10(3)/MCL (ref 0–0.04)
IMM GRANULOCYTES NFR BLD AUTO: 1.2 %
LYMPHOCYTES # BLD AUTO: 1.87 X10(3)/MCL (ref 0.6–4.6)
LYMPHOCYTES NFR BLD AUTO: 19 %
MAGNESIUM SERPL-MCNC: 1.9 MG/DL (ref 1.6–2.6)
MCH RBC QN AUTO: 27 PG (ref 27–31)
MCHC RBC AUTO-ENTMCNC: 31 G/DL (ref 33–36)
MCV RBC AUTO: 87 FL (ref 80–94)
MONOCYTES # BLD AUTO: 1.46 X10(3)/MCL (ref 0.1–1.3)
MONOCYTES NFR BLD AUTO: 14.8 %
NEUTROPHILS # BLD AUTO: 5.84 X10(3)/MCL (ref 2.1–9.2)
NEUTROPHILS NFR BLD AUTO: 59.4 %
NRBC BLD AUTO-RTO: 0 %
PHOSPHATE SERPL-MCNC: 3.2 MG/DL (ref 2.3–4.7)
PLATELET # BLD AUTO: 752 X10(3)/MCL (ref 130–400)
PMV BLD AUTO: 8.7 FL (ref 7.4–10.4)
POTASSIUM SERPL-SCNC: 5.3 MMOL/L (ref 3.5–5.1)
PROT SERPL-MCNC: 7.2 GM/DL (ref 6.4–8.3)
RBC # BLD AUTO: 3 X10(6)/MCL (ref 4.7–6.1)
SODIUM SERPL-SCNC: 134 MMOL/L (ref 136–145)
WBC # SPEC AUTO: 9.84 X10(3)/MCL (ref 4.5–11.5)

## 2023-06-06 PROCEDURE — 80053 COMPREHEN METABOLIC PANEL: CPT

## 2023-06-06 PROCEDURE — 27000221 HC OXYGEN, UP TO 24 HOURS

## 2023-06-06 PROCEDURE — 63600175 PHARM REV CODE 636 W HCPCS: Performed by: NURSE PRACTITIONER

## 2023-06-06 PROCEDURE — 85025 COMPLETE CBC W/AUTO DIFF WBC: CPT | Performed by: STUDENT IN AN ORGANIZED HEALTH CARE EDUCATION/TRAINING PROGRAM

## 2023-06-06 PROCEDURE — 63600175 PHARM REV CODE 636 W HCPCS: Performed by: STUDENT IN AN ORGANIZED HEALTH CARE EDUCATION/TRAINING PROGRAM

## 2023-06-06 PROCEDURE — 94761 N-INVAS EAR/PLS OXIMETRY MLT: CPT

## 2023-06-06 PROCEDURE — 83735 ASSAY OF MAGNESIUM: CPT

## 2023-06-06 PROCEDURE — 99900031 HC PATIENT EDUCATION (STAT)

## 2023-06-06 PROCEDURE — 99900026 HC AIRWAY MAINTENANCE (STAT)

## 2023-06-06 PROCEDURE — 20000000 HC ICU ROOM

## 2023-06-06 PROCEDURE — C9113 INJ PANTOPRAZOLE SODIUM, VIA: HCPCS | Performed by: NURSE PRACTITIONER

## 2023-06-06 PROCEDURE — 84100 ASSAY OF PHOSPHORUS: CPT

## 2023-06-06 PROCEDURE — 25000003 PHARM REV CODE 250: Performed by: STUDENT IN AN ORGANIZED HEALTH CARE EDUCATION/TRAINING PROGRAM

## 2023-06-06 PROCEDURE — 25000003 PHARM REV CODE 250: Performed by: INTERNAL MEDICINE

## 2023-06-06 RX ORDER — MAGNESIUM SULFATE 1 G/100ML
1 INJECTION INTRAVENOUS ONCE
Status: COMPLETED | OUTPATIENT
Start: 2023-06-06 | End: 2023-06-06

## 2023-06-06 RX ADMIN — SERTRALINE HYDROCHLORIDE 50 MG: 50 TABLET ORAL at 08:06

## 2023-06-06 RX ADMIN — CALCIUM ACETATE 667 MG: 667 CAPSULE ORAL at 05:06

## 2023-06-06 RX ADMIN — MAGNESIUM SULFATE IN DEXTROSE 1 G: 10 INJECTION, SOLUTION INTRAVENOUS at 10:06

## 2023-06-06 RX ADMIN — CLONAZEPAM 1 MG: 1 TABLET ORAL at 09:06

## 2023-06-06 RX ADMIN — LEVETIRACETAM 1000 MG: 500 TABLET, FILM COATED ORAL at 09:06

## 2023-06-06 RX ADMIN — LEVETIRACETAM 1000 MG: 500 TABLET, FILM COATED ORAL at 08:06

## 2023-06-06 RX ADMIN — MIDODRINE HYDROCHLORIDE 7.5 MG: 2.5 TABLET ORAL at 05:06

## 2023-06-06 RX ADMIN — CALCIUM ACETATE 667 MG: 667 CAPSULE ORAL at 11:06

## 2023-06-06 RX ADMIN — PANTOPRAZOLE SODIUM 40 MG: 40 INJECTION, POWDER, FOR SOLUTION INTRAVENOUS at 08:06

## 2023-06-06 RX ADMIN — ENOXAPARIN SODIUM 40 MG: 40 INJECTION SUBCUTANEOUS at 05:06

## 2023-06-06 RX ADMIN — MIDODRINE HYDROCHLORIDE 7.5 MG: 2.5 TABLET ORAL at 07:06

## 2023-06-06 RX ADMIN — SODIUM ZIRCONIUM CYCLOSILICATE 10 G: 10 POWDER, FOR SUSPENSION ORAL at 10:06

## 2023-06-06 RX ADMIN — CALCITRIOL CAPSULES 0.25 MCG 0.5 MCG: 0.25 CAPSULE ORAL at 08:06

## 2023-06-06 RX ADMIN — MIDODRINE HYDROCHLORIDE 7.5 MG: 2.5 TABLET ORAL at 11:06

## 2023-06-06 RX ADMIN — CALCIUM ACETATE 667 MG: 667 CAPSULE ORAL at 07:06

## 2023-06-06 NOTE — NURSING
Nurses Note -- 4 Eyes      6/6/2023   12:10 AM      Skin assessed during: Daily Assessment      [] No Altered Skin Integrity Present    [x]Prevention Measures Documented      [x] Yes- Altered Skin Integrity Present or Discovered   [] LDA Added if Not in Epic (Describe Wound)   [] New Altered Skin Integrity was Present on Admit and Documented in LDA   [] Wound Image Taken    Wound Care Consulted? Yes    Attending Nurse:  Snow Vera RN     Second RN/Staff Member:  Summer Shultz RN
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
## 2023-06-06 NOTE — NURSING
Nurses Note -- 4 Eyes      6/6/2023   5:33 PM      Skin assessed during: Daily Assessment      [] No Altered Skin Integrity Present    []Prevention Measures Documented      [x] Yes- Altered Skin Integrity Present or Discovered   [] LDA Added if Not in Epic (Describe Wound)   [] New Altered Skin Integrity was Present on Admit and Documented in LDA   [x] Wound Image Taken    Wound Care Consulted? Yes    Attending Nurse:  Jerri Obrien RN     Second RN/Staff Member:  Stephy Lerner RN

## 2023-06-07 LAB
ALBUMIN SERPL-MCNC: 2.1 G/DL (ref 3.5–5)
ALBUMIN/GLOB SERPL: 0.4 RATIO (ref 1.1–2)
ALP SERPL-CCNC: 145 UNIT/L (ref 40–150)
ALT SERPL-CCNC: 11 UNIT/L (ref 0–55)
AST SERPL-CCNC: 20 UNIT/L (ref 5–34)
BASOPHILS # BLD AUTO: 0.11 X10(3)/MCL
BASOPHILS NFR BLD AUTO: 0.9 %
BILIRUBIN DIRECT+TOT PNL SERPL-MCNC: 0.2 MG/DL
BUN SERPL-MCNC: 32.6 MG/DL (ref 8.9–20.6)
CALCIUM SERPL-MCNC: 7 MG/DL (ref 8.4–10.2)
CHLORIDE SERPL-SCNC: 104 MMOL/L (ref 98–107)
CO2 SERPL-SCNC: 21 MMOL/L (ref 22–29)
CREAT SERPL-MCNC: 0.53 MG/DL (ref 0.73–1.18)
EOSINOPHIL # BLD AUTO: 0.65 X10(3)/MCL (ref 0–0.9)
EOSINOPHIL NFR BLD AUTO: 5.6 %
ERYTHROCYTE [DISTWIDTH] IN BLOOD BY AUTOMATED COUNT: 20.5 % (ref 11.5–17)
GFR SERPLBLD CREATININE-BSD FMLA CKD-EPI: >60 MLS/MIN/1.73/M2
GLOBULIN SER-MCNC: 5.3 GM/DL (ref 2.4–3.5)
GLUCOSE SERPL-MCNC: 76 MG/DL (ref 74–100)
HCT VFR BLD AUTO: 26.7 % (ref 42–52)
HGB BLD-MCNC: 8.5 G/DL (ref 14–18)
IMM GRANULOCYTES # BLD AUTO: 0.13 X10(3)/MCL (ref 0–0.04)
IMM GRANULOCYTES NFR BLD AUTO: 1.1 %
LYMPHOCYTES # BLD AUTO: 2.84 X10(3)/MCL (ref 0.6–4.6)
LYMPHOCYTES NFR BLD AUTO: 24.3 %
MAGNESIUM SERPL-MCNC: 1.9 MG/DL (ref 1.6–2.6)
MCH RBC QN AUTO: 27.4 PG (ref 27–31)
MCHC RBC AUTO-ENTMCNC: 31.8 G/DL (ref 33–36)
MCV RBC AUTO: 86.1 FL (ref 80–94)
MONOCYTES # BLD AUTO: 1.34 X10(3)/MCL (ref 0.1–1.3)
MONOCYTES NFR BLD AUTO: 11.5 %
NEUTROPHILS # BLD AUTO: 6.61 X10(3)/MCL (ref 2.1–9.2)
NEUTROPHILS NFR BLD AUTO: 56.6 %
NRBC BLD AUTO-RTO: 0 %
PHOSPHATE SERPL-MCNC: 3.6 MG/DL (ref 2.3–4.7)
PLATELET # BLD AUTO: 780 X10(3)/MCL (ref 130–400)
PMV BLD AUTO: 8.3 FL (ref 7.4–10.4)
POTASSIUM SERPL-SCNC: 4.5 MMOL/L (ref 3.5–5.1)
PROT SERPL-MCNC: 7.4 GM/DL (ref 6.4–8.3)
RBC # BLD AUTO: 3.1 X10(6)/MCL (ref 4.7–6.1)
SODIUM SERPL-SCNC: 135 MMOL/L (ref 136–145)
WBC # SPEC AUTO: 11.68 X10(3)/MCL (ref 4.5–11.5)

## 2023-06-07 PROCEDURE — 25000003 PHARM REV CODE 250: Performed by: STUDENT IN AN ORGANIZED HEALTH CARE EDUCATION/TRAINING PROGRAM

## 2023-06-07 PROCEDURE — 27200966 HC CLOSED SUCTION SYSTEM

## 2023-06-07 PROCEDURE — 99900035 HC TECH TIME PER 15 MIN (STAT)

## 2023-06-07 PROCEDURE — 80053 COMPREHEN METABOLIC PANEL: CPT

## 2023-06-07 PROCEDURE — 25000003 PHARM REV CODE 250: Performed by: INTERNAL MEDICINE

## 2023-06-07 PROCEDURE — 27000221 HC OXYGEN, UP TO 24 HOURS

## 2023-06-07 PROCEDURE — 94761 N-INVAS EAR/PLS OXIMETRY MLT: CPT

## 2023-06-07 PROCEDURE — 99900026 HC AIRWAY MAINTENANCE (STAT)

## 2023-06-07 PROCEDURE — 20000000 HC ICU ROOM

## 2023-06-07 PROCEDURE — 85025 COMPLETE CBC W/AUTO DIFF WBC: CPT | Performed by: STUDENT IN AN ORGANIZED HEALTH CARE EDUCATION/TRAINING PROGRAM

## 2023-06-07 PROCEDURE — 63600175 PHARM REV CODE 636 W HCPCS: Performed by: NURSE PRACTITIONER

## 2023-06-07 PROCEDURE — 92597 ORAL SPEECH DEVICE EVAL: CPT

## 2023-06-07 PROCEDURE — 63600175 PHARM REV CODE 636 W HCPCS: Performed by: STUDENT IN AN ORGANIZED HEALTH CARE EDUCATION/TRAINING PROGRAM

## 2023-06-07 PROCEDURE — C9113 INJ PANTOPRAZOLE SODIUM, VIA: HCPCS | Performed by: NURSE PRACTITIONER

## 2023-06-07 PROCEDURE — 84100 ASSAY OF PHOSPHORUS: CPT

## 2023-06-07 PROCEDURE — 97606 NEG PRS WND THER DME>50 SQCM: CPT

## 2023-06-07 PROCEDURE — 83735 ASSAY OF MAGNESIUM: CPT

## 2023-06-07 RX ADMIN — CALCIUM ACETATE 667 MG: 667 CAPSULE ORAL at 08:06

## 2023-06-07 RX ADMIN — MIDODRINE HYDROCHLORIDE 7.5 MG: 2.5 TABLET ORAL at 01:06

## 2023-06-07 RX ADMIN — ENOXAPARIN SODIUM 40 MG: 40 INJECTION SUBCUTANEOUS at 05:06

## 2023-06-07 RX ADMIN — CALCITRIOL CAPSULES 0.25 MCG 0.5 MCG: 0.25 CAPSULE ORAL at 10:06

## 2023-06-07 RX ADMIN — SERTRALINE HYDROCHLORIDE 50 MG: 50 TABLET ORAL at 10:06

## 2023-06-07 RX ADMIN — LEVETIRACETAM 1000 MG: 500 TABLET, FILM COATED ORAL at 10:06

## 2023-06-07 RX ADMIN — LEVETIRACETAM 1000 MG: 500 TABLET, FILM COATED ORAL at 09:06

## 2023-06-07 RX ADMIN — CALCIUM ACETATE 667 MG: 667 CAPSULE ORAL at 05:06

## 2023-06-07 RX ADMIN — CALCIUM ACETATE 667 MG: 667 CAPSULE ORAL at 01:06

## 2023-06-07 RX ADMIN — PANTOPRAZOLE SODIUM 40 MG: 40 INJECTION, POWDER, FOR SOLUTION INTRAVENOUS at 10:06

## 2023-06-07 RX ADMIN — CLONAZEPAM 1 MG: 1 TABLET ORAL at 09:06

## 2023-06-07 RX ADMIN — MIDODRINE HYDROCHLORIDE 7.5 MG: 2.5 TABLET ORAL at 05:06

## 2023-06-07 RX ADMIN — MIDODRINE HYDROCHLORIDE 7.5 MG: 2.5 TABLET ORAL at 08:06

## 2023-06-07 RX ADMIN — CLONAZEPAM 1 MG: 1 TABLET ORAL at 10:06

## 2023-06-07 NOTE — PLAN OF CARE
Obtained FOC for [facility] in [place], [place] and referral submitted via [facility].

## 2023-06-07 NOTE — NURSING
Nurses Note -- 4 Eyes      6/7/2023   5:12 AM      Skin assessed during: Q Shift Change      [] No Altered Skin Integrity Present    [x]Prevention Measures Documented      [x] Yes- Altered Skin Integrity Present or Discovered   [] LDA Added if Not in Epic (Describe Wound)   [] New Altered Skin Integrity was Present on Admit and Documented in LDA   [] Wound Image Taken    Wound Care Consulted? Yes    Attending Nurse:  Summer Shultz RN     Second RN/Staff Member:  Darlin Le RN

## 2023-06-07 NOTE — PROGRESS NOTES
[facility] - 7th Floor ICU  Wound Care    Patient Name:  Jyoti Mayberry   MRN:  [id number]  Date: 6/7/2023  Diagnosis: <principal problem not specified>    History:     History reviewed. No pertinent past medical history.    Social History     Socioeconomic History    Marital status: Single       Precautions:     Allergies as of 05/15/2023    (No Known Allergies)       [facility] Assessment Details/Treatment   Follow up visit, sacral and ischial wounds cleansed and assessed and redressed with NPWT dressings , using white granulofoam under black granulofoam and iniated continuous NPWT at 125 mmhg , excellent seal obtained. Patient tolerated procedure well , and remains resting on an air fluidized bed with pressure injury prevention measures in place.    06/07/23 0900        Negative Pressure Wound Therapy  06/07/23 0900   Placement Date/Time: 06/07/23 0900   Location: Sacral Spine   NPWT Type Vacuum Therapy   Therapy Setting NPWT Continuous therapy   Pressure Setting NPWT 125 mmHg   Sponges Inserted NPWT Black;White;1        Negative Pressure Wound Therapy  06/07/23 0900   Placement Date/Time: 06/07/23 0900     NPWT Type Vacuum Therapy   Therapy Setting NPWT Continuous therapy   Pressure Setting NPWT 125 mmHg   Sponges Inserted NPWT Black;White;1        Altered Skin Integrity 05/15/23 1411 Sacral spine Full thickness tissue loss. Base is covered by slough and/or eschar in the wound bed   Date First Assessed/Time First Assessed: 05/15/23 1411   Altered Skin Integrity Present on Admission - Did Patient arrive to the hospital with altered skin?: yes  Location: Sacral spine  Description of Altered Skin Integrity: Full thickness tissue los...   Wound Image    Description of Altered Skin Integrity Full thickness tissue loss with exposed bone, tendon, or muscle. Often includes undermining and tunneling. May extend into muscle and/or supporting structures.   Dressing Appearance Moist drainage   Drainage Amount Scant    Drainage Characteristics/Odor Serosanguineous   Appearance Red;Granulating   Tissue loss description Full thickness   Red (%), Wound Tissue Color 100 %   Periwound Area Intact;Dry   Wound Edges Defined   Wound Length (cm) 9 cm   Wound Width (cm) 12 cm   Wound Depth (cm) 2 cm   Wound Volume (cm^3) 216 cm^3   Wound Surface Area (cm^2) 108 cm^2   Care Antimicrobial agent   Dressing   (npwt)   Periwound Care Skin barrier film applied   Dressing Change Due 06/09/23        Altered Skin Integrity 06/07/23 0900 Left Ischial tuberosity Full thickness tissue loss with exposed bone, tendon, or muscle. Often includes undermining and tunneling. May extend into muscle and/or supporting structures.   Date First Assessed/Time First Assessed: 06/07/23 0900   Altered Skin Integrity Present on Admission - Did Patient arrive to the hospital with altered skin?: yes  Side: Left  Location: Ischial tuberosity  Description of Altered Skin Integrity: Full th...   Wound Image    Description of Altered Skin Integrity Full thickness tissue loss with exposed bone, tendon, or muscle. Often includes undermining and tunneling. May extend into muscle and/or supporting structures.   Drainage Amount Small   Drainage Characteristics/Odor Serosanguineous   Appearance Granulating;Red   Tissue loss description Full thickness   Red (%), Wound Tissue Color 100 %   Periwound Area Intact   Wound Edges Defined   Wound Length (cm) 8.5 cm   Wound Width (cm) 5 cm   Wound Depth (cm) 2.5 cm   Wound Volume (cm^3) 106.25 cm^3   Wound Surface Area (cm^2) 42.5 cm^2   Care Antimicrobial agent   Dressing   (NPWT)   Periwound Care Skin barrier film applied   Dressing Change Due 06/09/23        Altered Skin Integrity 06/07/23 0900 Right Ischial tuberosity Full thickness tissue loss with exposed bone, tendon, or muscle. Often includes undermining and tunneling. May extend into muscle and/or supporting structures.   Date First Assessed/Time First Assessed: 06/07/23 0900    Altered Skin Integrity Present on Admission - Did Patient arrive to the hospital with altered skin?: yes  Side: Right  Location: Ischial tuberosity  Description of Altered Skin Integrity: Full t...   Wound Image    Description of Altered Skin Integrity Full thickness tissue loss with exposed bone, tendon, or muscle. Often includes undermining and tunneling. May extend into muscle and/or supporting structures.   Dressing Appearance Moist drainage   Drainage Amount Scant   Drainage Characteristics/Odor Serosanguineous   Appearance Granulating   Tissue loss description Full thickness   Red (%), Wound Tissue Color 100 %   Periwound Area Intact   Wound Edges Defined   Wound Length (cm) 9 cm   Wound Width (cm) 4.5 cm   Wound Depth (cm) 2 cm   Wound Volume (cm^3) 81 cm^3   Wound Surface Area (cm^2) 40.5 cm^2   Care Antimicrobial agent   Dressing   (NPWT)   Periwound Care Skin barrier film applied   Dressing Change Due 06/09/23       Recommendations made to primary team for monitoring of  NPWT dressings and pressure injury prevention measures to include q2hour turning. Orders placed.     06/07/2023
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
## 2023-06-07 NOTE — NURSING
Nurses Note -- 4 Eyes      6/7/2023   0900 AM      Skin assessed during: Daily Assessment      [] No Altered Skin Integrity Present    [x]Prevention Measures Documented      [x] Yes- Altered Skin Integrity Present or Discovered   [] LDA Added if Not in Epic (Describe Wound)   [] New Altered Skin Integrity was Present on Admit and Documented in LDA   [] Wound Image Taken    Wound Care Consulted? Yes    Attending Nurse:  Laya Patiño RN     Second RN/Staff Member:  Tracy Mcarthur RN

## 2023-06-08 VITALS
OXYGEN SATURATION: 99 % | TEMPERATURE: 98 F | SYSTOLIC BLOOD PRESSURE: 102 MMHG | WEIGHT: 147.25 LBS | HEART RATE: 99 BPM | RESPIRATION RATE: 20 BRPM | BODY MASS INDEX: 21.81 KG/M2 | DIASTOLIC BLOOD PRESSURE: 64 MMHG | HEIGHT: 69 IN

## 2023-06-08 LAB
ALBUMIN SERPL-MCNC: 2.1 G/DL (ref 3.5–5)
ALBUMIN/GLOB SERPL: 0.4 RATIO (ref 1.1–2)
ALP SERPL-CCNC: 146 UNIT/L (ref 40–150)
ALT SERPL-CCNC: 12 UNIT/L (ref 0–55)
AST SERPL-CCNC: 23 UNIT/L (ref 5–34)
BASOPHILS # BLD AUTO: 0.11 X10(3)/MCL
BASOPHILS NFR BLD AUTO: 1.1 %
BILIRUBIN DIRECT+TOT PNL SERPL-MCNC: 0.2 MG/DL
BUN SERPL-MCNC: 33 MG/DL (ref 8.9–20.6)
CALCIUM SERPL-MCNC: 7.2 MG/DL (ref 8.4–10.2)
CHLORIDE SERPL-SCNC: 102 MMOL/L (ref 98–107)
CO2 SERPL-SCNC: 22 MMOL/L (ref 22–29)
CREAT SERPL-MCNC: 0.53 MG/DL (ref 0.73–1.18)
EOSINOPHIL # BLD AUTO: 0.55 X10(3)/MCL (ref 0–0.9)
EOSINOPHIL NFR BLD AUTO: 5.6 %
ERYTHROCYTE [DISTWIDTH] IN BLOOD BY AUTOMATED COUNT: 20.1 % (ref 11.5–17)
GFR SERPLBLD CREATININE-BSD FMLA CKD-EPI: >60 MLS/MIN/1.73/M2
GLOBULIN SER-MCNC: 5.2 GM/DL (ref 2.4–3.5)
GLUCOSE SERPL-MCNC: 80 MG/DL (ref 74–100)
HCT VFR BLD AUTO: 27.3 % (ref 42–52)
HGB BLD-MCNC: 8.5 G/DL (ref 14–18)
IMM GRANULOCYTES # BLD AUTO: 0.09 X10(3)/MCL (ref 0–0.04)
IMM GRANULOCYTES NFR BLD AUTO: 0.9 %
LYMPHOCYTES # BLD AUTO: 2.23 X10(3)/MCL (ref 0.6–4.6)
LYMPHOCYTES NFR BLD AUTO: 22.8 %
MAGNESIUM SERPL-MCNC: 1.6 MG/DL (ref 1.6–2.6)
MCH RBC QN AUTO: 26.6 PG (ref 27–31)
MCHC RBC AUTO-ENTMCNC: 31.1 G/DL (ref 33–36)
MCV RBC AUTO: 85.3 FL (ref 80–94)
MONOCYTES # BLD AUTO: 0.95 X10(3)/MCL (ref 0.1–1.3)
MONOCYTES NFR BLD AUTO: 9.7 %
NEUTROPHILS # BLD AUTO: 5.84 X10(3)/MCL (ref 2.1–9.2)
NEUTROPHILS NFR BLD AUTO: 59.9 %
NRBC BLD AUTO-RTO: 0 %
PHOSPHATE SERPL-MCNC: 4.4 MG/DL (ref 2.3–4.7)
PLATELET # BLD AUTO: 768 X10(3)/MCL (ref 130–400)
PMV BLD AUTO: 8.4 FL (ref 7.4–10.4)
POTASSIUM SERPL-SCNC: 4.5 MMOL/L (ref 3.5–5.1)
PROT SERPL-MCNC: 7.3 GM/DL (ref 6.4–8.3)
RBC # BLD AUTO: 3.2 X10(6)/MCL (ref 4.7–6.1)
SODIUM SERPL-SCNC: 133 MMOL/L (ref 136–145)
WBC # SPEC AUTO: 9.77 X10(3)/MCL (ref 4.5–11.5)

## 2023-06-08 PROCEDURE — C9113 INJ PANTOPRAZOLE SODIUM, VIA: HCPCS | Performed by: NURSE PRACTITIONER

## 2023-06-08 PROCEDURE — 25000003 PHARM REV CODE 250: Performed by: INTERNAL MEDICINE

## 2023-06-08 PROCEDURE — 83735 ASSAY OF MAGNESIUM: CPT

## 2023-06-08 PROCEDURE — 25000003 PHARM REV CODE 250: Performed by: STUDENT IN AN ORGANIZED HEALTH CARE EDUCATION/TRAINING PROGRAM

## 2023-06-08 PROCEDURE — 84100 ASSAY OF PHOSPHORUS: CPT

## 2023-06-08 PROCEDURE — 63600175 PHARM REV CODE 636 W HCPCS: Performed by: NURSE PRACTITIONER

## 2023-06-08 PROCEDURE — 99900026 HC AIRWAY MAINTENANCE (STAT)

## 2023-06-08 PROCEDURE — 94761 N-INVAS EAR/PLS OXIMETRY MLT: CPT

## 2023-06-08 PROCEDURE — 80053 COMPREHEN METABOLIC PANEL: CPT

## 2023-06-08 PROCEDURE — 99900035 HC TECH TIME PER 15 MIN (STAT)

## 2023-06-08 PROCEDURE — 85025 COMPLETE CBC W/AUTO DIFF WBC: CPT | Performed by: STUDENT IN AN ORGANIZED HEALTH CARE EDUCATION/TRAINING PROGRAM

## 2023-06-08 RX ORDER — HYDROCODONE BITARTRATE AND ACETAMINOPHEN 10; 325 MG/1; MG/1
1 TABLET ORAL EVERY 6 HOURS PRN
Refills: 0 | Status: ON HOLD
Start: 2023-06-08

## 2023-06-08 RX ORDER — ENOXAPARIN SODIUM 100 MG/ML
40 INJECTION SUBCUTANEOUS DAILY
Status: ON HOLD
Start: 2023-06-08

## 2023-06-08 RX ORDER — CLONAZEPAM 1 MG/1
1 TABLET ORAL 2 TIMES DAILY
Qty: 60 TABLET | Refills: 0 | Status: ON HOLD
Start: 2023-06-08 | End: 2024-03-05 | Stop reason: HOSPADM

## 2023-06-08 RX ORDER — ONDANSETRON HYDROCHLORIDE 4 MG/5ML
4 SOLUTION ORAL EVERY 6 HOURS PRN
Status: ON HOLD
Start: 2023-06-08

## 2023-06-08 RX ORDER — PANTOPRAZOLE SODIUM 40 MG/10ML
40 INJECTION, POWDER, LYOPHILIZED, FOR SOLUTION INTRAVENOUS DAILY
Qty: 30 EACH | Refills: 11 | Status: ON HOLD
Start: 2023-06-09 | End: 2024-06-08

## 2023-06-08 RX ORDER — CALCITRIOL 0.5 UG/1
0.5 CAPSULE ORAL DAILY
Status: ON HOLD
Start: 2023-06-09 | End: 2024-03-11 | Stop reason: ALTCHOICE

## 2023-06-08 RX ORDER — ACETAMINOPHEN 325 MG/1
650 TABLET ORAL EVERY 6 HOURS PRN
Refills: 0 | Status: ON HOLD
Start: 2023-06-08

## 2023-06-08 RX ORDER — TALC
6 POWDER (GRAM) TOPICAL NIGHTLY PRN
Refills: 0 | Status: ON HOLD
Start: 2023-06-08

## 2023-06-08 RX ORDER — ALPRAZOLAM 1 MG/1
1 TABLET ORAL 3 TIMES DAILY PRN
Status: ON HOLD
Start: 2023-06-08 | End: 2024-03-05 | Stop reason: HOSPADM

## 2023-06-08 RX ORDER — MIDODRINE HYDROCHLORIDE 2.5 MG/1
7.5 TABLET ORAL
Qty: 270 TABLET | Refills: 11 | Status: ON HOLD
Start: 2023-06-08 | End: 2024-03-05 | Stop reason: HOSPADM

## 2023-06-08 RX ORDER — ALBUTEROL SULFATE 0.83 MG/ML
2.5 SOLUTION RESPIRATORY (INHALATION) EVERY 4 HOURS PRN
Refills: 0 | Status: ON HOLD
Start: 2023-06-08 | End: 2024-06-07

## 2023-06-08 RX ORDER — SERTRALINE HYDROCHLORIDE 50 MG/1
50 TABLET, FILM COATED ORAL DAILY
Qty: 30 TABLET | Refills: 11 | Status: ON HOLD
Start: 2023-06-09 | End: 2024-06-08

## 2023-06-08 RX ORDER — LEVETIRACETAM 1000 MG/1
1000 TABLET ORAL 2 TIMES DAILY
Qty: 60 TABLET | Refills: 11 | Status: ON HOLD
Start: 2023-06-08 | End: 2024-03-05 | Stop reason: HOSPADM

## 2023-06-08 RX ORDER — CALCIUM ACETATE 667 MG/1
667 CAPSULE ORAL
Qty: 90 CAPSULE | Refills: 11 | Status: ON HOLD
Start: 2023-06-08 | End: 2024-03-11 | Stop reason: ALTCHOICE

## 2023-06-08 RX ADMIN — CLONAZEPAM 1 MG: 1 TABLET ORAL at 08:06

## 2023-06-08 RX ADMIN — SERTRALINE HYDROCHLORIDE 50 MG: 50 TABLET ORAL at 08:06

## 2023-06-08 RX ADMIN — MIDODRINE HYDROCHLORIDE 7.5 MG: 2.5 TABLET ORAL at 12:06

## 2023-06-08 RX ADMIN — LEVETIRACETAM 1000 MG: 500 TABLET, FILM COATED ORAL at 08:06

## 2023-06-08 RX ADMIN — CALCITRIOL CAPSULES 0.25 MCG 0.5 MCG: 0.25 CAPSULE ORAL at 08:06

## 2023-06-08 RX ADMIN — CALCIUM ACETATE 667 MG: 667 CAPSULE ORAL at 12:06

## 2023-06-08 RX ADMIN — MIDODRINE HYDROCHLORIDE 7.5 MG: 2.5 TABLET ORAL at 08:06

## 2023-06-08 RX ADMIN — PANTOPRAZOLE SODIUM 40 MG: 40 INJECTION, POWDER, FOR SOLUTION INTRAVENOUS at 08:06

## 2023-06-08 RX ADMIN — CALCIUM ACETATE 667 MG: 667 CAPSULE ORAL at 08:06
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
## 2023-06-08 NOTE — PLAN OF CARE
Patient will be transferred to [facility], transportation has been set up, [facility].
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
## 2023-06-27 NOTE — DISCHARGE SUMMARY
[facility] - 7th Floor ICU  Pulmonology  Discharge Summary      Patient Name: Jyoti Mayberry  MRN: [id number]  Admission Date: 5/15/2023  Hospital Length of Stay: 24 days  Discharge Date and Time: 6/8/2023  4:10 PM  Attending Physician: Geri att. providers found   Discharging Provider: Chester Velazquez MD  Primary Care Provider: Primary Doctor No    HPI:  No notes on file    Procedure(s) (LRB):  PEG (N/A)    Indwelling Lines/Drains at Time of Discharge:   Lines/Drains/Airways     Drain  Duration                Gastrostomy/Enterostomy 05/30/23 1600 Percutaneous endoscopic gastrostomy (PEG) LUQ feeding 27 days          Airway  Duration           Adult Surgical Airway 05/25/23 1135 Shiley Cuffed 8.0 / 85 mm 33 days                Hospital Course:  No notes on file  Patient was admitted to the ICU following cardiopulmonary arrest resulting in CPR and intubation and mechanical ventilation in the emergency room.  He remained on the ventilator throughout his entire stay.  After prolonged period of time there was no significant improvement his respiratory status and therefore a tracheostomy was placed.    He had multiple sacral and extremity wounds which were evaluated by wound care and plastic surgery.    Eventually he became stable no further signs of active infection but still required mechanical ventilation and therefore he will be transferred to an LTAC facility.    Goals of Care Treatment Preferences:  Code Status: Full Code          What is most important right now is to focus on curative/life-prolongation (regardless of treatment burdens).  Accordingly, we have decided that the best plan to meet the patient's goals includes continuing with treatment.      Consults (From admission, onward)        Status Ordering Provider     Inpatient consult to Gastroenterology  Once        Provider:  RICHARD Espana     Inpatient consult to Palliative Care  Once        Provider:  Mike GROSS  MD Martine    Completed DEDE OSORIO     Inpatient consult to Wound Care Physician  Once        Provider:  Jaqueline Nicole MD    Completed GONZALEZ COLEMAN     Inpatient consult to Registered Dietitian/Nutritionist  Once        Provider:  (Not yet assigned)    Completed DOROTA ALEMAN     Inpatient consult to Cardiology  Once        Provider:  Octavio Bob MD    Completed DOROTA ALEMAN     IP consult to case management  Once        Provider:  (Not yet assigned)    Completed JOSÉ LUIS CARABALLO JR.     Inpatient consult to Registered Dietitian/Nutritionist  Once        Provider:  (Not yet assigned)    Completed JOSÉ LUIS CARABALLO JR.          Significant Labs:  None    Significant Imaging:  I have reviewed all pertinent imaging results/findings within the past 24 hours.    Pending Diagnostic Studies:     None        Final Active Diagnoses:    Diagnosis Date Noted POA    PRINCIPAL PROBLEM:  Open wounds of multiple sites of left hand [S61.402A] 05/18/2023 Yes    Quadriplegia [G82.50] 05/18/2023 Yes    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 [L89.154] 05/18/2023 Yes    Chronic osteomyelitis [M86.60] 05/18/2023 Yes    Stage IV pressure ulcer of left buttock [L89.324] 05/18/2023 Yes    Stage IV pressure ulcer of right buttock [L89.314] 05/18/2023 Yes    Extravasation of vesicant agent [T80.818A] 05/18/2023 Yes      Problems Resolved During this Admission:       Discharged Condition: stable    Disposition: Long Term Care    Follow Up:   Follow-up Information     [facility] cardiology clinic Follow up in 1 week(s).                     Patient Instructions:   No discharge procedures on file.  Medications:  Reconciled Home Medications:      Medication List      START taking these medications    acetaminophen 325 MG tablet  Commonly known as: TYLENOL  2 tablets (650 mg total) by Per G Tube route every 6 (six) hours as needed for Temperature greater than (100.4).     albuterol 2.5 mg /3 mL (0.083 %) nebulizer  solution  Commonly known as: PROVENTIL  Take 3 mLs (2.5 mg total) by nebulization every 4 (four) hours as needed (shortness of breath). Rescue     ALPRAZolam 1 MG tablet  Commonly known as: XANAX  Take 1 tablet (1 mg total) by mouth 3 (three) times daily as needed for Anxiety.     calcitRIOL 0.5 MCG Cap  Commonly known as: ROCALTROL  Take 1 capsule (0.5 mcg total) by mouth once daily.     calcium acetate(phosphat bind) 667 mg capsule  Commonly known as: PHOSLO  Take 1 capsule (667 mg total) by mouth 3 (three) times daily with meals.     clonazePAM 1 MG tablet  Commonly known as: KlonoPIN  Take 1 tablet (1 mg total) by mouth 2 (two) times daily.     enoxaparin 40 mg/0.4 mL Syrg  Commonly known as: LOVENOX  Inject 0.4 mLs (40 mg total) into the skin once daily.     HYDROcodone-acetaminophen  mg per tablet  Commonly known as: NORCO  Take 1 tablet by mouth every 6 (six) hours as needed for Pain.     levETIRAcetam 1000 MG tablet  Commonly known as: KEPPRA  Take 1 tablet (1,000 mg total) by mouth 2 (two) times daily.     melatonin 3 mg tablet  Commonly known as: MELATIN  Take 2 tablets (6 mg total) by mouth nightly as needed for Insomnia.     midodrine 2.5 MG Tab  Commonly known as: PROAMATINE  Take 3 tablets (7.5 mg total) by mouth 3 (three) times daily with meals.     ondansetron 4 mg/5 mL solution  Commonly known as: ZOFRAN  Take 5 mLs (4 mg total) by mouth every 6 (six) hours as needed for Nausea.     pantoprazole 40 mg injection  Commonly known as: PROTONIX  Inject 40 mg into the vein once daily.     sertraline 50 MG tablet  Commonly known as: ZOLOFT  Take 1 tablet (50 mg total) by mouth once daily.            Chester Velazquez MD  Pulmonology  [facility] - 7th Floor ICU

## 2024-02-21 ENCOUNTER — HOSPITAL ENCOUNTER (INPATIENT)
Facility: HOSPITAL | Age: 29
LOS: 15 days | Discharge: OTHER INSTITUTION | DRG: 870 | End: 2024-03-07
Attending: EMERGENCY MEDICINE | Admitting: SPECIALIST
Payer: MEDICAID

## 2024-02-21 DIAGNOSIS — N39.0 URINARY TRACT INFECTION ASSOCIATED WITH INDWELLING URETHRAL CATHETER, INITIAL ENCOUNTER: ICD-10-CM

## 2024-02-21 DIAGNOSIS — I50.9 HEART FAILURE: ICD-10-CM

## 2024-02-21 DIAGNOSIS — A41.9 SEPTIC SHOCK: Primary | ICD-10-CM

## 2024-02-21 DIAGNOSIS — R65.21 SEPSIS WITH ACUTE HYPOXIC RESPIRATORY FAILURE AND SEPTIC SHOCK, DUE TO UNSPECIFIED ORGANISM: ICD-10-CM

## 2024-02-21 DIAGNOSIS — R65.21 SEPTIC SHOCK: Primary | ICD-10-CM

## 2024-02-21 DIAGNOSIS — A41.9 SEPSIS WITH ACUTE HYPOXIC RESPIRATORY FAILURE AND SEPTIC SHOCK, DUE TO UNSPECIFIED ORGANISM: ICD-10-CM

## 2024-02-21 DIAGNOSIS — L89.44: ICD-10-CM

## 2024-02-21 DIAGNOSIS — I96 GANGRENE OF EXTREMITY: ICD-10-CM

## 2024-02-21 DIAGNOSIS — J96.01 SEPSIS WITH ACUTE HYPOXIC RESPIRATORY FAILURE AND SEPTIC SHOCK, DUE TO UNSPECIFIED ORGANISM: ICD-10-CM

## 2024-02-21 DIAGNOSIS — R09.02 HYPOXIA: ICD-10-CM

## 2024-02-21 DIAGNOSIS — T83.511A URINARY TRACT INFECTION ASSOCIATED WITH INDWELLING URETHRAL CATHETER, INITIAL ENCOUNTER: ICD-10-CM

## 2024-02-21 PROBLEM — R65.20 SEPSIS WITH ACUTE RESPIRATORY FAILURE: Status: ACTIVE | Noted: 2024-02-21

## 2024-02-21 PROBLEM — J96.00 SEPSIS WITH ACUTE RESPIRATORY FAILURE: Status: ACTIVE | Noted: 2024-02-21

## 2024-02-21 PROBLEM — R57.9 SHOCK: Status: ACTIVE | Noted: 2024-02-21

## 2024-02-21 PROBLEM — L89.90 PRESSURE ULCERS OF SKIN OF MULTIPLE TOPOGRAPHIC SITES: Status: ACTIVE | Noted: 2024-02-21

## 2024-02-21 PROBLEM — R06.03 RESPIRATORY DISTRESS: Status: ACTIVE | Noted: 2024-02-21

## 2024-02-21 LAB
ALBUMIN SERPL BCP-MCNC: 1.4 G/DL (ref 3.5–5.2)
ALLENS TEST: ABNORMAL
ALLENS TEST: ABNORMAL
ALP SERPL-CCNC: 123 U/L (ref 55–135)
ALT SERPL W/O P-5'-P-CCNC: 22 U/L (ref 10–44)
ANION GAP SERPL CALC-SCNC: 11 MMOL/L (ref 8–16)
ANISOCYTOSIS BLD QL SMEAR: SLIGHT
AST SERPL-CCNC: 32 U/L (ref 10–40)
BACTERIA #/AREA URNS HPF: ABNORMAL /HPF
BASOPHILS # BLD AUTO: ABNORMAL K/UL (ref 0–0.2)
BASOPHILS NFR BLD: 0 % (ref 0–1.9)
BILIRUB SERPL-MCNC: 0.2 MG/DL (ref 0.1–1)
BILIRUB UR QL STRIP: NEGATIVE
BNP SERPL-MCNC: 193 PG/ML (ref 0–99)
BUN SERPL-MCNC: 34 MG/DL (ref 6–20)
CALCIUM SERPL-MCNC: 7.8 MG/DL (ref 8.7–10.5)
CHLORIDE SERPL-SCNC: 106 MMOL/L (ref 95–110)
CLARITY UR: ABNORMAL
CO2 SERPL-SCNC: 22 MMOL/L (ref 23–29)
COLOR UR: YELLOW
CREAT SERPL-MCNC: 0.9 MG/DL (ref 0.5–1.4)
DACRYOCYTES BLD QL SMEAR: ABNORMAL
DELSYS: ABNORMAL
DELSYS: ABNORMAL
DIFFERENTIAL METHOD BLD: ABNORMAL
EOSINOPHIL # BLD AUTO: ABNORMAL K/UL (ref 0–0.5)
EOSINOPHIL NFR BLD: 1 % (ref 0–8)
ERYTHROCYTE [DISTWIDTH] IN BLOOD BY AUTOMATED COUNT: 16.4 % (ref 11.5–14.5)
ERYTHROCYTE [SEDIMENTATION RATE] IN BLOOD BY WESTERGREN METHOD: 26 MM/H
EST. GFR  (NO RACE VARIABLE): >60 ML/MIN/1.73 M^2
FIO2: 100
FIO2: 60
FLOW: 10
GLUCOSE SERPL-MCNC: 91 MG/DL (ref 70–110)
GLUCOSE UR QL STRIP: NEGATIVE
HCO3 UR-SCNC: 26 MMOL/L (ref 24–28)
HCO3 UR-SCNC: 26.3 MMOL/L (ref 24–28)
HCT VFR BLD AUTO: 24.1 % (ref 40–54)
HGB BLD-MCNC: 7.7 G/DL (ref 14–18)
HGB UR QL STRIP: ABNORMAL
HYALINE CASTS #/AREA URNS LPF: 0 /LPF
IMM GRANULOCYTES # BLD AUTO: ABNORMAL K/UL (ref 0–0.04)
IMM GRANULOCYTES NFR BLD AUTO: ABNORMAL % (ref 0–0.5)
INFLUENZA A, MOLECULAR: NEGATIVE
INFLUENZA B, MOLECULAR: NEGATIVE
KETONES UR QL STRIP: NEGATIVE
LACTATE SERPL-SCNC: 0.7 MMOL/L (ref 0.5–2.2)
LACTATE SERPL-SCNC: 1.7 MMOL/L (ref 0.5–2.2)
LEUKOCYTE ESTERASE UR QL STRIP: ABNORMAL
LIPASE SERPL-CCNC: <3 U/L (ref 4–60)
LYMPHOCYTES # BLD AUTO: ABNORMAL K/UL (ref 1–4.8)
LYMPHOCYTES NFR BLD: 24 % (ref 18–48)
MAGNESIUM SERPL-MCNC: 1.6 MG/DL (ref 1.6–2.6)
MCH RBC QN AUTO: 29.4 PG (ref 27–31)
MCHC RBC AUTO-ENTMCNC: 32 G/DL (ref 32–36)
MCV RBC AUTO: 92 FL (ref 82–98)
MICROSCOPIC COMMENT: ABNORMAL
MODE: ABNORMAL
MODE: ABNORMAL
MONOCYTES # BLD AUTO: ABNORMAL K/UL (ref 0.3–1)
MONOCYTES NFR BLD: 8 % (ref 4–15)
NEUTROPHILS NFR BLD: 67 % (ref 38–73)
NITRITE UR QL STRIP: POSITIVE
NRBC BLD-RTO: 0 /100 WBC
OHS QRS DURATION: 70 MS
OHS QTC CALCULATION: 449 MS
PCO2 BLDA: 48 MMHG (ref 35–45)
PCO2 BLDA: 48.5 MMHG (ref 35–45)
PEEP: 5
PH SMN: 7.34 [PH] (ref 7.35–7.45)
PH SMN: 7.35 [PH] (ref 7.35–7.45)
PH UR STRIP: 8 [PH] (ref 5–8)
PLATELET # BLD AUTO: 703 K/UL (ref 150–450)
PLATELET BLD QL SMEAR: ABNORMAL
PMV BLD AUTO: 8.7 FL (ref 9.2–12.9)
PO2 BLDA: 380 MMHG (ref 80–100)
PO2 BLDA: 54 MMHG (ref 80–100)
POC BE: 0 MMOL/L
POC BE: 1 MMOL/L
POC SATURATED O2: 100 % (ref 95–100)
POC SATURATED O2: 85 % (ref 95–100)
POCT GLUCOSE: 130 MG/DL (ref 70–110)
POTASSIUM SERPL-SCNC: 4.9 MMOL/L (ref 3.5–5.1)
PROCALCITONIN SERPL IA-MCNC: 0.85 NG/ML
PROT SERPL-MCNC: 7 G/DL (ref 6–8.4)
PROT UR QL STRIP: ABNORMAL
RBC # BLD AUTO: 2.62 M/UL (ref 4.6–6.2)
RBC #/AREA URNS HPF: 15 /HPF (ref 0–4)
SAMPLE: ABNORMAL
SAMPLE: ABNORMAL
SARS-COV-2 RDRP RESP QL NAA+PROBE: NEGATIVE
SITE: ABNORMAL
SITE: ABNORMAL
SODIUM SERPL-SCNC: 139 MMOL/L (ref 136–145)
SP GR UR STRIP: 1.01 (ref 1–1.03)
SP02: 93
SPECIMEN SOURCE: NORMAL
TROPONIN I SERPL DL<=0.01 NG/ML-MCNC: 0.01 NG/ML (ref 0–0.03)
URN SPEC COLLECT METH UR: ABNORMAL
UROBILINOGEN UR STRIP-ACNC: NEGATIVE EU/DL
VT: 365
WBC # BLD AUTO: 21.87 K/UL (ref 3.9–12.7)
WBC #/AREA URNS HPF: >100 /HPF (ref 0–5)
WBC CLUMPS URNS QL MICRO: ABNORMAL

## 2024-02-21 PROCEDURE — 96365 THER/PROPH/DIAG IV INF INIT: CPT

## 2024-02-21 PROCEDURE — 20000000 HC ICU ROOM

## 2024-02-21 PROCEDURE — 02HV33Z INSERTION OF INFUSION DEVICE INTO SUPERIOR VENA CAVA, PERCUTANEOUS APPROACH: ICD-10-PCS | Performed by: STUDENT IN AN ORGANIZED HEALTH CARE EDUCATION/TRAINING PROGRAM

## 2024-02-21 PROCEDURE — 80053 COMPREHEN METABOLIC PANEL: CPT | Performed by: EMERGENCY MEDICINE

## 2024-02-21 PROCEDURE — 63600175 PHARM REV CODE 636 W HCPCS

## 2024-02-21 PROCEDURE — 36415 COLL VENOUS BLD VENIPUNCTURE: CPT | Mod: XB | Performed by: SPECIALIST

## 2024-02-21 PROCEDURE — 27100171 HC OXYGEN HIGH FLOW UP TO 24 HOURS

## 2024-02-21 PROCEDURE — 99900035 HC TECH TIME PER 15 MIN (STAT)

## 2024-02-21 PROCEDURE — 63600175 PHARM REV CODE 636 W HCPCS: Performed by: NURSE PRACTITIONER

## 2024-02-21 PROCEDURE — 85027 COMPLETE CBC AUTOMATED: CPT | Performed by: EMERGENCY MEDICINE

## 2024-02-21 PROCEDURE — 83880 ASSAY OF NATRIURETIC PEPTIDE: CPT | Performed by: EMERGENCY MEDICINE

## 2024-02-21 PROCEDURE — 93005 ELECTROCARDIOGRAM TRACING: CPT

## 2024-02-21 PROCEDURE — 96368 THER/DIAG CONCURRENT INF: CPT

## 2024-02-21 PROCEDURE — 93010 ELECTROCARDIOGRAM REPORT: CPT | Mod: ,,, | Performed by: INTERNAL MEDICINE

## 2024-02-21 PROCEDURE — 99291 CRITICAL CARE FIRST HOUR: CPT | Mod: 25

## 2024-02-21 PROCEDURE — 25000242 PHARM REV CODE 250 ALT 637 W/ HCPCS: Performed by: EMERGENCY MEDICINE

## 2024-02-21 PROCEDURE — 83605 ASSAY OF LACTIC ACID: CPT | Mod: 91 | Performed by: EMERGENCY MEDICINE

## 2024-02-21 PROCEDURE — 25000003 PHARM REV CODE 250: Performed by: NURSE PRACTITIONER

## 2024-02-21 PROCEDURE — 85007 BL SMEAR W/DIFF WBC COUNT: CPT | Performed by: EMERGENCY MEDICINE

## 2024-02-21 PROCEDURE — 36600 WITHDRAWAL OF ARTERIAL BLOOD: CPT

## 2024-02-21 PROCEDURE — 82962 GLUCOSE BLOOD TEST: CPT

## 2024-02-21 PROCEDURE — 87040 BLOOD CULTURE FOR BACTERIA: CPT | Mod: 59 | Performed by: EMERGENCY MEDICINE

## 2024-02-21 PROCEDURE — 96361 HYDRATE IV INFUSION ADD-ON: CPT

## 2024-02-21 PROCEDURE — 84145 PROCALCITONIN (PCT): CPT | Performed by: EMERGENCY MEDICINE

## 2024-02-21 PROCEDURE — C1751 CATH, INF, PER/CENT/MIDLINE: HCPCS

## 2024-02-21 PROCEDURE — 94640 AIRWAY INHALATION TREATMENT: CPT | Mod: XB

## 2024-02-21 PROCEDURE — 87088 URINE BACTERIA CULTURE: CPT | Performed by: EMERGENCY MEDICINE

## 2024-02-21 PROCEDURE — 87154 CUL TYP ID BLD PTHGN 6+ TRGT: CPT | Performed by: EMERGENCY MEDICINE

## 2024-02-21 PROCEDURE — 63600175 PHARM REV CODE 636 W HCPCS: Performed by: EMERGENCY MEDICINE

## 2024-02-21 PROCEDURE — 36415 COLL VENOUS BLD VENIPUNCTURE: CPT | Performed by: EMERGENCY MEDICINE

## 2024-02-21 PROCEDURE — 96367 TX/PROPH/DG ADDL SEQ IV INF: CPT

## 2024-02-21 PROCEDURE — 94761 N-INVAS EAR/PLS OXIMETRY MLT: CPT | Mod: XB

## 2024-02-21 PROCEDURE — 87086 URINE CULTURE/COLONY COUNT: CPT | Performed by: EMERGENCY MEDICINE

## 2024-02-21 PROCEDURE — 87186 SC STD MICRODIL/AGAR DIL: CPT | Mod: 59 | Performed by: EMERGENCY MEDICINE

## 2024-02-21 PROCEDURE — 99900026 HC AIRWAY MAINTENANCE (STAT)

## 2024-02-21 PROCEDURE — 81000 URINALYSIS NONAUTO W/SCOPE: CPT | Performed by: EMERGENCY MEDICINE

## 2024-02-21 PROCEDURE — 87502 INFLUENZA DNA AMP PROBE: CPT | Performed by: EMERGENCY MEDICINE

## 2024-02-21 PROCEDURE — 82530 CORTISOL FREE: CPT | Performed by: SPECIALIST

## 2024-02-21 PROCEDURE — 87076 CULTURE ANAEROBE IDENT EACH: CPT | Performed by: EMERGENCY MEDICINE

## 2024-02-21 PROCEDURE — 84484 ASSAY OF TROPONIN QUANT: CPT | Performed by: EMERGENCY MEDICINE

## 2024-02-21 PROCEDURE — 82803 BLOOD GASES ANY COMBINATION: CPT

## 2024-02-21 PROCEDURE — 96366 THER/PROPH/DIAG IV INF ADDON: CPT

## 2024-02-21 PROCEDURE — 83735 ASSAY OF MAGNESIUM: CPT | Performed by: EMERGENCY MEDICINE

## 2024-02-21 PROCEDURE — 87077 CULTURE AEROBIC IDENTIFY: CPT | Mod: 59 | Performed by: EMERGENCY MEDICINE

## 2024-02-21 PROCEDURE — U0002 COVID-19 LAB TEST NON-CDC: HCPCS | Performed by: EMERGENCY MEDICINE

## 2024-02-21 PROCEDURE — 36556 INSERT NON-TUNNEL CV CATH: CPT

## 2024-02-21 PROCEDURE — 94003 VENT MGMT INPAT SUBQ DAY: CPT

## 2024-02-21 PROCEDURE — 5A1955Z RESPIRATORY VENTILATION, GREATER THAN 96 CONSECUTIVE HOURS: ICD-10-PCS | Performed by: STUDENT IN AN ORGANIZED HEALTH CARE EDUCATION/TRAINING PROGRAM

## 2024-02-21 PROCEDURE — 83690 ASSAY OF LIPASE: CPT | Performed by: EMERGENCY MEDICINE

## 2024-02-21 PROCEDURE — 25000003 PHARM REV CODE 250: Performed by: EMERGENCY MEDICINE

## 2024-02-21 RX ORDER — MAGNESIUM SULFATE HEPTAHYDRATE 40 MG/ML
2 INJECTION, SOLUTION INTRAVENOUS
Status: DISCONTINUED | OUTPATIENT
Start: 2024-02-21 | End: 2024-03-07 | Stop reason: HOSPADM

## 2024-02-21 RX ORDER — MEROPENEM AND SODIUM CHLORIDE 1 G/50ML
1 INJECTION, SOLUTION INTRAVENOUS
Status: DISCONTINUED | OUTPATIENT
Start: 2024-02-21 | End: 2024-02-21

## 2024-02-21 RX ORDER — NOREPINEPHRINE BITARTRATE/D5W 4MG/250ML
0-3 PLASTIC BAG, INJECTION (ML) INTRAVENOUS CONTINUOUS
Status: DISCONTINUED | OUTPATIENT
Start: 2024-02-21 | End: 2024-02-26

## 2024-02-21 RX ORDER — CHLORHEXIDINE GLUCONATE ORAL RINSE 1.2 MG/ML
15 SOLUTION DENTAL 2 TIMES DAILY
Status: DISCONTINUED | OUTPATIENT
Start: 2024-02-21 | End: 2024-03-07 | Stop reason: HOSPADM

## 2024-02-21 RX ORDER — SODIUM CHLORIDE, SODIUM LACTATE, POTASSIUM CHLORIDE, CALCIUM CHLORIDE 600; 310; 30; 20 MG/100ML; MG/100ML; MG/100ML; MG/100ML
INJECTION, SOLUTION INTRAVENOUS CONTINUOUS
Status: DISCONTINUED | OUTPATIENT
Start: 2024-02-21 | End: 2024-02-21

## 2024-02-21 RX ORDER — DOPAMINE HYDROCHLORIDE 160 MG/100ML
0-20 INJECTION, SOLUTION INTRAVENOUS CONTINUOUS
Status: DISCONTINUED | OUTPATIENT
Start: 2024-02-21 | End: 2024-02-26

## 2024-02-21 RX ORDER — ENOXAPARIN SODIUM 100 MG/ML
40 INJECTION SUBCUTANEOUS EVERY 24 HOURS
Status: DISCONTINUED | OUTPATIENT
Start: 2024-02-21 | End: 2024-02-22

## 2024-02-21 RX ORDER — CALCIUM CHLORIDE INJECTION 100 MG/ML
1 INJECTION, SOLUTION INTRAVENOUS ONCE
Status: COMPLETED | OUTPATIENT
Start: 2024-02-21 | End: 2024-02-21

## 2024-02-21 RX ORDER — INSULIN ASPART 100 [IU]/ML
0-10 INJECTION, SOLUTION INTRAVENOUS; SUBCUTANEOUS EVERY 6 HOURS PRN
Status: DISCONTINUED | OUTPATIENT
Start: 2024-02-21 | End: 2024-03-01

## 2024-02-21 RX ORDER — CHLORHEXIDINE GLUCONATE ORAL RINSE 1.2 MG/ML
15 SOLUTION DENTAL 2 TIMES DAILY
Status: DISCONTINUED | OUTPATIENT
Start: 2024-02-21 | End: 2024-02-21

## 2024-02-21 RX ORDER — POTASSIUM CHLORIDE 29.8 MG/ML
40 INJECTION INTRAVENOUS
Status: DISCONTINUED | OUTPATIENT
Start: 2024-02-21 | End: 2024-03-07 | Stop reason: HOSPADM

## 2024-02-21 RX ORDER — MUPIROCIN 20 MG/G
OINTMENT TOPICAL 2 TIMES DAILY
Status: COMPLETED | OUTPATIENT
Start: 2024-02-21 | End: 2024-02-26

## 2024-02-21 RX ORDER — PROPOFOL 10 MG/ML
0-50 INJECTION, EMULSION INTRAVENOUS CONTINUOUS
Status: DISCONTINUED | OUTPATIENT
Start: 2024-02-21 | End: 2024-02-23

## 2024-02-21 RX ORDER — GLUCAGON 1 MG
1 KIT INJECTION
Status: DISCONTINUED | OUTPATIENT
Start: 2024-02-21 | End: 2024-03-01

## 2024-02-21 RX ORDER — LEVETIRACETAM 500 MG/5ML
1000 INJECTION, SOLUTION, CONCENTRATE INTRAVENOUS EVERY 12 HOURS
Status: DISCONTINUED | OUTPATIENT
Start: 2024-02-21 | End: 2024-02-22

## 2024-02-21 RX ORDER — IPRATROPIUM BROMIDE AND ALBUTEROL SULFATE 2.5; .5 MG/3ML; MG/3ML
3 SOLUTION RESPIRATORY (INHALATION)
Status: COMPLETED | OUTPATIENT
Start: 2024-02-21 | End: 2024-02-21

## 2024-02-21 RX ORDER — POTASSIUM CHLORIDE 14.9 MG/ML
60 INJECTION INTRAVENOUS
Status: DISCONTINUED | OUTPATIENT
Start: 2024-02-21 | End: 2024-03-07 | Stop reason: HOSPADM

## 2024-02-21 RX ORDER — FENTANYL CITRATE-0.9 % NACL/PF 10 MCG/ML
0-200 PLASTIC BAG, INJECTION (ML) INTRAVENOUS CONTINUOUS
Status: DISCONTINUED | OUTPATIENT
Start: 2024-02-21 | End: 2024-02-26

## 2024-02-21 RX ORDER — POTASSIUM CHLORIDE 29.8 MG/ML
80 INJECTION INTRAVENOUS
Status: DISCONTINUED | OUTPATIENT
Start: 2024-02-21 | End: 2024-03-07 | Stop reason: HOSPADM

## 2024-02-21 RX ORDER — SODIUM CHLORIDE 0.9 % (FLUSH) 0.9 %
10 SYRINGE (ML) INJECTION
Status: DISCONTINUED | OUTPATIENT
Start: 2024-02-21 | End: 2024-03-07 | Stop reason: HOSPADM

## 2024-02-21 RX ORDER — MAGNESIUM SULFATE HEPTAHYDRATE 40 MG/ML
4 INJECTION, SOLUTION INTRAVENOUS
Status: DISCONTINUED | OUTPATIENT
Start: 2024-02-21 | End: 2024-03-07 | Stop reason: HOSPADM

## 2024-02-21 RX ORDER — PROPOFOL 10 MG/ML
INJECTION, EMULSION INTRAVENOUS
Status: COMPLETED
Start: 2024-02-21 | End: 2024-02-21

## 2024-02-21 RX ORDER — INDOMETHACIN 25 MG/1
50 CAPSULE ORAL ONCE
Status: COMPLETED | OUTPATIENT
Start: 2024-02-21 | End: 2024-02-21

## 2024-02-21 RX ORDER — FAMOTIDINE 10 MG/ML
20 INJECTION INTRAVENOUS EVERY 12 HOURS
Status: DISCONTINUED | OUTPATIENT
Start: 2024-02-21 | End: 2024-02-23

## 2024-02-21 RX ADMIN — FAMOTIDINE 20 MG: 10 INJECTION, SOLUTION INTRAVENOUS at 10:02

## 2024-02-21 RX ADMIN — DOPAMINE HYDROCHLORIDE 5 MCG/KG/MIN: 160 INJECTION, SOLUTION INTRAVENOUS at 07:02

## 2024-02-21 RX ADMIN — LEVETIRACETAM 1000 MG: 500 INJECTION, SOLUTION INTRAVENOUS at 10:02

## 2024-02-21 RX ADMIN — Medication 25 MCG/HR: at 06:02

## 2024-02-21 RX ADMIN — CALCIUM CHLORIDE 1 G: 100 INJECTION INTRAVENOUS; INTRAVENTRICULAR at 06:02

## 2024-02-21 RX ADMIN — SODIUM BICARBONATE 50 MEQ: 84 INJECTION, SOLUTION INTRAVENOUS at 05:02

## 2024-02-21 RX ADMIN — DAPTOMYCIN 500 MG: 500 INJECTION, POWDER, LYOPHILIZED, FOR SOLUTION INTRAVENOUS at 01:02

## 2024-02-21 RX ADMIN — PROPOFOL 5 MCG/KG/MIN: 10 INJECTION, EMULSION INTRAVENOUS at 04:02

## 2024-02-21 RX ADMIN — NOREPINEPHRINE BITARTRATE 0.05 MCG/KG/MIN: 4 INJECTION, SOLUTION INTRAVENOUS at 03:02

## 2024-02-21 RX ADMIN — IPRATROPIUM BROMIDE AND ALBUTEROL SULFATE 3 ML: .5; 3 SOLUTION RESPIRATORY (INHALATION) at 11:02

## 2024-02-21 RX ADMIN — CEFEPIME 2 G: 2 INJECTION, POWDER, FOR SOLUTION INTRAVENOUS at 12:02

## 2024-02-21 RX ADMIN — SODIUM CHLORIDE, POTASSIUM CHLORIDE, SODIUM LACTATE AND CALCIUM CHLORIDE 1725 ML: 600; 310; 30; 20 INJECTION, SOLUTION INTRAVENOUS at 12:02

## 2024-02-21 RX ADMIN — SODIUM CHLORIDE, POTASSIUM CHLORIDE, SODIUM LACTATE AND CALCIUM CHLORIDE: 600; 310; 30; 20 INJECTION, SOLUTION INTRAVENOUS at 02:02

## 2024-02-21 RX ADMIN — ENOXAPARIN SODIUM 40 MG: 40 INJECTION SUBCUTANEOUS at 06:02

## 2024-02-21 NOTE — ED PROVIDER NOTES
SCRIBE #1 NOTE: I, Marcy Ferrera, am scribing for, and in the presence of, Diony Marques DO. I have scribed the entire note.       History     Chief Complaint   Patient presents with    Altered Mental Status     Pt BIB EMS from [facility], found at GCS 7, normal GCS 12. Pt O2 saturation 85% on 10LPM trach collar.     Review of patient's allergies indicates:  No Known Allergies      History of Present Illness     HPI  HPI and ROS limited due to patient's altered mental status    2/21/2024, 10:59 AM  History obtained from the  EMS and patient      History of Present Illness: Jyoti Mayberry is a 28 y.o. male paraplegic patient with no PMHx on file who presents to the Emergency Department for evaluation of AMS which onset last night. Per EMS, pt was sent in from [facility] where he was found this morning at a GCS of 7 (baseline GCS of 12). Per EMS, pt was on 10L O2 on tracheotomy collar and had an O2 saturation of 85%.Symptoms are constant and moderate in severity. No mitigating or exacerbating factors reported. No associated sxs mentioned. Patient denies any fever, chills, n/v/d, headaches, cough, congestion, and all other sxs at this time. No further complaints or concerns at this time.       Arrival mode: EMS    PCP: No, Primary Doctor        Past Medical History:  No past medical history on file.    Past Surgical History:  Past Surgical History:   Procedure Laterality Date    ESOPHAGOGASTRODUODENOSCOPY W/ PEG N/A 5/30/2023    Procedure: PEG;  Surgeon: JUSTYN Devine MD;  Location: [facility] ENDOSCOPY;  Service: Gastroenterology;  Laterality: N/A;         Family History:  No family history on file.    Social History:  Social History     Tobacco Use    Smoking status: Not on file    Smokeless tobacco: Not on file   Substance and Sexual Activity    Alcohol use: Not on file    Drug use: Not on file    Sexual activity: Not on file        Review of Systems     Review of Systems   Constitutional:   Negative for chills and fever.   HENT:  Negative for congestion and sore throat.    Respiratory:  Negative for cough and shortness of breath.    Cardiovascular:  Negative for chest pain.   Gastrointestinal:  Negative for diarrhea, nausea and vomiting.   Genitourinary:  Negative for dysuria.   Musculoskeletal:  Negative for back pain.   Skin:  Negative for rash.   Neurological:  Negative for weakness and headaches.        (+) AMS   Hematological:  Does not bruise/bleed easily.   All other systems reviewed and are negative.     Physical Exam     Initial Vitals   BP Pulse Resp Temp SpO2   02/21/24 1057 02/21/24 1057 02/21/24 1057 02/21/24 1104 02/21/24 1057   (!) 90/53 96 (!) 25 98.1 °F (36.7 °C) (!) 85 %      MAP       --                 Physical Exam  Vitals reviewed.   Constitutional:       Comments: Pt lying in bed with trach collar keeping his eyes closed mostly will respond somewhat to voice in that he tries to look at you.  He appears ill.   Neck:      Comments: Trach collar in place no active bleeding it is functional  Cardiovascular:      Rate and Rhythm: Normal rate and regular rhythm.      Heart sounds: No murmur heard.  Pulmonary:      Comments: Coarse breath sounds bilaterally.  Abdominal:      General: There is no distension.      Palpations: Abdomen is soft.   Musculoskeletal:      Comments: There is gangrene noted to the distal end of all 4 extremities up to the level of the knee and elbow   Skin:     Comments: As noted in musculoskeletal exam.  There are also numerous stage IV ulcers noted to the sacral and backside areas.  There is no active bleeding there is no signs of acute infection.   Neurological:      Comments: As noted in constitutional exam            ED Course   Critical Care    Date/Time: 2/21/2024 4:00 PM    Performed by: Diony Marques DO  Authorized by: Diony Marques DO  Direct patient critical care time: 60 minutes  Additional history critical care time: 10  minutes  Ordering / reviewing critical care time: 10 minutes  Documentation critical care time: 10 minutes  Consulting other physicians critical care time: 10 minutes  Total critical care time (exclusive of procedural time) : 100 minutes  Critical care time was exclusive of separately billable procedures and treating other patients.  Critical care was necessary to treat or prevent imminent or life-threatening deterioration of the following conditions: cardiac failure, circulatory failure, sepsis and shock.  Critical care was time spent personally by me on the following activities: development of treatment plan with patient or surrogate, discussions with consultants, interpretation of cardiac output measurements, evaluation of patient's response to treatment, examination of patient, obtaining history from patient or surrogate, ordering and performing treatments and interventions, ordering and review of laboratory studies, ordering and review of radiographic studies, pulse oximetry, re-evaluation of patient's condition and review of old charts.        ED Vital Signs:  Vitals:    02/25/24 0600 02/25/24 0605 02/25/24 0620 02/25/24 0635   BP:  (!) 94/49 (!) 91/54 (!) 90/54   Pulse:  84 (!) 113 80   Resp:  (!) 27 (!) 31 20   Temp:  97.3 °F (36.3 °C) 97.3 °F (36.3 °C) 97.3 °F (36.3 °C)   TempSrc:       SpO2:  100% 100% 100%   Weight: 55.1 kg (121 lb 7.6 oz)      Height:        02/25/24 0650 02/25/24 0700 02/25/24 0715 02/25/24 0730   BP: (!) 89/55 (!) 95/52 (!) 96/51 (!) 96/55   Pulse: 88 81  75   Resp: (!) 21 20  20   Temp: 97.5 °F (36.4 °C) 97.5 °F (36.4 °C)  97.7 °F (36.5 °C)   TempSrc:  Core Bladder     SpO2: 100% 100%  100%   Weight:       Height:        02/25/24 0745 02/25/24 0800 02/25/24 0815 02/25/24 0830   BP: (!) 95/53 (!) 96/52 (!) 96/52 (!) 93/55   Pulse:  72     Resp:  (!) 23     Temp:  97.9 °F (36.6 °C)     TempSrc:       SpO2:       Weight:       Height:        02/25/24 0845 02/25/24 0917 02/25/24 1115    BP: (!) 96/52 (!) 100/58 (!) 95/55   Pulse:  86 80   Resp:  (!) 35 (!) 21   Temp:  98.1 °F (36.7 °C) 97.9 °F (36.6 °C)   TempSrc:      SpO2:  100% 100%   Weight:      Height:          Abnormal Lab Results:  Labs Reviewed   CBC W/ AUTO DIFFERENTIAL - Abnormal; Notable for the following components:       Result Value    WBC 21.87 (*)     RBC 2.62 (*)     Hemoglobin 7.7 (*)     Hematocrit 24.1 (*)     RDW 16.4 (*)     Platelets 703 (*)     MPV 8.7 (*)     Platelet Estimate Increased (*)     All other components within normal limits   COMPREHENSIVE METABOLIC PANEL - Abnormal; Notable for the following components:    CO2 22 (*)     BUN 34 (*)     Calcium 7.8 (*)     Albumin 1.4 (*)     All other components within normal limits   URINALYSIS, REFLEX TO URINE CULTURE - Abnormal; Notable for the following components:    Appearance, UA Cloudy (*)     Protein, UA 3+ (*)     Occult Blood UA Trace (*)     Nitrite, UA Positive (*)     Leukocytes, UA 3+ (*)     All other components within normal limits    Narrative:     Specimen Source->Urine   B-TYPE NATRIURETIC PEPTIDE - Abnormal; Notable for the following components:     (*)     All other components within normal limits   LIPASE - Abnormal; Notable for the following components:    Lipase <3 (*)     All other components within normal limits   PROCALCITONIN - Abnormal; Notable for the following components:    Procalcitonin 0.85 (*)     All other components within normal limits   URINALYSIS MICROSCOPIC - Abnormal; Notable for the following components:    RBC, UA 15 (*)     WBC, UA >100 (*)     WBC Clumps, UA Many (*)     All other components within normal limits    Narrative:     Specimen Source->Urine   ISTAT PROCEDURE - Abnormal; Notable for the following components:    POC PH 7.336 (*)     POC PCO2 48.5 (*)     POC PO2 54 (*)     All other components within normal limits   POCT GLUCOSE - Abnormal; Notable for the following components:    POCT Glucose 130 (*)     All  other components within normal limits   INFLUENZA A & B BY MOLECULAR   LACTIC ACID, PLASMA   LACTIC ACID, PLASMA   MAGNESIUM   TROPONIN I   SARS-COV-2 RNA AMPLIFICATION, QUAL   CORTISOL, FREE, SERUM   POCT GLUCOSE MONITORING CONTINUOUS        All Lab Results:  Results for orders placed or performed during the hospital encounter of 02/21/24   Blood culture x two cultures. Draw prior to antibiotics.    Specimen: Peripheral, Antecubital, Right; Blood   Result Value Ref Range    Blood Culture, Routine No Growth to date     Blood Culture, Routine No Growth to date     Blood Culture, Routine No Growth to date     Blood Culture, Routine No Growth to date    Blood culture x two cultures. Draw prior to antibiotics.    Specimen: Peripheral, Upper Arm, Right; Blood   Result Value Ref Range    Blood Culture, Routine Gram stain werner bottle: Gram negative rods     Blood Culture, Routine       Results called to and read back by:Ana Lilia Carver RN 02/23/2024  22:30   Influenza A & B by Molecular    Specimen: Nasopharyngeal Swab   Result Value Ref Range    Influenza A, Molecular Negative Negative    Influenza B, Molecular Negative Negative    Flu A & B Source Nasal swab    Urine culture    Specimen: Urine   Result Value Ref Range    Urine Culture, Routine PROTEUS MIRABILIS  >100,000 cfu/ml   (A)     Urine Culture, Routine (A)      PRESUMPTIVE PSEUDOMONAS SPECIES  > 100,000 cfu/ml  Identification and susceptibility pending         Susceptibility    Proteus mirabilis - CULTURE, URINE     Amp/Sulbactam >16/8 Resistant mcg/mL     Ampicillin >16 Resistant mcg/mL     Amox/K Clav'ate <=8/4 Sensitive mcg/mL     Ceftriaxone <=1 Sensitive mcg/mL     Cefazolin >16 Resistant mcg/mL     Ciprofloxacin >2 Resistant mcg/mL     Cefepime <=2 Sensitive mcg/mL     Ertapenem <=0.5 Sensitive mcg/mL     Gentamicin <=4 Sensitive mcg/mL     Levofloxacin <=2 Sensitive mcg/mL     Meropenem <=1 Sensitive mcg/mL     Piperacillin/Tazo <=16 Sensitive mcg/mL      Trimeth/Sulfa >2/38 Resistant mcg/mL     Tobramycin <=4 Sensitive mcg/mL   Culture, Respiratory with Gram Stain    Specimen: Sputum; Respiratory   Result Value Ref Range    Respiratory Culture No S aureus isolated.     Respiratory Culture PSEUDOMONAS AERUGINOSA  Moderate   (A)     Respiratory Culture (A)      PROTEUS MIRABILIS  Moderate  Normal respiratory diana also present      Gram Stain (Respiratory) <10 epithelial cells per low power field.     Gram Stain (Respiratory) Many WBC's     Gram Stain (Respiratory) Many Gram negative rods     Gram Stain (Respiratory) Rare Gram positive cocci        Susceptibility    Proteus mirabilis - CULTURE, RESPIRATORY     Amp/Sulbactam >16/8 Resistant mcg/mL     Ampicillin >16 Resistant mcg/mL     Amox/K Clav'ate >16/8 Resistant mcg/mL     Ceftriaxone <=1 Sensitive mcg/mL     Cefazolin >16 Resistant mcg/mL     Ciprofloxacin >2 Resistant mcg/mL     Cefepime <=2 Sensitive mcg/mL     Ertapenem <=0.5 Sensitive mcg/mL     Gentamicin <=4 Sensitive mcg/mL     Levofloxacin <=2 Sensitive mcg/mL     Meropenem <=1 Sensitive mcg/mL     Piperacillin/Tazo <=16 Sensitive mcg/mL     Trimeth/Sulfa >2/38 Resistant mcg/mL     Tobramycin <=4 Sensitive mcg/mL    Pseudomonas aeruginosa - CULTURE, RESPIRATORY     Amikacin <=16 Sensitive mcg/mL     Ciprofloxacin <=1 Sensitive mcg/mL     Cefepime 4 Sensitive mcg/mL     Gentamicin <=4 Sensitive mcg/mL     Levofloxacin <=2 Sensitive mcg/mL     Meropenem <=1 Sensitive mcg/mL     Piperacillin/Tazo <=16 Sensitive mcg/mL     Tobramycin <=4 Sensitive mcg/mL   Urine culture    Specimen: Urine   Result Value Ref Range    Urine Culture, Routine No significant growth    Rapid Organism ID by PCR (from Blood culture)   Result Value Ref Range    Enterococcus faecalis Not Detected Not Detected    Enterococcus faecium Not Detected Not Detected    Listeria monocytogenes Not Detected Not Detected    Staphylococcus spp. Not Detected Not Detected    Staphylococcus aureus  Not Detected Not Detected    Staphylococcus epidermidis Not Detected Not Detected    Staphylococcus lugdunensis Not Detected Not Detected    Streptococcus species Not Detected Not Detected    Streptococcus agalactiae Not Detected Not Detected    Streptococcus pneumoniae Not Detected Not Detected    Streptococcus pyogenes Not Detected Not Detected    Acinetobacter calcoaceticus/baumannii complex Not Detected Not Detected    Bacteroides fragilis Not Detected Not Detected    Enterobacterales Not Detected Not Detected    Enterobacter cloacae complex Not Detected Not Detected    Escherichia coli Not Detected Not Detected    Klebsiella aerogenes Not Detected Not Detected    Klebsiella oxytoca Not Detected Not Detected    Klebsiella pneumoniae group Not Detected Not Detected    Proteus Not Detected Not Detected    Salmonella sp Not Detected Not Detected    Serratia marcescens Not Detected Not Detected    Haemophilus influenzae Not Detected Not Detected    Neisseria meningtidis Not Detected Not Detected    Pseudomonas aeruginosa Not Detected Not Detected    Stenotrophomonas maltophilia Not Detected Not Detected    Candida albicans Not Detected Not Detected    Candida auris Not Detected Not Detected    Candida glabrata Not Detected Not Detected    Candida krusei Not Detected Not Detected    Candida parapsilosis Not Detected Not Detected    Candida tropicalis Not Detected Not Detected    Cryptococcus neoformans/gattii Not Detected Not Detected    CTX-M (ESBL ) Test Not Applicable Not Detected    IMP (Carbapenem resistant) Test Not Applicable Not Detected    KPC resistance gene (Carbapenem resistant) Test Not Applicable Not Detected    mcr-1  Test Not Applicable Not Detected    mec A/C  Test Not Applicable Not Detected    mec A/C and MREJ (MRSA) gene Test Not Applicable Not Detected    NDM (Carbapenem resistant) Test Not Applicable Not Detected    OXA-48-like (Carbapenem resistant) Test Not Applicable Not Detected     van A/B (VRE gene) Test Not Applicable Not Detected    VIM (Carbapenem resistant) Test Not Applicable Not Detected   CBC auto differential   Result Value Ref Range    WBC 21.87 (H) 3.90 - 12.70 K/uL    RBC 2.62 (L) 4.60 - 6.20 M/uL    Hemoglobin 7.7 (L) 14.0 - 18.0 g/dL    Hematocrit 24.1 (L) 40.0 - 54.0 %    MCV 92 82 - 98 fL    MCH 29.4 27.0 - 31.0 pg    MCHC 32.0 32.0 - 36.0 g/dL    RDW 16.4 (H) 11.5 - 14.5 %    Platelets 703 (H) 150 - 450 K/uL    MPV 8.7 (L) 9.2 - 12.9 fL    Immature Granulocytes CANCELED 0.0 - 0.5 %    Immature Grans (Abs) CANCELED 0.00 - 0.04 K/uL    Lymph # CANCELED 1.0 - 4.8 K/uL    Mono # CANCELED 0.3 - 1.0 K/uL    Eos # CANCELED 0.0 - 0.5 K/uL    Baso # CANCELED 0.00 - 0.20 K/uL    nRBC 0 0 /100 WBC    Gran % 67.0 38.0 - 73.0 %    Lymph % 24.0 18.0 - 48.0 %    Mono % 8.0 4.0 - 15.0 %    Eosinophil % 1.0 0.0 - 8.0 %    Basophil % 0.0 0.0 - 1.9 %    Platelet Estimate Increased (A)     Aniso Slight     Tear Drop Cells Occasional     Differential Method Manual    Comprehensive metabolic panel   Result Value Ref Range    Sodium 139 136 - 145 mmol/L    Potassium 4.9 3.5 - 5.1 mmol/L    Chloride 106 95 - 110 mmol/L    CO2 22 (L) 23 - 29 mmol/L    Glucose 91 70 - 110 mg/dL    BUN 34 (H) 6 - 20 mg/dL    Creatinine 0.9 0.5 - 1.4 mg/dL    Calcium 7.8 (L) 8.7 - 10.5 mg/dL    Total Protein 7.0 6.0 - 8.4 g/dL    Albumin 1.4 (L) 3.5 - 5.2 g/dL    Total Bilirubin 0.2 0.1 - 1.0 mg/dL    Alkaline Phosphatase 123 55 - 135 U/L    AST 32 10 - 40 U/L    ALT 22 10 - 44 U/L    eGFR >60 >60 mL/min/1.73 m^2    Anion Gap 11 8 - 16 mmol/L   Lactic acid, plasma #1   Result Value Ref Range    Lactate (Lactic Acid) 0.7 0.5 - 2.2 mmol/L   Lactic acid, plasma #2   Result Value Ref Range    Lactate (Lactic Acid) 1.7 0.5 - 2.2 mmol/L   Urinalysis, Reflex to Urine Culture Urine, Catheterized    Specimen: Urine   Result Value Ref Range    Specimen UA Urine, Catheterized     Color, UA Yellow Yellow, Straw, Melanie    Appearance,  UA Cloudy (A) Clear    pH, UA 8.0 5.0 - 8.0    Specific Gravity, UA 1.015 1.005 - 1.030    Protein, UA 3+ (A) Negative    Glucose, UA Negative Negative    Ketones, UA Negative Negative    Bilirubin (UA) Negative Negative    Occult Blood UA Trace (A) Negative    Nitrite, UA Positive (A) Negative    Urobilinogen, UA Negative <2.0 EU/dL    Leukocytes, UA 3+ (A) Negative   Magnesium   Result Value Ref Range    Magnesium 1.6 1.6 - 2.6 mg/dL   Brain natriuretic peptide   Result Value Ref Range     (H) 0 - 99 pg/mL   Lipase   Result Value Ref Range    Lipase <3 (L) 4 - 60 U/L   Troponin I   Result Value Ref Range    Troponin I 0.012 0.000 - 0.026 ng/mL   Procalcitonin   Result Value Ref Range    Procalcitonin 0.85 (H) <0.25 ng/mL   COVID-19 Rapid Screening   Result Value Ref Range    SARS-CoV-2 RNA, Amplification, Qual Negative Negative   Urinalysis Microscopic   Result Value Ref Range    RBC, UA 15 (H) 0 - 4 /hpf    WBC, UA >100 (H) 0 - 5 /hpf    WBC Clumps, UA Many (A) None-Rare    Bacteria Occasional None-Occ /hpf    Hyaline Casts, UA 0 0-1/lpf /lpf    Microscopic Comment SEE COMMENT    Urinalysis, Reflex to Urine Culture Urine, Catheterized    Specimen: Urine   Result Value Ref Range    Specimen UA Urine, Catheterized     Color, UA Orange (A) Yellow, Straw, Melanie    Appearance, UA Hazy (A) Clear    pH, UA 7.0 5.0 - 8.0    Specific Gravity, UA 1.015 1.005 - 1.030    Protein, UA 2+ (A) Negative    Glucose, UA Negative Negative    Ketones, UA Negative Negative    Bilirubin (UA) Negative Negative    Occult Blood UA 3+ (A) Negative    Nitrite, UA Negative Negative    Urobilinogen, UA Negative <2.0 EU/dL    Leukocytes, UA 3+ (A) Negative   Urinalysis Microscopic   Result Value Ref Range    RBC, UA >100 (H) 0 - 4 /hpf    WBC, UA >100 (H) 0 - 5 /hpf    WBC Clumps, UA Many (A) None-Rare    Bacteria Occasional None-Occ /hpf    Hyaline Casts, UA 0 0-1/lpf /lpf    Unclass Prema UA Moderate None-Moderate    Microscopic  Comment SEE COMMENT    Magnesium   Result Value Ref Range    Magnesium 1.8 1.6 - 2.6 mg/dL   Basic metabolic panel   Result Value Ref Range    Sodium 142 136 - 145 mmol/L    Potassium 4.2 3.5 - 5.1 mmol/L    Chloride 107 95 - 110 mmol/L    CO2 23 23 - 29 mmol/L    Glucose 100 70 - 110 mg/dL    BUN 31 (H) 6 - 20 mg/dL    Creatinine 0.8 0.5 - 1.4 mg/dL    Calcium 7.9 (L) 8.7 - 10.5 mg/dL    Anion Gap 12 8 - 16 mmol/L    eGFR >60 >60 mL/min/1.73 m^2   CBC auto differential   Result Value Ref Range    WBC 20.12 (H) 3.90 - 12.70 K/uL    RBC 2.50 (L) 4.60 - 6.20 M/uL    Hemoglobin 7.3 (L) 14.0 - 18.0 g/dL    Hematocrit 22.6 (L) 40.0 - 54.0 %    MCV 90 82 - 98 fL    MCH 29.2 27.0 - 31.0 pg    MCHC 32.3 32.0 - 36.0 g/dL    RDW 15.6 (H) 11.5 - 14.5 %    Platelets 771 (H) 150 - 450 K/uL    MPV 8.3 (L) 9.2 - 12.9 fL    Immature Granulocytes CANCELED 0.0 - 0.5 %    Immature Grans (Abs) CANCELED 0.00 - 0.04 K/uL    Lymph # CANCELED 1.0 - 4.8 K/uL    Mono # CANCELED 0.3 - 1.0 K/uL    Eos # CANCELED 0.0 - 0.5 K/uL    Baso # CANCELED 0.00 - 0.20 K/uL    nRBC 0 0 /100 WBC    Gran % 56.0 38.0 - 73.0 %    Lymph % 15.0 (L) 18.0 - 48.0 %    Mono % 6.0 4.0 - 15.0 %    Eosinophil % 0.0 0.0 - 8.0 %    Basophil % 0.0 0.0 - 1.9 %    Bands 23.0 %    Platelet Estimate Increased (A)     Stomatocytes Present     Differential Method Manual    Hepatic function panel   Result Value Ref Range    Total Protein 6.2 6.0 - 8.4 g/dL    Albumin 1.4 (L) 3.5 - 5.2 g/dL    Total Bilirubin 0.3 0.1 - 1.0 mg/dL    Bilirubin, Direct 0.1 0.1 - 0.3 mg/dL    AST 21 10 - 40 U/L    ALT 18 10 - 44 U/L    Alkaline Phosphatase 117 55 - 135 U/L   Lactic acid, plasma   Result Value Ref Range    Lactate (Lactic Acid) 1.2 0.5 - 2.2 mmol/L   CK   Result Value Ref Range    CPK 83 20 - 200 U/L   Magnesium   Result Value Ref Range    Magnesium 2.1 1.6 - 2.6 mg/dL   Basic metabolic panel   Result Value Ref Range    Sodium 144 136 - 145 mmol/L    Potassium 4.1 3.5 - 5.1 mmol/L     Chloride 108 95 - 110 mmol/L    CO2 22 (L) 23 - 29 mmol/L    Glucose 75 70 - 110 mg/dL    BUN 24 (H) 6 - 20 mg/dL    Creatinine 0.7 0.5 - 1.4 mg/dL    Calcium 7.3 (L) 8.7 - 10.5 mg/dL    Anion Gap 14 8 - 16 mmol/L    eGFR >60 >60 mL/min/1.73 m^2   CBC auto differential   Result Value Ref Range    WBC 17.07 (H) 3.90 - 12.70 K/uL    RBC 2.32 (L) 4.60 - 6.20 M/uL    Hemoglobin 6.6 (L) 14.0 - 18.0 g/dL    Hematocrit 21.0 (L) 40.0 - 54.0 %    MCV 91 82 - 98 fL    MCH 28.4 27.0 - 31.0 pg    MCHC 31.4 (L) 32.0 - 36.0 g/dL    RDW 15.9 (H) 11.5 - 14.5 %    Platelets 711 (H) 150 - 450 K/uL    MPV 8.4 (L) 9.2 - 12.9 fL    Immature Granulocytes CANCELED 0.0 - 0.5 %    Immature Grans (Abs) CANCELED 0.00 - 0.04 K/uL    Lymph # CANCELED 1.0 - 4.8 K/uL    Mono # CANCELED 0.3 - 1.0 K/uL    Eos # CANCELED 0.0 - 0.5 K/uL    Baso # CANCELED 0.00 - 0.20 K/uL    nRBC 0 0 /100 WBC    Gran % 70.0 38.0 - 73.0 %    Lymph % 5.0 (L) 18.0 - 48.0 %    Mono % 6.0 4.0 - 15.0 %    Eosinophil % 0.0 0.0 - 8.0 %    Basophil % 0.0 0.0 - 1.9 %    Bands 19.0 %    Platelet Estimate Increased (A)     Differential Method Manual    Magnesium   Result Value Ref Range    Magnesium 1.6 1.6 - 2.6 mg/dL   Basic metabolic panel   Result Value Ref Range    Sodium 144 136 - 145 mmol/L    Potassium 4.2 3.5 - 5.1 mmol/L    Chloride 109 95 - 110 mmol/L    CO2 21 (L) 23 - 29 mmol/L    Glucose 65 (L) 70 - 110 mg/dL    BUN 21 (H) 6 - 20 mg/dL    Creatinine 0.8 0.5 - 1.4 mg/dL    Calcium 6.9 (LL) 8.7 - 10.5 mg/dL    Anion Gap 14 8 - 16 mmol/L    eGFR >60 >60 mL/min/1.73 m^2   CBC auto differential   Result Value Ref Range    WBC 19.03 (H) 3.90 - 12.70 K/uL    RBC 2.84 (L) 4.60 - 6.20 M/uL    Hemoglobin 8.4 (L) 14.0 - 18.0 g/dL    Hematocrit 25.8 (L) 40.0 - 54.0 %    MCV 91 82 - 98 fL    MCH 29.6 27.0 - 31.0 pg    MCHC 32.6 32.0 - 36.0 g/dL    RDW 15.0 (H) 11.5 - 14.5 %    Platelets 689 (H) 150 - 450 K/uL    MPV 8.2 (L) 9.2 - 12.9 fL    Immature Granulocytes CANCELED 0.0 -  0.5 %    Immature Grans (Abs) CANCELED 0.00 - 0.04 K/uL    Lymph # CANCELED 1.0 - 4.8 K/uL    Mono # CANCELED 0.3 - 1.0 K/uL    Eos # CANCELED 0.0 - 0.5 K/uL    Baso # CANCELED 0.00 - 0.20 K/uL    nRBC 0 0 /100 WBC    Gran % 75.0 (H) 38.0 - 73.0 %    Lymph % 15.0 (L) 18.0 - 48.0 %    Mono % 4.0 4.0 - 15.0 %    Eosinophil % 1.0 0.0 - 8.0 %    Basophil % 0.0 0.0 - 1.9 %    Bands 3.0 %    Myelocytes 2.0 %    Platelet Estimate Increased (A)     Differential Method Manual    Magnesium   Result Value Ref Range    Magnesium 1.8 1.6 - 2.6 mg/dL   Basic metabolic panel   Result Value Ref Range    Sodium 142 136 - 145 mmol/L    Potassium 3.6 3.5 - 5.1 mmol/L    Chloride 109 95 - 110 mmol/L    CO2 21 (L) 23 - 29 mmol/L    Glucose 73 70 - 110 mg/dL    BUN 18 6 - 20 mg/dL    Creatinine 0.8 0.5 - 1.4 mg/dL    Calcium 7.1 (L) 8.7 - 10.5 mg/dL    Anion Gap 12 8 - 16 mmol/L    eGFR >60 >60 mL/min/1.73 m^2   CBC auto differential   Result Value Ref Range    WBC 31.09 (H) 3.90 - 12.70 K/uL    RBC 3.12 (L) 4.60 - 6.20 M/uL    Hemoglobin 9.4 (L) 14.0 - 18.0 g/dL    Hematocrit 29.1 (L) 40.0 - 54.0 %    MCV 93 82 - 98 fL    MCH 30.1 27.0 - 31.0 pg    MCHC 32.3 32.0 - 36.0 g/dL    RDW 15.7 (H) 11.5 - 14.5 %    Platelets 651 (H) 150 - 450 K/uL    MPV 8.2 (L) 9.2 - 12.9 fL    Immature Granulocytes CANCELED 0.0 - 0.5 %    Immature Grans (Abs) CANCELED 0.00 - 0.04 K/uL    Lymph # CANCELED 1.0 - 4.8 K/uL    Mono # CANCELED 0.3 - 1.0 K/uL    Eos # CANCELED 0.0 - 0.5 K/uL    Baso # CANCELED 0.00 - 0.20 K/uL    nRBC 0 0 /100 WBC    Gran % 76.0 (H) 38.0 - 73.0 %    Lymph % 10.0 (L) 18.0 - 48.0 %    Mono % 3.0 (L) 4.0 - 15.0 %    Eosinophil % 0.0 0.0 - 8.0 %    Basophil % 0.0 0.0 - 1.9 %    Bands 10.0 %    Metamyelocytes 1.0 %    Platelet Estimate Increased (A)     Stomatocytes Present     Differential Method Manual    EKG 12-lead   Result Value Ref Range    QRS Duration 70 ms    [facility] QTC Calculation 449 ms   Echo   Result Value Ref Range    Left  Atrium Major Axis 4.29 cm    Left Atrium Minor Axis 3.68 cm    RA Major Axis 3.90 cm    LV Diastolic Volume 106.99 mL    LV Systolic Volume 47.43 mL    MV Peak A Ken 0.60 m/s    TR Max Ken 3.12 m/s    MV Peak E Ken 0.84 m/s    PV mean gradient 2 mmHg    RVOT peak VTI 18.5 cm    RVOT peak ken 0.76 m/s    Ao VTI 27.80 cm    Ao peak ken 1.45 m/s    LVOT peak VTI 18.80 cm    LVOT peak ken 0.99 m/s    LVOT diameter 2.04 cm    IVRT 65.65 msec    E wave deceleration time 306.60 msec    AV mean gradient 5 mmHg    TAPSE 1.76 cm    LA size 2.81 cm    Ascending aorta 3.16 cm    STJ 3.01 cm    LVIDs 3.40 2.1 - 4.0 cm    Ao root annulus 2.78 cm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

## 2024-02-21 NOTE — ED NOTES
Per saul Pablo to administer Levophed through Peripheral IV until central line placement is completed, Give IVF and hold Levophed until further instructions are given.

## 2024-02-22 LAB
ALBUMIN SERPL BCP-MCNC: 1.4 G/DL (ref 3.5–5.2)
ALP SERPL-CCNC: 117 U/L (ref 55–135)
ALT SERPL W/O P-5'-P-CCNC: 18 U/L (ref 10–44)
ANION GAP SERPL CALC-SCNC: 12 MMOL/L (ref 8–16)
AORTIC ROOT ANNULUS: 2.78 CM
ASCENDING AORTA: 3.16 CM
AST SERPL-CCNC: 21 U/L (ref 10–40)
AV INDEX (PROSTH): 0.68
AV MEAN GRADIENT: 5 MMHG
AV PEAK GRADIENT: 8 MMHG
AV VALVE AREA BY VELOCITY RATIO: 2.23 CM²
AV VALVE AREA: 2.21 CM²
AV VELOCITY RATIO: 0.68
BACTERIA #/AREA URNS HPF: ABNORMAL /HPF
BASOPHILS # BLD AUTO: ABNORMAL K/UL (ref 0–0.2)
BASOPHILS NFR BLD: 0 % (ref 0–1.9)
BILIRUB DIRECT SERPL-MCNC: 0.1 MG/DL (ref 0.1–0.3)
BILIRUB SERPL-MCNC: 0.3 MG/DL (ref 0.1–1)
BILIRUB UR QL STRIP: NEGATIVE
BSA FOR ECHO PROCEDURE: 1.49 M2
BUN SERPL-MCNC: 31 MG/DL (ref 6–20)
CALCIUM SERPL-MCNC: 7.9 MG/DL (ref 8.7–10.5)
CHLORIDE SERPL-SCNC: 107 MMOL/L (ref 95–110)
CK SERPL-CCNC: 83 U/L (ref 20–200)
CLARITY UR: ABNORMAL
CO2 SERPL-SCNC: 23 MMOL/L (ref 23–29)
COLOR UR: ABNORMAL
CREAT SERPL-MCNC: 0.8 MG/DL (ref 0.5–1.4)
CV ECHO LV RWT: 0.38 CM
DIFFERENTIAL METHOD BLD: ABNORMAL
DOP CALC AO PEAK VEL: 1.45 M/S
DOP CALC AO VTI: 27.8 CM
DOP CALC LVOT AREA: 3.3 CM2
DOP CALC LVOT DIAMETER: 2.04 CM
DOP CALC LVOT PEAK VEL: 0.99 M/S
DOP CALC LVOT STROKE VOLUME: 61.42 CM3
DOP CALC RVOT PEAK VEL: 0.76 M/S
DOP CALC RVOT VTI: 18.5 CM
DOP CALCLVOT PEAK VEL VTI: 18.8 CM
E WAVE DECELERATION TIME: 306.6 MSEC
E/A RATIO: 1.4
E/E' RATIO: 5.09 M/S
ECHO LV POSTERIOR WALL: 0.91 CM (ref 0.6–1.1)
EOSINOPHIL # BLD AUTO: ABNORMAL K/UL (ref 0–0.5)
EOSINOPHIL NFR BLD: 0 % (ref 0–8)
ERYTHROCYTE [DISTWIDTH] IN BLOOD BY AUTOMATED COUNT: 15.6 % (ref 11.5–14.5)
EST. GFR  (NO RACE VARIABLE): >60 ML/MIN/1.73 M^2
FRACTIONAL SHORTENING: 29 % (ref 28–44)
GLUCOSE SERPL-MCNC: 100 MG/DL (ref 70–110)
GLUCOSE UR QL STRIP: NEGATIVE
HCT VFR BLD AUTO: 22.6 % (ref 40–54)
HGB BLD-MCNC: 7.3 G/DL (ref 14–18)
HGB UR QL STRIP: ABNORMAL
HYALINE CASTS #/AREA URNS LPF: 0 /LPF
IMM GRANULOCYTES # BLD AUTO: ABNORMAL K/UL (ref 0–0.04)
IMM GRANULOCYTES NFR BLD AUTO: ABNORMAL % (ref 0–0.5)
INTERVENTRICULAR SEPTUM: 0.6 CM (ref 0.6–1.1)
IVC DIAMETER: 1.34 CM
IVRT: 65.65 MSEC
KETONES UR QL STRIP: NEGATIVE
LA MAJOR: 4.29 CM
LA MINOR: 3.68 CM
LA WIDTH: 2.8 CM
LACTATE SERPL-SCNC: 1.2 MMOL/L (ref 0.5–2.2)
LEFT ATRIUM SIZE: 2.81 CM
LEFT ATRIUM VOLUME INDEX: 17.1 ML/M2
LEFT ATRIUM VOLUME: 26.49 CM3
LEFT INTERNAL DIMENSION IN SYSTOLE: 3.4 CM (ref 2.1–4)
LEFT VENTRICLE DIASTOLIC VOLUME INDEX: 69.03 ML/M2
LEFT VENTRICLE DIASTOLIC VOLUME: 106.99 ML
LEFT VENTRICLE MASS INDEX: 76 G/M2
LEFT VENTRICLE SYSTOLIC VOLUME INDEX: 30.6 ML/M2
LEFT VENTRICLE SYSTOLIC VOLUME: 47.43 ML
LEFT VENTRICULAR INTERNAL DIMENSION IN DIASTOLE: 4.79 CM (ref 3.5–6)
LEFT VENTRICULAR MASS: 117.2 G
LEUKOCYTE ESTERASE UR QL STRIP: ABNORMAL
LV LATERAL E/E' RATIO: 4.42 M/S
LV SEPTAL E/E' RATIO: 6 M/S
LVOT MG: 2.1 MMHG
LVOT MV: 0.68 CM/S
LYMPHOCYTES # BLD AUTO: ABNORMAL K/UL (ref 1–4.8)
LYMPHOCYTES NFR BLD: 15 % (ref 18–48)
MAGNESIUM SERPL-MCNC: 1.8 MG/DL (ref 1.6–2.6)
MCH RBC QN AUTO: 29.2 PG (ref 27–31)
MCHC RBC AUTO-ENTMCNC: 32.3 G/DL (ref 32–36)
MCV RBC AUTO: 90 FL (ref 82–98)
MICROSCOPIC COMMENT: ABNORMAL
MONOCYTES # BLD AUTO: ABNORMAL K/UL (ref 0.3–1)
MONOCYTES NFR BLD: 6 % (ref 4–15)
MV PEAK A VEL: 0.6 M/S
MV PEAK E VEL: 0.84 M/S
NEUTROPHILS NFR BLD: 56 % (ref 38–73)
NEUTS BAND NFR BLD MANUAL: 23 %
NITRITE UR QL STRIP: NEGATIVE
NRBC BLD-RTO: 0 /100 WBC
PH UR STRIP: 7 [PH] (ref 5–8)
PISA TR MAX VEL: 3.12 M/S
PLATELET # BLD AUTO: 771 K/UL (ref 150–450)
PLATELET BLD QL SMEAR: ABNORMAL
PMV BLD AUTO: 8.3 FL (ref 9.2–12.9)
POCT GLUCOSE: 124 MG/DL (ref 70–110)
POCT GLUCOSE: 148 MG/DL (ref 70–110)
POTASSIUM SERPL-SCNC: 4.2 MMOL/L (ref 3.5–5.1)
PROT SERPL-MCNC: 6.2 G/DL (ref 6–8.4)
PROT UR QL STRIP: ABNORMAL
PV MEAN GRADIENT: 2 MMHG
RA MAJOR: 3.9 CM
RA WIDTH: 3 CM
RBC # BLD AUTO: 2.5 M/UL (ref 4.6–6.2)
RBC #/AREA URNS HPF: >100 /HPF (ref 0–4)
RV MID DIAMA: 3.04 CM
SODIUM SERPL-SCNC: 142 MMOL/L (ref 136–145)
SP GR UR STRIP: 1.01 (ref 1–1.03)
STJ: 3.01 CM
STOMATOCYTES BLD QL SMEAR: PRESENT
TDI LATERAL: 0.19 M/S
TDI SEPTAL: 0.14 M/S
TDI: 0.17 M/S
TR MAX PG: 39 MMHG
TRICUSPID ANNULAR PLANE SYSTOLIC EXCURSION: 1.76 CM
UNIDENT CRYS URNS QL MICRO: ABNORMAL
URN SPEC COLLECT METH UR: ABNORMAL
UROBILINOGEN UR STRIP-ACNC: NEGATIVE EU/DL
WBC # BLD AUTO: 20.12 K/UL (ref 3.9–12.7)
WBC #/AREA URNS HPF: >100 /HPF (ref 0–5)
WBC CLUMPS URNS QL MICRO: ABNORMAL
Z-SCORE OF LEFT VENTRICULAR DIMENSION IN END DIASTOLE: 0.65
Z-SCORE OF LEFT VENTRICULAR DIMENSION IN END SYSTOLE: 1.58

## 2024-02-22 PROCEDURE — 87186 SC STD MICRODIL/AGAR DIL: CPT | Mod: 59 | Performed by: NURSE PRACTITIONER

## 2024-02-22 PROCEDURE — 99900026 HC AIRWAY MAINTENANCE (STAT)

## 2024-02-22 PROCEDURE — 20000000 HC ICU ROOM

## 2024-02-22 PROCEDURE — 82550 ASSAY OF CK (CPK): CPT | Performed by: NURSE PRACTITIONER

## 2024-02-22 PROCEDURE — 83735 ASSAY OF MAGNESIUM: CPT | Performed by: NURSE PRACTITIONER

## 2024-02-22 PROCEDURE — 99900035 HC TECH TIME PER 15 MIN (STAT)

## 2024-02-22 PROCEDURE — 94761 N-INVAS EAR/PLS OXIMETRY MLT: CPT

## 2024-02-22 PROCEDURE — 87205 SMEAR GRAM STAIN: CPT | Performed by: NURSE PRACTITIONER

## 2024-02-22 PROCEDURE — 27200966 HC CLOSED SUCTION SYSTEM

## 2024-02-22 PROCEDURE — 27100171 HC OXYGEN HIGH FLOW UP TO 24 HOURS

## 2024-02-22 PROCEDURE — 63600175 PHARM REV CODE 636 W HCPCS: Performed by: NURSE PRACTITIONER

## 2024-02-22 PROCEDURE — 94003 VENT MGMT INPAT SUBQ DAY: CPT

## 2024-02-22 PROCEDURE — 85027 COMPLETE CBC AUTOMATED: CPT | Performed by: NURSE PRACTITIONER

## 2024-02-22 PROCEDURE — 80048 BASIC METABOLIC PNL TOTAL CA: CPT | Performed by: NURSE PRACTITIONER

## 2024-02-22 PROCEDURE — 25500020 PHARM REV CODE 255: Performed by: SPECIALIST

## 2024-02-22 PROCEDURE — 87077 CULTURE AEROBIC IDENTIFY: CPT | Mod: 59 | Performed by: NURSE PRACTITIONER

## 2024-02-22 PROCEDURE — 25000003 PHARM REV CODE 250: Performed by: NURSE PRACTITIONER

## 2024-02-22 PROCEDURE — 27000207 HC ISOLATION

## 2024-02-22 PROCEDURE — 85007 BL SMEAR W/DIFF WBC COUNT: CPT | Performed by: NURSE PRACTITIONER

## 2024-02-22 PROCEDURE — 27000221 HC OXYGEN, UP TO 24 HOURS

## 2024-02-22 PROCEDURE — 80076 HEPATIC FUNCTION PANEL: CPT | Performed by: NURSE PRACTITIONER

## 2024-02-22 PROCEDURE — 83605 ASSAY OF LACTIC ACID: CPT | Performed by: NURSE PRACTITIONER

## 2024-02-22 PROCEDURE — 63600175 PHARM REV CODE 636 W HCPCS: Performed by: SPECIALIST

## 2024-02-22 PROCEDURE — 99223 1ST HOSP IP/OBS HIGH 75: CPT | Mod: NSCH,,, | Performed by: INTERNAL MEDICINE

## 2024-02-22 PROCEDURE — 81000 URINALYSIS NONAUTO W/SCOPE: CPT | Performed by: NURSE PRACTITIONER

## 2024-02-22 PROCEDURE — 25000003 PHARM REV CODE 250: Performed by: EMERGENCY MEDICINE

## 2024-02-22 PROCEDURE — 87086 URINE CULTURE/COLONY COUNT: CPT | Performed by: NURSE PRACTITIONER

## 2024-02-22 PROCEDURE — 25000003 PHARM REV CODE 250: Performed by: SPECIALIST

## 2024-02-22 PROCEDURE — 87070 CULTURE OTHR SPECIMN AEROBIC: CPT | Performed by: NURSE PRACTITIONER

## 2024-02-22 RX ORDER — LEVETIRACETAM 10 MG/ML
1000 INJECTION INTRAVASCULAR EVERY 12 HOURS
Status: DISCONTINUED | OUTPATIENT
Start: 2024-02-22 | End: 2024-02-26

## 2024-02-22 RX ORDER — ENOXAPARIN SODIUM 100 MG/ML
30 INJECTION SUBCUTANEOUS EVERY 24 HOURS
Status: DISCONTINUED | OUTPATIENT
Start: 2024-02-22 | End: 2024-02-23

## 2024-02-22 RX ADMIN — NOREPINEPHRINE BITARTRATE 0.06 MCG/KG/MIN: 4 INJECTION, SOLUTION INTRAVENOUS at 02:02

## 2024-02-22 RX ADMIN — FAMOTIDINE 20 MG: 10 INJECTION, SOLUTION INTRAVENOUS at 09:02

## 2024-02-22 RX ADMIN — MAGNESIUM SULFATE HEPTAHYDRATE 2 G: 40 INJECTION, SOLUTION INTRAVENOUS at 05:02

## 2024-02-22 RX ADMIN — FAMOTIDINE 20 MG: 10 INJECTION, SOLUTION INTRAVENOUS at 08:02

## 2024-02-22 RX ADMIN — CHLORHEXIDINE GLUCONATE 0.12% ORAL RINSE 15 ML: 1.2 LIQUID ORAL at 09:02

## 2024-02-22 RX ADMIN — CEFEPIME 2 G: 2 INJECTION, POWDER, FOR SOLUTION INTRAVENOUS at 12:02

## 2024-02-22 RX ADMIN — CEFEPIME 2 G: 2 INJECTION, POWDER, FOR SOLUTION INTRAVENOUS at 07:02

## 2024-02-22 RX ADMIN — LEVETIRACETAM 1000 MG: 500 INJECTION, SOLUTION INTRAVENOUS at 09:02

## 2024-02-22 RX ADMIN — CEFEPIME 2 G: 2 INJECTION, POWDER, FOR SOLUTION INTRAVENOUS at 10:02

## 2024-02-22 RX ADMIN — MAGNESIUM SULFATE HEPTAHYDRATE 2 G: 40 INJECTION, SOLUTION INTRAVENOUS at 12:02

## 2024-02-22 RX ADMIN — MUPIROCIN: 20 OINTMENT TOPICAL at 09:02

## 2024-02-22 RX ADMIN — CEFEPIME 2 G: 2 INJECTION, POWDER, FOR SOLUTION INTRAVENOUS at 03:02

## 2024-02-22 RX ADMIN — MUPIROCIN: 20 OINTMENT TOPICAL at 12:02

## 2024-02-22 RX ADMIN — CHLORHEXIDINE GLUCONATE 0.12% ORAL RINSE 15 ML: 1.2 LIQUID ORAL at 08:02

## 2024-02-22 RX ADMIN — MUPIROCIN: 20 OINTMENT TOPICAL at 08:02

## 2024-02-22 RX ADMIN — IOHEXOL 100 ML: 350 INJECTION, SOLUTION INTRAVENOUS at 02:02

## 2024-02-22 RX ADMIN — DAPTOMYCIN 500 MG: 500 INJECTION, POWDER, LYOPHILIZED, FOR SOLUTION INTRAVENOUS at 02:02

## 2024-02-22 RX ADMIN — CHLORHEXIDINE GLUCONATE 0.12% ORAL RINSE 15 ML: 1.2 LIQUID ORAL at 12:02

## 2024-02-22 RX ADMIN — LEVETIRACETAM 1000 MG: 500 INJECTION, SOLUTION INTRAVENOUS at 08:02

## 2024-02-22 NOTE — SUBJECTIVE & OBJECTIVE
No past medical history on file.    Past Surgical History:   Procedure Laterality Date    ESOPHAGOGASTRODUODENOSCOPY W/ PEG N/A 5/30/2023    Procedure: PEG;  Surgeon: JUSTYN Devine MD;  Location: [facility] ENDOSCOPY;  Service: Gastroenterology;  Laterality: N/A;       Review of patient's allergies indicates:  No Known Allergies    Family History    None       Tobacco Use    Smoking status: Not on file    Smokeless tobacco: Not on file   Substance and Sexual Activity    Alcohol use: Not on file    Drug use: Not on file    Sexual activity: Not on file         Review of Systems   Unable to perform ROS: Acuity of condition     Objective:     Vital Signs (Most Recent):  Temp: 98.1 °F (36.7 °C) (02/21/24 1104)  Pulse: 67 (02/21/24 2048)  Resp: (!) 26 (02/21/24 2048)  BP: (!) 146/74 (02/21/24 2048)  SpO2: 97 % (02/21/24 2048) Vital Signs (24h Range):  Temp:  [98.1 °F (36.7 °C)] 98.1 °F (36.7 °C)  Pulse:  [38-96] 67  Resp:  [15-34] 26  SpO2:  [71 %-100 %] 97 %  BP: ()/(43-80) 146/74     Weight: 57.5 kg (126 lb 12.2 oz)  Body mass index is 18.72 kg/m².      Intake/Output Summary (Last 24 hours) at 2/21/2024 2108  Last data filed at 2/21/2024 1652  Gross per 24 hour   Intake 1243.3 ml   Output 300 ml   Net 943.3 ml        Physical Exam  Vitals reviewed.   Constitutional:       General: He is in acute distress.      Appearance: He is ill-appearing and toxic-appearing.      Comments: Moribund appearing   HENT:      Head: Normocephalic.      Mouth/Throat:      Mouth: Mucous membranes are moist.      Pharynx: Oropharynx is clear.   Eyes:      Conjunctiva/sclera: Conjunctivae normal.      Pupils: Pupils are equal, round, and reactive to light.   Neck:      Comments: Tracheostomy in place- site WNL  Cardiovascular:      Rate and Rhythm: Normal rate.      Comments: Rhythm irregular with frequent ectopy and progressive down trending HR  Pulmonary:      Comments: Agonal breathing pattern with paradoxical breathing.  Diffuse rales  Abdominal:      General: There is no distension.      Palpations: Abdomen is soft.   Musculoskeletal:      Cervical back: Neck supple.      Right lower leg: No edema.      Left lower leg: No edema.      Comments: Extremities with contractures    Skin:     General: Skin is warm and dry.      Comments: Multiple large wounds. Dressing to right hand intact- not removed. Left upper arm dressing intact- not removed. Bilateral lower extremities with dry, gangrenous wound from knees downward, BEATRICE- no obvious drainage. Sacral and back wound also- not personally examined due to acuity at time of exam- pictures reviewed.    Neurological:      Mental Status: He is easily aroused. He is lethargic.      Comments: Prior to sedation- eyes open, moving head around and appears to be attempting to communicate by mouthing but I cannot understand.           Vents:  Vent Mode: A/C (02/21/24 2048)  Ventilator Initiated: Yes (02/21/24 1631)  Set Rate: 26 BPM (02/21/24 2048)  Vt Set: 365 mL (02/21/24 2048)  PEEP/CPAP: 5 cmH20 (02/21/24 2048)  Oxygen Concentration (%): 100 (02/21/24 2048)  Peak Airway Pressure: 23 cmH20 (02/21/24 2048)  Plateau Pressure: 0 cmH20 (02/21/24 2048)  Total Ve: 9.84 L/m (02/21/24 2048)  Negative Inspiratory Force (cm H2O): 0 (02/21/24 2048)  F/VT Ratio<105 (RSBI): (!) 68.97 (02/21/24 2048)    Lines/Drains/Airways       Central Venous Catheter Line  Duration             Percutaneous Central Line Insertion/Assessment - Triple Lumen  02/21/24 1754 Femoral Vein Right;Femoral Right <1 day              Drain  Duration                  Gastrostomy/Enterostomy 05/30/23 1600 Percutaneous endoscopic gastrostomy (PEG) LUQ feeding 267 days              Airway  Duration                  Airway - Non-Surgical Other (Comment) -- days    Adult Surgical Airway 05/25/23 1135 Shiley Cuffed 8.0 / 85 mm 272 days              Peripheral Intravenous Line  Duration                  Peripheral IV - Single Lumen 02/21/24  1152 18 G Anterior;Distal;Right Upper Arm <1 day                    Significant Labs:    CBC/Anemia Profile:  Recent Labs   Lab 02/21/24  1143   WBC 21.87*   HGB 7.7*   HCT 24.1*   *   MCV 92   RDW 16.4*        Chemistries:  Recent Labs   Lab 02/21/24  1143      K 4.9      CO2 22*   BUN 34*   CREATININE 0.9   CALCIUM 7.8*   ALBUMIN 1.4*   PROT 7.0   BILITOT 0.2   ALKPHOS 123   ALT 22   AST 32   MG 1.6       All pertinent labs within the past 24 hours have been reviewed.    Significant Imaging:   I have reviewed all pertinent imaging results/findings within the past 24 hours.
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
## 2024-02-22 NOTE — PROCEDURES
Jyoti Mayberry is a 28 y.o. male patient.    Temp: 98.1 °F (36.7 °C) (02/21/24 1104)  Pulse: 85 (02/21/24 2303)  Resp: (!) 26 (02/21/24 2303)  BP: 137/77 (02/21/24 2202)  SpO2: 100 % (02/21/24 2303)  Weight: 57.5 kg (126 lb 12.2 oz) (02/21/24 1057)       Central Line    Date/Time: 2/21/2024 4:15 PM    Performed by: Noe Ballard NP  Authorized by: Gena Plasencia MD    Location procedure was performed:  [facility] EMERGENCY DEPARTMENT  Consent Done ?:  Emergent Situation  Time out complete?: Verified correct patient, procedure, equipment, staff, and site/side    Indications:  Hemodynamic monitoring and med administration  Anesthesia:  Local infiltration  Preparation:  Skin prepped with ChloraPrep  Location:  Right internal jugular  Catheter type:  Triple lumen  Catheter size:  7 Fr  Inserted Catheter Length (cm):  16  Ultrasound guidance: Yes    Vessel Caliber:  Large   patent  Comprressibility:  Normal  Needle advanced into vessel with real time ultrasound guidance.    Manometry: No    Number of attempts:  3  Estimated blood loss (mL):  5  Other Complications:  Attempted x 3 unsuccessful with inability to advance guidewire past approximately 3-4cm, meeting significant resistance. Guidewire and needle removed together and both appeared intact. Proceeded to right femoral vein for CVC access, see separate note   Attempted x 3 unsuccessful with inability to advance guidewire past approximately 3-4cm, meeting significant resistance. Guidewire and needle removed together and both appeared intact. Proceeded to right femoral vein for CVC access, see separate note      2/21/2024
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
## 2024-02-22 NOTE — ASSESSMENT & PLAN NOTE
Prior to arrival, SPO2 85% on ATC  PaO2 54 on initial ABG  Likely further decompensated with IVFs given poor cardiac function  Respiratory pattern and oxygenation improved after being placed on vent  Follow up ABG  Vent settings as ordered, will adjust as indicated   VAP prevention ordered   LPS in place- peak pressures ~24  Sputum culture and CT chest pending
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
## 2024-02-22 NOTE — PLAN OF CARE
Problem: Adult Inpatient Plan of Care  Goal: Plan of Care Review  Outcome: Ongoing, Not Progressing  Received pt from ED around 2300. Pt remains on vent via trach, sedated to RASS -2 with fentanyl gtt. Mary hugger in place most of shift for hypothermia, currently off and pt normothermic. NSR on monitor, Dopamine gtt titrated off this shift. BP labile, Central line infusing Levo- titrated per orders. 35-50ml/hr urine output via sterling cath; sterling replaced per orders. Nonstick Vaseline gauze and rolled gauze applied to BLE; Pt with multiple significant wounds to BUE, BLE, back, penis,and sacrum- wound care order placed for further intervention. Contact precautions remain in place. Will continue with current POC and update as needed.

## 2024-02-22 NOTE — PLAN OF CARE
Nutrition Recs: 2/22  1. Recommend pt be initiated onto EN when medically appropriate.   - Impact Peptide 1.5. Initiate at 15 mL/hr, advance as pt tolerates, or per MD/NP. Aim for goal rate of 40 mL/hr.   - Formula will provide 1440 Kcal (86% EEN). 90.24 g Protein (100% EPN), 739.2 mL/day Free water.   - FWF + Free Water = 1669.2 mL/day (100% EFN).   - Check Mg, Na, K+, Phos, and Glu before and during initiation, correct as indicated.   2. Recommend pt receives Francisco BID via tube feeding.   3. Weigh daily.    Goals:   1. Pt will be initiated onto EN within 24-48 hrs.   2. Pt will consume >50% of EEN/EPN prior to RD follow up.   3. Pt will consume and tolerate Francisco prior to RD follow up.  Nutrition Goal Status: new  Communication of RD Recs: other (comment) (POC: Sticky note)  Kevin Thakur MS, Registration Eligible, Provisional LDN
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
## 2024-02-22 NOTE — ASSESSMENT & PLAN NOTE
"[facility] OBSERVATION UNIT: [phone]                                              INTERAGENCY TRANSFER FORM - PHYSICIAN ORDERS   11/15/2018                    Hospital Admission Date: 11/15/2018  YOANA QUIÑONES   : 10/7/1928  Sex: Male        Attending Provider: Diego Rondon MD     Allergies:  Glyburide, Lisinopril, Metformin, Penicillins, Verapamil    Infection:  None   Service:  HOSPITALIST    Ht:  1.702 m (5' 7\")   Wt:  82.5 kg (181 lb 12.8 oz)   Admission Wt:  82.5 kg (181 lb 12.8 oz)    BMI:  28.47 kg/m 2   BSA:  1.97 m 2            Patient PCP Information     Provider PCP Type    Justina Moise MD General      ED Clinical Impression     Diagnosis Description Comment Added By Time Added    Altered mental status, unspecified altered mental status type [R41.82] Altered mental status, unspecified altered mental status type [R41.82]  Arpan Gilmore MD 2018  1:44 AM    Skin tear of forearm without complication, initial encounter [S51.819A] Skin tear of forearm without complication, initial encounter [S51.819A]  Arpan Gilmore MD 2018  1:44 AM      Hospital Problems as of 2018              Priority Class Noted POA    ESRD (end stage renal disease) on dialysis (H) Medium  2014 Yes    CAD (coronary artery disease) Medium  2014 Yes    Mitral regurgitation Medium  2014 Yes    Pulmonary hypertension (H) Medium  2014 Yes    Diabetes (H) Medium  Unknown Yes    Hypertension Medium  Unknown Yes    Altered mental status Medium  2018 Yes    Fall Medium  2018 Unknown    Chronic diastolic heart failure (H) Medium  2018 Unknown    Severe aortic stenosis Medium  2018 Unknown      Non-Hospital Problems as of 2018              Priority Class Noted    NSTEMI (non-ST elevated myocardial infarction) (H) Medium  2014    Sepsis (H) Medium  2016    Pneumonia Medium  2016    Acute diastolic (congestive) heart " MRI pelvis beginning of this year showing osteo with bone loss and surgical changes  Will review available information for prior cultures and treatments   Contributing to complexity of care   failure (H) Medium  8/9/2017    Cellulitis Medium  9/18/2018      Code Status History     Date Active Date Inactive Code Status Order ID Comments User Context    11/16/2018  3:28 AM 11/16/2018  2:56 PM Full Code [id number]  Diego Rondon MD Inpatient    11/4/2018  2:40 AM 11/8/2018  4:36 PM Full Code [id number]  Jayant Ricketts MD Inpatient    9/18/2018  4:17 PM 9/25/2018  6:18 PM DNI [id number]  Aisha Franco PA-C Inpatient    9/18/2018  2:58 PM 9/18/2018  4:17 PM DNI [id number]  Aisha Franco PA-C ED    8/15/2017  8:27 AM 9/18/2018  2:58 PM Full Code [id number]  Jayant Ricketts MD Outpatient    8/9/2017 10:43 PM 8/15/2017  8:27 AM Full Code [id number]  Marilee Ramirez MD Inpatient    10/2/2016  2:30 PM 8/9/2017 10:43 PM Full Code [id number]  Primitivo Yeboah MD Outpatient    9/24/2016  2:49 PM 10/2/2016  2:30 PM Full Code [id number]  Bola Garcia, DO Inpatient    1/17/2016 10:07 AM 9/24/2016  2:49 PM Full Code [id number]  Bola Garcia, DO Outpatient    1/12/2016  3:10 PM 1/17/2016 10:07 AM Full Code [id number]  Diaz Hale,  Inpatient    12/26/2014 10:57 AM 1/12/2016  3:10 PM Full Code [id number]  Edin Elkins MD Outpatient    12/24/2014  3:28 PM 12/24/2014 11:14 PM Full Code [id number]  Phyllis Barrios MD Inpatient    12/24/2014 11:20 AM 12/24/2014  3:28 PM Full Code [id number]  Cholo Nogueira MD Outpatient    12/24/2014  5:42 AM 12/24/2014 11:20 AM Full Code [id number]  Tip Langston MD Inpatient         Medication Review      CONTINUE these medications which have NOT CHANGED        Dose / Directions Comments    acetaminophen 500 MG tablet   Commonly known as:  TYLENOL        Dose:  1000 mg   Take 1,000 mg by mouth 3 times daily as needed for mild pain   Refills:  0        aspirin 81 MG tablet        Dose:  81 mg   Take 81 mg by mouth daily   Refills:  0        atorvastatin 40 MG tablet   Commonly known as:  LIPITOR        Dose:  40  mg   Take 40 mg by mouth At Bedtime   Refills:  0        calcium acetate 667 MG Caps capsule   Commonly known as:  PHOSLO        Dose:  2001 mg   Take 2,001 mg by mouth 3 times daily (with meals)   Refills:  0        diclofenac 1 % Gel topical gel   Commonly known as:  VOLTAREN   Used for:  Cellulitis of right lower extremity        Dose:  2 g   Apply 2 g topically 4 times daily Apply to RLE   Refills:  0        gabapentin 100 MG capsule   Commonly known as:  NEURONTIN        Dose:  100 mg   Take 1 capsule (100 mg) by mouth 3 times daily for 10 days   Quantity:  30 capsule   Refills:  0        metoprolol succinate 25 MG 24 hr tablet   Commonly known as:  TOPROL-XL        Dose:  12.5 mg   Take 12.5 mg by mouth daily Hold for SBP equal to or less than 100. Hold for HR equal to or less than 60.   Refills:  0        nitroGLYcerin 0.4 MG sublingual tablet   Commonly known as:  NITROSTAT   Used for:  NSTEMI (non-ST elevated myocardial infarction) (H)        Dose:  0.4 mg   Place 1 tablet (0.4 mg) under the tongue every 5 minutes as needed for chest pain   Quantity:  25 tablet   Refills:  3        * polyethylene glycol Packet   Commonly known as:  MIRALAX/GLYCOLAX        Dose:  1 packet   Take 1 packet by mouth daily as needed for constipation   Refills:  0        * polyethylene glycol Packet   Commonly known as:  MIRALAX/GLYCOLAX   Used for:  Cellulitis of right lower extremity        Dose:  17 g   Take 17 g by mouth daily   Quantity:  7 packet   Refills:  0        * Notice:  This list has 2 medication(s) that are the same as other medications prescribed for you. Read the directions carefully, and ask your doctor or other care provider to review them with you.            Summary of Visit     Reason for your hospital stay       You were in the hospital to assess you after your fall             After Care     Activity - Up with nursing assistance           Advance Diet as Tolerated       Follow this diet upon discharge:  Orders Placed This Encounter      Regular Diet Adult       Fall precautions           General info for SNF       Length of Stay Estimate: Short Term Care: Estimated # of Days 31-90  Condition at Discharge: Stable  Level of care:skilled   Rehabilitation Potential: Fair  Admission H&P remains valid and up-to-date: Yes  Recent Chemotherapy: N/A  Use Nursing Home Standing Orders: Yes       Intake and output       Every shift       Mantoux instructions       Give two-step Mantoux (PPD) Per Facility Policy Yes             Referrals     Occupational Therapy Adult Consult       Evaluate and treat as clinically indicated.    Reason:  Physical deconditioning       Physical Therapy Adult Consult       Evaluate and treat as clinically indicated.    Reason:  Physical deconditioning             Follow-Up Appointment Instructions     Future Labs/Procedures    Follow Up and recommended labs and tests     Comments:    Follow up with Nursing home physician.  No follow up labs or test are needed.  Follow-up with cardiology in the next 1-2 weeks to discuss heart problems      Follow-Up Appointment Instructions     Follow Up and recommended labs and tests       Follow up with Nursing home physician.  No follow up labs or test are needed.  Follow-up with cardiology in the next 1-2 weeks to discuss heart problems             Statement of Approval     Ordered          11/16/18 1218  I have reviewed and agree with all the recommendations and orders detailed in this document.  EFFECTIVE NOW     Approved and electronically signed by:  Cortney Griffin PA-C

## 2024-02-22 NOTE — ASSESSMENT & PLAN NOTE
Several large, unstageable wounds present  Not all personally visualized due to acuity of illness with stabilization of hemodynamics and respiratory state being priority  Wound care consult  Will personally examine all wounds when stable   Surgery consult likely warranted- not stable for surgery at this time and thus will defer + awaiting imaging  Optimize nutrition as able to promote wound healing- notably albumin is 1.4  Pressure redistribution mattress and reposition frequently

## 2024-02-22 NOTE — ASSESSMENT & PLAN NOTE
Suspect sepsis primarily but may have cardiac component as well with echo showing EF 15-20%  LA surprisingly normal   Received more than adequate fluid resuscitation and shock did not resolve. Evidence of compromised perfusion includes encephalopathy.  Levophed initiated to keep MAP > 65 mmHg   Infectious work up in progress  CT chest/abd/pelvis ordered  Broad spectrum antibiotics - Cefepime + Daptomycin with multiple MDR infections  Echo pending   Additional work up and treatments as indicated

## 2024-02-22 NOTE — EICU
EICU BRIEF ADMIT NOTE:    HISTORY:  Shock, Reparatory failure Please refer to H/P and ER notes for detail    CAMERA ASSESSMENT: Two way audiovisual assessment was done: Yes    Telemetry was reviewed. Medical records including notes, labs and imaging were reviewed.Yes    DISCUSSED with bedside nurse.Yes    ASSESSMENT AND PLAN:    # Acute hypoxic respiratory failure: due to Pneumonia vs Pulm edema. Vent support, IV antibiotics  # Shock Sepsis plus cardiomyopathy: IV antibiotics, Procal and lactate are normal      BEST PRACTICES REVIEW:    INTUBATED: Yes . Tidal Volume review completed.  ml, 6 ml per KG IBW. Vent bundle initiated yes  GLYCEMIN CONTROL:  Diabetes: NO  STRESS ULCER PROPHYLAXIS: H2 antagonist   DVT PROPHYLAXIS:  Pharmacological    Thank You for allowing EICU to participate in the care of the patient. Please call as needed      Jose Johnson MD  EICU  Critical Care Medicine

## 2024-02-22 NOTE — PLAN OF CARE
Problem: Bleeding (Sepsis/Septic Shock)  Goal: Absence of Bleeding  Outcome: Ongoing, Progressing  Hematuria noted today     Problem: Glycemic Control Impaired (Sepsis/Septic Shock)  Goal: Blood Glucose Level Within Desired Range  Outcome: Ongoing, Progressing  Glucose levels <150     Problem: Infection Progression (Sepsis/Septic Shock)  Goal: Absence of Infection Signs and Symptoms  Outcome: Ongoing, Progressing     Problem: Nutrition Impaired (Sepsis/Septic Shock)  Goal: Optimal Nutrition Intake  Outcome: Ongoing, Progressing  TF to be addressed      Problem: Infection  Goal: Absence of Infection Signs and Symptoms  Outcome: Ongoing, Progressing

## 2024-02-22 NOTE — ASSESSMENT & PLAN NOTE
>>ASSESSMENT AND PLAN FOR PRESSURE ULCERS OF SKIN OF MULTIPLE TOPOGRAPHIC SITES WRITTEN ON 2/21/2024 11:17 PM BY NATANAEL MAURO, NP    Several large, unstageable wounds present  Not all personally visualized due to acuity of illness with stabilization of hemodynamics and respiratory state being priority  Wound care consult  Will personally examine all wounds when stable   Surgery consult likely warranted- not stable for surgery at this time and thus will defer + awaiting imaging  Optimize nutrition as able to promote wound healing- notably albumin is 1.4  Pressure redistribution mattress and reposition frequently

## 2024-02-22 NOTE — PROCEDURES
Jyoti Mayberry is a 28 y.o. male patient.    Temp: 98.1 °F (36.7 °C) (02/21/24 1104)  Pulse: 85 (02/21/24 2303)  Resp: (!) 26 (02/21/24 2303)  BP: 137/77 (02/21/24 2202)  SpO2: 100 % (02/21/24 2303)  Weight: 57.5 kg (126 lb 12.2 oz) (02/21/24 1057)       Central Line    Date/Time: 2/21/2024 5:00 PM    Performed by: Noe Ballard NP  Authorized by: Gena Plasencia MD    Location procedure was performed:  [facility] EMERGENCY DEPARTMENT  Consent Done ?:  Emergent Situation  Time out complete?: Verified correct patient, procedure, equipment, staff, and site/side    Indications:  Med administration, vascular access and hemodynamic monitoring  Anesthesia:  Local infiltration  Local anesthetic:  Lidocaine 1% without epinephrine  Description of findings:  Several vessels with what appears to be thrombi  Preparation:  Skin prepped with ChloraPrep  Skin prep agent dried: Skin prep agent completely dried prior to procedure    Sterile barriers: All five maximal sterile barriers used - gloves, gown, cap, mask and large sterile sheet    Hand hygiene: Hand hygiene performed immediately prior to central venous catheter insertion    Location:  Right femoral  Site selection rationale:  Unable to successfully place in IJ  Catheter type:  Triple lumen  Catheter size:  7 Fr  Inserted Catheter Length (cm):  20  Manometry: No    Number of attempts:  1  Securement:  Line sutured, chlorhexidine patch, sterile dressing applied and blood return through all ports  Complications: No    XRay:  Successful placement  Adverse Events:  None      2/21/2024

## 2024-02-23 LAB
ABO + RH BLD: NORMAL
ACINETOBACTER CALCOACETICUS/BAUMANNII COMPLEX: NOT DETECTED
ANION GAP SERPL CALC-SCNC: 14 MMOL/L (ref 8–16)
BACTERIA UR CULT: NORMAL
BACTEROIDES FRAGILIS: NOT DETECTED
BASOPHILS # BLD AUTO: ABNORMAL K/UL (ref 0–0.2)
BASOPHILS NFR BLD: 0 % (ref 0–1.9)
BLD GP AB SCN CELLS X3 SERPL QL: NORMAL
BLD PROD TYP BPU: NORMAL
BLOOD UNIT EXPIRATION DATE: NORMAL
BLOOD UNIT TYPE CODE: 5100
BLOOD UNIT TYPE: NORMAL
BUN SERPL-MCNC: 24 MG/DL (ref 6–20)
CALCIUM SERPL-MCNC: 7.3 MG/DL (ref 8.7–10.5)
CANDIDA ALBICANS: NOT DETECTED
CANDIDA AURIS: NOT DETECTED
CANDIDA GLABRATA: NOT DETECTED
CANDIDA KRUSEI: NOT DETECTED
CANDIDA PARAPSILOSIS: NOT DETECTED
CANDIDA TROPICALIS: NOT DETECTED
CHLORIDE SERPL-SCNC: 108 MMOL/L (ref 95–110)
CO2 SERPL-SCNC: 22 MMOL/L (ref 23–29)
CODING SYSTEM: NORMAL
CREAT SERPL-MCNC: 0.7 MG/DL (ref 0.5–1.4)
CROSSMATCH INTERPRETATION: NORMAL
CRYPTOCOCCUS NEOFORMANS/GATTII: NOT DETECTED
CTX-M GENE (ESBL PRODUCER): NORMAL
DIFFERENTIAL METHOD BLD: ABNORMAL
DISPENSE STATUS: NORMAL
ENTEROBACTER CLOACAE COMPLEX: NOT DETECTED
ENTEROBACTERALES: NOT DETECTED
ENTEROCOCCUS FAECALIS: NOT DETECTED
ENTEROCOCCUS FAECIUM: NOT DETECTED
EOSINOPHIL # BLD AUTO: ABNORMAL K/UL (ref 0–0.5)
EOSINOPHIL NFR BLD: 0 % (ref 0–8)
ERYTHROCYTE [DISTWIDTH] IN BLOOD BY AUTOMATED COUNT: 15.9 % (ref 11.5–14.5)
ESCHERICHIA COLI: NOT DETECTED
EST. GFR  (NO RACE VARIABLE): >60 ML/MIN/1.73 M^2
GLUCOSE SERPL-MCNC: 75 MG/DL (ref 70–110)
HAEMOPHILUS INFLUENZAE: NOT DETECTED
HCT VFR BLD AUTO: 21 % (ref 40–54)
HGB BLD-MCNC: 6.6 G/DL (ref 14–18)
IMM GRANULOCYTES # BLD AUTO: ABNORMAL K/UL (ref 0–0.04)
IMM GRANULOCYTES NFR BLD AUTO: ABNORMAL % (ref 0–0.5)
IMP GENE (CARBAPENEM RESISTANT): NORMAL
KLEBSIELLA AEROGENES: NOT DETECTED
KLEBSIELLA OXYTOCA: NOT DETECTED
KLEBSIELLA PNEUMONIAE GROUP: NOT DETECTED
KPC RESISTANCE GENE (CARBAPENEM): NORMAL
LISTERIA MONOCYTOGENES: NOT DETECTED
LYMPHOCYTES # BLD AUTO: ABNORMAL K/UL (ref 1–4.8)
LYMPHOCYTES NFR BLD: 5 % (ref 18–48)
MAGNESIUM SERPL-MCNC: 2.1 MG/DL (ref 1.6–2.6)
MCH RBC QN AUTO: 28.4 PG (ref 27–31)
MCHC RBC AUTO-ENTMCNC: 31.4 G/DL (ref 32–36)
MCR-1: NORMAL
MCV RBC AUTO: 91 FL (ref 82–98)
MEC A/C AND MREJ (MRSA): NORMAL
MEC A/C: NORMAL
MONOCYTES # BLD AUTO: ABNORMAL K/UL (ref 0.3–1)
MONOCYTES NFR BLD: 6 % (ref 4–15)
NDM GENE (CARBAPENEM RESISTANT): NORMAL
NEISSERIA MENINGITIDIS: NOT DETECTED
NEUTROPHILS NFR BLD: 70 % (ref 38–73)
NEUTS BAND NFR BLD MANUAL: 19 %
NRBC BLD-RTO: 0 /100 WBC
NUM UNITS TRANS PACKED RBC: NORMAL
OXA-48-LIKE (CARBAPENEM RESISTANT): NORMAL
PLATELET # BLD AUTO: 711 K/UL (ref 150–450)
PLATELET BLD QL SMEAR: ABNORMAL
PMV BLD AUTO: 8.4 FL (ref 9.2–12.9)
POCT GLUCOSE: 89 MG/DL (ref 70–110)
POCT GLUCOSE: 96 MG/DL (ref 70–110)
POCT GLUCOSE: 99 MG/DL (ref 70–110)
POTASSIUM SERPL-SCNC: 4.1 MMOL/L (ref 3.5–5.1)
PROTEUS SPECIES: NOT DETECTED
PSEUDOMONAS AERUGINOSA: NOT DETECTED
RBC # BLD AUTO: 2.32 M/UL (ref 4.6–6.2)
SALMONELLA SP: NOT DETECTED
SERRATIA MARCESCENS: NOT DETECTED
SODIUM SERPL-SCNC: 144 MMOL/L (ref 136–145)
SPECIMEN OUTDATE: NORMAL
STAPHYLOCOCCUS AUREUS: NOT DETECTED
STAPHYLOCOCCUS EPIDERMIDIS: NOT DETECTED
STAPHYLOCOCCUS LUGDUNESIS: NOT DETECTED
STAPHYLOCOCCUS SPECIES: NOT DETECTED
STENOTROPHOMONAS MALTOPHILIA: NOT DETECTED
STREPTOCOCCUS AGALACTIAE: NOT DETECTED
STREPTOCOCCUS PNEUMONIAE: NOT DETECTED
STREPTOCOCCUS PYOGENES: NOT DETECTED
STREPTOCOCCUS SPECIES: NOT DETECTED
VAN A/B (VRE GENE): NORMAL
VIM GENE (CARBAPENEM RESISTANT): NORMAL
WBC # BLD AUTO: 17.07 K/UL (ref 3.9–12.7)

## 2024-02-23 PROCEDURE — 63600175 PHARM REV CODE 636 W HCPCS: Performed by: SPECIALIST

## 2024-02-23 PROCEDURE — 86920 COMPATIBILITY TEST SPIN: CPT

## 2024-02-23 PROCEDURE — 99900026 HC AIRWAY MAINTENANCE (STAT)

## 2024-02-23 PROCEDURE — 94003 VENT MGMT INPAT SUBQ DAY: CPT

## 2024-02-23 PROCEDURE — 99223 1ST HOSP IP/OBS HIGH 75: CPT | Mod: ,,, | Performed by: COLON & RECTAL SURGERY

## 2024-02-23 PROCEDURE — 63600175 PHARM REV CODE 636 W HCPCS: Performed by: NURSE PRACTITIONER

## 2024-02-23 PROCEDURE — 20000000 HC ICU ROOM

## 2024-02-23 PROCEDURE — 83735 ASSAY OF MAGNESIUM: CPT | Performed by: NURSE PRACTITIONER

## 2024-02-23 PROCEDURE — 36430 TRANSFUSION BLD/BLD COMPNT: CPT

## 2024-02-23 PROCEDURE — 99233 SBSQ HOSP IP/OBS HIGH 50: CPT | Mod: NSCH,,, | Performed by: INTERNAL MEDICINE

## 2024-02-23 PROCEDURE — 30233N1 TRANSFUSION OF NONAUTOLOGOUS RED BLOOD CELLS INTO PERIPHERAL VEIN, PERCUTANEOUS APPROACH: ICD-10-PCS | Performed by: STUDENT IN AN ORGANIZED HEALTH CARE EDUCATION/TRAINING PROGRAM

## 2024-02-23 PROCEDURE — 27100171 HC OXYGEN HIGH FLOW UP TO 24 HOURS

## 2024-02-23 PROCEDURE — 25000003 PHARM REV CODE 250: Performed by: INTERNAL MEDICINE

## 2024-02-23 PROCEDURE — 25000003 PHARM REV CODE 250: Performed by: EMERGENCY MEDICINE

## 2024-02-23 PROCEDURE — P9016 RBC LEUKOCYTES REDUCED: HCPCS

## 2024-02-23 PROCEDURE — 25000003 PHARM REV CODE 250: Performed by: NURSE PRACTITIONER

## 2024-02-23 PROCEDURE — 99900035 HC TECH TIME PER 15 MIN (STAT)

## 2024-02-23 PROCEDURE — 85007 BL SMEAR W/DIFF WBC COUNT: CPT | Performed by: NURSE PRACTITIONER

## 2024-02-23 PROCEDURE — 25000003 PHARM REV CODE 250: Performed by: SPECIALIST

## 2024-02-23 PROCEDURE — 86850 RBC ANTIBODY SCREEN: CPT

## 2024-02-23 PROCEDURE — 27000207 HC ISOLATION

## 2024-02-23 PROCEDURE — 85027 COMPLETE CBC AUTOMATED: CPT | Performed by: NURSE PRACTITIONER

## 2024-02-23 PROCEDURE — 94761 N-INVAS EAR/PLS OXIMETRY MLT: CPT

## 2024-02-23 PROCEDURE — 63600175 PHARM REV CODE 636 W HCPCS: Performed by: INTERNAL MEDICINE

## 2024-02-23 PROCEDURE — 80048 BASIC METABOLIC PNL TOTAL CA: CPT | Performed by: NURSE PRACTITIONER

## 2024-02-23 RX ORDER — FAMOTIDINE 40 MG/5ML
20 POWDER, FOR SUSPENSION ORAL 2 TIMES DAILY
Status: DISCONTINUED | OUTPATIENT
Start: 2024-02-23 | End: 2024-03-07 | Stop reason: HOSPADM

## 2024-02-23 RX ORDER — LINEZOLID 2 MG/ML
600 INJECTION, SOLUTION INTRAVENOUS
Status: DISCONTINUED | OUTPATIENT
Start: 2024-02-23 | End: 2024-03-07 | Stop reason: HOSPADM

## 2024-02-23 RX ORDER — MINOCYCLINE HYDROCHLORIDE 50 MG/1
100 CAPSULE ORAL EVERY 12 HOURS
Status: DISCONTINUED | OUTPATIENT
Start: 2024-02-23 | End: 2024-03-07 | Stop reason: HOSPADM

## 2024-02-23 RX ORDER — HYDROCODONE BITARTRATE AND ACETAMINOPHEN 500; 5 MG/1; MG/1
TABLET ORAL
Status: DISCONTINUED | OUTPATIENT
Start: 2024-02-23 | End: 2024-02-27 | Stop reason: ALTCHOICE

## 2024-02-23 RX ADMIN — MINOCYCLINE HYDROCHLORIDE 100 MG: 50 CAPSULE ORAL at 02:02

## 2024-02-23 RX ADMIN — MEROPENEM 2 G: 1 INJECTION INTRAVENOUS at 02:02

## 2024-02-23 RX ADMIN — CHLORHEXIDINE GLUCONATE 0.12% ORAL RINSE 15 ML: 1.2 LIQUID ORAL at 08:02

## 2024-02-23 RX ADMIN — FAMOTIDINE 20 MG: 40 POWDER, FOR SUSPENSION ORAL at 08:02

## 2024-02-23 RX ADMIN — MEROPENEM 2 G: 1 INJECTION INTRAVENOUS at 11:02

## 2024-02-23 RX ADMIN — LINEZOLID 600 MG: 600 INJECTION, SOLUTION INTRAVENOUS at 05:02

## 2024-02-23 RX ADMIN — MEROPENEM 1 G: 1 INJECTION INTRAVENOUS at 04:02

## 2024-02-23 RX ADMIN — Medication 173 MCG/HR: at 06:02

## 2024-02-23 RX ADMIN — MUPIROCIN: 20 OINTMENT TOPICAL at 08:02

## 2024-02-23 RX ADMIN — LEVETIRACETAM INJECTION 1000 MG: 10 INJECTION INTRAVENOUS at 08:02

## 2024-02-23 RX ADMIN — FAMOTIDINE 20 MG: 10 INJECTION, SOLUTION INTRAVENOUS at 08:02

## 2024-02-23 RX ADMIN — CEFEPIME 2 G: 2 INJECTION, POWDER, FOR SOLUTION INTRAVENOUS at 07:02

## 2024-02-23 RX ADMIN — Medication 125 MCG/HR: at 12:02

## 2024-02-23 RX ADMIN — LINEZOLID 600 MG: 600 INJECTION, SOLUTION INTRAVENOUS at 04:02

## 2024-02-23 RX ADMIN — NOREPINEPHRINE BITARTRATE 0.03 MCG/KG/MIN: 4 INJECTION, SOLUTION INTRAVENOUS at 10:02

## 2024-02-23 RX ADMIN — MINOCYCLINE HYDROCHLORIDE 100 MG: 50 CAPSULE ORAL at 08:02

## 2024-02-23 NOTE — ASSESSMENT & PLAN NOTE
Prior to arrival, SPO2 85% on ATC  PaO2 54 on initial ABG  Likely further decompensated with IVFs given hx poor cardiac function  Respiratory pattern and oxygenation improved after being placed on vent  Follow up ABG  Vent settings reviewed and will adjust as indicated   VAP prevention ordered   Resolved- breathing much more comfortably today

## 2024-02-23 NOTE — ASSESSMENT & PLAN NOTE
Will follow critical care team.  Recent cultures at [facility]  reviewed -        2/12-  Specimen: Wound - BACK  Component 10 d ago   Aspirate or Abscess Culture Multiple organisms present, predominant potential pathogen(s):   Aspirate or Abscess Culture 4+ Klebsiella pneumoniae Abnormal    Aspirate or Abscess Culture 3+ Enterococcus faecium Abnormal    01/13-  1 mo ago Comments    TISSUE CULTURE 1+ Enterococcus faecium Abnormal  For susceptibility results, refer to culture # - [id number]   TISSUE CULTURE Scant (< 1+) growth Acinetobacter baumanii/nosocomialis group Abnormal  For susceptibility results, refer to culture # - [id number]     The isolate done 02/12- of Klebsiella is resistant to Cefepime and the enterococcus is daptomycin resistant -will use Zyvox/Meropenem    Guarded prognosis -  Will use new cultures to guide regime -follow blood ,local wound cultures ,resp culture
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
## 2024-02-23 NOTE — SUBJECTIVE & OBJECTIVE
No past medical history on file.    Past Surgical History:   Procedure Laterality Date    ESOPHAGOGASTRODUODENOSCOPY W/ PEG N/A 5/30/2023    Procedure: PEG;  Surgeon: JUSTYN Devine MD;  Location: [facility];  Service: Gastroenterology;  Laterality: N/A;       Review of patient's allergies indicates:  No Known Allergies    Medications:  Medications Prior to Admission   Medication Sig    acetaminophen (TYLENOL) 325 MG tablet 2 tablets (650 mg total) by Per G Tube route every 6 (six) hours as needed for Temperature greater than (100.4).    albuterol (PROVENTIL) 2.5 mg /3 mL (0.083 %) nebulizer solution Take 3 mLs (2.5 mg total) by nebulization every 4 (four) hours as needed (shortness of breath). Rescue    ALPRAZolam (XANAX) 1 MG tablet Take 1 tablet (1 mg total) by mouth 3 (three) times daily as needed for Anxiety.    calcitRIOL (ROCALTROL) 0.5 MCG Cap Take 1 capsule (0.5 mcg total) by mouth once daily.    calcium acetate,phosphat bind, (PHOSLO) 667 mg capsule Take 1 capsule (667 mg total) by mouth 3 (three) times daily with meals.    clonazePAM (KLONOPIN) 1 MG tablet Take 1 tablet (1 mg total) by mouth 2 (two) times daily.    enoxaparin (LOVENOX) 40 mg/0.4 mL Syrg Inject 0.4 mLs (40 mg total) into the skin once daily.    HYDROcodone-acetaminophen (NORCO)  mg per tablet Take 1 tablet by mouth every 6 (six) hours as needed for Pain.    levETIRAcetam (KEPPRA) 1000 MG tablet Take 1 tablet (1,000 mg total) by mouth 2 (two) times daily.    melatonin (MELATIN) 3 mg tablet Take 2 tablets (6 mg total) by mouth nightly as needed for Insomnia.    midodrine (PROAMATINE) 2.5 MG Tab Take 3 tablets (7.5 mg total) by mouth 3 (three) times daily with meals.    ondansetron (ZOFRAN) 4 mg/5 mL solution Take 5 mLs (4 mg total) by mouth every 6 (six) hours as needed for Nausea.    pantoprazole (PROTONIX) 40 mg injection Inject 40 mg into the vein once daily.    sertraline (ZOLOFT) 50 MG tablet Take 1 tablet (50 mg  total) by mouth once daily.     Antibiotics (From admission, onward)      Start     Stop Route Frequency Ordered    02/23/24 0445  linezolid 600 mg/300 mL IVPB 600 mg         -- IV Every 12 hours (non-standard times) 02/23/24 0332 02/23/24 0445  meropenem (MERREM) 1 g in sodium chloride 0.9 % 100 mL IVPB (MB+)         -- IV Every 8 hours (non-standard times) 02/23/24 0336 02/21/24 2100  mupirocin 2 % ointment         02/26/24 2059 Nasl 2 times daily 02/21/24 1718          Antifungals (From admission, onward)      None          Antivirals (From admission, onward)      None             There is no immunization history for the selected administration types on file for this patient.    Family History    None       Social History     Socioeconomic History    Marital status: Single     Review of Systems   Unable to perform ROS: Acuity of condition     Objective:     Vital Signs (Most Recent):  Temp: 98.4 °F (36.9 °C) (02/23/24 0321)  Pulse: 95 (02/23/24 0321)  Resp: (!) 21 (02/23/24 0321)  BP: (!) 110/58 (02/23/24 0321)  SpO2: 100 % (02/23/24 0321) Vital Signs (24h Range):  Temp:  [96.3 °F (35.7 °C)-99 °F (37.2 °C)] 98.4 °F (36.9 °C)  Pulse:  [] 95  Resp:  [20-27] 21  SpO2:  [98 %-100 %] 100 %  BP: ()/(33-65) 110/58     Weight: 45.8 kg (100 lb 15.5 oz)  Body mass index is 14.91 kg/m².    Estimated Creatinine Clearance: 89.9 mL/min (based on SCr of 0.8 mg/dL).     Physical Exam  Vitals and nursing note reviewed.   Constitutional:       Appearance: He is cachectic. He is ill-appearing.   Pulmonary:      Effort: No respiratory distress.      Breath sounds: No wheezing.   Musculoskeletal:      Comments: See wound care pics   Skin:     Findings: Lesion and rash present.   Neurological:      Comments: On tracheotomy /vent support                                                                Significant Labs: Blood Culture:   Recent Labs   Lab 02/21/24  1144 02/21/24  1145   JEN No Growth to date  No  "Growth to date No Growth to date  No Growth to date     CBC:   Recent Labs   Lab 02/21/24  1143 02/22/24  0421   WBC 21.87* 20.12*   HGB 7.7* 7.3*   HCT 24.1* 22.6*   * 771*     CMP:   Recent Labs   Lab 02/21/24  1143 02/22/24  0421    142   K 4.9 4.2    107   CO2 22* 23   GLU 91 100   BUN 34* 31*   CREATININE 0.9 0.8   CALCIUM 7.8* 7.9*   PROT 7.0 6.2   ALBUMIN 1.4* 1.4*   BILITOT 0.2 0.3   ALKPHOS 123 117   AST 32 21   ALT 22 18   ANIONGAP 11 12     Urine Culture:   Recent Labs   Lab 02/21/24  1242   LABURIN PRESUMPTIVE PROTEUS SPECIES  >100,000 cfu/ml  Identification and susceptibility pending  No other significant isolate  *     Wound Culture: No results for input(s): "LABAERO" in the last 4320 hours.  All pertinent labs within the past 24 hours have been reviewed.    Significant Imaging: I have reviewed all pertinent imaging results/findings within the past 24 hours.  "

## 2024-02-23 NOTE — ASSESSMENT & PLAN NOTE
>>ASSESSMENT AND PLAN FOR PRESSURE ULCERS OF SKIN OF MULTIPLE TOPOGRAPHIC SITES WRITTEN ON 2/23/2024  3:43 AM BY DEANNA BERNAL MD, HUDSON    Will continue wound care -prognosis -guarded

## 2024-02-23 NOTE — ASSESSMENT & PLAN NOTE
Several large, unstageable wounds present  Not all personally visualized due to acuity of illness with stabilization of hemodynamics and respiratory state being priority  Wound care consulted and evaluated today- see uploaded pictures and note  Optimize nutrition as able to promote wound healing- notably albumin is 1.4  Surgery consult placed - discussed with Dr. Dale

## 2024-02-23 NOTE — ASSESSMENT & PLAN NOTE
Suspect sepsis primarily but may have cardiac component as well with echo showing EF 15-20%  LA surprisingly normal   Received more than adequate fluid resuscitation and shock did not resolve. Evidence of compromised perfusion includes encephalopathy.  Levophed initiated to keep MAP > 65 mmHg   Infectious work up in progress- UA grossly abnormal with cx + presumed proteus   CT chest/abd/pelvis reviewed  Broad spectrum antibiotics - Cefepime + Daptomycin with multiple MDR infections  Echo with EF 35-40%  Wound care consult completed- pictures have been uploaded. Most concerning wound is left arm, most likely contributing to presentation in addition to UTI  Additional work up and treatments as indicated     2/23 PRBC today. On minimal levo. May be able to wean off.

## 2024-02-23 NOTE — SUBJECTIVE & OBJECTIVE
Objective:     Vital Signs (Most Recent):  Temp: 96.4 °F (35.8 °C) (02/22/24 2100)  Pulse: 64 (02/22/24 2100)  Resp: 20 (02/22/24 2100)  BP: 102/60 (02/22/24 2145)  SpO2: 100 % (02/22/24 2100) Vital Signs (24h Range):  Temp:  [92.7 °F (33.7 °C)-99 °F (37.2 °C)] 96.4 °F (35.8 °C)  Pulse:  [] 64  Resp:  [20-27] 20  SpO2:  [98 %-100 %] 100 %  BP: ()/(33-86) 102/60     Weight: 45.8 kg (100 lb 15.5 oz)  Body mass index is 14.91 kg/m².      Intake/Output Summary (Last 24 hours) at 2/22/2024 2243  Last data filed at 2/22/2024 2200  Gross per 24 hour   Intake 1019.94 ml   Output 1350 ml   Net -330.06 ml        Physical Exam  Vitals reviewed.   Constitutional:       General: He is sleeping. He is not in acute distress.     Appearance: He is ill-appearing.      Comments: Acutely ill and chronically debilitated appearing   HENT:      Head: Normocephalic.      Mouth/Throat:      Mouth: Mucous membranes are moist.      Pharynx: Oropharynx is clear.   Neck:      Comments: Trach site WNL  Cardiovascular:      Rate and Rhythm: Normal rate and regular rhythm.   Pulmonary:      Comments: Synchronous with vent. Scattered rhonchi  Abdominal:      General: There is no distension.      Palpations: Abdomen is soft.      Comments: LLQ ostomy   Musculoskeletal:      Cervical back: Neck supple.      Comments: Extremity contractures, generalized muscle atrophy   Skin:     General: Skin is dry.      Comments: Multiple, large wounds with eschar and purulent drainage. malodorous   Neurological:      Comments: Sleeping and comfortable at time of visit. Awakens easily           Review of Systems    Vents:  Vent Mode: A/C (02/22/24 1911)  Ventilator Initiated: Yes (02/21/24 1631)  Set Rate: 20 BPM (02/22/24 1911)  Vt Set: 360 mL (02/22/24 1911)  PEEP/CPAP: 5 cmH20 (02/22/24 1911)  Oxygen Concentration (%): 40 (02/22/24 2100)  Peak Airway Pressure: 21 cmH20 (02/22/24 1911)  Plateau Pressure: 17 cmH20 (02/22/24 1911)  Total Ve: 7.62  L/m (02/22/24 1911)  Negative Inspiratory Force (cm H2O): 0 (02/22/24 1911)  F/VT Ratio<105 (RSBI): (!) 52.91 (02/22/24 1911)    Lines/Drains/Airways       Central Venous Catheter Line  Duration             Percutaneous Central Line Insertion/Assessment - Triple Lumen  02/21/24 1754 Femoral Vein Right;Femoral Right 1 day              Drain  Duration                  Colostomy  LLQ -- days         Urethral Catheter 02/21/24 2330 Temperature probe <1 day              Airway  Duration             Adult Surgical Airway 05/25/23 1135 Shiley Cuffed 8.0 / 85 mm 273 days                    Significant Labs:    CBC/Anemia Profile:  Recent Labs   Lab 02/21/24  1143 02/22/24  0421   WBC 21.87* 20.12*   HGB 7.7* 7.3*   HCT 24.1* 22.6*   * 771*   MCV 92 90   RDW 16.4* 15.6*        Chemistries:  Recent Labs   Lab 02/21/24  1143 02/22/24  0421    142   K 4.9 4.2    107   CO2 22* 23   BUN 34* 31*   CREATININE 0.9 0.8   CALCIUM 7.8* 7.9*   ALBUMIN 1.4* 1.4*   PROT 7.0 6.2   BILITOT 0.2 0.3   ALKPHOS 123 117   ALT 22 18   AST 32 21   MG 1.6 1.8       All pertinent labs within the past 24 hours have been reviewed.    Significant Imaging:  I have reviewed all pertinent imaging results/findings within the past 24 hours.
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
## 2024-02-23 NOTE — ASSESSMENT & PLAN NOTE
>>ASSESSMENT AND PLAN FOR PRESSURE ULCERS OF SKIN OF MULTIPLE TOPOGRAPHIC SITES WRITTEN ON 2/22/2024 10:51 PM BY NATANAEL MAURO, NP    Several large, unstageable wounds present  Not all personally visualized due to acuity of illness with stabilization of hemodynamics and respiratory state being priority  Wound care consulted and evaluated today- see uploaded pictures and note  Optimize nutrition as able to promote wound healing- notably albumin is 1.4  Surgery consult placed - discussed with Dr. Dale
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
## 2024-02-23 NOTE — PLAN OF CARE
Pt remains on vent via trach, settings unchanged.  On Fent for RASS -2.  Pt nodding appropriately.   Levo infusing to maintain MAP >65.  Monitoring BG Q6, no coverage required.  NGT placed today for med admin.  Voiding per sterling with adequate UOP.  Ileostomy draining.  1U PRBC transfused today.   Turnign Q2 to prevent further skin breakdown and injury.  Transfer request placed, pending acceptance.  Pt and family updated on POC.      Problem: Adult Inpatient Plan of Care  Goal: Plan of Care Review  Outcome: Ongoing, Progressing  Goal: Patient-Specific Goal (Individualized)  Outcome: Ongoing, Progressing  Goal: Absence of Hospital-Acquired Illness or Injury  Outcome: Ongoing, Progressing  Goal: Optimal Comfort and Wellbeing  Outcome: Ongoing, Progressing  Goal: Readiness for Transition of Care  Outcome: Ongoing, Progressing     Problem: Adjustment to Illness (Sepsis/Septic Shock)  Goal: Optimal Coping  Outcome: Ongoing, Progressing     Problem: Bleeding (Sepsis/Septic Shock)  Goal: Absence of Bleeding  Outcome: Ongoing, Progressing     Problem: Glycemic Control Impaired (Sepsis/Septic Shock)  Goal: Blood Glucose Level Within Desired Range  Outcome: Ongoing, Progressing     Problem: Infection Progression (Sepsis/Septic Shock)  Goal: Absence of Infection Signs and Symptoms  Outcome: Ongoing, Progressing     Problem: Nutrition Impaired (Sepsis/Septic Shock)  Goal: Optimal Nutrition Intake  Outcome: Ongoing, Progressing     Problem: Infection  Goal: Absence of Infection Signs and Symptoms  Outcome: Ongoing, Progressing     Problem: Communication Impairment (Mechanical Ventilation, Invasive)  Goal: Effective Communication  Outcome: Ongoing, Progressing     Problem: Device-Related Complication Risk (Mechanical Ventilation, Invasive)  Goal: Optimal Device Function  Outcome: Ongoing, Progressing     Problem: Inability to Wean (Mechanical Ventilation, Invasive)  Goal: Mechanical Ventilation Liberation  Outcome: Ongoing,  Progressing     Problem: Nutrition Impairment (Mechanical Ventilation, Invasive)  Goal: Optimal Nutrition Delivery  Outcome: Ongoing, Progressing     Problem: Skin and Tissue Injury (Mechanical Ventilation, Invasive)  Goal: Absence of Device-Related Skin and Tissue Injury  Outcome: Ongoing, Progressing     Problem: Ventilator-Induced Lung Injury (Mechanical Ventilation, Invasive)  Goal: Absence of Ventilator-Induced Lung Injury  Outcome: Ongoing, Progressing     Problem: Communication Impairment (Artificial Airway)  Goal: Effective Communication  Outcome: Ongoing, Progressing     Problem: Device-Related Complication Risk (Artificial Airway)  Goal: Optimal Device Function  Outcome: Ongoing, Progressing     Problem: Skin and Tissue Injury (Artificial Airway)  Goal: Absence of Device-Related Skin or Tissue Injury  Outcome: Ongoing, Progressing     Problem: Noninvasive Ventilation Acute  Goal: Effective Unassisted Ventilation and Oxygenation  Outcome: Ongoing, Progressing     Problem: Fall Injury Risk  Goal: Absence of Fall and Fall-Related Injury  Outcome: Ongoing, Progressing     Problem: Skin Injury Risk Increased  Goal: Skin Health and Integrity  Outcome: Ongoing, Progressing     Problem: Impaired Wound Healing  Goal: Optimal Wound Healing  Outcome: Ongoing, Progressing
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
## 2024-02-23 NOTE — ASSESSMENT & PLAN NOTE
He was recently at [facility] and this was also noted- no plans by plastic surgery at that time for flap   Based on recent cultures -will use Zyvox/meropenem for now
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
## 2024-02-23 NOTE — ASSESSMENT & PLAN NOTE
>>ASSESSMENT AND PLAN FOR PRESSURE ULCERS OF SKIN OF MULTIPLE TOPOGRAPHIC SITES WRITTEN ON 2/23/2024  7:51 AM BY SUKHI PIERCE MD    Several large, unstageable wounds present  Not all personally visualized due to acuity of illness with stabilization of hemodynamics and respiratory state being priority  Wound care consulted and evaluated today- see uploaded pictures and note  Optimize nutrition as able to promote wound healing- notably albumin is 1.4  Surgery consult placed - discussed with Dr. Dale     2/23  Wound care help appreciated.     Sacral spine  Full thickness tissue loss with exposed bone, tendon, or muscle. Often includes undermining and tunneling. May extend into muscle and/or supporting structures.   Appearance: Bone;Muscle;Granulating;Red;Moist;Black  (black old retained wound vac sponge appears embedded)      Ischial tuberosity; Left and right  Full thickness tissue loss with exposed bone, tendon, or muscle. Often includes undermining and tunneling. May extend into muscle and/or supporting structures    Thoracic spine  Full thickness tissue loss with exposed bone, tendon, or muscle. Often includes undermining and tunneling. May extend into muscle and/or supporting structures.    Right upper Back Full thickness tissue loss with exposed bone, tendon, or muscle. Often includes undermining and tunneling. May extend into muscle and/or supporting structures.    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 Note pending.   May need ortho, plastic and vascular consults. Consider transfer to higher level  of care.   He discussed the case with our Ortho MD and she did not think this is a good place to operate because of lack of plastic etc coverage.   Will get transfer center to help with transfer.    Before we do that I will also request palliative care to meet with mother. She has been busy with the birth of her grand child but plans on coming in today.
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
## 2024-02-23 NOTE — SUBJECTIVE & OBJECTIVE
No current facility-administered medications on file prior to encounter.     Current Outpatient Medications on File Prior to Encounter   Medication Sig    acetaminophen (TYLENOL) 325 MG tablet 2 tablets (650 mg total) by Per G Tube route every 6 (six) hours as needed for Temperature greater than (100.4).    albuterol (PROVENTIL) 2.5 mg /3 mL (0.083 %) nebulizer solution Take 3 mLs (2.5 mg total) by nebulization every 4 (four) hours as needed (shortness of breath). Rescue    ALPRAZolam (XANAX) 1 MG tablet Take 1 tablet (1 mg total) by mouth 3 (three) times daily as needed for Anxiety.    calcitRIOL (ROCALTROL) 0.5 MCG Cap Take 1 capsule (0.5 mcg total) by mouth once daily.    calcium acetate,phosphat bind, (PHOSLO) 667 mg capsule Take 1 capsule (667 mg total) by mouth 3 (three) times daily with meals.    clonazePAM (KLONOPIN) 1 MG tablet Take 1 tablet (1 mg total) by mouth 2 (two) times daily.    enoxaparin (LOVENOX) 40 mg/0.4 mL Syrg Inject 0.4 mLs (40 mg total) into the skin once daily.    HYDROcodone-acetaminophen (NORCO)  mg per tablet Take 1 tablet by mouth every 6 (six) hours as needed for Pain.    levETIRAcetam (KEPPRA) 1000 MG tablet Take 1 tablet (1,000 mg total) by mouth 2 (two) times daily.    melatonin (MELATIN) 3 mg tablet Take 2 tablets (6 mg total) by mouth nightly as needed for Insomnia.    midodrine (PROAMATINE) 2.5 MG Tab Take 3 tablets (7.5 mg total) by mouth 3 (three) times daily with meals.    ondansetron (ZOFRAN) 4 mg/5 mL solution Take 5 mLs (4 mg total) by mouth every 6 (six) hours as needed for Nausea.    pantoprazole (PROTONIX) 40 mg injection Inject 40 mg into the vein once daily.    sertraline (ZOLOFT) 50 MG tablet Take 1 tablet (50 mg total) by mouth once daily.       Review of patient's allergies indicates:  No Known Allergies    No past medical history on file.  Past Surgical History:   Procedure Laterality Date    ESOPHAGOGASTRODUODENOSCOPY W/ PEG N/A 5/30/2023    Procedure: PEG;   Surgeon: JUSTYN Devine MD;  Location: [facility] ENDOSCOPY;  Service: Gastroenterology;  Laterality: N/A;     Family History    None       Tobacco Use    Smoking status: Not on file    Smokeless tobacco: Not on file   Substance and Sexual Activity    Alcohol use: Not on file    Drug use: Not on file    Sexual activity: Not on file     Review of Systems   Unable to perform ROS: Acuity of condition     Objective:     Vital Signs (Most Recent):  Temp: 97.3 °F (36.3 °C) (02/23/24 1423)  Pulse: 82 (02/23/24 1423)  Resp: 20 (02/23/24 1423)  BP: (!) 92/56 (02/23/24 1117)  SpO2: 100 % (02/23/24 1423) Vital Signs (24h Range):  Temp:  [96.3 °F (35.7 °C)-99 °F (37.2 °C)] 97.3 °F (36.3 °C)  Pulse:  [55-95] 82  Resp:  [20-22] 20  SpO2:  [100 %] 100 %  BP: ()/(50-64) 92/56     Weight: 50.8 kg (111 lb 15.9 oz)  Body mass index is 16.54 kg/m².     Physical Exam  Vitals and nursing note reviewed. Exam conducted with a chaperone present.   Constitutional:       Appearance: He is cachectic. He is ill-appearing.   Pulmonary:      Comments: On vent via trach  Abdominal:      General: There is no distension.      Palpations: Abdomen is soft.      Comments: Ostomy in place in LLQ with stool in appliance   Musculoskeletal:      Comments: See wound care pics   Skin:     Findings: Lesion and rash present.   Neurological:      Comments: On tracheotomy /vent support            I have reviewed all pertinent lab results within the past 24 hours.  CBC:   Recent Labs   Lab 02/23/24  0420   WBC 17.07*   RBC 2.32*   HGB 6.6*   HCT 21.0*   *   MCV 91   MCH 28.4   MCHC 31.4*     BMP:   Recent Labs   Lab 02/23/24  0420   GLU 75      K 4.1      CO2 22*   BUN 24*   CREATININE 0.7   CALCIUM 7.3*   MG 2.1     CMP:   Recent Labs   Lab 02/22/24  0421 02/23/24  0420    75   CALCIUM 7.9* 7.3*   ALBUMIN 1.4*  --    PROT 6.2  --     144   K 4.2 4.1   CO2 23 22*    108   BUN 31* 24*   CREATININE 0.8 0.7    ALKPHOS 117  --    ALT 18  --    AST 21  --    BILITOT 0.3  --      LFTs:   Recent Labs   Lab 02/22/24  0421   ALT 18   AST 21   ALKPHOS 117   BILITOT 0.3   PROT 6.2   ALBUMIN 1.4*       Cardiac markers:   Recent Labs   Lab 02/21/24  1143   TROPONINI 0.012       Significant Diagnostics:  I have reviewed all pertinent imaging results/findings within the past 24 hours.    CT:  FINDINGS:  CT chest:  Lungs: Patchy bilateral pulmonary consolidations. Complete atelectasis of the lower lobes. Mucoid material layers in the trachea. Trace pleural effusions. No pneumothorax. No mass.  Pleural space: Trace bilateral pleural effusions..  Heart: Unremarkable. No cardiomegaly. No significant pericardial effusion. No significant coronary artery calcifications.  Bones/joints: Unremarkable. No acute fracture. No dislocation.  Soft tissues: Unremarkable.  Vasculature: Unremarkable. No thoracic aortic aneurysm.  Lymph nodes: Unremarkable. No enlarged lymph nodes.     CT abdomen pelvis:     ABDOMEN:  Liver: No mass.  Mild hepatomegaly.  Gallbladder and bile ducts: Mild thickening of the gallbladder wall. Small layering stones. Normal caliber CBD. .  Pancreas: No mass. No ductal dilation.  Spleen: No splenomegaly.  Adrenals: No mass.  Kidneys and ureters: No solid mass. No hydronephrosis.  Stomach and bowel: Edema of the gastric wall and diffuse mild colonic wall thickening.     PELVIS:  Appendix: Normal appendix.  Bladder: Shen catheter balloon in place. Thickened bladder wall abnormal despite underdistention.  Reproductive: Unremarkable as visualized.     ABDOMEN and PELVIS:  Intraperitoneal space: Small volume ascites. No free air.  Bones/joints: Bilateral ulcers over the ischial spines with erosions of the underlying bone. Resection of the distal sacrum.  Soft tissues: Diffuse anasarca with subcutaneous edema.  Suspect sacral decubitus.  Cannot exclude osteomyelitis.  Recommend direct visualization.  Vasculature:  Unremarkable.  Lymph nodes: No enlarged lymph nodes.        Impression:     Patchy areas of consolidation within the lungs concerning for airspace disease or aspiration.     Moderate to severe thickening of the gastric wall concerning for gastritis.  Consider EGD.     Diffuse mild colonic wall thickening.     See above for additional findings.

## 2024-02-23 NOTE — ASSESSMENT & PLAN NOTE
>>ASSESSMENT AND PLAN FOR PRESSURE ULCERS OF SKIN OF MULTIPLE TOPOGRAPHIC SITES WRITTEN ON 2/23/2024  3:20 PM BY RUTH ZIEGLER MD    27yo M with quadriplegia with severe wounds of multiple extremities and the sacrum    - Long discussion with the ICU regarding his wounds.  His sacral wound appears to be clean without need for further debridement.  Patient has chronic osteomyelitis in the sacrum and bilateral hips.  Given this severe disease of his bilateral lower extremities and upper extremity, he will likely benefit from wound care/possible amputations of these by either vascular surgery or orthopedics.  He may eventually require plastic surgeries for flap although this may not be possible due to lack of source control from an infection standpoint.    - Given the extensive nature of wound care and debridement he requires, he will likely benefit from transfer to a tertiary referral center or even a burn Care Center as these were more likely have the resources needed to address his wound care concerns.  Given the severity of his disease and need for resources that are required, this extensive surgical intervention is not possible at this facility as we do not have all services required that he would need as well as the resources needed such as a large blood bank, inpatient plastic surgery coverage an extensive wound care coverage.  Patient is already on pressors and on the ventilator in the ICU with severe sepsis.  He has a very high mortality rate regardless of whether surgical intervention is undertaken.

## 2024-02-23 NOTE — ASSESSMENT & PLAN NOTE
MRI pelvis beginning of this year showing osteo with bone loss and surgical changes  Contributing to complexity of care

## 2024-02-23 NOTE — ASSESSMENT & PLAN NOTE
Suspect sepsis primarily but may have cardiac component as well with echo showing EF 15-20%  LA surprisingly normal   Received more than adequate fluid resuscitation and shock did not resolve. Evidence of compromised perfusion includes encephalopathy.  Levophed initiated to keep MAP > 65 mmHg   Infectious work up in progress- UA grossly abnormal with cx + presumed proteus   CT chest/abd/pelvis reviewed  Broad spectrum antibiotics - Cefepime + Daptomycin with multiple MDR infections  Echo with EF 35-40%  Wound care consult completed- pictures have been uploaded. Most concerning wound is left arm, most likely contributing to presentation in addition to UTI  Additional work up and treatments as indicated

## 2024-02-23 NOTE — SUBJECTIVE & OBJECTIVE
Objective:     Vital Signs (Most Recent):  Temp: 97.9 °F (36.6 °C) (02/23/24 0730)  Pulse: 63 (02/23/24 0730)  Resp: 20 (02/23/24 0730)  BP: (!) 98/58 (02/23/24 0730)  SpO2: 100 % (02/23/24 0730) Vital Signs (24h Range):  Temp:  [96.3 °F (35.7 °C)-99 °F (37.2 °C)] 97.9 °F (36.6 °C)  Pulse:  [] 63  Resp:  [20-27] 20  SpO2:  [98 %-100 %] 100 %  BP: ()/(46-65) 98/58     Weight: 50.8 kg (111 lb 15.9 oz)  Body mass index is 16.54 kg/m².      Intake/Output Summary (Last 24 hours) at 2/23/2024 0745  Last data filed at 2/23/2024 0600  Gross per 24 hour   Intake 1338.68 ml   Output 900 ml   Net 438.68 ml        Physical Exam  Vitals and nursing note reviewed.   Constitutional:       Appearance: He is ill-appearing. He is not diaphoretic.   HENT:      Head: Normocephalic.   Eyes:      Pupils: Pupils are equal, round, and reactive to light.   Neck:      Comments: Trach in place  Cardiovascular:      Rate and Rhythm: Tachycardia present.   Pulmonary:      Effort: Pulmonary effort is normal. No respiratory distress.   Abdominal:      Palpations: Abdomen is soft.      Comments: Ostomy   Skin:     Comments: Sacral spine  Full thickness tissue loss with exposed bone, tendon, or muscle. Often includes undermining and tunneling. May extend into muscle and/or supporting structures.   Appearance: Bone;Muscle;Granulating;Red;Moist;Black  (black old retained wound vac sponge appears embedded)        Ischial tuberosity; Left and right  Full thickness tissue loss with exposed bone, tendon, or muscle. Often includes undermining and tunneling. May extend into muscle and/or supporting structures     Thoracic spine  Full thickness tissue loss with exposed bone, tendon, or muscle. Often includes undermining and tunneling. May extend into muscle and/or supporting structures.     Right upper Back Full thickness tissue loss with exposed bone, tendon, or muscle. Often includes undermining and tunneling. May extend into muscle and/or  supporting structures.     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                Review of Systems   Unable to perform ROS: Acuity of condition       Vents:  Vent Mode: A/C (02/23/24 0509)  Ventilator Initiated: Yes (02/21/24 1631)  Set Rate: 20 BPM (02/23/24 0509)  Vt Set: 360 mL (02/23/24 0509)  PEEP/CPAP: 5 cmH20 (02/23/24 0509)  Oxygen Concentration (%): 40 (02/23/24 0600)  Peak Airway Pressure: 20 cmH20 (02/23/24 0509)  Plateau Pressure: 18 cmH20 (02/23/24 0509)  Total Ve: 7.88 L/m (02/23/24 0509)  Negative Inspiratory Force (cm H2O): 0 (02/23/24 0509)  F/VT Ratio<105 (RSBI): (!) 52.22 (02/23/24 0509)    Lines/Drains/Airways       Central Venous Catheter Line  Duration             Percutaneous Central Line Insertion/Assessment - Triple Lumen  02/21/24 1754 Femoral Vein Right;Femoral Right 1 day              Drain  Duration                  Colostomy  LLQ -- days         Urethral Catheter 02/21/24 2330 Temperature probe 1 day              Airway  Duration             Adult Surgical Airway 05/25/23 1135 Shiley Cuffed 8.0 / 85 mm 273 days                    Significant Labs:    CBC/Anemia Profile:  Recent Labs   Lab 02/21/24  1143 02/22/24  0421 02/23/24  0420   WBC 21.87* 20.12* 17.07*   HGB 7.7* 7.3* 6.6*   HCT 24.1* 22.6* 21.0*   * 771* 711*   MCV 92 90 91   RDW 16.4* 15.6* 15.9*        Chemistries:  Recent Labs   Lab 02/21/24  1143 02/22/24  0421 02/23/24  0420    142 144   K 4.9 4.2 4.1    107 108   CO2 22* 23 22*   BUN 34* 31* 24*   CREATININE 0.9 0.8 0.7   CALCIUM 7.8* 7.9* 7.3*   ALBUMIN 1.4* 1.4*   --    PROT 7.0 6.2  --    BILITOT 0.2 0.3  --    ALKPHOS 123 117  --    ALT 22 18  --    AST 32 21  --    MG 1.6 1.8 2.1       All pertinent labs within the past 24 hours have been reviewed.    Significant Imaging:  I have reviewed all pertinent imaging results/findings within the past 24 hours.
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
## 2024-02-23 NOTE — ASSESSMENT & PLAN NOTE
Several large, unstageable wounds present  Not all personally visualized due to acuity of illness with stabilization of hemodynamics and respiratory state being priority  Wound care consulted and evaluated today- see uploaded pictures and note  Optimize nutrition as able to promote wound healing- notably albumin is 1.4  Surgery consult placed - discussed with Dr. Dale     2/23  Wound care help appreciated.     Sacral spine  Full thickness tissue loss with exposed bone, tendon, or muscle. Often includes undermining and tunneling. May extend into muscle and/or supporting structures.   Appearance: Bone;Muscle;Granulating;Red;Moist;Black  (black old retained wound vac sponge appears embedded)      Ischial tuberosity; Left and right  Full thickness tissue loss with exposed bone, tendon, or muscle. Often includes undermining and tunneling. May extend into muscle and/or supporting structures    Thoracic spine  Full thickness tissue loss with exposed bone, tendon, or muscle. Often includes undermining and tunneling. May extend into muscle and/or supporting structures.    Right upper Back Full thickness tissue loss with exposed bone, tendon, or muscle. Often includes undermining and tunneling. May extend into muscle and/or supporting structures.    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 Note pending.   May need ortho, plastic and vascular consults. Consider transfer to higher level of care.   He discussed the case with our Ortho MD and she did not think this is a good place to operate because of lack of plastic etc  coverage.   Will get transfer center to help with transfer.    Before we do that I will also request palliative care to meet with mother. She has been busy with the birth of her grand child but plans on coming in today.
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
## 2024-02-23 NOTE — ASSESSMENT & PLAN NOTE
Prior to arrival, SPO2 85% on ATC  PaO2 54 on initial ABG  Likely further decompensated with IVFs given hx poor cardiac function  Respiratory pattern and oxygenation improved after being placed on vent  Follow up ABG  Vent settings reviewed and will adjust as indicated   VAP prevention ordered   Resolved- breathing much more comfortably today     2/23 Continue vent support. Will try SBT post transfusion.

## 2024-02-23 NOTE — HOSPITAL COURSE
2/22: remains on levophed. Mentation has improved- more alert per nursing. At time of my visit, patient sleeping after wound care and pain medication; synchronous with vent and breathing comfortably, prelim urine cx > 100K proteus. Repeat UA after catheter change remains grossly abnormal- hematuria after cath change, clear and catheter easily draining. Evaluated by wound care- surgery consulted per recs   2/23 Still on Levo. Awaiting transfusion. On full vent support. Not awake or following commands.   2/24: remains on mechanical ventilation and continues to require levophed. Febrile overnight. Discussed options for wounds with surgery and ortho who both agree that patient's wounds are too complex to be surgically and post-surgically managed and requires a tertiary center   2/25: remains on vent, settings stable. On levophed. WBC up to 30. Transfer request pending  2/26 off pressors, on fentanyl remains on vent   2/27 - off fentanyl , on trach collar all day yesterday , small amt of levo  Will nod   2/28 on trach collar yesterday some hypoglycemia on tube feeds  2/29 Tolerating trach collar. BP borderline.   3/1 Temp and BP borderline. Tolerating TC  No family has visited him yet.

## 2024-02-24 PROBLEM — A41.9 SEPTIC SHOCK: Status: ACTIVE | Noted: 2024-02-21

## 2024-02-24 PROBLEM — R65.21 SEPTIC SHOCK: Status: ACTIVE | Noted: 2024-02-21

## 2024-02-24 PROBLEM — R06.03 RESPIRATORY DISTRESS: Status: RESOLVED | Noted: 2024-02-21 | Resolved: 2024-02-24

## 2024-02-24 LAB
ANION GAP SERPL CALC-SCNC: 14 MMOL/L (ref 8–16)
BASOPHILS # BLD AUTO: ABNORMAL K/UL (ref 0–0.2)
BASOPHILS NFR BLD: 0 % (ref 0–1.9)
BUN SERPL-MCNC: 21 MG/DL (ref 6–20)
CALCIUM SERPL-MCNC: 6.9 MG/DL (ref 8.7–10.5)
CHLORIDE SERPL-SCNC: 109 MMOL/L (ref 95–110)
CO2 SERPL-SCNC: 21 MMOL/L (ref 23–29)
CREAT SERPL-MCNC: 0.8 MG/DL (ref 0.5–1.4)
DIFFERENTIAL METHOD BLD: ABNORMAL
EOSINOPHIL # BLD AUTO: ABNORMAL K/UL (ref 0–0.5)
EOSINOPHIL NFR BLD: 1 % (ref 0–8)
ERYTHROCYTE [DISTWIDTH] IN BLOOD BY AUTOMATED COUNT: 15 % (ref 11.5–14.5)
EST. GFR  (NO RACE VARIABLE): >60 ML/MIN/1.73 M^2
GLUCOSE SERPL-MCNC: 65 MG/DL (ref 70–110)
HCT VFR BLD AUTO: 25.8 % (ref 40–54)
HGB BLD-MCNC: 8.4 G/DL (ref 14–18)
IMM GRANULOCYTES # BLD AUTO: ABNORMAL K/UL (ref 0–0.04)
IMM GRANULOCYTES NFR BLD AUTO: ABNORMAL % (ref 0–0.5)
LYMPHOCYTES # BLD AUTO: ABNORMAL K/UL (ref 1–4.8)
LYMPHOCYTES NFR BLD: 15 % (ref 18–48)
MAGNESIUM SERPL-MCNC: 1.6 MG/DL (ref 1.6–2.6)
MCH RBC QN AUTO: 29.6 PG (ref 27–31)
MCHC RBC AUTO-ENTMCNC: 32.6 G/DL (ref 32–36)
MCV RBC AUTO: 91 FL (ref 82–98)
MONOCYTES # BLD AUTO: ABNORMAL K/UL (ref 0.3–1)
MONOCYTES NFR BLD: 4 % (ref 4–15)
MYELOCYTES NFR BLD MANUAL: 2 %
NEUTROPHILS NFR BLD: 75 % (ref 38–73)
NEUTS BAND NFR BLD MANUAL: 3 %
NRBC BLD-RTO: 0 /100 WBC
PLATELET # BLD AUTO: 689 K/UL (ref 150–450)
PLATELET BLD QL SMEAR: ABNORMAL
PMV BLD AUTO: 8.2 FL (ref 9.2–12.9)
POCT GLUCOSE: 139 MG/DL (ref 70–110)
POCT GLUCOSE: 69 MG/DL (ref 70–110)
POCT GLUCOSE: 80 MG/DL (ref 70–110)
POCT GLUCOSE: 80 MG/DL (ref 70–110)
POCT GLUCOSE: 89 MG/DL (ref 70–110)
POCT GLUCOSE: 90 MG/DL (ref 70–110)
POTASSIUM SERPL-SCNC: 4.2 MMOL/L (ref 3.5–5.1)
RBC # BLD AUTO: 2.84 M/UL (ref 4.6–6.2)
SODIUM SERPL-SCNC: 144 MMOL/L (ref 136–145)
WBC # BLD AUTO: 19.03 K/UL (ref 3.9–12.7)

## 2024-02-24 PROCEDURE — 25000003 PHARM REV CODE 250: Performed by: EMERGENCY MEDICINE

## 2024-02-24 PROCEDURE — 25000003 PHARM REV CODE 250: Performed by: NURSE PRACTITIONER

## 2024-02-24 PROCEDURE — 25000003 PHARM REV CODE 250: Performed by: SPECIALIST

## 2024-02-24 PROCEDURE — 94761 N-INVAS EAR/PLS OXIMETRY MLT: CPT

## 2024-02-24 PROCEDURE — 85027 COMPLETE CBC AUTOMATED: CPT | Performed by: NURSE PRACTITIONER

## 2024-02-24 PROCEDURE — 27000207 HC ISOLATION

## 2024-02-24 PROCEDURE — 85007 BL SMEAR W/DIFF WBC COUNT: CPT | Performed by: NURSE PRACTITIONER

## 2024-02-24 PROCEDURE — 20000000 HC ICU ROOM

## 2024-02-24 PROCEDURE — 63600175 PHARM REV CODE 636 W HCPCS: Performed by: INTERNAL MEDICINE

## 2024-02-24 PROCEDURE — 27100171 HC OXYGEN HIGH FLOW UP TO 24 HOURS

## 2024-02-24 PROCEDURE — 99900035 HC TECH TIME PER 15 MIN (STAT)

## 2024-02-24 PROCEDURE — 83735 ASSAY OF MAGNESIUM: CPT | Performed by: NURSE PRACTITIONER

## 2024-02-24 PROCEDURE — 63600175 PHARM REV CODE 636 W HCPCS: Performed by: NURSE PRACTITIONER

## 2024-02-24 PROCEDURE — 63600175 PHARM REV CODE 636 W HCPCS: Performed by: SPECIALIST

## 2024-02-24 PROCEDURE — 80048 BASIC METABOLIC PNL TOTAL CA: CPT | Performed by: NURSE PRACTITIONER

## 2024-02-24 PROCEDURE — 99233 SBSQ HOSP IP/OBS HIGH 50: CPT | Mod: NSCH,,, | Performed by: INTERNAL MEDICINE

## 2024-02-24 PROCEDURE — 94003 VENT MGMT INPAT SUBQ DAY: CPT

## 2024-02-24 PROCEDURE — 25000003 PHARM REV CODE 250: Performed by: INTERNAL MEDICINE

## 2024-02-24 PROCEDURE — 99900026 HC AIRWAY MAINTENANCE (STAT)

## 2024-02-24 RX ORDER — ACETAMINOPHEN 650 MG/20.3ML
650 LIQUID ORAL EVERY 6 HOURS PRN
Status: DISCONTINUED | OUTPATIENT
Start: 2024-02-24 | End: 2024-02-29

## 2024-02-24 RX ADMIN — MEROPENEM 2 G: 1 INJECTION INTRAVENOUS at 06:02

## 2024-02-24 RX ADMIN — FAMOTIDINE 20 MG: 40 POWDER, FOR SUSPENSION ORAL at 09:02

## 2024-02-24 RX ADMIN — DEXTROSE MONOHYDRATE 125 ML: 100 INJECTION, SOLUTION INTRAVENOUS at 01:02

## 2024-02-24 RX ADMIN — LEVETIRACETAM INJECTION 1000 MG: 10 INJECTION INTRAVENOUS at 09:02

## 2024-02-24 RX ADMIN — LINEZOLID 600 MG: 600 INJECTION, SOLUTION INTRAVENOUS at 04:02

## 2024-02-24 RX ADMIN — NOREPINEPHRINE BITARTRATE 0.07 MCG/KG/MIN: 4 INJECTION, SOLUTION INTRAVENOUS at 09:02

## 2024-02-24 RX ADMIN — MAGNESIUM SULFATE HEPTAHYDRATE 2 G: 40 INJECTION, SOLUTION INTRAVENOUS at 11:02

## 2024-02-24 RX ADMIN — CHLORHEXIDINE GLUCONATE 0.12% ORAL RINSE 15 ML: 1.2 LIQUID ORAL at 09:02

## 2024-02-24 RX ADMIN — MINOCYCLINE HYDROCHLORIDE 100 MG: 50 CAPSULE ORAL at 09:02

## 2024-02-24 RX ADMIN — MUPIROCIN: 20 OINTMENT TOPICAL at 09:02

## 2024-02-24 RX ADMIN — Medication 173 MCG/HR: at 09:02

## 2024-02-24 RX ADMIN — ACETAMINOPHEN 650 MG: 650 SOLUTION ORAL at 06:02

## 2024-02-24 RX ADMIN — MEROPENEM 2 G: 1 INJECTION INTRAVENOUS at 02:02

## 2024-02-24 RX ADMIN — MEROPENEM 2 G: 1 INJECTION INTRAVENOUS at 10:02

## 2024-02-24 NOTE — ASSESSMENT & PLAN NOTE
Sources of infection include: wounds, urine and lungs  S/p 1 unit PRBC 2/24  Levophed initiated to keep MAP > 65 mmHg   Infectious work up in progress- UA grossly abnormal with cx + presumed proteus   CT chest/abd/pelvis reviewed  Antibiotics per ID  General surgery and orthopedics consulted to evaluate wounds for debridement and/or amputation- both have advised for transfer to tertiary center given complexity of surgery and post-op wound care. Transfer request placed   Additional work up and treatments as indicated
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
## 2024-02-24 NOTE — PLAN OF CARE
Patient trached on ventilator  NSR on monitor  Right fem TLC   Fentanyl infusing for a RASS goal of -2  Levophed infusing for blood pressure support  Shen in place  Patient turned, heel boots in place    Problem: Glycemic Control Impaired (Sepsis/Septic Shock)  Goal: Blood Glucose Level Within Desired Range  Outcome: Ongoing, Progressing     Problem: Infection  Goal: Absence of Infection Signs and Symptoms  Outcome: Ongoing, Progressing     Problem: Communication Impairment (Mechanical Ventilation, Invasive)  Goal: Effective Communication  Outcome: Ongoing, Progressing     Problem: Impaired Wound Healing  Goal: Optimal Wound Healing  Outcome: Ongoing, Progressing

## 2024-02-24 NOTE — ASSESSMENT & PLAN NOTE
>>ASSESSMENT AND PLAN FOR PRESSURE ULCERS OF SKIN OF MULTIPLE TOPOGRAPHIC SITES WRITTEN ON 2/24/2024  4:01 PM BY NATANAEL MAURO, NP    Several large, unstageable wounds present  Optimize nutrition as able to promote wound healing- notably albumin is 1.4  Tube feeds and nutritional supplements as able   Surgery consult placed - discussed with Dr. Dale     Sacral spine  Full thickness tissue loss with exposed bone, tendon, or muscle. Often includes undermining and tunneling. May extend into muscle and/or supporting structures.   Appearance: Bone;Muscle;Granulating;Red;Moist;Black      Ischial tuberosity; Left and right  Full thickness tissue loss with exposed bone, tendon, or muscle. Often includes undermining and tunneling. May extend into muscle and/or supporting structures  Thoracic spine  Full thickness tissue loss with exposed bone, tendon, or muscle. Often includes undermining and tunneling. May extend into muscle and/or supporting structures.  Right upper Back Full thickness tissue loss with exposed bone, tendon, or muscle. Often includes undermining and tunneling. May extend into muscle and/or supporting structures.  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

## 2024-02-24 NOTE — ASSESSMENT & PLAN NOTE
Placed on ventilator upon admission 2/2 respiratory distress  Vent settings reviewed, no adjustments made  VAP prevention bundle  Antibiotics   Critical illness ongoing with uncontrolled source- precludes SAT/SBT trials at this time

## 2024-02-24 NOTE — ASSESSMENT & PLAN NOTE
>>ASSESSMENT AND PLAN FOR PRESSURE ULCERS OF SKIN OF MULTIPLE TOPOGRAPHIC SITES WRITTEN ON 2/24/2024  4:52 AM BY DEANNA BERNAL MD, HUDSON    Will continue wound care -prognosis -guarded

## 2024-02-24 NOTE — ASSESSMENT & PLAN NOTE
MRI pelvis beginning of this year showing osteo with bone loss and surgical changes  Contributing to complexity of care   Patient presents to [facility] for INR testing. No change in diet or medications. No hospitalizations since last anticoagulation visit.  INR is therapeutic so patient will remain on same dose of warfarin and recheck INR in 6 weeks.    Patient was instructed to contact us with any unusual bleeding, bruising, any changes in medications, diet or health status and if there are any other questions or concerns.   Patient verbalized understanding of above.
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
## 2024-02-24 NOTE — ASSESSMENT & PLAN NOTE
Several large, unstageable wounds present  Optimize nutrition as able to promote wound healing- notably albumin is 1.4  Tube feeds and nutritional supplements as able   Surgery consult placed - discussed with Dr. Dale     Sacral spine  Full thickness tissue loss with exposed bone, tendon, or muscle. Often includes undermining and tunneling. May extend into muscle and/or supporting structures.   Appearance: Bone;Muscle;Granulating;Red;Moist;Black      Ischial tuberosity; Left and right  Full thickness tissue loss with exposed bone, tendon, or muscle. Often includes undermining and tunneling. May extend into muscle and/or supporting structures  Thoracic spine  Full thickness tissue loss with exposed bone, tendon, or muscle. Often includes undermining and tunneling. May extend into muscle and/or supporting structures.  Right upper Back Full thickness tissue loss with exposed bone, tendon, or muscle. Often includes undermining and tunneling. May extend into muscle and/or supporting structures.  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

## 2024-02-24 NOTE — SUBJECTIVE & OBJECTIVE
Objective:     Vital Signs (Most Recent):  Temp: 96.8 °F (36 °C) (02/24/24 1400)  Pulse: 70 (02/24/24 1400)  Resp: 20 (02/24/24 1400)  BP: (!) 91/54 (02/24/24 1456)  SpO2: 98 % (02/24/24 1400) Vital Signs (24h Range):  Temp:  [96.8 °F (36 °C)-101.1 °F (38.4 °C)] 96.8 °F (36 °C)  Pulse:  [] 70  Resp:  [20-25] 20  SpO2:  [97 %-100 %] 98 %  BP: ()/(38-59) 91/54     Weight: 50.8 kg (111 lb 15.9 oz)  Body mass index is 16.54 kg/m².      Intake/Output Summary (Last 24 hours) at 2/24/2024 1459  Last data filed at 2/24/2024 1456  Gross per 24 hour   Intake 1990.89 ml   Output 1245 ml   Net 745.89 ml        Physical Exam  Vitals reviewed.   Constitutional:       Appearance: He is ill-appearing and toxic-appearing.   HENT:      Head: Normocephalic and atraumatic.      Mouth/Throat:      Mouth: Mucous membranes are moist.      Pharynx: Oropharynx is clear.   Eyes:      Extraocular Movements: Extraocular movements intact.      Conjunctiva/sclera: Conjunctivae normal.   Cardiovascular:      Rate and Rhythm: Normal rate and regular rhythm.      Pulses: Normal pulses.   Pulmonary:      Effort: Pulmonary effort is normal.   Abdominal:      General: Bowel sounds are normal.      Palpations: Abdomen is soft.   Musculoskeletal:      Cervical back: Normal range of motion and neck supple.      Comments: Contractures in all extremities. Generalized muscle atrophy    Skin:     General: Skin is warm and dry.   Neurological:      Mental Status: He is alert.      Comments: Eyes partially open, not nodding or tracking. No movement in extremities            Review of Systems    Vents:  Vent Mode: A/C (02/24/24 1312)  Ventilator Initiated: Yes (02/21/24 1631)  Set Rate: 20 BPM (02/24/24 1312)  Vt Set: 360 mL (02/24/24 1312)  PEEP/CPAP: 5 cmH20 (02/24/24 1312)  Oxygen Concentration (%): 40 (02/24/24 1400)  Peak Airway Pressure: 28 cmH20 (02/24/24 1312)  Plateau Pressure: 17 cmH20 (02/24/24 1312)  Total Ve: 7.41 L/m (02/24/24  1312)  Negative Inspiratory Force (cm H2O): 0 (02/24/24 1312)  F/VT Ratio<105 (RSBI): (!) 54.2 (02/24/24 1312)    Lines/Drains/Airways       Central Venous Catheter Line  Duration             Percutaneous Central Line Insertion/Assessment - Triple Lumen  02/21/24 1754 Femoral Vein Right;Femoral Right 2 days              Drain  Duration                  Colostomy  LLQ -- days         Urethral Catheter 02/21/24 2330 Temperature probe 2 days         NG/OG Tube 02/23/24 1459 Right nostril 1 day              Airway  Duration             Adult Surgical Airway 05/25/23 1135 Shiley Cuffed 8.0 / 85 mm 275 days                    Significant Labs:    CBC/Anemia Profile:  Recent Labs   Lab 02/23/24  0420 02/24/24  0425   WBC 17.07* 19.03*   HGB 6.6* 8.4*   HCT 21.0* 25.8*   * 689*   MCV 91 91   RDW 15.9* 15.0*        Chemistries:  Recent Labs   Lab 02/23/24  0420 02/24/24  0425    144   K 4.1 4.2    109   CO2 22* 21*   BUN 24* 21*   CREATININE 0.7 0.8   CALCIUM 7.3* 6.9*   MG 2.1 1.6       All pertinent labs within the past 24 hours have been reviewed.    Significant Imaging:  I have reviewed all pertinent imaging results/findings within the past 24 hours.
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
## 2024-02-24 NOTE — ASSESSMENT & PLAN NOTE
He was recently at [facility] and this was also noted- no plans by plastic surgery at that time for flap   Based on recent cultures -will use Zyvox/meropenem for now

## 2024-02-24 NOTE — ASSESSMENT & PLAN NOTE
Will follow critical care team.  Recent cultures at [facility]  reviewed -        2/12-  Specimen: Wound - BACK  Component 10 d ago   Aspirate or Abscess Culture Multiple organisms present, predominant potential pathogen(s):   Aspirate or Abscess Culture 4+ Klebsiella pneumoniae Abnormal    Aspirate or Abscess Culture 3+ Enterococcus faecium Abnormal    01/13-  1 mo ago Comments    TISSUE CULTURE 1+ Enterococcus faecium Abnormal  For susceptibility results, refer to culture # - [id number]   TISSUE CULTURE Scant (< 1+) growth Acinetobacter baumanii/nosocomialis group Abnormal  For susceptibility results, refer to culture # - [id number]     The isolate done 02/12- of Klebsiella is resistant to Cefepime and the enterococcus is daptomycin resistant -will use Zyvox/Meropenem    Guarded prognosis -  Will use new cultures to guide regime -follow blood ,local wound cultures ,resp culture     02/23- reviewed cultures again with Pharmacy - will add Minocycline for previous Acinetobacter ,continue Zyvox/Meropenem   Will follow new cultures

## 2024-02-25 LAB
ANION GAP SERPL CALC-SCNC: 12 MMOL/L (ref 8–16)
BASOPHILS # BLD AUTO: ABNORMAL K/UL (ref 0–0.2)
BASOPHILS NFR BLD: 0 % (ref 0–1.9)
BUN SERPL-MCNC: 18 MG/DL (ref 6–20)
CALCIUM SERPL-MCNC: 7.1 MG/DL (ref 8.7–10.5)
CHLORIDE SERPL-SCNC: 109 MMOL/L (ref 95–110)
CO2 SERPL-SCNC: 21 MMOL/L (ref 23–29)
CREAT SERPL-MCNC: 0.8 MG/DL (ref 0.5–1.4)
DIFFERENTIAL METHOD BLD: ABNORMAL
EOSINOPHIL # BLD AUTO: ABNORMAL K/UL (ref 0–0.5)
EOSINOPHIL NFR BLD: 0 % (ref 0–8)
ERYTHROCYTE [DISTWIDTH] IN BLOOD BY AUTOMATED COUNT: 15.7 % (ref 11.5–14.5)
EST. GFR  (NO RACE VARIABLE): >60 ML/MIN/1.73 M^2
GLUCOSE SERPL-MCNC: 73 MG/DL (ref 70–110)
HCT VFR BLD AUTO: 29.1 % (ref 40–54)
HGB BLD-MCNC: 9.4 G/DL (ref 14–18)
IMM GRANULOCYTES # BLD AUTO: ABNORMAL K/UL (ref 0–0.04)
IMM GRANULOCYTES NFR BLD AUTO: ABNORMAL % (ref 0–0.5)
LYMPHOCYTES # BLD AUTO: ABNORMAL K/UL (ref 1–4.8)
LYMPHOCYTES NFR BLD: 10 % (ref 18–48)
MAGNESIUM SERPL-MCNC: 1.8 MG/DL (ref 1.6–2.6)
MCH RBC QN AUTO: 30.1 PG (ref 27–31)
MCHC RBC AUTO-ENTMCNC: 32.3 G/DL (ref 32–36)
MCV RBC AUTO: 93 FL (ref 82–98)
METAMYELOCYTES NFR BLD MANUAL: 1 %
MONOCYTES # BLD AUTO: ABNORMAL K/UL (ref 0.3–1)
MONOCYTES NFR BLD: 3 % (ref 4–15)
NEUTROPHILS NFR BLD: 76 % (ref 38–73)
NEUTS BAND NFR BLD MANUAL: 10 %
NRBC BLD-RTO: 0 /100 WBC
PLATELET # BLD AUTO: 651 K/UL (ref 150–450)
PLATELET BLD QL SMEAR: ABNORMAL
PMV BLD AUTO: 8.2 FL (ref 9.2–12.9)
POCT GLUCOSE: 103 MG/DL (ref 70–110)
POCT GLUCOSE: 79 MG/DL (ref 70–110)
POCT GLUCOSE: 88 MG/DL (ref 70–110)
POCT GLUCOSE: 94 MG/DL (ref 70–110)
POTASSIUM SERPL-SCNC: 3.6 MMOL/L (ref 3.5–5.1)
RBC # BLD AUTO: 3.12 M/UL (ref 4.6–6.2)
SODIUM SERPL-SCNC: 142 MMOL/L (ref 136–145)
STOMATOCYTES BLD QL SMEAR: PRESENT
WBC # BLD AUTO: 31.09 K/UL (ref 3.9–12.7)

## 2024-02-25 PROCEDURE — 25000003 PHARM REV CODE 250: Performed by: NURSE PRACTITIONER

## 2024-02-25 PROCEDURE — 99900026 HC AIRWAY MAINTENANCE (STAT)

## 2024-02-25 PROCEDURE — 27000207 HC ISOLATION

## 2024-02-25 PROCEDURE — 94761 N-INVAS EAR/PLS OXIMETRY MLT: CPT

## 2024-02-25 PROCEDURE — 25000003 PHARM REV CODE 250: Performed by: EMERGENCY MEDICINE

## 2024-02-25 PROCEDURE — 27100171 HC OXYGEN HIGH FLOW UP TO 24 HOURS

## 2024-02-25 PROCEDURE — 25000003 PHARM REV CODE 250: Performed by: SPECIALIST

## 2024-02-25 PROCEDURE — 94003 VENT MGMT INPAT SUBQ DAY: CPT

## 2024-02-25 PROCEDURE — 63600175 PHARM REV CODE 636 W HCPCS: Performed by: NURSE PRACTITIONER

## 2024-02-25 PROCEDURE — 25000003 PHARM REV CODE 250: Performed by: INTERNAL MEDICINE

## 2024-02-25 PROCEDURE — 85027 COMPLETE CBC AUTOMATED: CPT | Performed by: NURSE PRACTITIONER

## 2024-02-25 PROCEDURE — 85007 BL SMEAR W/DIFF WBC COUNT: CPT | Performed by: NURSE PRACTITIONER

## 2024-02-25 PROCEDURE — 83735 ASSAY OF MAGNESIUM: CPT | Performed by: NURSE PRACTITIONER

## 2024-02-25 PROCEDURE — 63600175 PHARM REV CODE 636 W HCPCS: Performed by: SPECIALIST

## 2024-02-25 PROCEDURE — 99900035 HC TECH TIME PER 15 MIN (STAT)

## 2024-02-25 PROCEDURE — 20000000 HC ICU ROOM

## 2024-02-25 PROCEDURE — 80048 BASIC METABOLIC PNL TOTAL CA: CPT | Performed by: NURSE PRACTITIONER

## 2024-02-25 PROCEDURE — 63600175 PHARM REV CODE 636 W HCPCS: Performed by: INTERNAL MEDICINE

## 2024-02-25 RX ORDER — ENOXAPARIN SODIUM 100 MG/ML
40 INJECTION SUBCUTANEOUS EVERY 24 HOURS
Status: DISCONTINUED | OUTPATIENT
Start: 2024-02-25 | End: 2024-03-06

## 2024-02-25 RX ADMIN — CHLORHEXIDINE GLUCONATE 0.12% ORAL RINSE 15 ML: 1.2 LIQUID ORAL at 08:02

## 2024-02-25 RX ADMIN — MEROPENEM 2 G: 1 INJECTION INTRAVENOUS at 02:02

## 2024-02-25 RX ADMIN — Medication 173 MCG/HR: at 12:02

## 2024-02-25 RX ADMIN — FAMOTIDINE 20 MG: 40 POWDER, FOR SUSPENSION ORAL at 08:02

## 2024-02-25 RX ADMIN — MINOCYCLINE HYDROCHLORIDE 100 MG: 50 CAPSULE ORAL at 08:02

## 2024-02-25 RX ADMIN — MEROPENEM 2 G: 1 INJECTION INTRAVENOUS at 09:02

## 2024-02-25 RX ADMIN — MAGNESIUM SULFATE HEPTAHYDRATE 2 G: 40 INJECTION, SOLUTION INTRAVENOUS at 06:02

## 2024-02-25 RX ADMIN — MEROPENEM 2 G: 1 INJECTION INTRAVENOUS at 08:02

## 2024-02-25 RX ADMIN — MUPIROCIN: 20 OINTMENT TOPICAL at 08:02

## 2024-02-25 RX ADMIN — NOREPINEPHRINE BITARTRATE 0.1 MCG/KG/MIN: 4 INJECTION, SOLUTION INTRAVENOUS at 02:02

## 2024-02-25 RX ADMIN — Medication 173 MCG/HR: at 03:02

## 2024-02-25 RX ADMIN — NOREPINEPHRINE BITARTRATE 0.08 MCG/KG/MIN: 4 INJECTION, SOLUTION INTRAVENOUS at 12:02

## 2024-02-25 RX ADMIN — LINEZOLID 600 MG: 600 INJECTION, SOLUTION INTRAVENOUS at 05:02

## 2024-02-25 RX ADMIN — LINEZOLID 600 MG: 600 INJECTION, SOLUTION INTRAVENOUS at 06:02

## 2024-02-25 RX ADMIN — POTASSIUM CHLORIDE 40 MEQ: 29.8 INJECTION, SOLUTION INTRAVENOUS at 09:02

## 2024-02-25 RX ADMIN — LEVETIRACETAM INJECTION 1000 MG: 10 INJECTION INTRAVENOUS at 08:02

## 2024-02-25 RX ADMIN — ENOXAPARIN SODIUM 40 MG: 100 INJECTION SUBCUTANEOUS at 05:02

## 2024-02-25 NOTE — SUBJECTIVE & OBJECTIVE
Objective:     Vital Signs (Most Recent):  Temp: 98.1 °F (36.7 °C) (02/25/24 0917)  Pulse: 86 (02/25/24 0917)  Resp: (!) 35 (02/25/24 0917)  BP: (!) 100/58 (02/25/24 0917)  SpO2: 100 % (02/25/24 0917) Vital Signs (24h Range):  Temp:  [95.4 °F (35.2 °C)-98.2 °F (36.8 °C)] 98.1 °F (36.7 °C)  Pulse:  [] 86  Resp:  [19-35] 35  SpO2:  [87 %-100 %] 100 %  BP: ()/(49-65) 100/58     Weight: 55.1 kg (121 lb 7.6 oz)  Body mass index is 17.94 kg/m².      Intake/Output Summary (Last 24 hours) at 2/25/2024 1039  Last data filed at 2/25/2024 0600  Gross per 24 hour   Intake 1488.13 ml   Output 1345 ml   Net 143.13 ml        Physical Exam  Vitals reviewed.   Constitutional:       Appearance: He is underweight. He is ill-appearing.      Interventions: He is sedated.   HENT:      Head: Normocephalic and atraumatic.      Mouth/Throat:      Mouth: Mucous membranes are moist.      Pharynx: Oropharynx is clear.   Eyes:      Conjunctiva/sclera: Conjunctivae normal.      Pupils: Pupils are equal, round, and reactive to light.   Neck:      Comments: Trach site WNL  Cardiovascular:      Rate and Rhythm: Normal rate and regular rhythm.   Pulmonary:      Comments: Synchronous with vent. rhonchi  Abdominal:      General: Bowel sounds are normal.      Palpations: Abdomen is soft.      Comments: LLQ ostomy    Musculoskeletal:      Cervical back: Neck supple.      Comments: All extremities with contractures. Severe muscle atrophy in all extremities    Skin:     General: Skin is warm and dry.      Comments: Multiple wounds with dressing in place. Malodorous    Neurological:      Comments: Eyes open. Not tracking. Quadriplegia            Review of Systems   Reason unable to perform ROS: ventilator, sedation, inability to communicate effectively.       Vents:  Vent Mode: A/C (02/25/24 0917)  Ventilator Initiated: Yes (02/21/24 1631)  Set Rate: 20 BPM (02/25/24 0917)  Vt Set: 360 mL (02/25/24 0917)  PEEP/CPAP: 5 cmH20 (02/25/24  0917)  Oxygen Concentration (%): 40 (02/25/24 0917)  Peak Airway Pressure: 20 cmH20 (02/25/24 0917)  Plateau Pressure: 17 cmH20 (02/25/24 0917)  Total Ve: 8.09 L/m (02/25/24 0917)  Negative Inspiratory Force (cm H2O): 0 (02/25/24 0917)  F/VT Ratio<105 (RSBI): (!) 90.91 (02/25/24 0917)    Lines/Drains/Airways       Central Venous Catheter Line  Duration             Percutaneous Central Line Insertion/Assessment - Triple Lumen  02/21/24 1754 Femoral Vein Right;Femoral Right 3 days              Drain  Duration                  Colostomy  LLQ -- days         Urethral Catheter 02/21/24 2330 Temperature probe 3 days         NG/OG Tube 02/23/24 1459 Right nostril 1 day              Airway  Duration             Adult Surgical Airway 05/25/23 1135 Shiley Cuffed 8.0 / 85 mm 276 days                    Significant Labs:    CBC/Anemia Profile:  Recent Labs   Lab 02/24/24 0425 02/25/24 0424   WBC 19.03* 31.09*   HGB 8.4* 9.4*   HCT 25.8* 29.1*   * 651*   MCV 91 93   RDW 15.0* 15.7*        Chemistries:  Recent Labs   Lab 02/24/24 0425 02/25/24 0424    142   K 4.2 3.6    109   CO2 21* 21*   BUN 21* 18   CREATININE 0.8 0.8   CALCIUM 6.9* 7.1*   MG 1.6 1.8       All pertinent labs within the past 24 hours have been reviewed.    Significant Imaging:  I have reviewed all pertinent imaging results/findings within the past 24 hours.

## 2024-02-25 NOTE — ASSESSMENT & PLAN NOTE
He was recently at [facility] and this was also noted- no plans by plastic surgery at that time for flap   Based on recent cultures -will use Zyvox/meropenem for now    02/24- will follow final cultures to adjust regime

## 2024-02-25 NOTE — ASSESSMENT & PLAN NOTE
Will follow critical care team.  Recent cultures at [facility]  reviewed -        2/12-  Specimen: Wound - BACK  Component 10 d ago   Aspirate or Abscess Culture Multiple organisms present, predominant potential pathogen(s):   Aspirate or Abscess Culture 4+ Klebsiella pneumoniae Abnormal    Aspirate or Abscess Culture 3+ Enterococcus faecium Abnormal    01/13-  1 mo ago Comments    TISSUE CULTURE 1+ Enterococcus faecium Abnormal  For susceptibility results, refer to culture # - [id number]   TISSUE CULTURE Scant (< 1+) growth Acinetobacter baumanii/nosocomialis group Abnormal  For susceptibility results, refer to culture # - [id number]     The isolate done 02/12- of Klebsiella is resistant to Cefepime and the enterococcus is daptomycin resistant -will use Zyvox/Meropenem    Guarded prognosis -  Will use new cultures to guide regime -follow blood ,local wound cultures ,resp culture     02/23- reviewed cultures again with Pharmacy - will add Minocycline for previous Acinetobacter ,continue Zyvox/Meropenem   Will follow new cultures     02/24- will continue meropenem./zyvox  Follow final cultures
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
## 2024-02-25 NOTE — PLAN OF CARE
An initial episode of desaturation was observed at the beginning of the shift, promptly addressed through suctioning and repositioning. Subsequently, oxygen saturation improved and remained consistently within the target range of %. The patient is currently receiving a Levophed drip to maintain a mean arterial pressure (MAP) above 65 mmHg. Wound care procedures were performed, including dressing changes for both upper extremities and the genitalia. Additionally, the sacral dressing was replaced.

## 2024-02-25 NOTE — ASSESSMENT & PLAN NOTE
>>ASSESSMENT AND PLAN FOR PRESSURE ULCERS OF SKIN OF MULTIPLE TOPOGRAPHIC SITES WRITTEN ON 2/25/2024 10:44 AM BY ANTANAEL MAURO, GEE    Several large, unstageable wounds present  Optimize nutrition as able to promote wound healing- notably albumin is 1.4  Tube feeds and nutritional supplements as able   Surgery consult placed - discussed with Dr. Dale     Sacral spine  Full thickness tissue loss with exposed bone, tendon, or muscle. Often includes undermining and tunneling. May extend into muscle and/or supporting structures.   Appearance: Bone;Muscle;Granulating;Red;Moist;Black      Ischial tuberosity; Left and right  Full thickness tissue loss with exposed bone, tendon, or muscle. Often includes undermining and tunneling. May extend into muscle and/or supporting structures  Thoracic spine  Full thickness tissue loss with exposed bone, tendon, or muscle. Often includes undermining and tunneling. May extend into muscle and/or supporting structures.  Right upper Back Full thickness tissue loss with exposed bone, tendon, or muscle. Often includes undermining and tunneling. May extend into muscle and/or supporting structures.  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

## 2024-02-25 NOTE — ASSESSMENT & PLAN NOTE
Sources of infection include: wounds, urine and lungs  S/p 1 unit PRBC 2/24  Levophed initiated to keep MAP > 65 mmHg   Wound source not controlled at this time- surgery and ortho consulted- recommend transfer   Infectious work up in progress- UA grossly abnormal with cx + presumed proteus   CT chest/abd/pelvis reviewed  Antibiotics per ID  General surgery and orthopedics consulted to evaluate wounds for debridement and/or amputation- both have advised for transfer to tertiary center given complexity of surgery and post-op wound care. Transfer request placed   Additional work up and treatments as indicated
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
## 2024-02-26 PROBLEM — Z99.11 ON MECHANICALLY ASSISTED VENTILATION: Status: ACTIVE | Noted: 2024-02-26

## 2024-02-26 LAB
ACANTHOCYTES BLD QL SMEAR: PRESENT
ALLENS TEST: ABNORMAL
ANION GAP SERPL CALC-SCNC: 12 MMOL/L (ref 8–16)
ANISOCYTOSIS BLD QL SMEAR: SLIGHT
BACTERIA BLD CULT: NORMAL
BACTERIA SPEC AEROBE CULT: ABNORMAL
BACTERIA UR CULT: ABNORMAL
BACTERIA UR CULT: ABNORMAL
BASOPHILS # BLD AUTO: ABNORMAL K/UL (ref 0–0.2)
BASOPHILS NFR BLD: 0 % (ref 0–1.9)
BUN SERPL-MCNC: 22 MG/DL (ref 6–20)
CALCIUM SERPL-MCNC: 7.3 MG/DL (ref 8.7–10.5)
CHLORIDE SERPL-SCNC: 107 MMOL/L (ref 95–110)
CO2 SERPL-SCNC: 21 MMOL/L (ref 23–29)
CREAT SERPL-MCNC: 0.7 MG/DL (ref 0.5–1.4)
DACRYOCYTES BLD QL SMEAR: ABNORMAL
DELSYS: ABNORMAL
DIFFERENTIAL METHOD BLD: ABNORMAL
EOSINOPHIL # BLD AUTO: ABNORMAL K/UL (ref 0–0.5)
EOSINOPHIL NFR BLD: 0 % (ref 0–8)
ERYTHROCYTE [DISTWIDTH] IN BLOOD BY AUTOMATED COUNT: 16.1 % (ref 11.5–14.5)
EST. GFR  (NO RACE VARIABLE): >60 ML/MIN/1.73 M^2
FIO2: 28
FLOW: 5
GLUCOSE SERPL-MCNC: 74 MG/DL (ref 70–110)
GRAM STN SPEC: ABNORMAL
HCO3 UR-SCNC: 25.2 MMOL/L (ref 24–28)
HCT VFR BLD AUTO: 28.5 % (ref 40–54)
HGB BLD-MCNC: 9.1 G/DL (ref 14–18)
IMM GRANULOCYTES # BLD AUTO: ABNORMAL K/UL (ref 0–0.04)
IMM GRANULOCYTES NFR BLD AUTO: ABNORMAL % (ref 0–0.5)
LYMPHOCYTES # BLD AUTO: ABNORMAL K/UL (ref 1–4.8)
LYMPHOCYTES NFR BLD: 19 % (ref 18–48)
MAGNESIUM SERPL-MCNC: 1.9 MG/DL (ref 1.6–2.6)
MCH RBC QN AUTO: 29.8 PG (ref 27–31)
MCHC RBC AUTO-ENTMCNC: 31.9 G/DL (ref 32–36)
MCV RBC AUTO: 93 FL (ref 82–98)
MODE: ABNORMAL
MONOCYTES # BLD AUTO: ABNORMAL K/UL (ref 0.3–1)
MONOCYTES NFR BLD: 9 % (ref 4–15)
NEUTROPHILS NFR BLD: 72 % (ref 38–73)
NRBC BLD-RTO: 0 /100 WBC
PCO2 BLDA: 42.7 MMHG (ref 35–45)
PH SMN: 7.38 [PH] (ref 7.35–7.45)
PLATELET # BLD AUTO: 631 K/UL (ref 150–450)
PLATELET BLD QL SMEAR: ABNORMAL
PMV BLD AUTO: 8.5 FL (ref 9.2–12.9)
PO2 BLDA: 79 MMHG (ref 80–100)
POC BE: 0 MMOL/L
POC SATURATED O2: 95 % (ref 95–100)
POCT GLUCOSE: 108 MG/DL (ref 70–110)
POCT GLUCOSE: 80 MG/DL (ref 70–110)
POCT GLUCOSE: 92 MG/DL (ref 70–110)
POCT GLUCOSE: 95 MG/DL (ref 70–110)
POTASSIUM SERPL-SCNC: 4.4 MMOL/L (ref 3.5–5.1)
RBC # BLD AUTO: 3.05 M/UL (ref 4.6–6.2)
SAMPLE: ABNORMAL
SCHISTOCYTES BLD QL SMEAR: PRESENT
SITE: ABNORMAL
SODIUM SERPL-SCNC: 140 MMOL/L (ref 136–145)
T4 FREE SERPL-MCNC: <0.4 NG/DL (ref 0.71–1.51)
TSH SERPL DL<=0.005 MIU/L-ACNC: 46.77 UIU/ML (ref 0.4–4)
WBC # BLD AUTO: 19.78 K/UL (ref 3.9–12.7)

## 2024-02-26 PROCEDURE — 25000003 PHARM REV CODE 250: Performed by: SPECIALIST

## 2024-02-26 PROCEDURE — 25000003 PHARM REV CODE 250: Performed by: INTERNAL MEDICINE

## 2024-02-26 PROCEDURE — 25000003 PHARM REV CODE 250: Performed by: NURSE PRACTITIONER

## 2024-02-26 PROCEDURE — 99900026 HC AIRWAY MAINTENANCE (STAT)

## 2024-02-26 PROCEDURE — 36415 COLL VENOUS BLD VENIPUNCTURE: CPT | Performed by: NURSE PRACTITIONER

## 2024-02-26 PROCEDURE — 25000003 PHARM REV CODE 250: Performed by: EMERGENCY MEDICINE

## 2024-02-26 PROCEDURE — 94003 VENT MGMT INPAT SUBQ DAY: CPT

## 2024-02-26 PROCEDURE — 63600175 PHARM REV CODE 636 W HCPCS: Performed by: SPECIALIST

## 2024-02-26 PROCEDURE — 63600175 PHARM REV CODE 636 W HCPCS: Performed by: INTERNAL MEDICINE

## 2024-02-26 PROCEDURE — 99900035 HC TECH TIME PER 15 MIN (STAT)

## 2024-02-26 PROCEDURE — 84439 ASSAY OF FREE THYROXINE: CPT | Performed by: NURSE PRACTITIONER

## 2024-02-26 PROCEDURE — 83735 ASSAY OF MAGNESIUM: CPT | Performed by: NURSE PRACTITIONER

## 2024-02-26 PROCEDURE — 27000207 HC ISOLATION

## 2024-02-26 PROCEDURE — 85027 COMPLETE CBC AUTOMATED: CPT | Performed by: NURSE PRACTITIONER

## 2024-02-26 PROCEDURE — 20000000 HC ICU ROOM

## 2024-02-26 PROCEDURE — 80048 BASIC METABOLIC PNL TOTAL CA: CPT | Performed by: NURSE PRACTITIONER

## 2024-02-26 PROCEDURE — 84443 ASSAY THYROID STIM HORMONE: CPT | Performed by: NURSE PRACTITIONER

## 2024-02-26 PROCEDURE — 63600175 PHARM REV CODE 636 W HCPCS: Performed by: NURSE PRACTITIONER

## 2024-02-26 PROCEDURE — 85007 BL SMEAR W/DIFF WBC COUNT: CPT | Performed by: NURSE PRACTITIONER

## 2024-02-26 PROCEDURE — 27100171 HC OXYGEN HIGH FLOW UP TO 24 HOURS

## 2024-02-26 RX ORDER — MIDODRINE HYDROCHLORIDE 5 MG/1
10 TABLET ORAL 3 TIMES DAILY
Status: DISCONTINUED | OUTPATIENT
Start: 2024-02-26 | End: 2024-02-27

## 2024-02-26 RX ORDER — NOREPINEPHRINE BITARTRATE/D5W 4MG/250ML
0-3 PLASTIC BAG, INJECTION (ML) INTRAVENOUS CONTINUOUS
Status: DISCONTINUED | OUTPATIENT
Start: 2024-02-26 | End: 2024-02-29

## 2024-02-26 RX ORDER — MIDODRINE HYDROCHLORIDE 5 MG/1
5 TABLET ORAL 3 TIMES DAILY
Status: DISCONTINUED | OUTPATIENT
Start: 2024-02-26 | End: 2024-02-26

## 2024-02-26 RX ORDER — LEVETIRACETAM 10 MG/ML
750 INJECTION INTRAVASCULAR EVERY 12 HOURS
Status: DISCONTINUED | OUTPATIENT
Start: 2024-02-26 | End: 2024-02-27

## 2024-02-26 RX ADMIN — LEVETIRACETAM INJECTION 750 MG: 10 INJECTION INTRAVENOUS at 08:02

## 2024-02-26 RX ADMIN — FAMOTIDINE 20 MG: 40 POWDER, FOR SUSPENSION ORAL at 08:02

## 2024-02-26 RX ADMIN — MUPIROCIN: 20 OINTMENT TOPICAL at 09:02

## 2024-02-26 RX ADMIN — MEROPENEM 2 G: 1 INJECTION INTRAVENOUS at 05:02

## 2024-02-26 RX ADMIN — LEVETIRACETAM INJECTION 1000 MG: 10 INJECTION INTRAVENOUS at 09:02

## 2024-02-26 RX ADMIN — MINOCYCLINE HYDROCHLORIDE 100 MG: 50 CAPSULE ORAL at 09:02

## 2024-02-26 RX ADMIN — MEROPENEM 2 G: 1 INJECTION INTRAVENOUS at 09:02

## 2024-02-26 RX ADMIN — MINOCYCLINE HYDROCHLORIDE 100 MG: 50 CAPSULE ORAL at 08:02

## 2024-02-26 RX ADMIN — CHLORHEXIDINE GLUCONATE 0.12% ORAL RINSE 15 ML: 1.2 LIQUID ORAL at 08:02

## 2024-02-26 RX ADMIN — NOREPINEPHRINE BITARTRATE 0.08 MCG/KG/MIN: 4 INJECTION, SOLUTION INTRAVENOUS at 02:02

## 2024-02-26 RX ADMIN — LINEZOLID 600 MG: 600 INJECTION, SOLUTION INTRAVENOUS at 04:02

## 2024-02-26 RX ADMIN — NOREPINEPHRINE BITARTRATE 0.08 MCG/KG/MIN: 4 INJECTION, SOLUTION INTRAVENOUS at 03:02

## 2024-02-26 RX ADMIN — CHLORHEXIDINE GLUCONATE 0.12% ORAL RINSE 15 ML: 1.2 LIQUID ORAL at 03:02

## 2024-02-26 RX ADMIN — FAMOTIDINE 20 MG: 40 POWDER, FOR SUSPENSION ORAL at 11:02

## 2024-02-26 RX ADMIN — MIDODRINE HYDROCHLORIDE 5 MG: 5 TABLET ORAL at 03:02

## 2024-02-26 RX ADMIN — ENOXAPARIN SODIUM 40 MG: 100 INJECTION SUBCUTANEOUS at 04:02

## 2024-02-26 RX ADMIN — MIDODRINE HYDROCHLORIDE 10 MG: 5 TABLET ORAL at 08:02

## 2024-02-26 RX ADMIN — MEROPENEM 2 G: 1 INJECTION INTRAVENOUS at 01:02

## 2024-02-26 RX ADMIN — Medication 185.5 MCG/HR: at 05:02

## 2024-02-26 NOTE — PROGRESS NOTES
[facility] - Intensive Care ([facility])  Wound Care    Patient Name:  Jyoti Mayberry   MRN:  [id number]  Date: 2/26/2024  Diagnosis: Septic shock    History:     No past medical history on file.    Social History     Socioeconomic History    Marital status: Single       Precautions:     Allergies as of 02/21/2024    (No Known Allergies)       WO Assessment Details/Treatment     F/U visit with Mr. Mayberry for ongoing wound care needs. He iss een in ICU, remains ventillated via trach, sedated and on IV pressor therapy.   Wound care was performed overnight to E, so left dressings undisturbed. 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  BLE with extensive dry gangrene, and scattered areas of gray intact moist boggy tissue, and few areas of full thickness tissue loss with beefy red wound beds. Painted all with betadine, covered open/moist areas with xeroform, and wrapped with abd pads, then wrapped BLE with kerlix to secure.  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 Mositure barrier paste applied and wrappe with abd pad.   Turned to left side.  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 Sacral wound again with retained embedded wound vac sponge, able to remove small amount at this visit. Cleansed all with vashe and allowed to dwell, patted dry, mositure barrier applied to wound edges. Wounds filled with medihoney and aquacel extra including undermined spaces, then foam dressings applied to cover all.   Recommend ongoing care per orders, again consider transfer for debridement of retained wound vac foam from sacral wound and amputation vs. Debridement of BLE and LUE.       02/26/24 0950   WOCN Assessment   WOCN Total Time (mins) 90   Visit Date 02/26/24    Visit Time 0950   Consult Type Follow Up   WOCN Speciality Wound;Ostomy   WOCN List colostomy   Wound pressure;arterial;other;At risk for pressure Injury   Continence Type Urinary   Ostomy Type Colostomy   Intervention assessed;changed;chart review;coordination of care;team conference;orders   Teaching   (unable due to pateint condition)        Altered Skin Integrity 05/15/23 1411 Sacral spine Full thickness tissue loss with exposed bone, tendon, or muscle. Often includes undermining and tunneling. May extend into muscle and/or supporting structures.   Final Assessment Date/Final Assessment Time: (c)  (c)   Date First Assessed/Time First Assessed: 05/15/23 1411   Altered Skin Integrity Present on Admission - Did Patient arrive to the hospital with altered skin?: yes  Location: Sacral spine  Descript...   Wound Image     Description of Altered Skin Integrity Full thickness tissue loss with exposed bone, tendon, or muscle. Often includes undermining and tunneling. May extend into muscle and/or supporting structures.   Dressing Appearance Intact;Moist drainage   Drainage Amount Moderate   Drainage Characteristics/Odor Serosanguineous   Appearance Bone;Red;Black;Granulating;Other (see comments)  (retained black wound vac foam present)   Tissue loss description Full thickness   Black (%), Wound Tissue Color 20 %   Red (%), Wound Tissue Color 70 %   Yellow (%), Wound Tissue Color 10 %   Periwound Area Intact   Wound Edges Defined   Wound Length (cm) 10.5 cm   Wound Width (cm) 18 cm   Wound Depth (cm) 2 cm   Wound Volume (cm^3) 378 cm^3   Wound Surface Area (cm^2) 189 cm^2   Undermining (depth (cm)/location) 3cm from 10-4 o'clock   Care Cleansed with:;Wound cleanser;Applied:;Skin Barrier   Dressing Honey;Hydrofiber;Foam   Packing packing removed;packed with;honey packing;hydrofiber/alginate   Packing Inserted  3   Periwound Care Topical treatment applied   Dressing Change Due 02/29/24        Altered Skin Integrity  06/07/23 0900 Left Ischial tuberosity Full thickness tissue loss with exposed bone, tendon, or muscle. Often includes undermining and tunneling. May extend into muscle and/or supporting structures.   Final Assessment Date/Final Assessment Time: (c)  (c)   Date First Assessed/Time First Assessed: 06/07/23 0900   Altered Skin Integrity Present on Admission - Did Patient arrive to the hospital with altered skin?: yes  Side: Left  Location: Ischial tu...   Wound Image     Description of Altered Skin Integrity Full thickness tissue loss with exposed bone, tendon, or muscle. Often includes undermining and tunneling. May extend into muscle and/or supporting structures.   Dressing Appearance Intact;Moist drainage   Drainage Amount Moderate   Drainage Characteristics/Odor Serosanguineous   Appearance Bone;Red;Granulating;Moist   Tissue loss description Full thickness   Red (%), Wound Tissue Color 90 %   Yellow (%), Wound Tissue Color 10 %   Periwound Area Intact   Wound Edges Open   Wound Length (cm) 9 cm   Wound Width (cm) 7 cm   Wound Depth (cm) 1 cm   Wound Volume (cm^3) 63 cm^3   Wound Surface Area (cm^2) 63 cm^2   Undermining (depth (cm)/location) 1cm from 9-12 o'clock   Care Cleansed with:;Wound cleanser;Applied:;Skin Barrier   Dressing Honey;Hydrofiber;Foam   Packing packed with;honey packing;hydrofiber/alginate   Packing Inserted  1   Periwound Care Topical treatment applied   Dressing Change Due 02/29/24        Altered Skin Integrity 06/07/23 0900 Right Ischial tuberosity Full thickness tissue loss with exposed bone, tendon, or muscle. Often includes undermining and tunneling. May extend into muscle and/or supporting structures.   Final Assessment Date/Final Assessment Time: (c)  (c)   Date First Assessed/Time First Assessed: 06/07/23 0900   Altered Skin Integrity Present on Admission - Did Patient arrive to the hospital with altered skin?: yes  Side: Right  Location: Ischial t...   Wound Image    Description of  Altered Skin Integrity Full thickness tissue loss with exposed bone, tendon, or muscle. Often includes undermining and tunneling. May extend into muscle and/or supporting structures.   Dressing Appearance Intact;Moist drainage   Drainage Amount Moderate   Drainage Characteristics/Odor Serosanguineous   Appearance Bone   Red (%), Wound Tissue Color 100 %   Wound Length (cm) 9 cm   Wound Width (cm) 7 cm   Wound Depth (cm) 1.5 cm   Wound Volume (cm^3) 94.5 cm^3   Wound Surface Area (cm^2) 63 cm^2   Undermining (depth (cm)/location) 1.5cm from 12-5 o'clock   Care Cleansed with:;Wound cleanser;Applied:;Skin Barrier   Dressing Honey;Hydrofiber;Foam   Packing packed with;honey packing;hydrofiber/alginate   Packing Inserted  1   Periwound Care Topical treatment applied   Dressing Change Due 02/29/24        Altered Skin Integrity 02/22/24 Thoracic spine Full thickness tissue loss with exposed bone, tendon, or muscle. Often includes undermining and tunneling. May extend into muscle and/or supporting structures.   Date First Assessed: 02/22/24   Altered Skin Integrity Present on Admission - Did Patient arrive to the hospital with altered skin?: yes  Location: Thoracic spine  Description of Altered Skin Integrity: Full thickness tissue loss with exposed bone, te...   Wound Image    Description of Altered Skin Integrity Full thickness tissue loss with exposed bone, tendon, or muscle. Often includes undermining and tunneling. May extend into muscle and/or supporting structures.   Dressing Appearance Intact;Moist drainage   Drainage Amount Moderate   Drainage Characteristics/Odor Serosanguineous   Appearance Red;Bone;Muscle   Tissue loss description Full thickness   Red (%), Wound Tissue Color 100 %   Periwound Area Intact   Wound Edges Open   Wound Length (cm) 2.5 cm   Wound Width (cm) 2.5 cm   Wound Depth (cm) 0.5 cm   Wound Volume (cm^3) 3.125 cm^3   Wound Surface Area (cm^2) 6.25 cm^2   Undermining (depth (cm)/location) 1cm  from 8-11 o'clock   Care Cleansed with:;Wound cleanser;Applied:;Skin Barrier   Dressing Honey;Hydrofiber;Foam   Packing packed with;honey packing;hydrofiber/alginate   Packing Inserted  1   Periwound Care Topical treatment applied   Dressing Change Due 02/29/24        Altered Skin Integrity 02/22/24 Right upper Back Full thickness tissue loss with exposed bone, tendon, or muscle. Often includes undermining and tunneling. May extend into muscle and/or supporting structures.   Date First Assessed: 02/22/24   Altered Skin Integrity Present on Admission - Did Patient arrive to the hospital with altered skin?: yes  Side: Right  Orientation: upper  Location: Back  Description of Altered Skin Integrity: Full thickness tissue los...   Wound Image    Description of Altered Skin Integrity Full thickness tissue loss with exposed bone, tendon, or muscle. Often includes undermining and tunneling. May extend into muscle and/or supporting structures.   Dressing Appearance Intact;Moist drainage   Drainage Amount Moderate   Drainage Characteristics/Odor Serosanguineous   Appearance Bone;Muscle;Red;Moist   Tissue loss description Full thickness   Red (%), Wound Tissue Color 50 %   Yellow (%), Wound Tissue Color 50 %   Periwound Area Intact   Wound Edges Open   Wound Length (cm) 4.5 cm   Wound Width (cm) 1 cm   Wound Depth (cm) 1.5 cm   Wound Volume (cm^3) 6.75 cm^3   Wound Surface Area (cm^2) 4.5 cm^2   Undermining (depth (cm)/location) 3cm from 8-3 o'clock   Care Cleansed with:;Wound cleanser;Applied:;Skin Barrier   Dressing Honey;Hydrofiber;Foam;Changed   Packing packed with;honey packing;hydrofiber/alginate   Packing Inserted  1   Periwound Care Topical treatment applied   Dressing Change Due 02/29/24        Altered Skin Integrity 02/22/24 Right dorsal Hand Ulceration   Date First Assessed: 02/22/24   Altered Skin Integrity Present on Admission - Did Patient arrive to the hospital with altered skin?: yes  Side: Right  Orientation:  dorsal  Location: Hand  Primary Wound Type: Ulceration   Appearance Dressing in place, unable to visualize        Altered Skin Integrity 02/22/24 Right posterior Elbow Ulceration   Date First Assessed: 02/22/24   Altered Skin Integrity Present on Admission - Did Patient arrive to the hospital with altered skin?: yes  Side: Right  Orientation: posterior  Location: Elbow  Primary Wound Type: Ulceration   Appearance Dressing in place, unable to visualize        Altered Skin Integrity 02/22/24 Left lower;upper;anterior;posterior;medial;lateral Arm Other (comment)   Date First Assessed: 02/22/24   Altered Skin Integrity Present on Admission - Did Patient arrive to the hospital with altered skin?: yes  Side: Left  Orientation: lower;upper;anterior;posterior;medial;lateral  Location: (c) Arm  Primary Wound Type: (c...   Description of Altered Skin Integrity   (not pressure-related skin injury)   Appearance Dressing in place, unable to visualize        Altered Skin Integrity 02/22/24 Left anterior;lower;posterior;medial;lateral;proximal;distal;upper Leg Other (comment)   Date First Assessed: 02/22/24   Altered Skin Integrity Present on Admission - Did Patient arrive to the hospital with altered skin?: yes  Side: Left  Orientation: anterior;lower;posterior;medial;lateral;proximal;distal;upper  Location: (c) Leg  Primar...   Wound Image     Description of Altered Skin Integrity   (not pressure related skin injury)   Dressing Appearance Intact   Drainage Amount Small   Drainage Characteristics/Odor Tan;Serosanguineous   Appearance Black;Necrotic;Eschar;Tan;Gray;Moist   Tissue loss description Full thickness   Black (%), Wound Tissue Color 80 %   Red (%), Wound Tissue Color 10 %   Yellow (%), Wound Tissue Color 10 %   Care Applied:;Povidone iodine   Dressing Non-adherent;Absorptive Pad;Rolled gauze   Dressing Change Due 02/29/24        Altered Skin Integrity 02/22/24 Right anterior;lower;upper;posterior;medial;lateral Leg Other  (comment)   Date First Assessed: 02/22/24   Altered Skin Integrity Present on Admission - Did Patient arrive to the hospital with altered skin?: yes  Side: Right  Orientation: anterior;lower;upper;posterior;medial;lateral  Location: Leg  Primary Wound Type: (c) O...   Wound Image       Description of Altered Skin Integrity   (not pressure-related skin injury)   Dressing Appearance Intact   Drainage Amount Small   Drainage Characteristics/Odor Tan;Serosanguineous   Appearance Black;Necrotic;Eschar;Tan;Gray;Moist   Tissue loss description Full thickness   Black (%), Wound Tissue Color 80 %   Red (%), Wound Tissue Color 10 %   Yellow (%), Wound Tissue Color 10 %   Care Applied:;Povidone iodine   Dressing Non-adherent;Absorptive Pad;Rolled gauze   Dressing Change Due 02/29/24        Altered Skin Integrity 02/22/24 Penis Mucosal membrane tissue injury with history of medical device use   Date First Assessed: 02/22/24   Altered Skin Integrity Present on Admission - Did Patient arrive to the hospital with altered skin?: yes  Location: Penis  Description of Altered Skin Integrity: Mucosal membrane tissue injury with history of medical de...   Wound Image     Description of Altered Skin Integrity   (not pressure related skin injury)   Dressing Appearance Intact   Drainage Amount Small   Drainage Characteristics/Odor Sanguineous   Appearance Red;Pink;Yellow;Tan   Tissue loss description Full thickness   Care Cleansed with:;Wound cleanser;Applied:;Skin Barrier   Dressing Absorptive Pad        Colostomy  LLQ   Placement Date: (c)    Present Prior to Hospital Arrival?: Yes  Location: LLQ   Stomal Appliance 1 piece;Clean;Dry;Intact;No Leakage   Stoma Appearance pink   Stoma Function stool;brown;thick liquid       Recommendations made to primary team for ongoing wound care per orders, consider transfer to higher level of care for IP plastics for debridement of sacral stage 4 PI due to retained wound vac sponge as well as  debridment vs amputation of BLE and LUE.    02/26/2024

## 2024-02-26 NOTE — ASSESSMENT & PLAN NOTE
MRI pelvis beginning of this year showing osteo with bone loss and surgical changes  Contributing to complexity of care    2/26- -ID following   Seen by surgery / wound care / and ortho  See pressure ulcers  Trying to transfer to higher level
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
## 2024-02-26 NOTE — PLAN OF CARE
The patient remains hemodynamically stable. Tolerated the current ventilator settings, albeit with a small to moderate amount of secretions. Wound care performed at the sacrum and bilateral ischium

## 2024-02-26 NOTE — CARE UPDATE
Challenging case as patient has wounds that require debridement but may not be able to have this performed here. Appreciate general surgery, orthopedics, and ICU team. May be transferred to higher level of care. Culture data from prior admission shown below. Recommend treating aggressively for chronic osteomyelitis with minimum 6 weeks of antibiotics. Continue minocycline 100 mg BID, linezolid 600 mg BID, and meropenem 2 g Q8H for now. Will continue to follow.     Aspirate or Abscess Culture Multiple organisms present, predominant potential pathogen(s):   Aspirate or Abscess Culture 4+ Klebsiella pneumoniae Abnormal    Aspirate or Abscess Culture 3+ Enterococcus faecium Abnormal    Gram stain Few Gram negative bacilli   Gram stain Few Gram positive cocci   Gram stain Occasional (Rare) Polymorphonuclear leukocytes   Resulting Agency [facility]   Susceptibility    Organism Antibiotic Method Susceptibility   Klebsiella pneumoniae --- SUSCEPTIBILITY TESTING    Klebsiella pneumoniae Amikacin SUSCEPTIBILITY TESTING <=16 µg/mL AUBREY: Susceptible   Klebsiella pneumoniae Amoxicillin/K Clavulanate SUSCEPTIBILITY TESTING 16/8 µg/mL AUBREY: Intermediate   Klebsiella pneumoniae Ampicillin SUSCEPTIBILITY TESTING >16 µg/mL AUBREY: Resistant   Klebsiella pneumoniae Ampicillin/Sulbactam SUSCEPTIBILITY TESTING >16/8 µg/mL AUBREY: Resistant   Klebsiella pneumoniae Cefazolin SUSCEPTIBILITY TESTING >16 µg/mL AUBREY: Resistant   Klebsiella pneumoniae Cefepime SUSCEPTIBILITY TESTING >16 µg/mL AUBREY: Resistant   Klebsiella pneumoniae Ceftriaxone SUSCEPTIBILITY TESTING >32 µg/mL AUBREY: Resistant   Klebsiella pneumoniae Cefuroxime SUSCEPTIBILITY TESTING >16 µg/mL AUBREY: Resistant   Klebsiella pneumoniae Ciprofloxacin SUSCEPTIBILITY TESTING 2 µg/mL AUBREY: Resistant   Klebsiella pneumoniae Ertapenem SUSCEPTIBILITY TESTING <=0.5 µg/mL AUBREY: Susceptible   Klebsiella pneumoniae Gentamicin SUSCEPTIBILITY TESTING >8 µg/mL AUBREY: Resistant   Klebsiella  pneumoniae Levofloxacin SUSCEPTIBILITY TESTING <=0.5 µg/mL AUBREY: Susceptible   Klebsiella pneumoniae Meropenem SUSCEPTIBILITY TESTING <=1 µg/mL AUBREY: Susceptible   Klebsiella pneumoniae Piperacillin/Tazobactam SUSCEPTIBILITY TESTING 16 µg/mL AUBREY: Susceptible-dose dependent   Klebsiella pneumoniae Trimethoprim/Sulfamethoxazole SUSCEPTIBILITY TESTING >2/38 µg/mL AUBREY: Resistant   Comment: Susceptibility test results indicate that this organism may produce an extended-spectrum beta-lactamase and therefore may be clinically resistant to therapy with Penicillins, Cephalosporins, and Aztreonam.   Enterococcus faecium --- SUSCEPTIBILITY TESTING    Enterococcus faecium Ampicillin SUSCEPTIBILITY TESTING >8 µg/mL AUBREY: Resistant   Enterococcus faecium Daptomycin SUSCEPTIBILITY TESTING 8 µg/mL AUBREY: Resistant   Enterococcus faecium Gentamycin Synergy Screen SUSCEPTIBILITY TESTING <=500 µg/mL AUBREY: Susceptible   Enterococcus faecium Linezolid SUSCEPTIBILITY TESTING 2 µg/mL AUBREY: Susceptible   Enterococcus faecium Vancomycin SUSCEPTIBILITY TESTING 8 µg/mL AUBREY: Intermediate   Specimen Collected: 02/12/24 20:29    Performed by: [facility] Last Resulted: 02/17/24 10:28   Received From: [facility]  Result Received: 02/21/24 10:56    View Encounter        Recent Data from [facility]  Related to Aspirate or Abscess Culture(Aerobic/Anaerobic)  Component 02/12/24 02/12/24 02/12/24 01/23/24 01/23/24 01/23/24   Aspirate or Abscess Culture Multiple organisms present, predominant potential pathogen(s): 4+ Klebsiella pneumoniae Abnormal  3+ Enterococcus faecium Abnormal  -- -- --   Aspirate or Abscess Culture Multiple organisms present, predominant potential pathogen(s): 4+ Klebsiella pneumoniae Abnormal  3+ Enterococcus faecium Abnormal  -- -- --   Aspirate or Abscess Culture Multiple organisms present, predominant potential pathogen(s): 4+ Klebsiella pneumoniae Abnormal  3+ Enterococcus faecium Abnormal  -- -- --   Gram stain  Few Gram negative bacilli Few Gram positive cocci Occasional (Rare) Polymorphonuclear leukocytes No Organisms seen Numerous Polymorphonuclear leukocytes Moderate Mononuclear cells   Gram stain Few Gram negative bacilli Few Gram positive cocci Occasional (Rare) Polymorphonuclear leukocytes No Organisms seen Numerous Polymorphonuclear leukocytes Moderate Mononuclear cells   Gram stain Few Gram negative bacilli Few Gram positive cocci Occasional (Rare) Polymorphonuclear leukocytes No Organisms seen Numerous Polymorphonuclear leukocytes Moderate Mononuclear cells     TISSUE CULTURE 1+ Enterococcus faecium Abnormal For susceptibility results, refer to culture # - [id number]  TISSUE CULTURE Scant (< 1+) growth Acinetobacter baumanii/nosocomialis group Abnormal For susceptibility results, refer to culture # - [id number]

## 2024-02-26 NOTE — ASSESSMENT & PLAN NOTE
Several large, unstageable wounds present  Optimize nutrition as able to promote wound healing- notably albumin is 1.4  Tube feeds and nutritional supplements as able   Surgery consult placed - discussed with Dr. Dale     Sacral spine  Full thickness tissue loss with exposed bone, tendon, or muscle. Often includes undermining and tunneling. May extend into muscle and/or supporting structures.   Appearance: Bone;Muscle;Granulating;Red;Moist;Black        Ischial tuberosity; Left and right  Full thickness tissue loss with exposed bone, tendon, or muscle. Often includes undermining and tunneling. May extend into muscle and/or supporting structures  Thoracic spine  Full thickness tissue loss with exposed bone, tendon, or muscle. Often includes undermining and tunneling. May extend into muscle and/or supporting structures.  Right upper Back Full thickness tissue loss with exposed bone, tendon, or muscle. Often includes undermining and tunneling. May extend into muscle and/or supporting structures.  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 Need amputations   Called mother to discuss  Consider palliative care consult

## 2024-02-26 NOTE — ASSESSMENT & PLAN NOTE
>>ASSESSMENT AND PLAN FOR PRESSURE ULCERS OF SKIN OF MULTIPLE TOPOGRAPHIC SITES WRITTEN ON 2/26/2024  2:34 PM BY ANUM NAVA MD    Several large, unstageable wounds present  Optimize nutrition as able to promote wound healing- notably albumin is 1.4  Tube feeds and nutritional supplements as able   Surgery consult placed - discussed with Dr. Dale     Sacral spine  Full thickness tissue loss with exposed bone, tendon, or muscle. Often includes undermining and tunneling. May extend into muscle and/or supporting structures.   Appearance: Bone;Muscle;Granulating;Red;Moist;Black        Ischial tuberosity; Left and right  Full thickness tissue loss with exposed bone, tendon, or muscle. Often includes undermining and tunneling. May extend into muscle and/or supporting structures  Thoracic spine  Full thickness tissue loss with exposed bone, tendon, or muscle. Often includes undermining and tunneling. May extend into muscle and/or supporting structures.  Right upper Back Full thickness tissue loss with exposed bone, tendon, or muscle. Often includes undermining and tunneling. May extend into muscle and/or supporting structures.  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 Need amputations   Called mother to discuss  Consider  palliative care consult
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
## 2024-02-26 NOTE — SUBJECTIVE & OBJECTIVE
Interval History: no acute events  Remains ill on vent      Objective:     Vital Signs (Most Recent):  Temp: 97.2 °F (36.2 °C) (02/26/24 1125)  Pulse: 98 (02/26/24 1125)  Resp: 16 (02/26/24 0736)  BP: (!) 99/59 (02/26/24 0736)  SpO2: 99 % (02/26/24 1125) Vital Signs (24h Range):  Temp:  [96.6 °F (35.9 °C)-98.1 °F (36.7 °C)] 97.2 °F (36.2 °C)  Pulse:  [] 98  Resp:  [15-28] 16  SpO2:  [98 %-100 %] 99 %  BP: ()/(52-71) 99/59     Weight: 53.5 kg (118 lb)  Body mass index is 17.43 kg/m².      Intake/Output Summary (Last 24 hours) at 2/26/2024 1349  Last data filed at 2/26/2024 0600  Gross per 24 hour   Intake 2057.81 ml   Output 1440 ml   Net 617.81 ml        Physical Exam  Vitals and nursing note reviewed.   Constitutional:       General: He is not in acute distress.     Appearance: He is ill-appearing.   HENT:      Head: Normocephalic and atraumatic.   Eyes:      General:         Right eye: No discharge.         Left eye: No discharge.   Cardiovascular:      Rate and Rhythm: Normal rate and regular rhythm.      Heart sounds: No murmur heard.  Pulmonary:      Effort: No respiratory distress.      Breath sounds: No wheezing.   Abdominal:      General: There is no distension.      Palpations: Abdomen is soft.   Musculoskeletal:         General: Deformity present.      Cervical back: Neck supple.      Comments: All ext wrapped   Neurological:      Comments: Move mouth           Review of Systems   Reason unable to perform ROS: unable to give.       Vents:  Vent Mode: A/C (02/26/24 1125)  Ventilator Initiated: Yes (02/21/24 1631)  Set Rate: 20 BPM (02/26/24 1125)  Vt Set: 360 mL (02/26/24 1125)  PEEP/CPAP: 5 cmH20 (02/26/24 1125)  Oxygen Concentration (%): 40 (02/26/24 1125)  Peak Airway Pressure: 15 cmH20 (02/26/24 1125)  Plateau Pressure: 17 cmH20 (02/26/24 1125)  Total Ve: 8.35 L/m (02/26/24 1125)  Negative Inspiratory Force (cm H2O): 0 (02/26/24 1125)  F/VT Ratio<105 (RSBI): (!) 42.9 (02/26/24  0736)    Lines/Drains/Airways       Central Venous Catheter Line  Duration             Percutaneous Central Line Insertion/Assessment - Triple Lumen  02/21/24 1754 Femoral Vein Right;Femoral Right 4 days              Drain  Duration                  Colostomy  LLQ -- days         Urethral Catheter 02/21/24 2330 Temperature probe 4 days         NG/OG Tube 02/23/24 1459 Right nostril 2 days              Airway  Duration             Adult Surgical Airway 05/25/23 1135 Shiley Cuffed 8.0 / 85 mm 277 days                    Significant Labs:    CBC/Anemia Profile:  Recent Labs   Lab 02/25/24  0424 02/26/24  0414   WBC 31.09* 19.78*   HGB 9.4* 9.1*   HCT 29.1* 28.5*   * 631*   MCV 93 93   RDW 15.7* 16.1*        Chemistries:  Recent Labs   Lab 02/25/24  0424 02/26/24  0414    140   K 3.6 4.4    107   CO2 21* 21*   BUN 18 22*   CREATININE 0.8 0.7   CALCIUM 7.1* 7.3*   MG 1.8 1.9       All pertinent labs within the past 24 hours have been reviewed.    Significant Imaging:  I have reviewed all pertinent imaging results/findings within the past 24 hours.

## 2024-02-26 NOTE — ASSESSMENT & PLAN NOTE
Sources of infection include: wounds, urine and lungs  S/p 1 unit PRBC 2/24  Levophed initiated to keep MAP > 65 mmHg   Wound source not controlled at this time- surgery and ortho consulted- recommend transfer   Infectious work up in progress- UA grossly abnormal with cx + presumed proteus   CT chest/abd/pelvis reviewed  Antibiotics per ID  General surgery and orthopedics consulted to evaluate wounds for debridement and/or amputation- both have advised for transfer to tertiary center given complexity of surgery and post-op wound care. Transfer request placed   Additional work up and treatments as indicated     2/26 -cont antibiotics   ID following   Off pressors   From [facility] notes on midodrine 10tid, will start 5 tid

## 2024-02-27 PROBLEM — E03.9 HYPOTHYROID: Status: ACTIVE | Noted: 2024-02-27

## 2024-02-27 LAB
ACANTHOCYTES BLD QL SMEAR: PRESENT
ACANTHOCYTES BLD QL SMEAR: PRESENT
ALBUMIN SERPL BCP-MCNC: 1.2 G/DL (ref 3.5–5.2)
ALP SERPL-CCNC: 137 U/L (ref 55–135)
ALT SERPL W/O P-5'-P-CCNC: 13 U/L (ref 10–44)
ANION GAP SERPL CALC-SCNC: 9 MMOL/L (ref 8–16)
ANISOCYTOSIS BLD QL SMEAR: SLIGHT
ANISOCYTOSIS BLD QL SMEAR: SLIGHT
AST SERPL-CCNC: 30 U/L (ref 10–40)
BACTERIA BLD CULT: ABNORMAL
BASOPHILS # BLD AUTO: ABNORMAL K/UL (ref 0–0.2)
BASOPHILS # BLD AUTO: ABNORMAL K/UL (ref 0–0.2)
BASOPHILS NFR BLD: 0 % (ref 0–1.9)
BASOPHILS NFR BLD: 0 % (ref 0–1.9)
BILIRUB SERPL-MCNC: 0.2 MG/DL (ref 0.1–1)
BUN SERPL-MCNC: 28 MG/DL (ref 6–20)
CALCIUM SERPL-MCNC: 8.1 MG/DL (ref 8.7–10.5)
CHLORIDE SERPL-SCNC: 106 MMOL/L (ref 95–110)
CO2 SERPL-SCNC: 23 MMOL/L (ref 23–29)
CREAT SERPL-MCNC: 0.7 MG/DL (ref 0.5–1.4)
DACRYOCYTES BLD QL SMEAR: ABNORMAL
DACRYOCYTES BLD QL SMEAR: ABNORMAL
DIFFERENTIAL METHOD BLD: ABNORMAL
DIFFERENTIAL METHOD BLD: ABNORMAL
EOSINOPHIL # BLD AUTO: ABNORMAL K/UL (ref 0–0.5)
EOSINOPHIL # BLD AUTO: ABNORMAL K/UL (ref 0–0.5)
EOSINOPHIL NFR BLD: 0 % (ref 0–8)
EOSINOPHIL NFR BLD: 0 % (ref 0–8)
ERYTHROCYTE [DISTWIDTH] IN BLOOD BY AUTOMATED COUNT: 15.9 % (ref 11.5–14.5)
ERYTHROCYTE [DISTWIDTH] IN BLOOD BY AUTOMATED COUNT: 15.9 % (ref 11.5–14.5)
EST. GFR  (NO RACE VARIABLE): >60 ML/MIN/1.73 M^2
GLUCOSE SERPL-MCNC: 74 MG/DL (ref 70–110)
HCT VFR BLD AUTO: 28 % (ref 40–54)
HCT VFR BLD AUTO: 28 % (ref 40–54)
HGB BLD-MCNC: 8.8 G/DL (ref 14–18)
HGB BLD-MCNC: 8.8 G/DL (ref 14–18)
IMM GRANULOCYTES # BLD AUTO: ABNORMAL K/UL (ref 0–0.04)
IMM GRANULOCYTES # BLD AUTO: ABNORMAL K/UL (ref 0–0.04)
IMM GRANULOCYTES NFR BLD AUTO: ABNORMAL % (ref 0–0.5)
IMM GRANULOCYTES NFR BLD AUTO: ABNORMAL % (ref 0–0.5)
LYMPHOCYTES # BLD AUTO: ABNORMAL K/UL (ref 1–4.8)
LYMPHOCYTES # BLD AUTO: ABNORMAL K/UL (ref 1–4.8)
LYMPHOCYTES NFR BLD: 23 % (ref 18–48)
LYMPHOCYTES NFR BLD: 23 % (ref 18–48)
MAGNESIUM SERPL-MCNC: 1.4 MG/DL (ref 1.6–2.6)
MCH RBC QN AUTO: 29.3 PG (ref 27–31)
MCH RBC QN AUTO: 29.3 PG (ref 27–31)
MCHC RBC AUTO-ENTMCNC: 31.4 G/DL (ref 32–36)
MCHC RBC AUTO-ENTMCNC: 31.4 G/DL (ref 32–36)
MCV RBC AUTO: 93 FL (ref 82–98)
MCV RBC AUTO: 93 FL (ref 82–98)
MONOCYTES # BLD AUTO: ABNORMAL K/UL (ref 0.3–1)
MONOCYTES # BLD AUTO: ABNORMAL K/UL (ref 0.3–1)
MONOCYTES NFR BLD: 12 % (ref 4–15)
MONOCYTES NFR BLD: 12 % (ref 4–15)
NEUTROPHILS NFR BLD: 65 % (ref 38–73)
NEUTROPHILS NFR BLD: 65 % (ref 38–73)
NRBC BLD-RTO: 0 /100 WBC
NRBC BLD-RTO: 0 /100 WBC
PHOSPHATE SERPL-MCNC: 3.3 MG/DL (ref 2.7–4.5)
PLATELET # BLD AUTO: 642 K/UL (ref 150–450)
PLATELET # BLD AUTO: 642 K/UL (ref 150–450)
PLATELET BLD QL SMEAR: ABNORMAL
PLATELET BLD QL SMEAR: ABNORMAL
PMV BLD AUTO: 8.4 FL (ref 9.2–12.9)
PMV BLD AUTO: 8.4 FL (ref 9.2–12.9)
POCT GLUCOSE: 64 MG/DL (ref 70–110)
POCT GLUCOSE: 85 MG/DL (ref 70–110)
POCT GLUCOSE: 95 MG/DL (ref 70–110)
POTASSIUM SERPL-SCNC: 4 MMOL/L (ref 3.5–5.1)
PROT SERPL-MCNC: 6.2 G/DL (ref 6–8.4)
RBC # BLD AUTO: 3 M/UL (ref 4.6–6.2)
RBC # BLD AUTO: 3 M/UL (ref 4.6–6.2)
SCHISTOCYTES BLD QL SMEAR: PRESENT
SCHISTOCYTES BLD QL SMEAR: PRESENT
SODIUM SERPL-SCNC: 138 MMOL/L (ref 136–145)
TARGETS BLD QL SMEAR: ABNORMAL
TARGETS BLD QL SMEAR: ABNORMAL
WBC # BLD AUTO: 18.78 K/UL (ref 3.9–12.7)
WBC # BLD AUTO: 18.78 K/UL (ref 3.9–12.7)

## 2024-02-27 PROCEDURE — 20000000 HC ICU ROOM

## 2024-02-27 PROCEDURE — 85007 BL SMEAR W/DIFF WBC COUNT: CPT | Performed by: NURSE PRACTITIONER

## 2024-02-27 PROCEDURE — 36415 COLL VENOUS BLD VENIPUNCTURE: CPT | Mod: XB | Performed by: STUDENT IN AN ORGANIZED HEALTH CARE EDUCATION/TRAINING PROGRAM

## 2024-02-27 PROCEDURE — 63600175 PHARM REV CODE 636 W HCPCS: Performed by: NURSE PRACTITIONER

## 2024-02-27 PROCEDURE — 85027 COMPLETE CBC AUTOMATED: CPT | Performed by: NURSE PRACTITIONER

## 2024-02-27 PROCEDURE — 84100 ASSAY OF PHOSPHORUS: CPT | Performed by: INTERNAL MEDICINE

## 2024-02-27 PROCEDURE — 99900035 HC TECH TIME PER 15 MIN (STAT)

## 2024-02-27 PROCEDURE — 27100171 HC OXYGEN HIGH FLOW UP TO 24 HOURS

## 2024-02-27 PROCEDURE — 25000003 PHARM REV CODE 250: Performed by: SPECIALIST

## 2024-02-27 PROCEDURE — 87040 BLOOD CULTURE FOR BACTERIA: CPT | Performed by: STUDENT IN AN ORGANIZED HEALTH CARE EDUCATION/TRAINING PROGRAM

## 2024-02-27 PROCEDURE — 63600175 PHARM REV CODE 636 W HCPCS: Performed by: INTERNAL MEDICINE

## 2024-02-27 PROCEDURE — 27000207 HC ISOLATION

## 2024-02-27 PROCEDURE — 99900026 HC AIRWAY MAINTENANCE (STAT)

## 2024-02-27 PROCEDURE — 94003 VENT MGMT INPAT SUBQ DAY: CPT

## 2024-02-27 PROCEDURE — 94761 N-INVAS EAR/PLS OXIMETRY MLT: CPT

## 2024-02-27 PROCEDURE — 25000003 PHARM REV CODE 250: Performed by: INTERNAL MEDICINE

## 2024-02-27 PROCEDURE — 25000003 PHARM REV CODE 250: Performed by: NURSE PRACTITIONER

## 2024-02-27 PROCEDURE — 31720 CLEARANCE OF AIRWAYS: CPT

## 2024-02-27 PROCEDURE — 83735 ASSAY OF MAGNESIUM: CPT | Performed by: INTERNAL MEDICINE

## 2024-02-27 PROCEDURE — 80053 COMPREHEN METABOLIC PANEL: CPT | Performed by: INTERNAL MEDICINE

## 2024-02-27 RX ORDER — MIDODRINE HYDROCHLORIDE 5 MG/1
10 TABLET ORAL 3 TIMES DAILY
Status: DISCONTINUED | OUTPATIENT
Start: 2024-02-27 | End: 2024-03-07 | Stop reason: HOSPADM

## 2024-02-27 RX ORDER — LEVOTHYROXINE SODIUM 100 UG/1
100 TABLET ORAL
Status: DISCONTINUED | OUTPATIENT
Start: 2024-02-27 | End: 2024-03-07 | Stop reason: HOSPADM

## 2024-02-27 RX ORDER — GABAPENTIN 300 MG/1
300 CAPSULE ORAL 3 TIMES DAILY
Status: DISCONTINUED | OUTPATIENT
Start: 2024-02-27 | End: 2024-03-07 | Stop reason: HOSPADM

## 2024-02-27 RX ORDER — FOLIC ACID 1 MG/1
1 TABLET ORAL DAILY
Status: DISCONTINUED | OUTPATIENT
Start: 2024-02-27 | End: 2024-03-07 | Stop reason: HOSPADM

## 2024-02-27 RX ORDER — HYDROCODONE BITARTRATE AND ACETAMINOPHEN 5; 325 MG/1; MG/1
1 TABLET ORAL EVERY 6 HOURS PRN
Status: DISCONTINUED | OUTPATIENT
Start: 2024-02-27 | End: 2024-03-07 | Stop reason: HOSPADM

## 2024-02-27 RX ORDER — LEVETIRACETAM 100 MG/ML
750 SOLUTION ORAL 2 TIMES DAILY
Status: DISCONTINUED | OUTPATIENT
Start: 2024-02-27 | End: 2024-03-07 | Stop reason: HOSPADM

## 2024-02-27 RX ORDER — ALPRAZOLAM 0.5 MG/1
0.5 TABLET ORAL 3 TIMES DAILY PRN
Status: DISCONTINUED | OUTPATIENT
Start: 2024-02-27 | End: 2024-03-07 | Stop reason: HOSPADM

## 2024-02-27 RX ORDER — MEGESTROL ACETATE 40 MG/ML
400 SUSPENSION ORAL DAILY
Status: DISCONTINUED | OUTPATIENT
Start: 2024-02-27 | End: 2024-02-27

## 2024-02-27 RX ADMIN — CHLORHEXIDINE GLUCONATE 0.12% ORAL RINSE 15 ML: 1.2 LIQUID ORAL at 08:02

## 2024-02-27 RX ADMIN — MIDODRINE HYDROCHLORIDE 10 MG: 5 TABLET ORAL at 09:02

## 2024-02-27 RX ADMIN — MINOCYCLINE HYDROCHLORIDE 100 MG: 50 CAPSULE ORAL at 08:02

## 2024-02-27 RX ADMIN — MAGNESIUM SULFATE HEPTAHYDRATE 2 G: 40 INJECTION, SOLUTION INTRAVENOUS at 05:02

## 2024-02-27 RX ADMIN — LEVETIRACETAM 750 MG: 100 SOLUTION ORAL at 09:02

## 2024-02-27 RX ADMIN — GABAPENTIN 300 MG: 300 CAPSULE ORAL at 09:02

## 2024-02-27 RX ADMIN — MIDODRINE HYDROCHLORIDE 10 MG: 5 TABLET ORAL at 08:02

## 2024-02-27 RX ADMIN — DEXTROSE MONOHYDRATE 125 ML: 100 INJECTION, SOLUTION INTRAVENOUS at 11:02

## 2024-02-27 RX ADMIN — LEVOTHYROXINE SODIUM 100 MCG: 100 TABLET ORAL at 10:02

## 2024-02-27 RX ADMIN — MEROPENEM 2 G: 1 INJECTION INTRAVENOUS at 09:02

## 2024-02-27 RX ADMIN — MEROPENEM 2 G: 1 INJECTION INTRAVENOUS at 02:02

## 2024-02-27 RX ADMIN — FAMOTIDINE 20 MG: 40 POWDER, FOR SUSPENSION ORAL at 09:02

## 2024-02-27 RX ADMIN — GABAPENTIN 300 MG: 300 CAPSULE ORAL at 02:02

## 2024-02-27 RX ADMIN — FAMOTIDINE 20 MG: 40 POWDER, FOR SUSPENSION ORAL at 08:02

## 2024-02-27 RX ADMIN — LINEZOLID 600 MG: 600 INJECTION, SOLUTION INTRAVENOUS at 06:02

## 2024-02-27 RX ADMIN — MEROPENEM 2 G: 1 INJECTION INTRAVENOUS at 05:02

## 2024-02-27 RX ADMIN — MINOCYCLINE HYDROCHLORIDE 100 MG: 50 CAPSULE ORAL at 09:02

## 2024-02-27 RX ADMIN — LEVETIRACETAM INJECTION 750 MG: 10 INJECTION INTRAVENOUS at 08:02

## 2024-02-27 RX ADMIN — CHLORHEXIDINE GLUCONATE 0.12% ORAL RINSE 15 ML: 1.2 LIQUID ORAL at 09:02

## 2024-02-27 RX ADMIN — MAGNESIUM SULFATE HEPTAHYDRATE 2 G: 40 INJECTION, SOLUTION INTRAVENOUS at 08:02

## 2024-02-27 RX ADMIN — NOREPINEPHRINE BITARTRATE 0.04 MCG/KG/MIN: 4 INJECTION, SOLUTION INTRAVENOUS at 01:02

## 2024-02-27 RX ADMIN — FOLIC ACID 1 MG: 1 TABLET ORAL at 10:02

## 2024-02-27 RX ADMIN — ENOXAPARIN SODIUM 40 MG: 100 INJECTION SUBCUTANEOUS at 06:02

## 2024-02-27 RX ADMIN — GABAPENTIN 300 MG: 300 CAPSULE ORAL at 10:02

## 2024-02-27 RX ADMIN — MIDODRINE HYDROCHLORIDE 10 MG: 5 TABLET ORAL at 02:02

## 2024-02-27 RX ADMIN — LINEZOLID 600 MG: 600 INJECTION, SOLUTION INTRAVENOUS at 04:02
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
## 2024-02-27 NOTE — PLAN OF CARE
Sedation discontinued. Placed on T-Collar. Tube feeds. Antibiotics. Wound care RN changed all dressing to wounds and pics taken. Plan to transfer to main campus if bed becomes available./
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
## 2024-02-27 NOTE — PLAN OF CARE
Plan of care reviewed. Patient placed on vent at HS per order. Levophed titrated for a goal SBP of 90.   Transfer center updated on patient's status.     Problem: Infection  Goal: Absence of Infection Signs and Symptoms  Outcome: Ongoing, Progressing  Intervention: Prevent or Manage Infection  Flowsheets (Taken 2/27/2024 0552)  Fever Reduction/Comfort Measures: lightweight bedding  Infection Management: aseptic technique maintained  Isolation Precautions:   precautions initiated   contact     Problem: Communication Impairment (Mechanical Ventilation, Invasive)  Goal: Effective Communication  Outcome: Ongoing, Progressing  Intervention: Ensure Effective Communication  Flowsheets (Taken 2/27/2024 0552)  Communication Enhancement Strategies: nonverbal strategies used     Problem: Device-Related Complication Risk (Mechanical Ventilation, Invasive)  Goal: Optimal Device Function  Outcome: Ongoing, Progressing  Intervention: Optimize Device Care and Function  Flowsheets (Taken 2/27/2024 0552)  Aspiration Precautions: respiratory status monitored  Airway Safety Measures:   mask valve resuscitator at bedside   manual resuscitator/mask at bedside   suction at bedside   oxygen flowmeter at bedside     Problem: Inability to Wean (Mechanical Ventilation, Invasive)  Goal: Mechanical Ventilation Liberation  Outcome: Ongoing, Progressing  Intervention: Promote Extubation and Mechanical Ventilation Liberation  Flowsheets (Taken 2/27/2024 0552)  Sleep/Rest Enhancement: relaxation techniques promoted  Environmental Support: calm environment promoted  Medication Review/Management:   medications reviewed   high-risk medications identified     Problem: Skin Injury Risk Increased  Goal: Skin Health and Integrity  Outcome: Ongoing, Progressing  Intervention: Optimize Skin Protection  Flowsheets (Taken 2/27/2024 0552)  Pressure Reduction Techniques: weight shift assistance provided  Pressure Reduction Devices:   heel offloading device  utilized   positioning supports utilized   pressure-redistributing mattress utilized   foam padding utilized  Skin Protection:   adhesive use limited   incontinence pads utilized   tubing/devices free from skin contact  Head of Bed (HOB) Positioning: HOB at 30-45 degrees  Intervention: Promote and Optimize Oral Intake  Flowsheets (Taken 2/27/2024 0552)  Oral Nutrition Promotion: safe use of adaptive equipment encouraged     Problem: Impaired Wound Healing  Goal: Optimal Wound Healing  Outcome: Ongoing, Progressing  Intervention: Promote Wound Healing  Flowsheets (Taken 2/27/2024 0552)  Oral Nutrition Promotion: safe use of adaptive equipment encouraged  Sleep/Rest Enhancement: relaxation techniques promoted  Activity Management: Patient unable to perform activities  Pain Management Interventions:   care clustered   pillow support provided   relaxation techniques promoted   position adjusted

## 2024-02-27 NOTE — SUBJECTIVE & OBJECTIVE
Interval History: no acute events         Objective:     Vital Signs (Most Recent):  Temp: 97 °F (36.1 °C) (02/27/24 0931)  Pulse: 96 (02/27/24 0931)  Resp: (!) 25 (02/27/24 0931)  BP: 111/61 (02/27/24 0730)  SpO2: 98 % (02/27/24 0931) Vital Signs (24h Range):  Temp:  [96.8 °F (36 °C)-97.9 °F (36.6 °C)] 97 °F (36.1 °C)  Pulse:  [] 96  Resp:  [11-36] 25  SpO2:  [98 %-100 %] 98 %  BP: ()/(50-75) 111/61     Weight: 54.9 kg (121 lb)  Body mass index is 17.87 kg/m².      Intake/Output Summary (Last 24 hours) at 2/27/2024 0939  Last data filed at 2/27/2024 0715  Gross per 24 hour   Intake 2330.08 ml   Output 1430 ml   Net 900.08 ml        Physical Exam  Vitals and nursing note reviewed.   Constitutional:       Appearance: He is ill-appearing.   HENT:      Head: Normocephalic and atraumatic.   Eyes:      General:         Right eye: No discharge.         Left eye: No discharge.   Cardiovascular:      Rate and Rhythm: Normal rate and regular rhythm.      Heart sounds: No murmur heard.  Pulmonary:      Effort: No respiratory distress.      Breath sounds: No wheezing or rales.   Abdominal:      General: There is no distension.      Palpations: Abdomen is soft.   Musculoskeletal:         General: Deformity present.      Cervical back: Neck supple. No rigidity.   Neurological:      Comments: Will nod            Review of Systems   Reason unable to perform ROS: unable to give.       Vents:  Vent Mode: Spont (02/27/24 0931)  Ventilator Initiated: Yes (02/21/24 1631)  Set Rate: 20 BPM (02/27/24 0744)  Vt Set: 360 mL (02/27/24 0744)  Pressure Support: 7 cmH20 (02/27/24 0931)  PEEP/CPAP: 5 cmH20 (02/27/24 0931)  Oxygen Concentration (%): 28 (02/27/24 0931)  Peak Airway Pressure: 13 cmH20 (02/27/24 0931)  Plateau Pressure: 18 cmH20 (02/27/24 0931)  Total Ve: 4.72 L/m (02/27/24 0931)  Negative Inspiratory Force (cm H2O): 0 (02/27/24 0931)  F/VT Ratio<105 (RSBI): (!) 38.88 (02/27/24 0910)    Lines/Drains/Airways        Central Venous Catheter Line  Duration             Percutaneous Central Line Insertion/Assessment - Triple Lumen  02/21/24 1754 Femoral Vein Right;Femoral Right 5 days              Drain  Duration                  Colostomy  LLQ -- days         Urethral Catheter 02/21/24 2330 Temperature probe 5 days         NG/OG Tube 02/23/24 1459 Right nostril 3 days              Airway  Duration             Adult Surgical Airway 05/25/23 1135 Shiley Cuffed 8.0 / 85 mm 277 days                    Significant Labs:    CBC/Anemia Profile:  Recent Labs   Lab 02/26/24  0414 02/27/24  0410   WBC 19.78* 18.78*  18.78*   HGB 9.1* 8.8*  8.8*   HCT 28.5* 28.0*  28.0*   * 642*  642*   MCV 93 93  93   RDW 16.1* 15.9*  15.9*        Chemistries:  Recent Labs   Lab 02/26/24 0414 02/27/24  0410    138   K 4.4 4.0    106   CO2 21* 23   BUN 22* 28*   CREATININE 0.7 0.7   CALCIUM 7.3* 8.1*   ALBUMIN  --  1.2*   PROT  --  6.2   BILITOT  --  0.2   ALKPHOS  --  137*   ALT  --  13   AST  --  30   MG 1.9 1.4*   PHOS  --  3.3       All pertinent labs within the past 24 hours have been reviewed.    Significant Imaging:  I have reviewed all pertinent imaging results/findings within the past 24 hours.

## 2024-02-27 NOTE — PLAN OF CARE
Nutrition Recommendations 2/27/24:  1. If Enteral nutrition warranted, recommend modify TF to Impact Peptide 1.5, goal rate of 40 mL/hr  - Formula will provide 1440 kcal/day (98% EEN), 90 g protein/day (100% EPN), 739 mL/day free formula water/day (50% fluid needs)   - 120 mL q4h free water flushes (720 mL/day) =from formula + FWF = 1459 mL/day (100% fluid needs), per MD/NP   - Monitor Mg, Na, K+, Phos, and Glu, correct as indicated.   2. If PO diet warranted, recommend High protein diet, Texture per SLP recommendations   3. Recommend pt continues to receive Francisco BID via tube feeding  4. Recommend pt receives daily multivitamin, zinc and vitamin C supplementation  5. Recommend Banatrol (Banana flakes) TID to assist with diarrhea or BID for loose stools   6. Daily weights     Goals:   1. Pt will be initiated onto EN within 24-48 hrs (Resolved)   2. Pt will consume > 50% of EEN and EPN prior to RD follow up  3. Pt will consume and tolerate Francisco prior to RD follow up (Meeting)   4. If pt diet advanced to PO diet, will be to safest texture prior to RD follow up   5. Pt will receive MTV, zinc and vitamin C prior to RD follow up   6. Pt's diarrhea/loose stools will be improved prior to RD follow up     Felicia Luna, Registration Eligible, Provisional LDN

## 2024-02-27 NOTE — ASSESSMENT & PLAN NOTE
Sources of infection include: wounds, urine and lungs  S/p 1 unit PRBC 2/24  Levophed initiated to keep MAP > 65 mmHg   Wound source not controlled at this time- surgery and ortho consulted- recommend transfer   Infectious work up in progress- UA grossly abnormal with cx + presumed proteus   CT chest/abd/pelvis reviewed  Antibiotics per ID  General surgery and orthopedics consulted to evaluate wounds for debridement and/or amputation- both have advised for transfer to tertiary center given complexity of surgery and post-op wound care. Transfer request placed   Additional work up and treatments as indicated     2/26 -cont antibiotics   ID following   Off pressors   From [facility] notes on midodrine 10tid, will start 5 tid    2/27 see pressure ulcers  Cont wean levo   Back on midodrine   Patient here with Pseudomonas and Proteus both in his respiratory culture as well as his urine.

## 2024-02-27 NOTE — CARE UPDATE
Per Jeromy SMYTH directive - patient to be transitioned to Trach Collar, settings to meet patient needs and patient to be left on as tolerated.

## 2024-02-27 NOTE — PLAN OF CARE
[facility] - Intensive Care (Hospital)  Discharge Reassessment    Primary Care Provider: No, Primary Doctor    Expected Discharge Date:     Reassessment (most recent)       Discharge Reassessment - 02/27/24 1022          Discharge Reassessment    Assessment Type Discharge Planning Reassessment     Did the patient's condition or plan change since previous assessment? No     Discharge Plan discussed with: Parent(s)     Communicated ANDRÉS with patient/caregiver Date not available/Unable to determine     Discharge Plan A Hospital Transfer     Discharge Plan B Long-term acute care facility (LTAC)     DME Needed Upon Discharge  none     Transition of Care Barriers None     Why the patient remains in the hospital Requires continued medical care                   Transfer request for HLOC pending acceptance. [facility] following. Tolerating trach collar. Requiring Levophed.

## 2024-02-27 NOTE — ASSESSMENT & PLAN NOTE
PS and trach collar if able  Not on chronic vent just trach     2/27  Patient tolerated trach collar all day yesterday.  Placed on pressure support this morning and we will transition to trach collar.

## 2024-02-27 NOTE — ASSESSMENT & PLAN NOTE
>>ASSESSMENT AND PLAN FOR PRESSURE ULCERS OF SKIN OF MULTIPLE TOPOGRAPHIC SITES WRITTEN ON 2/27/2024 10:09 AM BY ANUM NAVA MD    Several large, unstageable wounds present  Optimize nutrition as able to promote wound healing- notably albumin is 1.4  Tube feeds and nutritional supplements as able   Surgery consult placed - discussed with Dr. Dale     Sacral spine  Full thickness tissue loss with exposed bone, tendon, or muscle. Often includes undermining and tunneling. May extend into muscle and/or supporting structures.   Appearance: Bone;Muscle;Granulating;Red;Moist;Black        Ischial tuberosity; Left and right  Full thickness tissue loss with exposed bone, tendon, or muscle. Often includes undermining and tunneling. May extend into muscle and/or supporting structures  Thoracic spine  Full thickness tissue loss with exposed bone, tendon, or muscle. Often includes undermining and tunneling. May extend into muscle and/or supporting structures.  Right upper Back Full thickness tissue loss with exposed bone, tendon, or muscle. Often includes undermining and tunneling. May extend into muscle and/or supporting structures.  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 Need amputations   Called mother to discuss  Consider  palliative care consult     2/27   Transfer center discussed with [facility] no accepting MD's , they reviewed  chart   assisted in getting records   [facility] where he arrested. From there he was transferred to [facility] in [place] ([facility]) for a HLOC. They were the only available hospital with an open ICU bed. From there, he was admitted to [facility] in [place] for a couple days before being admitted here. I've reached out to the facility liaison to get any records they may have from [place].   Records from [place] patient was not a surgical reconstruction candidate.  To continue multivitamins zinc and vitamin-C and optimize nutrition.  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  During that time he had Enterococcus sacral area as well as Acinetobacter.  Also had Pseudomonas.  In his urine he had Klebsiella blood Providencia as well as sputum.  At this time continue wound care as well as antibiotics per Infectious Disease.  We will continue support.  I did call mother yesterday and ask her to come to the hospital to talk about current situation.  Consider palliative consult , Patient now nodding

## 2024-02-27 NOTE — ASSESSMENT & PLAN NOTE
Several large, unstageable wounds present  Optimize nutrition as able to promote wound healing- notably albumin is 1.4  Tube feeds and nutritional supplements as able   Surgery consult placed - discussed with Dr. Dale     Sacral spine  Full thickness tissue loss with exposed bone, tendon, or muscle. Often includes undermining and tunneling. May extend into muscle and/or supporting structures.   Appearance: Bone;Muscle;Granulating;Red;Moist;Black        Ischial tuberosity; Left and right  Full thickness tissue loss with exposed bone, tendon, or muscle. Often includes undermining and tunneling. May extend into muscle and/or supporting structures  Thoracic spine  Full thickness tissue loss with exposed bone, tendon, or muscle. Often includes undermining and tunneling. May extend into muscle and/or supporting structures.  Right upper Back Full thickness tissue loss with exposed bone, tendon, or muscle. Often includes undermining and tunneling. May extend into muscle and/or supporting structures.  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 Need amputations   Called mother to discuss  Consider palliative care consult     2/27   Transfer center discussed with [facility] no accepting MD's , they reviewed  chart   assisted in  getting records   [facility] where he arrested. From there he was transferred to [facility] in [place] ([facility]) for a HLOC. They were the only available hospital with an open ICU bed. From there, he was admitted to [facility] in [place] for a couple days before being admitted here. I've reached out to the facility liaison to get any records they may have from [place].   Records from [place] patient was not a surgical reconstruction candidate.  To continue multivitamins zinc and vitamin-C and optimize nutrition.  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  During that time he had Enterococcus sacral area as well as Acinetobacter.  Also had Pseudomonas.  In his urine he had Klebsiella blood Providencia as well as sputum.  At this time continue wound care as well as antibiotics per Infectious Disease.  We will continue support.  I did call mother yesterday and ask her to come to the hospital to talk about current situation.  Consider palliative consult , Patient now nodding

## 2024-02-27 NOTE — ASSESSMENT & PLAN NOTE
Placed on ventilator upon admission 2/2 respiratory distress  Vent settings reviewed, no adjustments made  VAP prevention bundle  Antibiotics   Critical illness ongoing with uncontrolled source- precludes SAT/SBT trials at this time    2/27 see pressure ulcers

## 2024-02-28 LAB
ANION GAP SERPL CALC-SCNC: 9 MMOL/L (ref 8–16)
BASOPHILS # BLD AUTO: 0.08 K/UL (ref 0–0.2)
BASOPHILS NFR BLD: 0.5 % (ref 0–1.9)
BUN SERPL-MCNC: 33 MG/DL (ref 6–20)
CALCIUM SERPL-MCNC: 8.1 MG/DL (ref 8.7–10.5)
CHLORIDE SERPL-SCNC: 105 MMOL/L (ref 95–110)
CO2 SERPL-SCNC: 23 MMOL/L (ref 23–29)
CORTIS SERPL-MCNC: 12.3 UG/DL
CREAT SERPL-MCNC: 0.6 MG/DL (ref 0.5–1.4)
DIFFERENTIAL METHOD BLD: ABNORMAL
EOSINOPHIL # BLD AUTO: 0.3 K/UL (ref 0–0.5)
EOSINOPHIL NFR BLD: 1.6 % (ref 0–8)
ERYTHROCYTE [DISTWIDTH] IN BLOOD BY AUTOMATED COUNT: 15.9 % (ref 11.5–14.5)
EST. GFR  (NO RACE VARIABLE): >60 ML/MIN/1.73 M^2
GLUCOSE SERPL-MCNC: 65 MG/DL (ref 70–110)
HCT VFR BLD AUTO: 27.7 % (ref 40–54)
HGB BLD-MCNC: 9 G/DL (ref 14–18)
IMM GRANULOCYTES # BLD AUTO: 0.39 K/UL (ref 0–0.04)
IMM GRANULOCYTES NFR BLD AUTO: 2.3 % (ref 0–0.5)
LYMPHOCYTES # BLD AUTO: 3.1 K/UL (ref 1–4.8)
LYMPHOCYTES NFR BLD: 18.4 % (ref 18–48)
MAGNESIUM SERPL-MCNC: 2 MG/DL (ref 1.6–2.6)
MCH RBC QN AUTO: 29.8 PG (ref 27–31)
MCHC RBC AUTO-ENTMCNC: 32.5 G/DL (ref 32–36)
MCV RBC AUTO: 92 FL (ref 82–98)
MONOCYTES # BLD AUTO: 1.5 K/UL (ref 0.3–1)
MONOCYTES NFR BLD: 9 % (ref 4–15)
NEUTROPHILS # BLD AUTO: 11.5 K/UL (ref 1.8–7.7)
NEUTROPHILS NFR BLD: 68.2 % (ref 38–73)
NRBC BLD-RTO: 0 /100 WBC
PHOSPHATE SERPL-MCNC: 3.5 MG/DL (ref 2.7–4.5)
PLATELET # BLD AUTO: 607 K/UL (ref 150–450)
PMV BLD AUTO: 8.6 FL (ref 9.2–12.9)
POCT GLUCOSE: 115 MG/DL (ref 70–110)
POCT GLUCOSE: 70 MG/DL (ref 70–110)
POCT GLUCOSE: 76 MG/DL (ref 70–110)
POCT GLUCOSE: 85 MG/DL (ref 70–110)
POCT GLUCOSE: 92 MG/DL (ref 70–110)
POTASSIUM SERPL-SCNC: 4.1 MMOL/L (ref 3.5–5.1)
RBC # BLD AUTO: 3.02 M/UL (ref 4.6–6.2)
SODIUM SERPL-SCNC: 137 MMOL/L (ref 136–145)
WBC # BLD AUTO: 16.8 K/UL (ref 3.9–12.7)

## 2024-02-28 PROCEDURE — 94761 N-INVAS EAR/PLS OXIMETRY MLT: CPT

## 2024-02-28 PROCEDURE — S5010 5% DEXTROSE AND 0.45% SALINE: HCPCS | Performed by: INTERNAL MEDICINE

## 2024-02-28 PROCEDURE — 83735 ASSAY OF MAGNESIUM: CPT | Performed by: INTERNAL MEDICINE

## 2024-02-28 PROCEDURE — 63600175 PHARM REV CODE 636 W HCPCS: Performed by: INTERNAL MEDICINE

## 2024-02-28 PROCEDURE — 80048 BASIC METABOLIC PNL TOTAL CA: CPT | Performed by: INTERNAL MEDICINE

## 2024-02-28 PROCEDURE — 25000003 PHARM REV CODE 250: Performed by: INTERNAL MEDICINE

## 2024-02-28 PROCEDURE — 99900035 HC TECH TIME PER 15 MIN (STAT)

## 2024-02-28 PROCEDURE — 20000000 HC ICU ROOM

## 2024-02-28 PROCEDURE — 85025 COMPLETE CBC W/AUTO DIFF WBC: CPT | Performed by: INTERNAL MEDICINE

## 2024-02-28 PROCEDURE — 82533 TOTAL CORTISOL: CPT | Performed by: INTERNAL MEDICINE

## 2024-02-28 PROCEDURE — 99900026 HC AIRWAY MAINTENANCE (STAT)

## 2024-02-28 PROCEDURE — 63600175 PHARM REV CODE 636 W HCPCS: Performed by: NURSE PRACTITIONER

## 2024-02-28 PROCEDURE — 27100171 HC OXYGEN HIGH FLOW UP TO 24 HOURS

## 2024-02-28 PROCEDURE — 25000003 PHARM REV CODE 250: Performed by: NURSE PRACTITIONER

## 2024-02-28 PROCEDURE — 25000003 PHARM REV CODE 250: Performed by: SPECIALIST

## 2024-02-28 PROCEDURE — 84100 ASSAY OF PHOSPHORUS: CPT | Performed by: INTERNAL MEDICINE

## 2024-02-28 PROCEDURE — 99232 SBSQ HOSP IP/OBS MODERATE 35: CPT | Mod: ,,, | Performed by: STUDENT IN AN ORGANIZED HEALTH CARE EDUCATION/TRAINING PROGRAM

## 2024-02-28 PROCEDURE — 27000221 HC OXYGEN, UP TO 24 HOURS

## 2024-02-28 PROCEDURE — 27000207 HC ISOLATION

## 2024-02-28 RX ORDER — DEXTROSE MONOHYDRATE AND SODIUM CHLORIDE 5; .45 G/100ML; G/100ML
INJECTION, SOLUTION INTRAVENOUS CONTINUOUS
Status: DISCONTINUED | OUTPATIENT
Start: 2024-02-28 | End: 2024-03-07 | Stop reason: HOSPADM

## 2024-02-28 RX ADMIN — DEXTROSE AND SODIUM CHLORIDE: 5; 450 INJECTION, SOLUTION INTRAVENOUS at 07:02

## 2024-02-28 RX ADMIN — FAMOTIDINE 20 MG: 40 POWDER, FOR SUSPENSION ORAL at 09:02

## 2024-02-28 RX ADMIN — GABAPENTIN 300 MG: 300 CAPSULE ORAL at 02:02

## 2024-02-28 RX ADMIN — CHLORHEXIDINE GLUCONATE 0.12% ORAL RINSE 15 ML: 1.2 LIQUID ORAL at 09:02

## 2024-02-28 RX ADMIN — MEROPENEM 2 G: 1 INJECTION INTRAVENOUS at 02:02

## 2024-02-28 RX ADMIN — FOLIC ACID 1 MG: 1 TABLET ORAL at 08:02

## 2024-02-28 RX ADMIN — MINOCYCLINE HYDROCHLORIDE 100 MG: 50 CAPSULE ORAL at 08:02

## 2024-02-28 RX ADMIN — MEROPENEM 2 G: 1 INJECTION INTRAVENOUS at 09:02

## 2024-02-28 RX ADMIN — LEVETIRACETAM 750 MG: 100 SOLUTION ORAL at 09:02

## 2024-02-28 RX ADMIN — GABAPENTIN 300 MG: 300 CAPSULE ORAL at 09:02

## 2024-02-28 RX ADMIN — MIDODRINE HYDROCHLORIDE 10 MG: 5 TABLET ORAL at 02:02

## 2024-02-28 RX ADMIN — MINOCYCLINE HYDROCHLORIDE 100 MG: 50 CAPSULE ORAL at 09:02

## 2024-02-28 RX ADMIN — MIDODRINE HYDROCHLORIDE 10 MG: 5 TABLET ORAL at 09:02

## 2024-02-28 RX ADMIN — FAMOTIDINE 20 MG: 40 POWDER, FOR SUSPENSION ORAL at 08:02

## 2024-02-28 RX ADMIN — LINEZOLID 600 MG: 600 INJECTION, SOLUTION INTRAVENOUS at 05:02

## 2024-02-28 RX ADMIN — ENOXAPARIN SODIUM 40 MG: 100 INJECTION SUBCUTANEOUS at 05:02

## 2024-02-28 RX ADMIN — MEROPENEM 2 G: 1 INJECTION INTRAVENOUS at 06:02

## 2024-02-28 RX ADMIN — LEVETIRACETAM 750 MG: 100 SOLUTION ORAL at 08:02

## 2024-02-28 RX ADMIN — ALPRAZOLAM 0.5 MG: 0.5 TABLET ORAL at 05:02

## 2024-02-28 RX ADMIN — CHLORHEXIDINE GLUCONATE 0.12% ORAL RINSE 15 ML: 1.2 LIQUID ORAL at 08:02

## 2024-02-28 RX ADMIN — GABAPENTIN 300 MG: 300 CAPSULE ORAL at 08:02

## 2024-02-28 RX ADMIN — MIDODRINE HYDROCHLORIDE 10 MG: 5 TABLET ORAL at 08:02

## 2024-02-28 RX ADMIN — LEVOTHYROXINE SODIUM 100 MCG: 100 TABLET ORAL at 05:02

## 2024-02-28 NOTE — ASSESSMENT & PLAN NOTE
Patient was on Synthroid at [place].  Did check TSH is elevated.  We will resume.    2/28 started back on home synthroid  With hypoglycemia and some hypothermia check cortisol
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
## 2024-02-28 NOTE — ASSESSMENT & PLAN NOTE
>>ASSESSMENT AND PLAN FOR PRESSURE ULCERS OF SKIN OF MULTIPLE TOPOGRAPHIC SITES WRITTEN ON 2/28/2024  8:48 AM BY ANUM NAVA MD    Several large, unstageable wounds present  Optimize nutrition as able to promote wound healing- notably albumin is 1.4  Tube feeds and nutritional supplements as able   Surgery consult placed - discussed with Dr. Dale     Sacral spine  Full thickness tissue loss with exposed bone, tendon, or muscle. Often includes undermining and tunneling. May extend into muscle and/or supporting structures.   Appearance: Bone;Muscle;Granulating;Red;Moist;Black        Ischial tuberosity; Left and right  Full thickness tissue loss with exposed bone, tendon, or muscle. Often includes undermining and tunneling. May extend into muscle and/or supporting structures  Thoracic spine  Full thickness tissue loss with exposed bone, tendon, or muscle. Often includes undermining and tunneling. May extend into muscle and/or supporting structures.  Right upper Back Full thickness tissue loss with exposed bone, tendon, or muscle. Often includes undermining and tunneling. May extend into muscle and/or supporting structures.  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 Need amputations   Called mother to discuss  Consider  palliative care consult     2/27   Transfer center discussed with [facility] no accepting MD's , they reviewed  chart   assisted in getting records   [facility] where he arrested. From there he was transferred to [facility] in [place] ([facility]) for a HLOC. They were the only available hospital with an open ICU bed. From there, he was admitted to [facility] in [place] for a couple days before being admitted here. I've reached out to the facility liaison to get any records they may have from [place].   Records from [place] patient was not a surgical reconstruction candidate.  To continue multivitamins zinc and vitamin-C and optimize nutrition.  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  During that time he had Enterococcus sacral area as well as Acinetobacter.  Also had Pseudomonas.  In his urine he had Klebsiella blood Providencia as well as sputum.  At this time continue wound care as well as antibiotics per Infectious Disease.  We will continue support.  I did call mother yesterday and ask her to come to the hospital to talk about current situation.  Consider palliative consult , Patient now nodding     2/28 cont wean levo   Wound care  Antimicrobials per ID

## 2024-02-28 NOTE — PLAN OF CARE
Pt with trach to trach collar. XOCHILT pt AAOx. GCS 8. VSS w/ SBP maintained >90 on levo at 0.04. Afebrile. SR on monitor. Tolerating trach collar well. Shen in place; 1225mL UOP this shift. Ileostomy bag in place; 75mL fecal output this shift. Accuchecks Q4; no coverage needed.     Pt resting comfortably in bed with side rails up x3; locked and lowered with alarm set. Monitor alarms on and audible.

## 2024-02-28 NOTE — ASSESSMENT & PLAN NOTE
>>ASSESSMENT AND PLAN FOR PRESSURE ULCERS OF SKIN OF MULTIPLE TOPOGRAPHIC SITES WRITTEN ON 2/28/2024  3:23 PM BY CHERRY DC DO    Will continue wound care   Guarded prognosis

## 2024-02-28 NOTE — ASSESSMENT & PLAN NOTE
Will follow critical care team.  Recent cultures at [facility] reviewed -    2/12-  Specimen: Wound - BACK  Component 10 d ago   Aspirate or Abscess Culture Multiple organisms present, predominant potential pathogen(s):   Aspirate or Abscess Culture 4+ Klebsiella pneumoniae Abnormal    Aspirate or Abscess Culture 3+ Enterococcus faecium Abnormal    01/13-  1 mo ago Comments    TISSUE CULTURE 1+ Enterococcus faecium Abnormal  For susceptibility results, refer to culture # - [id number]   TISSUE CULTURE Scant (< 1+) growth Acinetobacter baumanii/nosocomialis group Abnormal  For susceptibility results, refer to culture # - [id number]     The isolate done 02/12- of Klebsiella is resistant to Cefepime and the enterococcus is daptomycin resistant -will use Zyvox/Meropenem    Guarded prognosis -  Will use new cultures to guide regime -follow blood ,local wound cultures ,resp culture     02/23- reviewed cultures again with Pharmacy - will add Minocycline for previous Acinetobacter ,continue Zyvox/Meropenem   Will follow new cultures     02/24- will continue meropenem./zyvox  Follow final cultures    2/28- continue meropenem, Zyvox, and minocycline for total 6 week course from negative blood cx collected 2/27; EOC 4/9/24    ID will follow peripherally

## 2024-02-28 NOTE — SUBJECTIVE & OBJECTIVE
Interval History: At this time appears to be no surgical options for multiple wounds. Necrotic tissue precludes collection of new cultures. Will plan to treat chronic osteo with 6 weeks of current antibiotic regimen. Will need drug toxicity monitoring.     Review of Systems   Unable to perform ROS: Other     Objective:     Vital Signs (Most Recent):  Temp: 99.5 °F (37.5 °C) (02/28/24 1505)  Pulse: 81 (02/28/24 1505)  Resp: (!) 28 (02/28/24 1505)  BP: (!) 94/55 (02/28/24 1505)  SpO2: 95 % (02/28/24 1505) Vital Signs (24h Range):  Temp:  [95 °F (35 °C)-100.4 °F (38 °C)] 99.5 °F (37.5 °C)  Pulse:  [] 81  Resp:  [12-32] 28  SpO2:  [93 %-100 %] 95 %  BP: ()/(40-73) 94/55     Weight: 54.9 kg (121 lb)  Body mass index is 17.87 kg/m².    Estimated Creatinine Clearance: 143.6 mL/min (based on SCr of 0.6 mg/dL).     Physical Exam  Constitutional:       General: He is not in acute distress.     Appearance: Normal appearance. He is ill-appearing.   Cardiovascular:      Rate and Rhythm: Normal rate and regular rhythm.      Pulses: Normal pulses.      Heart sounds: Normal heart sounds. No murmur heard.     No friction rub. No gallop.   Pulmonary:      Effort: Pulmonary effort is normal. No respiratory distress.      Breath sounds: Normal breath sounds.      Comments: Trach collar  Abdominal:      General: Abdomen is flat. Bowel sounds are normal. There is no distension.      Palpations: Abdomen is soft.      Tenderness: There is no abdominal tenderness.   Skin:     General: Skin is warm and dry.      Comments: Necrosis of both feet, left UE    Neurological:      Mental Status: He is alert. He is disoriented.   Psychiatric:         Behavior: Behavior normal.          Significant Labs: Blood Culture:   Recent Labs   Lab 02/21/24  1144 02/21/24  1145 02/27/24  1848 02/27/24  1946   LABBLOO No growth after 5 days. Gram stain werner bottle: Gram negative rods  Results called to and read back by:Ana Lilia Carver RN 02/23/2024   22:30  FUSOBACTERIUM NUCLEATUM* No Growth to date No Growth to date     CBC:   Recent Labs   Lab 02/27/24  0410 02/28/24  0504   WBC 18.78*  18.78* 16.80*   HGB 8.8*  8.8* 9.0*   HCT 28.0*  28.0* 27.7*   *  642* 607*     CMP:   Recent Labs   Lab 02/27/24  0410 02/28/24  0504    137   K 4.0 4.1    105   CO2 23 23   GLU 74 65*   BUN 28* 33*   CREATININE 0.7 0.6   CALCIUM 8.1* 8.1*   PROT 6.2  --    ALBUMIN 1.2*  --    BILITOT 0.2  --    ALKPHOS 137*  --    AST 30  --    ALT 13  --    ANIONGAP 9 9     Microbiology Results (last 7 days)       Procedure Component Value Units Date/Time    Blood culture [[id number]] Collected: 02/27/24 1848    Order Status: Completed Specimen: Blood from Peripheral, Forearm, Right Updated: 02/28/24 0915     Blood Culture, Routine No Growth to date    Blood culture [[id number]] Collected: 02/27/24 1946    Order Status: Completed Specimen: Blood from Peripheral, Forearm, Right Updated: 02/28/24 0915     Blood Culture, Routine No Growth to date    Blood culture x two cultures. Draw prior to antibiotics. [[id number]]  (Abnormal) Collected: 02/21/24 1145    Order Status: Completed Specimen: Blood from Peripheral, Upper Arm, Right Updated: 02/27/24 1110     Blood Culture, Routine Gram stain werner bottle: Gram negative rods      Results called to and read back by:Ana Lilia Carver RN 02/23/2024  22:30      FUSOBACTERIUM NUCLEATUM    Narrative:      Aerobic and anaerobic    Blood culture x two cultures. Draw prior to antibiotics. [[id number]] Collected: 02/21/24 1144    Order Status: Completed Specimen: Blood from Peripheral, Antecubital, Right Updated: 02/26/24 2222     Blood Culture, Routine No growth after 5 days.    Narrative:      Aerobic and anaerobic    Urine culture [[id number]]  (Abnormal)  (Susceptibility) Collected: 02/21/24 1242    Order Status: Completed Specimen: Urine Updated: 02/26/24 1024     Urine Culture, Routine PROTEUS MIRABILIS  >100,000 cfu/ml         PSEUDOMONAS AERUGINOSA  > 100,000 cfu/ml      Narrative:      Specimen Source->Urine    Culture, Respiratory with Gram Stain [[id number]]  (Abnormal)  (Susceptibility) Collected: 02/22/24 0026    Order Status: Completed Specimen: Respiratory from Sputum Updated: 02/26/24 0926     Respiratory Culture No S aureus isolated.      PSEUDOMONAS AERUGINOSA  Moderate        PROTEUS MIRABILIS  Moderate  Normal respiratory diana also present       Gram Stain (Respiratory) <10 epithelial cells per low power field.     Gram Stain (Respiratory) Many WBC's     Gram Stain (Respiratory) Many Gram negative rods     Gram Stain (Respiratory) Rare Gram positive cocci    Rapid Organism ID by PCR (from Blood culture) [[id number]] Collected: 02/21/24 1145    Order Status: Completed Updated: 02/23/24 2345     Enterococcus faecalis Not Detected     Enterococcus faecium Not Detected     Listeria monocytogenes Not Detected     Staphylococcus spp. Not Detected     Staphylococcus aureus Not Detected     Staphylococcus epidermidis Not Detected     Staphylococcus lugdunensis Not Detected     Streptococcus species Not Detected     Streptococcus agalactiae Not Detected     Streptococcus pneumoniae Not Detected     Streptococcus pyogenes Not Detected     Acinetobacter calcoaceticus/baumannii complex Not Detected     Bacteroides fragilis Not Detected     Enterobacterales Not Detected     Enterobacter cloacae complex Not Detected     Escherichia coli Not Detected     Klebsiella aerogenes Not Detected     Klebsiella oxytoca Not Detected     Klebsiella pneumoniae group Not Detected     Proteus Not Detected     Salmonella sp Not Detected     Serratia marcescens Not Detected     Haemophilus influenzae Not Detected     Neisseria meningtidis Not Detected     Pseudomonas aeruginosa Not Detected     Stenotrophomonas maltophilia Not Detected     Candida albicans Not Detected     Candida auris Not Detected     Candida glabrata Not Detected     Monica krusei Not  "Detected     Candida parapsilosis Not Detected     Candida tropicalis Not Detected     Cryptococcus neoformans/gattii Not Detected     CTX-M (ESBL ) Test Not Applicable     IMP (Carbapenem resistant) Test Not Applicable     KPC resistance gene (Carbapenem resistant) Test Not Applicable     mcr-1  Test Not Applicable     mec A/C  Test Not Applicable     mec A/C and MREJ (MRSA) gene Test Not Applicable     NDM (Carbapenem resistant) Test Not Applicable     OXA-48-like (Carbapenem resistant) Test Not Applicable     van A/B (VRE gene) Test Not Applicable     VIM (Carbapenem resistant) Test Not Applicable    Narrative:      Aerobic and anaerobic    Urine culture [[id number]] Collected: 02/22/24 0025    Order Status: Completed Specimen: Urine Updated: 02/23/24 2213     Urine Culture, Routine No significant growth    Narrative:      Please obtain after indwelling catheter exchanged  Specimen Source->Urine    Culture, Anaerobe [[id number]]     Order Status: No result Specimen: Wound from Sacrum     Aerobic culture [[id number]]     Order Status: No result Specimen: Wound from Sacrum           Wound Culture: No results for input(s): "LABAERO" in the last 4320 hours.  All pertinent labs within the past 24 hours have been reviewed.    Significant Imaging: I have reviewed all pertinent imaging results/findings within the past 24 hours.  "

## 2024-02-28 NOTE — ASSESSMENT & PLAN NOTE
He was recently at [facility] and this was also noted- no plans by plastic surgery at that time for flap   Currently no surgical options per multiple teams at various OSH   Based on recent cultures -will use Zyvox/meropenem/minocycline   Plan for total 6 week course of above agents   Anticipated stop date 4/9/24 (from negative blood cx)

## 2024-02-28 NOTE — SUBJECTIVE & OBJECTIVE
Interval History: some hypoglycemia   On levo 0.02  No trach collar         Objective:     Vital Signs (Most Recent):  Temp: 99.7 °F (37.6 °C) (02/28/24 0751)  Pulse: 82 (02/28/24 0751)  Resp: 20 (02/28/24 0751)  BP: (!) 86/46 (02/28/24 0751)  SpO2: 97 % (02/28/24 0751) Vital Signs (24h Range):  Temp:  [95 °F (35 °C)-100.4 °F (38 °C)] 99.7 °F (37.6 °C)  Pulse:  [] 82  Resp:  [12-29] 20  SpO2:  [93 %-100 %] 97 %  BP: ()/(44-73) 86/46     Weight: 54.9 kg (121 lb)  Body mass index is 17.87 kg/m².      Intake/Output Summary (Last 24 hours) at 2/28/2024 0839  Last data filed at 2/28/2024 0720  Gross per 24 hour   Intake 1701.33 ml   Output 2775 ml   Net -1073.67 ml        Physical Exam  Vitals and nursing note reviewed.   Constitutional:       General: He is not in acute distress.     Comments: Slight drowsy   HENT:      Head: Normocephalic and atraumatic.   Eyes:      General:         Right eye: No discharge.         Left eye: No discharge.   Cardiovascular:      Rate and Rhythm: Normal rate and regular rhythm.      Heart sounds: No murmur heard.  Pulmonary:      Effort: No respiratory distress.      Breath sounds: No wheezing or rales.   Abdominal:      Palpations: Abdomen is soft.      Tenderness: There is no abdominal tenderness.   Musculoskeletal:         General: Deformity present.      Cervical back: Neck supple.      Comments: Extremities wrapped   Neurological:      Comments: Slight drowsy           Review of Systems   Reason unable to perform ROS: unable to give.       Vents:  Vent Mode: Spont (02/27/24 1925)  Ventilator Initiated: Yes (02/21/24 1631)  Set Rate: 20 BPM (02/27/24 0744)  Vt Set: 360 mL (02/27/24 0744)  Pressure Support: 7 cmH20 (02/27/24 1925)  PEEP/CPAP: 5 cmH20 (02/27/24 1925)  Oxygen Concentration (%): 28 (02/28/24 0720)  Peak Airway Pressure: 13 cmH20 (02/27/24 1925)  Plateau Pressure: 18 cmH20 (02/27/24 1925)  Total Ve: 0 L/m (02/27/24 1925)  Negative Inspiratory Force (cm H2O):  0 (02/27/24 1925)  F/VT Ratio<105 (RSBI): (!) 38.88 (02/27/24 0910)    Lines/Drains/Airways       Central Venous Catheter Line  Duration             Percutaneous Central Line Insertion/Assessment - Triple Lumen  02/21/24 1754 Femoral Vein Right;Femoral Right 6 days              Drain  Duration                  Colostomy  LLQ -- days         Urethral Catheter 02/21/24 2330 Temperature probe 6 days         NG/OG Tube 02/23/24 1459 Right nostril 4 days              Airway  Duration             Adult Surgical Airway 05/25/23 1135 Shiley Cuffed 8.0 / 85 mm 278 days                    Significant Labs:    CBC/Anemia Profile:  Recent Labs   Lab 02/27/24  0410 02/28/24  0504   WBC 18.78*  18.78* 16.80*   HGB 8.8*  8.8* 9.0*   HCT 28.0*  28.0* 27.7*   *  642* 607*   MCV 93  93 92   RDW 15.9*  15.9* 15.9*        Chemistries:  Recent Labs   Lab 02/27/24  0410 02/28/24  0504    137   K 4.0 4.1    105   CO2 23 23   BUN 28* 33*   CREATININE 0.7 0.6   CALCIUM 8.1* 8.1*   ALBUMIN 1.2*  --    PROT 6.2  --    BILITOT 0.2  --    ALKPHOS 137*  --    ALT 13  --    AST 30  --    MG 1.4* 2.0   PHOS 3.3 3.5       All pertinent labs within the past 24 hours have been reviewed.    Significant Imaging:  I have reviewed all pertinent imaging results/findings within the past 24 hours.

## 2024-02-28 NOTE — PLAN OF CARE
Pt attempting to communicate today. Continually asking for mother, unable to reach mother by phone. Levophed titrated to SBP goal. Shen catheter in place with adequate UOP. TF rate increased to goal, pt tolerating well. POC reviewed with pt. Safety precautions in place.

## 2024-02-28 NOTE — CARE UPDATE
Attempted to call pt's mother, Ann, no answer. Called pt's father, Diony, and he did answer. He reports he just got back from working and has been gone for about a month. He will be back for a few days, so he would like to come visit pt before having to leave again. Explained PM has been consulted to participate in pt's care and explained need for a family meeting to be scheduled. Diony reports he and Ann have been together for 32 years and were high school sweethearts. They have 6 sons and 1 daughter. I encouraged him to bring any of pt's siblings that may want to also participate in the meeting. Diony did mention that he knows about pt's wounds, which he has had for a while now. I explained that unfortunately the wounds are much worse than he may remember and we will have to talk about some difficult things at this time meeting, because pt's options to treat these wounds may be limited going forward. Support was offered throughout phone call. Diony plans to speak with family and call PM to update with a time they will be here tomorrow.    ORVILLE Caraballo, [facility]  Palliative Medicine  [phone]
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
## 2024-02-28 NOTE — ASSESSMENT & PLAN NOTE
Several large, unstageable wounds present  Optimize nutrition as able to promote wound healing- notably albumin is 1.4  Tube feeds and nutritional supplements as able   Surgery consult placed - discussed with Dr. Dale     Sacral spine  Full thickness tissue loss with exposed bone, tendon, or muscle. Often includes undermining and tunneling. May extend into muscle and/or supporting structures.   Appearance: Bone;Muscle;Granulating;Red;Moist;Black        Ischial tuberosity; Left and right  Full thickness tissue loss with exposed bone, tendon, or muscle. Often includes undermining and tunneling. May extend into muscle and/or supporting structures  Thoracic spine  Full thickness tissue loss with exposed bone, tendon, or muscle. Often includes undermining and tunneling. May extend into muscle and/or supporting structures.  Right upper Back Full thickness tissue loss with exposed bone, tendon, or muscle. Often includes undermining and tunneling. May extend into muscle and/or supporting structures.  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 Need amputations   Called mother to discuss  Consider palliative care consult     2/27   Transfer center discussed with [facility] no accepting MD's , they reviewed  chart   assisted in  getting records   [facility] where he arrested. From there he was transferred to [facility] in [place] ([facility]) for a HLOC. They were the only available hospital with an open ICU bed. From there, he was admitted to [facility] in [place] for a couple days before being admitted here. I've reached out to the facility liaison to get any records they may have from [place].   Records from [place] patient was not a surgical reconstruction candidate.  To continue multivitamins zinc and vitamin-C and optimize nutrition.  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  During that time he had Enterococcus sacral area as well as Acinetobacter.  Also had Pseudomonas.  In his urine he had Klebsiella blood Providencia as well as sputum.  At this time continue wound care as well as antibiotics per Infectious Disease.  We will continue support.  I did call mother yesterday and ask her to come to the hospital to talk about current situation.  Consider palliative consult , Patient now nodding     2/28 cont wean levo   Wound care  Antimicrobials per ID

## 2024-02-28 NOTE — PLAN OF CARE
POC reviewed with pt. Pt has remained on trach collar throughout shift and tolerating well. Pt was hypothermic upon arrival and bear hugger placed. Pt is now normothermic. Was able to wean levo. Pt had a hypoglycemic episode gave prn correction with success. Pt has not complained of pain this shift. Report to be given to day shift RN who will assume care.

## 2024-02-28 NOTE — ASSESSMENT & PLAN NOTE
PS and trach collar if able  Not on chronic vent just trach     2/27  Patient tolerated trach collar all day yesterday.  Placed on pressure support this morning and we will transition to trach collar.    2/28 on trach collar

## 2024-02-28 NOTE — ASSESSMENT & PLAN NOTE
Sources of infection include: wounds, urine and lungs  S/p 1 unit PRBC 2/24  Levophed initiated to keep MAP > 65 mmHg   Wound source not controlled at this time- surgery and ortho consulted- recommend transfer   Infectious work up in progress- UA grossly abnormal with cx + presumed proteus   CT chest/abd/pelvis reviewed  Antibiotics per ID  General surgery and orthopedics consulted to evaluate wounds for debridement and/or amputation- both have advised for transfer to tertiary center given complexity of surgery and post-op wound care. Transfer request placed   Additional work up and treatments as indicated     2/26 -cont antibiotics   ID following   Off pressors   From [facility] notes on midodrine 10tid, will start 5 tid    2/27 see pressure ulcers  Cont wean levo   Back on midodrine   Patient here with Pseudomonas and Proteus both in his respiratory culture as well as his urine.    2/28 no change on levo 0.02 cont try to wean   See pressure ulcers

## 2024-02-29 PROBLEM — N30.00 ACUTE CYSTITIS: Status: ACTIVE | Noted: 2024-02-29

## 2024-02-29 LAB
ANION GAP SERPL CALC-SCNC: 9 MMOL/L (ref 8–16)
BASOPHILS # BLD AUTO: 0.1 K/UL (ref 0–0.2)
BASOPHILS NFR BLD: 0.5 % (ref 0–1.9)
BUN SERPL-MCNC: 44 MG/DL (ref 6–20)
CALCIUM SERPL-MCNC: 8.5 MG/DL (ref 8.7–10.5)
CHLORIDE SERPL-SCNC: 105 MMOL/L (ref 95–110)
CO2 SERPL-SCNC: 24 MMOL/L (ref 23–29)
CREAT SERPL-MCNC: 0.6 MG/DL (ref 0.5–1.4)
DIFFERENTIAL METHOD BLD: ABNORMAL
EOSINOPHIL # BLD AUTO: 0.3 K/UL (ref 0–0.5)
EOSINOPHIL NFR BLD: 1.6 % (ref 0–8)
ERYTHROCYTE [DISTWIDTH] IN BLOOD BY AUTOMATED COUNT: 16 % (ref 11.5–14.5)
EST. GFR  (NO RACE VARIABLE): >60 ML/MIN/1.73 M^2
GLUCOSE SERPL-MCNC: 101 MG/DL (ref 70–110)
HCT VFR BLD AUTO: 29.4 % (ref 40–54)
HGB BLD-MCNC: 9.5 G/DL (ref 14–18)
IMM GRANULOCYTES # BLD AUTO: 0.41 K/UL (ref 0–0.04)
IMM GRANULOCYTES NFR BLD AUTO: 2 % (ref 0–0.5)
LYMPHOCYTES # BLD AUTO: 4.7 K/UL (ref 1–4.8)
LYMPHOCYTES NFR BLD: 22.9 % (ref 18–48)
MAGNESIUM SERPL-MCNC: 1.5 MG/DL (ref 1.6–2.6)
MCH RBC QN AUTO: 30 PG (ref 27–31)
MCHC RBC AUTO-ENTMCNC: 32.3 G/DL (ref 32–36)
MCV RBC AUTO: 93 FL (ref 82–98)
MONOCYTES # BLD AUTO: 2.4 K/UL (ref 0.3–1)
MONOCYTES NFR BLD: 11.4 % (ref 4–15)
NEUTROPHILS # BLD AUTO: 12.7 K/UL (ref 1.8–7.7)
NEUTROPHILS NFR BLD: 61.6 % (ref 38–73)
NRBC BLD-RTO: 0 /100 WBC
PHOSPHATE SERPL-MCNC: 3.3 MG/DL (ref 2.7–4.5)
PLATELET # BLD AUTO: 702 K/UL (ref 150–450)
PLATELET BLD QL SMEAR: ABNORMAL
PMV BLD AUTO: 8.3 FL (ref 9.2–12.9)
POCT GLUCOSE: 104 MG/DL (ref 70–110)
POCT GLUCOSE: 107 MG/DL (ref 70–110)
POCT GLUCOSE: 122 MG/DL (ref 70–110)
POCT GLUCOSE: 128 MG/DL (ref 70–110)
POCT GLUCOSE: 141 MG/DL (ref 70–110)
POCT GLUCOSE: 89 MG/DL (ref 70–110)
POTASSIUM SERPL-SCNC: 3.7 MMOL/L (ref 3.5–5.1)
RBC # BLD AUTO: 3.17 M/UL (ref 4.6–6.2)
SODIUM SERPL-SCNC: 138 MMOL/L (ref 136–145)
STOMATOCYTES BLD QL SMEAR: PRESENT
TARGETS BLD QL SMEAR: ABNORMAL
WBC # BLD AUTO: 20.67 K/UL (ref 3.9–12.7)

## 2024-02-29 PROCEDURE — 25000003 PHARM REV CODE 250: Performed by: INTERNAL MEDICINE

## 2024-02-29 PROCEDURE — 63600175 PHARM REV CODE 636 W HCPCS: Performed by: INTERNAL MEDICINE

## 2024-02-29 PROCEDURE — 99900035 HC TECH TIME PER 15 MIN (STAT)

## 2024-02-29 PROCEDURE — 85025 COMPLETE CBC W/AUTO DIFF WBC: CPT | Performed by: INTERNAL MEDICINE

## 2024-02-29 PROCEDURE — 63600175 PHARM REV CODE 636 W HCPCS: Performed by: NURSE PRACTITIONER

## 2024-02-29 PROCEDURE — 94761 N-INVAS EAR/PLS OXIMETRY MLT: CPT

## 2024-02-29 PROCEDURE — 20000000 HC ICU ROOM

## 2024-02-29 PROCEDURE — 99900026 HC AIRWAY MAINTENANCE (STAT)

## 2024-02-29 PROCEDURE — 25000003 PHARM REV CODE 250: Performed by: SPECIALIST

## 2024-02-29 PROCEDURE — 63600175 PHARM REV CODE 636 W HCPCS: Performed by: SPECIALIST

## 2024-02-29 PROCEDURE — 84100 ASSAY OF PHOSPHORUS: CPT | Performed by: INTERNAL MEDICINE

## 2024-02-29 PROCEDURE — 27000221 HC OXYGEN, UP TO 24 HOURS

## 2024-02-29 PROCEDURE — 25000003 PHARM REV CODE 250: Performed by: NURSE PRACTITIONER

## 2024-02-29 PROCEDURE — 27000207 HC ISOLATION

## 2024-02-29 PROCEDURE — 83735 ASSAY OF MAGNESIUM: CPT | Performed by: INTERNAL MEDICINE

## 2024-02-29 PROCEDURE — S5010 5% DEXTROSE AND 0.45% SALINE: HCPCS | Performed by: INTERNAL MEDICINE

## 2024-02-29 PROCEDURE — 80048 BASIC METABOLIC PNL TOTAL CA: CPT | Performed by: INTERNAL MEDICINE

## 2024-02-29 RX ORDER — MAGNESIUM SULFATE HEPTAHYDRATE 40 MG/ML
2 INJECTION, SOLUTION INTRAVENOUS ONCE
Status: COMPLETED | OUTPATIENT
Start: 2024-02-29 | End: 2024-02-29

## 2024-02-29 RX ORDER — NOREPINEPHRINE BITARTRATE/D5W 4MG/250ML
0-3 PLASTIC BAG, INJECTION (ML) INTRAVENOUS CONTINUOUS
Status: DISCONTINUED | OUTPATIENT
Start: 2024-02-29 | End: 2024-03-02

## 2024-02-29 RX ORDER — ACETAMINOPHEN 650 MG/20.3ML
650 LIQUID ORAL EVERY 6 HOURS PRN
Status: DISCONTINUED | OUTPATIENT
Start: 2024-02-29 | End: 2024-03-07 | Stop reason: HOSPADM

## 2024-02-29 RX ADMIN — MINOCYCLINE HYDROCHLORIDE 100 MG: 50 CAPSULE ORAL at 08:02

## 2024-02-29 RX ADMIN — MIDODRINE HYDROCHLORIDE 10 MG: 5 TABLET ORAL at 08:02

## 2024-02-29 RX ADMIN — LEVOTHYROXINE SODIUM 100 MCG: 100 TABLET ORAL at 06:02

## 2024-02-29 RX ADMIN — CHLORHEXIDINE GLUCONATE 0.12% ORAL RINSE 15 ML: 1.2 LIQUID ORAL at 08:02

## 2024-02-29 RX ADMIN — GABAPENTIN 300 MG: 300 CAPSULE ORAL at 08:02

## 2024-02-29 RX ADMIN — GABAPENTIN 300 MG: 300 CAPSULE ORAL at 02:02

## 2024-02-29 RX ADMIN — FAMOTIDINE 20 MG: 40 POWDER, FOR SUSPENSION ORAL at 08:02

## 2024-02-29 RX ADMIN — MAGNESIUM SULFATE HEPTAHYDRATE 2 G: 40 INJECTION, SOLUTION INTRAVENOUS at 06:02

## 2024-02-29 RX ADMIN — MIDODRINE HYDROCHLORIDE 10 MG: 5 TABLET ORAL at 02:02

## 2024-02-29 RX ADMIN — DEXTROSE AND SODIUM CHLORIDE: 5; 450 INJECTION, SOLUTION INTRAVENOUS at 03:02

## 2024-02-29 RX ADMIN — FAMOTIDINE 20 MG: 40 POWDER, FOR SUSPENSION ORAL at 09:02

## 2024-02-29 RX ADMIN — POTASSIUM CHLORIDE 40 MEQ: 29.8 INJECTION, SOLUTION INTRAVENOUS at 07:02

## 2024-02-29 RX ADMIN — ENOXAPARIN SODIUM 40 MG: 100 INJECTION SUBCUTANEOUS at 05:02

## 2024-02-29 RX ADMIN — LEVETIRACETAM 750 MG: 100 SOLUTION ORAL at 09:02

## 2024-02-29 RX ADMIN — HYDROCODONE BITARTRATE AND ACETAMINOPHEN 1 TABLET: 5; 325 TABLET ORAL at 11:02

## 2024-02-29 RX ADMIN — LEVETIRACETAM 750 MG: 100 SOLUTION ORAL at 08:02

## 2024-02-29 RX ADMIN — LINEZOLID 600 MG: 600 INJECTION, SOLUTION INTRAVENOUS at 04:02

## 2024-02-29 RX ADMIN — MEROPENEM 2 G: 1 INJECTION INTRAVENOUS at 06:02

## 2024-02-29 RX ADMIN — NOREPINEPHRINE BITARTRATE 0.03 MCG/KG/MIN: 4 INJECTION, SOLUTION INTRAVENOUS at 09:02

## 2024-02-29 RX ADMIN — MIDODRINE HYDROCHLORIDE 10 MG: 5 TABLET ORAL at 09:02

## 2024-02-29 RX ADMIN — MINOCYCLINE HYDROCHLORIDE 100 MG: 50 CAPSULE ORAL at 09:02

## 2024-02-29 RX ADMIN — FOLIC ACID 1 MG: 1 TABLET ORAL at 08:02

## 2024-02-29 RX ADMIN — GABAPENTIN 300 MG: 300 CAPSULE ORAL at 09:02

## 2024-02-29 RX ADMIN — CHLORHEXIDINE GLUCONATE 0.12% ORAL RINSE 15 ML: 1.2 LIQUID ORAL at 09:02

## 2024-02-29 RX ADMIN — MAGNESIUM SULFATE HEPTAHYDRATE 2 G: 40 INJECTION, SOLUTION INTRAVENOUS at 09:02

## 2024-02-29 RX ADMIN — MEROPENEM 2 G: 1 INJECTION INTRAVENOUS at 09:02

## 2024-02-29 RX ADMIN — MEROPENEM 2 G: 1 INJECTION INTRAVENOUS at 02:02

## 2024-02-29 RX ADMIN — LINEZOLID 600 MG: 600 INJECTION, SOLUTION INTRAVENOUS at 05:02

## 2024-02-29 NOTE — PLAN OF CARE
Patient tolerated trach collar at 8lpm.  Minimal dose of levophed at 0.03mcg/kg/min.  Wound care done at both upper extremities, sacrum, and bilateral ischial areas. Still with necrotic tissues seen.  For discussion with family regarding possible transition to palliative care.
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
## 2024-02-29 NOTE — PLAN OF CARE
[facility] - Intensive Care (Hospital)  Discharge Reassessment    Primary Care Provider: No, Primary Doctor    Expected Discharge Date:     Reassessment (most recent)       Discharge Reassessment - 02/29/24 1442          Discharge Reassessment    Assessment Type Discharge Planning Reassessment     Did the patient's condition or plan change since previous assessment? No     Communicated ANDRÉS with patient/caregiver Date not available/Unable to determine     Discharge Plan A Other   pending hospital course    DME Needed Upon Discharge  none     Transition of Care Barriers No family/friends to help     Why the patient remains in the hospital Requires continued medical care                   Discharge planning reassessment complete. Patient remains hospitalized in critical condition. Tolerating trach collar. On Levophed. Transfer request pending x 7 days. Unable to find accepting facility. Family has not visited at bedside to discuss GOC. Prognosis undetermined due to clinical status.

## 2024-02-29 NOTE — ASSESSMENT & PLAN NOTE
Sources of infection include: wounds, urine and lungs  S/p 1 unit PRBC 2/24  Levophed initiated to keep MAP > 65 mmHg   Wound source not controlled at this time- surgery and ortho consulted- recommend transfer   Infectious work up in progress- UA grossly abnormal with cx + presumed proteus   CT chest/abd/pelvis reviewed  Antibiotics per ID  General surgery and orthopedics consulted to evaluate wounds for debridement and/or amputation- both have advised for transfer to tertiary center given complexity of surgery and post-op wound care. Transfer request placed   Additional work up and treatments as indicated     2/26 -cont antibiotics   ID following   Off pressors   From [facility] notes on midodrine 10tid, will start 5 tid    2/27 see pressure ulcers  Cont wean levo   Back on midodrine   Patient here with Pseudomonas and Proteus both in his respiratory culture as well as his urine.    2/28 no change on levo 0.02 cont try to wean   See cultures                   Blood Culture, Routine FUSOBACTERIUM NUCLEATUM Abnormal

## 2024-02-29 NOTE — ASSESSMENT & PLAN NOTE
Placed on ventilator upon admission 2/2 respiratory distress  Vent settings reviewed, no adjustments made  VAP prevention bundle  Antibiotics   Critical illness ongoing with uncontrolled source- precludes SAT/SBT trials at this time    2/27 see cultures.    Respiratory Culture  Abnormal   PSEUDOMONAS AERUGINOSA  Moderate     Respiratory Culture  Abnormal   PROTEUS MIRABILIS  Moderate  Normal respiratory diana also present       Susceptibility   Pseudomonas aeruginosa Proteus mirabilis     CULTURE, RESPIRATORY CULTURE, RESPIRATORY     Amikacin <=16 mcg/mL Sensitive       Amox/K Clav'ate   >16/8 mcg/mL Resistant     Amp/Sulbactam   >16/8 mcg/mL Resistant     Ampicillin   >16 mcg/mL Resistant     Cefazolin   >16 mcg/mL Resistant     Cefepime 4 mcg/mL Sensitive <=2 mcg/mL Sensitive     Ceftriaxone   <=1 mcg/mL Sensitive     Ciprofloxacin <=1 mcg/mL Sensitive >2 mcg/mL Resistant     Ertapenem   <=0.5 mcg/mL Sensitive     Gentamicin <=4 mcg/mL Sensitive <=4 mcg/mL Sensitive     Levofloxacin <=2 mcg/mL Sensitive <=2 mcg/mL Sensitive     Meropenem <=1 mcg/mL Sensitive <=1 mcg/mL Sensitive     Piperacillin/Tazo <=16 mcg/mL Sensitive <=16 mcg/mL Sensitive     Tobramycin <=4 mcg/mL Sensitive <=4 mcg/mL Sensitive     Trimeth/Sulfa   >2/38 mcg/mL Resistant

## 2024-02-29 NOTE — CARE UPDATE
No family present at bedside today. Pt's dad did not call to f/u with PM. Attempted to call both mom and dad's phone numbers, calls went to  with no VM set up to leave a message. PM will try to reach parents again tomorrow. If unable to reach, may need to call for a welfare check on family or to try to reach pt's siblings if parents are unable/unwilling to participate in pt's are.     ORVILLE Caraballo, [facility]  Palliative Medicine  [phone]

## 2024-02-29 NOTE — ASSESSMENT & PLAN NOTE
MRI pelvis beginning of this year showing osteo with bone loss and surgical changes  Contributing to complexity of care    2/26- -ID following   Seen by surgery / wound care / and ortho  See pressure ulcers  Trying to transfer to higher level     2/29 See sepsis  Terminal wounds.   Awaiting potential transfer to [facility]  As expressed before he needs surgery, ortho and eventually plastics.

## 2024-02-29 NOTE — ASSESSMENT & PLAN NOTE
Urine Culture, Routine  Abnormal   PSEUDOMONAS AERUGINOSA  > 100,000 cfu/ml     Resulting Agency [facility]         CULTURE, URINE CULTURE, URINE   Amikacin   <=16 mcg/mL Sensitive   Amox/K Clav'ate <=8/4 mcg/mL Sensitive     Amp/Sulbactam >16/8 mcg/mL Resistant     Ampicillin >16 mcg/mL Resistant     Cefazolin >16 mcg/mL Resistant     Cefepime <=2 mcg/mL Sensitive 4 mcg/mL Sensitive   Ceftriaxone <=1 mcg/mL Sensitive     Ciprofloxacin >2 mcg/mL Resistant 2 mcg/mL Intermediate   Ertapenem <=0.5 mcg/mL Sensitive     Gentamicin <=4 mcg/mL Sensitive <=4 mcg/mL Sensitive   Levofloxacin <=2 mcg/mL Sensitive 4 mcg/mL Intermediate   Meropenem <=1 mcg/mL Sensitive <=1 mcg/mL Sensitive   Piperacillin/Tazo <=16 mcg/mL Sensitive <=16 mcg/mL Sensitive   Tobramycin <=4 mcg/mL Sensitive <=4 mcg/mL Sensitive   Trimeth/Sulfa >2/38 mcg/mL Resistant

## 2024-02-29 NOTE — PROGRESS NOTES
[facility] - Intensive Care ([facility])  Wound Care    Patient Name:  Jyoti Mayberry   MRN:  [id number]  Date: 2/29/2024  Diagnosis: Septic shock    History:     No past medical history on file.    Social History     Socioeconomic History    Marital status: Single       Precautions:     Allergies as of 02/21/2024    (No Known Allergies)       WO Assessment Details/Treatment     F/U visit with Mr. Mayberry for ongoing wound care needs. He is seen in ICU, trach collar in place, on IV pressor therapy. Patient is alert, opens eyes to voice.  Wound care was performed overnight to [facility], so left dressings undisturbed. 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  BLE with extensive dry gangrene, and scattered areas of gray intact moist boggy tissue, and few areas of full thickness tissue loss with beefy red wound beds, unchanged from prior assessment. Painted all with betadine, covered open/moist areas with xeroform, and wrapped with abd pads, then wrapped BLE with kerlix to secure.  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 Mositure barrier paste applied and wrapped with abd pad.   Turned to right side.  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 Sacral wound again with retained embedded wound vac sponge, able to remove small amount at this visit. Cleansed all with vashe and allowed to dwell, patted dry, mositure barrier applied to wound edges. Wounds filled with medihoney and aquacel extra including undermined spaces, then foam dressings applied to cover all.   Recommend ongoing care per orders, goals of care meeting expected with family once arrives to bedside as wounds are terminal.       02/29/24 1045   WOCN Assessment   WOCN Total Time (mins) 90    Visit Date 02/29/24   Visit Time 1045   Consult Type Follow Up   WOCN Speciality Wound;Ostomy   WOCN List colostomy   Wound pressure;arterial;At risk for pressure Injury   Ostomy Type Colostomy   Intervention assessed;changed;chart review;coordination of care;team conference   Teaching   (unable to patient condition)        Altered Skin Integrity 05/15/23 1411 Sacral spine Full thickness tissue loss with exposed bone, tendon, or muscle. Often includes undermining and tunneling. May extend into muscle and/or supporting structures.   Final Assessment Date/Final Assessment Time: (c)  (c)   Date First Assessed/Time First Assessed: 05/15/23 1411   Altered Skin Integrity Present on Admission - Did Patient arrive to the hospital with altered skin?: yes  Location: Sacral spine  Descript...   Wound Image    Description of Altered Skin Integrity Full thickness tissue loss with exposed bone, tendon, or muscle. Often includes undermining and tunneling. May extend into muscle and/or supporting structures.   Dressing Appearance Intact;Moist drainage   Drainage Amount Moderate   Drainage Characteristics/Odor Serosanguineous   Appearance Bone;Red;Granulating;Black;Yellow  (retained wound vac foam present)   Tissue loss description Full thickness   Black (%), Wound Tissue Color 20 %   Red (%), Wound Tissue Color 70 %   Yellow (%), Wound Tissue Color 10 %   Periwound Area Intact   Wound Edges Defined   Wound Length (cm) 10.5 cm   Wound Width (cm) 18 cm   Wound Depth (cm) 1.5 cm   Wound Volume (cm^3) 283.5 cm^3   Wound Surface Area (cm^2) 189 cm^2   Undermining (depth (cm)/location) 3cm from 10-4 o'clock   Care Cleansed with:;Wound cleanser;Applied:;Skin Barrier   Dressing Honey;Hydrofiber;Foam   Packing packing removed;packed with;honey packing;hydrofiber/alginate   Packing Inserted  3   Dressing Change Due 03/04/24        Altered Skin Integrity 06/07/23 0900 Left Ischial tuberosity Full thickness tissue loss with exposed bone,  tendon, or muscle. Often includes undermining and tunneling. May extend into muscle and/or supporting structures.   Final Assessment Date/Final Assessment Time: (c)  (c)   Date First Assessed/Time First Assessed: 06/07/23 0900   Altered Skin Integrity Present on Admission - Did Patient arrive to the hospital with altered skin?: yes  Side: Left  Location: Ischial tu...   Wound Image    Description of Altered Skin Integrity Full thickness tissue loss with exposed bone, tendon, or muscle. Often includes undermining and tunneling. May extend into muscle and/or supporting structures.   Dressing Appearance Intact;Moist drainage   Drainage Amount Moderate   Drainage Characteristics/Odor Serosanguineous   Appearance Red;Granulating;Bone   Tissue loss description Full thickness   Red (%), Wound Tissue Color 90 %   Yellow (%), Wound Tissue Color 10 %   Periwound Area Intact   Wound Edges Open   Wound Length (cm) 9 cm   Wound Width (cm) 7 cm   Wound Depth (cm) 0.5 cm   Wound Volume (cm^3) 31.5 cm^3   Wound Surface Area (cm^2) 63 cm^2   Undermining (depth (cm)/location) 1cm from 9-12 o'clock   Care Cleansed with:;Wound cleanser;Applied:;Skin Barrier   Dressing Honey;Hydrofiber;Foam   Packing packing removed;packed with;honey packing;hydrofiber/alginate   Packing Inserted  1   Dressing Change Due 03/04/24        Altered Skin Integrity 06/07/23 0900 Right Ischial tuberosity Full thickness tissue loss with exposed bone, tendon, or muscle. Often includes undermining and tunneling. May extend into muscle and/or supporting structures.   Final Assessment Date/Final Assessment Time: (c)  (c)   Date First Assessed/Time First Assessed: 06/07/23 0900   Altered Skin Integrity Present on Admission - Did Patient arrive to the hospital with altered skin?: yes  Side: Right  Location: Ischial t...   Wound Image    Description of Altered Skin Integrity Full thickness tissue loss with exposed bone, tendon, or muscle. Often includes undermining  and tunneling. May extend into muscle and/or supporting structures.   Dressing Appearance Intact;Moist drainage   Drainage Amount Moderate   Drainage Characteristics/Odor Serosanguineous   Appearance Red;Granulating;Bone   Tissue loss description Full thickness   Red (%), Wound Tissue Color 100 %   Periwound Area Intact   Wound Edges Open   Wound Length (cm) 9 cm   Wound Width (cm) 7 cm   Wound Depth (cm) 1.5 cm   Wound Volume (cm^3) 94.5 cm^3   Wound Surface Area (cm^2) 63 cm^2   Undermining (depth (cm)/location) 1.5cm from 12-5 o'clock   Care Cleansed with:;Wound cleanser;Applied:;Skin Barrier   Dressing Honey;Hydrofiber;Foam   Packing packing removed;packed with;honey packing;hydrofiber/alginate   Packing Inserted  1   Dressing Change Due 03/04/24        Altered Skin Integrity 02/22/24 Thoracic spine Full thickness tissue loss with exposed bone, tendon, or muscle. Often includes undermining and tunneling. May extend into muscle and/or supporting structures.   Date First Assessed: 02/22/24   Altered Skin Integrity Present on Admission - Did Patient arrive to the hospital with altered skin?: yes  Location: Thoracic spine  Description of Altered Skin Integrity: Full thickness tissue loss with exposed bone, te...   Wound Image    Description of Altered Skin Integrity Full thickness tissue loss with exposed bone, tendon, or muscle. Often includes undermining and tunneling. May extend into muscle and/or supporting structures.   Dressing Appearance Intact   Drainage Amount Small   Drainage Characteristics/Odor Serosanguineous   Appearance Red;Yellow;Bone   Tissue loss description Full thickness   Red (%), Wound Tissue Color 100 %   Periwound Area Intact   Wound Edges Open   Wound Length (cm) 2.5 cm   Wound Width (cm) 2.5 cm   Wound Depth (cm) 0.5 cm   Wound Volume (cm^3) 3.125 cm^3   Wound Surface Area (cm^2) 6.25 cm^2   Undermining (depth (cm)/location) 1cm at 8-11 o'clock   Care Cleansed with:;Wound  cleanser;Applied:;Skin Barrier   Dressing Honey;Hydrofiber;Foam;Changed   Packing packed with;honey packing;hydrofiber/alginate   Dressing Change Due 03/04/24        Altered Skin Integrity 02/22/24 Right upper Back Full thickness tissue loss with exposed bone, tendon, or muscle. Often includes undermining and tunneling. May extend into muscle and/or supporting structures.   Date First Assessed: 02/22/24   Altered Skin Integrity Present on Admission - Did Patient arrive to the hospital with altered skin?: yes  Side: Right  Orientation: upper  Location: Back  Description of Altered Skin Integrity: Full thickness tissue los...   Wound Image    Description of Altered Skin Integrity Full thickness tissue loss with exposed bone, tendon, or muscle. Often includes undermining and tunneling. May extend into muscle and/or supporting structures.   Dressing Appearance Intact;Moist drainage   Drainage Amount Small   Drainage Characteristics/Odor Serosanguineous   Appearance Red;Bone;Moist   Tissue loss description Full thickness   Red (%), Wound Tissue Color 50 %   Yellow (%), Wound Tissue Color 50 %   Periwound Area Intact   Wound Edges Open   Wound Length (cm) 4.5 cm   Wound Width (cm) 4 cm   Wound Depth (cm) 1.5 cm   Wound Volume (cm^3) 27 cm^3   Wound Surface Area (cm^2) 18 cm^2   Undermining (depth (cm)/location) 3cm from 8-3 o'clock   Care Cleansed with:;Wound cleanser;Applied:;Skin Barrier   Dressing Honey;Hydrofiber;Foam   Packing packed with;honey packing;hydrofiber/alginate   Packing Inserted  1   Dressing Change Due 03/04/24        Altered Skin Integrity 02/22/24 Right dorsal Hand Ulceration   Date First Assessed: 02/22/24   Altered Skin Integrity Present on Admission - Did Patient arrive to the hospital with altered skin?: yes  Side: Right  Orientation: dorsal  Location: Hand  Primary Wound Type: Ulceration   Appearance Dressing in place, unable to visualize        Altered Skin Integrity 02/22/24 Right posterior  Elbow Ulceration   Date First Assessed: 02/22/24   Altered Skin Integrity Present on Admission - Did Patient arrive to the hospital with altered skin?: yes  Side: Right  Orientation: posterior  Location: Elbow  Primary Wound Type: Ulceration   Appearance Dressing in place, unable to visualize        Altered Skin Integrity 02/22/24 Left lower;upper;anterior;posterior;medial;lateral Arm Other (comment)   Date First Assessed: 02/22/24   Altered Skin Integrity Present on Admission - Did Patient arrive to the hospital with altered skin?: yes  Side: Left  Orientation: lower;upper;anterior;posterior;medial;lateral  Location: (c) Arm  Primary Wound Type: (c...   Appearance Dressing in place, unable to visualize        Altered Skin Integrity 02/22/24 Left anterior;lower;posterior;medial;lateral;proximal;distal;upper Leg Other (comment)   Date First Assessed: 02/22/24   Altered Skin Integrity Present on Admission - Did Patient arrive to the hospital with altered skin?: yes  Side: Left  Orientation: anterior;lower;posterior;medial;lateral;proximal;distal;upper  Location: (c) Leg  Primar...   Dressing Appearance Intact   Drainage Amount Small   Drainage Characteristics/Odor Tan;Serosanguineous   Appearance Black;Necrotic;Eschar;Tan;Yellow;Red;Moist   Tissue loss description Full thickness   Black (%), Wound Tissue Color 80 %   Red (%), Wound Tissue Color 10 %   Yellow (%), Wound Tissue Color 10 %   Care Applied:;Povidone iodine   Dressing Non-adherent;Absorptive Pad;Rolled gauze   Dressing Change Due 03/04/24        Altered Skin Integrity 02/22/24 Right anterior;lower;upper;posterior;medial;lateral Leg Other (comment)   Date First Assessed: 02/22/24   Altered Skin Integrity Present on Admission - Did Patient arrive to the hospital with altered skin?: yes  Side: Right  Orientation: anterior;lower;upper;posterior;medial;lateral  Location: Leg  Primary Wound Type: (c) O...   Dressing Appearance Intact   Drainage Amount Scant    Drainage Characteristics/Odor Serosanguineous;Tan   Appearance Black;Necrotic;Eschar;Gray;Tan;Yellow;Red   Tissue loss description Full thickness   Black (%), Wound Tissue Color 80 %   Red (%), Wound Tissue Color 10 %   Yellow (%), Wound Tissue Color 10 %   Care Applied:;Povidone iodine   Dressing Non-adherent;Absorptive Pad;Rolled gauze   Dressing Change Due 03/04/24        Altered Skin Integrity 02/22/24 Penis Mucosal membrane tissue injury with history of medical device use   Date First Assessed: 02/22/24   Altered Skin Integrity Present on Admission - Did Patient arrive to the hospital with altered skin?: yes  Location: Penis  Description of Altered Skin Integrity: Mucosal membrane tissue injury with history of medical de...   Description of Altered Skin Integrity Mucosal membrane tissue injury with history of medical device use   Drainage Amount Small   Drainage Characteristics/Odor Sanguineous   Appearance Red;Yellow;Moist   Tissue loss description Full thickness   Care Cleansed with:;Wound cleanser;Applied:;Skin Barrier   Dressing Absorptive Pad   Dressing Change Due 03/01/24        Colostomy  LLQ   Placement Date: (c)    Present Prior to Hospital Arrival?: Yes  Location: LLQ   Stomal Appliance 1 piece;Clean;Dry;Intact;No Leakage    02/29/2024

## 2024-02-29 NOTE — ASSESSMENT & PLAN NOTE
Several large, unstageable wounds present  Optimize nutrition as able to promote wound healing- notably albumin is 1.4  Tube feeds and nutritional supplements as able   Surgery consult placed - discussed with Dr. Dale     Sacral spine  Full thickness tissue loss with exposed bone, tendon, or muscle. Often includes undermining and tunneling. May extend into muscle and/or supporting structures.   Appearance: Bone;Muscle;Granulating;Red;Moist;Black        Ischial tuberosity; Left and right  Full thickness tissue loss with exposed bone, tendon, or muscle. Often includes undermining and tunneling. May extend into muscle and/or supporting structures  Thoracic spine  Full thickness tissue loss with exposed bone, tendon, or muscle. Often includes undermining and tunneling. May extend into muscle and/or supporting structures.  Right upper Back Full thickness tissue loss with exposed bone, tendon, or muscle. Often includes undermining and tunneling. May extend into muscle and/or supporting structures.  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 Need amputations   Called mother to discuss  Consider palliative care consult     2/27   Transfer center discussed with [facility] no accepting MD's , they reviewed  chart   assisted in  getting records   [facility] where he arrested. From there he was transferred to [facility] in [place] ([facility]) for a HLOC. They were the only available hospital with an open ICU bed. From there, he was admitted to [facility] in [place] for a couple days before being admitted here. I've reached out to the facility liaison to get any records they may have from [place].   Records from [place] patient was not a surgical reconstruction candidate.  To continue multivitamins zinc and vitamin-C and optimize nutrition.  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  During that time he had Enterococcus sacral area as well as Acinetobacter.  Also had Pseudomonas.  In his urine he had Klebsiella blood Providencia as well as sputum.  At this time continue wound care as well as antibiotics per Infectious Disease.  We will continue support.  I did call mother yesterday and ask her to come to the hospital to talk about current situation.  Consider palliative consult , Patient now nodding     2/28 cont wean levo   Wound care  Antimicrobials per ID     2/29. See wounds above and wound care notes and pictures.   These wounds are unlikely to heal. He is terminal

## 2024-02-29 NOTE — SUBJECTIVE & OBJECTIVE
Objective:     Vital Signs (Most Recent):  Temp: 97 °F (36.1 °C) (02/29/24 1145)  Pulse: 88 (02/29/24 1145)  Resp: (!) 26 (02/29/24 1145)  BP: 110/75 (02/29/24 1130)  SpO2: 99 % (02/29/24 1145) Vital Signs (24h Range):  Temp:  [93 °F (33.9 °C)-100.2 °F (37.9 °C)] 97 °F (36.1 °C)  Pulse:  [] 88  Resp:  [13-43] 26  SpO2:  [94 %-100 %] 99 %  BP: ()/(50-84) 110/75     Weight: 49 kg (108 lb)  Body mass index is 15.95 kg/m².      Intake/Output Summary (Last 24 hours) at 2/29/2024 1240  Last data filed at 2/29/2024 1145  Gross per 24 hour   Intake 3247.47 ml   Output 3600 ml   Net -352.53 ml        Physical Exam  Vitals and nursing note reviewed.   Constitutional:       General: He is not in acute distress.     Appearance: He is ill-appearing and toxic-appearing. He is not diaphoretic.   HENT:      Head: Normocephalic.   Eyes:      Pupils: Pupils are equal, round, and reactive to light.   Pulmonary:      Effort: No respiratory distress.      Breath sounds: Rales present.   Abdominal:      Palpations: Abdomen is soft.   Neurological:      Mental Status: He is alert.           Review of Systems    Vents:  Vent Mode: Spont (02/27/24 1925)  Ventilator Initiated: Yes (02/21/24 1631)  Set Rate: 20 BPM (02/27/24 0744)  Vt Set: 360 mL (02/27/24 0744)  Pressure Support: 7 cmH20 (02/27/24 1925)  PEEP/CPAP: 5 cmH20 (02/27/24 1925)  Oxygen Concentration (%): 28 (02/29/24 0756)  Peak Airway Pressure: 13 cmH20 (02/27/24 1925)  Plateau Pressure: 18 cmH20 (02/27/24 1925)  Total Ve: 0 L/m (02/27/24 1925)  Negative Inspiratory Force (cm H2O): 0 (02/27/24 1925)  F/VT Ratio<105 (RSBI): (!) 38.88 (02/27/24 0910)    Lines/Drains/Airways       Central Venous Catheter Line  Duration             Percutaneous Central Line Insertion/Assessment - Triple Lumen  02/21/24 1754 Femoral Vein Right;Femoral Right 7 days              Drain  Duration                  Colostomy  LLQ -- days         Urethral Catheter 02/21/24 2330 Temperature  probe 7 days         NG/OG Tube 02/23/24 1459 Right nostril 5 days              Airway  Duration             Adult Surgical Airway 05/25/23 1135 Shiley Cuffed 8.0 / 85 mm 280 days                    Significant Labs:    CBC/Anemia Profile:  Recent Labs   Lab 02/28/24  0504 02/29/24  0354   WBC 16.80* 20.67*   HGB 9.0* 9.5*   HCT 27.7* 29.4*   * 702*   MCV 92 93   RDW 15.9* 16.0*        Chemistries:  Recent Labs   Lab 02/28/24  0504 02/29/24  0354    138   K 4.1 3.7    105   CO2 23 24   BUN 33* 44*   CREATININE 0.6 0.6   CALCIUM 8.1* 8.5*   MG 2.0 1.5*   PHOS 3.5 3.3       All pertinent labs within the past 24 hours have been reviewed.    Significant Imaging:  I have reviewed all pertinent imaging results/findings within the past 24 hours.

## 2024-02-29 NOTE — PLAN OF CARE
No acute events this shift. Pt remains on low dose levo, IV fluids and abx as ordered.   Electrolytes replaced as appropriate.   Tracheal suctioning provided prn, remains on supplemental O2 via trach collar.  Seen by wound care nurse this shift.   Mary hugger now off, hypothermia corrected.   Palliative care team following.

## 2024-02-29 NOTE — ASSESSMENT & PLAN NOTE
>>ASSESSMENT AND PLAN FOR PRESSURE ULCERS OF SKIN OF MULTIPLE TOPOGRAPHIC SITES WRITTEN ON 2/29/2024 12:59 PM BY SUKHI PIERCE MD    Several large, unstageable wounds present  Optimize nutrition as able to promote wound healing- notably albumin is 1.4  Tube feeds and nutritional supplements as able   Surgery consult placed - discussed with Dr. Dale     Sacral spine  Full thickness tissue loss with exposed bone, tendon, or muscle. Often includes undermining and tunneling. May extend into muscle and/or supporting structures.   Appearance: Bone;Muscle;Granulating;Red;Moist;Black        Ischial tuberosity; Left and right  Full thickness tissue loss with exposed bone, tendon, or muscle. Often includes undermining and tunneling. May extend into muscle and/or supporting structures  Thoracic spine  Full thickness tissue loss with exposed bone, tendon, or muscle. Often includes undermining and tunneling. May extend into muscle and/or supporting structures.  Right upper Back Full thickness tissue loss with exposed bone, tendon, or muscle. Often includes undermining and tunneling. May extend into muscle and/or supporting structures.  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 Need amputations   Called mother to discuss  Consider  palliative care consult     2/27   Transfer center discussed with [facility] no accepting MD's , they reviewed  chart   assisted in getting records   [place]. From there he was transferred to [facility] in [place] ([facility]) for a HLOC. They were the only available hospital with an open ICU bed. From there, he was admitted to [facility] in [place] for a couple days before being admitted here. I've reached out to the facility liaison to get any records they may have from [place].   Records from [place] patient was not a surgical reconstruction candidate.  To continue multivitamins zinc and vitamin-C and optimize nutrition.  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  During that time he had Enterococcus sacral area as well as Acinetobacter.  Also had Pseudomonas.  In his urine he had Klebsiella blood Providencia as well as sputum.  At this time continue wound care as well as antibiotics per Infectious Disease.  We will continue support.  I did call mother yesterday and ask her to come to the hospital to talk about current situation.  Consider palliative consult , Patient now nodding     2/28 cont wean levo   Wound care  Antimicrobials per ID     2/29. See wounds above and wound care notes and pictures.   These wounds are unlikely to heal. He is terminal

## 2024-03-01 LAB
ALBUMIN SERPL BCP-MCNC: 1.3 G/DL (ref 3.5–5.2)
ANION GAP SERPL CALC-SCNC: 6 MMOL/L (ref 8–16)
BASOPHILS # BLD AUTO: 0.12 K/UL (ref 0–0.2)
BASOPHILS NFR BLD: 0.7 % (ref 0–1.9)
BUN SERPL-MCNC: 48 MG/DL (ref 6–20)
CALCIUM SERPL-MCNC: 8.5 MG/DL (ref 8.7–10.5)
CHLORIDE SERPL-SCNC: 107 MMOL/L (ref 95–110)
CO2 SERPL-SCNC: 24 MMOL/L (ref 23–29)
CORTIS F SERPL-MCNC: 6.12 MCG/DL
CREAT SERPL-MCNC: 0.6 MG/DL (ref 0.5–1.4)
DIFFERENTIAL METHOD BLD: ABNORMAL
EOSINOPHIL # BLD AUTO: 0.4 K/UL (ref 0–0.5)
EOSINOPHIL NFR BLD: 2.1 % (ref 0–8)
ERYTHROCYTE [DISTWIDTH] IN BLOOD BY AUTOMATED COUNT: 16.1 % (ref 11.5–14.5)
EST. GFR  (NO RACE VARIABLE): >60 ML/MIN/1.73 M^2
GLUCOSE SERPL-MCNC: 106 MG/DL (ref 70–110)
HCT VFR BLD AUTO: 27.5 % (ref 40–54)
HGB BLD-MCNC: 8.7 G/DL (ref 14–18)
IMM GRANULOCYTES # BLD AUTO: 0.24 K/UL (ref 0–0.04)
IMM GRANULOCYTES NFR BLD AUTO: 1.3 % (ref 0–0.5)
LYMPHOCYTES # BLD AUTO: 4.3 K/UL (ref 1–4.8)
LYMPHOCYTES NFR BLD: 23.7 % (ref 18–48)
MAGNESIUM SERPL-MCNC: 1.9 MG/DL (ref 1.6–2.6)
MCH RBC QN AUTO: 29.4 PG (ref 27–31)
MCHC RBC AUTO-ENTMCNC: 31.6 G/DL (ref 32–36)
MCV RBC AUTO: 93 FL (ref 82–98)
MONOCYTES # BLD AUTO: 2.1 K/UL (ref 0.3–1)
MONOCYTES NFR BLD: 11.9 % (ref 4–15)
NEUTROPHILS # BLD AUTO: 10.8 K/UL (ref 1.8–7.7)
NEUTROPHILS NFR BLD: 60.3 % (ref 38–73)
NRBC BLD-RTO: 0 /100 WBC
PHOSPHATE SERPL-MCNC: 3.3 MG/DL (ref 2.7–4.5)
PLATELET # BLD AUTO: 578 K/UL (ref 150–450)
PLATELET BLD QL SMEAR: ABNORMAL
PMV BLD AUTO: 8.5 FL (ref 9.2–12.9)
POCT GLUCOSE: 108 MG/DL (ref 70–110)
POCT GLUCOSE: 90 MG/DL (ref 70–110)
POLYCHROMASIA BLD QL SMEAR: ABNORMAL
POTASSIUM SERPL-SCNC: 4.5 MMOL/L (ref 3.5–5.1)
RBC # BLD AUTO: 2.96 M/UL (ref 4.6–6.2)
SODIUM SERPL-SCNC: 137 MMOL/L (ref 136–145)
STOMATOCYTES BLD QL SMEAR: PRESENT
TARGETS BLD QL SMEAR: ABNORMAL
WBC # BLD AUTO: 17.93 K/UL (ref 3.9–12.7)

## 2024-03-01 PROCEDURE — 27000221 HC OXYGEN, UP TO 24 HOURS

## 2024-03-01 PROCEDURE — 25000003 PHARM REV CODE 250: Performed by: NURSE PRACTITIONER

## 2024-03-01 PROCEDURE — 83735 ASSAY OF MAGNESIUM: CPT | Performed by: NURSE PRACTITIONER

## 2024-03-01 PROCEDURE — 99900035 HC TECH TIME PER 15 MIN (STAT)

## 2024-03-01 PROCEDURE — 25000003 PHARM REV CODE 250: Performed by: INTERNAL MEDICINE

## 2024-03-01 PROCEDURE — P9045 ALBUMIN (HUMAN), 5%, 250 ML: HCPCS | Mod: JZ,JG | Performed by: SPECIALIST

## 2024-03-01 PROCEDURE — 25000003 PHARM REV CODE 250: Performed by: SPECIALIST

## 2024-03-01 PROCEDURE — 63600175 PHARM REV CODE 636 W HCPCS: Performed by: INTERNAL MEDICINE

## 2024-03-01 PROCEDURE — 85025 COMPLETE CBC W/AUTO DIFF WBC: CPT | Performed by: NURSE PRACTITIONER

## 2024-03-01 PROCEDURE — 20000000 HC ICU ROOM

## 2024-03-01 PROCEDURE — 63600175 PHARM REV CODE 636 W HCPCS: Mod: JZ,JG | Performed by: SPECIALIST

## 2024-03-01 PROCEDURE — 94761 N-INVAS EAR/PLS OXIMETRY MLT: CPT

## 2024-03-01 PROCEDURE — 31720 CLEARANCE OF AIRWAYS: CPT

## 2024-03-01 PROCEDURE — 80069 RENAL FUNCTION PANEL: CPT | Performed by: NURSE PRACTITIONER

## 2024-03-01 PROCEDURE — 63600175 PHARM REV CODE 636 W HCPCS: Performed by: NURSE PRACTITIONER

## 2024-03-01 PROCEDURE — S5010 5% DEXTROSE AND 0.45% SALINE: HCPCS | Performed by: INTERNAL MEDICINE

## 2024-03-01 PROCEDURE — 27000207 HC ISOLATION

## 2024-03-01 RX ORDER — ALBUMIN HUMAN 50 G/1000ML
25 SOLUTION INTRAVENOUS ONCE
Status: COMPLETED | OUTPATIENT
Start: 2024-03-01 | End: 2024-03-01

## 2024-03-01 RX ADMIN — LEVETIRACETAM 750 MG: 100 SOLUTION ORAL at 09:03

## 2024-03-01 RX ADMIN — MEROPENEM 2 G: 1 INJECTION INTRAVENOUS at 05:03

## 2024-03-01 RX ADMIN — MIDODRINE HYDROCHLORIDE 10 MG: 5 TABLET ORAL at 09:03

## 2024-03-01 RX ADMIN — ALBUMIN (HUMAN) 25 G: 2.5 SOLUTION INTRAVENOUS at 10:03

## 2024-03-01 RX ADMIN — GABAPENTIN 300 MG: 300 CAPSULE ORAL at 09:03

## 2024-03-01 RX ADMIN — MEROPENEM 2 G: 1 INJECTION INTRAVENOUS at 01:03

## 2024-03-01 RX ADMIN — FAMOTIDINE 20 MG: 40 POWDER, FOR SUSPENSION ORAL at 09:03

## 2024-03-01 RX ADMIN — FOLIC ACID 1 MG: 1 TABLET ORAL at 09:03

## 2024-03-01 RX ADMIN — ALPRAZOLAM 0.5 MG: 0.5 TABLET ORAL at 10:03

## 2024-03-01 RX ADMIN — MEROPENEM 2 G: 1 INJECTION INTRAVENOUS at 09:03

## 2024-03-01 RX ADMIN — LINEZOLID 600 MG: 600 INJECTION, SOLUTION INTRAVENOUS at 05:03

## 2024-03-01 RX ADMIN — MIDODRINE HYDROCHLORIDE 10 MG: 5 TABLET ORAL at 03:03

## 2024-03-01 RX ADMIN — CHLORHEXIDINE GLUCONATE 0.12% ORAL RINSE 15 ML: 1.2 LIQUID ORAL at 09:03

## 2024-03-01 RX ADMIN — GABAPENTIN 300 MG: 300 CAPSULE ORAL at 03:03

## 2024-03-01 RX ADMIN — HYDROCODONE BITARTRATE AND ACETAMINOPHEN 1 TABLET: 5; 325 TABLET ORAL at 09:03

## 2024-03-01 RX ADMIN — DEXTROSE AND SODIUM CHLORIDE: 5; 450 INJECTION, SOLUTION INTRAVENOUS at 09:03

## 2024-03-01 RX ADMIN — HYDROCODONE BITARTRATE AND ACETAMINOPHEN 1 TABLET: 5; 325 TABLET ORAL at 03:03

## 2024-03-01 RX ADMIN — LINEZOLID 600 MG: 600 INJECTION, SOLUTION INTRAVENOUS at 04:03

## 2024-03-01 RX ADMIN — MINOCYCLINE HYDROCHLORIDE 100 MG: 50 CAPSULE ORAL at 09:03

## 2024-03-01 RX ADMIN — ENOXAPARIN SODIUM 40 MG: 100 INJECTION SUBCUTANEOUS at 05:03

## 2024-03-01 RX ADMIN — DEXTROSE AND SODIUM CHLORIDE: 5; 450 INJECTION, SOLUTION INTRAVENOUS at 12:03

## 2024-03-01 RX ADMIN — LEVOTHYROXINE SODIUM 100 MCG: 100 TABLET ORAL at 05:03

## 2024-03-01 NOTE — ASSESSMENT & PLAN NOTE
Several large, unstageable wounds present  Optimize nutrition as able to promote wound healing- notably albumin is 1.4  Tube feeds and nutritional supplements as able   Surgery consult placed - discussed with Dr. Dale     Sacral spine  Full thickness tissue loss with exposed bone, tendon, or muscle. Often includes undermining and tunneling. May extend into muscle and/or supporting structures.   Appearance: Bone;Muscle;Granulating;Red;Moist;Black        Ischial tuberosity; Left and right  Full thickness tissue loss with exposed bone, tendon, or muscle. Often includes undermining and tunneling. May extend into muscle and/or supporting structures  Thoracic spine  Full thickness tissue loss with exposed bone, tendon, or muscle. Often includes undermining and tunneling. May extend into muscle and/or supporting structures.  Right upper Back Full thickness tissue loss with exposed bone, tendon, or muscle. Often includes undermining and tunneling. May extend into muscle and/or supporting structures.  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 Need amputations   Called mother to discuss  Consider palliative care consult     2/27   Transfer center discussed with [facility] no accepting MD's , they reviewed  chart   assisted in  getting records   [facility] where he arrested. From there he was transferred to [facility] in [place] ([facility]) for a HLOC. They were the only available hospital with an open ICU bed. From there, he was admitted to [facility] in [place] for a couple days before being admitted here. I've reached out to the facility liaison to get any records they may have from [place].   Records from [place] patient was not a surgical reconstruction candidate.  To continue multivitamins zinc and vitamin-C and optimize nutrition.  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  During that time he had Enterococcus sacral area as well as Acinetobacter.  Also had Pseudomonas.  In his urine he had Klebsiella blood Providencia as well as sputum.  At this time continue wound care as well as antibiotics per Infectious Disease.  We will continue support.  I did call mother yesterday and ask her to come to the hospital to talk about current situation.  Consider palliative consult , Patient now nodding     2/28 cont wean levo   Wound care  Antimicrobials per ID     2/29. See wounds above and wound care notes and pictures.   These wounds are unlikely to heal. He is terminal     3/1 No change.

## 2024-03-01 NOTE — ASSESSMENT & PLAN NOTE
>>ASSESSMENT AND PLAN FOR PRESSURE ULCERS OF SKIN OF MULTIPLE TOPOGRAPHIC SITES WRITTEN ON 3/1/2024 12:53 PM BY SUKHI PIERCE MD    Several large, unstageable wounds present  Optimize nutrition as able to promote wound healing- notably albumin is 1.4  Tube feeds and nutritional supplements as able   Surgery consult placed - discussed with Dr. Dale     Sacral spine  Full thickness tissue loss with exposed bone, tendon, or muscle. Often includes undermining and tunneling. May extend into muscle and/or supporting structures.   Appearance: Bone;Muscle;Granulating;Red;Moist;Black        Ischial tuberosity; Left and right  Full thickness tissue loss with exposed bone, tendon, or muscle. Often includes undermining and tunneling. May extend into muscle and/or supporting structures  Thoracic spine  Full thickness tissue loss with exposed bone, tendon, or muscle. Often includes undermining and tunneling. May extend into muscle and/or supporting structures.  Right upper Back Full thickness tissue loss with exposed bone, tendon, or muscle. Often includes undermining and tunneling. May extend into muscle and/or supporting structures.  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 Need amputations   Called mother to discuss  Consider  palliative care consult     2/27   Transfer center discussed with [facility] no accepting MD's , they reviewed  chart   assisted in getting records   [facility] where he arrested. From there he was transferred to [facility] in [place] ([facility]) for a HLOC. They were the only available hospital with an open ICU bed. From there, he was admitted to [facility] in [place] for a couple days before being admitted here. I've reached out to the facility liaison to get any records they may have from [place].   Records from [place] patient was not a surgical reconstruction candidate.  To continue multivitamins zinc and vitamin-C and optimize nutrition.  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  During that time he had Enterococcus sacral area as well as Acinetobacter.  Also had Pseudomonas.  In his urine he had Klebsiella blood Providencia as well as sputum.  At this time continue wound care as well as antibiotics per Infectious Disease.  We will continue support.  I did call mother yesterday and ask her to come to the hospital to talk about current situation.  Consider palliative consult , Patient now nodding     2/28 cont wean levo   Wound care  Antimicrobials per ID     2/29. See wounds above and wound care notes and pictures.   These wounds are unlikely to heal. He is terminal     3/1 No change.

## 2024-03-01 NOTE — ASSESSMENT & PLAN NOTE
MRI pelvis beginning of this year showing osteo with bone loss and surgical changes  Contributing to complexity of care    2/26- -ID following   Seen by surgery / wound care / and ortho  See pressure ulcers  Trying to transfer to higher level     2/29 See sepsis  Terminal wounds.   Awaiting potential transfer to [facility]  As expressed before he needs surgery, ortho and eventually plastics.     3/1 No change

## 2024-03-01 NOTE — CONSULTS
Family is supposed to come tomorrow to meet with ICU. Family will call back with time for meeting. If pt is still here Monday, PM will follow up if needed.    3:10pm  Pt's brother arrived to bedside to visit. Brief meeting with him and MD about pt's care and need for parents to come. He reports they will be here about 6pm tonight to meet with MD.    ORVILLE Caraballo, [facility]  Palliative Medicine  [phone]
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
## 2024-03-01 NOTE — ASSESSMENT & PLAN NOTE
PS and trach collar if able  Not on chronic vent just trach     2/27  Patient tolerated trach collar all day yesterday.  Placed on pressure support this morning and we will transition to trach collar.    2/28 on trach collar  2/29 On trach collar. High likelihood he may develop atelectasis etc sec to NM weakness.   3/1 On trach collar

## 2024-03-01 NOTE — ASSESSMENT & PLAN NOTE
Sources of infection include: wounds, urine and lungs  S/p 1 unit PRBC 2/24  Levophed initiated to keep MAP > 65 mmHg   Wound source not controlled at this time- surgery and ortho consulted- recommend transfer   Infectious work up in progress- UA grossly abnormal with cx + presumed proteus   CT chest/abd/pelvis reviewed  Antibiotics per ID  General surgery and orthopedics consulted to evaluate wounds for debridement and/or amputation- both have advised for transfer to tertiary center given complexity of surgery and post-op wound care. Transfer request placed   Additional work up and treatments as indicated     2/26 -cont antibiotics   ID following   Off pressors   From [facility] notes on midodrine 10tid, will start 5 tid    2/27 see pressure ulcers  Cont wean levo   Back on midodrine   Patient here with Pseudomonas and Proteus both in his respiratory culture as well as his urine.    2/28 no change on levo 0.02 cont try to wean   See cultures                   Blood Culture, Routine FUSOBACTERIUM NUCLEATUM Abnormal         3/1 On Appropriate Abx
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
## 2024-03-01 NOTE — ASSESSMENT & PLAN NOTE
Placed on ventilator upon admission 2/2 respiratory distress  Vent settings reviewed, no adjustments made  VAP prevention bundle  Antibiotics   Critical illness ongoing with uncontrolled source- precludes SAT/SBT trials at this time    2/27 see cultures.    Respiratory Culture  Abnormal   PSEUDOMONAS AERUGINOSA  Moderate     Respiratory Culture  Abnormal   PROTEUS MIRABILIS  Moderate  Normal respiratory diana also present       Susceptibility   Pseudomonas aeruginosa Proteus mirabilis     CULTURE, RESPIRATORY CULTURE, RESPIRATORY     Amikacin <=16 mcg/mL Sensitive       Amox/K Clav'ate   >16/8 mcg/mL Resistant     Amp/Sulbactam   >16/8 mcg/mL Resistant     Ampicillin   >16 mcg/mL Resistant     Cefazolin   >16 mcg/mL Resistant     Cefepime 4 mcg/mL Sensitive <=2 mcg/mL Sensitive     Ceftriaxone   <=1 mcg/mL Sensitive     Ciprofloxacin <=1 mcg/mL Sensitive >2 mcg/mL Resistant     Ertapenem   <=0.5 mcg/mL Sensitive     Gentamicin <=4 mcg/mL Sensitive <=4 mcg/mL Sensitive     Levofloxacin <=2 mcg/mL Sensitive <=2 mcg/mL Sensitive     Meropenem <=1 mcg/mL Sensitive <=1 mcg/mL Sensitive     Piperacillin/Tazo <=16 mcg/mL Sensitive <=16 mcg/mL Sensitive     Tobramycin <=4 mcg/mL Sensitive <=4 mcg/mL Sensitive     Trimeth/Sulfa   >2/38 mcg/mL Resistant               3/1 On appropriate Abx

## 2024-03-01 NOTE — SUBJECTIVE & OBJECTIVE
Objective:     Vital Signs (Most Recent):  Temp: 99.1 °F (37.3 °C) (03/01/24 1230)  Pulse: 89 (03/01/24 1230)  Resp: 18 (03/01/24 1230)  BP: (!) 99/55 (03/01/24 1230)  SpO2: 100 % (03/01/24 1230) Vital Signs (24h Range):  Temp:  [96.6 °F (35.9 °C)-99.5 °F (37.5 °C)] 99.1 °F (37.3 °C)  Pulse:  [] 89  Resp:  [14-39] 18  SpO2:  [99 %-100 %] 100 %  BP: ()/(45-68) 99/55     Weight: 51.3 kg (113 lb)  Body mass index is 16.69 kg/m².      Intake/Output Summary (Last 24 hours) at 3/1/2024 1250  Last data filed at 3/1/2024 1200  Gross per 24 hour   Intake 3462.31 ml   Output 2835 ml   Net 627.31 ml        Physical Exam  Vitals and nursing note reviewed.   HENT:      Head: Normocephalic.   Eyes:      Pupils: Pupils are equal, round, and reactive to light.   Cardiovascular:      Rate and Rhythm: Tachycardia present.   Pulmonary:      Effort: Pulmonary effort is normal.   Abdominal:      Palpations: Abdomen is soft.   Neurological:      Mental Status: He is alert.           Review of Systems    Vents:  Vent Mode: Spont (02/27/24 1925)  Ventilator Initiated: Yes (02/21/24 1631)  Set Rate: 20 BPM (02/27/24 0744)  Vt Set: 360 mL (02/27/24 0744)  Pressure Support: 7 cmH20 (02/27/24 1925)  PEEP/CPAP: 5 cmH20 (02/27/24 1925)  Oxygen Concentration (%): 28 (03/01/24 1132)  Peak Airway Pressure: 13 cmH20 (02/27/24 1925)  Plateau Pressure: 18 cmH20 (02/27/24 1925)  Total Ve: 0 L/m (02/27/24 1925)  Negative Inspiratory Force (cm H2O): 0 (02/27/24 1925)  F/VT Ratio<105 (RSBI): (!) 38.88 (02/27/24 0910)    Lines/Drains/Airways       Central Venous Catheter Line  Duration             Percutaneous Central Line Insertion/Assessment - Triple Lumen  02/21/24 1754 Femoral Vein Right;Femoral Right 8 days              Drain  Duration                  Colostomy  LLQ -- days         Urethral Catheter 02/21/24 2330 Temperature probe 8 days         NG/OG Tube 02/23/24 1459 Right nostril 6 days              Airway  Duration              Adult Surgical Airway 05/25/23 1135 Shiley Cuffed 8.0 / 85 mm 281 days                    Significant Labs:    CBC/Anemia Profile:  Recent Labs   Lab 02/29/24  0354 03/01/24  0349   WBC 20.67* 17.93*   HGB 9.5* 8.7*   HCT 29.4* 27.5*   * 578*   MCV 93 93   RDW 16.0* 16.1*        Chemistries:  Recent Labs   Lab 02/29/24  0354 03/01/24  0349    137   K 3.7 4.5    107   CO2 24 24   BUN 44* 48*   CREATININE 0.6 0.6   CALCIUM 8.5* 8.5*   ALBUMIN  --  1.3*   MG 1.5* 1.9   PHOS 3.3 3.3       All pertinent labs within the past 24 hours have been reviewed.    Significant Imaging:  I have reviewed all pertinent imaging results/findings within the past 24 hours.

## 2024-03-01 NOTE — PLAN OF CARE
03/01/24 0959   Post-Acute Status   Post-Acute Authorization Placement   Post-Acute Placement Status Referrals Sent   Coverage Medicaid   Discharge Plan   Discharge Plan A Long-term acute care facility (LTAC)     Referral sent to [facility] for admission
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
## 2024-03-01 NOTE — PLAN OF CARE
Attempted to off levophed but did not tolerate, see vital signs flowsheet.  Tolerating trach collar at 5lpm.  No electrolyte replacements.  Able to tolerate tube feeds, no residuals.

## 2024-03-02 LAB
ALBUMIN SERPL BCP-MCNC: 1.5 G/DL (ref 3.5–5.2)
ANION GAP SERPL CALC-SCNC: 8 MMOL/L (ref 8–16)
BASOPHILS # BLD AUTO: 0.09 K/UL (ref 0–0.2)
BASOPHILS NFR BLD: 0.5 % (ref 0–1.9)
BUN SERPL-MCNC: 32 MG/DL (ref 6–20)
CALCIUM SERPL-MCNC: 8.3 MG/DL (ref 8.7–10.5)
CHLORIDE SERPL-SCNC: 106 MMOL/L (ref 95–110)
CO2 SERPL-SCNC: 24 MMOL/L (ref 23–29)
CREAT SERPL-MCNC: 0.6 MG/DL (ref 0.5–1.4)
DIFFERENTIAL METHOD BLD: ABNORMAL
EOSINOPHIL # BLD AUTO: 0.4 K/UL (ref 0–0.5)
EOSINOPHIL NFR BLD: 2.1 % (ref 0–8)
ERYTHROCYTE [DISTWIDTH] IN BLOOD BY AUTOMATED COUNT: 16.6 % (ref 11.5–14.5)
EST. GFR  (NO RACE VARIABLE): >60 ML/MIN/1.73 M^2
GLUCOSE SERPL-MCNC: 77 MG/DL (ref 70–110)
HCT VFR BLD AUTO: 24.8 % (ref 40–54)
HGB BLD-MCNC: 7.8 G/DL (ref 14–18)
HYPOCHROMIA BLD QL SMEAR: ABNORMAL
IMM GRANULOCYTES # BLD AUTO: 0.31 K/UL (ref 0–0.04)
IMM GRANULOCYTES NFR BLD AUTO: 1.7 % (ref 0–0.5)
LYMPHOCYTES # BLD AUTO: 4.1 K/UL (ref 1–4.8)
LYMPHOCYTES NFR BLD: 22.4 % (ref 18–48)
MCH RBC QN AUTO: 29.4 PG (ref 27–31)
MCHC RBC AUTO-ENTMCNC: 31.5 G/DL (ref 32–36)
MCV RBC AUTO: 94 FL (ref 82–98)
MONOCYTES # BLD AUTO: 2.2 K/UL (ref 0.3–1)
MONOCYTES NFR BLD: 11.8 % (ref 4–15)
NEUTROPHILS # BLD AUTO: 11.2 K/UL (ref 1.8–7.7)
NEUTROPHILS NFR BLD: 61.5 % (ref 38–73)
NRBC BLD-RTO: 0 /100 WBC
PHOSPHATE SERPL-MCNC: 3.7 MG/DL (ref 2.7–4.5)
PLATELET # BLD AUTO: 390 K/UL (ref 150–450)
PLATELET BLD QL SMEAR: ABNORMAL
PMV BLD AUTO: 8.5 FL (ref 9.2–12.9)
POTASSIUM SERPL-SCNC: 4.5 MMOL/L (ref 3.5–5.1)
RBC # BLD AUTO: 2.65 M/UL (ref 4.6–6.2)
SODIUM SERPL-SCNC: 138 MMOL/L (ref 136–145)
WBC # BLD AUTO: 18.25 K/UL (ref 3.9–12.7)

## 2024-03-02 PROCEDURE — 25000003 PHARM REV CODE 250: Performed by: INTERNAL MEDICINE

## 2024-03-02 PROCEDURE — 25000003 PHARM REV CODE 250: Performed by: NURSE PRACTITIONER

## 2024-03-02 PROCEDURE — 85025 COMPLETE CBC W/AUTO DIFF WBC: CPT | Performed by: NURSE PRACTITIONER

## 2024-03-02 PROCEDURE — 21400001 HC TELEMETRY ROOM

## 2024-03-02 PROCEDURE — 27000221 HC OXYGEN, UP TO 24 HOURS

## 2024-03-02 PROCEDURE — 31720 CLEARANCE OF AIRWAYS: CPT

## 2024-03-02 PROCEDURE — 99900035 HC TECH TIME PER 15 MIN (STAT)

## 2024-03-02 PROCEDURE — 99900026 HC AIRWAY MAINTENANCE (STAT)

## 2024-03-02 PROCEDURE — 94761 N-INVAS EAR/PLS OXIMETRY MLT: CPT

## 2024-03-02 PROCEDURE — 80069 RENAL FUNCTION PANEL: CPT | Performed by: NURSE PRACTITIONER

## 2024-03-02 PROCEDURE — S5010 5% DEXTROSE AND 0.45% SALINE: HCPCS | Performed by: INTERNAL MEDICINE

## 2024-03-02 PROCEDURE — 27000207 HC ISOLATION

## 2024-03-02 PROCEDURE — 63600175 PHARM REV CODE 636 W HCPCS: Performed by: INTERNAL MEDICINE

## 2024-03-02 PROCEDURE — 25000003 PHARM REV CODE 250: Performed by: SPECIALIST

## 2024-03-02 PROCEDURE — 63600175 PHARM REV CODE 636 W HCPCS: Performed by: NURSE PRACTITIONER

## 2024-03-02 RX ADMIN — GABAPENTIN 300 MG: 300 CAPSULE ORAL at 08:03

## 2024-03-02 RX ADMIN — MEROPENEM 2 G: 1 INJECTION INTRAVENOUS at 05:03

## 2024-03-02 RX ADMIN — LEVETIRACETAM 750 MG: 100 SOLUTION ORAL at 08:03

## 2024-03-02 RX ADMIN — FAMOTIDINE 20 MG: 40 POWDER, FOR SUSPENSION ORAL at 08:03

## 2024-03-02 RX ADMIN — MIDODRINE HYDROCHLORIDE 10 MG: 5 TABLET ORAL at 02:03

## 2024-03-02 RX ADMIN — ENOXAPARIN SODIUM 40 MG: 100 INJECTION SUBCUTANEOUS at 05:03

## 2024-03-02 RX ADMIN — MIDODRINE HYDROCHLORIDE 10 MG: 5 TABLET ORAL at 08:03

## 2024-03-02 RX ADMIN — ALPRAZOLAM 0.5 MG: 0.5 TABLET ORAL at 08:03

## 2024-03-02 RX ADMIN — GABAPENTIN 300 MG: 300 CAPSULE ORAL at 02:03

## 2024-03-02 RX ADMIN — CHLORHEXIDINE GLUCONATE 0.12% ORAL RINSE 15 ML: 1.2 LIQUID ORAL at 08:03

## 2024-03-02 RX ADMIN — MEROPENEM 2 G: 1 INJECTION INTRAVENOUS at 09:03

## 2024-03-02 RX ADMIN — MEROPENEM 2 G: 1 INJECTION INTRAVENOUS at 02:03

## 2024-03-02 RX ADMIN — HYDROCODONE BITARTRATE AND ACETAMINOPHEN 1 TABLET: 5; 325 TABLET ORAL at 08:03

## 2024-03-02 RX ADMIN — LINEZOLID 600 MG: 600 INJECTION, SOLUTION INTRAVENOUS at 04:03

## 2024-03-02 RX ADMIN — MINOCYCLINE HYDROCHLORIDE 100 MG: 50 CAPSULE ORAL at 08:03

## 2024-03-02 RX ADMIN — FOLIC ACID 1 MG: 1 TABLET ORAL at 08:03

## 2024-03-02 RX ADMIN — LINEZOLID 600 MG: 600 INJECTION, SOLUTION INTRAVENOUS at 05:03

## 2024-03-02 RX ADMIN — LEVOTHYROXINE SODIUM 100 MCG: 100 TABLET ORAL at 05:03

## 2024-03-02 RX ADMIN — DEXTROSE AND SODIUM CHLORIDE: 5; 450 INJECTION, SOLUTION INTRAVENOUS at 05:03

## 2024-03-02 NOTE — PLAN OF CARE
No significant changes overnight. Pt disoriented to situation, reoriented pt. On trach collar, 5L NC tolerating well. NSR on heart monitor. Tolerating TF at goal. Good UOP per sterling. Turned q2h. Precautions maintained. Bed low and locked. Bed alarm on. Will continue to monitor.
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
## 2024-03-02 NOTE — PLAN OF CARE
Pt weaned off levo. Albumin given to maintain MAP>60. NSR on the monitor. Supplemental O2 via trach collar. Tolerating tube feeds. Colostomy bag changed, site without breakdown. Safety precautions maintained. Bed alarm set. Heels elevated with pillows.
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
## 2024-03-03 PROCEDURE — 27000221 HC OXYGEN, UP TO 24 HOURS

## 2024-03-03 PROCEDURE — 25000003 PHARM REV CODE 250: Performed by: SPECIALIST

## 2024-03-03 PROCEDURE — S5010 5% DEXTROSE AND 0.45% SALINE: HCPCS | Performed by: INTERNAL MEDICINE

## 2024-03-03 PROCEDURE — 63600175 PHARM REV CODE 636 W HCPCS: Performed by: NURSE PRACTITIONER

## 2024-03-03 PROCEDURE — 25000003 PHARM REV CODE 250: Performed by: INTERNAL MEDICINE

## 2024-03-03 PROCEDURE — 25000003 PHARM REV CODE 250: Performed by: NURSE PRACTITIONER

## 2024-03-03 PROCEDURE — 31720 CLEARANCE OF AIRWAYS: CPT

## 2024-03-03 PROCEDURE — 63600175 PHARM REV CODE 636 W HCPCS: Performed by: INTERNAL MEDICINE

## 2024-03-03 PROCEDURE — 99900026 HC AIRWAY MAINTENANCE (STAT)

## 2024-03-03 PROCEDURE — 99900035 HC TECH TIME PER 15 MIN (STAT)

## 2024-03-03 PROCEDURE — 11000001 HC ACUTE MED/SURG PRIVATE ROOM

## 2024-03-03 PROCEDURE — 27000207 HC ISOLATION

## 2024-03-03 PROCEDURE — 94761 N-INVAS EAR/PLS OXIMETRY MLT: CPT

## 2024-03-03 PROCEDURE — 25000003 PHARM REV CODE 250: Performed by: STUDENT IN AN ORGANIZED HEALTH CARE EDUCATION/TRAINING PROGRAM

## 2024-03-03 RX ADMIN — CHLORHEXIDINE GLUCONATE 0.12% ORAL RINSE 15 ML: 1.2 LIQUID ORAL at 09:03

## 2024-03-03 RX ADMIN — MINOCYCLINE HYDROCHLORIDE 100 MG: 50 CAPSULE ORAL at 09:03

## 2024-03-03 RX ADMIN — MEROPENEM 2 G: 1 INJECTION INTRAVENOUS at 01:03

## 2024-03-03 RX ADMIN — MIDODRINE HYDROCHLORIDE 10 MG: 5 TABLET ORAL at 09:03

## 2024-03-03 RX ADMIN — LINEZOLID 600 MG: 600 INJECTION, SOLUTION INTRAVENOUS at 04:03

## 2024-03-03 RX ADMIN — LEVETIRACETAM 750 MG: 100 SOLUTION ORAL at 09:03

## 2024-03-03 RX ADMIN — DEXTROSE AND SODIUM CHLORIDE: 5; 450 INJECTION, SOLUTION INTRAVENOUS at 04:03

## 2024-03-03 RX ADMIN — MEROPENEM 2 G: 1 INJECTION INTRAVENOUS at 10:03

## 2024-03-03 RX ADMIN — GABAPENTIN 300 MG: 300 CAPSULE ORAL at 09:03

## 2024-03-03 RX ADMIN — SODIUM CHLORIDE 500 ML: 9 INJECTION, SOLUTION INTRAVENOUS at 02:03

## 2024-03-03 RX ADMIN — FOLIC ACID 1 MG: 1 TABLET ORAL at 09:03

## 2024-03-03 RX ADMIN — MIDODRINE HYDROCHLORIDE 10 MG: 5 TABLET ORAL at 02:03

## 2024-03-03 RX ADMIN — HYDROCODONE BITARTRATE AND ACETAMINOPHEN 1 TABLET: 5; 325 TABLET ORAL at 09:03

## 2024-03-03 RX ADMIN — MEROPENEM 2 G: 1 INJECTION INTRAVENOUS at 06:03

## 2024-03-03 RX ADMIN — FAMOTIDINE 20 MG: 40 POWDER, FOR SUSPENSION ORAL at 09:03

## 2024-03-03 RX ADMIN — ENOXAPARIN SODIUM 40 MG: 100 INJECTION SUBCUTANEOUS at 05:03

## 2024-03-03 RX ADMIN — GABAPENTIN 300 MG: 300 CAPSULE ORAL at 02:03

## 2024-03-03 RX ADMIN — LINEZOLID 600 MG: 600 INJECTION, SOLUTION INTRAVENOUS at 05:03

## 2024-03-03 RX ADMIN — LEVOTHYROXINE SODIUM 100 MCG: 100 TABLET ORAL at 07:03

## 2024-03-03 RX ADMIN — ALPRAZOLAM 0.5 MG: 0.5 TABLET ORAL at 09:03

## 2024-03-03 NOTE — ASSESSMENT & PLAN NOTE
-Consultations with surgery and orthopedic teams were made, including discussions with Dr. Dale, leading to the consensus that the surgical complexity and subsequent wound care were too advanced for their capabilities, necessitating a transfer to a tertiary care center.     -Attempts to facilitate this transfer were complicated by limited availability at receiving facilities, but ultimately, the patient was moved through several healthcare facilities, including [facility] and [facility], before being admitted to [facility] in [place] and then transferred to our facility. 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    -Despite aggressive management strategies, including antibiotics per ID recommendations, multivitamin and nutritional optimization, and continuous wound care, the prognosis remains grim. Wounds were deemed unlikely to heal, categorizing him as terminal.     -Palliative care has been facilitating discussions with the patient's mother and other family members about a shift towards comfort care, acknowledging the patient's critical condition.     03/03/2024  Follow up Palliative care progress in their ongoing communications with family.

## 2024-03-03 NOTE — ASSESSMENT & PLAN NOTE
>>ASSESSMENT AND PLAN FOR PRESSURE ULCERS OF SKIN OF MULTIPLE TOPOGRAPHIC SITES WRITTEN ON 3/3/2024  1:17 PM BY GUME GILMAN MD    -Consultations with surgery and orthopedic teams were made, including discussions with Dr. Dale, leading to the consensus that the surgical complexity and subsequent wound care were too advanced for their capabilities, necessitating a transfer to a tertiary care center.     -Attempts to facilitate this transfer were complicated by limited availability at receiving facilities, but ultimately, the patient was moved through several healthcare facilities, including [facility] and [facility], before being admitted to [facility] in [place] and then transferred to our facility. 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    -Despite aggressive management strategies, including antibiotics per ID recommendations, multivitamin and nutritional optimization, and continuous wound care, the prognosis remains grim. Wounds were deemed unlikely to heal, categorizing him as terminal.     -Palliative care has been facilitating discussions with the patient's mother and other family members about a shift towards comfort care, acknowledging the patient's critical condition.     03/03/2024  Follow up Palliative care progress in their ongoing communications with family.

## 2024-03-03 NOTE — PLAN OF CARE
Care plan reviewed with patient. Refused wound care this shift, done by night shift. Suctioning done. Feeding tolerated.  Free from falls. No other complaints made. All orders checked and reviewed.

## 2024-03-03 NOTE — ASSESSMENT & PLAN NOTE
Sources of infection include: wounds, urine and lungs. General surgery and orthopedics consulted to evaluate wounds for debridement and/or amputation- both have advised for transfer to tertiary center given complexity of surgery and post-op wound care. Transfer request placed   03/03/2024  --Improved, afebrile, blood pressures controlled  --Leukocytosis persist but has been stable for the past 3-4 days  --On zyvox, minocycline, and merrem per culture data  --repeat Blood Cultures NGTD  --ID following, appreciate recs  --monitor clinically
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
## 2024-03-03 NOTE — NURSING TRANSFER
Nursing Transfer Note      3/3/2024   12:52 AM    Nurse giving handoff: JANETT Strange  Nurse receiving handoff:JANETT Sage    Reason patient is being transferred: ICU Downgrade    Transfer From: ICU    Transfer via Specialty Bed    Transfer with Trach Collar to O2 5L 28%    Transported by JANETT Strange    Transfer Vital Signs:  Blood Pressure:100/58  Heart Rate:80  O2:100  Temperature: 99.3  Respirations:22      Order for Tele Monitor? No    Additional Lines: Oxygen, Suction, and Shen Catheter TLC    4eyes on Skin: no    Medicines sent: Bactroban    Any special needs or follow-up needed: R/T notified of transfer    Patient belongings transferred with patient: Yes    Chart send with patient: Yes    Notified: Called, No answer    Patient reassessed at: 3/3/24 0056 (date, time)    Upon arrival to floor: patient oriented to room, call bell in reach, and bed in lowest position

## 2024-03-03 NOTE — PLAN OF CARE
VSS. NSR on the monitor. UOP 1,000ml for shift. Med surg orders placed. Waiting bed assignment. Safety precautions maintained. Family not present at bedside.

## 2024-03-04 PROBLEM — Z51.5 ENCOUNTER FOR PALLIATIVE CARE: Status: ACTIVE | Noted: 2024-03-04

## 2024-03-04 LAB
BACTERIA BLD CULT: NORMAL
BACTERIA BLD CULT: NORMAL

## 2024-03-04 PROCEDURE — 99498 ADVNCD CARE PLAN ADDL 30 MIN: CPT | Mod: ,,, | Performed by: NURSE PRACTITIONER

## 2024-03-04 PROCEDURE — 25000003 PHARM REV CODE 250: Performed by: INTERNAL MEDICINE

## 2024-03-04 PROCEDURE — 99497 ADVNCD CARE PLAN 30 MIN: CPT | Mod: 25,,, | Performed by: NURSE PRACTITIONER

## 2024-03-04 PROCEDURE — S5010 5% DEXTROSE AND 0.45% SALINE: HCPCS | Performed by: INTERNAL MEDICINE

## 2024-03-04 PROCEDURE — 11000001 HC ACUTE MED/SURG PRIVATE ROOM

## 2024-03-04 PROCEDURE — 99900035 HC TECH TIME PER 15 MIN (STAT)

## 2024-03-04 PROCEDURE — 99223 1ST HOSP IP/OBS HIGH 75: CPT | Mod: ,,, | Performed by: NURSE PRACTITIONER

## 2024-03-04 PROCEDURE — 63600175 PHARM REV CODE 636 W HCPCS: Performed by: NURSE PRACTITIONER

## 2024-03-04 PROCEDURE — 31720 CLEARANCE OF AIRWAYS: CPT

## 2024-03-04 PROCEDURE — 27000207 HC ISOLATION

## 2024-03-04 PROCEDURE — 63600175 PHARM REV CODE 636 W HCPCS: Performed by: INTERNAL MEDICINE

## 2024-03-04 PROCEDURE — 27100171 HC OXYGEN HIGH FLOW UP TO 24 HOURS

## 2024-03-04 PROCEDURE — 25000003 PHARM REV CODE 250: Performed by: SPECIALIST

## 2024-03-04 PROCEDURE — 94761 N-INVAS EAR/PLS OXIMETRY MLT: CPT

## 2024-03-04 PROCEDURE — 25000003 PHARM REV CODE 250: Performed by: NURSE PRACTITIONER

## 2024-03-04 RX ORDER — ALPRAZOLAM 0.25 MG/1
0.25 TABLET ORAL ONCE
Status: DISCONTINUED | OUTPATIENT
Start: 2024-03-04 | End: 2024-03-07 | Stop reason: HOSPADM

## 2024-03-04 RX ADMIN — MIDODRINE HYDROCHLORIDE 10 MG: 5 TABLET ORAL at 03:03

## 2024-03-04 RX ADMIN — MINOCYCLINE HYDROCHLORIDE 100 MG: 50 CAPSULE ORAL at 09:03

## 2024-03-04 RX ADMIN — LINEZOLID 600 MG: 600 INJECTION, SOLUTION INTRAVENOUS at 04:03

## 2024-03-04 RX ADMIN — DEXTROSE AND SODIUM CHLORIDE: 5; 450 INJECTION, SOLUTION INTRAVENOUS at 11:03

## 2024-03-04 RX ADMIN — MIDODRINE HYDROCHLORIDE 10 MG: 5 TABLET ORAL at 09:03

## 2024-03-04 RX ADMIN — GABAPENTIN 300 MG: 300 CAPSULE ORAL at 09:03

## 2024-03-04 RX ADMIN — MEROPENEM 2 G: 1 INJECTION INTRAVENOUS at 03:03

## 2024-03-04 RX ADMIN — CHLORHEXIDINE GLUCONATE 0.12% ORAL RINSE 15 ML: 1.2 LIQUID ORAL at 09:03

## 2024-03-04 RX ADMIN — ENOXAPARIN SODIUM 40 MG: 100 INJECTION SUBCUTANEOUS at 04:03

## 2024-03-04 RX ADMIN — MEROPENEM 2 G: 1 INJECTION INTRAVENOUS at 05:03

## 2024-03-04 RX ADMIN — FAMOTIDINE 20 MG: 40 POWDER, FOR SUSPENSION ORAL at 10:03

## 2024-03-04 RX ADMIN — MINOCYCLINE HYDROCHLORIDE 100 MG: 50 CAPSULE ORAL at 10:03

## 2024-03-04 RX ADMIN — FOLIC ACID 1 MG: 1 TABLET ORAL at 10:03

## 2024-03-04 RX ADMIN — GABAPENTIN 300 MG: 300 CAPSULE ORAL at 10:03

## 2024-03-04 RX ADMIN — HYDROCODONE BITARTRATE AND ACETAMINOPHEN 1 TABLET: 5; 325 TABLET ORAL at 09:03

## 2024-03-04 RX ADMIN — MEROPENEM 2 G: 1 INJECTION INTRAVENOUS at 09:03

## 2024-03-04 RX ADMIN — LEVETIRACETAM 750 MG: 100 SOLUTION ORAL at 09:03

## 2024-03-04 RX ADMIN — LEVETIRACETAM 750 MG: 100 SOLUTION ORAL at 10:03

## 2024-03-04 RX ADMIN — GABAPENTIN 300 MG: 300 CAPSULE ORAL at 03:03

## 2024-03-04 RX ADMIN — CHLORHEXIDINE GLUCONATE 0.12% ORAL RINSE 15 ML: 1.2 LIQUID ORAL at 10:03

## 2024-03-04 RX ADMIN — MIDODRINE HYDROCHLORIDE 10 MG: 5 TABLET ORAL at 07:03

## 2024-03-04 RX ADMIN — LEVOTHYROXINE SODIUM 100 MCG: 100 TABLET ORAL at 05:03

## 2024-03-04 RX ADMIN — HYDROCODONE BITARTRATE AND ACETAMINOPHEN 1 TABLET: 5; 325 TABLET ORAL at 04:03

## 2024-03-04 RX ADMIN — FAMOTIDINE 20 MG: 40 POWDER, FOR SUSPENSION ORAL at 09:03

## 2024-03-04 RX ADMIN — ALPRAZOLAM 0.5 MG: 0.5 TABLET ORAL at 04:03
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
## 2024-03-04 NOTE — ASSESSMENT & PLAN NOTE
-Patient with sepsis in the setting of multiple pressure ulcers to skin. Per chart review, consultations with surgery and orthopedic teams were made, including discussions with Dr. Dale, leading to the consensus that the surgical complexity and subsequent wound care were too advanced for their capabilities, necessitating a transfer to a tertiary care center.   -Attempts to facilitate this transfer were complicated by limited availability at receiving facilities, but ultimately, the patient was moved through several healthcare facilities, including [facility] and [facility], before being admitted to [facility] in [place] and then transferred to our facility. 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  -Despite aggressive management strategies, including antibiotics per ID recommendations, multivitamin and nutritional optimization, and continuous wound care, the prognosis remains grim. Wounds were deemed unlikely to heal, categorizing him as terminal.   -Today parents at bedside, family meeting with patient and his parents Ann and Diony. They are asking for discussion with ortho or vascular surgery regarding treatment options and noted if there are no further options would likely avoid LTAC and take patient home and provide comfort care.   -Plan is for patient and family to discuss tx vs comfort focused treatment, discuss with specialities treatment options-risks vs benefits, recommendation for transfer? PM to follow up tomorrow to continue GOC discussions. At this time want to continue treatment with continued GOC discussions.
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
## 2024-03-04 NOTE — SUBJECTIVE & OBJECTIVE
"Interval History: Family meeting today with patient and his two parents Ann and Diony at the bedside. Patient alert and oriented and able to participate in discussion. They are all aware of patient's past medical history, current medical condition, along with limited options regarding surgery for extensive, generalized wounds. We then discussed goals of care and code status: full code vs DNR/I along with risks, benefits, and alternatives. Patient and family noted they have daly in God and that he has the final say. Patient stated " God hasn't give up on me so I don't want to give up on him and I want to fight". Patient and mother noted they again wanted to discuss risks vs benefits of amputation or further treatment options or if they do not have treatment options with ortho or vascular. Ann noted if there are no treatment options then she would likely avoid LTAC and take patient home and elect comfort focused treatment. They want to know their options which will aid in further GOC discussions. PM to follow up tomorrow.  All questions and concerns addressed. Primary team updated.     No past medical history on file.    Past Surgical History:   Procedure Laterality Date    ESOPHAGOGASTRODUODENOSCOPY W/ PEG N/A 5/30/2023    Procedure: PEG;  Surgeon: JUSTYN Devine MD;  Location: [facility] ENDOSCOPY;  Service: Gastroenterology;  Laterality: N/A;       Review of patient's allergies indicates:  No Known Allergies    Medications:  Continuous Infusions:   dextrose 5 % and 0.45 % NaCl 50 mL/hr at 03/04/24 1112     Scheduled Meds:   chlorhexidine  15 mL Mouth/Throat BID    enoxparin  40 mg Subcutaneous Q24H (prophylaxis, 1700)    famotidine  20 mg Per NG tube BID    folic acid  1 mg Per NG tube Daily    gabapentin  300 mg Per NG tube TID    levETIRAcetam  750 mg Per NG tube BID    levothyroxine  100 mcg Per NG tube Before breakfast    linezolid  600 mg Intravenous Q12H    meropenem (MERREM) IVPB  2 g " Intravenous Q8H    midodrine  10 mg Per NG tube TID    minocycline  100 mg Per NG tube Q12H     PRN Meds:acetaminophen, ALPRAZolam, dextrose 10%, dextrose 10%, HYDROcodone-acetaminophen, influenza, magnesium sulfate IVPB, magnesium sulfate IVPB, pneumoc 20-rajendra conj-dip cr(PF), potassium chloride in water **AND** potassium chloride in water **AND** potassium chloride in water, sodium chloride 0.9%    Family History    None       Tobacco Use    Smoking status: Not on file    Smokeless tobacco: Not on file   Substance and Sexual Activity    Alcohol use: Not on file    Drug use: Not on file    Sexual activity: Not on file       Review of Systems   Constitutional:  Positive for fatigue.   Musculoskeletal:         Generalized pain, worse with wound care     Objective:     Vital Signs (Most Recent):  Temp: 98.1 °F (36.7 °C) (03/04/24 0708)  Pulse: 105 (03/04/24 1100)  Resp: 18 (03/04/24 1230)  BP: 113/67 (03/04/24 0900)  SpO2: 99 % (03/04/24 1230) Vital Signs (24h Range):  Temp:  [98.1 °F (36.7 °C)-98.9 °F (37.2 °C)] 98.1 °F (36.7 °C)  Pulse:  [] 105  Resp:  [17-20] 18  SpO2:  [98 %-100 %] 99 %  BP: ()/(53-67) 113/67     Weight: 52.2 kg (115 lb)  Body mass index is 16.98 kg/m².       Physical Exam  Vitals and nursing note reviewed.   Constitutional:       Appearance: He is ill-appearing.   HENT:      Head: Normocephalic.      Nose: Nose normal.      Comments: NGT with TF infusing      Mouth/Throat:      Mouth: Mucous membranes are dry.   Eyes:      General:         Right eye: No discharge.         Left eye: No discharge.   Cardiovascular:      Rate and Rhythm: Tachycardia present.   Pulmonary:      Comments: Trach + trach collar  Abdominal:      Palpations: Abdomen is soft.   Musculoskeletal:      Comments: Bed bound   Skin:     Comments: Several large, unstageable wounds across various parts of the body, including the sacral and thoracic spine, ischial tuberosity on both sides, right upper back, right dorsal  "hand, right posterior elbow, and extensive areas on both arms and legs. These wounds exhibited full thickness tissue loss with exposed bone, tendon, or muscle, displaying characteristics such as being black, necrotic, granulating, moist, and exhibiting multiple colors and textures.    Neurological:      Mental Status: He is alert and oriented to person, place, and time.   Psychiatric:         Mood and Affect: Mood normal.         Behavior: Behavior normal.         Thought Content: Thought content normal.         Judgment: Judgment normal.            Review of Symptoms      Symptom Assessment (ESAS 0-10 Scale)  Pain:  0  Dyspnea:  0  Anxiety:  0  Nausea:  0  Depression:  0  Anorexia:  0  Fatigue:  0  Insomnia:  0  Restlessness:  0  Agitation:  0         Performance Status:  30    Psychosocial/Cultural:   See Palliative Psychosocial Note: No  Lived in LTAC prior to admission, prior to lived at home with his family.   **Primary  to Follow**  Palliative Care  Consult: No        Advance Care Planning   Advance Directives:     Decision Making:  Patient answered questions and Family answered questions  Goals of Care: What is most important right now is to focus on curative/life-prolongation (regardless of treatment burdens). Accordingly, we have decided that the best plan to meet the patient's goals includes continuing with treatment. Discussed tx vs comfort focused treatment.          Significant Labs: All pertinent labs within the past 24 hours have been reviewed.  CBC:   Recent Labs   Lab 03/02/24  0434   WBC 18.25*   HGB 7.8*   HCT 24.8*   MCV 94        BMP:  No results for input(s): "GLU", "NA", "K", "CL", "CO2", "BUN", "CREATININE", "CALCIUM", "MG" in the last 24 hours.  LFT:  Lab Results   Component Value Date    AST 30 02/27/2024    ALKPHOS 137 (H) 02/27/2024    BILITOT 0.2 02/27/2024     Albumin:   Albumin   Date Value Ref Range Status   03/02/2024 1.5 (L) 3.5 - 5.2 g/dL Final "     Protein:   Total Protein   Date Value Ref Range Status   02/27/2024 6.2 6.0 - 8.4 g/dL Final     Lactic acid:   Lab Results   Component Value Date    LACTATE 1.2 02/22/2024    LACTATE 1.7 02/21/2024       Significant Imaging: I have reviewed all pertinent imaging results/findings within the past 24 hours.
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
## 2024-03-04 NOTE — PROGRESS NOTES
Advance Care Planning   [facility] - Med Surg  Palliative Care       Patient Name: Jyoti Mayberry  MRN: [id number]  Admission Date: 2/21/2024  Hospital Length of Stay: 12 days  Code Status: Full Code   Attending Provider: Patrice Juarez,*  Palliative Care Provider: Madeline Amin NP  Primary Care Physician: No, Primary Doctor  Principal Problem:Septic shock    Met with pt and parents, Ann and Diony, at bedside this morning along with PM NP. They report they plan to stay here for a few days. Reviewed our role in pt's care and offered support. Ann and Diony had questions regarding wound care, respiratory supplies and asked to speak to surgeon about pt's wounds. I was able to pull up pt's chart in the room to show them pictures of pt's wounds and how they've changed since his previous hospitalization in [place]. I also messaged the wound care nurse asking if she could stop by to answer their questions about wound care. I did explain wound care had not planned to see pt today, because he had been refusing wound care. Pt's parents report he was getting it daily before, and he is refusing because they may not be doing it how he prefers. I encouraged them to share information with the wound care nurse so they know how to help him. NP will reach out to hospital medicine and on call surgeon regarding parent's other questions, but we did review past notes about other hospitals declining transfer requests as they did not feel pt would be a candidate for surgery and recommended palliative care. We plan to f/u tomorrow for ongoing support.       ORVILLE Caraballo, [facility]  Palliative Medicine  [phone]

## 2024-03-04 NOTE — SUBJECTIVE & OBJECTIVE
"    Review of Systems  Objective:     Vital Signs (Most Recent):  Temp: 98.1 °F (36.7 °C) (03/04/24 0708)  Pulse: 105 (03/04/24 1100)  Resp: 18 (03/04/24 1230)  BP: 113/67 (03/04/24 0900)  SpO2: 99 % (03/04/24 1230) Vital Signs (24h Range):  Temp:  [98.1 °F (36.7 °C)-98.9 °F (37.2 °C)] 98.1 °F (36.7 °C)  Pulse:  [] 105  Resp:  [17-20] 18  SpO2:  [98 %-100 %] 99 %  BP: ()/(48-67) 113/67     Weight: 52.2 kg (115 lb)  Body mass index is 16.98 kg/m².    Intake/Output Summary (Last 24 hours) at 3/4/2024 1315  Last data filed at 3/4/2024 1011  Gross per 24 hour   Intake 3168.81 ml   Output 1790 ml   Net 1378.81 ml         Physical Exam      GEN: Awakens easily, doesn't seem to be in any acute distress, pleasant, body habitus significantly underweight  HEENT: NGT in place, surgical airway, eable to phonate appropriately   CARDS: regular rate and rhythm on cardiac monitor     Extremities: several large, unstageable wounds across various parts of the body, including the sacral and thoracic spine, ischial tuberosity on both sides, right upper back, right dorsal hand, right posterior elbow, and extensive areas on both arms and legs. These wounds exhibited full thickness tissue loss with exposed bone, tendon, or muscle, displaying characteristics such as being black, necrotic, granulating, moist, and exhibiting multiple colors and textures.      PULM: trach collar, breathing comfortably, chest symmetric, nonlabored  Neuro: Alert and oriented, follows directions and answers questions appropriately  Significant Labs: All pertinent labs within the past 24 hours have been reviewed.  CBC: No results for input(s): "WBC", "HGB", "HCT", "PLT" in the last 48 hours.  CMP: No results for input(s): "NA", "K", "CL", "CO2", "GLU", "BUN", "CREATININE", "CALCIUM", "PROT", "ALBUMIN", "BILITOT", "ALKPHOS", "AST", "ALT", "ANIONGAP", "EGFRNONAA" in the last 48 hours.    Invalid input(s): "ESTGFAFR[place]"    Significant Imaging: "     Imaging Results              US Lower Extremity Veins Bilateral (Final result)  Result time 02/21/24 21:16:19      Final result by Rhonda Saini MD (02/21/24 21:16:19)                   Impression:      No evidence of deep venous thrombosis in either lower extremity.      Electronically signed by: Rhonda Saini  Date:    02/21/2024  Time:    21:16               Narrative:    EXAMINATION:  US LOWER EXTREMITY VEINS BILATERAL    CLINICAL HISTORY:  eval for dvt;    TECHNIQUE:  Duplex and color flow Doppler and dynamic compression was performed of the bilateral lower extremity veins was performed.    COMPARISON:  None    FINDINGS:  Right thigh veins: The common femoral, femoral, popliteal, upper greater saphenous, and deep femoral veins are patent and free of thrombus. The veins are normally compressible and have normal phasic flow and augmentation response.    Right calf veins: The visualized calf veins are patent.    Left thigh veins: The common femoral, femoral, popliteal, upper greater saphenous, and deep femoral veins are patent and free of thrombus. The veins are normally compressible and have normal phasic flow and augmentation response.    Left calf veins: The visualized calf veins are patent.    Miscellaneous: None                                       X-Ray Chest AP Portable (Final result)  Result time 02/21/24 19:30:06      Final result by Rhonda Saini MD (02/21/24 19:30:06)                   Impression:      Stable short-term follow-up      Electronically signed by: Rhonda Saini  Date:    02/21/2024  Time:    19:30               Narrative:    EXAMINATION:  XR CHEST AP PORTABLE    CLINICAL HISTORY:  acute respiratory failure;    TECHNIQUE:  Single frontal portable view of the chest was performed.    COMPARISON:  02/21/2024 earlier imaging same date    Tracheostomy similar position.  Lungs unchanged with bibasilar opacity                                       X-Ray Chest AP Portable  (Final result)  Result time 02/21/24 11:22:10      Final result by Roberto Vasquez MD (02/21/24 11:22:10)                   Impression:      As above      Electronically signed by: Roberto Vasquez MD  Date:    02/21/2024  Time:    11:22               Narrative:    EXAMINATION:  XR CHEST AP PORTABLE    CLINICAL HISTORY:  Sepsis;    FINDINGS:  Single view of the chest.  Comparison 06/03/2023.    Cardiac silhouette is normal.  No consolidation.  Perihilar and lower lobe interstitial edema suspected.  Cannot exclude early infiltrates within the lower lobes.  No evidence of pleural effusion or pneumothorax.  Bones appear intact.  Tracheostomy tube appears in place.  Postop changes cervical spine.

## 2024-03-04 NOTE — ASSESSMENT & PLAN NOTE
>>ASSESSMENT AND PLAN FOR PRESSURE ULCERS OF SKIN OF MULTIPLE TOPOGRAPHIC SITES WRITTEN ON 3/4/2024  2:24 PM BY LOLA MANDEL, NP    -Patient with sepsis in the setting of multiple pressure ulcers to skin. Per chart review, consultations with surgery and orthopedic teams were made, including discussions with Dr. Dale, leading to the consensus that the surgical complexity and subsequent wound care were too advanced for their capabilities, necessitating a transfer to a tertiary care center.   -Attempts to facilitate this transfer were complicated by limited availability at receiving facilities, but ultimately, the patient was moved through several healthcare facilities, including [facility] and [facility], before being admitted to [facility] in [place] and then transferred to our facility. 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  -Despite aggressive management strategies, including antibiotics per ID recommendations, multivitamin and nutritional optimization, and continuous wound care, the prognosis remains grim. Wounds were deemed unlikely to heal, categorizing him as terminal.   -Today parents at bedside, family meeting with patient and his parents Ann and Diony. They are asking for discussion with ortho or vascular surgery regarding treatment options and noted if there are no further options would likely avoid LTAC and take patient home and provide comfort care.   -Plan is for patient and family to discuss tx vs comfort focused treatment, discuss with specialities treatment options-risks vs benefits, recommendation for transfer? PM to follow up tomorrow to continue GOC discussions. At this time want to continue treatment with continued GOC  discussions.

## 2024-03-04 NOTE — PLAN OF CARE
03/04/24 1323   Discharge Reassessment   Assessment Type Discharge Planning Reassessment   Did the patient's condition or plan change since previous assessment? No   Discharge Plan discussed with:   (Attending MD, Dr. Juarez)   Communicated ANDRÉS with patient/caregiver Date not available/Unable to determine   Discharge Plan A Long-term acute care facility (LTAC)   DME Needed Upon Discharge  none   Transition of Care Barriers None   Why the patient remains in the hospital Requires continued medical care   Post-Acute Status   Discharge Delays None known at this time       Assessment/Plan:      * Septic shock  Sources of infection include: wounds, urine and lungs. General surgery and orthopedics consulted to evaluate wounds for debridement and/or amputation- both have advised for transfer to tertiary center given complexity of surgery and post-op wound care. Transfer request placed   03/03/2024  --Improved, afebrile, blood pressures controlled  --Leukocytosis persist but has been stable for the past 3-4 days  --On zyvox, minocycline, and merrem per culture data  --repeat Blood Cultures NGTD  --ID following, appreciate recs  --monitor clinically     Hypothyroid  03/03/2024  --stable, continue synthroid 100mcg        Pressure ulcers of skin of multiple topographic sites  -Consultations with surgery and orthopedic teams were made, including discussions with Dr. Dale, leading to the consensus that the surgical complexity and subsequent wound care were too advanced for their capabilities, necessitating a transfer to a tertiary care center.      -Attempts to facilitate this transfer were complicated by limited availability at receiving facilities, but ultimately, the patient was moved through several healthcare facilities, including [facility] and [facility], before being admitted to [facility] in [place] and then transferred to our facility. 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     -Despite aggressive management strategies, including antibiotics per ID recommendations, multivitamin and nutritional optimization, and continuous wound care, the prognosis remains grim. Wounds were deemed unlikely to heal, categorizing him as terminal.      -Palliative care has been facilitating discussions with the patient's mother and other family members about a shift towards comfort care, acknowledging the patient's critical condition.      03/03/2024  Follow up Palliative care progress in their ongoing communications with family.         Chronic osteomyelitis           Quadriplegia              VTE Risk Mitigation (From admission, onward)              Ordered       enoxaparin injection 40 mg  Every 24 hours         02/25/24 1046       Place sequential compression device  Until discontinued         02/21/24 1718       IP VTE LOW RISK PATIENT  Once         02/21/24 1718                          Discharge Planning   ANDRÉS:      Code Status: Full Code   Is the patient medically ready for discharge?:     Reason for patient still in hospital (select all that apply): Patient trending condition, Consult recommendations, and Pending disposition  Discharge Plan A: Long-term acute care facility (LTAC)

## 2024-03-04 NOTE — ASSESSMENT & PLAN NOTE
-Code status: Full code.   -HCPOA: Surrogate: Parents Ann Mayberry and Diony Ambriz.   -GOC: Focus on curative/life-prolongation (regardless of treatment burdens). Accordingly, we have decided that the best plan to meet the patient's goals includes continuing with treatment along with further discussions regarding treatment options or if there is nothing to offer from a surgical standpoint in the setting of multiple, generalized extensive wounds. Discussed tx vs comfort focused treatment.   -See HPI for further details

## 2024-03-05 LAB
ACANTHOCYTES BLD QL SMEAR: PRESENT
ANION GAP SERPL CALC-SCNC: 7 MMOL/L (ref 8–16)
ANISOCYTOSIS BLD QL SMEAR: SLIGHT
BASOPHILS # BLD AUTO: 0.19 K/UL (ref 0–0.2)
BASOPHILS NFR BLD: 0.9 % (ref 0–1.9)
BUN SERPL-MCNC: 14 MG/DL (ref 6–20)
CALCIUM SERPL-MCNC: 7.4 MG/DL (ref 8.7–10.5)
CHLORIDE SERPL-SCNC: 104 MMOL/L (ref 95–110)
CO2 SERPL-SCNC: 23 MMOL/L (ref 23–29)
CREAT SERPL-MCNC: 0.6 MG/DL (ref 0.5–1.4)
DACRYOCYTES BLD QL SMEAR: ABNORMAL
DIFFERENTIAL METHOD BLD: ABNORMAL
EOSINOPHIL # BLD AUTO: 0.7 K/UL (ref 0–0.5)
EOSINOPHIL NFR BLD: 3.2 % (ref 0–8)
ERYTHROCYTE [DISTWIDTH] IN BLOOD BY AUTOMATED COUNT: 16 % (ref 11.5–14.5)
EST. GFR  (NO RACE VARIABLE): >60 ML/MIN/1.73 M^2
GLUCOSE SERPL-MCNC: 101 MG/DL (ref 70–110)
HCT VFR BLD AUTO: 22.6 % (ref 40–54)
HGB BLD-MCNC: 7.1 G/DL (ref 14–18)
IMM GRANULOCYTES # BLD AUTO: 0.27 K/UL (ref 0–0.04)
IMM GRANULOCYTES NFR BLD AUTO: 1.3 % (ref 0–0.5)
LYMPHOCYTES # BLD AUTO: 4 K/UL (ref 1–4.8)
LYMPHOCYTES NFR BLD: 19.6 % (ref 18–48)
MCH RBC QN AUTO: 29.6 PG (ref 27–31)
MCHC RBC AUTO-ENTMCNC: 31.4 G/DL (ref 32–36)
MCV RBC AUTO: 94 FL (ref 82–98)
MONOCYTES # BLD AUTO: 2.5 K/UL (ref 0.3–1)
MONOCYTES NFR BLD: 12.3 % (ref 4–15)
NEUTROPHILS # BLD AUTO: 12.9 K/UL (ref 1.8–7.7)
NEUTROPHILS NFR BLD: 62.7 % (ref 38–73)
NRBC BLD-RTO: 0 /100 WBC
PLATELET # BLD AUTO: 324 K/UL (ref 150–450)
PLATELET BLD QL SMEAR: ABNORMAL
PMV BLD AUTO: 8.7 FL (ref 9.2–12.9)
POTASSIUM SERPL-SCNC: 4.6 MMOL/L (ref 3.5–5.1)
RBC # BLD AUTO: 2.4 M/UL (ref 4.6–6.2)
SCHISTOCYTES BLD QL SMEAR: PRESENT
SODIUM SERPL-SCNC: 134 MMOL/L (ref 136–145)
TARGETS BLD QL SMEAR: ABNORMAL
WBC # BLD AUTO: 20.59 K/UL (ref 3.9–12.7)

## 2024-03-05 PROCEDURE — 85025 COMPLETE CBC W/AUTO DIFF WBC: CPT | Performed by: STUDENT IN AN ORGANIZED HEALTH CARE EDUCATION/TRAINING PROGRAM

## 2024-03-05 PROCEDURE — 25000003 PHARM REV CODE 250: Performed by: INTERNAL MEDICINE

## 2024-03-05 PROCEDURE — 99498 ADVNCD CARE PLAN ADDL 30 MIN: CPT | Mod: ,,, | Performed by: NURSE PRACTITIONER

## 2024-03-05 PROCEDURE — 99497 ADVNCD CARE PLAN 30 MIN: CPT | Mod: ,,, | Performed by: NURSE PRACTITIONER

## 2024-03-05 PROCEDURE — 27000207 HC ISOLATION

## 2024-03-05 PROCEDURE — 80048 BASIC METABOLIC PNL TOTAL CA: CPT | Performed by: STUDENT IN AN ORGANIZED HEALTH CARE EDUCATION/TRAINING PROGRAM

## 2024-03-05 PROCEDURE — 94761 N-INVAS EAR/PLS OXIMETRY MLT: CPT

## 2024-03-05 PROCEDURE — 99900035 HC TECH TIME PER 15 MIN (STAT)

## 2024-03-05 PROCEDURE — 63600175 PHARM REV CODE 636 W HCPCS: Performed by: NURSE PRACTITIONER

## 2024-03-05 PROCEDURE — 99223 1ST HOSP IP/OBS HIGH 75: CPT | Mod: ,,, | Performed by: SURGERY

## 2024-03-05 PROCEDURE — 25000003 PHARM REV CODE 250: Performed by: NURSE PRACTITIONER

## 2024-03-05 PROCEDURE — 31720 CLEARANCE OF AIRWAYS: CPT

## 2024-03-05 PROCEDURE — 11000001 HC ACUTE MED/SURG PRIVATE ROOM

## 2024-03-05 PROCEDURE — S5010 5% DEXTROSE AND 0.45% SALINE: HCPCS | Performed by: INTERNAL MEDICINE

## 2024-03-05 PROCEDURE — 25000003 PHARM REV CODE 250: Performed by: SPECIALIST

## 2024-03-05 PROCEDURE — 63600175 PHARM REV CODE 636 W HCPCS: Performed by: INTERNAL MEDICINE

## 2024-03-05 PROCEDURE — 27000221 HC OXYGEN, UP TO 24 HOURS

## 2024-03-05 PROCEDURE — 99233 SBSQ HOSP IP/OBS HIGH 50: CPT | Mod: ,,, | Performed by: NURSE PRACTITIONER

## 2024-03-05 RX ORDER — MINOCYCLINE HYDROCHLORIDE 100 MG/1
100 CAPSULE ORAL EVERY 12 HOURS
Start: 2024-03-05

## 2024-03-05 RX ORDER — LINEZOLID 2 MG/ML
600 INJECTION, SOLUTION INTRAVENOUS EVERY 12 HOURS
Status: ON HOLD
Start: 2024-03-05 | End: 2024-04-17 | Stop reason: HOSPADM

## 2024-03-05 RX ORDER — FOLIC ACID 1 MG/1
1 TABLET ORAL DAILY
Refills: 0
Start: 2024-03-06 | End: 2025-03-06

## 2024-03-05 RX ORDER — LEVETIRACETAM 100 MG/ML
750 SOLUTION ORAL 2 TIMES DAILY
Qty: 450 ML | Refills: 11
Start: 2024-03-05 | End: 2025-03-05

## 2024-03-05 RX ORDER — MIDODRINE HYDROCHLORIDE 10 MG/1
10 TABLET ORAL 3 TIMES DAILY
Qty: 90 TABLET | Refills: 11
Start: 2024-03-05 | End: 2025-03-05

## 2024-03-05 RX ORDER — LEVOTHYROXINE SODIUM 100 UG/1
100 TABLET ORAL
Qty: 30 TABLET | Refills: 11
Start: 2024-03-06 | End: 2025-03-06

## 2024-03-05 RX ORDER — GABAPENTIN 300 MG/1
300 CAPSULE ORAL 3 TIMES DAILY
Qty: 90 CAPSULE | Refills: 11
Start: 2024-03-05 | End: 2025-03-05

## 2024-03-05 RX ADMIN — ENOXAPARIN SODIUM 40 MG: 100 INJECTION SUBCUTANEOUS at 05:03

## 2024-03-05 RX ADMIN — MEROPENEM 2 G: 1 INJECTION INTRAVENOUS at 09:03

## 2024-03-05 RX ADMIN — DEXTROSE AND SODIUM CHLORIDE: 5; 450 INJECTION, SOLUTION INTRAVENOUS at 10:03

## 2024-03-05 RX ADMIN — LEVETIRACETAM 750 MG: 100 SOLUTION ORAL at 09:03

## 2024-03-05 RX ADMIN — FOLIC ACID 1 MG: 1 TABLET ORAL at 10:03

## 2024-03-05 RX ADMIN — MIDODRINE HYDROCHLORIDE 10 MG: 5 TABLET ORAL at 09:03

## 2024-03-05 RX ADMIN — FAMOTIDINE 20 MG: 40 POWDER, FOR SUSPENSION ORAL at 10:03

## 2024-03-05 RX ADMIN — LINEZOLID 600 MG: 600 INJECTION, SOLUTION INTRAVENOUS at 04:03

## 2024-03-05 RX ADMIN — HYDROCODONE BITARTRATE AND ACETAMINOPHEN 1 TABLET: 5; 325 TABLET ORAL at 09:03

## 2024-03-05 RX ADMIN — MINOCYCLINE HYDROCHLORIDE 100 MG: 50 CAPSULE ORAL at 10:03

## 2024-03-05 RX ADMIN — GABAPENTIN 300 MG: 300 CAPSULE ORAL at 03:03

## 2024-03-05 RX ADMIN — FAMOTIDINE 20 MG: 40 POWDER, FOR SUSPENSION ORAL at 09:03

## 2024-03-05 RX ADMIN — LEVETIRACETAM 750 MG: 100 SOLUTION ORAL at 10:03

## 2024-03-05 RX ADMIN — CHLORHEXIDINE GLUCONATE 0.12% ORAL RINSE 15 ML: 1.2 LIQUID ORAL at 10:03

## 2024-03-05 RX ADMIN — MIDODRINE HYDROCHLORIDE 10 MG: 5 TABLET ORAL at 03:03

## 2024-03-05 RX ADMIN — LEVOTHYROXINE SODIUM 100 MCG: 100 TABLET ORAL at 05:03

## 2024-03-05 RX ADMIN — ALPRAZOLAM 0.5 MG: 0.5 TABLET ORAL at 12:03

## 2024-03-05 RX ADMIN — GABAPENTIN 300 MG: 300 CAPSULE ORAL at 09:03

## 2024-03-05 RX ADMIN — LINEZOLID 600 MG: 600 INJECTION, SOLUTION INTRAVENOUS at 05:03

## 2024-03-05 RX ADMIN — GABAPENTIN 300 MG: 300 CAPSULE ORAL at 10:03

## 2024-03-05 RX ADMIN — MEROPENEM 2 G: 1 INJECTION INTRAVENOUS at 05:03

## 2024-03-05 RX ADMIN — MEROPENEM 2 G: 1 INJECTION INTRAVENOUS at 01:03

## 2024-03-05 RX ADMIN — MINOCYCLINE HYDROCHLORIDE 100 MG: 50 CAPSULE ORAL at 09:03

## 2024-03-05 RX ADMIN — MIDODRINE HYDROCHLORIDE 10 MG: 5 TABLET ORAL at 10:03

## 2024-03-05 RX ADMIN — DEXTROSE AND SODIUM CHLORIDE: 5; 450 INJECTION, SOLUTION INTRAVENOUS at 04:03

## 2024-03-05 RX ADMIN — CHLORHEXIDINE GLUCONATE 0.12% ORAL RINSE 15 ML: 1.2 LIQUID ORAL at 09:03

## 2024-03-05 NOTE — ASSESSMENT & PLAN NOTE
-Code status: Full code.   -HCPOA: Surrogate: Parents Ann Mayberry and Diony Ambriz.   -GOC: Focus on curative/life-prolongation (regardless of treatment burdens). Accordingly, we have decided that the best plan to meet the patient's goals includes continuing with treatment along with further discussions regarding treatment options or if there is nothing to offer from a surgical standpoint in the setting of multiple, generalized extensive wounds. Discussed tx vs comfort focused treatment. 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  -See HPI for further details

## 2024-03-05 NOTE — PROGRESS NOTES
ST evaluation orders received and chart reviewed.  During rounds, ST assessment placed on HOLD as pt currently receiving nutrition/medication via NG tube with plans to transfer for higher level of care following vascular consult.

## 2024-03-05 NOTE — PLAN OF CARE
Nutrition Recommendations 3/5/24:  1. Recommend modify TF to Impact Peptide 1.5, goal rate of 40 mL/hr  - Formula will provide 1440 kcal/day (87% EEN), 90 g protein/day (108% EPN), 739 mL/day free formula water/day (45% fluid needs)   - 150 mL q4h free water flushes (900 mL/day) =from formula + FWF = 1639 mL/day (100% fluid needs), per MD/NP   - Monitor Mg, Na, K+, Phos, and Glu, correct as indicated.   2. If PO diet warranted, recommend High protein diet, Texture per SLP recommendations   3. Recommend pt continues to receive Francisco BID via tube feeding  4. Recommend pt receives daily multivitamin, zinc and vitamin C supplementation  5. Updated, Daily weights   6. Collaboration with other providers     Goals:   1. Pt will consume > 50% of EEN and EPN prior to RD follow up (Meeting)  2. Pt will consume and tolerate Francisco prior to RD follow up (Meeting)   3. If pt diet advanced to PO diet, will be to safest texture prior to RD follow up   4. Pt will receive MTV, zinc and vitamin C prior to RD follow up   5. Pt's diarrhea/loose stools will be improved prior to RD follow up (Resolved)   6. Pt's will be re-weighed and weight updated prior to RD follow up     Felicia Luna, Registration Eligible, Provisional LDN

## 2024-03-05 NOTE — ASSESSMENT & PLAN NOTE
>>ASSESSMENT AND PLAN FOR PRESSURE ULCERS OF SKIN OF MULTIPLE TOPOGRAPHIC SITES WRITTEN ON 3/5/2024 12:05 PM BY LOLA MANDEL, NP    -Patient with sepsis in the setting of multiple pressure ulcers to skin. Per chart review, consultations with surgery and orthopedic teams were made, including discussions with Dr. Dale, leading to the consensus that the surgical complexity and subsequent wound care were too advanced for their capabilities, necessitating a transfer to a tertiary care center.   -Attempts to facilitate this transfer were complicated by limited availability at receiving facilities, but ultimately, the patient was moved through several healthcare facilities, including [facility] and [facility], before being admitted to [facility] in [place] and then transferred to our facility. 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  -Despite aggressive management strategies, including antibiotics per ID recommendations, multivitamin and nutritional optimization, and continuous wound care, the prognosis remains grim. Wounds were deemed unlikely to heal, categorizing him as terminal.   -Followed up on GOC with patient and his parents Ann and Diony.  Patient and family want to continue aggressive measures and if patient's heart stops beating or he stops breathing, wants all heroic measures including resuscitation. We did follow up on discussion regarding tx vs comfort focused treatment and hospice in which they noted they would know when they are at that point and they are not at this time. 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

## 2024-03-05 NOTE — SUBJECTIVE & OBJECTIVE
Review of Systems  Objective:     Vital Signs (Most Recent):  Temp: 97.8 °F (36.6 °C) (03/05/24 1059)  Pulse: 77 (03/05/24 1059)  Resp: 18 (03/05/24 1059)  BP: (!) 100/59 (03/05/24 1059)  SpO2: 98 % (03/05/24 1209) Vital Signs (24h Range):  Temp:  [97.6 °F (36.4 °C)-99.4 °F (37.4 °C)] 97.8 °F (36.6 °C)  Pulse:  [] 77  Resp:  [18-20] 18  SpO2:  [90 %-100 %] 98 %  BP: ()/(51-59) 100/59     Weight: (!) 139 kg (306 lb 7 oz)  Body mass index is 45.25 kg/m².    Intake/Output Summary (Last 24 hours) at 3/5/2024 1445  Last data filed at 3/5/2024 1059  Gross per 24 hour   Intake 3113.92 ml   Output 2900 ml   Net 213.92 ml         Physical Exam      GEN: Awakens easily, doesn't seem to be in any acute distress, pleasant, body habitus significantly underweight  HEENT: NGT in place, surgical airway, eable to phonate appropriately   CARDS: regular rate and rhythm on cardiac monitor     Extremities: several large, unstageable wounds across various parts of the body, including the sacral and thoracic spine, ischial tuberosity on both sides, right upper back, right dorsal hand, right posterior elbow, and extensive areas on both arms and legs. These wounds exhibited full thickness tissue loss with exposed bone, tendon, or muscle, displaying characteristics such as being black, necrotic, granulating, moist, and exhibiting multiple colors and textures.      PULM: trach collar, breathing comfortably, chest symmetric, nonlabored  Neuro: Alert and oriented, follows directions and answers questions appropriately    Significant Labs: All pertinent labs within the past 24 hours have been reviewed.  CBC:   Recent Labs   Lab 03/05/24  0540   WBC 20.59*   HGB 7.1*   HCT 22.6*        CMP:   Recent Labs   Lab 03/05/24  0540   *   K 4.6      CO2 23      BUN 14   CREATININE 0.6   CALCIUM 7.4*   ANIONGAP 7*       Significant Imaging:     Imaging Results              US Lower Extremity Veins Bilateral (Final  result)  Result time 02/21/24 21:16:19      Final result by Rhonda Saini MD (02/21/24 21:16:19)                   Impression:      No evidence of deep venous thrombosis in either lower extremity.      Electronically signed by: Rhonda Saini  Date:    02/21/2024  Time:    21:16               Narrative:    EXAMINATION:  US LOWER EXTREMITY VEINS BILATERAL    CLINICAL HISTORY:  eval for dvt;    TECHNIQUE:  Duplex and color flow Doppler and dynamic compression was performed of the bilateral lower extremity veins was performed.    COMPARISON:  None    FINDINGS:  Right thigh veins: The common femoral, femoral, popliteal, upper greater saphenous, and deep femoral veins are patent and free of thrombus. The veins are normally compressible and have normal phasic flow and augmentation response.    Right calf veins: The visualized calf veins are patent.    Left thigh veins: The common femoral, femoral, popliteal, upper greater saphenous, and deep femoral veins are patent and free of thrombus. The veins are normally compressible and have normal phasic flow and augmentation response.    Left calf veins: The visualized calf veins are patent.    Miscellaneous: None                                       X-Ray Chest AP Portable (Final result)  Result time 02/21/24 19:30:06      Final result by Rhonda Saini MD (02/21/24 19:30:06)                   Impression:      Stable short-term follow-up      Electronically signed by: Rhonda Saini  Date:    02/21/2024  Time:    19:30               Narrative:    EXAMINATION:  XR CHEST AP PORTABLE    CLINICAL HISTORY:  acute respiratory failure;    TECHNIQUE:  Single frontal portable view of the chest was performed.    COMPARISON:  02/21/2024 earlier imaging same date    Tracheostomy similar position.  Lungs unchanged with bibasilar opacity                                       X-Ray Chest AP Portable (Final result)  Result time 02/21/24 11:22:10      Final result by Christina  Roberto HAMMONDS MD (02/21/24 11:22:10)                   Impression:      As above      Electronically signed by: Roberto Vasquez MD  Date:    02/21/2024  Time:    11:22               Narrative:    EXAMINATION:  XR CHEST AP PORTABLE    CLINICAL HISTORY:  Sepsis;    FINDINGS:  Single view of the chest.  Comparison 06/03/2023.    Cardiac silhouette is normal.  No consolidation.  Perihilar and lower lobe interstitial edema suspected.  Cannot exclude early infiltrates within the lower lobes.  No evidence of pleural effusion or pneumothorax.  Bones appear intact.  Tracheostomy tube appears in place.  Postop changes cervical spine.

## 2024-03-05 NOTE — PROGRESS NOTES
[facility]  Wound Care    Patient Name:  Jyoti Mayberry   MRN:  [id number]  Date: 3/4/2024  Diagnosis: Septic shock    History:     No past medical history on file.    Social History     Socioeconomic History    Marital status: Single       Precautions:     Allergies as of 02/21/2024    (No Known Allergies)       [facility] Assessment Details/Treatment     F/U visit with patient for ongoing wound care needs. He is awake and alert, verbally responsive.  parents and primary nurse at bedside during visit. Updated parents on wound plan of care since admit and shown pictures. At this time, wound care performed with primary nurse Akila.  BLE and LUE and right hand: dry gangrene necrosis distally, BLE necrosis extends to above knee with areas of intact healthy tissue also noted. LUE necrosis extends to axilla also with areas of intact healthy tissue noted. Areas to bilateral calves, proximal lower legs, and left upper arm region with the necrotic eschar lifting and underlying red and yellow subcutaneous tissue noted. Cleansed all and painted with betadine. Xeroform gauze applied over moist areas and areas of lifting eschar, padded with abd pads, and wrapped with kerlix.  Right elbow full thickness ulceration with moist red an d yellow wound bed, and granulation tissue, epithelialization to edges. Cleansed with vashe, patted dry, and hydrofiber applied, secured with foam dressing.  Patient then turned to right side.   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 Right upper shoulder and thoracic spine wounds still with palpable bone, mall amount yellow tissue, and mosit red granulation tissue, and undermining.   Sacral wound no longer has bone palpable, embedded wound vac foam is still noted, and able to remove small amount of foam this visit, otherwise granulating well.  Left ischium with no longer palpable bone, moist red granulation tissue.  Right ischium  with area of palpable still noted, moist red granulation tissue.  These wounds all cleansed with vashe and patted dry. Sigrid wound skin painted with cavilon. Medihony applied to hydrofiber, then applied to fill wound beds including undermines spaces, and then covered with foam dressings. Patient tolerated care well, though break taken for suctioning and resp recovery.     03/04/24 1235   WOCN Assessment   WOCN Total Time (mins) 120   Visit Date 03/04/24   Visit Time 1235   Consult Type Follow Up   WOCN Speciality Wound;Ostomy   WOCN List colostomy   Wound pressure;arterial;At risk for pressure Injury;other   Continence Type Urinary   Ostomy Type Colostomy   Intervention assessed;changed;chart review;coordination of care;team conference        Altered Skin Integrity 05/15/23 1411 Sacral spine Full thickness tissue loss with exposed bone, tendon, or muscle. Often includes undermining and tunneling. May extend into muscle and/or supporting structures.   Final Assessment Date/Final Assessment Time: (c)  (c)   Date First Assessed/Time First Assessed: 05/15/23 1411   Altered Skin Integrity Present on Admission - Did Patient arrive to the hospital with altered skin?: yes  Location: Sacral spine  Descript...   Description of Altered Skin Integrity Full thickness tissue loss with exposed bone, tendon, or muscle. Often includes undermining and tunneling. May extend into muscle and/or supporting structures.   Dressing Appearance Intact   Drainage Amount Moderate   Drainage Characteristics/Odor Serosanguineous   Appearance Red;Black;Granulating   Tissue loss description Full thickness   Black (%), Wound Tissue Color 10 %   Red (%), Wound Tissue Color 90 %   Yellow (%), Wound Tissue Color 0 %   Periwound Area Intact   Wound Edges Defined   Wound Length (cm) 10.5 cm   Wound Width (cm) 18 cm   Wound Depth (cm) 1.5 cm   Wound Volume (cm^3) 283.5 cm^3   Wound Surface Area (cm^2) 189 cm^2   Undermining (depth (cm)/location) 2.5cm from  10-4 o'clock   Care Cleansed with:;Wound cleanser;Applied:;Skin Barrier   Dressing Applied;Honey;Hydrofiber;Foam   Packing packing removed;packed with;honey packing;hydrofiber/alginate   Packing Inserted  3   Periwound Care Skin barrier film applied   Dressing Change Due 03/07/24        Altered Skin Integrity 06/07/23 0900 Left Ischial tuberosity Full thickness tissue loss with exposed bone, tendon, or muscle. Often includes undermining and tunneling. May extend into muscle and/or supporting structures.   Final Assessment Date/Final Assessment Time: (c)  (c)   Date First Assessed/Time First Assessed: 06/07/23 0900   Altered Skin Integrity Present on Admission - Did Patient arrive to the hospital with altered skin?: yes  Side: Left  Location: Ischial tu...   Description of Altered Skin Integrity Full thickness tissue loss with exposed bone, tendon, or muscle. Often includes undermining and tunneling. May extend into muscle and/or supporting structures.   Dressing Appearance Intact;Moist drainage   Drainage Amount Small   Drainage Characteristics/Odor Serosanguineous   Appearance Red;Granulating;Moist   Tissue loss description Full thickness   Red (%), Wound Tissue Color 100 %   Periwound Area Intact   Wound Edges Open   Wound Length (cm) 9 cm   Wound Width (cm) 7 cm   Wound Depth (cm) 0.5 cm   Wound Volume (cm^3) 31.5 cm^3   Wound Surface Area (cm^2) 63 cm^2   Undermining (depth (cm)/location) 1cm from 9-12 o'clock   Care Cleansed with:;Sterile normal saline;Applied:;Skin Barrier   Dressing Honey;Hydrofiber;Foam   Packing packing removed;packed with;honey packing;hydrofiber/alginate   Packing Inserted  1   Periwound Care Skin barrier film applied   Dressing Change Due 03/07/24        Altered Skin Integrity 06/07/23 0900 Right Ischial tuberosity Full thickness tissue loss with exposed bone, tendon, or muscle. Often includes undermining and tunneling. May extend into muscle and/or supporting structures.   Final  Assessment Date/Final Assessment Time: (c)  (c)   Date First Assessed/Time First Assessed: 06/07/23 0900   Altered Skin Integrity Present on Admission - Did Patient arrive to the hospital with altered skin?: yes  Side: Right  Location: Ischial t...   Description of Altered Skin Integrity Full thickness tissue loss with exposed bone, tendon, or muscle. Often includes undermining and tunneling. May extend into muscle and/or supporting structures.   Dressing Appearance Intact   Drainage Amount Small   Drainage Characteristics/Odor Serosanguineous   Appearance Bone;Red;Granulating   Tissue loss description Full thickness   Red (%), Wound Tissue Color 100 %   Periwound Area Intact   Wound Edges Open   Wound Length (cm) 9 cm   Wound Width (cm) 7 cm   Wound Depth (cm) 1.5 cm   Wound Volume (cm^3) 94.5 cm^3   Wound Surface Area (cm^2) 63 cm^2   Undermining (depth (cm)/location) 1.5cm [id number] o'clock   Care Cleansed with:;Wound cleanser;Applied:;Skin Barrier   Dressing Applied;Honey;Hydrofiber;Foam   Packing packing removed;packed with;honey packing;hydrofiber/alginate   Packing Inserted  1   Periwound Care Skin barrier film applied   Dressing Change Due 03/07/24        Altered Skin Integrity 02/22/24 Thoracic spine Full thickness tissue loss with exposed bone, tendon, or muscle. Often includes undermining and tunneling. May extend into muscle and/or supporting structures.   Date First Assessed: 02/22/24   Altered Skin Integrity Present on Admission - Did Patient arrive to the hospital with altered skin?: yes  Location: Thoracic spine  Description of Altered Skin Integrity: Full thickness tissue loss with exposed bone, te...   Dressing Appearance Intact;Moist drainage   Drainage Amount Small   Drainage Characteristics/Odor Serosanguineous   Appearance Red;Bone   Tissue loss description Full thickness   Red (%), Wound Tissue Color 100 %   Periwound Area Intact   Wound Edges Open   Wound Length (cm) 2.5 cm   Wound Width (cm)  2.5 cm   Wound Depth (cm) 0.5 cm   Wound Volume (cm^3) 3.125 cm^3   Wound Surface Area (cm^2) 6.25 cm^2   Undermining (depth (cm)/location) 1cm @ 8-11 o'clock   Care Cleansed with:;Wound cleanser;Applied:;Skin Barrier   Dressing Changed;Honey;Hydrofiber;Foam   Packing packing removed;packed with;honey packing;hydrofiber/alginate   Packing Inserted  1   Periwound Care Skin barrier film applied   Dressing Change Due 03/07/24        Altered Skin Integrity 02/22/24 Right upper Back Full thickness tissue loss with exposed bone, tendon, or muscle. Often includes undermining and tunneling. May extend into muscle and/or supporting structures.   Date First Assessed: 02/22/24   Altered Skin Integrity Present on Admission - Did Patient arrive to the hospital with altered skin?: yes  Side: Right  Orientation: upper  Location: Back  Description of Altered Skin Integrity: Full thickness tissue los...   Description of Altered Skin Integrity Full thickness tissue loss with exposed bone, tendon, or muscle. Often includes undermining and tunneling. May extend into muscle and/or supporting structures.   Dressing Appearance Intact;Moist drainage   Drainage Amount Small   Drainage Characteristics/Odor Serosanguineous   Appearance Red;Yellow;Bone   Tissue loss description Full thickness   Red (%), Wound Tissue Color 50 %   Yellow (%), Wound Tissue Color 50 %   Periwound Area Intact   Wound Edges Open   Wound Length (cm) 4.5 cm   Wound Width (cm) 4 cm   Wound Depth (cm) 1.5 cm   Wound Volume (cm^3) 27 cm^3   Wound Surface Area (cm^2) 18 cm^2   Undermining (depth (cm)/location) 3cm from 8-3 o'clock   Care Cleansed with:;Wound cleanser;Applied:;Skin Barrier   Dressing Applied;Honey;Hydrofiber;Foam   Packing packing removed;packed with;honey packing;hydrofiber/alginate   Packing Inserted  1   Periwound Care Skin barrier film applied   Dressing Change Due 03/07/24        Altered Skin Integrity 02/22/24 Right dorsal Hand Ulceration   Date  First Assessed: 02/22/24   Altered Skin Integrity Present on Admission - Did Patient arrive to the hospital with altered skin?: yes  Side: Right  Orientation: dorsal  Location: Hand  Primary Wound Type: Ulceration   Wound Image    Dressing Appearance Intact   Drainage Amount None   Appearance Black;Eschar   Tissue loss description Full thickness   Black (%), Wound Tissue Color 100 %   Periwound Area Intact   Wound Edges Defined   Wound Length (cm) 7 cm   Wound Width (cm) 6 cm   Wound Depth (cm) 0.1 cm   Wound Volume (cm^3) 4.2 cm^3   Wound Surface Area (cm^2) 42 cm^2   Care Applied:;Povidone iodine   Dressing Changed;Gauze;Rolled gauze   Dressing Change Due 03/07/24        Altered Skin Integrity 02/22/24 Right posterior Elbow Ulceration   Date First Assessed: 02/22/24   Altered Skin Integrity Present on Admission - Did Patient arrive to the hospital with altered skin?: yes  Side: Right  Orientation: posterior  Location: Elbow  Primary Wound Type: Ulceration   Wound Image    Dressing Appearance Intact   Drainage Amount Small   Drainage Characteristics/Odor Serosanguineous   Appearance Red;Granulating;Epithelialization   Tissue loss description Full thickness   Red (%), Wound Tissue Color 90 %   Yellow (%), Wound Tissue Color 10 %   Periwound Area Intact   Wound Edges Defined   Wound Length (cm) 5 cm   Wound Width (cm) 3 cm   Wound Depth (cm) 0.1 cm   Wound Volume (cm^3) 1.5 cm^3   Wound Surface Area (cm^2) 15 cm^2   Care Cleansed with:;Wound cleanser;Applied:;Skin Barrier   Dressing Hydrofiber;Foam   Dressing Change Due 03/07/24        Altered Skin Integrity 02/22/24 Left lower;upper;anterior;posterior;medial;lateral Arm Other (comment)   Date First Assessed: 02/22/24   Altered Skin Integrity Present on Admission - Did Patient arrive to the hospital with altered skin?: yes  Side: Left  Orientation: lower;upper;anterior;posterior;medial;lateral  Location: (c) Arm  Primary Wound Type: (c...   Wound Image     Dressing  Appearance Intact;Moist drainage   Drainage Amount Small   Drainage Characteristics/Odor Serosanguineous   Appearance Black;Necrotic;Eschar;Red;Yellow;Moist   Tissue loss description Full thickness   Black (%), Wound Tissue Color 80 %   Red (%), Wound Tissue Color 10 %   Yellow (%), Wound Tissue Color 10 %   Wound Edges Defined;Other (see comments)  (lifting)   Care Cleansed with:;Applied:;Povidone iodine   Dressing Non-adherent;Absorptive Pad;Rolled gauze   Dressing Change Due 03/07/24        Altered Skin Integrity 02/22/24 Left anterior;lower;posterior;medial;lateral;proximal;distal;upper Leg Other (comment)   Date First Assessed: 02/22/24   Altered Skin Integrity Present on Admission - Did Patient arrive to the hospital with altered skin?: yes  Side: Left  Orientation: anterior;lower;posterior;medial;lateral;proximal;distal;upper  Location: (c) Leg  Primar...   Wound Image    Dressing Appearance Intact;Moist drainage   Drainage Amount Small   Drainage Characteristics/Odor Serosanguineous   Appearance Black;Necrotic;Eschar;Red;Yellow;Granulating   Tissue loss description Full thickness   Black (%), Wound Tissue Color 80 %   Red (%), Wound Tissue Color 10 %   Yellow (%), Wound Tissue Color 10 %   Wound Edges Defined  (lifting)   Care Applied:;Povidone iodine   Dressing Non-adherent;Absorptive Pad;Rolled gauze   Dressing Change Due 03/07/24        Altered Skin Integrity 02/22/24 Right anterior;lower;upper;posterior;medial;lateral Leg Other (comment)   Date First Assessed: 02/22/24   Altered Skin Integrity Present on Admission - Did Patient arrive to the hospital with altered skin?: yes  Side: Right  Orientation: anterior;lower;upper;posterior;medial;lateral  Location: Leg  Primary Wound Type: (c) O...   Wound Image    Dressing Appearance Intact;Moist drainage   Drainage Amount Small   Drainage Characteristics/Odor Serosanguineous   Appearance Black;Necrotic;Eschar;Red;Yellow;Granulating   Tissue loss description  Full thickness   Black (%), Wound Tissue Color 80 %   Red (%), Wound Tissue Color 10 %   Yellow (%), Wound Tissue Color 10 %   Wound Edges Defined   Care Applied:;Povidone iodine   Dressing Non-adherent;Absorptive Pad;Rolled gauze   Dressing Change Due 03/07/24        Altered Skin Integrity 02/22/24 Penis Mucosal membrane tissue injury with history of medical device use   Date First Assessed: 02/22/24   Altered Skin Integrity Present on Admission - Did Patient arrive to the hospital with altered skin?: yes  Location: Penis  Description of Altered Skin Integrity: Mucosal membrane tissue injury with history of medical de...   Dressing Appearance Intact;Moist drainage   Drainage Amount Small   Drainage Characteristics/Odor Sanguineous   Appearance Red;Yellow   Tissue loss description Full thickness        Colostomy  LLQ   Placement Date: (c)    Present Prior to Hospital Arrival?: Yes  Location: LLQ   Stomal Appliance 1 piece;Clean;Dry;Intact;No Leakage   Stoma Appearance round;pink   Stoma Function stool       Recommendations made to primary team for ongoing wound care per orders . Orders placed.     03/04/2024
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
## 2024-03-05 NOTE — ASSESSMENT & PLAN NOTE
-Patient with sepsis in the setting of multiple pressure ulcers to skin. Per chart review, consultations with surgery and orthopedic teams were made, including discussions with Dr. Dale, leading to the consensus that the surgical complexity and subsequent wound care were too advanced for their capabilities, necessitating a transfer to a tertiary care center.   -Attempts to facilitate this transfer were complicated by limited availability at receiving facilities, but ultimately, the patient was moved through several healthcare facilities, including [facility] and [facility], before being admitted to [facility] in [place] and then transferred to our facility. 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  -Despite aggressive management strategies, including antibiotics per ID recommendations, multivitamin and nutritional optimization, and continuous wound care, the prognosis remains grim. Wounds were deemed unlikely to heal, categorizing him as terminal.   -Followed up on GOC with patient and his parents Ann and Diony.  Patient and family want to continue aggressive measures and if patient's heart stops beating or he stops breathing, wants all heroic measures including resuscitation. We did follow up on discussion regarding tx vs comfort focused treatment and hospice in which they noted they would know when they are at that point and they are not at this time. 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

## 2024-03-05 NOTE — DISCHARGE SUMMARY
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 CANCELED 0.00 - 0.20 K/uL    nRBC 0 0 /100 WBC    Gran % 76.0 (H) 38.0 - 73.0 %    Lymph % 10.0 (L) 18.0 - 48.0 %    Mono % 3.0 (L) 4.0 - 15.0 %    Eosinophil % 0.0 0.0 - 8.0 %    Basophil % 0.0 0.0 - 1.9 %    Bands 10.0 %    Metamyelocytes 1.0 %    Platelet Estimate Increased (A)     Stomatocytes Present     Differential Method Manual    Magnesium   Result Value Ref Range    Magnesium 1.9 1.6 - 2.6 mg/dL   Basic metabolic panel   Result Value Ref Range    Sodium 140 136 - 145 mmol/L    Potassium 4.4 3.5 - 5.1 mmol/L    Chloride 107 95 - 110 mmol/L    CO2 21 (L) 23 - 29 mmol/L    Glucose 74 70 - 110 mg/dL    BUN 22 (H) 6 - 20 mg/dL    Creatinine 0.7 0.5 - 1.4 mg/dL    Calcium 7.3 (L) 8.7 - 10.5 mg/dL    Anion Gap 12 8 - 16 mmol/L    eGFR >60 >60 mL/min/1.73 m^2   CBC auto differential   Result Value Ref Range    WBC 19.78 (H) 3.90 - 12.70 K/uL    RBC 3.05 (L) 4.60 - 6.20 M/uL    Hemoglobin 9.1 (L) 14.0 - 18.0 g/dL    Hematocrit 28.5 (L) 40.0 - 54.0 %    MCV 93 82 - 98 fL    MCH 29.8 27.0 - 31.0 pg    MCHC 31.9 (L) 32.0 - 36.0 g/dL    RDW 16.1 (H) 11.5 - 14.5 %    Platelets 631 (H) 150 - 450 K/uL    MPV 8.5 (L) 9.2 - 12.9 fL    Immature Granulocytes CANCELED 0.0 - 0.5 %    Immature Grans (Abs)  CANCELED 0.00 - 0.04 K/uL    Lymph # CANCELED 1.0 - 4.8 K/uL    Mono # CANCELED 0.3 - 1.0 K/uL    Eos # CANCELED 0.0 - 0.5 K/uL    Baso # CANCELED 0.00 - 0.20 K/uL    nRBC 0 0 /100 WBC    Gran % 72.0 38.0 - 73.0 %    Lymph % 19.0 18.0 - 48.0 %    Mono % 9.0 4.0 - 15.0 %    Eosinophil % 0.0 0.0 - 8.0 %    Basophil % 0.0 0.0 - 1.9 %    Platelet Estimate Increased (A)     Aniso Slight     Tear Drop Cells Occasional     Acanthocytes Present     Schistocytes Present     Differential Method Manual    TSH   Result Value Ref Range    TSH 46.767 (H) 0.400 - 4.000 uIU/mL   T4, Free   Result Value Ref Range    Free T4 <0.40 (L) 0.71 - 1.51 ng/dL   CBC auto differential   Result Value Ref Range    WBC 18.78 (H) 3.90 - 12.70 K/uL    RBC 3.00 (L) 4.60 - 6.20 M/uL    Hemoglobin 8.8 (L) 14.0 - 18.0 g/dL    Hematocrit 28.0 (L) 40.0 - 54.0 %    MCV 93 82 - 98 fL    MCH 29.3 27.0 - 31.0 pg    MCHC 31.4 (L) 32.0 - 36.0 g/dL    RDW 15.9 (H) 11.5 - 14.5 %    Platelets 642 (H) 150 - 450 K/uL    MPV 8.4 (L) 9.2 - 12.9 fL    Immature Granulocytes CANCELED 0.0 - 0.5 %    Immature Grans (Abs) CANCELED 0.00 - 0.04 K/uL    Lymph # CANCELED 1.0 - 4.8 K/uL    Mono # CANCELED 0.3 - 1.0 K/uL    Eos # CANCELED 0.0 - 0.5 K/uL    Baso # CANCELED 0.00 - 0.20 K/uL    nRBC 0 0 /100 WBC    Gran % 65.0 38.0 - 73.0 %    Lymph % 23.0 18.0 - 48.0 %    Mono % 12.0 4.0 - 15.0 %    Eosinophil % 0.0 0.0 - 8.0 %    Basophil % 0.0 0.0 - 1.9 %    Platelet Estimate Increased (A)     Aniso Slight     Target Cells Occasional     Tear Drop Cells Occasional     Acanthocytes Present     Schistocytes Present     Differential Method Manual    CBC Auto Differential   Result Value Ref Range    WBC 18.78 (H) 3.90 - 12.70 K/uL    RBC 3.00 (L) 4.60 - 6.20 M/uL    Hemoglobin 8.8 (L) 14.0 - 18.0 g/dL    Hematocrit 28.0 (L) 40.0 - 54.0 %    MCV 93 82 - 98 fL    MCH 29.3 27.0 - 31.0 pg    MCHC 31.4 (L) 32.0 - 36.0 g/dL    RDW 15.9 (H) 11.5 - 14.5 %    Platelets 642 (H) 150 - 450  K/uL    MPV 8.4 (L) 9.2 - 12.9 fL    Immature Granulocytes CANCELED 0.0 - 0.5 %    Immature Grans (Abs) CANCELED 0.00 - 0.04 K/uL    Lymph # CANCELED 1.0 - 4.8 K/uL    Mono # CANCELED 0.3 - 1.0 K/uL    Eos # CANCELED 0.0 - 0.5 K/uL    Baso # CANCELED 0.00 - 0.20 K/uL    nRBC 0 0 /100 WBC    Gran % 65.0 38.0 - 73.0 %    Lymph % 23.0 18.0 - 48.0 %    Mono % 12.0 4.0 - 15.0 %    Eosinophil % 0.0 0.0 - 8.0 %    Basophil % 0.0 0.0 - 1.9 %    Platelet Estimate Increased (A)     Aniso Slight     Target Cells Occasional     Tear Drop Cells Occasional     Acanthocytes Present     Schistocytes Present     Differential Method Manual    Comprehensive Metabolic Panel   Result Value Ref Range    Sodium 138 136 - 145 mmol/L    Potassium 4.0 3.5 - 5.1 mmol/L    Chloride 106 95 - 110 mmol/L    CO2 23 23 - 29 mmol/L    Glucose 74 70 - 110 mg/dL    BUN 28 (H) 6 - 20 mg/dL    Creatinine 0.7 0.5 - 1.4 mg/dL    Calcium 8.1 (L) 8.7 - 10.5 mg/dL    Total Protein 6.2 6.0 - 8.4 g/dL    Albumin 1.2 (L) 3.5 - 5.2 g/dL    Total Bilirubin 0.2 0.1 - 1.0 mg/dL    Alkaline Phosphatase 137 (H) 55 - 135 U/L    AST 30 10 - 40 U/L    ALT 13 10 - 44 U/L    eGFR >60 >60 mL/min/1.73 m^2    Anion Gap 9 8 - 16 mmol/L   Magnesium   Result Value Ref Range    Magnesium 1.4 (L) 1.6 - 2.6 mg/dL   Phosphorus   Result Value Ref Range    Phosphorus 3.3 2.7 - 4.5 mg/dL   CBC Auto Differential   Result Value Ref Range    WBC 16.80 (H) 3.90 - 12.70 K/uL    RBC 3.02 (L) 4.60 - 6.20 M/uL    Hemoglobin 9.0 (L) 14.0 - 18.0 g/dL    Hematocrit 27.7 (L) 40.0 - 54.0 %    MCV 92 82 - 98 fL    MCH 29.8 27.0 - 31.0 pg    MCHC 32.5 32.0 - 36.0 g/dL    RDW 15.9 (H) 11.5 - 14.5 %    Platelets 607 (H) 150 - 450 K/uL    MPV 8.6 (L) 9.2 - 12.9 fL    Immature Granulocytes 2.3 (H) 0.0 - 0.5 %    Gran # (ANC) 11.5 (H) 1.8 - 7.7 K/uL    Immature Grans (Abs) 0.39 (H) 0.00 - 0.04 K/uL    Lymph # 3.1 1.0 - 4.8 K/uL    Mono # 1.5 (H) 0.3 - 1.0 K/uL    Eos # 0.3 0.0 - 0.5 K/uL    Baso # 0.08  0.00 - 0.20 K/uL    nRBC 0 0 /100 WBC    Gran % 68.2 38.0 - 73.0 %    Lymph % 18.4 18.0 - 48.0 %    Mono % 9.0 4.0 - 15.0 %    Eosinophil % 1.6 0.0 - 8.0 %    Basophil % 0.5 0.0 - 1.9 %    Differential Method Automated    Basic Metabolic Panel   Result Value Ref Range    Sodium 137 136 - 145 mmol/L    Potassium 4.1 3.5 - 5.1 mmol/L    Chloride 105 95 - 110 mmol/L    CO2 23 23 - 29 mmol/L    Glucose 65 (L) 70 - 110 mg/dL    BUN 33 (H) 6 - 20 mg/dL    Creatinine 0.6 0.5 - 1.4 mg/dL    Calcium 8.1 (L) 8.7 - 10.5 mg/dL    Anion Gap 9 8 - 16 mmol/L    eGFR >60 >60 mL/min/1.73 m^2   Magnesium   Result Value Ref Range    Magnesium 2.0 1.6 - 2.6 mg/dL   Phosphorus   Result Value Ref Range    Phosphorus 3.5 2.7 - 4.5 mg/dL   Cortisol   Result Value Ref Range    Cortisol 12.30 ug/dL   CBC Auto Differential   Result Value Ref Range    WBC 20.67 (H) 3.90 - 12.70 K/uL    RBC 3.17 (L) 4.60 - 6.20 M/uL    Hemoglobin 9.5 (L) 14.0 - 18.0 g/dL    Hematocrit 29.4 (L) 40.0 - 54.0 %    MCV 93 82 - 98 fL    MCH 30.0 27.0 - 31.0 pg    MCHC 32.3 32.0 - 36.0 g/dL    RDW 16.0 (H) 11.5 - 14.5 %    Platelets 702 (H) 150 - 450 K/uL    MPV 8.3 (L) 9.2 - 12.9 fL    Immature Granulocytes 2.0 (H) 0.0 - 0.5 %    Gran # (ANC) 12.7 (H) 1.8 - 7.7 K/uL    Immature Grans (Abs) 0.41 (H) 0.00 - 0.04 K/uL    Lymph # 4.7 1.0 - 4.8 K/uL    Mono # 2.4 (H) 0.3 - 1.0 K/uL    Eos # 0.3 0.0 - 0.5 K/uL    Baso # 0.10 0.00 - 0.20 K/uL    nRBC 0 0 /100 WBC    Gran % 61.6 38.0 - 73.0 %    Lymph % 22.9 18.0 - 48.0 %    Mono % 11.4 4.0 - 15.0 %    Eosinophil % 1.6 0.0 - 8.0 %    Basophil % 0.5 0.0 - 1.9 %    Platelet Estimate Increased (A)     Target Cells Occasional     Stomatocytes Present     Differential Method Automated    Basic Metabolic Panel   Result Value Ref Range    Sodium 138 136 - 145 mmol/L    Potassium 3.7 3.5 - 5.1 mmol/L    Chloride 105 95 - 110 mmol/L    CO2 24 23 - 29 mmol/L    Glucose 101 70 - 110 mg/dL    BUN 44 (H) 6 - 20 mg/dL    Creatinine 0.6  0.5 - 1.4 mg/dL    Calcium 8.5 (L) 8.7 - 10.5 mg/dL    Anion Gap 9 8 - 16 mmol/L    eGFR >60 >60 mL/min/1.73 m^2   Magnesium   Result Value Ref Range    Magnesium 1.5 (L) 1.6 - 2.6 mg/dL   Phosphorus   Result Value Ref Range    Phosphorus 3.3 2.7 - 4.5 mg/dL   CBC Auto Differential   Result Value Ref Range    WBC 17.93 (H) 3.90 - 12.70 K/uL    RBC 2.96 (L) 4.60 - 6.20 M/uL    Hemoglobin 8.7 (L) 14.0 - 18.0 g/dL    Hematocrit 27.5 (L) 40.0 - 54.0 %    MCV 93 82 - 98 fL    MCH 29.4 27.0 - 31.0 pg    MCHC 31.6 (L) 32.0 - 36.0 g/dL    RDW 16.1 (H) 11.5 - 14.5 %    Platelets 578 (H) 150 - 450 K/uL    MPV 8.5 (L) 9.2 - 12.9 fL    Immature Granulocytes 1.3 (H) 0.0 - 0.5 %    Gran # (ANC) 10.8 (H) 1.8 - 7.7 K/uL    Immature Grans (Abs) 0.24 (H) 0.00 - 0.04 K/uL    Lymph # 4.3 1.0 - 4.8 K/uL    Mono # 2.1 (H) 0.3 - 1.0 K/uL    Eos # 0.4 0.0 - 0.5 K/uL    Baso # 0.12 0.00 - 0.20 K/uL    nRBC 0 0 /100 WBC    Gran % 60.3 38.0 - 73.0 %    Lymph % 23.7 18.0 - 48.0 %    Mono % 11.9 4.0 - 15.0 %    Eosinophil % 2.1 0.0 - 8.0 %    Basophil % 0.7 0.0 - 1.9 %    Platelet Estimate Increased (A)     Poly Occasional     Target Cells Occasional     Stomatocytes Present     Differential Method Automated    Renal Function Panel   Result Value Ref Range    Glucose 106 70 - 110 mg/dL    Sodium 137 136 - 145 mmol/L    Potassium 4.5 3.5 - 5.1 mmol/L    Chloride 107 95 - 110 mmol/L    CO2 24 23 - 29 mmol/L    BUN 48 (H) 6 - 20 mg/dL    Calcium 8.5 (L) 8.7 - 10.5 mg/dL    Creatinine 0.6 0.5 - 1.4 mg/dL    Albumin 1.3 (L) 3.5 - 5.2 g/dL    Phosphorus 3.3 2.7 - 4.5 mg/dL    eGFR >60 >60 mL/min/1.73 m^2    Anion Gap 6 (L) 8 - 16 mmol/L   Magnesium   Result Value Ref Range    Magnesium 1.9 1.6 - 2.6 mg/dL   CBC Auto Differential   Result Value Ref Range    WBC 18.25 (H) 3.90 - 12.70 K/uL    RBC 2.65 (L) 4.60 - 6.20 M/uL    Hemoglobin 7.8 (L) 14.0 - 18.0 g/dL    Hematocrit 24.8 (L) 40.0 - 54.0 %    MCV 94 82 - 98 fL    MCH 29.4 27.0 - 31.0 pg    MCHC  31.5 (L) 32.0 - 36.0 g/dL    RDW 16.6 (H) 11.5 - 14.5 %    Platelets 390 150 - 450 K/uL    MPV 8.5 (L) 9.2 - 12.9 fL    Immature Granulocytes 1.7 (H) 0.0 - 0.5 %    Gran # (ANC) 11.2 (H) 1.8 - 7.7 K/uL    Immature Grans (Abs) 0.31 (H) 0.00 - 0.04 K/uL    Lymph # 4.1 1.0 - 4.8 K/uL    Mono # 2.2 (H) 0.3 - 1.0 K/uL    Eos # 0.4 0.0 - 0.5 K/uL    Baso # 0.09 0.00 - 0.20 K/uL    nRBC 0 0 /100 WBC    Gran % 61.5 38.0 - 73.0 %    Lymph % 22.4 18.0 - 48.0 %    Mono % 11.8 4.0 - 15.0 %    Eosinophil % 2.1 0.0 - 8.0 %    Basophil % 0.5 0.0 - 1.9 %    Platelet Estimate Appears normal     Hypo Occasional     Differential Method Automated    Renal Function Panel   Result Value Ref Range    Glucose 77 70 - 110 mg/dL    Sodium 138 136 - 145 mmol/L    Potassium 4.5 3.5 - 5.1 mmol/L    Chloride 106 95 - 110 mmol/L    CO2 24 23 - 29 mmol/L    BUN 32 (H) 6 - 20 mg/dL    Calcium 8.3 (L) 8.7 - 10.5 mg/dL    Creatinine 0.6 0.5 - 1.4 mg/dL    Albumin 1.5 (L) 3.5 - 5.2 g/dL    Phosphorus 3.7 2.7 - 4.5 mg/dL    eGFR >60 >60 mL/min/1.73 m^2    Anion Gap 8 8 - 16 mmol/L   CBC Auto Differential   Result Value Ref Range    WBC 20.59 (H) 3.90 - 12.70 K/uL    RBC 2.40 (L) 4.60 - 6.20 M/uL    Hemoglobin 7.1 (L) 14.0 - 18.0 g/dL    Hematocrit 22.6 (L) 40.0 - 54.0 %    MCV 94 82 - 98 fL    MCH 29.6 27.0 - 31.0 pg    MCHC 31.4 (L) 32.0 - 36.0 g/dL    RDW 16.0 (H) 11.5 - 14.5 %    Platelets 324 150 - 450 K/uL    MPV 8.7 (L) 9.2 - 12.9 fL    Immature Granulocytes 1.3 (H) 0.0 - 0.5 %    Gran # (ANC) 12.9 (H) 1.8 - 7.7 K/uL    Immature Grans (Abs) 0.27 (H) 0.00 - 0.04 K/uL    Lymph # 4.0 1.0 - 4.8 K/uL    Mono # 2.5 (H) 0.3 - 1.0 K/uL    Eos # 0.7 (H) 0.0 - 0.5 K/uL    Baso # 0.19 0.00 - 0.20 K/uL    nRBC 0 0 /100 WBC    Gran % 62.7 38.0 - 73.0 %    Lymph % 19.6 18.0 - 48.0 %    Mono % 12.3 4.0 - 15.0 %    Eosinophil % 3.2 0.0 - 8.0 %    Basophil % 0.9 0.0 - 1.9 %    Platelet Estimate Appears normal     Aniso Slight     Target Cells Occasional     Tear  Drop Cells Occasional     Acanthocytes Present     Schistocytes Present     Differential Method Automated    Basic Metabolic Panel   Result Value Ref Range    Sodium 134 (L) 136 - 145 mmol/L    Potassium 4.6 3.5 - 5.1 mmol/L    Chloride 104 95 - 110 mmol/L    CO2 23 23 - 29 mmol/L    Glucose 101 70 - 110 mg/dL    BUN 14 6 - 20 mg/dL    Creatinine 0.6 0.5 - 1.4 mg/dL    Calcium 7.4 (L) 8.7 - 10.5 mg/dL    Anion Gap 7 (L) 8 - 16 mmol/L    eGFR >60 >60 mL/min/1.73 m^2   EKG 12-lead   Result Value Ref Range    QRS Duration 70 ms    [facility] QTC Calculation 449 ms   Echo   Result Value Ref Range    Left Atrium Major Axis 4.29 cm    Left Atrium Minor Axis 3.68 cm    RA Major Axis 3.90 cm    LV Diastolic Volume 106.99 mL    LV Systolic Volume 47.43 mL    MV Peak A Ken 0.60 m/s    TR Max Ken 3.12 m/s    MV Peak E Ken 0.84 m/s    PV mean gradient 2 mmHg    RVOT peak VTI 18.5 cm    RVOT peak ken 0.76 m/s    Ao VTI 27.80 cm    Ao peak ken 1.45 m/s    LVOT peak VTI 18.80 cm    LVOT peak ken 0.99 m/s    LVOT diameter 2.04 cm    IVRT 65.65 msec    E wave deceleration time 306.60 msec    AV mean gradient 5 mmHg    TAPSE 1.76 cm    LA size 2.81 cm    Ascending aorta 3.16 cm    STJ 3.01 cm    LVIDs 3.40 2.1 - 4.0 cm    Ao root annulus 2.78 cm    Posterior Wall 0.91 0.6 - 1.1 cm    IVS 0.60 0.6 - 1.1 cm    LVIDd 4.79 3.5 - 6.0 cm    TDI LATERAL 0.19 m/s    Left Ventricular Outflow Tract Mean Gradient 2.10 mmHg    Left Ventricular Outflow Tract Mean Velocity 0.68 cm/s    RV mid diameter 3.04 cm    IVC diameter 1.34 cm    TDI SEPTAL 0.14 m/s    LV LATERAL E/E' RATIO 4.42 m/s    LV SEPTAL E/E' RATIO 6.00 m/s    FS 29 28 - 44 %    LV mass 117.20 g    ZLVIDD 0.65     ZLVIDS 1.58     Left Ventricle Relative Wall Thickness 0.38 cm    AV valve area 2.21 cm²    AV Velocity Ratio 0.68     AV index (prosthetic) 0.68     E/A ratio 1.40     Mean e' 0.17 m/s    LVOT area 3.3 cm2    LVOT stroke volume 61.42 cm3    AV peak gradient 8 mmHg    E/E' ratio  5.09 m/s    LV Systolic Volume Index 30.6 mL/m2    LV Diastolic Volume Index 69.03 mL/m2    LV Mass Index 76 g/m2    Triscuspid Valve Regurgitation Peak Gradient 39 mmHg    JENNIFER by Velocity Ratio 2.23 cm²    BSA 1.49 m2    LA Volume Index 17.1 mL/m2    LA volume 26.49 cm3    LA WIDTH 2.8 cm    RA Width 3.0 cm   Type & Screen   Result Value Ref Range    Group & Rh O POS     Indirect Kathi NEG     Specimen Outdate 02/26/2024 23:59    ISTAT PROCEDURE   Result Value Ref Range    POC PH 7.336 (L) 7.35 - 7.45    POC PCO2 48.5 (H) 35 - 45 mmHg    POC PO2 54 (LL) 80 - 100 mmHg    POC HCO3 26.0 24 - 28 mmol/L    POC BE 0 -2 to 2 mmol/L    POC SATURATED O2 85 95 - 100 %    Sample ARTERIAL     Site RR     Allens Test Pass     DelSys T-Collar     Mode SPONT     Flow 10     FiO2 60     Sp02 93    POCT glucose   Result Value Ref Range    POCT Glucose 130 (H) 70 - 110 mg/dL   ISTAT PROCEDURE   Result Value Ref Range    POC PH 7.347 (L) 7.35 - 7.45    POC PCO2 48.0 (H) 35 - 45 mmHg    POC PO2 380 (H) 80 - 100 mmHg    POC HCO3 26.3 24 - 28 mmol/L    POC BE 1 -2 to 2 mmol/L    POC SATURATED O2 100 95 - 100 %    Rate 26     Sample ARTERIAL     Site RR     Allens Test Pass     DelSys Adult Vent     Mode AC/PRVC     Vt 365     PEEP 5     FiO2 100    POCT glucose   Result Value Ref Range    POCT Glucose 148 (H) 70 - 110 mg/dL   POCT glucose   Result Value Ref Range    POCT Glucose 124 (H) 70 - 110 mg/dL   POCT glucose   Result Value Ref Range    POCT Glucose 89 70 - 110 mg/dL   POCT glucose   Result Value Ref Range    POCT Glucose 96 70 - 110 mg/dL   POCT glucose   Result Value Ref Range    POCT Glucose 99 70 - 110 mg/dL   POCT glucose   Result Value Ref Range    POCT Glucose 69 (L) 70 - 110 mg/dL   POCT glucose   Result Value Ref Range    POCT Glucose 89 70 - 110 mg/dL   POCT glucose   Result Value Ref Range    POCT Glucose 90 70 - 110 mg/dL   POCT glucose   Result Value Ref Range    POCT Glucose 80 70 - 110 mg/dL   POCT glucose    Result Value Ref Range    POCT Glucose 80 70 - 110 mg/dL   POCT glucose   Result Value Ref Range    POCT Glucose 139 (H) 70 - 110 mg/dL   POCT glucose   Result Value Ref Range    POCT Glucose 88 70 - 110 mg/dL   POCT glucose   Result Value Ref Range    POCT Glucose 79 70 - 110 mg/dL   POCT glucose   Result Value Ref Range    POCT Glucose 103 70 - 110 mg/dL   POCT glucose   Result Value Ref Range    POCT Glucose 94 70 - 110 mg/dL   POCT glucose   Result Value Ref Range    POCT Glucose 95 70 - 110 mg/dL   POCT glucose   Result Value Ref Range    POCT Glucose 108 70 - 110 mg/dL   POCT glucose   Result Value Ref Range    POCT Glucose 92 70 - 110 mg/dL   ISTAT PROCEDURE   Result Value Ref Range    POC PH 7.379 7.35 - 7.45    POC PCO2 42.7 35 - 45 mmHg    POC PO2 79 (L) 80 - 100 mmHg    POC HCO3 25.2 24 - 28 mmol/L    POC BE 0 -2 to 2 mmol/L    POC SATURATED O2 95 95 - 100 %    Sample ARTERIAL     Site RB     Allens Test Pass     DelSys T-Collar     Mode SPONT     Flow 5     FiO2 28    POCT glucose   Result Value Ref Range    POCT Glucose 80 70 - 110 mg/dL   POCT glucose   Result Value Ref Range    POCT Glucose 95 70 - 110 mg/dL   POCT glucose   Result Value Ref Range    POCT Glucose 85 70 - 110 mg/dL   POCT glucose   Result Value Ref Range    POCT Glucose 64 (L) 70 - 110 mg/dL   POCT glucose   Result Value Ref Range    POCT Glucose 76 70 - 110 mg/dL   POCT glucose   Result Value Ref Range    POCT Glucose 70 70 - 110 mg/dL   POCT glucose   Result Value Ref Range    POCT Glucose 85 70 - 110 mg/dL   POCT glucose   Result Value Ref Range    POCT Glucose 115 (H) 70 - 110 mg/dL   POCT glucose   Result Value Ref Range    POCT Glucose 92 70 - 110 mg/dL   POCT glucose   Result Value Ref Range    POCT Glucose 128 (H) 70 - 110 mg/dL   POCT glucose   Result Value Ref Range    POCT Glucose 107 70 - 110 mg/dL   POCT glucose   Result Value Ref Range    POCT Glucose 141 (H) 70 - 110 mg/dL   POCT glucose   Result Value Ref Range     POCT Glucose 122 (H) 70 - 110 mg/dL   POCT glucose   Result Value Ref Range    POCT Glucose 104 70 - 110 mg/dL   POCT glucose   Result Value Ref Range    POCT Glucose 89 70 - 110 mg/dL   POCT glucose   Result Value Ref Range    POCT Glucose 90 70 - 110 mg/dL   POCT glucose   Result Value Ref Range    POCT Glucose 108 70 - 110 mg/dL   Prepare RBC 1 Unit   Result Value Ref Range    UNIT NUMBER [id number]     Product Code [id number]     DISPENSE STATUS TRANSFUSED     CODING SYSTEM [id number]     Unit Blood Type Code 5100     Unit Blood Type O POS     Unit Expiration [id number]     CROSSMATCH INTERPRETATION Compatible         Pending Diagnostic Studies:       None           Medications:       Medication List        START taking these medications      folic acid 1 MG tablet  Commonly known as: FOLVITE  1 tablet (1 mg total) by Per G Tube route once daily.  Start taking on: March 6, 2024     gabapentin 300 MG capsule  Commonly known as: NEURONTIN  Take 1 capsule (300 mg total) by mouth 3 (three) times daily.     levETIRAcetam 100 mg/mL Soln  Commonly known as: KEPPRA  7.5 mLs (750 mg total) by Per NG tube route 2 (two) times daily.  Replaces: levETIRAcetam 1000 MG tablet     levothyroxine 100 MCG tablet  Commonly known as: SYNTHROID  Take 1 tablet (100 mcg total) by mouth before breakfast.  Start taking on: March 6, 2024     linezolid 600 mg/300 mL Pgbk  Commonly known as: ZYVOX  Inject 300 mLs (600 mg total) into the vein every 12 (twelve) hours.     minocycline 100 MG capsule  Commonly known as: MINOCIN,DYNACIN  1 capsule (100 mg total) by Per NG tube route every 12 (twelve) hours.     sodium chloride 0.9% SolP 100 mL with meropenem 1 gram SolR 2 g  Inject 2 g into the vein every 8 (eight) hours.            CHANGE how you take these medications      midodrine 10 MG tablet  Commonly known as: PROAMATINE  1 tablet (10 mg total) by Per NG tube route 3 (three) times daily.  What changed:   medication strength  how much to  take  how to take this  when to take this            CONTINUE taking these medications      acetaminophen 325 MG tablet  Commonly known as: TYLENOL  2 tablets (650 mg total) by Per G Tube route every 6 (six) hours as needed for Temperature greater than (100.4).     albuterol 2.5 mg /3 mL (0.083 %) nebulizer solution  Commonly known as: PROVENTIL  Take 3 mLs (2.5 mg total) by nebulization every 4 (four) hours as needed (shortness of breath). Rescue     calcitRIOL 0.5 MCG Cap  Commonly known as: ROCALTROL  Take 1 capsule (0.5 mcg total) by mouth once daily.     calcium acetate(phosphat bind) 667 mg capsule  Commonly known as: PHOSLO  Take 1 capsule (667 mg total) by mouth 3 (three) times daily with meals.     enoxaparin 40 mg/0.4 mL Syrg  Commonly known as: LOVENOX  Inject 0.4 mLs (40 mg total) into the skin once daily.     HYDROcodone-acetaminophen  mg per tablet  Commonly known as: NORCO  Take 1 tablet by mouth every 6 (six) hours as needed for Pain.     melatonin 3 mg tablet  Commonly known as: MELATIN  Take 2 tablets (6 mg total) by mouth nightly as needed for Insomnia.     ondansetron 4 mg/5 mL solution  Commonly known as: ZOFRAN  Take 5 mLs (4 mg total) by mouth every 6 (six) hours as needed for Nausea.     pantoprazole 40 mg injection  Commonly known as: PROTONIX  Inject 40 mg into the vein once daily.     sertraline 50 MG tablet  Commonly known as: ZOLOFT  Take 1 tablet (50 mg total) by mouth once daily.            STOP taking these medications      ALPRAZolam 1 MG tablet  Commonly known as: XANAX     clonazePAM 1 MG tablet  Commonly known as: KlonoPIN     levETIRAcetam 1000 MG tablet  Commonly known as: KEPPRA  Replaced by: levETIRAcetam 100 mg/mL Soln               Where to Get Your Medications        Information about where to get these medications is not yet available    Ask your nurse or doctor about these medications  folic acid 1 MG tablet  gabapentin 300 MG capsule  levETIRAcetam 100 mg/mL  Soln  levothyroxine 100 MCG tablet  linezolid 600 mg/300 mL Pgbk  midodrine 10 MG tablet  minocycline 100 MG capsule  sodium chloride 0.9% SolP 100 mL with meropenem 1 gram SolR 2 g          Indwelling Lines/Drains at time of discharge:   Lines/Drains/Airways       Central Venous Catheter Line  Duration             Percutaneous Central Line Insertion/Assessment - Triple Lumen  02/21/24 1754 Femoral Vein Right;Femoral Right 12 days              Drain  Duration                  Colostomy  LLQ -- days         Urethral Catheter 02/21/24 2330 Temperature probe 12 days         NG/OG Tube 02/23/24 1459 Right nostril 11 days              Airway  Duration             Adult Surgical Airway 05/25/23 1135 Shiley Cuffed 8.0 / 85 mm 285 days                    Time spent on the discharge of patient: 91 minutes         Patrice Juarez MD  Department of Hospital Medicine  [facility] - Med S[facility]
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
## 2024-03-05 NOTE — SUBJECTIVE & OBJECTIVE
Interval History: Followed up with patient and his parents Ann and Diony on goals of care and advance care planning. Patient and family want to continue aggressive measures and if patient's heart stops beating or he stops breathing, wants all heroic measures including resuscitation. We did follow up on discussion regarding tx vs comfort focused treatment and hospice in which they noted they would know when they are at that point and they are not at this time. 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 GOC established and PM will follow up as needed. All questions and concerns addressed. Primary team updated.     No past medical history on file.    Past Surgical History:   Procedure Laterality Date    ESOPHAGOGASTRODUODENOSCOPY W/ PEG N/A 5/30/2023    Procedure: PEG;  Surgeon: JUSTYN Devine MD;  Location: [facility] ENDOSCOPY;  Service: Gastroenterology;  Laterality: N/A;       Review of patient's allergies indicates:  No Known Allergies    Medications:  Continuous Infusions:   dextrose 5 % and 0.45 % NaCl 50 mL/hr at 03/05/24 0637     Scheduled Meds:   ALPRAZolam  0.25 mg Oral Once    chlorhexidine  15 mL Mouth/Throat BID    enoxparin  40 mg Subcutaneous Q24H (prophylaxis, 1700)    famotidine  20 mg Per NG tube BID    folic acid  1 mg Per NG tube Daily    gabapentin  300 mg Per NG tube TID    levETIRAcetam  750 mg Per NG tube BID    levothyroxine  100 mcg Per NG tube Before breakfast    linezolid  600 mg Intravenous Q12H    meropenem (MERREM) IVPB  2 g Intravenous Q8H    midodrine  10 mg Per NG tube TID    minocycline  100 mg Per NG tube Q12H     PRN Meds:acetaminophen, ALPRAZolam, dextrose 10%, dextrose 10%, HYDROcodone-acetaminophen, influenza, magnesium sulfate IVPB, magnesium sulfate IVPB, pneumoc 20-rajendra conj-dip cr(PF), potassium chloride in water **AND** potassium chloride in water **AND** potassium chloride in  water, sodium chloride 0.9%    Family History    None       Tobacco Use    Smoking status: Not on file    Smokeless tobacco: Not on file   Substance and Sexual Activity    Alcohol use: Not on file    Drug use: Not on file    Sexual activity: Not on file       Review of Systems   Constitutional:  Positive for fatigue.   Musculoskeletal:         Generalized pain, worse with wound care     Objective:     Vital Signs (Most Recent):  Temp: 97.8 °F (36.6 °C) (03/05/24 1059)  Pulse: 77 (03/05/24 1059)  Resp: 18 (03/05/24 1059)  BP: (!) 100/59 (03/05/24 1059)  SpO2: 98 % (03/05/24 1059) Vital Signs (24h Range):  Temp:  [97.6 °F (36.4 °C)-99.4 °F (37.4 °C)] 97.8 °F (36.6 °C)  Pulse:  [] 77  Resp:  [16-20] 18  SpO2:  [90 %-100 %] 98 %  BP: ()/(51-59) 100/59     Weight: (!) 139 kg (306 lb 7 oz)  Body mass index is 45.25 kg/m².       Physical Exam  Vitals and nursing note reviewed.   Constitutional:       Appearance: He is ill-appearing.      Comments: Asleep, easily aroused   HENT:      Head: Normocephalic.      Nose: Nose normal.      Comments: NGT with TF infusing      Mouth/Throat:      Mouth: Mucous membranes are dry.   Eyes:      General:         Right eye: No discharge.         Left eye: No discharge.   Cardiovascular:      Rate and Rhythm: Tachycardia present.   Pulmonary:      Comments: Trach + trach collar  Abdominal:      Palpations: Abdomen is soft.   Musculoskeletal:      Comments: Bed bound   Skin:     Comments: Several large, unstageable wounds across various parts of the body, including the sacral and thoracic spine, ischial tuberosity on both sides, right upper back, right dorsal hand, right posterior elbow, and extensive areas on both arms and legs. These wounds exhibited full thickness tissue loss with exposed bone, tendon, or muscle, displaying characteristics such as being black, necrotic, granulating, moist, and exhibiting multiple colors and textures.    Neurological:      Mental Status: He is  oriented to person, place, and time.   Psychiatric:         Mood and Affect: Mood normal.         Behavior: Behavior normal.         Thought Content: Thought content normal.         Judgment: Judgment normal.            Review of Symptoms      Symptom Assessment (ESAS 0-10 Scale)  Pain:  0  Dyspnea:  0  Anxiety:  0  Nausea:  0  Depression:  0  Anorexia:  0  Fatigue:  0  Insomnia:  0  Restlessness:  0  Agitation:  0         Performance Status:  30    Psychosocial/Cultural:   See Palliative Psychosocial Note: No  Lived in LTAC prior to admission, prior to lived at home with his family.   **Primary  to Follow**  Palliative Care  Consult: No        Advance Care Planning   Advance Directives:     Decision Making:  Patient answered questions and Family answered questions  Goals of Care: What is most important right now is to focus on curative/life-prolongation (regardless of treatment burdens). Accordingly, we have decided that the best plan to meet the patient's goals includes continuing with treatment.         Significant Labs: All pertinent labs within the past 24 hours have been reviewed.  CBC:   Recent Labs   Lab 03/05/24  0540   WBC 20.59*   HGB 7.1*   HCT 22.6*   MCV 94          BMP:  Recent Labs   Lab 03/05/24  0540      *   K 4.6      CO2 23   BUN 14   CREATININE 0.6   CALCIUM 7.4*     LFT:  Lab Results   Component Value Date    AST 30 02/27/2024    ALKPHOS 137 (H) 02/27/2024    BILITOT 0.2 02/27/2024     Albumin:   Albumin   Date Value Ref Range Status   03/02/2024 1.5 (L) 3.5 - 5.2 g/dL Final     Protein:   Total Protein   Date Value Ref Range Status   02/27/2024 6.2 6.0 - 8.4 g/dL Final     Lactic acid:   Lab Results   Component Value Date    LACTATE 1.2 02/22/2024    LACTATE 1.7 02/21/2024       Significant Imaging: I have reviewed all pertinent imaging results/findings within the past 24 hours.

## 2024-03-05 NOTE — PLAN OF CARE
[facility] - Med Surg  Discharge Final Note    Primary Care Provider: No, Primary Doctor    Expected Discharge Date: 3/5/2024    Final Discharge Note (most recent)       Final Note - 03/05/24 1513          Final Note    Assessment Type Final Discharge Note     Anticipated Discharge Disposition Another Health Care Institution Not Defined        Post-Acute Status    Discharge Delays None known at this time                   Transfer to [facility].

## 2024-03-05 NOTE — NURSING
Patient remains free from falls or injury. Patient has chronic sterling. Patient has NG tube to the right nare with tube feedings going at 40mL/ HR. Patient has multiple wounds. Encouraged patient to turn Q two hours.

## 2024-03-05 NOTE — PLAN OF CARE
Care plan reviewed with patient. Wound care done with Wound care nurse. Suctioning done. Feeding tolerated.  Free from falls. No other complaints made. All orders checked and reviewed.

## 2024-03-05 NOTE — PLAN OF CARE
03/05/24 1338   Discharge Reassessment   Assessment Type Discharge Planning Reassessment   Did the patient's condition or plan change since previous assessment? No   Discharge Plan discussed with:   (Attending MD, Dr. Juarez)   Discharge Plan A   (TBD)   DME Needed Upon Discharge  none   Transition of Care Barriers None   Why the patient remains in the hospital Requires continued medical care   Post-Acute Status   Discharge Delays None known at this time     Plan for pt to transfer to [facility] for HLOC.

## 2024-03-06 LAB
ACANTHOCYTES BLD QL SMEAR: PRESENT
ANION GAP SERPL CALC-SCNC: 9 MMOL/L (ref 8–16)
ANISOCYTOSIS BLD QL SMEAR: SLIGHT
BASOPHILS # BLD AUTO: 0.17 K/UL (ref 0–0.2)
BASOPHILS NFR BLD: 0.8 % (ref 0–1.9)
BUN SERPL-MCNC: 12 MG/DL (ref 6–20)
CALCIUM SERPL-MCNC: 7.4 MG/DL (ref 8.7–10.5)
CHLORIDE SERPL-SCNC: 103 MMOL/L (ref 95–110)
CO2 SERPL-SCNC: 22 MMOL/L (ref 23–29)
CREAT SERPL-MCNC: 0.6 MG/DL (ref 0.5–1.4)
DACRYOCYTES BLD QL SMEAR: ABNORMAL
DIFFERENTIAL METHOD BLD: ABNORMAL
EOSINOPHIL # BLD AUTO: 0.8 K/UL (ref 0–0.5)
EOSINOPHIL NFR BLD: 4.1 % (ref 0–8)
ERYTHROCYTE [DISTWIDTH] IN BLOOD BY AUTOMATED COUNT: 16 % (ref 11.5–14.5)
EST. GFR  (NO RACE VARIABLE): >60 ML/MIN/1.73 M^2
GLUCOSE SERPL-MCNC: 85 MG/DL (ref 70–110)
HCT VFR BLD AUTO: 22.1 % (ref 40–54)
HGB BLD-MCNC: 7 G/DL (ref 14–18)
IMM GRANULOCYTES # BLD AUTO: 0.17 K/UL (ref 0–0.04)
IMM GRANULOCYTES NFR BLD AUTO: 0.8 % (ref 0–0.5)
LYMPHOCYTES # BLD AUTO: 4.4 K/UL (ref 1–4.8)
LYMPHOCYTES NFR BLD: 21.4 % (ref 18–48)
MCH RBC QN AUTO: 29.4 PG (ref 27–31)
MCHC RBC AUTO-ENTMCNC: 31.7 G/DL (ref 32–36)
MCV RBC AUTO: 93 FL (ref 82–98)
MONOCYTES # BLD AUTO: 2.4 K/UL (ref 0.3–1)
MONOCYTES NFR BLD: 11.9 % (ref 4–15)
NEUTROPHILS # BLD AUTO: 12.5 K/UL (ref 1.8–7.7)
NEUTROPHILS NFR BLD: 61 % (ref 38–73)
NRBC BLD-RTO: 0 /100 WBC
PLATELET # BLD AUTO: 365 K/UL (ref 150–450)
PLATELET BLD QL SMEAR: ABNORMAL
PMV BLD AUTO: 9 FL (ref 9.2–12.9)
POTASSIUM SERPL-SCNC: 4.6 MMOL/L (ref 3.5–5.1)
RBC # BLD AUTO: 2.38 M/UL (ref 4.6–6.2)
SCHISTOCYTES BLD QL SMEAR: PRESENT
SODIUM SERPL-SCNC: 134 MMOL/L (ref 136–145)
TARGETS BLD QL SMEAR: ABNORMAL
WBC # BLD AUTO: 20.44 K/UL (ref 3.9–12.7)

## 2024-03-06 PROCEDURE — 85025 COMPLETE CBC W/AUTO DIFF WBC: CPT | Performed by: STUDENT IN AN ORGANIZED HEALTH CARE EDUCATION/TRAINING PROGRAM

## 2024-03-06 PROCEDURE — 63600175 PHARM REV CODE 636 W HCPCS: Performed by: STUDENT IN AN ORGANIZED HEALTH CARE EDUCATION/TRAINING PROGRAM

## 2024-03-06 PROCEDURE — 25000003 PHARM REV CODE 250: Performed by: INTERNAL MEDICINE

## 2024-03-06 PROCEDURE — 25000003 PHARM REV CODE 250: Performed by: SPECIALIST

## 2024-03-06 PROCEDURE — 99900035 HC TECH TIME PER 15 MIN (STAT)

## 2024-03-06 PROCEDURE — 25000003 PHARM REV CODE 250: Performed by: STUDENT IN AN ORGANIZED HEALTH CARE EDUCATION/TRAINING PROGRAM

## 2024-03-06 PROCEDURE — 80048 BASIC METABOLIC PNL TOTAL CA: CPT | Performed by: STUDENT IN AN ORGANIZED HEALTH CARE EDUCATION/TRAINING PROGRAM

## 2024-03-06 PROCEDURE — 11000001 HC ACUTE MED/SURG PRIVATE ROOM

## 2024-03-06 PROCEDURE — 31720 CLEARANCE OF AIRWAYS: CPT

## 2024-03-06 PROCEDURE — 94761 N-INVAS EAR/PLS OXIMETRY MLT: CPT

## 2024-03-06 PROCEDURE — 27000221 HC OXYGEN, UP TO 24 HOURS

## 2024-03-06 PROCEDURE — 27000207 HC ISOLATION

## 2024-03-06 PROCEDURE — S5010 5% DEXTROSE AND 0.45% SALINE: HCPCS | Performed by: INTERNAL MEDICINE

## 2024-03-06 PROCEDURE — 25000003 PHARM REV CODE 250: Performed by: NURSE PRACTITIONER

## 2024-03-06 PROCEDURE — 63600175 PHARM REV CODE 636 W HCPCS: Performed by: INTERNAL MEDICINE

## 2024-03-06 PROCEDURE — 36556 INSERT NON-TUNNEL CV CATH: CPT

## 2024-03-06 RX ORDER — ENOXAPARIN SODIUM 100 MG/ML
40 INJECTION SUBCUTANEOUS EVERY 12 HOURS
Status: DISCONTINUED | OUTPATIENT
Start: 2024-03-06 | End: 2024-03-07 | Stop reason: HOSPADM

## 2024-03-06 RX ADMIN — LINEZOLID 600 MG: 600 INJECTION, SOLUTION INTRAVENOUS at 06:03

## 2024-03-06 RX ADMIN — LEVOTHYROXINE SODIUM 100 MCG: 100 TABLET ORAL at 05:03

## 2024-03-06 RX ADMIN — MINOCYCLINE HYDROCHLORIDE 100 MG: 50 CAPSULE ORAL at 08:03

## 2024-03-06 RX ADMIN — HYDROCODONE BITARTRATE AND ACETAMINOPHEN 1 TABLET: 5; 325 TABLET ORAL at 05:03

## 2024-03-06 RX ADMIN — MIDODRINE HYDROCHLORIDE 10 MG: 5 TABLET ORAL at 08:03

## 2024-03-06 RX ADMIN — CHLORHEXIDINE GLUCONATE 0.12% ORAL RINSE 15 ML: 1.2 LIQUID ORAL at 08:03

## 2024-03-06 RX ADMIN — GABAPENTIN 300 MG: 300 CAPSULE ORAL at 03:03

## 2024-03-06 RX ADMIN — SODIUM CHLORIDE 250 ML: 9 INJECTION, SOLUTION INTRAVENOUS at 08:03

## 2024-03-06 RX ADMIN — DEXTROSE AND SODIUM CHLORIDE: 5; 450 INJECTION, SOLUTION INTRAVENOUS at 03:03

## 2024-03-06 RX ADMIN — LEVETIRACETAM 750 MG: 100 SOLUTION ORAL at 08:03

## 2024-03-06 RX ADMIN — MEROPENEM 2 G: 1 INJECTION INTRAVENOUS at 03:03

## 2024-03-06 RX ADMIN — ENOXAPARIN SODIUM 40 MG: 100 INJECTION SUBCUTANEOUS at 06:03

## 2024-03-06 RX ADMIN — GABAPENTIN 300 MG: 300 CAPSULE ORAL at 08:03

## 2024-03-06 RX ADMIN — LINEZOLID 600 MG: 600 INJECTION, SOLUTION INTRAVENOUS at 05:03

## 2024-03-06 RX ADMIN — MEROPENEM 2 G: 1 INJECTION INTRAVENOUS at 09:03

## 2024-03-06 RX ADMIN — HYDROCODONE BITARTRATE AND ACETAMINOPHEN 1 TABLET: 5; 325 TABLET ORAL at 12:03

## 2024-03-06 RX ADMIN — FOLIC ACID 1 MG: 1 TABLET ORAL at 08:03

## 2024-03-06 RX ADMIN — MEROPENEM 2 G: 1 INJECTION INTRAVENOUS at 07:03

## 2024-03-06 RX ADMIN — ALPRAZOLAM 0.5 MG: 0.5 TABLET ORAL at 09:03

## 2024-03-06 RX ADMIN — MIDODRINE HYDROCHLORIDE 10 MG: 5 TABLET ORAL at 03:03

## 2024-03-06 RX ADMIN — FAMOTIDINE 20 MG: 40 POWDER, FOR SUSPENSION ORAL at 08:03

## 2024-03-06 RX ADMIN — HYDROCODONE BITARTRATE AND ACETAMINOPHEN 1 TABLET: 5; 325 TABLET ORAL at 08:03

## 2024-03-06 NOTE — PLAN OF CARE
Patient free from injury this shift. Medications administered as ordered by MD. Scheduled for transfer to main [facility] in [facility] this evening. Chart check complete.

## 2024-03-06 NOTE — PLAN OF CARE
Patient is stable. No signs or symptoms of acute distress. Meds and IV antibiotics given as ordered. Trach, Shen, and NG tube in place. Patient tolerating tube feedings well. Patient refused to turn for most of the day. Dressing monitored. Bed in lowest position, call light in reach. Chart orders reviewed.

## 2024-03-06 NOTE — SUBJECTIVE & OBJECTIVE
Review of Systems  Objective:     Vital Signs (Most Recent):  Temp: 97.8 °F (36.6 °C) (03/06/24 1212)  Pulse: 102 (03/06/24 1212)  Resp: 18 (03/06/24 1212)  BP: (!) 101/54 (03/06/24 1212)  SpO2: 100 % (03/06/24 1212) Vital Signs (24h Range):  Temp:  [97.6 °F (36.4 °C)-100.5 °F (38.1 °C)] 97.8 °F (36.6 °C)  Pulse:  [] 102  Resp:  [16-22] 18  SpO2:  [99 %-100 %] 100 %  BP: ()/(53-61) 101/54     Weight: (!) 139 kg (306 lb 7 oz)  Body mass index is 45.25 kg/m².    Intake/Output Summary (Last 24 hours) at 3/6/2024 1227  Last data filed at 3/6/2024 1212  Gross per 24 hour   Intake 2068.89 ml   Output 2950 ml   Net -881.11 ml         Physical Exam      GEN: Awakens easily, doesn't seem to be in any acute distress, pleasant, body habitus significantly underweight  HEENT: NGT in place, surgical airway, eable to phonate appropriately   CARDS: regular rate and rhythm on cardiac monitor     Extremities: several large, unstageable wounds across various parts of the body, including the sacral and thoracic spine, ischial tuberosity on both sides, right upper back, right dorsal hand, right posterior elbow, and extensive areas on both arms and legs. These wounds exhibited full thickness tissue loss with exposed bone, tendon, or muscle, displaying characteristics such as being black, necrotic, granulating, moist, and exhibiting multiple colors and textures.      PULM: trach collar, breathing comfortably, chest symmetric, nonlabored  Neuro: Alert and oriented, follows directions and answers questions appropriately      Significant Labs: All pertinent labs within the past 24 hours have been reviewed.  CBC:   Recent Labs   Lab 03/05/24 0540 03/06/24  0531   WBC 20.59* 20.44*   HGB 7.1* 7.0*   HCT 22.6* 22.1*    365     CMP:   Recent Labs   Lab 03/05/24  0540 03/06/24  0531   * 134*   K 4.6 4.6    103   CO2 23 22*    85   BUN 14 12   CREATININE 0.6 0.6   CALCIUM 7.4* 7.4*   ANIONGAP 7* 9        Significant Imaging:     Imaging Results              US Lower Extremity Veins Bilateral (Final result)  Result time 02/21/24 21:16:19      Final result by Rhonda Saini MD (02/21/24 21:16:19)                   Impression:      No evidence of deep venous thrombosis in either lower extremity.      Electronically signed by: Rhonda Saini  Date:    02/21/2024  Time:    21:16               Narrative:    EXAMINATION:  US LOWER EXTREMITY VEINS BILATERAL    CLINICAL HISTORY:  eval for dvt;    TECHNIQUE:  Duplex and color flow Doppler and dynamic compression was performed of the bilateral lower extremity veins was performed.    COMPARISON:  None    FINDINGS:  Right thigh veins: The common femoral, femoral, popliteal, upper greater saphenous, and deep femoral veins are patent and free of thrombus. The veins are normally compressible and have normal phasic flow and augmentation response.    Right calf veins: The visualized calf veins are patent.    Left thigh veins: The common femoral, femoral, popliteal, upper greater saphenous, and deep femoral veins are patent and free of thrombus. The veins are normally compressible and have normal phasic flow and augmentation response.    Left calf veins: The visualized calf veins are patent.    Miscellaneous: None                                       X-Ray Chest AP Portable (Final result)  Result time 02/21/24 19:30:06      Final result by Rhonda Saini MD (02/21/24 19:30:06)                   Impression:      Stable short-term follow-up      Electronically signed by: Rhonda Saini  Date:    02/21/2024  Time:    19:30               Narrative:    EXAMINATION:  XR CHEST AP PORTABLE    CLINICAL HISTORY:  acute respiratory failure;    TECHNIQUE:  Single frontal portable view of the chest was performed.    COMPARISON:  02/21/2024 earlier imaging same date    Tracheostomy similar position.  Lungs unchanged with bibasilar opacity                                        X-Ray Chest AP Portable (Final result)  Result time 02/21/24 11:22:10      Final result by Roberto Vasquez MD (02/21/24 11:22:10)                   Impression:      As above      Electronically signed by: Roberto Vasquez MD  Date:    02/21/2024  Time:    11:22               Narrative:    EXAMINATION:  XR CHEST AP PORTABLE    CLINICAL HISTORY:  Sepsis;    FINDINGS:  Single view of the chest.  Comparison 06/03/2023.    Cardiac silhouette is normal.  No consolidation.  Perihilar and lower lobe interstitial edema suspected.  Cannot exclude early infiltrates within the lower lobes.  No evidence of pleural effusion or pneumothorax.  Bones appear intact.  Tracheostomy tube appears in place.  Postop changes cervical spine.
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
## 2024-03-07 ENCOUNTER — HOSPITAL ENCOUNTER (INPATIENT)
Facility: HOSPITAL | Age: 29
LOS: 35 days | Discharge: ANOTHER HOSPITAL | DRG: 853 | End: 2024-04-11
Attending: INTERNAL MEDICINE | Admitting: INTERNAL MEDICINE
Payer: MEDICAID

## 2024-03-07 VITALS
OXYGEN SATURATION: 100 % | HEIGHT: 69 IN | DIASTOLIC BLOOD PRESSURE: 55 MMHG | RESPIRATION RATE: 16 BRPM | BODY MASS INDEX: 45.39 KG/M2 | TEMPERATURE: 98 F | HEART RATE: 88 BPM | SYSTOLIC BLOOD PRESSURE: 114 MMHG | WEIGHT: 306.44 LBS

## 2024-03-07 DIAGNOSIS — Z71.89 ACP (ADVANCE CARE PLANNING): ICD-10-CM

## 2024-03-07 DIAGNOSIS — I96 GANGRENE: ICD-10-CM

## 2024-03-07 DIAGNOSIS — R65.20 SEVERE SEPSIS: ICD-10-CM

## 2024-03-07 DIAGNOSIS — T50.905A ADVERSE EFFECTS OF MEDICATION: ICD-10-CM

## 2024-03-07 DIAGNOSIS — G82.50 QUADRIPLEGIA: Chronic | ICD-10-CM

## 2024-03-07 DIAGNOSIS — A41.9 SEVERE SEPSIS: ICD-10-CM

## 2024-03-07 DIAGNOSIS — L89.90 PRESSURE ULCERS OF SKIN OF MULTIPLE TOPOGRAPHIC SITES: ICD-10-CM

## 2024-03-07 DIAGNOSIS — R00.0 TACHYCARDIA: ICD-10-CM

## 2024-03-07 DIAGNOSIS — Z51.5 PALLIATIVE CARE ENCOUNTER: Primary | ICD-10-CM

## 2024-03-07 DIAGNOSIS — R52 PAIN: ICD-10-CM

## 2024-03-07 PROBLEM — S61.402A: Chronic | Status: ACTIVE | Noted: 2023-05-18

## 2024-03-07 PROBLEM — L89.154 STAGE IV PRESSURE ULCER OF SACRAL REGION: Chronic | Status: ACTIVE | Noted: 2023-05-18

## 2024-03-07 PROBLEM — L89.324 STAGE IV PRESSURE ULCER OF LEFT BUTTOCK: Chronic | Status: ACTIVE | Noted: 2023-05-18

## 2024-03-07 PROBLEM — E66.01 SEVERE OBESITY (BMI >= 40): Status: ACTIVE | Noted: 2024-03-07

## 2024-03-07 PROBLEM — M86.60 CHRONIC OSTEOMYELITIS: Chronic | Status: ACTIVE | Noted: 2023-05-18

## 2024-03-07 PROBLEM — L89.314 STAGE IV PRESSURE ULCER OF RIGHT BUTTOCK: Chronic | Status: ACTIVE | Noted: 2023-05-18

## 2024-03-07 PROBLEM — E87.1 HYPONATREMIA: Status: ACTIVE | Noted: 2024-03-07

## 2024-03-07 LAB
ANION GAP SERPL CALC-SCNC: 5 MMOL/L (ref 8–16)
ANISOCYTOSIS BLD QL SMEAR: SLIGHT
BASOPHILS # BLD AUTO: 0.17 K/UL (ref 0–0.2)
BASOPHILS NFR BLD: 0.7 % (ref 0–1.9)
BUN SERPL-MCNC: 11 MG/DL (ref 6–20)
CALCIUM SERPL-MCNC: 7.9 MG/DL (ref 8.7–10.5)
CHLORIDE SERPL-SCNC: 103 MMOL/L (ref 95–110)
CO2 SERPL-SCNC: 27 MMOL/L (ref 23–29)
CREAT SERPL-MCNC: 0.5 MG/DL (ref 0.5–1.4)
DIFFERENTIAL METHOD BLD: ABNORMAL
EOSINOPHIL # BLD AUTO: 0.9 K/UL (ref 0–0.5)
EOSINOPHIL NFR BLD: 3.7 % (ref 0–8)
ERYTHROCYTE [DISTWIDTH] IN BLOOD BY AUTOMATED COUNT: 16.1 % (ref 11.5–14.5)
EST. GFR  (NO RACE VARIABLE): >60 ML/MIN/1.73 M^2
GLUCOSE SERPL-MCNC: 75 MG/DL (ref 70–110)
HCT VFR BLD AUTO: 22.7 % (ref 40–54)
HGB BLD-MCNC: 7 G/DL (ref 14–18)
HYPOCHROMIA BLD QL SMEAR: ABNORMAL
IMM GRANULOCYTES # BLD AUTO: 0.18 K/UL (ref 0–0.04)
IMM GRANULOCYTES NFR BLD AUTO: 0.8 % (ref 0–0.5)
LYMPHOCYTES # BLD AUTO: 4.2 K/UL (ref 1–4.8)
LYMPHOCYTES NFR BLD: 18.3 % (ref 18–48)
MCH RBC QN AUTO: 29.7 PG (ref 27–31)
MCHC RBC AUTO-ENTMCNC: 30.8 G/DL (ref 32–36)
MCV RBC AUTO: 96 FL (ref 82–98)
MONOCYTES # BLD AUTO: 2.6 K/UL (ref 0.3–1)
MONOCYTES NFR BLD: 11.3 % (ref 4–15)
NEUTROPHILS # BLD AUTO: 14.8 K/UL (ref 1.8–7.7)
NEUTROPHILS NFR BLD: 65.2 % (ref 38–73)
NRBC BLD-RTO: 0 /100 WBC
OVALOCYTES BLD QL SMEAR: ABNORMAL
PLATELET # BLD AUTO: 392 K/UL (ref 150–450)
PMV BLD AUTO: 8.9 FL (ref 9.2–12.9)
POIKILOCYTOSIS BLD QL SMEAR: SLIGHT
POLYCHROMASIA BLD QL SMEAR: ABNORMAL
POTASSIUM SERPL-SCNC: 5 MMOL/L (ref 3.5–5.1)
RBC # BLD AUTO: 2.36 M/UL (ref 4.6–6.2)
SODIUM SERPL-SCNC: 135 MMOL/L (ref 136–145)
SPHEROCYTES BLD QL SMEAR: ABNORMAL
WBC # BLD AUTO: 22.77 K/UL (ref 3.9–12.7)

## 2024-03-07 PROCEDURE — 99900026 HC AIRWAY MAINTENANCE (STAT)

## 2024-03-07 PROCEDURE — 85025 COMPLETE CBC W/AUTO DIFF WBC: CPT

## 2024-03-07 PROCEDURE — 99900035 HC TECH TIME PER 15 MIN (STAT)

## 2024-03-07 PROCEDURE — 27100171 HC OXYGEN HIGH FLOW UP TO 24 HOURS

## 2024-03-07 PROCEDURE — 27000207 HC ISOLATION

## 2024-03-07 PROCEDURE — 80048 BASIC METABOLIC PNL TOTAL CA: CPT

## 2024-03-07 PROCEDURE — 25000003 PHARM REV CODE 250

## 2024-03-07 PROCEDURE — 92610 EVALUATE SWALLOWING FUNCTION: CPT

## 2024-03-07 PROCEDURE — 63600175 PHARM REV CODE 636 W HCPCS

## 2024-03-07 PROCEDURE — 94761 N-INVAS EAR/PLS OXIMETRY MLT: CPT

## 2024-03-07 PROCEDURE — 27200966 HC CLOSED SUCTION SYSTEM

## 2024-03-07 PROCEDURE — 99223 1ST HOSP IP/OBS HIGH 75: CPT | Mod: ,,,

## 2024-03-07 PROCEDURE — 20600001 HC STEP DOWN PRIVATE ROOM

## 2024-03-07 PROCEDURE — 27000221 HC OXYGEN, UP TO 24 HOURS

## 2024-03-07 PROCEDURE — 97535 SELF CARE MNGMENT TRAINING: CPT

## 2024-03-07 RX ORDER — HYDROCODONE BITARTRATE AND ACETAMINOPHEN 10; 325 MG/1; MG/1
1 TABLET ORAL EVERY 6 HOURS PRN
Status: DISCONTINUED | OUTPATIENT
Start: 2024-03-07 | End: 2024-03-11

## 2024-03-07 RX ORDER — FOLIC ACID 1 MG/1
1 TABLET ORAL DAILY
Status: DISCONTINUED | OUTPATIENT
Start: 2024-03-07 | End: 2024-04-04

## 2024-03-07 RX ORDER — ACETAMINOPHEN 650 MG/20.3ML
650 LIQUID ORAL EVERY 6 HOURS PRN
Status: DISCONTINUED | OUTPATIENT
Start: 2024-03-07 | End: 2024-03-13

## 2024-03-07 RX ORDER — CHLORHEXIDINE GLUCONATE ORAL RINSE 1.2 MG/ML
15 SOLUTION DENTAL 2 TIMES DAILY
Status: DISCONTINUED | OUTPATIENT
Start: 2024-03-07 | End: 2024-04-08

## 2024-03-07 RX ORDER — ASPIRIN 325 MG
50000 TABLET, DELAYED RELEASE (ENTERIC COATED) ORAL
COMMUNITY
Start: 2023-12-15

## 2024-03-07 RX ORDER — LINEZOLID 2 MG/ML
600 INJECTION, SOLUTION INTRAVENOUS
Status: DISCONTINUED | OUTPATIENT
Start: 2024-03-07 | End: 2024-03-19

## 2024-03-07 RX ORDER — GABAPENTIN 300 MG/1
300 CAPSULE ORAL 3 TIMES DAILY
Status: DISCONTINUED | OUTPATIENT
Start: 2024-03-07 | End: 2024-03-19

## 2024-03-07 RX ORDER — FAMOTIDINE 40 MG/5ML
20 POWDER, FOR SUSPENSION ORAL 2 TIMES DAILY
Status: DISCONTINUED | OUTPATIENT
Start: 2024-03-07 | End: 2024-03-19

## 2024-03-07 RX ORDER — ALPRAZOLAM 0.5 MG/1
0.5 TABLET ORAL 3 TIMES DAILY PRN
Status: DISCONTINUED | OUTPATIENT
Start: 2024-03-07 | End: 2024-03-07

## 2024-03-07 RX ORDER — OXYCODONE HYDROCHLORIDE 5 MG/1
5 TABLET ORAL EVERY 6 HOURS PRN
Status: DISCONTINUED | OUTPATIENT
Start: 2024-03-07 | End: 2024-03-07

## 2024-03-07 RX ORDER — ENOXAPARIN SODIUM 100 MG/ML
40 INJECTION SUBCUTANEOUS EVERY 12 HOURS
Status: DISCONTINUED | OUTPATIENT
Start: 2024-03-07 | End: 2024-03-07

## 2024-03-07 RX ORDER — MINOCYCLINE HYDROCHLORIDE 100 MG/1
100 CAPSULE ORAL EVERY 12 HOURS
Status: DISCONTINUED | OUTPATIENT
Start: 2024-03-07 | End: 2024-03-15

## 2024-03-07 RX ORDER — ENOXAPARIN SODIUM 100 MG/ML
40 INJECTION SUBCUTANEOUS EVERY 12 HOURS
Status: COMPLETED | OUTPATIENT
Start: 2024-03-07 | End: 2024-03-07

## 2024-03-07 RX ORDER — LEVOTHYROXINE SODIUM 100 UG/1
100 TABLET ORAL
Status: DISCONTINUED | OUTPATIENT
Start: 2024-03-07 | End: 2024-03-08

## 2024-03-07 RX ORDER — MORPHINE SULFATE 4 MG/ML
4 INJECTION, SOLUTION INTRAMUSCULAR; INTRAVENOUS EVERY 6 HOURS PRN
Status: DISCONTINUED | OUTPATIENT
Start: 2024-03-07 | End: 2024-03-11

## 2024-03-07 RX ORDER — ALPRAZOLAM 1 MG/1
1 TABLET ORAL 3 TIMES DAILY PRN
COMMUNITY
Start: 2024-02-19

## 2024-03-07 RX ORDER — ALPRAZOLAM 0.5 MG/1
1 TABLET ORAL 3 TIMES DAILY PRN
Status: DISCONTINUED | OUTPATIENT
Start: 2024-03-07 | End: 2024-03-23

## 2024-03-07 RX ORDER — MIDODRINE HYDROCHLORIDE 5 MG/1
10 TABLET ORAL 3 TIMES DAILY
Status: DISCONTINUED | OUTPATIENT
Start: 2024-03-07 | End: 2024-03-14

## 2024-03-07 RX ORDER — LEVETIRACETAM 100 MG/ML
750 SOLUTION ORAL 2 TIMES DAILY
Status: DISCONTINUED | OUTPATIENT
Start: 2024-03-07 | End: 2024-03-19

## 2024-03-07 RX ADMIN — LEVOTHYROXINE SODIUM 100 MCG: 100 TABLET ORAL at 06:03

## 2024-03-07 RX ADMIN — HYDROCODONE BITARTRATE AND ACETAMINOPHEN 1 TABLET: 10; 325 TABLET ORAL at 11:03

## 2024-03-07 RX ADMIN — LINEZOLID 600 MG: 600 INJECTION, SOLUTION INTRAVENOUS at 06:03

## 2024-03-07 RX ADMIN — ENOXAPARIN SODIUM 40 MG: 40 INJECTION SUBCUTANEOUS at 06:03

## 2024-03-07 RX ADMIN — GABAPENTIN 300 MG: 300 CAPSULE ORAL at 08:03

## 2024-03-07 RX ADMIN — ALPRAZOLAM 0.5 MG: 0.5 TABLET ORAL at 03:03

## 2024-03-07 RX ADMIN — MEROPENEM 2 G: 1 INJECTION INTRAVENOUS at 09:03

## 2024-03-07 RX ADMIN — MINOCYCLINE HYDROCHLORIDE 100 MG: 100 CAPSULE ORAL at 08:03

## 2024-03-07 RX ADMIN — FOLIC ACID 1 MG: 1 TABLET ORAL at 09:03

## 2024-03-07 RX ADMIN — LEVETIRACETAM 750 MG: 100 SOLUTION ORAL at 10:03

## 2024-03-07 RX ADMIN — GABAPENTIN 300 MG: 300 CAPSULE ORAL at 04:03

## 2024-03-07 RX ADMIN — FAMOTIDINE 20 MG: 40 POWDER, FOR SUSPENSION ORAL at 08:03

## 2024-03-07 RX ADMIN — LEVETIRACETAM 750 MG: 100 SOLUTION ORAL at 11:03

## 2024-03-07 RX ADMIN — MIDODRINE HYDROCHLORIDE 10 MG: 5 TABLET ORAL at 08:03

## 2024-03-07 RX ADMIN — ALPRAZOLAM 1 MG: 0.5 TABLET ORAL at 11:03

## 2024-03-07 RX ADMIN — LINEZOLID 600 MG: 600 INJECTION, SOLUTION INTRAVENOUS at 08:03

## 2024-03-07 RX ADMIN — GABAPENTIN 300 MG: 300 CAPSULE ORAL at 09:03

## 2024-03-07 RX ADMIN — MEROPENEM 2 G: 1 INJECTION INTRAVENOUS at 04:03

## 2024-03-07 RX ADMIN — FAMOTIDINE 20 MG: 40 POWDER, FOR SUSPENSION ORAL at 09:03

## 2024-03-07 RX ADMIN — CHLORHEXIDINE GLUCONATE 0.12% ORAL RINSE 15 ML: 1.2 LIQUID ORAL at 08:03

## 2024-03-07 RX ADMIN — MIDODRINE HYDROCHLORIDE 10 MG: 5 TABLET ORAL at 09:03

## 2024-03-07 RX ADMIN — MIDODRINE HYDROCHLORIDE 10 MG: 5 TABLET ORAL at 04:03

## 2024-03-07 RX ADMIN — MINOCYCLINE HYDROCHLORIDE 100 MG: 100 CAPSULE ORAL at 11:03

## 2024-03-07 RX ADMIN — OXYCODONE 5 MG: 5 TABLET ORAL at 03:03
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
## 2024-03-07 NOTE — ASSESSMENT & PLAN NOTE
UCX from 02/21 growing MDR Proteus mirabilis and Pseudomonas aeruginosa.    -- Should have completed the treatment course for initial UTI; however, given indwelling sterling catheter, risk of re-infection + colonization is high  -- ID consulted for further assistance in long term antibiotic plan

## 2024-03-07 NOTE — RESPIRATORY THERAPY
"RAPID RESPONSE RESPIRATORY THERAPY PROACTIVE NOTE           Time of visit: 912     Code Status: Prior   : 1995  Bed: [facility]:   MRN: [id number]  Time spent at the bedside: < 15 min    SITUATION    Evaluated patient for: LDA Check     BACKGROUND    Patient has no past medical history on file.  Clinically Significant Surgical Hx: tracheostomy    24 Hours Vitals Range:  Temp:  [97.4 °F (36.3 °C)-98.6 °F (37 °C)]   Pulse:  []   Resp:  [16-20]   BP: ()/(53-64)   SpO2:  [99 %-100 %]     Labs:    Recent Labs     24  0540 24  0531 24  0650   * 134* 135*   K 4.6 4.6 5.0    103 103   CO2 23 22* 27   BUN 14 12 11   CREATININE 0.6 0.6 0.5    85 75        No results for input(s): "PH", "PCO2", "PO2", "HCO3", "POCSATURATED", "BE" in the last 72 hours.    ASSESSMENT/INTERVENTIONS  Supply check. Tracheostomy sign placed at head of bead.       Last VS   Temp: 97.6 °F (36.4 °C) ( 1602)  Pulse: 105 ( 1602)  Resp: 18 ( 1602)  BP: 101/59 ( 1602)  SpO2: 100 % ( 1602)      Extra trachs at bedside: 6 CN/8 CN  Level of Consciousness: Level of Consciousness (AVPU): alert  Respiratory Effort: Respiratory Effort: Unlabored Expansion/Accessory Muscle Usage: Expansion/Accessory Muscles/Retractions: no use of accessory muscles  All Lung Field Breath Sounds: All Lung Fields Breath Sounds: Anterior:, Lateral:, coarse, diminished  NATALIA Breath Sounds: coarse  O2 Device/Concentration: 5 lpm/28% FiO2 trach collar  Surgical airway: Yes, Type: Shiley Size: 8, cuffed  Ambu at bedside:       Active Orders   Respiratory Care    Oxygen Continuous     Frequency: Continuous     Number of Occurrences: Until Specified     Order Questions:      Device type: Low flow      Device: Trach Collar      FiO2%: 60      Titrate O2 per Oxygen Titration Protocol: Yes      To maintain SpO2 goal of: >= 90%      Notify MD of: Inability to achieve desired SpO2; Sudden change in patient status and " requires 20% increase in FiO2; Patient requires >60% FiO2    Pulse Oximetry Continuous     Frequency: Continuous     Number of Occurrences: Until Specified    Routine tracheostomy care     Frequency: BID     Number of Occurrences: Until Specified    SUCTION PRN     Frequency: PRN     Number of Occurrences: Until Specified       RECOMMENDATIONS    We recommend: RRT Recs: Continue POC per primary team.      FOLLOW-UP    Please call back the Rapid Response RT, Heena Howell, RRT at x [phone] for any questions or concerns.

## 2024-03-07 NOTE — PT/OT/SLP EVAL
"Speech Language Pathology Evaluation  Bedside Swallow    Patient Name:  Jyoti Mayberry   MRN:  [id number]  Admitting Diagnosis: Severe sepsis    Recommendations:                 General Recommendations:  Dysphagia therapy  Diet recommendations:  Soft & Bite Sized Diet - IDDSI Level 6, Thin   Aspiration Precautions: 1 bite/sip at a time, Assistance with meals, Eliminate distractions, Feed only when awake/alert, HOB to 90 degrees, meds whole one at a time or crushed in puree as needed, closely Monitor for s/s of aspiration, Small bites/sips, and Strict aspiration precautions   General Precautions: Standard, fall, dental soft  Communication strategies:  none    Assessment:     Jyoti Mayberry is a 28 y.o. male with an SLP diagnosis of Dysphagia.  He presents with mild dysphagia, trach with valve in place.     Pt has premorbid trach in place & wears "at all times" per parents.     History:     No past medical history on file.    Past Surgical History:   Procedure Laterality Date    ESOPHAGOGASTRODUODENOSCOPY W/ PEG N/A 5/30/2023    Procedure: PEG;  Surgeon: JUSTYN Devine MD;  Location: [facility] ENDOSCOPY;  Service: Gastroenterology;  Laterality: N/A;       Social History: Patient lives with parents    Modified Barium Swallow: no recent  7/9/20 no penetration or aspiration. See full report for further details.     Chest X-Rays: 2/23/24    Prior diet: regular diet with trach & valve in place. Parents report pt has been tolerating diet with valve in place for years.     Pt has size 8 trach in place. Pt with great tolerance of valve that he wears at all times per parents.     Subjective   Awake & alert yet appears weak. NGT in place.     Pain/Comfort:  Pain Rating 1: 9/10  Location 1:  (shoulder & arm)  Pain Addressed 2: Distraction, Reposition  Pain Rating Post-Intervention 2: 8/10    Respiratory Status: trach with speaking valve in place    Objective:     Oral Musculature Evaluation  Oral Musculature: " WFL  Dentition: present and adequate  Oral Labial Strength and Mobility: WFL  Lingual Strength and Mobility: WFL  Volitional Cough: weak yet reports he is able to produce a strong cough if needed  Volitional Swallow: adequate  Voice Prior to PO Intake: clear, adequate strength with valve in place    Trach with speaking valve in place. Pt tolerates well, wears it all times per parents. Great voicing & intensity. No s/s consistent with difficulty breathing.     Bedside Swallow Eval:   Consistencies Assessed:  Thin liquids ice chip x1, via tsp x1, via cup sips x3, via straw sips x3  Puree 1tsp bites x3  Solids small bites of solid x5      Oral Phase:   Prolonged mastication    Pharyngeal Phase:   no overt clinical signs/symptoms of aspiration  multiple spontaneous swallows with thin     SLP recs that pt is ok to advance to soft & bite sized & thins. Multiple spontaneous swallows suspected 2/2 trach & NGT in place. Will closely monitor.     SLP educated pt & parents on all diet recs, precautions, risks & s/s aspiration, role of SLP, POC, etc. SLP also educated them on valve precautions & recs including removing while sleeping. All verbalized understanding.     Goals:   Multidisciplinary Problems       SLP Goals          Problem: SLP    Goal Priority Disciplines Outcome   SLP Goal     SLP Ongoing, Progressing   Description: Speech Language Pathology Goals  Goals expected to be met by 3/14  1. Pt will tolerate soft & bite sized solids & thin liquids without s/s aspiration & adequate oral phase of swallow   2. Ongoing swallow assessment to ensure pt is on safest least restrictive PO consistencies                       Plan:     Patient to be seen:  4 x/week   Plan of Care expires:  04/05/24  Plan of Care reviewed with:  patient, mother, father   SLP Follow-Up:  Yes       Discharge recommendations:    TBD    Time Tracking:     SLP Treatment Date:   03/07/24  Speech Start Time:  0930  Speech Stop Time:  0957     Speech Total  Time (min):  27 min    Billable Minutes: Eval Swallow and Oral Function 10 and Self Care/Home Management Training 17    03/07/2024

## 2024-03-07 NOTE — NURSING
Patient arrived to [facility] alert and oriented with mother at bedside. Shen and NG tube intact. Hospital Med Team P notified of patient's arrival.  Feeding continued at 40cc. Skin assessment completed. Patient placed on continuous cardiac monitoring with continuous pulse ox.     Nurses Note -- 4 Eyes      3/7/2024   02:00 AM      Skin assessed during: Admit      [] No Altered Skin Integrity Present    []Prevention Measures Documented      [x] Yes- Altered Skin Integrity Present or Discovered   [] LDA Added if Not in Epic (Describe Wound)   [] New Altered Skin Integrity was Present on Admit and Documented in LDA   [] Wound Image Taken    Wound Care Consulted? Yes    Attending Nurse:  JANETT Riley     Second RN/Staff Member:  Michelle Perdomo

## 2024-03-07 NOTE — SUBJECTIVE & OBJECTIVE
No past medical history on file.    Past Surgical History:   Procedure Laterality Date    ESOPHAGOGASTRODUODENOSCOPY W/ PEG N/A 5/30/2023    Procedure: PEG;  Surgeon: JUSTYN Devine MD;  Location: [facility] ENDOSCOPY;  Service: Gastroenterology;  Laterality: N/A;       Review of patient's allergies indicates:  No Known Allergies    Current Facility-Administered Medications on File Prior to Encounter   Medication    [COMPLETED] sodium chloride 0.9% bolus 250 mL 250 mL    [DISCONTINUED] acetaminophen oral solution 650 mg    [DISCONTINUED] ALPRAZolam tablet 0.25 mg    [DISCONTINUED] ALPRAZolam tablet 0.5 mg    [DISCONTINUED] chlorhexidine 0.12 % solution 15 mL    [DISCONTINUED] dextrose 10% bolus 125 mL 125 mL    [DISCONTINUED] dextrose 10% bolus 250 mL 250 mL    [DISCONTINUED] dextrose 5 % and 0.45 % NaCl infusion    [DISCONTINUED] enoxaparin injection 40 mg    [DISCONTINUED] enoxaparin injection 40 mg    [DISCONTINUED] famotidine 40 mg/5 mL (8 mg/mL) suspension 20 mg    [DISCONTINUED] folic acid tablet 1 mg    [DISCONTINUED] gabapentin capsule 300 mg    [DISCONTINUED] HYDROcodone-acetaminophen 5-325 mg per tablet 1 tablet    [DISCONTINUED] influenza (QUADRIVALENT PF) vaccine 0.5 mL    [DISCONTINUED] levETIRAcetam 100 mg/mL solution 750 mg    [DISCONTINUED] levothyroxine tablet 100 mcg    [DISCONTINUED] linezolid 600 mg/300 mL IVPB 600 mg    [DISCONTINUED] magnesium sulfate 2g in water 50mL IVPB (premix)    [DISCONTINUED] magnesium sulfate 2g in water 50mL IVPB (premix)    [DISCONTINUED] meropenem (MERREM) 2 g in sodium chloride 0.9% 100 mL IVPB    [DISCONTINUED] midodrine tablet 10 mg    [DISCONTINUED] minocycline capsule 100 mg    [DISCONTINUED] pneumoc 20-rajendra conj-dip cr(PF) (PREVNAR-20 (PF)) injection Syrg 0.5 mL    [DISCONTINUED] potassium chloride 20 mEq in 100 mL IVPB (FOR CENTRAL LINE ADMINISTRATION ONLY)    [DISCONTINUED] potassium chloride 40 mEq in 100 mL IVPB (FOR CENTRAL LINE ADMINISTRATION ONLY)     [DISCONTINUED] potassium chloride 40 mEq in 100 mL IVPB (FOR CENTRAL LINE ADMINISTRATION ONLY)    [DISCONTINUED] sodium chloride 0.9% flush 10 mL     Current Outpatient Medications on File Prior to Encounter   Medication Sig    acetaminophen (TYLENOL) 325 MG tablet 2 tablets (650 mg total) by Per G Tube route every 6 (six) hours as needed for Temperature greater than (100.4).    albuterol (PROVENTIL) 2.5 mg /3 mL (0.083 %) nebulizer solution Take 3 mLs (2.5 mg total) by nebulization every 4 (four) hours as needed (shortness of breath). Rescue    calcitRIOL (ROCALTROL) 0.5 MCG Cap Take 1 capsule (0.5 mcg total) by mouth once daily.    calcium acetate,phosphat bind, (PHOSLO) 667 mg capsule Take 1 capsule (667 mg total) by mouth 3 (three) times daily with meals.    enoxaparin (LOVENOX) 40 mg/0.4 mL Syrg Inject 0.4 mLs (40 mg total) into the skin once daily.    folic acid (FOLVITE) 1 MG tablet 1 tablet (1 mg total) by Per G Tube route once daily.    gabapentin (NEURONTIN) 300 MG capsule Take 1 capsule (300 mg total) by mouth 3 (three) times daily.    HYDROcodone-acetaminophen (NORCO)  mg per tablet Take 1 tablet by mouth every 6 (six) hours as needed for Pain.    levETIRAcetam (KEPPRA) 100 mg/mL Soln 7.5 mLs (750 mg total) by Per NG tube route 2 (two) times daily.    levothyroxine (SYNTHROID) 100 MCG tablet Take 1 tablet (100 mcg total) by mouth before breakfast.    linezolid (ZYVOX) 600 mg/300 mL PgBk Inject 300 mLs (600 mg total) into the vein every 12 (twelve) hours.    melatonin (MELATIN) 3 mg tablet Take 2 tablets (6 mg total) by mouth nightly as needed for Insomnia.    midodrine (PROAMATINE) 10 MG tablet 1 tablet (10 mg total) by Per NG tube route 3 (three) times daily.    minocycline (MINOCIN,DYNACIN) 100 MG capsule 1 capsule (100 mg total) by Per NG tube route every 12 (twelve) hours.    ondansetron (ZOFRAN) 4 mg/5 mL solution Take 5 mLs (4 mg total) by mouth every 6 (six) hours as needed for Nausea.     pantoprazole (PROTONIX) 40 mg injection Inject 40 mg into the vein once daily.    sertraline (ZOLOFT) 50 MG tablet Take 1 tablet (50 mg total) by mouth once daily.    sodium chloride 0.9% SolP 100 mL with meropenem 1 gram SolR 2 g Inject 2 g into the vein every 8 (eight) hours.     Family History    None       Tobacco Use    Smoking status: Not on file    Smokeless tobacco: Not on file   Substance and Sexual Activity    Alcohol use: Not on file    Drug use: Not on file    Sexual activity: Not on file     Review of Systems   Constitutional:  Positive for activity change and fatigue. Negative for chills and fever.   Respiratory:  Negative for chest tightness and shortness of breath.    Cardiovascular:  Negative for chest pain.   Gastrointestinal:  Positive for nausea. Negative for abdominal pain.   Genitourinary:  Positive for difficulty urinating. Negative for hematuria.   Musculoskeletal:  Positive for back pain, gait problem, joint swelling and myalgias.   Skin:  Positive for rash and wound.   Neurological:  Negative for light-headedness and headaches.     Objective:     Vital Signs (Most Recent):    Vital Signs (24h Range):  Temp:  [97.8 °F (36.6 °C)-100.5 °F (38.1 °C)] 98.1 °F (36.7 °C)  Pulse:  [] 88  Resp:  [16-22] 16  SpO2:  [99 %-100 %] 100 %  BP: ()/(53-55) 114/55        There is no height or weight on file to calculate BMI.     Physical Exam  Constitutional:       Appearance: He is ill-appearing. He is not toxic-appearing.   HENT:      Head: Normocephalic.      Nose:      Comments: NGT in place  Cardiovascular:      Rate and Rhythm: Normal rate and regular rhythm.   Pulmonary:      Effort: No respiratory distress.      Breath sounds: No wheezing or rales.      Comments: TC, nonlabored breathing  Abdominal:      General: There is no distension.      Tenderness: There is no abdominal tenderness.   Musculoskeletal:      Comments: Several large, unstageable wounds across various parts of the body,  including the sacral and thoracic spine, ischial tuberosity on both sides, right upper back, right dorsal hand, right posterior elbow, and extensive areas on both arms and legs. These wounds exhibited full thickness tissue loss with exposed bone, tendon, or muscle, displaying characteristics such as being black, necrotic, granulating, moist, and exhibiting multiple colors and textures.    Neurological:      General: No focal deficit present.      Mental Status: He is alert and oriented to person, place, and time. Mental status is at baseline.          Media Information    File Link    Scan on 3/4/2024  1:22 PM by Viv Hernández, RN: Left arm        Key Information    Document ID File Type Document Type Description   [id number] Image Photographs Left arm     Import Information    Attached At Date Time User Dept   Encounter Level 3/4/2024  1:22 PM Viv Hernández RN [facility] Surgical Unit     Encounter    Hospital Encounter on 2/21/24         Media Information    File Link    Scan on 3/4/2024  1:22 PM by Viv Hernández, RN: Left hand        Key Information    Document ID File Type Document Type Description   [id number] Image Photographs Left hand     Import Information    Attached At Date Time User Dept   Encounter Level 3/4/2024  1:22 PM Viv Hernández RN [facility] Surgical [facility]     Encounter     Media Information    File Link    Scan on 3/4/2024  1:16 PM by Viv Hernández, RN: Right dorsal hand        Key Information    Document ID File Type Document Type Description   [id number] Image Photographs Right dorsal hand     Import Information    Attached At Date Time User Dept   Encounter Level 3/4/2024  1:16 PM Viv Hernández RN [facility] Surgical [facility]     Encounter    Hospital Encounter on 2/21/24      Media Information    File Link    Scan on 3/4/2024  1:18 PM by Viv Hernández RN: Right elbow        Key Information    Document ID  File Type Document Type Description   [id number] Image Photographs Right elbow     Import Information    Attached At Date Time User Dept   Encounter Level 3/4/2024  1:18 PM Viv Hernández RN [facility] Medical Surgical Unit     Encounter    Hospital Encounter on 2/21/24     Hospital Encounter on 2/21/24     Significant Labs: All pertinent labs within the past 24 hours have been reviewed.  CBC:   Recent Labs   Lab 03/05/24  0540 03/06/24  0531   WBC 20.59* 20.44*   HGB 7.1* 7.0*   HCT 22.6* 22.1*    365     CMP:   Recent Labs   Lab 03/05/24  0540 03/06/24  0531   * 134*   K 4.6 4.6    103   CO2 23 22*    85   BUN 14 12   CREATININE 0.6 0.6   CALCIUM 7.4* 7.4*   ANIONGAP 7* 9       Significant Imaging: I have reviewed all pertinent imaging results/findings within the past 24 hours.       Media Information    File Link    Scan on 3/4/2024  1:04 PM by Viv Hernández RN: Right leg        Key Information    Document ID File Type Document Type Description   [id number] Image Photographs Right leg     Import Information    Attached At Date Time User Dept   Encounter Level 3/4/2024  1:04 PM Viv Hernández RN [facility] Medical Surgical Unit     Encounter    Hospital Encounter on 2/21/24      Media Information    File Link    Scan on 2/29/2024 11:18 AM by Viv Hernández, RN: Left ischium        Key Information    Document ID File Type Document Type Description   [id number] Image Photographs Left ischium     Import Information    Attached At Date Time User Dept   Encounter Level 2/29/2024 11:18 AM Viv Hernández RN [facility] Intensive Care Unit     Encounter    Hospital Encounter on 2/21/24      Media Information    File Link    Scan on 2/29/2024 11:17 AM by Viv Hernández RN: Sacrum        Key Information    Document ID File Type Document Type Description   [id number] Image Photographs Sacrum     Import Information    Attached At Date  Time User Dept   Encounter Level 2/29/2024 11:17 AM Viv Hernández, RN [facility] Intensive Care Unit     Encounter    Hospital Encounter on 2/21/24

## 2024-03-07 NOTE — PLAN OF CARE
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 Yes    GTTS: abx    Urine Output/Bowel Movement:   I/O this shift:  In: -   Out: 50 [Stool:50]  Last Bowel Movement: 03/07/24      Drains/Tubes/Tube Feeds (include total output/shift):   I/O this shift:  In: -   Out: 50 [Stool:50]      Lines: PICC      Accuchecks:none    Skin: mx wounds    Fall Risk Score: 15    Activity level? bedrest    Any scheduled procedures? Possible amputation of BLE and LUE    Any safety concerns? Aspiration      Problem: Adjustment to Illness (Sepsis/Septic Shock)  Goal: Optimal Coping  Outcome: Ongoing, Progressing     Problem: Infection Progression (Sepsis/Septic Shock)  Goal: Absence of Infection Signs and Symptoms  Outcome: Ongoing, Progressing     Problem: Nutrition Impaired (Sepsis/Septic Shock)  Goal: Optimal Nutrition Intake  Outcome: Ongoing, Progressing     Problem: Coping Ineffective  Goal: Effective Coping  Outcome: Ongoing, Progressing     Problem: Impaired Wound Healing  Goal: Optimal Wound Healing  Outcome: Ongoing, Progressing     Problem: Infection  Goal: Absence of Infection Signs and Symptoms  Outcome: Ongoing, Progressing

## 2024-03-07 NOTE — PLAN OF CARE
Problem: SLP  Goal: SLP Goal  Description: Speech Language Pathology Goals  Goals expected to be met by 3/14  1. Pt will tolerate soft & bite sized solids & thin liquids without s/s aspiration & adequate oral phase of swallow   2. Ongoing swallow assessment to ensure pt is on safest least restrictive PO consistencies  Outcome: Ongoing, Progressing    SLP Clinical Swallow Evaluation completed. See note for details.

## 2024-03-07 NOTE — SUBJECTIVE & OBJECTIVE
No past medical history on file.    Past Surgical History:   Procedure Laterality Date    ESOPHAGOGASTRODUODENOSCOPY W/ PEG N/A 5/30/2023    Procedure: PEG;  Surgeon: JUSTYN Devine MD;  Location: [facility] ENDOSCOPY;  Service: Gastroenterology;  Laterality: N/A;       Review of patient's allergies indicates:  No Known Allergies    Medications:  Continuous Infusions:  Scheduled Meds:   chlorhexidine  15 mL Mouth/Throat BID    enoxparin  40 mg Subcutaneous Q12H (treatment, non-standard time)    famotidine  20 mg Per NG tube BID    folic acid  1 mg Per NG tube Daily    gabapentin  300 mg Per NG tube TID    levETIRAcetam  750 mg Per NG tube BID    levothyroxine  100 mcg Per NG tube Before breakfast    linezolid  600 mg Intravenous Q12H    meropenem (MERREM) IVPB  2 g Intravenous Q8H    midodrine  10 mg Per NG tube TID    minocycline  100 mg Per NG tube Q12H     PRN Meds:acetaminophen, ALPRAZolam, HYDROcodone-acetaminophen, morphine    Family History    None       Tobacco Use    Smoking status: Not on file    Smokeless tobacco: Not on file   Substance and Sexual Activity    Alcohol use: Not on file    Drug use: Not on file    Sexual activity: Not on file       Review of Systems  Objective:     Vital Signs (Most Recent):  Temp: 97.4 °F (36.3 °C) (03/07/24 1219)  Pulse: 77 (03/07/24 1248)  Resp: 18 (03/07/24 1219)  BP: (!) 90/54 (03/07/24 1219)  SpO2: 100 % (03/07/24 1248) Vital Signs (24h Range):  Temp:  [97.4 °F (36.3 °C)-98.6 °F (37 °C)] 97.4 °F (36.3 °C)  Pulse:  [] 77  Resp:  [16-20] 18  SpO2:  [99 %-100 %] 100 %  BP: ()/(53-64) 90/54        There is no height or weight on file to calculate BMI.       Physical Exam  Constitutional:       Appearance: He is ill-appearing.   Neck:      Trachea: Tracheostomy present.   Pulmonary:      Effort: Pulmonary effort is normal.   Neurological:      Mental Status: He is alert and oriented to person, place, and time.            Review of Symptoms      Symptom  Assessment (ESAS 0-10 Scale)  Pain:  5  Dyspnea:  0  Anxiety:  0  Nausea:  0  Depression:  0  Anorexia:  0  Fatigue:  0  Insomnia:  0  Restlessness:  0  Agitation:  0         Pain Assessment:    Location(s): other      Performance Status:  20    Living Arrangements:  Lives in nursing home    Psychosocial/Cultural:   See Palliative Psychosocial Note: Yes  Pt was most recently living in an LTAC facility in [place]. Mom and Dad are both actively involved with pt.   **Primary  to Follow**  Palliative Care  Consult: Yes  Other       Location (Other): generalized to all limbs       Location: generalized       Quality: aching        Quantity: 5/10 in intensity        Chronicity: Onset 2 year(s) ago, stable        Aggravating Factors: none        Alleviating Factors: opiates        Associated Symptoms: none      Advance Care Planning   Advance Directives:   Living Will: No    LaPOST: No    Do Not Resuscitate Status: No      Decision Making:  Patient answered questions  Goals of Care: What is most important right now is to focus on curative/life-prolongation (regardless of treatment burdens). Accordingly, we have decided that the best plan to meet the patient's goals includes continuing with treatment.         Significant Labs: BMP:   Recent Labs   Lab 03/07/24  0650   GLU 75   *   K 5.0      CO2 27   BUN 11   CREATININE 0.5   CALCIUM 7.9*     CBC:   Recent Labs   Lab 03/06/24  0531 03/07/24  0650   WBC 20.44* 22.77*   HGB 7.0* 7.0*   HCT 22.1* 22.7*    392     CBC:   Recent Labs   Lab 03/07/24  0650   WBC 22.77*   HGB 7.0*   HCT 22.7*   MCV 96        BMP:  Recent Labs   Lab 03/07/24  0650   GLU 75   *   K 5.0      CO2 27   BUN 11   CREATININE 0.5   CALCIUM 7.9*     LFT:  Lab Results   Component Value Date    AST 30 02/27/2024    ALKPHOS 137 (H) 02/27/2024    BILITOT 0.2 02/27/2024     Albumin:   Albumin   Date Value Ref Range Status   03/02/2024 1.5 (L) 3.5  - 5.2 g/dL Final     Protein:   Total Protein   Date Value Ref Range Status   02/27/2024 6.2 6.0 - 8.4 g/dL Final     Lactic acid:   Lab Results   Component Value Date    LACTATE 1.2 02/22/2024    LACTATE 1.7 02/21/2024       Significant Imaging: I have reviewed all pertinent imaging results/findings within the past 24 hours.
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
## 2024-03-07 NOTE — PLAN OF CARE
Recommendations    Continue IDDSI Level 6, ADAT per MD  Continue Peptamen AF @ 40ml/hr to provide 1152 kcals, 73 g PRO, 785 m fluid w/ additional FWF per MD  Continue Francisco BID x 14 days for wound healing  RD Following    Goals: Meet % een/epn by next RD f/u  Nutrition Goal Status: new  Communication of RD Recs: other (comment) (poc)

## 2024-03-07 NOTE — CONSULTS
[facility]  Wound Care    Patient Name:  Jyoti Mayberry   MRN:  [id number]  Date: 3/7/2024  Diagnosis: Severe sepsis    History:     No past medical history on file.    Social History     Socioeconomic History    Marital status: Single     Social Determinants of Health     Financial Resource Strain: Low Risk  (3/7/2024)    Overall Financial Resource Strain (CARDIA)     Difficulty of Paying Living Expenses: Not hard at all   Food Insecurity: No Food Insecurity (3/7/2024)    Hunger Vital Sign     Worried About Running Out of Food in the Last Year: Never true     Ran Out of Food in the Last Year: Never true   Transportation Needs: No Transportation Needs (3/7/2024)    PRAPARE - Transportation     Lack of Transportation (Medical): No     Lack of Transportation (Non-Medical): No   Physical Activity: Inactive (3/7/2024)    Exercise Vital Sign     Days of Exercise per Week: 0 days     Minutes of Exercise per Session: 0 min   Stress: Stress Concern Present (3/7/2024)    [employer] - Occupational Stress Questionnaire     Feeling of Stress : Very much   Social Connections: Socially Isolated (3/7/2024)    Social Connection and Isolation Panel [NHANES]     Frequency of Communication with Friends and Family: More than three times a week     Frequency of Social Gatherings with Friends and Family: More than three times a week     Attends Hindu Services: Never     Active Member of Clubs or Organizations: No     Attends Club or Organization Meetings: Never     Marital Status: Never    Housing Stability: Unknown (3/7/2024)    Housing Stability Vital Sign     Unable to Pay for Housing in the Last Year: No     Unstable Housing in the Last Year: No       Precautions:     Allergies as of 03/05/2024    (No Known Allergies)       WO Assessment Details/Treatment     Patient seen for wound care consultation. -placed by RN for extremities.     Reviewed chart for this encounter.   See Flow Sheet  for findings.    Pt is awake/ alert and initially agreeable to care while removing dressings to BLE and LUE- general surgery arrived at bedside for eval. Lower extremities- cleansed with Dakin's solution- mild odor present at dsg removal -dissipated after cleaning. Care continued with Xeroform to open areas with ABD pads to secure- all eschar kept dry- secured with kerlix. R hand and elbow - cleansed with Dakin's solution and placed silver hydrofiber for bacteriocidal property and absorption- secured with border foam dressing. Upon discussing turn for posterior body surface area assmt- pt requested to do at another time due to busy am and tired. Plan for transfer to specialty bed today- will try to return today to complete assmt. Family present reports sacral, B ischia, and mid back with wound- chart review of photos taken at prior facility show wounds not assessed as clean- red/ moist full thickness. Pt reports nursing staff changed dressings early this am. Repositioned to comfort with pillow support- will attempt assmt completion this pm.    RECOMMENDATIONS:  R hand/ R elbow- altered skin integrity: 1)clean with Vashe solution and pat dry. 2)cover open area with Aquacel Ag 3)cover with border foam dressing. Perform care every other day.    LUE/ BLE- altered skin integrity: 1)clean with Vashe solution 2)cover open areas with Xeroform- paint all areas of eschar with Betadine 3)cover with ABD pads and secure with kerlix. Perform care every other day.    Discussed POC with patient/ family and primary nurse.   See EMR for orders     Bedside nursing to continue care & monitoring.  Bedside nursing to maintain pressure injury prevention interventions.-Immerse specialty bed. Continue B heel offloading boots.  Current documented Gallito score is 9 with a nutrition sub-scale score of 2. IP nutrition following.   03/07/24 1030   WOCN Assessment   WOCN Total Time (mins) 75   Visit Date 03/07/24   Visit Time 1030   Consult Type  New   WOCN Speciality Wound;Ostomy   WOCN List colostomy   Ostomy Type Colostomy   Intervention assessed;changed;applied;chart review;orders   Teaching on-going  (pt/ family- skin/ wound assmt with recommendations. Gen Sx present for portion of care.)   Skin Interventions   Device Skin Pressure Protection positioning supports utilized;pressure points protected;absorbent pad utilized/changed   Pressure Reduction Devices heel offloading device utilized;positioning supports utilized;specialty bed utilized  (pending Immerse bed.)   Pressure Reduction Techniques frequent weight shift encouraged;heels elevated off bed;weight shift assistance provided   Skin Protection adhesive use limited;drying agents applied;incontinence pads utilized;silicone foam dressing in place;skin sealant/moisture barrier applied   Positioning   Body Position position maintained   Head of Bed (HOB) Positioning HOB elevated   Positioning/Transfer Devices pillows   Pressure Injury Prevention    Check Moisture Management Pad Done   Heel protection technique Heel boot   Check Medical Devices Done        Altered Skin Integrity 02/22/24 Right anterior;lower;upper;posterior;medial;lateral Leg Other (comment)   Date First Assessed: 02/22/24   Altered Skin Integrity Present on Admission - Did Patient arrive to the hospital with altered skin?: yes  Side: Right  Orientation: anterior;lower;upper;posterior;medial;lateral  Location: Leg  Primary Wound Type: (c) O...   Wound Image       Dressing Appearance Moist drainage   Drainage Amount Small   Drainage Characteristics/Odor Serosanguineous   Appearance Pink;Red;Eschar;Slough;Moist   Tissue loss description Full thickness   Black (%), Wound Tissue Color 50 %   Red (%), Wound Tissue Color 30 %   Yellow (%), Wound Tissue Color 20 %   Periwound Area Dry;Intact   Care Cleansed with:;Antimicrobial agent  (Dakin's solution- quarter strength)   Dressing Changed;Non-adherent;Absorptive Pad;Rolled gauze   Dressing  Change Due 03/09/24        Altered Skin Integrity 02/22/24 Left anterior;lower;posterior;medial;lateral;proximal;distal;upper Leg Other (comment)   Date First Assessed: 02/22/24   Altered Skin Integrity Present on Admission - Did Patient arrive to the hospital with altered skin?: yes  Side: Left  Orientation: anterior;lower;posterior;medial;lateral;proximal;distal;upper  Location: (c) Leg  Primar...   Wound Image       Dressing Appearance Moist drainage   Drainage Amount Small   Drainage Characteristics/Odor Serosanguineous;Malodorous  (odor dissipated after cleaning with Dakin's solution.)   Appearance Pink;Red;Black;Yellow;Eschar;Slough;Moist   Tissue loss description Full thickness   Black (%), Wound Tissue Color 80 %   Red (%), Wound Tissue Color 15 %   Yellow (%), Wound Tissue Color 5 %   Periwound Area Dry;Intact;Maroon  (devitalized tissue present.)   Care Cleansed with:;Antimicrobial agent  (dakin's solution- quarter strength)   Dressing Changed;Non-adherent;Absorptive Pad;Rolled gauze   Dressing Change Due 03/09/24        Altered Skin Integrity 02/22/24 Left lower;upper;anterior;posterior;medial;lateral Arm Other (comment)   Date First Assessed: 02/22/24   Altered Skin Integrity Present on Admission - Did Patient arrive to the hospital with altered skin?: yes  Side: Left  Orientation: lower;upper;anterior;posterior;medial;lateral  Location: (c) Arm  Primary Wound Type: (c...   Wound Image     Dressing Appearance Moist drainage   Drainage Amount Small   Drainage Characteristics/Odor Serosanguineous   Appearance Pink;Red;Black;Eschar;Slough   Tissue loss description Full thickness   Black (%), Wound Tissue Color 80 %   Red (%), Wound Tissue Color 15 %   Yellow (%), Wound Tissue Color 5 %   Periwound Area Dry;Intact   Care Cleansed with:;Antimicrobial agent  (Dakin's soluition)   Dressing Changed;Non-adherent;Absorptive Pad;Rolled gauze   Periwound Care Dry periwound area maintained   Dressing Change Due  03/09/24        Altered Skin Integrity 02/22/24 Right dorsal Hand Ulceration   Date First Assessed: 02/22/24   Altered Skin Integrity Present on Admission - Did Patient arrive to the hospital with altered skin?: yes  Side: Right  Orientation: dorsal  Location: Hand  Primary Wound Type: Ulceration   Wound Image    Description of Altered Skin Integrity Partial thickness tissue loss. Shallow open ulcer with a red or pink wound bed, without slough. Intact or Open/Ruptured Serum-filled blister.   Dressing Appearance Moist drainage   Drainage Amount Small   Drainage Characteristics/Odor Serous   Appearance Pink;Red   Tissue loss description Partial thickness   Periwound Area Dry;Intact   Wound Length (cm) 7 cm   Wound Width (cm) 6 cm   Wound Depth (cm) 0.1 cm   Wound Volume (cm^3) 4.2 cm^3   Wound Surface Area (cm^2) 42 cm^2   Care Cleansed with:;Antimicrobial agent  (Dakin's solution- quarter strength)   Dressing Applied;Silver;Hydrofiber;Silicone;Foam   Periwound Care Dry periwound area maintained   Dressing Change Due 03/09/24        Altered Skin Integrity 05/15/23 1411 Sacral spine Full thickness tissue loss with exposed bone, tendon, or muscle. Often includes undermining and tunneling. May extend into muscle and/or supporting structures.   Final Assessment Date/Final Assessment Time: (c)  (c)   Date First Assessed/Time First Assessed: 05/15/23 1411   Altered Skin Integrity Present on Admission - Did Patient arrive to the hospital with altered skin?: yes  Location: Sacral spine  Descript...   Wound Image   (pt refused turn at this time.- will return.)        Altered Skin Integrity 06/07/23 0900 Left Ischial tuberosity Full thickness tissue loss with exposed bone, tendon, or muscle. Often includes undermining and tunneling. May extend into muscle and/or supporting structures.   Final Assessment Date/Final Assessment Time: (c)  (c)   Date First Assessed/Time First Assessed: 06/07/23 0900   Altered Skin Integrity Present  on Admission - Did Patient arrive to the hospital with altered skin?: yes  Side: Left  Location: Ischial tu...   Wound Image   (pt refused turn at this time- will return.)        Altered Skin Integrity 06/07/23 0900 Right Ischial tuberosity Full thickness tissue loss with exposed bone, tendon, or muscle. Often includes undermining and tunneling. May extend into muscle and/or supporting structures.   Final Assessment Date/Final Assessment Time: (c)  (c)   Date First Assessed/Time First Assessed: 06/07/23 0900   Altered Skin Integrity Present on Admission - Did Patient arrive to the hospital with altered skin?: yes  Side: Right  Location: Ischial t...   Wound Image   (pt refused turn for assmt - will return.)        Altered Skin Integrity 02/22/24 Thoracic spine Full thickness tissue loss with exposed bone, tendon, or muscle. Often includes undermining and tunneling. May extend into muscle and/or supporting structures.   Date First Assessed: 02/22/24   Altered Skin Integrity Present on Admission - Did Patient arrive to the hospital with altered skin?: yes  Location: Thoracic spine  Description of Altered Skin Integrity: Full thickness tissue loss with exposed bone, te...   Wound Image   (pt refused turn at this time- will return.)        Altered Skin Integrity 02/22/24 Right upper Back Full thickness tissue loss with exposed bone, tendon, or muscle. Often includes undermining and tunneling. May extend into muscle and/or supporting structures.   Date First Assessed: 02/22/24   Altered Skin Integrity Present on Admission - Did Patient arrive to the hospital with altered skin?: yes  Side: Right  Orientation: upper  Location: Back  Description of Altered Skin Integrity: Full thickness tissue los...   Wound Image   (pt refused turn at this time- will return.)        Altered Skin Integrity 02/22/24 Right posterior Elbow Ulceration   Date First Assessed: 02/22/24   Altered Skin Integrity Present on Admission - Did Patient  arrive to the hospital with altered skin?: yes  Side: Right  Orientation: posterior  Location: Elbow  Primary Wound Type: Ulceration   Wound Image    Description of Altered Skin Integrity Partial thickness tissue loss. Shallow open ulcer with a red or pink wound bed, without slough. Intact or Open/Ruptured Serum-filled blister.   Dressing Appearance Moist drainage   Drainage Amount Small   Drainage Characteristics/Odor Serosanguineous   Appearance Pink;Red;Moist   Tissue loss description Partial thickness   Red (%), Wound Tissue Color 100 %   Periwound Area Dry;Intact;Scar tissue   Care Cleansed with:;Antimicrobial agent  (Dakin's solution -1/4 strength)   Dressing Changed;Silicone;Foam   Periwound Care Dry periwound area maintained   Dressing Change Due 03/09/24     Will continue to follow as needed and/ or as directed until discharge.    Rae Jackson BSN, RN, CWOCN  03/07/2024

## 2024-03-07 NOTE — ASSESSMENT & PLAN NOTE
Palliative Care at OSH engaging in ongoing communications with family regarding GOC given extensive comorbid conditions and recurrent hospitalizations. Per documentation review and brief discussion with family on initial transfer admission encounter, patient & family wish to continue all medical and surgical options at this time.     -- Patient & family would greatly benefit from continued Palliative Care discussions  -- IP Consult to [facility] Care services
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
## 2024-03-07 NOTE — ASSESSMENT & PLAN NOTE
"Stage IV pressure ulcer of bilateral buttock, sacral region  Pressure ulcers of multiple sites  Chronic osteomyelitis    This patient does have evidence of infective focus  My overall impression is sepsis.  Source: Skin and Soft Tissue (location extremities)  Antibiotics given-   Antibiotics (72h ago, onward)      Start     Stop Route Frequency Ordered    03/07/24 0900  minocycline capsule 100 mg         -- PER NG TUBE Every 12 hours 03/07/24 0220 03/07/24 0615  linezolid 600 mg/300 mL IVPB 600 mg         -- IV Every 12 hours (non-standard times) 03/07/24 0220 03/07/24 0600  meropenem (MERREM) 2 g in sodium chloride 0.9% 100 mL IVPB         -- IV Every 8 hours (non-standard times) 03/07/24 0220          Latest lactate reviewed-  No results for input(s): "LACTATE", "POCLAC" in the last 72 hours.  Organ dysfunction indicated by Acute respiratory failure    UCX 02/21 and RCX 02/22 with MDR proteus mirabilis, pseudomonas aeruginosa. Patient has extensive MDRO / ESBL culture data from prior hospitalizations as well. Shock component is now resolved, but leukocytosis, fever, and underlying wounds are still a component as source control has not been achieved.    -- IP consult to general surgery for surgical evaluation; per documentation review, previous surgical evaluation at OSH were evaluating for possible amputations +/- surgical debridements  -- Continue linezolid, meropenem, minocycline per ID recommendations at OSH  -- IP Consult to Infectious Disease for further assistance in antibiotic regimen management  -- IP Consult to Wound Care  -- F/U Culture data  -- Turn q2h  "

## 2024-03-07 NOTE — CARE UPDATE
I have reviewed the chart of Jyoti Mayberry who is hospitalized for the following:    Active Hospital Problems    Diagnosis    *Severe sepsis    Severe obesity (BMI >= 40)    Hyponatremia     Monitor daily labs      Encounter for palliative care    Acute cystitis    Pressure ulcers of skin of multiple topographic sites    Quadriplegia    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    Chronic osteomyelitis    Stage IV pressure ulcer of left buttock    Stage IV pressure ulcer of right buttock    Open wounds of multiple sites of left hand          Goran Duncan PA-C  [facility]
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
## 2024-03-07 NOTE — PLAN OF CARE
[facility] [address] [facility]  Initial Discharge Assessment       Primary Care Provider: Geri, Primary Doctor    Admission Diagnosis: Pressure ulcers of skin of multiple topographic sites [L89.90]    Admission Date: 3/7/2024  Expected Discharge Date:          Payor: MEDICAID / Plan: HE[place]UE [place] / Product Type: Managed Medicaid /     Extended Emergency Contact Information  Primary Emergency Contact: Ann Mayberry  Mobile Phone: [phone]  Relation: Mother  Preferred language: English   needed? No  Secondary Emergency Contact: PbTeejean pierre  Mobile Phone: [phone]  Relation: Father  Preferred language: English   needed? No    Discharge Plan A: Long-term acute care facility (LTAC)  Discharge Plan B: Home with family    No Pharmacies Listed    Initial Assessment (most recent)       Adult Discharge Assessment - 03/07/24 1147          Discharge Assessment    Assessment Type Discharge Planning Assessment     Confirmed/corrected address, phone number and insurance Yes     Confirmed Demographics Correct on Facesheet     Source of Information patient     Communicated ANDRÉS with patient/caregiver Yes     People in Home parent(s)     Do you expect to return to your current living situation? Yes     Do you have help at home or someone to help you manage your care at home? Yes     Who are your caregiver(s) and their phone number(s)? father and mother     Current cognitive status: Alert/Oriented     Walking or Climbing Stairs transferring difficulty, dependent     Dressing/Bathing dressing difficulty, dependent     Home Layout Able to live on 1st floor     Equipment Currently Used at Home bath bench;wound care supplies;suction machine     Do you take prescription medications? Yes     Do you have prescription coverage? Yes     Do you have any problems affording any of your prescribed medications? No     Is the patient taking medications as prescribed? yes     Who is going to help you get home at  discharge? mother and father     Are you on dialysis? No     Do you take coumadin? No     Discharge Plan A Long-term acute care facility (LTAC)     Discharge Plan B Home with family     Discharge Plan discussed with: Patient     SDOH --   no       Physical Activity    On average, how many days per week do you engage in moderate to strenuous exercise (like a brisk walk)? 0 days     On average, how many minutes do you engage in exercise at this level? 0 min        Financial Resource Strain    How hard is it for you to pay for the very basics like food, housing, medical care, and heating? Not hard at all        Housing Stability    In the last 12 months, was there a time when you were not able to pay the mortgage or rent on time? No     In the last 12 months, was there a time when you did not have a steady place to sleep or slept in a shelter (including now)? No        Transportation Needs    In the past 12 months, has lack of transportation kept you from medical appointments or from getting medications? No     In the past 12 months, has lack of transportation kept you from meetings, work, or from getting things needed for daily living? No        Food Insecurity    Within the past 12 months, you worried that your food would run out before you got the money to buy more. Never true     Within the past 12 months, the food you bought just didn't last and you didn't have money to get more. Never true        Stress    Do you feel stress - tense, restless, nervous, or anxious, or unable to sleep at night because your mind is troubled all the time - these days? Very much        Social Connections    In a typical week, how many times do you talk on the phone with family, friends, or neighbors? More than three times a week     How often do you get together with friends or relatives? More than three times a week     How often do you attend Jewish or Yarsani services? Never     Do you belong to any clubs or organizations such as  Cheondoism groups, unions, fraternal or athletic groups, or school groups? No     How often do you attend meetings of the clubs or organizations you belong to? Never     Are you , , , , never , or living with a partner? Never         Alcohol Use    Q1: How often do you have a drink containing alcohol? Never     Q2: How many drinks containing alcohol do you have on a typical day when you are drinking? Patient does not drink     Q3: How often do you have six or more drinks on one occasion? Never                   The CM met with the patient at bedside to complete the DPA.  The CM placed name and contact information on the blackboard in the patient's room.  Use preferred pharmacy / bedside delivery for any necessary  medications at the time of discharge.The patient is dependent, quadriplegic, and trach ed.  The patient uses the following home equipment. The patient is not on Dialysis or Coumadin. The patient's mother will provide assistance to the patient upon discharge. The patient's mother will provide transportation upon discharge. The CM will continue to follow for course of hospitalization.

## 2024-03-07 NOTE — NURSING
Patient transferred to [facility] by [facility] via stretcher. Pt mother to ride in ambulance with pt. Belongings sent with pt father. Tube feedings remained at 40 mL/hr. IVF continued. Shen in place. Trach collar with oxygen in place.

## 2024-03-08 PROBLEM — E66.01 SEVERE OBESITY (BMI >= 40): Status: RESOLVED | Noted: 2024-03-07 | Resolved: 2024-03-08

## 2024-03-08 LAB
ABO + RH BLD: NORMAL
ANION GAP SERPL CALC-SCNC: 7 MMOL/L (ref 8–16)
ANISOCYTOSIS BLD QL SMEAR: SLIGHT
BASOPHILS # BLD AUTO: 0.16 K/UL (ref 0–0.2)
BASOPHILS NFR BLD: 0.7 % (ref 0–1.9)
BLD GP AB SCN CELLS X3 SERPL QL: NORMAL
BLD PROD TYP BPU: NORMAL
BLOOD UNIT EXPIRATION DATE: NORMAL
BLOOD UNIT TYPE CODE: 5100
BLOOD UNIT TYPE: NORMAL
BUN SERPL-MCNC: 15 MG/DL (ref 6–20)
CALCIUM SERPL-MCNC: 8 MG/DL (ref 8.7–10.5)
CHLORIDE SERPL-SCNC: 101 MMOL/L (ref 95–110)
CO2 SERPL-SCNC: 27 MMOL/L (ref 23–29)
CODING SYSTEM: NORMAL
CREAT SERPL-MCNC: 0.6 MG/DL (ref 0.5–1.4)
CROSSMATCH INTERPRETATION: NORMAL
DIFFERENTIAL METHOD BLD: ABNORMAL
DISPENSE STATUS: NORMAL
EOSINOPHIL # BLD AUTO: 1.4 K/UL (ref 0–0.5)
EOSINOPHIL NFR BLD: 6 % (ref 0–8)
ERYTHROCYTE [DISTWIDTH] IN BLOOD BY AUTOMATED COUNT: 16.2 % (ref 11.5–14.5)
EST. GFR  (NO RACE VARIABLE): >60 ML/MIN/1.73 M^2
GLUCOSE SERPL-MCNC: 65 MG/DL (ref 70–110)
HCT VFR BLD AUTO: 20.1 % (ref 40–54)
HGB BLD-MCNC: 6.2 G/DL (ref 14–18)
HYPOCHROMIA BLD QL SMEAR: ABNORMAL
IMM GRANULOCYTES # BLD AUTO: 0.17 K/UL (ref 0–0.04)
IMM GRANULOCYTES NFR BLD AUTO: 0.7 % (ref 0–0.5)
LYMPHOCYTES # BLD AUTO: 3.9 K/UL (ref 1–4.8)
LYMPHOCYTES NFR BLD: 17.1 % (ref 18–48)
MCH RBC QN AUTO: 29.7 PG (ref 27–31)
MCHC RBC AUTO-ENTMCNC: 30.8 G/DL (ref 32–36)
MCV RBC AUTO: 96 FL (ref 82–98)
MONOCYTES # BLD AUTO: 2.1 K/UL (ref 0.3–1)
MONOCYTES NFR BLD: 9.3 % (ref 4–15)
NEUTROPHILS # BLD AUTO: 15 K/UL (ref 1.8–7.7)
NEUTROPHILS NFR BLD: 66.2 % (ref 38–73)
NRBC BLD-RTO: 0 /100 WBC
PLATELET # BLD AUTO: 481 K/UL (ref 150–450)
PLATELET BLD QL SMEAR: ABNORMAL
PMV BLD AUTO: 8.9 FL (ref 9.2–12.9)
POTASSIUM SERPL-SCNC: 5 MMOL/L (ref 3.5–5.1)
RBC # BLD AUTO: 2.09 M/UL (ref 4.6–6.2)
SODIUM SERPL-SCNC: 135 MMOL/L (ref 136–145)
SPECIMEN OUTDATE: NORMAL
T4 FREE SERPL-MCNC: 0.57 NG/DL (ref 0.71–1.51)
TRANS ERYTHROCYTES VOL PATIENT: NORMAL ML
TSH SERPL DL<=0.005 MIU/L-ACNC: 118.68 UIU/ML (ref 0.4–4)
WBC # BLD AUTO: 22.69 K/UL (ref 3.9–12.7)

## 2024-03-08 PROCEDURE — 25000003 PHARM REV CODE 250

## 2024-03-08 PROCEDURE — 99233 SBSQ HOSP IP/OBS HIGH 50: CPT | Mod: ,,,

## 2024-03-08 PROCEDURE — 27000207 HC ISOLATION

## 2024-03-08 PROCEDURE — 30233N1 TRANSFUSION OF NONAUTOLOGOUS RED BLOOD CELLS INTO PERIPHERAL VEIN, PERCUTANEOUS APPROACH: ICD-10-PCS | Performed by: INTERNAL MEDICINE

## 2024-03-08 PROCEDURE — 84443 ASSAY THYROID STIM HORMONE: CPT

## 2024-03-08 PROCEDURE — 20600001 HC STEP DOWN PRIVATE ROOM

## 2024-03-08 PROCEDURE — 63600175 PHARM REV CODE 636 W HCPCS

## 2024-03-08 PROCEDURE — 86920 COMPATIBILITY TEST SPIN: CPT

## 2024-03-08 PROCEDURE — 80048 BASIC METABOLIC PNL TOTAL CA: CPT

## 2024-03-08 PROCEDURE — 99900035 HC TECH TIME PER 15 MIN (STAT)

## 2024-03-08 PROCEDURE — 85025 COMPLETE CBC W/AUTO DIFF WBC: CPT

## 2024-03-08 PROCEDURE — 27000221 HC OXYGEN, UP TO 24 HOURS

## 2024-03-08 PROCEDURE — 99900026 HC AIRWAY MAINTENANCE (STAT)

## 2024-03-08 PROCEDURE — P9021 RED BLOOD CELLS UNIT: HCPCS

## 2024-03-08 PROCEDURE — 36430 TRANSFUSION BLD/BLD COMPNT: CPT

## 2024-03-08 PROCEDURE — 86901 BLOOD TYPING SEROLOGIC RH(D): CPT

## 2024-03-08 PROCEDURE — 94761 N-INVAS EAR/PLS OXIMETRY MLT: CPT

## 2024-03-08 PROCEDURE — 27200966 HC CLOSED SUCTION SYSTEM

## 2024-03-08 PROCEDURE — 84439 ASSAY OF FREE THYROXINE: CPT

## 2024-03-08 RX ORDER — POLYETHYLENE GLYCOL 3350 17 G/17G
17 POWDER, FOR SOLUTION ORAL 2 TIMES DAILY
Status: DISCONTINUED | OUTPATIENT
Start: 2024-03-08 | End: 2024-03-22

## 2024-03-08 RX ORDER — LEVOTHYROXINE SODIUM 112 UG/1
112 TABLET ORAL
Status: DISCONTINUED | OUTPATIENT
Start: 2024-03-09 | End: 2024-04-04

## 2024-03-08 RX ORDER — HYDROCODONE BITARTRATE AND ACETAMINOPHEN 500; 5 MG/1; MG/1
TABLET ORAL
Status: DISCONTINUED | OUTPATIENT
Start: 2024-03-08 | End: 2024-03-11

## 2024-03-08 RX ADMIN — FAMOTIDINE 20 MG: 40 POWDER, FOR SUSPENSION ORAL at 10:03

## 2024-03-08 RX ADMIN — MIDODRINE HYDROCHLORIDE 10 MG: 5 TABLET ORAL at 09:03

## 2024-03-08 RX ADMIN — HYDROCODONE BITARTRATE AND ACETAMINOPHEN 1 TABLET: 10; 325 TABLET ORAL at 11:03

## 2024-03-08 RX ADMIN — MEROPENEM 2 G: 1 INJECTION INTRAVENOUS at 05:03

## 2024-03-08 RX ADMIN — MINOCYCLINE HYDROCHLORIDE 100 MG: 100 CAPSULE ORAL at 10:03

## 2024-03-08 RX ADMIN — GABAPENTIN 300 MG: 300 CAPSULE ORAL at 09:03

## 2024-03-08 RX ADMIN — LINEZOLID 600 MG: 600 INJECTION, SOLUTION INTRAVENOUS at 09:03

## 2024-03-08 RX ADMIN — LEVOTHYROXINE SODIUM 100 MCG: 100 TABLET ORAL at 06:03

## 2024-03-08 RX ADMIN — LEVETIRACETAM 750 MG: 100 SOLUTION ORAL at 09:03

## 2024-03-08 RX ADMIN — CHLORHEXIDINE GLUCONATE 0.12% ORAL RINSE 15 ML: 1.2 LIQUID ORAL at 09:03

## 2024-03-08 RX ADMIN — FAMOTIDINE 20 MG: 40 POWDER, FOR SUSPENSION ORAL at 12:03

## 2024-03-08 RX ADMIN — MINOCYCLINE HYDROCHLORIDE 100 MG: 100 CAPSULE ORAL at 09:03

## 2024-03-08 RX ADMIN — LEVETIRACETAM 750 MG: 100 SOLUTION ORAL at 12:03

## 2024-03-08 RX ADMIN — MIDODRINE HYDROCHLORIDE 10 MG: 5 TABLET ORAL at 10:03

## 2024-03-08 RX ADMIN — MEROPENEM 2 G: 1 INJECTION INTRAVENOUS at 09:03

## 2024-03-08 RX ADMIN — GABAPENTIN 300 MG: 300 CAPSULE ORAL at 10:03

## 2024-03-08 RX ADMIN — MIDODRINE HYDROCHLORIDE 10 MG: 5 TABLET ORAL at 05:03

## 2024-03-08 RX ADMIN — FOLIC ACID 1 MG: 1 TABLET ORAL at 09:03

## 2024-03-08 RX ADMIN — POLYETHYLENE GLYCOL 3350 17 G: 17 POWDER, FOR SOLUTION ORAL at 10:03

## 2024-03-08 RX ADMIN — LINEZOLID 600 MG: 600 INJECTION, SOLUTION INTRAVENOUS at 10:03

## 2024-03-08 RX ADMIN — POLYETHYLENE GLYCOL 3350 17 G: 17 POWDER, FOR SOLUTION ORAL at 12:03

## 2024-03-08 RX ADMIN — ALPRAZOLAM 1 MG: 0.5 TABLET ORAL at 05:03

## 2024-03-08 RX ADMIN — MEROPENEM 2 G: 1 INJECTION INTRAVENOUS at 01:03

## 2024-03-08 RX ADMIN — GABAPENTIN 300 MG: 300 CAPSULE ORAL at 05:03

## 2024-03-08 RX ADMIN — HYDROCODONE BITARTRATE AND ACETAMINOPHEN 1 TABLET: 10; 325 TABLET ORAL at 05:03

## 2024-03-08 RX ADMIN — CHLORHEXIDINE GLUCONATE 0.12% ORAL RINSE 15 ML: 1.2 LIQUID ORAL at 10:03

## 2024-03-08 NOTE — ASSESSMENT & PLAN NOTE
Palliative Care at OSH engaging in ongoing communications with family regarding GOC given extensive comorbid conditions and recurrent hospitalizations. Per documentation review and brief discussion with family on initial transfer admission encounter, patient & family wish to continue all medical and surgical options at this time.     -- Patient & family would greatly benefit from continued Palliative Care discussions  -- IP Consult to hospitals Care services

## 2024-03-08 NOTE — ASSESSMENT & PLAN NOTE
Patient has hyponatremia which is controlled,We will aim to correct the sodium by 4-6mEq in 24 hours. We will monitor sodium Daily. The hyponatremia is due to Dehydration/hypovolemia. The patient's sodium results have been reviewed and are listed below.  Recent Labs   Lab 03/08/24  0611   *

## 2024-03-08 NOTE — PT/OT/SLP PROGRESS
Speech Language Pathology      Jyoti Mayberry  MRN: [id number]    Patient not seen today secondary to NPO for up[coming procedure. Per chart review, pt continue to tolerate PMSV and has been utilizing speaking valve prior to hospital admission. Will follow-up per LSP POC. Continue with most recent recommendations as outlined below:     Recommendations:      IMPORTANT  CAUTION: CUFF MUST ALWAYS BE DEFLATED WHEN VALVE IN USE - [place] speaking valve (PMSV) placement across all waking hours as tolerated                  General Recommendations:  Dysphagia therapy  Diet recommendations:  Soft & Bite Sized Diet - IDDSI Level 6, Thin   Aspiration Precautions: 1 bite/sip at a time, Assistance with meals, Eliminate distractions, Feed only when awake/alert, HOB to 90 degrees, meds whole one at a time or crushed in puree as needed, closely Monitor for s/s of aspiration, Small bites/sips, and Strict aspiration precautions   General Precautions: Standard, fall, dental soft  Communication strategies:  none  PMSV Precautions: Only wear when cuff is deflated, remove valve with any S/S respiratory distress, remove valve when sleeping. Please note, to clean valve:  1. Swish valve in pure soap and warm water, 2. Rinse valve thoroughly in warm running water, 3. Allow valve to air dry thoroughly before placing it in storage container, 4. DO NOT use hot water, peroxide, bleach, vinegar, alcohol, brushes, or cotton swabs to clean valve.       3/8/2024

## 2024-03-08 NOTE — SUBJECTIVE & OBJECTIVE
Interval History: NAEO. WBC remains elevated. Continuing abx. Awaiting surgery input on possible interventions.    Review of Systems   Constitutional:  Negative for fatigue.   HENT: Negative.     Eyes: Negative.    Respiratory:  Negative for cough, shortness of breath and wheezing.    Cardiovascular:  Negative for chest pain and palpitations.   Gastrointestinal:  Negative for abdominal distention, constipation, nausea and vomiting.   Genitourinary: Negative.    Musculoskeletal:  Positive for myalgias.   Skin:  Positive for wound.   Neurological:  Negative for dizziness, seizures, weakness and numbness.   Hematological: Negative.    Psychiatric/Behavioral: Negative.       Objective:     Vital Signs (Most Recent):  Temp: 97.7 °F (36.5 °C) (03/08/24 1355)  Pulse: 82 (03/08/24 1412)  Resp: 18 (03/08/24 1412)  BP: 101/65 (03/08/24 1412)  SpO2: 100 % (03/08/24 1412) Vital Signs (24h Range):  Temp:  [97.3 °F (36.3 °C)-98 °F (36.7 °C)] 97.7 °F (36.5 °C)  Pulse:  [] 82  Resp:  [16-20] 18  SpO2:  [96 %-100 %] 100 %  BP: (101-117)/(58-66) 101/65     Weight: 59.4 kg (131 lb)  Body mass index is 19.35 kg/m².    Intake/Output Summary (Last 24 hours) at 3/8/2024 1458  Last data filed at 3/8/2024 0611  Gross per 24 hour   Intake 740 ml   Output 1350 ml   Net -610 ml         Physical Exam  Constitutional:       Appearance: He is ill-appearing.   HENT:      Head: Normocephalic.      Nose: Nose normal.      Comments: NG tube in place     Mouth/Throat:      Mouth: Mucous membranes are moist.   Eyes:      Pupils: Pupils are equal, round, and reactive to light.   Cardiovascular:      Rate and Rhythm: Normal rate and regular rhythm.   Pulmonary:      Effort: No respiratory distress.      Breath sounds: No wheezing or rhonchi.      Comments: On trach collar 5L  Abdominal:      General: Abdomen is flat. There is no distension.      Palpations: Abdomen is soft.      Tenderness: There is no abdominal tenderness. There is no right CVA  tenderness or left CVA tenderness.   Musculoskeletal:         General: Deformity and signs of injury present.      Cervical back: Normal range of motion.   Skin:     General: Skin is dry.      Findings: Lesion present.      Comments: Gangrene BL LE and LUE below elbow   Neurological:      Mental Status: He is alert and oriented to person, place, and time.             Significant Labs: All pertinent labs within the past 24 hours have been reviewed.    Significant Imaging: I have reviewed all pertinent imaging results/findings within the past 24 hours.
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
## 2024-03-08 NOTE — PROGRESS NOTES
[facility]  Wound Care    Patient Name:  Jyoti Mayberry   MRN:  [id number]  Date: 3/7/2024  Diagnosis: Severe sepsis    History:     No past medical history on file.    Social History     Socioeconomic History    Marital status: Single     Social Determinants of Health     Financial Resource Strain: Low Risk  (3/7/2024)    Overall Financial Resource Strain (CARDIA)     Difficulty of Paying Living Expenses: Not hard at all   Food Insecurity: No Food Insecurity (3/7/2024)    Hunger Vital Sign     Worried About Running Out of Food in the Last Year: Never true     Ran Out of Food in the Last Year: Never true   Transportation Needs: No Transportation Needs (3/7/2024)    PRAPARE - Transportation     Lack of Transportation (Medical): No     Lack of Transportation (Non-Medical): No   Physical Activity: Inactive (3/7/2024)    Exercise Vital Sign     Days of Exercise per Week: 0 days     Minutes of Exercise per Session: 0 min   Stress: Stress Concern Present (3/7/2024)    [place] [employer] - Occupational Stress Questionnaire     Feeling of Stress : Very much   Social Connections: Socially Isolated (3/7/2024)    Social Connection and Isolation Panel [NHANES]     Frequency of Communication with Friends and Family: More than three times a week     Frequency of Social Gatherings with Friends and Family: More than three times a week     Attends Tenriism Services: Never     Active Member of Clubs or Organizations: No     Attends Club or Organization Meetings: Never     Marital Status: Never    Housing Stability: Unknown (3/7/2024)    Housing Stability Vital Sign     Unable to Pay for Housing in the Last Year: No     Unstable Housing in the Last Year: No       Precautions:     Allergies as of 03/05/2024    (No Known Allergies)       WOC Assessment Details/Treatment     Return to complete skin assmt as discussed earlier today. Dinner meal tray arrived at same time- pt request to delay assmt to eat  meal. According to pt/ family- posterior dressing changes were performed this morning. Orders in from prior facility indeed in  place and appropriate from photo review in chart. However- importance of updated assmt emphasized with understanding voiced- plan made to return tomorrow to complete assmt/ care. Pt agreeable to plan at this time.      Will continue to follow as needed and/ or as directed until discharge.    Rae MADDOXN, RN, CWOCN  03/07/2024

## 2024-03-08 NOTE — ASSESSMENT & PLAN NOTE
>>ASSESSMENT AND PLAN FOR PRESSURE ULCERS OF SKIN OF MULTIPLE TOPOGRAPHIC SITES WRITTEN ON 3/8/2024  3:07 PM BY GUNNER NAQVI MD

## 2024-03-08 NOTE — PLAN OF CARE
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 yes    GTTS: Abx    Urine Output/Bowel Movement:   I/O this shift:  In: 200 [NG/GT:200]  Out: 350 [Urine:350]  Last Bowel Movement: 03/08/24      Drains/Tubes/Tube Feeds (include total output/shift):   I/O this shift:  In: 200 [NG/GT:200]  Out: 350 [Urine:350]      Lines: Femoral PICC      Accuchecks:none    Skin: mx stage 4 wounds and DTI    Fall Risk Score: 18    Activity level? bedrest    Any scheduled procedures? OR 3/8/24    Any safety concerns? Aspirating      Problem: Adjustment to Illness (Sepsis/Septic Shock)  Goal: Optimal Coping  Outcome: Ongoing, Progressing     Problem: Bleeding (Sepsis/Septic Shock)  Goal: Absence of Bleeding  Outcome: Ongoing, Progressing     Problem: Infection Progression (Sepsis/Septic Shock)  Goal: Absence of Infection Signs and Symptoms  Outcome: Ongoing, Progressing     Problem: Nutrition Impaired (Sepsis/Septic Shock)  Goal: Optimal Nutrition Intake  Outcome: Ongoing, Progressing     Problem: Coping Ineffective  Goal: Effective Coping  Outcome: Ongoing, Progressing     Problem: Infection  Goal: Absence of Infection Signs and Symptoms  Outcome: Ongoing, Progressing     Problem: Impaired Wound Healing  Goal: Optimal Wound Healing  Outcome: Ongoing, Progressing     "

## 2024-03-08 NOTE — SUBJECTIVE & OBJECTIVE
No past medical history on file.    Past Surgical History:   Procedure Laterality Date    ESOPHAGOGASTRODUODENOSCOPY W/ PEG N/A 5/30/2023    Procedure: PEG;  Surgeon: JUSTYN Devine MD;  Location: [facility] ENDOSCOPY;  Service: Gastroenterology;  Laterality: N/A;       Review of patient's allergies indicates:  No Known Allergies    Medications:  Continuous Infusions:  Scheduled Meds:   chlorhexidine  15 mL Mouth/Throat BID    famotidine  20 mg Per NG tube BID    folic acid  1 mg Per NG tube Daily    gabapentin  300 mg Per NG tube TID    levETIRAcetam  750 mg Per NG tube BID    [START ON 3/9/2024] levothyroxine  112 mcg Per NG tube Before breakfast    linezolid  600 mg Intravenous Q12H    meropenem (MERREM) IVPB  2 g Intravenous Q8H    midodrine  10 mg Per NG tube TID    minocycline  100 mg Per NG tube Q12H    polyethylene glycol  17 g Per NG tube BID     PRN Meds:0.9%  NaCl infusion (for blood administration), acetaminophen, ALPRAZolam, HYDROcodone-acetaminophen, morphine    Family History    None       Tobacco Use    Smoking status: Not on file    Smokeless tobacco: Not on file   Substance and Sexual Activity    Alcohol use: Not on file    Drug use: Not on file    Sexual activity: Not on file       Review of Systems  Objective:     Vital Signs (Most Recent):  Temp: 97.7 °F (36.5 °C) (03/08/24 1355)  Pulse: 82 (03/08/24 1412)  Resp: 18 (03/08/24 1412)  BP: 101/65 (03/08/24 1412)  SpO2: 100 % (03/08/24 1412) Vital Signs (24h Range):  Temp:  [97.3 °F (36.3 °C)-98 °F (36.7 °C)] 97.7 °F (36.5 °C)  Pulse:  [] 82  Resp:  [16-20] 18  SpO2:  [96 %-100 %] 100 %  BP: (101-117)/(58-66) 101/65     Weight: 59.4 kg (131 lb)  Body mass index is 19.35 kg/m².       Physical Exam  Constitutional:       Appearance: He is ill-appearing.   Neck:      Trachea: Tracheostomy present.   Pulmonary:      Effort: Pulmonary effort is normal.   Neurological:      Mental Status: He is alert and oriented to person, place, and time.             Review of Symptoms      Symptom Assessment (ESAS 0-10 Scale)  Pain:  0  Dyspnea:  0  Anxiety:  0  Nausea:  0  Depression:  0  Anorexia:  0  Fatigue:  0  Insomnia:  0  Restlessness:  0  Agitation:  0         Pain Assessment:    Location(s): other      Performance Status:  20    Living Arrangements:  Lives in nursing home    Psychosocial/Cultural:   See Palliative Psychosocial Note: Yes  Pt was most recently living in an LTAC facility in [place]. Mom and Dad are both actively involved with pt.   **Primary  to Follow**  Palliative Care  Consult: Yes  Other       Location (Other): generalized to all limbs       Location: generalized       Quality: aching        Quantity: 5/10 in intensity        Chronicity: Onset 2 year(s) ago, stable        Aggravating Factors: none        Alleviating Factors: opiates        Associated Symptoms: none      Advance Care Planning   Advance Directives:   Living Will: No    LaPOST: No    Do Not Resuscitate Status: No      Decision Making:  Patient answered questions  Goals of Care: What is most important right now is to focus on curative/life-prolongation (regardless of treatment burdens). Accordingly, we have decided that the best plan to meet the patient's goals includes continuing with treatment.         Significant Labs: BMP:   Recent Labs   Lab 03/08/24  0611   GLU 65*   *   K 5.0      CO2 27   BUN 15   CREATININE 0.6   CALCIUM 8.0*       CBC:   Recent Labs   Lab 03/07/24  0650 03/08/24  0611   WBC 22.77* 22.69*   HGB 7.0* 6.2*   HCT 22.7* 20.1*    481*       CBC:   Recent Labs   Lab 03/08/24  0611   WBC 22.69*   HGB 6.2*   HCT 20.1*   MCV 96   *       BMP:  Recent Labs   Lab 03/08/24  0611   GLU 65*   *   K 5.0      CO2 27   BUN 15   CREATININE 0.6   CALCIUM 8.0*       LFT:  Lab Results   Component Value Date    AST 30 02/27/2024    ALKPHOS 137 (H) 02/27/2024    BILITOT 0.2 02/27/2024     Albumin:   Albumin    Date Value Ref Range Status   03/02/2024 1.5 (L) 3.5 - 5.2 g/dL Final     Protein:   Total Protein   Date Value Ref Range Status   02/27/2024 6.2 6.0 - 8.4 g/dL Final     Lactic acid:   Lab Results   Component Value Date    LACTATE 1.2 02/22/2024    LACTATE 1.7 02/21/2024       Significant Imaging: I have reviewed all pertinent imaging results/findings within the past 24 hours.

## 2024-03-08 NOTE — RESPIRATORY THERAPY
"RAPID RESPONSE RESPIRATORY THERAPY PROACTIVE NOTE           Time of visit: 1042     Code Status: Prior   : 1995  [facility]:   MRN: [id number]  Time spent at the bedside: < 15 min    SITUATION    Evaluated patient for: LDA Check     BACKGROUND    Patient has no past medical history on file.  Clinically Significant Surgical Hx: tracheostomy    24 Hours Vitals Range:  Temp:  [97.3 °F (36.3 °C)-98.5 °F (36.9 °C)]   Pulse:  []   Resp:  [16-20]   BP: (101-117)/(58-66)   SpO2:  [96 %-100 %]     Labs:    Recent Labs     24  0531 24  0650 24  0611   * 135* 135*   K 4.6 5.0 5.0    103 101   CO2 22* 27 27   BUN 12 11 15   CREATININE 0.6 0.5 0.6   GLU 85 75 65*        No results for input(s): "PH", "PCO2", "PO2", "HCO3", "POCSATURATED", "BE" in the last 72 hours.    ASSESSMENT/INTERVENTIONS  Resting comfortably with no respiratory interventions required.      Last VS   Temp: 98.5 °F (36.9 °C) (1657)  Pulse: 91 (1657)  Resp: 20 (1657)  BP: 101/64 (1657)  SpO2: 100 % (1657)      Extra trachs at bedside: y  Level of Consciousness: Level of Consciousness (AVPU): alert  Respiratory Effort: Respiratory Effort: Unlabored Expansion/Accessory Muscle Usage: Expansion/Accessory Muscles/Retractions: expansion symmetric, no retractions, no use of accessory muscles  All Lung Field Breath Sounds: All Lung Fields Breath Sounds: coarse, diminished  NATALIA Breath Sounds: coarse  O2 Device/Concentration: y  Surgical airway: Yes, Type: Shiley Size: 8, cuffed  Ambu at bedside:       Active Orders   Respiratory Care    Oxygen Continuous     Frequency: Continuous     Number of Occurrences: Until Specified     Order Questions:      Device type: Low flow      Device: Trach Collar      FiO2%: 60      Titrate O2 per Oxygen Titration Protocol: Yes      To maintain SpO2 goal of: >= 90%      Notify MD of: Inability to achieve desired SpO2; Sudden change in patient status and requires " 20% increase in FiO2; Patient requires >60% FiO2    Pulse Oximetry Continuous     Frequency: Continuous     Number of Occurrences: Until Specified    Routine tracheostomy care     Frequency: BID     Number of Occurrences: Until Specified    SUCTION PRN     Frequency: PRN     Number of Occurrences: Until Specified       RECOMMENDATIONS    We recommend: RRT Recs: Continue POC per primary team.      FOLLOW-UP    Please call back the Rapid Response RT, Nickolas Beltre RRT at x [phone] for any questions or concerns.

## 2024-03-08 NOTE — ASSESSMENT & PLAN NOTE
Body mass index is 19.35 kg/m². Morbid obesity complicates all aspects of disease management from diagnostic modalities to treatment. Weight loss encouraged and health benefits explained to patient.

## 2024-03-09 PROBLEM — I96 GANGRENE: Status: ACTIVE | Noted: 2024-03-09

## 2024-03-09 LAB
ANION GAP SERPL CALC-SCNC: 7 MMOL/L (ref 8–16)
BASOPHILS # BLD AUTO: 0.26 K/UL (ref 0–0.2)
BASOPHILS NFR BLD: 1.4 % (ref 0–1.9)
BUN SERPL-MCNC: 13 MG/DL (ref 6–20)
CALCIUM SERPL-MCNC: 7.5 MG/DL (ref 8.7–10.5)
CHLORIDE SERPL-SCNC: 101 MMOL/L (ref 95–110)
CO2 SERPL-SCNC: 26 MMOL/L (ref 23–29)
CREAT SERPL-MCNC: 0.6 MG/DL (ref 0.5–1.4)
DIFFERENTIAL METHOD BLD: ABNORMAL
EOSINOPHIL # BLD AUTO: 1.5 K/UL (ref 0–0.5)
EOSINOPHIL NFR BLD: 7.7 % (ref 0–8)
ERYTHROCYTE [DISTWIDTH] IN BLOOD BY AUTOMATED COUNT: 16 % (ref 11.5–14.5)
EST. GFR  (NO RACE VARIABLE): >60 ML/MIN/1.73 M^2
GLUCOSE SERPL-MCNC: 64 MG/DL (ref 70–110)
HCT VFR BLD AUTO: 26.3 % (ref 40–54)
HGB BLD-MCNC: 8.2 G/DL (ref 14–18)
IMM GRANULOCYTES # BLD AUTO: 0.09 K/UL (ref 0–0.04)
IMM GRANULOCYTES NFR BLD AUTO: 0.5 % (ref 0–0.5)
LYMPHOCYTES # BLD AUTO: 3.8 K/UL (ref 1–4.8)
LYMPHOCYTES NFR BLD: 19.8 % (ref 18–48)
MCH RBC QN AUTO: 29.4 PG (ref 27–31)
MCHC RBC AUTO-ENTMCNC: 31.2 G/DL (ref 32–36)
MCV RBC AUTO: 94 FL (ref 82–98)
MONOCYTES # BLD AUTO: 1.6 K/UL (ref 0.3–1)
MONOCYTES NFR BLD: 8.3 % (ref 4–15)
NEUTROPHILS # BLD AUTO: 11.8 K/UL (ref 1.8–7.7)
NEUTROPHILS NFR BLD: 62.3 % (ref 38–73)
NRBC BLD-RTO: 0 /100 WBC
PLATELET # BLD AUTO: 455 K/UL (ref 150–450)
PMV BLD AUTO: 8.8 FL (ref 9.2–12.9)
POTASSIUM SERPL-SCNC: 5 MMOL/L (ref 3.5–5.1)
RBC # BLD AUTO: 2.79 M/UL (ref 4.6–6.2)
SODIUM SERPL-SCNC: 134 MMOL/L (ref 136–145)
WBC # BLD AUTO: 18.95 K/UL (ref 3.9–12.7)

## 2024-03-09 PROCEDURE — 27000221 HC OXYGEN, UP TO 24 HOURS

## 2024-03-09 PROCEDURE — 25000003 PHARM REV CODE 250

## 2024-03-09 PROCEDURE — 20600001 HC STEP DOWN PRIVATE ROOM

## 2024-03-09 PROCEDURE — 85025 COMPLETE CBC W/AUTO DIFF WBC: CPT

## 2024-03-09 PROCEDURE — 27200966 HC CLOSED SUCTION SYSTEM

## 2024-03-09 PROCEDURE — 99223 1ST HOSP IP/OBS HIGH 75: CPT | Mod: ,,, | Performed by: INTERNAL MEDICINE

## 2024-03-09 PROCEDURE — 99900035 HC TECH TIME PER 15 MIN (STAT)

## 2024-03-09 PROCEDURE — 94761 N-INVAS EAR/PLS OXIMETRY MLT: CPT

## 2024-03-09 PROCEDURE — 27000207 HC ISOLATION

## 2024-03-09 PROCEDURE — 63600175 PHARM REV CODE 636 W HCPCS

## 2024-03-09 PROCEDURE — 99900026 HC AIRWAY MAINTENANCE (STAT)

## 2024-03-09 PROCEDURE — 80048 BASIC METABOLIC PNL TOTAL CA: CPT

## 2024-03-09 RX ADMIN — FAMOTIDINE 20 MG: 40 POWDER, FOR SUSPENSION ORAL at 08:03

## 2024-03-09 RX ADMIN — HYDROCODONE BITARTRATE AND ACETAMINOPHEN 1 TABLET: 10; 325 TABLET ORAL at 03:03

## 2024-03-09 RX ADMIN — CHLORHEXIDINE GLUCONATE 0.12% ORAL RINSE 15 ML: 1.2 LIQUID ORAL at 08:03

## 2024-03-09 RX ADMIN — MEROPENEM 2 G: 1 INJECTION INTRAVENOUS at 12:03

## 2024-03-09 RX ADMIN — GABAPENTIN 300 MG: 300 CAPSULE ORAL at 08:03

## 2024-03-09 RX ADMIN — POLYETHYLENE GLYCOL 3350 17 G: 17 POWDER, FOR SOLUTION ORAL at 08:03

## 2024-03-09 RX ADMIN — HYDROCODONE BITARTRATE AND ACETAMINOPHEN 1 TABLET: 10; 325 TABLET ORAL at 09:03

## 2024-03-09 RX ADMIN — MIDODRINE HYDROCHLORIDE 10 MG: 5 TABLET ORAL at 03:03

## 2024-03-09 RX ADMIN — LINEZOLID 600 MG: 600 INJECTION, SOLUTION INTRAVENOUS at 09:03

## 2024-03-09 RX ADMIN — CHLORHEXIDINE GLUCONATE 0.12% ORAL RINSE 15 ML: 1.2 LIQUID ORAL at 09:03

## 2024-03-09 RX ADMIN — MINOCYCLINE HYDROCHLORIDE 100 MG: 100 CAPSULE ORAL at 08:03

## 2024-03-09 RX ADMIN — GABAPENTIN 300 MG: 300 CAPSULE ORAL at 09:03

## 2024-03-09 RX ADMIN — GABAPENTIN 300 MG: 300 CAPSULE ORAL at 03:03

## 2024-03-09 RX ADMIN — MIDODRINE HYDROCHLORIDE 10 MG: 5 TABLET ORAL at 08:03

## 2024-03-09 RX ADMIN — LEVETIRACETAM 750 MG: 100 SOLUTION ORAL at 10:03

## 2024-03-09 RX ADMIN — ALPRAZOLAM 1 MG: 0.5 TABLET ORAL at 09:03

## 2024-03-09 RX ADMIN — MEROPENEM 2 G: 1 INJECTION INTRAVENOUS at 03:03

## 2024-03-09 RX ADMIN — FOLIC ACID 1 MG: 1 TABLET ORAL at 08:03

## 2024-03-09 RX ADMIN — LEVOTHYROXINE SODIUM 112 MCG: 112 TABLET ORAL at 05:03

## 2024-03-09 RX ADMIN — MEROPENEM 2 G: 1 INJECTION INTRAVENOUS at 11:03

## 2024-03-09 RX ADMIN — MIDODRINE HYDROCHLORIDE 10 MG: 5 TABLET ORAL at 09:03

## 2024-03-09 RX ADMIN — FAMOTIDINE 20 MG: 40 POWDER, FOR SUSPENSION ORAL at 09:03

## 2024-03-09 RX ADMIN — MINOCYCLINE HYDROCHLORIDE 100 MG: 100 CAPSULE ORAL at 09:03

## 2024-03-09 RX ADMIN — ALPRAZOLAM 1 MG: 0.5 TABLET ORAL at 03:03

## 2024-03-09 RX ADMIN — MEROPENEM 2 G: 1 INJECTION INTRAVENOUS at 08:03

## 2024-03-09 RX ADMIN — LINEZOLID 600 MG: 600 INJECTION, SOLUTION INTRAVENOUS at 08:03

## 2024-03-09 RX ADMIN — LEVETIRACETAM 750 MG: 100 SOLUTION ORAL at 09:03
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
## 2024-03-09 NOTE — SUBJECTIVE & OBJECTIVE
Interval History: Received a unit overnight. Pt overall feeling ok. Tolerating tube feeds. Denies N/V. Has ostomy output. Reports pain is well controlled.     Medications:  Continuous Infusions:  Scheduled Meds:   chlorhexidine  15 mL Mouth/Throat BID    famotidine  20 mg Per NG tube BID    folic acid  1 mg Per NG tube Daily    gabapentin  300 mg Per NG tube TID    levETIRAcetam  750 mg Per NG tube BID    levothyroxine  112 mcg Per NG tube Before breakfast    linezolid  600 mg Intravenous Q12H    meropenem (MERREM) IVPB  2 g Intravenous Q8H    midodrine  10 mg Per NG tube TID    minocycline  100 mg Per NG tube Q12H    polyethylene glycol  17 g Per NG tube BID     PRN Meds:0.9%  NaCl infusion (for blood administration), acetaminophen, ALPRAZolam, HYDROcodone-acetaminophen, morphine     Review of patient's allergies indicates:  No Known Allergies  Objective:     Vital Signs (Most Recent):  Temp: 98.2 °F (36.8 °C) (03/09/24 0803)  Pulse: 99 (03/09/24 0803)  Resp: 18 (03/09/24 0911)  BP: (!) 91/50 (03/09/24 0803)  SpO2: 97 % (03/09/24 0803) Vital Signs (24h Range):  Temp:  [97.5 °F (36.4 °C)-98.5 °F (36.9 °C)] 98.2 °F (36.8 °C)  Pulse:  [] 99  Resp:  [16-20] 18  SpO2:  [97 %-100 %] 97 %  BP: ()/(50-67) 91/50     Weight: 59.4 kg (131 lb)  Body mass index is 19.35 kg/m².    Intake/Output - Last 3 Shifts         03/07 0700 03/08 0659 03/08 0700 03/09 0659 03/09 0700  03/10 0659    Blood  303     NG/      IV Piggyback 300 500     Total Intake(mL/kg) 940 (15.8) 803 (13.5)     Urine (mL/kg/hr) 1350 (0.9) 2000 (1.4)     Stool  50     Total Output 1350 2050     Net -410 -1247            Stool Occurrence  1 x              Physical Exam  Constitutional:       Appearance: He is ill-appearing.   HENT:      Head: Normocephalic.      Nose: Nose normal.      Comments: NG tube in place     Mouth/Throat:      Mouth: Mucous membranes are moist.   Eyes:      Pupils: Pupils are equal, round, and reactive to light.    Cardiovascular:      Rate and Rhythm: Normal rate and regular rhythm.   Pulmonary:      Effort: No respiratory distress.      Breath sounds: No wheezing or rhonchi.      Comments: On trach collar 5L  Abdominal:      General: Abdomen is flat. There is no distension.      Palpations: Abdomen is soft.      Tenderness: There is no abdominal tenderness. There is no right CVA tenderness or left CVA tenderness.      Comments: Ostomy in left lower quadrant functional   Musculoskeletal:         General: Deformity and signs of injury present.      Cervical back: Normal range of motion.   Skin:     General: Skin is dry.      Comments: Gangrene BL LE and LUE below elbow   Neurological:      Mental Status: He is alert and oriented to person, place, and time.          Significant Labs:  I have reviewed all pertinent lab results within the past 24 hours.    Significant Diagnostics:  I have reviewed all pertinent imaging results/findings within the past 24 hours.

## 2024-03-09 NOTE — ASSESSMENT & PLAN NOTE
Patient has hyponatremia which is controlled,We will aim to correct the sodium by 4-6mEq in 24 hours. We will monitor sodium Daily. The hyponatremia is due to Dehydration/hypovolemia. The patient's sodium results have been reviewed and are listed below.  Recent Labs   Lab 03/09/24  0530   *

## 2024-03-09 NOTE — ASSESSMENT & PLAN NOTE
Palliative Care at OSH engaging in ongoing communications with family regarding GOC given extensive comorbid conditions and recurrent hospitalizations. Per documentation review and brief discussion with family on initial transfer admission encounter, patient & family wish to continue all medical and surgical options at this time.     -- Patient & family would greatly benefit from continued Palliative Care discussions  -- IP Consult to \[facility] [place] [facility]\"" Care services

## 2024-03-09 NOTE — PLAN OF CARE
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      Shift Events: Blood tranfusion this shift due to H/H 6.2     Goals of Care: wound care, abx trx,      Neuro: alert and oriented     Vital Signs:   Vitals:    03/08/24 1547 03/08/24 1657 03/08/24 1757 03/08/24 1921   BP: (!) 117/58 101/64  108/67   BP Location: Left arm Left arm  Right arm   Patient Position: Lying Lying  Lying   Pulse: 85 91  74   Resp: 20 20 20 16   Temp: 97.5 °F (36.4 °C) 98.5 °F (36.9 °C)  97.6 °F (36.4 °C)   TempSrc: Oral Oral  Oral   SpO2: 100% 100%  100%   Weight:       Height:             Respiratory: 5lL 28%     Diet: Regular level 7    Is patient tolerating current diet? Yes     GTTS: abx     Urine Output/Bowel Movement:   I/O this shift:  Urine sterling 1200ml  Last Bowel Movement: 03/8/24        Drains/Tubes/Tube Feeds (include total output/shift):   N/A        Lines: PICC        Accuchecks:none     Skin: mx wounds     Fall Risk Score: 15     Activity level? bedrest     Any scheduled procedures? Possible amputation of BLE and LUE     Any safety concerns? Aspiration

## 2024-03-09 NOTE — RESPIRATORY THERAPY
"RAPID RESPONSE RESPIRATORY THERAPY PROACTIVE NOTE           Time of visit: 1043     Code Status: Prior   : 1995  Bed: 1023/1023 A:   MRN: [id number]  Time spent at the bedside: 15 -30 min    SITUATION    Evaluated patient for: LDA Check     BACKGROUND    Patient has no past medical history on file.  Clinically Significant Surgical Hx: tracheostomy    24 Hours Vitals Range:  Temp:  [97.5 °F (36.4 °C)-98.6 °F (37 °C)]   Pulse:  []   Resp:  [16-20]   BP: ()/(50-67)   SpO2:  [97 %-100 %]     Labs:    Recent Labs     24  0650 24  0611 24  0530   * 135* 134*   K 5.0 5.0 5.0    101 101   CO2 27 27 26   BUN 11 15 13   CREATININE 0.5 0.6 0.6   GLU 75 65* 64*        No results for input(s): "PH", "PCO2", "PO2", "HCO3", "POCSATURATED", "BE" in the last 72 hours.    ASSESSMENT/INTERVENTIONS  Pt on 5L 28% trach collar. Shiley size 8 cuffed trach in place. All supplies in room. Extra trachs at bedside - 6 and 8, both cuffed.       Last VS   Temp: 98.6 °F (37 °C) (1210)  Pulse: 92 (1210)  Resp: 18 (1210)  BP: 105/64 (1210)  SpO2: 99 % (1210)      Extra trachs at bedside: 6 and 8, both cuffed.  Level of Consciousness: Level of Consciousness (AVPU): alert  Respiratory Effort: Respiratory Effort: Normal, Unlabored Expansion/Accessory Muscle Usage: Expansion/Accessory Muscles/Retractions: expansion symmetric, no retractions, no use of accessory muscles  All Lung Field Breath Sounds: All Lung Fields Breath Sounds: coarse, diminished  NATALIA Breath Sounds: coarse  O2 Device/Concentration: 5L 28% TC.  Surgical airway: Yes, Type: Shiley Size: 8, cuffed  Ambu at bedside:       Active Orders   Respiratory Care    Oxygen Continuous     Frequency: Continuous     Number of Occurrences: Until Specified     Order Questions:      Device type: Low flow      Device: Trach Collar      FiO2%: 60      Titrate O2 per Oxygen Titration Protocol: Yes      To maintain SpO2 goal of: " >= 90%      Notify MD of: Inability to achieve desired SpO2; Sudden change in patient status and requires 20% increase in FiO2; Patient requires >60% FiO2    Pulse Oximetry Continuous     Frequency: Continuous     Number of Occurrences: Until Specified    Routine tracheostomy care     Frequency: BID     Number of Occurrences: Until Specified    SUCTION PRN     Frequency: PRN     Number of Occurrences: Until Specified       RECOMMENDATIONS    We recommend: RRT Recs: Continue POC per primary team.      FOLLOW-UP    Please call back the Rapid Response RT, Bryant Mcmillan RRT at x [phone] for any questions or concerns.

## 2024-03-09 NOTE — SUBJECTIVE & OBJECTIVE
Interval History: NAEO. Surgery discussion interventions. Would not occur until Monday at earliest. Continuing abx.    Review of Systems   Constitutional:  Negative for fatigue.   HENT: Negative.     Eyes: Negative.    Respiratory:  Negative for cough, shortness of breath and wheezing.    Cardiovascular:  Negative for chest pain and palpitations.   Gastrointestinal:  Negative for abdominal distention, constipation, nausea and vomiting.   Genitourinary: Negative.    Musculoskeletal:  Positive for myalgias.   Skin:  Positive for wound.   Neurological:  Negative for dizziness, seizures, weakness and numbness.   Hematological: Negative.    Psychiatric/Behavioral: Negative.       Objective:     Vital Signs (Most Recent):  Temp: 98.6 °F (37 °C) (03/09/24 1210)  Pulse: 92 (03/09/24 1210)  Resp: 18 (03/09/24 1210)  BP: 105/64 (03/09/24 1210)  SpO2: 99 % (03/09/24 1210) Vital Signs (24h Range):  Temp:  [97.5 °F (36.4 °C)-98.6 °F (37 °C)] 98.6 °F (37 °C)  Pulse:  [] 92  Resp:  [16-20] 18  SpO2:  [97 %-100 %] 99 %  BP: ()/(50-67) 105/64     Weight: 59.4 kg (131 lb)  Body mass index is 19.35 kg/m².    Intake/Output Summary (Last 24 hours) at 3/9/2024 1258  Last data filed at 3/9/2024 0943  Gross per 24 hour   Intake 2077.83 ml   Output 2050 ml   Net 27.83 ml         Physical Exam  Constitutional:       Appearance: He is ill-appearing.   HENT:      Head: Normocephalic.      Nose: Nose normal.      Comments: NG tube in place     Mouth/Throat:      Mouth: Mucous membranes are moist.   Eyes:      Pupils: Pupils are equal, round, and reactive to light.   Cardiovascular:      Rate and Rhythm: Normal rate and regular rhythm.   Pulmonary:      Effort: No respiratory distress.      Breath sounds: No wheezing or rhonchi.      Comments: On trach collar 5L  Abdominal:      General: Abdomen is flat. There is no distension.      Palpations: Abdomen is soft.      Tenderness: There is no abdominal tenderness. There is no right CVA  tenderness or left CVA tenderness.   Musculoskeletal:         General: Deformity and signs of injury present.      Cervical back: Normal range of motion.   Skin:     General: Skin is dry.      Findings: Lesion present.      Comments: Gangrene BL LE and LUE below elbow   Neurological:      Mental Status: He is alert and oriented to person, place, and time.             Significant Labs: All pertinent labs within the past 24 hours have been reviewed.    Significant Imaging: I have reviewed all pertinent imaging results/findings within the past 24 hours.

## 2024-03-09 NOTE — PLAN OF CARE
Problem: Adult Inpatient Plan of Care  Goal: Plan of Care Review  Outcome: Ongoing, Progressing  Goal: Patient-Specific Goal (Individualized)  Outcome: Ongoing, Progressing  Goal: Absence of Hospital-Acquired Illness or Injury  Outcome: Ongoing, Progressing  Goal: Optimal Comfort and Wellbeing  Outcome: Ongoing, Progressing  Goal: Readiness for Transition of Care  Outcome: Ongoing, Progressing     Problem: Adjustment to Illness (Sepsis/Septic Shock)  Goal: Optimal Coping  Outcome: Ongoing, Progressing     Problem: Bleeding (Sepsis/Septic Shock)  Goal: Absence of Bleeding  Outcome: Ongoing, Progressing     Problem: Glycemic Control Impaired (Sepsis/Septic Shock)  Goal: Blood Glucose Level Within Desired Range  Outcome: Ongoing, Progressing     Problem: Infection Progression (Sepsis/Septic Shock)  Goal: Absence of Infection Signs and Symptoms  Outcome: Ongoing, Progressing     Problem: Nutrition Impaired (Sepsis/Septic Shock)  Goal: Optimal Nutrition Intake  Outcome: Ongoing, Progressing     Problem: Bariatric Environmental Safety  Goal: Safety Maintained with Care  Outcome: Ongoing, Progressing     Problem: Coping Ineffective  Goal: Effective Coping  Outcome: Ongoing, Progressing     Problem: Infection  Goal: Absence of Infection Signs and Symptoms  Outcome: Ongoing, Progressing     Problem: Impaired Wound Healing  Goal: Optimal Wound Healing  Outcome: Ongoing, Progressing     Problem: Fall Injury Risk  Goal: Absence of Fall and Fall-Related Injury  Outcome: Ongoing, Progressing
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
## 2024-03-09 NOTE — ASSESSMENT & PLAN NOTE
>>ASSESSMENT AND PLAN FOR PRESSURE ULCERS OF SKIN OF MULTIPLE TOPOGRAPHIC SITES WRITTEN ON 3/9/2024  1:01 PM BY GUNNER NAQVI MD
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
## 2024-03-10 LAB
ANION GAP SERPL CALC-SCNC: 9 MMOL/L (ref 8–16)
ANISOCYTOSIS BLD QL SMEAR: SLIGHT
BASOPHILS # BLD AUTO: 0.21 K/UL (ref 0–0.2)
BASOPHILS NFR BLD: 1 % (ref 0–1.9)
BUN SERPL-MCNC: 15 MG/DL (ref 6–20)
CALCIUM SERPL-MCNC: 7.8 MG/DL (ref 8.7–10.5)
CHLORIDE SERPL-SCNC: 100 MMOL/L (ref 95–110)
CO2 SERPL-SCNC: 26 MMOL/L (ref 23–29)
CREAT SERPL-MCNC: 0.6 MG/DL (ref 0.5–1.4)
DIFFERENTIAL METHOD BLD: ABNORMAL
EOSINOPHIL # BLD AUTO: 1.4 K/UL (ref 0–0.5)
EOSINOPHIL NFR BLD: 7 % (ref 0–8)
ERYTHROCYTE [DISTWIDTH] IN BLOOD BY AUTOMATED COUNT: 15.9 % (ref 11.5–14.5)
EST. GFR  (NO RACE VARIABLE): >60 ML/MIN/1.73 M^2
GLUCOSE SERPL-MCNC: 89 MG/DL (ref 70–110)
HCT VFR BLD AUTO: 27.3 % (ref 40–54)
HGB BLD-MCNC: 8.7 G/DL (ref 14–18)
HYPOCHROMIA BLD QL SMEAR: ABNORMAL
IMM GRANULOCYTES # BLD AUTO: 0.13 K/UL (ref 0–0.04)
IMM GRANULOCYTES NFR BLD AUTO: 0.6 % (ref 0–0.5)
LYMPHOCYTES # BLD AUTO: 4.4 K/UL (ref 1–4.8)
LYMPHOCYTES NFR BLD: 21.7 % (ref 18–48)
MCH RBC QN AUTO: 30.5 PG (ref 27–31)
MCHC RBC AUTO-ENTMCNC: 31.9 G/DL (ref 32–36)
MCV RBC AUTO: 96 FL (ref 82–98)
MONOCYTES # BLD AUTO: 2.1 K/UL (ref 0.3–1)
MONOCYTES NFR BLD: 10.6 % (ref 4–15)
NEUTROPHILS # BLD AUTO: 11.9 K/UL (ref 1.8–7.7)
NEUTROPHILS NFR BLD: 59.1 % (ref 38–73)
NRBC BLD-RTO: 0 /100 WBC
OVALOCYTES BLD QL SMEAR: ABNORMAL
PLATELET # BLD AUTO: 491 K/UL (ref 150–450)
PLATELET BLD QL SMEAR: ABNORMAL
PMV BLD AUTO: 8.4 FL (ref 9.2–12.9)
POIKILOCYTOSIS BLD QL SMEAR: SLIGHT
POLYCHROMASIA BLD QL SMEAR: ABNORMAL
POTASSIUM SERPL-SCNC: 4.9 MMOL/L (ref 3.5–5.1)
RBC # BLD AUTO: 2.85 M/UL (ref 4.6–6.2)
SODIUM SERPL-SCNC: 135 MMOL/L (ref 136–145)
WBC # BLD AUTO: 20.1 K/UL (ref 3.9–12.7)

## 2024-03-10 PROCEDURE — 99900035 HC TECH TIME PER 15 MIN (STAT)

## 2024-03-10 PROCEDURE — 25000003 PHARM REV CODE 250

## 2024-03-10 PROCEDURE — 63600175 PHARM REV CODE 636 W HCPCS

## 2024-03-10 PROCEDURE — 94761 N-INVAS EAR/PLS OXIMETRY MLT: CPT

## 2024-03-10 PROCEDURE — 85025 COMPLETE CBC W/AUTO DIFF WBC: CPT

## 2024-03-10 PROCEDURE — 80048 BASIC METABOLIC PNL TOTAL CA: CPT

## 2024-03-10 PROCEDURE — 99900026 HC AIRWAY MAINTENANCE (STAT)

## 2024-03-10 PROCEDURE — 99232 SBSQ HOSP IP/OBS MODERATE 35: CPT | Mod: ,,, | Performed by: INTERNAL MEDICINE

## 2024-03-10 PROCEDURE — 27000221 HC OXYGEN, UP TO 24 HOURS

## 2024-03-10 PROCEDURE — 27000207 HC ISOLATION

## 2024-03-10 PROCEDURE — 20600001 HC STEP DOWN PRIVATE ROOM

## 2024-03-10 RX ADMIN — MEROPENEM 2 G: 1 INJECTION INTRAVENOUS at 04:03

## 2024-03-10 RX ADMIN — HYDROCODONE BITARTRATE AND ACETAMINOPHEN 1 TABLET: 10; 325 TABLET ORAL at 08:03

## 2024-03-10 RX ADMIN — LEVOTHYROXINE SODIUM 112 MCG: 112 TABLET ORAL at 05:03

## 2024-03-10 RX ADMIN — CHLORHEXIDINE GLUCONATE 0.12% ORAL RINSE 15 ML: 1.2 LIQUID ORAL at 09:03

## 2024-03-10 RX ADMIN — CHLORHEXIDINE GLUCONATE 0.12% ORAL RINSE 15 ML: 1.2 LIQUID ORAL at 08:03

## 2024-03-10 RX ADMIN — MINOCYCLINE HYDROCHLORIDE 100 MG: 100 CAPSULE ORAL at 08:03

## 2024-03-10 RX ADMIN — LINEZOLID 600 MG: 600 INJECTION, SOLUTION INTRAVENOUS at 08:03

## 2024-03-10 RX ADMIN — ALPRAZOLAM 1 MG: 0.5 TABLET ORAL at 05:03

## 2024-03-10 RX ADMIN — POLYETHYLENE GLYCOL 3350 17 G: 17 POWDER, FOR SOLUTION ORAL at 08:03

## 2024-03-10 RX ADMIN — MEROPENEM 2 G: 1 INJECTION INTRAVENOUS at 08:03

## 2024-03-10 RX ADMIN — HYDROCODONE BITARTRATE AND ACETAMINOPHEN 1 TABLET: 10; 325 TABLET ORAL at 02:03

## 2024-03-10 RX ADMIN — GABAPENTIN 300 MG: 300 CAPSULE ORAL at 02:03

## 2024-03-10 RX ADMIN — MIDODRINE HYDROCHLORIDE 10 MG: 5 TABLET ORAL at 08:03

## 2024-03-10 RX ADMIN — GABAPENTIN 300 MG: 300 CAPSULE ORAL at 08:03

## 2024-03-10 RX ADMIN — LEVETIRACETAM 750 MG: 100 SOLUTION ORAL at 08:03

## 2024-03-10 RX ADMIN — FOLIC ACID 1 MG: 1 TABLET ORAL at 08:03

## 2024-03-10 RX ADMIN — MINOCYCLINE HYDROCHLORIDE 100 MG: 100 CAPSULE ORAL at 09:03

## 2024-03-10 RX ADMIN — FAMOTIDINE 20 MG: 40 POWDER, FOR SUSPENSION ORAL at 08:03

## 2024-03-10 RX ADMIN — MIDODRINE HYDROCHLORIDE 10 MG: 5 TABLET ORAL at 02:03

## 2024-03-10 NOTE — SUBJECTIVE & OBJECTIVE
Interval History: NAEON. Ostomy with 800 cc output. UOP 1700 cc/24 hours. HDS, afeb. On 5 L of O2 satting 100%.    Medications:  Continuous Infusions:  Scheduled Meds:   chlorhexidine  15 mL Mouth/Throat BID    famotidine  20 mg Per NG tube BID    folic acid  1 mg Per NG tube Daily    gabapentin  300 mg Per NG tube TID    levetiracetam  750 mg Per NG tube BID    levothyroxine  112 mcg Per NG tube Before breakfast    linezolid  600 mg Intravenous Q12H    meropenem (MERREM) IVPB  2 g Intravenous Q8H    midodrine  10 mg Per NG tube TID    minocycline  100 mg Per NG tube Q12H    polyethylene glycol  17 g Per NG tube BID     PRN Meds:0.9%  NaCl infusion (for blood administration), acetaminophen, ALPRAZolam, HYDROcodone-acetaminophen, morphine     Review of patient's allergies indicates:  No Known Allergies  Objective:     Vital Signs (Most Recent):  Temp: 98 °F (36.7 °C) (03/10/24 0827)  Pulse: 110 (03/10/24 0827)  Resp: 16 (03/10/24 0827)  BP: (!) 111/57 (03/10/24 0827)  SpO2: 97 % (03/10/24 0827) Vital Signs (24h Range):  Temp:  [97.6 °F (36.4 °C)-98.6 °F (37 °C)] 98 °F (36.7 °C)  Pulse:  [] 110  Resp:  [16-18] 16  SpO2:  [97 %-100 %] 97 %  BP: (101-118)/(55-69) 111/57     Weight: 59.4 kg (131 lb)  Body mass index is 19.35 kg/m².    Intake/Output - Last 3 Shifts         03/08 0700 03/09 0659 03/09 0700  03/10 0659 03/10 0700  03/11 0659    P.O.  1554 237    Blood 303      NG/GT  720     IV Piggyback 500 1193.7     Total Intake(mL/kg) 803 (13.5) 3467.7 (58.4) 237 (4)    Urine (mL/kg/hr) 2000 (1.4) 1700 (1.2)     Stool 50 800     Total Output 2050 2500     Net -1247 +967.7 +237           Stool Occurrence 1 x               Physical Exam  Constitutional:       Appearance: He is ill-appearing.   HENT:      Head: Normocephalic.      Nose: Nose normal.      Comments: NG tube in place     Mouth/Throat:      Mouth: Mucous membranes are moist.   Eyes:      Pupils: Pupils are equal, round, and reactive to light.    Cardiovascular:      Rate and Rhythm: Normal rate and regular rhythm.   Pulmonary:      Effort: No respiratory distress.      Breath sounds: No wheezing or rhonchi.      Comments: On trach collar 5L  Abdominal:      General: Abdomen is flat. There is no distension.      Palpations: Abdomen is soft.      Tenderness: There is no abdominal tenderness. There is no right CVA tenderness or left CVA tenderness.      Comments: Ostomy in left lower quadrant functional   Musculoskeletal:         General: Deformity and signs of injury present.      Cervical back: Normal range of motion.   Skin:     General: Skin is dry.      Comments: Gangrene BL LE and LUE below elbow   Neurological:      Mental Status: He is alert and oriented to person, place, and time.          Significant Labs:  I have reviewed all pertinent lab results within the past 24 hours.    Significant Diagnostics:  I have reviewed all pertinent imaging results/findings within the past 24 hours.

## 2024-03-10 NOTE — ASSESSMENT & PLAN NOTE
"Stage IV pressure ulcer of bilateral buttock, sacral region  Pressure ulcers of multiple sites  Chronic osteomyelitis    This patient does have evidence of infective focus  My overall impression is sepsis.  Source: Skin and Soft Tissue (location extremities)  Antibiotics given-   Antibiotics (72h ago, onward)      Start     Stop Route Frequency Ordered    03/07/24 0900  minocycline capsule 100 mg         -- PER NG TUBE Every 12 hours 03/07/24 0220 03/07/24 0615  linezolid 600 mg/300 mL IVPB 600 mg         -- IV Every 12 hours (non-standard times) 03/07/24 0220 03/07/24 0600  meropenem (MERREM) 2 g in sodium chloride 0.9% 100 mL IVPB         -- IV Every 8 hours (non-standard times) 03/07/24 0220          Latest lactate reviewed-  No results for input(s): "LACTATE", "POCLAC" in the last 72 hours.  Organ dysfunction indicated by Acute respiratory failure    UCX 02/21 and RCX 02/22 with MDR proteus mirabilis, pseudomonas aeruginosa. Patient has extensive MDRO / ESBL culture data from prior hospitalizations as well. Shock component is now resolved, but leukocytosis, fever, and underlying wounds are still a component as source control has not been achieved.    -- IP consult to general surgery for surgical evaluation; per documentation review, previous surgical evaluation at OSH were evaluating for possible amputations +/- surgical debridements  -- plan for possible procedure tomorrow, will keep NPO at [facility]  -- Continue linezolid, meropenem, minocycline per ID recommendations at OSH  -- Weekly CK  -- IP Consult to Infectious Disease for further assistance in antibiotic regimen management  -- IP Consult to Wound Care  -- F/U Culture data  -- Turn q2h  "
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
## 2024-03-10 NOTE — SUBJECTIVE & OBJECTIVE
Interval History: NAEO. Discussions re surgical interventions ongoing.    Review of Systems   Constitutional:  Negative for fatigue.   HENT: Negative.     Eyes: Negative.    Respiratory:  Negative for cough, shortness of breath and wheezing.    Cardiovascular:  Negative for chest pain and palpitations.   Gastrointestinal:  Negative for abdominal distention, constipation, nausea and vomiting.   Genitourinary: Negative.    Musculoskeletal:  Positive for myalgias.   Skin:  Positive for wound.   Neurological:  Negative for dizziness, seizures, weakness and numbness.   Hematological: Negative.    Psychiatric/Behavioral: Negative.       Objective:     Vital Signs (Most Recent):  Temp: 98 °F (36.7 °C) (03/10/24 0827)  Pulse: 109 (03/10/24 1138)  Resp: 16 (03/10/24 0827)  BP: (!) 111/57 (03/10/24 0827)  SpO2: 97 % (03/10/24 0827) Vital Signs (24h Range):  Temp:  [97.6 °F (36.4 °C)-98.6 °F (37 °C)] 98 °F (36.7 °C)  Pulse:  [] 109  Resp:  [16-18] 16  SpO2:  [97 %-100 %] 97 %  BP: (101-118)/(55-69) 111/57     Weight: 59.4 kg (131 lb)  Body mass index is 19.35 kg/m².    Intake/Output Summary (Last 24 hours) at 3/10/2024 1208  Last data filed at 3/10/2024 0914  Gross per 24 hour   Intake 2352.83 ml   Output 2500 ml   Net -147.17 ml         Physical Exam  Constitutional:       Appearance: He is ill-appearing.   HENT:      Head: Normocephalic.      Nose: Nose normal.      Comments: NG tube in place     Mouth/Throat:      Mouth: Mucous membranes are moist.   Eyes:      Pupils: Pupils are equal, round, and reactive to light.   Cardiovascular:      Rate and Rhythm: Normal rate and regular rhythm.   Pulmonary:      Effort: No respiratory distress.      Breath sounds: No wheezing or rhonchi.      Comments: On trach collar 5L  Abdominal:      General: Abdomen is flat. There is no distension.      Palpations: Abdomen is soft.      Tenderness: There is no abdominal tenderness. There is no right CVA tenderness or left CVA tenderness.    Musculoskeletal:         General: Deformity and signs of injury present.      Cervical back: Normal range of motion.   Skin:     General: Skin is dry.      Findings: Lesion present.      Comments: Gangrene BL LE and LUE below elbow   Neurological:      Mental Status: He is alert and oriented to person, place, and time.             Significant Labs: All pertinent labs within the past 24 hours have been reviewed.    Significant Imaging: I have reviewed all pertinent imaging results/findings within the past 24 hours.
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
## 2024-03-10 NOTE — SUBJECTIVE & OBJECTIVE
Past Surgical History:   Procedure Laterality Date    ESOPHAGOGASTRODUODENOSCOPY W/ PEG N/A 5/30/2023    Procedure: PEG;  Surgeon: JUSTYN Devine MD;  Location: [facility] ENDOSCOPY;  Service: Gastroenterology;  Laterality: N/A;     No current facility-administered medications on file prior to encounter.     Current Outpatient Medications on File Prior to Encounter   Medication Sig Dispense Refill    ALPRAZolam (XANAX) 1 MG tablet Take 1 mg by mouth 3 (three) times daily as needed for Anxiety.      cholecalciferol, vitamin D3, 1,250 mcg (50,000 unit) capsule Take 50,000 Units by mouth every 7 days.      acetaminophen (TYLENOL) 325 MG tablet 2 tablets (650 mg total) by Per G Tube route every 6 (six) hours as needed for Temperature greater than (100.4).  0    albuterol (PROVENTIL) 2.5 mg /3 mL (0.083 %) nebulizer solution Take 3 mLs (2.5 mg total) by nebulization every 4 (four) hours as needed (shortness of breath). Rescue  0    calcitRIOL (ROCALTROL) 0.5 MCG Cap Take 1 capsule (0.5 mcg total) by mouth once daily.      calcium acetate,phosphat bind, (PHOSLO) 667 mg capsule Take 1 capsule (667 mg total) by mouth 3 (three) times daily with meals. 90 capsule 11    enoxaparin (LOVENOX) 40 mg/0.4 mL Syrg Inject 0.4 mLs (40 mg total) into the skin once daily.      folic acid (FOLVITE) 1 MG tablet 1 tablet (1 mg total) by Per G Tube route once daily.  0    gabapentin (NEURONTIN) 300 MG capsule Take 1 capsule (300 mg total) by mouth 3 (three) times daily. 90 capsule 11    HYDROcodone-acetaminophen (NORCO)  mg per tablet Take 1 tablet by mouth every 6 (six) hours as needed for Pain.  0    levETIRAcetam (KEPPRA) 100 mg/mL Soln 7.5 mLs (750 mg total) by Per NG tube route 2 (two) times daily. 450 mL 11    levothyroxine (SYNTHROID) 100 MCG tablet Take 1 tablet (100 mcg total) by mouth before breakfast. 30 tablet 11    linezolid (ZYVOX) 600 mg/300 mL PgBk Inject 300 mLs (600 mg total) into the vein every 12 (twelve)  hours.      melatonin (MELATIN) 3 mg tablet Take 2 tablets (6 mg total) by mouth nightly as needed for Insomnia.  0    midodrine (PROAMATINE) 10 MG tablet 1 tablet (10 mg total) by Per NG tube route 3 (three) times daily. 90 tablet 11    minocycline (MINOCIN,DYNACIN) 100 MG capsule 1 capsule (100 mg total) by Per NG tube route every 12 (twelve) hours.      ondansetron (ZOFRAN) 4 mg/5 mL solution Take 5 mLs (4 mg total) by mouth every 6 (six) hours as needed for Nausea.      pantoprazole (PROTONIX) 40 mg injection Inject 40 mg into the vein once daily. 30 each 11    sertraline (ZOLOFT) 50 MG tablet Take 1 tablet (50 mg total) by mouth once daily. 30 tablet 11    sodium chloride 0.9% SolP 100 mL with meropenem 1 gram SolR 2 g Inject 2 g into the vein every 8 (eight) hours.       Social History     Socioeconomic History    Marital status: Single     Social Determinants of Health     Financial Resource Strain: Low Risk  (3/7/2024)    Overall Financial Resource Strain (CARDIA)     Difficulty of Paying Living Expenses: Not hard at all   Food Insecurity: No Food Insecurity (3/7/2024)    Hunger Vital Sign     Worried About Running Out of Food in the Last Year: Never true     Ran Out of Food in the Last Year: Never true   Transportation Needs: No Transportation Needs (3/7/2024)    PRAPARE - Transportation     Lack of Transportation (Medical): No     Lack of Transportation (Non-Medical): No   Physical Activity: Inactive (3/7/2024)    Exercise Vital Sign     Days of Exercise per Week: 0 days     Minutes of Exercise per Session: 0 min   Stress: Stress Concern Present (3/7/2024)    [employer] [place] [employer] - Occupational Stress Questionnaire     Feeling of Stress : Very much   Social Connections: Socially Isolated (3/7/2024)    Social Connection and Isolation Panel [NHANES]     Frequency of Communication with Friends and Family: More than three times a week     Frequency of Social Gatherings with Friends and  Family: More than three times a week     Attends L[employer] day Services: Never     Active Member of Clubs or Organizations: No     Attends Club or Organization Meetings: Never     Marital Status: Never    Housing Stability: Unknown (3/7/2024)    Housing Stability Vital Sign     Unable to Pay for Housing in the Last Year: No     Unstable Housing in the Last Year: No       The patient has a family history of  noncontributory  Review of Systems   Constitutional:  Negative for fatigue.   HENT: Negative.     Eyes: Negative.    Respiratory:  Negative for cough, shortness of breath and wheezing.    Cardiovascular:  Negative for chest pain and palpitations.   Gastrointestinal:  Negative for abdominal distention, constipation, nausea and vomiting.   Genitourinary: Negative.    Musculoskeletal:  Positive for myalgias.   Skin:  Positive for wound.   Neurological:  Negative for dizziness, seizures, weakness and numbness.   Hematological: Negative.    Psychiatric/Behavioral: Negative.       Objective:     Vital Signs (Most Recent):  Temp: 97.9 °F (36.6 °C) (03/09/24 1553)  Pulse: 96 (03/09/24 1653)  Resp: 18 (03/09/24 1653)  BP: 101/61 (03/09/24 1553)  SpO2: 98 % (03/09/24 1653) Vital Signs (24h Range):  Temp:  [97.6 °F (36.4 °C)-98.6 °F (37 °C)] 97.9 °F (36.6 °C)  Pulse:  [] 96  Resp:  [16-19] 18  SpO2:  [97 %-100 %] 98 %  BP: ()/(50-67) 101/61     Weight: 59.4 kg (131 lb)  Body mass index is 19.35 kg/m².    Intake/Output Summary (Last 24 hours) at 3/9/2024 1805  Last data filed at 3/9/2024 1753  Gross per 24 hour   Intake 2827.7 ml   Output 3200 ml   Net -372.3 ml           Physical Exam  Constitutional:       Appearance: He is ill-appearing.   HENT:      Head: Normocephalic.      Nose: Nose normal.      Comments: NG tube in place     Mouth/Throat:      Mouth: Mucous membranes are moist.   Eyes:      Pupils: Pupils are equal, round, and reactive to light.   Cardiovascular:      Rate and Rhythm: Normal rate and  regular rhythm.   Pulmonary:      Effort: No respiratory distress.      Breath sounds: No wheezing or rhonchi.      Comments: On trach collar 5L  Abdominal:      General: Abdomen is flat. There is no distension.      Palpations: Abdomen is soft.      Tenderness: There is no abdominal tenderness. There is no right CVA tenderness or left CVA tenderness.   Musculoskeletal:         General: Deformity and signs of injury present.      Cervical back: Normal range of motion.   Skin:     General: Skin is dry.      Findings: Lesion present.      Comments: Gangrene BL LE and LUE below elbow   Neurological:      Mental Status: He is alert and oriented to person, place, and time.             Significant Labs: All pertinent labs within the past 24 hours have been reviewed.    Significant Imaging: I have reviewed all pertinent imaging results/findings within the past 24 hours.
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
## 2024-03-10 NOTE — RESPIRATORY THERAPY
"RAPID RESPONSE RESPIRATORY THERAPY PROACTIVE NOTE           Time of visit: 1023     Code Status: Prior   : 1995  Bed: 1023/1023 A:   MRN: [id number]  Time spent at the bedside: < 15 min    SITUATION    Evaluated patient for: LDA Check     BACKGROUND    Patient has no past medical history on file.  Clinically Significant Surgical Hx: tracheostomy    24 Hours Vitals Range:  Temp:  [97.6 °F (36.4 °C)-98.6 °F (37 °C)]   Pulse:  []   Resp:  [16-18]   BP: (101-118)/(55-69)   SpO2:  [97 %-100 %]     Labs:    Recent Labs     24  0611 24  0530 03/10/24  0500   * 134* 135*   K 5.0 5.0 4.9    101 100   CO2 27 26 26   BUN 15 13 15   CREATININE 0.6 0.6 0.6   GLU 65* 64* 89        No results for input(s): "PH", "PCO2", "PO2", "HCO3", "POCSATURATED", "BE" in the last 72 hours.    ASSESSMENT/INTERVENTIONS  Pt on 5L 28% trach collar. Shiley size 8 cuffed trach in place. All supplies in room. Extra trachs at bedside - 6 and 8, both cuffed.       Last VS   Temp: 98 °F (36.7 °C) (03/10 0827)  Pulse: 109 (03/10 1138)  Resp: 16 (03/10 0827)  BP: 111/57 (03/10 0827)  SpO2: 97 % (03/10 0827)      Extra trachs at bedside: 6 and 8, both cuffed.  Level of Consciousness: Level of Consciousness (AVPU): alert  Respiratory Effort: Respiratory Effort: Normal, Unlabored Expansion/Accessory Muscle Usage: Expansion/Accessory Muscles/Retractions: no use of accessory muscles, no retractions, expansion symmetric  All Lung Field Breath Sounds: All Lung Fields Breath Sounds: Anterior:, Lateral:, coarse  NATALIA Breath Sounds: coarse  O2 Device/Concentration: 5L 28% TC.  Surgical airway: Yes, Type: Shiley Size: 8, cuffed  Ambu at bedside:       Active Orders   Respiratory Care    Oxygen Continuous     Frequency: Continuous     Number of Occurrences: Until Specified     Order Questions:      Device type: Low flow      Device: Trach Collar      FiO2%: 60      Titrate O2 per Oxygen Titration Protocol: Yes      To maintain SpO2 " goal of: >= 90%      Notify MD of: Inability to achieve desired SpO2; Sudden change in patient status and requires 20% increase in FiO2; Patient requires >60% FiO2    Pulse Oximetry Continuous     Frequency: Continuous     Number of Occurrences: Until Specified    Routine tracheostomy care     Frequency: BID     Number of Occurrences: Until Specified    SUCTION PRN     Frequency: PRN     Number of Occurrences: Until Specified       RECOMMENDATIONS    We recommend: RRT Recs: Continue POC per primary team.      FOLLOW-UP    Please call back the Rapid Response RT, Bryant Mcmillan RRT at x [phone] for any questions or concerns.

## 2024-03-11 LAB
ANION GAP SERPL CALC-SCNC: 6 MMOL/L (ref 8–16)
ANISOCYTOSIS BLD QL SMEAR: SLIGHT
BASOPHILS # BLD AUTO: 0.15 K/UL (ref 0–0.2)
BASOPHILS NFR BLD: 0.7 % (ref 0–1.9)
BUN SERPL-MCNC: 16 MG/DL (ref 6–20)
CALCIUM SERPL-MCNC: 7.7 MG/DL (ref 8.7–10.5)
CHLORIDE SERPL-SCNC: 101 MMOL/L (ref 95–110)
CO2 SERPL-SCNC: 26 MMOL/L (ref 23–29)
CREAT SERPL-MCNC: 0.6 MG/DL (ref 0.5–1.4)
DIFFERENTIAL METHOD BLD: ABNORMAL
EOSINOPHIL # BLD AUTO: 1.1 K/UL (ref 0–0.5)
EOSINOPHIL NFR BLD: 5.4 % (ref 0–8)
ERYTHROCYTE [DISTWIDTH] IN BLOOD BY AUTOMATED COUNT: 16.3 % (ref 11.5–14.5)
EST. GFR  (NO RACE VARIABLE): >60 ML/MIN/1.73 M^2
GLUCOSE SERPL-MCNC: 71 MG/DL (ref 70–110)
HCT VFR BLD AUTO: 24.1 % (ref 40–54)
HGB BLD-MCNC: 7.6 G/DL (ref 14–18)
HYPOCHROMIA BLD QL SMEAR: ABNORMAL
IMM GRANULOCYTES # BLD AUTO: 0.1 K/UL (ref 0–0.04)
IMM GRANULOCYTES NFR BLD AUTO: 0.5 % (ref 0–0.5)
LYMPHOCYTES # BLD AUTO: 4.2 K/UL (ref 1–4.8)
LYMPHOCYTES NFR BLD: 20.8 % (ref 18–48)
MCH RBC QN AUTO: 29.9 PG (ref 27–31)
MCHC RBC AUTO-ENTMCNC: 31.5 G/DL (ref 32–36)
MCV RBC AUTO: 95 FL (ref 82–98)
MONOCYTES # BLD AUTO: 2.3 K/UL (ref 0.3–1)
MONOCYTES NFR BLD: 11.5 % (ref 4–15)
NEUTROPHILS # BLD AUTO: 12.2 K/UL (ref 1.8–7.7)
NEUTROPHILS NFR BLD: 61.1 % (ref 38–73)
NRBC BLD-RTO: 0 /100 WBC
OVALOCYTES BLD QL SMEAR: ABNORMAL
PLATELET # BLD AUTO: 383 K/UL (ref 150–450)
PLATELET BLD QL SMEAR: ABNORMAL
PMV BLD AUTO: 8.4 FL (ref 9.2–12.9)
POCT GLUCOSE: 102 MG/DL (ref 70–110)
POCT GLUCOSE: 69 MG/DL (ref 70–110)
POCT GLUCOSE: 85 MG/DL (ref 70–110)
POIKILOCYTOSIS BLD QL SMEAR: SLIGHT
POLYCHROMASIA BLD QL SMEAR: ABNORMAL
POTASSIUM SERPL-SCNC: 5 MMOL/L (ref 3.5–5.1)
RBC # BLD AUTO: 2.54 M/UL (ref 4.6–6.2)
SODIUM SERPL-SCNC: 133 MMOL/L (ref 136–145)
TARGETS BLD QL SMEAR: ABNORMAL
WBC # BLD AUTO: 20.02 K/UL (ref 3.9–12.7)

## 2024-03-11 PROCEDURE — 27100171 HC OXYGEN HIGH FLOW UP TO 24 HOURS

## 2024-03-11 PROCEDURE — 27000207 HC ISOLATION

## 2024-03-11 PROCEDURE — 85025 COMPLETE CBC W/AUTO DIFF WBC: CPT

## 2024-03-11 PROCEDURE — 94761 N-INVAS EAR/PLS OXIMETRY MLT: CPT

## 2024-03-11 PROCEDURE — 27200966 HC CLOSED SUCTION SYSTEM

## 2024-03-11 PROCEDURE — 63600175 PHARM REV CODE 636 W HCPCS

## 2024-03-11 PROCEDURE — 99900035 HC TECH TIME PER 15 MIN (STAT)

## 2024-03-11 PROCEDURE — 99900026 HC AIRWAY MAINTENANCE (STAT)

## 2024-03-11 PROCEDURE — 25000003 PHARM REV CODE 250

## 2024-03-11 PROCEDURE — 25000003 PHARM REV CODE 250: Performed by: INTERNAL MEDICINE

## 2024-03-11 PROCEDURE — 27000221 HC OXYGEN, UP TO 24 HOURS

## 2024-03-11 PROCEDURE — 20600001 HC STEP DOWN PRIVATE ROOM

## 2024-03-11 PROCEDURE — 80048 BASIC METABOLIC PNL TOTAL CA: CPT

## 2024-03-11 RX ORDER — DEXTROSE MONOHYDRATE AND SODIUM CHLORIDE 5; .9 G/100ML; G/100ML
INJECTION, SOLUTION INTRAVENOUS CONTINUOUS
Status: ACTIVE | OUTPATIENT
Start: 2024-03-11 | End: 2024-03-11

## 2024-03-11 RX ORDER — HYDROCODONE BITARTRATE AND ACETAMINOPHEN 10; 325 MG/1; MG/1
1 TABLET ORAL EVERY 4 HOURS PRN
Status: DISCONTINUED | OUTPATIENT
Start: 2024-03-11 | End: 2024-03-14

## 2024-03-11 RX ORDER — HYDROCODONE BITARTRATE AND ACETAMINOPHEN 10; 325 MG/1; MG/1
1 TABLET ORAL EVERY 6 HOURS PRN
Status: DISCONTINUED | OUTPATIENT
Start: 2024-03-11 | End: 2024-03-11

## 2024-03-11 RX ORDER — METHOCARBAMOL 500 MG/1
500 TABLET, FILM COATED ORAL 3 TIMES DAILY
Status: DISCONTINUED | OUTPATIENT
Start: 2024-03-11 | End: 2024-04-07

## 2024-03-11 RX ORDER — ENOXAPARIN SODIUM 100 MG/ML
40 INJECTION SUBCUTANEOUS EVERY 24 HOURS
Status: COMPLETED | OUTPATIENT
Start: 2024-03-11 | End: 2024-03-12

## 2024-03-11 RX ADMIN — ENOXAPARIN SODIUM 40 MG: 40 INJECTION SUBCUTANEOUS at 04:03

## 2024-03-11 RX ADMIN — HYDROCODONE BITARTRATE AND ACETAMINOPHEN 1 TABLET: 10; 325 TABLET ORAL at 09:03

## 2024-03-11 RX ADMIN — CHLORHEXIDINE GLUCONATE 0.12% ORAL RINSE 15 ML: 1.2 LIQUID ORAL at 09:03

## 2024-03-11 RX ADMIN — GABAPENTIN 300 MG: 300 CAPSULE ORAL at 02:03

## 2024-03-11 RX ADMIN — MEROPENEM 2 G: 1 INJECTION INTRAVENOUS at 08:03

## 2024-03-11 RX ADMIN — METHOCARBAMOL 500 MG: 500 TABLET ORAL at 09:03

## 2024-03-11 RX ADMIN — LEVOTHYROXINE SODIUM 112 MCG: 112 TABLET ORAL at 04:03

## 2024-03-11 RX ADMIN — LEVETIRACETAM 750 MG: 100 SOLUTION ORAL at 08:03

## 2024-03-11 RX ADMIN — FAMOTIDINE 20 MG: 40 POWDER, FOR SUSPENSION ORAL at 08:03

## 2024-03-11 RX ADMIN — METHOCARBAMOL 500 MG: 500 TABLET ORAL at 02:03

## 2024-03-11 RX ADMIN — POLYETHYLENE GLYCOL 3350 17 G: 17 POWDER, FOR SOLUTION ORAL at 09:03

## 2024-03-11 RX ADMIN — MINOCYCLINE HYDROCHLORIDE 100 MG: 100 CAPSULE ORAL at 09:03

## 2024-03-11 RX ADMIN — ALPRAZOLAM 1 MG: 0.5 TABLET ORAL at 12:03

## 2024-03-11 RX ADMIN — MEROPENEM 2 G: 1 INJECTION INTRAVENOUS at 04:03

## 2024-03-11 RX ADMIN — GABAPENTIN 300 MG: 300 CAPSULE ORAL at 08:03

## 2024-03-11 RX ADMIN — MIDODRINE HYDROCHLORIDE 10 MG: 5 TABLET ORAL at 08:03

## 2024-03-11 RX ADMIN — FAMOTIDINE 20 MG: 40 POWDER, FOR SUSPENSION ORAL at 09:03

## 2024-03-11 RX ADMIN — HYDROCODONE BITARTRATE AND ACETAMINOPHEN 1 TABLET: 10; 325 TABLET ORAL at 04:03

## 2024-03-11 RX ADMIN — FOLIC ACID 1 MG: 1 TABLET ORAL at 08:03

## 2024-03-11 RX ADMIN — ALPRAZOLAM 1 MG: 0.5 TABLET ORAL at 09:03

## 2024-03-11 RX ADMIN — MIDODRINE HYDROCHLORIDE 10 MG: 5 TABLET ORAL at 09:03

## 2024-03-11 RX ADMIN — LEVETIRACETAM 750 MG: 100 SOLUTION ORAL at 09:03

## 2024-03-11 RX ADMIN — HYDROCODONE BITARTRATE AND ACETAMINOPHEN 1 TABLET: 10; 325 TABLET ORAL at 02:03

## 2024-03-11 RX ADMIN — LINEZOLID 600 MG: 600 INJECTION, SOLUTION INTRAVENOUS at 08:03

## 2024-03-11 RX ADMIN — POLYETHYLENE GLYCOL 3350 17 G: 17 POWDER, FOR SOLUTION ORAL at 08:03

## 2024-03-11 RX ADMIN — CHLORHEXIDINE GLUCONATE 0.12% ORAL RINSE 15 ML: 1.2 LIQUID ORAL at 08:03

## 2024-03-11 RX ADMIN — MEROPENEM 2 G: 1 INJECTION INTRAVENOUS at 12:03

## 2024-03-11 RX ADMIN — MINOCYCLINE HYDROCHLORIDE 100 MG: 100 CAPSULE ORAL at 08:03

## 2024-03-11 RX ADMIN — MEROPENEM 2 G: 1 INJECTION INTRAVENOUS at 11:03

## 2024-03-11 RX ADMIN — LINEZOLID 600 MG: 600 INJECTION, SOLUTION INTRAVENOUS at 09:03

## 2024-03-11 RX ADMIN — ALPRAZOLAM 1 MG: 0.5 TABLET ORAL at 08:03

## 2024-03-11 RX ADMIN — HYDROCODONE BITARTRATE AND ACETAMINOPHEN 1 TABLET: 10; 325 TABLET ORAL at 10:03

## 2024-03-11 RX ADMIN — GABAPENTIN 300 MG: 300 CAPSULE ORAL at 09:03

## 2024-03-11 RX ADMIN — DEXTROSE AND SODIUM CHLORIDE: 5; 900 INJECTION, SOLUTION INTRAVENOUS at 08:03

## 2024-03-11 RX ADMIN — MIDODRINE HYDROCHLORIDE 10 MG: 5 TABLET ORAL at 02:03

## 2024-03-11 NOTE — HOSPITAL COURSE
Patient admitted to hospital medicine at [facility] for surgical management. 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 Patient with low MAPs afterwards, repletion with blood products and crystalloids done with subsequent resolution of hypotension. L Arm cultures grew Acinetobacter and Pseudomonas. L Leg cultures grew Pseudomonas. BCx with no growth to date. Infectious disease service following. Currently on Avycaz, Minocycline and Linezolid. Will likely need placement in LTAC once medically ready. Consulted Optho 3/18 for development of eye pain, patient sleeps with eyes open causing dry eyes, eyedrops and erythromycin ointment ordered. Per ID patient with positive margins in the LUE which is infected with highly resistant Acinetobacter and recommending further proximal amputation. Discussed with Gen Surg, who believe that further amputation would be highly morbid and risks outweighed the benefits. Will plan to continue antibiotics. Deferred further imaging for osteomyelitis due to patient already being on correct treatment. Will treat for  Pending discharge to LTAC - cost of Avycaz is currently a barrier; CM attempting to have patient's insurance cover the cost. Insurance denied and patient will have to stay for remaining treatment, end date of antibiotics 4/24/24.

## 2024-03-11 NOTE — ASSESSMENT & PLAN NOTE
Given degree of wounds and past medical history, palliative care consulted. Family and patient currently want full measures

## 2024-03-11 NOTE — ASSESSMENT & PLAN NOTE
"Stage IV pressure ulcer of bilateral buttock, sacral region  Pressure ulcers of multiple sites  Chronic osteomyelitis    This patient does have evidence of infective focus  My overall impression is sepsis.  Source: Skin and Soft Tissue (location extremities)  Antibiotics given-   Antibiotics (72h ago, onward)      Start     Stop Route Frequency Ordered    03/07/24 0900  minocycline capsule 100 mg         -- PER NG TUBE Every 12 hours 03/07/24 0220 03/07/24 0615  linezolid 600 mg/300 mL IVPB 600 mg         -- IV Every 12 hours (non-standard times) 03/07/24 0220 03/07/24 0600  meropenem (MERREM) 2 g in sodium chloride 0.9% 100 mL IVPB         -- IV Every 8 hours (non-standard times) 03/07/24 0220          Latest lactate reviewed-  No results for input(s): "LACTATE", "POCLAC" in the last 72 hours.  Organ dysfunction indicated by Acute respiratory failure    UCX 02/21 and RCX 02/22 with MDR proteus mirabilis, pseudomonas aeruginosa. Patient has extensive MDRO / ESBL culture data from prior hospitalizations as well. Shock component is now resolved, but leukocytosis, fever, and underlying wounds are still a component as source control has not been achieved.    -- IP consult to general surgery for surgical evaluation; per documentation review, previous surgical evaluation at OSH were evaluating for possible amputations +/- surgical debridements  -- plan for possible procedure tomorrow, will keep NPO at [facility]  -- Continue linezolid, meropenem, minocycline per ID recommendations at OSH  -- Weekly CK  -- IP Consult to Wound Care  -- F/U Culture data  -- Turn q2h  "
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
## 2024-03-11 NOTE — SUBJECTIVE & OBJECTIVE
Interval History: Pending surgical plan. Remains with leukocytosis.      Objective:     Vital Signs (Most Recent):  Temp: 98.1 °F (36.7 °C) (03/11/24 1203)  Pulse: 104 (03/11/24 1203)  Resp: 20 (03/11/24 1203)  BP: (!) 90/53 (03/11/24 1203)  SpO2: 100 % (03/11/24 1203) Vital Signs (24h Range):  Temp:  [97.8 °F (36.6 °C)-99.9 °F (37.7 °C)] 98.1 °F (36.7 °C)  Pulse:  [] 104  Resp:  [16-20] 20  SpO2:  [98 %-100 %] 100 %  BP: ()/(49-57) 90/53     Weight: 59.4 kg (131 lb)  Body mass index is 19.35 kg/m².    Intake/Output Summary (Last 24 hours) at 3/11/2024 1441  Last data filed at 3/11/2024 1200  Gross per 24 hour   Intake 2007.2 ml   Output 2650 ml   Net -642.8 ml         Physical Exam  Vitals and nursing note reviewed.   Constitutional:       General: He is not in acute distress.     Appearance: Normal appearance. He is not ill-appearing.   HENT:      Head: Normocephalic and atraumatic.      Right Ear: External ear normal.      Left Ear: External ear normal.      Nose: Nose normal.   Eyes:      General: No scleral icterus.     Comments: Blind   Neck:      Comments: Trach collar in place  Cardiovascular:      Rate and Rhythm: Normal rate and regular rhythm.   Pulmonary:      Effort: Pulmonary effort is normal. No respiratory distress.   Abdominal:      General: Abdomen is flat. There is no distension.   Musculoskeletal:         General: Normal range of motion.      Cervical back: Normal range of motion.      Right lower leg: No edema.      Left lower leg: No edema.   Skin:     Coloration: Skin is not jaundiced.      Findings: No bruising.      Comments: Gangrene BL LE's and LUE   Neurological:      Mental Status: He is alert and oriented to person, place, and time. Mental status is at baseline.             Significant Labs: All pertinent labs within the past 24 hours have been reviewed.    Significant Imaging: I have reviewed all pertinent imaging results/findings within the past 24 hours.

## 2024-03-11 NOTE — CONSULTS
[facility]  Wound Care    Patient Name:  Jyoti Mayberry   MRN:  [id number]  Date: 3/11/2024  Diagnosis: Severe sepsis    History:     No past medical history on file.    Social History     Socioeconomic History    Marital status: Single     Social Determinants of Health     Financial Resource Strain: Low Risk  (3/7/2024)    Overall Financial Resource Strain (CARDIA)     Difficulty of Paying Living Expenses: Not hard at all   Food Insecurity: No Food Insecurity (3/7/2024)    Hunger Vital Sign     Worried About Running Out of Food in the Last Year: Never true     Ran Out of Food in the Last Year: Never true   Transportation Needs: No Transportation Needs (3/7/2024)    PRAPARE - Transportation     Lack of Transportation (Medical): No     Lack of Transportation (Non-Medical): No   Physical Activity: Inactive (3/7/2024)    Exercise Vital Sign     Days of Exercise per Week: 0 days     Minutes of Exercise per Session: 0 min   Stress: Stress Concern Present (3/7/2024)    [place] [employer] - Occupational Stress Questionnaire     Feeling of Stress : Very much   Social Connections: Socially Isolated (3/7/2024)    Social Connection and Isolation Panel [NHANES]     Frequency of Communication with Friends and Family: More than three times a week     Frequency of Social Gatherings with Friends and Family: More than three times a week     Attends Hoahaoism Services: Never     Active Member of Clubs or Organizations: No     Attends Club or Organization Meetings: Never     Marital Status: Never    Housing Stability: Unknown (3/7/2024)    Housing Stability Vital Sign     Unable to Pay for Housing in the Last Year: No     Unstable Housing in the Last Year: No       Precautions:     Allergies as of 03/05/2024    (No Known Allergies)       WOC Assessment Details/Treatment     Wound care follow up. Assisted by patient's mother and Shauna Torres RN wound care.  General surgery rounded during patient care,  plan for amputations Wednesday. Extremities with minimal drainage on dressing, recommend leaving current dressing in place unless saturated to minimize patient discomfort prior to surgery Wednesday.  Sacral/bilateral ischial/thoracic spine and right upper back stage 4 pressure injuries beefy red, bone exposure noted to each. Patient on immerse surface.  Recommend hydrofera blue ready foam dressing changes MWF to sacral/bilateral ischial pressure injuries, and aquacel ag to thoracic spine and right upper back pressure injuries.  Updated Dr. Jose Charles, approved recommendations.  Nursing to continue care.     03/11/24 1530   Skin   Specialty Bed/Overlay   (immerse)   Bed Support Surface Assessed        Altered Skin Integrity 05/15/23 1411 Sacral spine Full thickness tissue loss with exposed bone, tendon, or muscle. Often includes undermining and tunneling. May extend into muscle and/or supporting structures.   Final Assessment Date/Final Assessment Time: (c)  (c)   Date First Assessed/Time First Assessed: 05/15/23 1411   Altered Skin Integrity Present on Admission - Did Patient arrive to the hospital with altered skin?: yes  Location: Sacral spine  Descript...   Wound Image    Description of Altered Skin Integrity Full thickness tissue loss with exposed bone, tendon, or muscle. Often includes undermining and tunneling. May extend into muscle and/or supporting structures.  (stage 4 pressure injury)   Drainage Amount Moderate   Drainage Characteristics/Odor Brown;Malodorous   Appearance Moist;Red;Bone   Tissue loss description Full thickness   Periwound Area Macerated   Wound Edges Open   Care Cleansed with:;Wound cleanser   Dressing Changed;Applied  (hydrofera blue ready and abd pad)   Periwound Care Skin barrier film applied   Dressing Change Due 03/13/24        Altered Skin Integrity 06/07/23 0900 Left Ischial tuberosity Full thickness tissue loss with exposed bone, tendon, or muscle. Often includes undermining  and tunneling. May extend into muscle and/or supporting structures.   Final Assessment Date/Final Assessment Time: (c)  (c)   Date First Assessed/Time First Assessed: 06/07/23 0900   Altered Skin Integrity Present on Admission - Did Patient arrive to the hospital with altered skin?: yes  Side: Left  Location: Ischial tu...   Description of Altered Skin Integrity Full thickness tissue loss with exposed bone, tendon, or muscle. Often includes undermining and tunneling. May extend into muscle and/or supporting structures.  (stage 4 pressure injury)   Drainage Amount Moderate   Drainage Characteristics/Odor Brown;Malodorous   Appearance Moist;Red;Bone   Tissue loss description Full thickness   Periwound Area Macerated   Wound Edges Open   Care Cleansed with:;Wound cleanser   Dressing Changed;Applied  (hydrofera blue ready and abd pad)   Periwound Care Skin barrier film applied   Dressing Change Due 03/13/24        Altered Skin Integrity 06/07/23 0900 Right Ischial tuberosity Full thickness tissue loss with exposed bone, tendon, or muscle. Often includes undermining and tunneling. May extend into muscle and/or supporting structures.   Final Assessment Date/Final Assessment Time: (c)  (c)   Date First Assessed/Time First Assessed: 06/07/23 0900   Altered Skin Integrity Present on Admission - Did Patient arrive to the hospital with altered skin?: yes  Side: Right  Location: Ischial t...   Description of Altered Skin Integrity Full thickness tissue loss with exposed bone, tendon, or muscle. Often includes undermining and tunneling. May extend into muscle and/or supporting structures.  (stage 4 pressure injury)   Drainage Amount Moderate   Drainage Characteristics/Odor Brown;Malodorous   Appearance Moist;Red;Bone   Tissue loss description Full thickness   Periwound Area Macerated   Wound Edges Open   Care Cleansed with:;Wound cleanser   Dressing Applied  (hydrofera blue ready foam/abd pad)   Periwound Care Skin barrier film  applied   Dressing Change Due 03/13/24        Altered Skin Integrity 02/22/24 Thoracic spine Full thickness tissue loss with exposed bone, tendon, or muscle. Often includes undermining and tunneling. May extend into muscle and/or supporting structures.   Date First Assessed: 02/22/24   Altered Skin Integrity Present on Admission - Did Patient arrive to the hospital with altered skin?: yes  Location: Thoracic spine  Description of Altered Skin Integrity: Full thickness tissue loss with exposed bone, te...   Wound Image    Description of Altered Skin Integrity Full thickness tissue loss with exposed bone, tendon, or muscle. Often includes undermining and tunneling. May extend into muscle and/or supporting structures.  (stage 4 pressure injury)   Drainage Amount None   Drainage Characteristics/Odor No odor   Appearance Moist;Red;Slough;Fibrin;Bone   Periwound Area Intact   Wound Edges Open   Care Cleansed with:;Wound cleanser   Dressing Changed;Applied  (aquacel ag, dry gauze)   Periwound Care Skin barrier film applied   Dressing Change Due 03/13/24        Altered Skin Integrity 02/22/24 Right upper Back Full thickness tissue loss with exposed bone, tendon, or muscle. Often includes undermining and tunneling. May extend into muscle and/or supporting structures.   Date First Assessed: 02/22/24   Altered Skin Integrity Present on Admission - Did Patient arrive to the hospital with altered skin?: yes  Side: Right  Orientation: upper  Location: Back  Description of Altered Skin Integrity: Full thickness tissue los...   Wound Image    Description of Altered Skin Integrity Full thickness tissue loss with exposed bone, tendon, or muscle. Often includes undermining and tunneling. May extend into muscle and/or supporting structures.  (stage 4 pressure injury)   Drainage Amount None   Drainage Characteristics/Odor No odor   Appearance Moist;Red;Muscle;Fibrin;Bone   Tissue loss description Full thickness   Periwound Area Intact    Wound Edges Open   Undermining (depth (cm)/location) approximately 3 cm from 8 to 3 oclock   Care Cleansed with:;Wound cleanser   Dressing Changed  (aquacel ag, dry gauze)   Periwound Care Skin barrier film applied   Dressing Change Due 03/13/24

## 2024-03-11 NOTE — ASSESSMENT & PLAN NOTE
Patient has hyponatremia which is controlled,We will aim to correct the sodium by 4-6mEq in 24 hours. We will monitor sodium Daily. The hyponatremia is due to Dehydration/hypovolemia. The patient's sodium results have been reviewed and are listed below.  Recent Labs   Lab 03/11/24  0448   *

## 2024-03-11 NOTE — PT/OT/SLP PROGRESS
Speech Language Pathology      Jyoti Mayberry  MRN: [id number]    SLP attempted to see Patient for therapy.  Upon initial attempt, Patient NPO.  Until later attempts, Patient unavailable (MD rounding, Wound care at the bedside.)  Patient reviewed with RN. ST to continue to follow up and re-attempt 3/12/24 per ST POC.      3/11/2024  (9:45, 10:27, 15:12)
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
## 2024-03-11 NOTE — ASSESSMENT & PLAN NOTE
>>ASSESSMENT AND PLAN FOR PRESSURE ULCERS OF SKIN OF MULTIPLE TOPOGRAPHIC SITES WRITTEN ON 3/10/2024  7:45 PM BY FRANKY BADILLO MD    See sepsis
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
## 2024-03-11 NOTE — ASSESSMENT & PLAN NOTE
Patient has hyponatremia which is controlled,We will aim to correct the sodium by 4-6mEq in 24 hours. We will monitor sodium Daily. The hyponatremia is due to Dehydration/hypovolemia. The patient's sodium results have been reviewed and are listed below.  Recent Labs   Lab 03/10/24  0500   *

## 2024-03-11 NOTE — RESPIRATORY THERAPY
"RAPID RESPONSE RESPIRATORY THERAPY PROACTIVE NOTE           Time of visit: 910     Code Status: Prior   : 1995  Bed: [facility]3/[facility] A:   MRN: [id number]  Time spent at the bedside: < 15 min    SITUATION    Evaluated patient for: LDA Check     BACKGROUND    Patient has no past medical history on file.  Clinically Significant Surgical Hx: tracheostomy    24 Hours Vitals Range:  Temp:  [97.8 °F (36.6 °C)-100 °F (37.8 °C)]   Pulse:  [100-118]   Resp:  [16-18]   BP: ()/(49-72)   SpO2:  [98 %-100 %]     Labs:    Recent Labs     24  0530 03/10/24  0500 24  0448   * 135* 133*   K 5.0 4.9 5.0    100 101   CO2 26 26 26   BUN 13 15 16   CREATININE 0.6 0.6 0.6   GLU 64* 89 71        No results for input(s): "PH", "PCO2", "PO2", "HCO3", "POCSATURATED", "BE" in the last 72 hours.    ASSESSMENT/INTERVENTIONS  Patient resting comfortably. No respiratory concerns at this time.      Last VS   Temp: 99 °F (37.2 °C) ( 0910)  Pulse: 107 ( 1047)  Resp: 18 ( 1033)  BP: 89/52 ( 1047)  SpO2: 99 % (910)      Extra trachs at bedside: #6 Shiley Cuffed, #8 Shiley Cuffed  Level of Consciousness: Level of Consciousness (AVPU): alert  Respiratory Effort: Respiratory Effort: Unlabored, Normal Expansion/Accessory Muscle Usage: Expansion/Accessory Muscles/Retractions: expansion symmetric, no retractions, no use of accessory muscles  All Lung Field Breath Sounds: All Lung Fields Breath Sounds: coarse, diminished  NATALIA Breath Sounds: coarse  O2 Device/Concentration: 5L/28%  Surgical airway: Yes, Type: Shiley Size: 8, cuffed  Ambu at bedside:       Active Orders   Respiratory Care    Oxygen Continuous     Frequency: Continuous     Number of Occurrences: Until Specified     Order Questions:      Device type: Low flow      Device: Trach Collar      FiO2%: 60      Titrate O2 per Oxygen Titration Protocol: Yes      To maintain SpO2 goal of: >= 90%      Notify MD of: Inability to achieve desired SpO2; " Sudden change in patient status and requires 20% increase in FiO2; Patient requires >60% FiO2    Pulse Oximetry Continuous     Frequency: Continuous     Number of Occurrences: Until Specified    Routine tracheostomy care     Frequency: BID     Number of Occurrences: Until Specified    SUCTION PRN     Frequency: PRN     Number of Occurrences: Until Specified       RECOMMENDATIONS    We recommend: RRT Recs: Continue POC per primary team.      FOLLOW-UP    Please call back the Rapid Response RTJerri RRT at [phone] for any questions or concerns.

## 2024-03-11 NOTE — ASSESSMENT & PLAN NOTE
UCX from 02/21 growing MDR Proteus mirabilis and Pseudomonas aeruginosa.    -- Should have completed the treatment course for initial UTI; however, given indwelling sterling catheter, risk of re-infection + colonization is high  -- ID consulted, see severe sepsis

## 2024-03-11 NOTE — CARE UPDATE
GENERAL SURGERY  CARE UPDATE      Discussed with patient and family, will proceed to OR on 3/13/24. Please hold TF/NPO at midnight prior, informed consent has been obtained.     Will continue to follow.         -- Komal Salazar M.D  General Surgery PGY5  Pager: [phone]

## 2024-03-11 NOTE — ASSESSMENT & PLAN NOTE
>>ASSESSMENT AND PLAN FOR PRESSURE ULCERS OF SKIN OF MULTIPLE TOPOGRAPHIC SITES WRITTEN ON 3/11/2024  2:46 PM BY KELLY HUERTA MD    See sepsis

## 2024-03-11 NOTE — ASSESSMENT & PLAN NOTE
Palliative Care at [facility] engaging in ongoing communications with family regarding GOC given extensive comorbid conditions and recurrent hospitalizations. Per documentation review and brief discussion with family on initial transfer admission encounter, patient & family wish to continue all medical and surgical options at this time.     -- Patient & family would greatly benefit from continued Palliative Care discussions  -- IP Consult to [facility] Care services
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
## 2024-03-11 NOTE — SUBJECTIVE & OBJECTIVE
Interval history: NAEO. Reports trach secretions improving since starting current abx.  Denies issues with BM/diarrhea or bloody stools    Past Surgical History:   Procedure Laterality Date    ESOPHAGOGASTRODUODENOSCOPY W/ PEG N/A 5/30/2023    Procedure: PEG;  Surgeon: JUSTYN Devine MD;  Location: [facility] ENDOSCOPY;  Service: Gastroenterology;  Laterality: N/A;     No current facility-administered medications on file prior to encounter.     Current Outpatient Medications on File Prior to Encounter   Medication Sig Dispense Refill    ALPRAZolam (XANAX) 1 MG tablet Take 1 mg by mouth 3 (three) times daily as needed for Anxiety.      cholecalciferol, vitamin D3, 1,250 mcg (50,000 unit) capsule Take 50,000 Units by mouth every 7 days.      acetaminophen (TYLENOL) 325 MG tablet 2 tablets (650 mg total) by Per G Tube route every 6 (six) hours as needed for Temperature greater than (100.4).  0    albuterol (PROVENTIL) 2.5 mg /3 mL (0.083 %) nebulizer solution Take 3 mLs (2.5 mg total) by nebulization every 4 (four) hours as needed (shortness of breath). Rescue  0    calcitRIOL (ROCALTROL) 0.5 MCG Cap Take 1 capsule (0.5 mcg total) by mouth once daily.      calcium acetate,phosphat bind, (PHOSLO) 667 mg capsule Take 1 capsule (667 mg total) by mouth 3 (three) times daily with meals. 90 capsule 11    enoxaparin (LOVENOX) 40 mg/0.4 mL Syrg Inject 0.4 mLs (40 mg total) into the skin once daily.      folic acid (FOLVITE) 1 MG tablet 1 tablet (1 mg total) by Per G Tube route once daily.  0    gabapentin (NEURONTIN) 300 MG capsule Take 1 capsule (300 mg total) by mouth 3 (three) times daily. 90 capsule 11    HYDROcodone-acetaminophen (NORCO)  mg per tablet Take 1 tablet by mouth every 6 (six) hours as needed for Pain.  0    levETIRAcetam (KEPPRA) 100 mg/mL Soln 7.5 mLs (750 mg total) by Per NG tube route 2 (two) times daily. 450 mL 11    levothyroxine (SYNTHROID) 100 MCG tablet Take 1 tablet (100 mcg total) by  mouth before breakfast. 30 tablet 11    linezolid (ZYVOX) 600 mg/300 mL PgBk Inject 300 mLs (600 mg total) into the vein every 12 (twelve) hours.      melatonin (MELATIN) 3 mg tablet Take 2 tablets (6 mg total) by mouth nightly as needed for Insomnia.  0    midodrine (PROAMATINE) 10 MG tablet 1 tablet (10 mg total) by Per NG tube route 3 (three) times daily. 90 tablet 11    minocycline (MINOCIN,DYNACIN) 100 MG capsule 1 capsule (100 mg total) by Per NG tube route every 12 (twelve) hours.      ondansetron (ZOFRAN) 4 mg/5 mL solution Take 5 mLs (4 mg total) by mouth every 6 (six) hours as needed for Nausea.      pantoprazole (PROTONIX) 40 mg injection Inject 40 mg into the vein once daily. 30 each 11    sertraline (ZOLOFT) 50 MG tablet Take 1 tablet (50 mg total) by mouth once daily. 30 tablet 11    sodium chloride 0.9% SolP 100 mL with meropenem 1 gram SolR 2 g Inject 2 g into the vein every 8 (eight) hours.       Social History     Socioeconomic History    Marital status: Single     Social Determinants of Health     Financial Resource Strain: Low Risk  (3/7/2024)    Overall Financial Resource Strain (CARDIA)     Difficulty of Paying Living Expenses: Not hard at all   Food Insecurity: No Food Insecurity (3/7/2024)    Hunger Vital Sign     Worried About Running Out of Food in the Last Year: Never true     Ran Out of Food in the Last Year: Never true   Transportation Needs: No Transportation Needs (3/7/2024)    PRAPARE - Transportation     Lack of Transportation (Medical): No     Lack of Transportation (Non-Medical): No   Physical Activity: Inactive (3/7/2024)    Exercise Vital Sign     Days of Exercise per Week: 0 days     Minutes of Exercise per Session: 0 min   Stress: Stress Concern Present (3/7/2024)    [employer] - Occupational Stress Questionnaire     Feeling of Stress : Very much   Social Connections: Socially Isolated (3/7/2024)    Social Connection and Isolation Panel [NHANES]      Frequency of Communication with Friends and Family: More than three times a week     Frequency of Social Gatherings with Friends and Family: More than three times a week     Attends Taoism Services: Never     Active Member of Clubs or Organizations: No     Attends Club or Organization Meetings: Never     Marital Status: Never    Housing Stability: Unknown (3/7/2024)    Housing Stability Vital Sign     Unable to Pay for Housing in the Last Year: No     Unstable Housing in the Last Year: No       The patient has a family history of  noncontributory  Review of Systems   Constitutional:  Negative for fatigue.   HENT: Negative.     Eyes: Negative.    Respiratory:  Negative for cough, shortness of breath and wheezing.    Cardiovascular:  Negative for chest pain and palpitations.   Gastrointestinal:  Negative for abdominal distention, constipation, nausea and vomiting.   Genitourinary: Negative.    Musculoskeletal:  Positive for myalgias.   Skin:  Positive for wound.   Neurological:  Negative for dizziness, seizures, weakness and numbness.   Hematological: Negative.    Psychiatric/Behavioral: Negative.       Objective:     Vital Signs (Most Recent):  Temp: 99.9 °F (37.7 °C) (03/10/24 1710)  Pulse: (!) 112 (03/10/24 1710)  Resp: 18 (03/10/24 1710)  BP: (!) 98/57 (03/10/24 1710)  SpO2: 99 % (03/10/24 1710) Vital Signs (24h Range):  Temp:  [97.6 °F (36.4 °C)-100 °F (37.8 °C)] 99.9 °F (37.7 °C)  Pulse:  [] 112  Resp:  [16-18] 18  SpO2:  [97 %-100 %] 99 %  BP: ()/(56-72) 98/57     Weight: 59.4 kg (131 lb)  Body mass index is 19.35 kg/m².    Intake/Output Summary (Last 24 hours) at 3/10/2024 2004  Last data filed at 3/10/2024 1746  Gross per 24 hour   Intake 3320.23 ml   Output 2450 ml   Net 870.23 ml           Physical Exam  Constitutional:       Appearance: He is ill-appearing.   HENT:      Head: Normocephalic.      Nose: Nose normal.      Comments: NG tube in place     Mouth/Throat:      Mouth: Mucous  membranes are moist.   Eyes:      Pupils: Pupils are equal, round, and reactive to light.   Cardiovascular:      Rate and Rhythm: Normal rate and regular rhythm.   Pulmonary:      Effort: No respiratory distress.      Breath sounds: No wheezing or rhonchi.      Comments: On trach collar 5L  Abdominal:      General: Abdomen is flat. There is no distension.      Palpations: Abdomen is soft.      Tenderness: There is no abdominal tenderness. There is no right CVA tenderness or left CVA tenderness.   Musculoskeletal:         General: Deformity and signs of injury present.      Cervical back: Normal range of motion.   Skin:     General: Skin is dry.      Findings: Lesion present.      Comments: Gangrene BL LE and LUE below elbow   Neurological:      Mental Status: He is alert and oriented to person, place, and time.             Significant Labs: All pertinent labs within the past 24 hours have been reviewed.    Significant Imaging: I have reviewed all pertinent imaging results/findings within the past 24 hours.

## 2024-03-12 ENCOUNTER — ANESTHESIA EVENT (OUTPATIENT)
Dept: SURGERY | Facility: HOSPITAL | Age: 29
DRG: 853 | End: 2024-03-12
Payer: MEDICAID

## 2024-03-12 LAB
ANION GAP SERPL CALC-SCNC: 6 MMOL/L (ref 8–16)
BASOPHILS # BLD AUTO: 0.19 K/UL (ref 0–0.2)
BASOPHILS NFR BLD: 1 % (ref 0–1.9)
BUN SERPL-MCNC: 17 MG/DL (ref 6–20)
CALCIUM SERPL-MCNC: 7.8 MG/DL (ref 8.7–10.5)
CHLORIDE SERPL-SCNC: 102 MMOL/L (ref 95–110)
CO2 SERPL-SCNC: 28 MMOL/L (ref 23–29)
CREAT SERPL-MCNC: 0.6 MG/DL (ref 0.5–1.4)
DIFFERENTIAL METHOD BLD: ABNORMAL
EOSINOPHIL # BLD AUTO: 1.2 K/UL (ref 0–0.5)
EOSINOPHIL NFR BLD: 6.5 % (ref 0–8)
ERYTHROCYTE [DISTWIDTH] IN BLOOD BY AUTOMATED COUNT: 16.4 % (ref 11.5–14.5)
EST. GFR  (NO RACE VARIABLE): >60 ML/MIN/1.73 M^2
GLUCOSE SERPL-MCNC: 84 MG/DL (ref 70–110)
HCT VFR BLD AUTO: 24.3 % (ref 40–54)
HGB BLD-MCNC: 7.7 G/DL (ref 14–18)
IMM GRANULOCYTES # BLD AUTO: 0.12 K/UL (ref 0–0.04)
IMM GRANULOCYTES NFR BLD AUTO: 0.6 % (ref 0–0.5)
LYMPHOCYTES # BLD AUTO: 3.3 K/UL (ref 1–4.8)
LYMPHOCYTES NFR BLD: 17.6 % (ref 18–48)
MCH RBC QN AUTO: 30.6 PG (ref 27–31)
MCHC RBC AUTO-ENTMCNC: 31.7 G/DL (ref 32–36)
MCV RBC AUTO: 96 FL (ref 82–98)
MONOCYTES # BLD AUTO: 1.9 K/UL (ref 0.3–1)
MONOCYTES NFR BLD: 10.2 % (ref 4–15)
NEUTROPHILS # BLD AUTO: 12.2 K/UL (ref 1.8–7.7)
NEUTROPHILS NFR BLD: 64.1 % (ref 38–73)
NRBC BLD-RTO: 0 /100 WBC
PLATELET # BLD AUTO: 392 K/UL (ref 150–450)
PMV BLD AUTO: 8.3 FL (ref 9.2–12.9)
POTASSIUM SERPL-SCNC: 5.3 MMOL/L (ref 3.5–5.1)
RBC # BLD AUTO: 2.52 M/UL (ref 4.6–6.2)
SODIUM SERPL-SCNC: 136 MMOL/L (ref 136–145)
WBC # BLD AUTO: 18.96 K/UL (ref 3.9–12.7)

## 2024-03-12 PROCEDURE — 97535 SELF CARE MNGMENT TRAINING: CPT

## 2024-03-12 PROCEDURE — 25000003 PHARM REV CODE 250

## 2024-03-12 PROCEDURE — 27000207 HC ISOLATION

## 2024-03-12 PROCEDURE — 99232 SBSQ HOSP IP/OBS MODERATE 35: CPT | Mod: ,,, | Performed by: STUDENT IN AN ORGANIZED HEALTH CARE EDUCATION/TRAINING PROGRAM

## 2024-03-12 PROCEDURE — 80048 BASIC METABOLIC PNL TOTAL CA: CPT

## 2024-03-12 PROCEDURE — 99900035 HC TECH TIME PER 15 MIN (STAT)

## 2024-03-12 PROCEDURE — 63600175 PHARM REV CODE 636 W HCPCS

## 2024-03-12 PROCEDURE — 27000221 HC OXYGEN, UP TO 24 HOURS

## 2024-03-12 PROCEDURE — 99223 1ST HOSP IP/OBS HIGH 75: CPT | Mod: ,,, | Performed by: OTOLARYNGOLOGY

## 2024-03-12 PROCEDURE — 99900026 HC AIRWAY MAINTENANCE (STAT)

## 2024-03-12 PROCEDURE — 85025 COMPLETE CBC W/AUTO DIFF WBC: CPT

## 2024-03-12 PROCEDURE — 94761 N-INVAS EAR/PLS OXIMETRY MLT: CPT

## 2024-03-12 PROCEDURE — 27200966 HC CLOSED SUCTION SYSTEM

## 2024-03-12 PROCEDURE — 20600001 HC STEP DOWN PRIVATE ROOM

## 2024-03-12 RX ORDER — LIDOCAINE 50 MG/G
1 PATCH TOPICAL
Status: DISCONTINUED | OUTPATIENT
Start: 2024-03-12 | End: 2024-04-11

## 2024-03-12 RX ADMIN — MINOCYCLINE HYDROCHLORIDE 100 MG: 100 CAPSULE ORAL at 08:03

## 2024-03-12 RX ADMIN — LINEZOLID 600 MG: 600 INJECTION, SOLUTION INTRAVENOUS at 07:03

## 2024-03-12 RX ADMIN — ALPRAZOLAM 1 MG: 0.5 TABLET ORAL at 09:03

## 2024-03-12 RX ADMIN — ENOXAPARIN SODIUM 40 MG: 40 INJECTION SUBCUTANEOUS at 04:03

## 2024-03-12 RX ADMIN — MINOCYCLINE HYDROCHLORIDE 100 MG: 100 CAPSULE ORAL at 09:03

## 2024-03-12 RX ADMIN — GABAPENTIN 300 MG: 300 CAPSULE ORAL at 02:03

## 2024-03-12 RX ADMIN — ALPRAZOLAM 1 MG: 0.5 TABLET ORAL at 12:03

## 2024-03-12 RX ADMIN — HYDROCODONE BITARTRATE AND ACETAMINOPHEN 1 TABLET: 10; 325 TABLET ORAL at 09:03

## 2024-03-12 RX ADMIN — GABAPENTIN 300 MG: 300 CAPSULE ORAL at 08:03

## 2024-03-12 RX ADMIN — GABAPENTIN 300 MG: 300 CAPSULE ORAL at 09:03

## 2024-03-12 RX ADMIN — LINEZOLID 600 MG: 600 INJECTION, SOLUTION INTRAVENOUS at 09:03

## 2024-03-12 RX ADMIN — METHOCARBAMOL 500 MG: 500 TABLET ORAL at 08:03

## 2024-03-12 RX ADMIN — FOLIC ACID 1 MG: 1 TABLET ORAL at 08:03

## 2024-03-12 RX ADMIN — FAMOTIDINE 20 MG: 40 POWDER, FOR SUSPENSION ORAL at 09:03

## 2024-03-12 RX ADMIN — LEVOTHYROXINE SODIUM 112 MCG: 112 TABLET ORAL at 05:03

## 2024-03-12 RX ADMIN — POLYETHYLENE GLYCOL 3350 17 G: 17 POWDER, FOR SOLUTION ORAL at 08:03

## 2024-03-12 RX ADMIN — METHOCARBAMOL 500 MG: 500 TABLET ORAL at 02:03

## 2024-03-12 RX ADMIN — MIDODRINE HYDROCHLORIDE 10 MG: 5 TABLET ORAL at 08:03

## 2024-03-12 RX ADMIN — CHLORHEXIDINE GLUCONATE 0.12% ORAL RINSE 15 ML: 1.2 LIQUID ORAL at 08:03

## 2024-03-12 RX ADMIN — LEVETIRACETAM 750 MG: 100 SOLUTION ORAL at 09:03

## 2024-03-12 RX ADMIN — FAMOTIDINE 20 MG: 40 POWDER, FOR SUSPENSION ORAL at 08:03

## 2024-03-12 RX ADMIN — MIDODRINE HYDROCHLORIDE 10 MG: 5 TABLET ORAL at 02:03

## 2024-03-12 RX ADMIN — LIDOCAINE 5% 1 PATCH: 700 PATCH TOPICAL at 02:03

## 2024-03-12 RX ADMIN — LEVETIRACETAM 750 MG: 100 SOLUTION ORAL at 08:03

## 2024-03-12 RX ADMIN — MEROPENEM 2 G: 1 INJECTION INTRAVENOUS at 08:03

## 2024-03-12 RX ADMIN — MEROPENEM 2 G: 1 INJECTION INTRAVENOUS at 04:03

## 2024-03-12 RX ADMIN — METHOCARBAMOL 500 MG: 500 TABLET ORAL at 09:03

## 2024-03-12 RX ADMIN — HYDROCODONE BITARTRATE AND ACETAMINOPHEN 1 TABLET: 10; 325 TABLET ORAL at 10:03

## 2024-03-12 RX ADMIN — MIDODRINE HYDROCHLORIDE 10 MG: 5 TABLET ORAL at 09:03

## 2024-03-12 NOTE — PLAN OF CARE
Recommendations    Continue Regular diet w/ boost TID  Continue Peptamen AF @ 40ml/hr to provide 1152 kcals, 73 g PRO, 785 m fluid w/ additional FWF per MD  Continue Francisco BID x 14 days for wound healing  RD Following    Goals: Meet % een/epn by next RD f/u  Nutrition Goal Status: progressing towards goal  Communication of RD Recs: other (comment) (poc)
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
## 2024-03-12 NOTE — ASSESSMENT & PLAN NOTE
"Stage IV pressure ulcer of bilateral buttock, sacral region  Pressure ulcers of multiple sites  Chronic osteomyelitis    This patient does have evidence of infective focus  My overall impression is sepsis.  Source: Skin and Soft Tissue (location extremities)  Antibiotics given-   Antibiotics (72h ago, onward)      Start     Stop Route Frequency Ordered    03/07/24 0900  minocycline capsule 100 mg         -- PER NG TUBE Every 12 hours 03/07/24 0220 03/07/24 0615  linezolid 600 mg/300 mL IVPB 600 mg         -- IV Every 12 hours (non-standard times) 03/07/24 0220 03/07/24 0600  meropenem (MERREM) 2 g in sodium chloride 0.9% 100 mL IVPB         -- IV Every 8 hours (non-standard times) 03/07/24 0220          Latest lactate reviewed-  No results for input(s): "LACTATE", "POCLAC" in the last 72 hours.  Organ dysfunction indicated by Acute respiratory failure    UCX 02/21 and RCX 02/22 with MDR proteus mirabilis, pseudomonas aeruginosa. Patient has extensive MDRO / ESBL culture data from prior hospitalizations as well. Shock component is now resolved, but leukocytosis, fever, and underlying wounds are still a component as source control has not been achieved.    -- IP consult to general surgery for surgical evaluation; per documentation review, previous surgical evaluation at OSH were evaluating for possible amputations +/- surgical debridements. Plan for surgery 3/13  -- plan for possible procedure tomorrow, will keep NPO at [facility]  -- Continue linezolid, meropenem, minocycline per ID recommendations at OSH  -- Weekly CK  -- IP Consult to Wound Care  -- F/U Culture data  -- Turn q2h  "

## 2024-03-12 NOTE — RESPIRATORY THERAPY
"RAPID RESPONSE RESPIRATORY THERAPY PROACTIVE NOTE           Time of visit: 1018     Code Status: Prior   : 1995  Bed: [address] [place] [address] A:   MRN: [id number]  Time spent at the bedside: < 15 min    SITUATION    Evaluated patient for: LDA Check     BACKGROUND    Patient has no past medical history on file.  Clinically Significant Surgical Hx: tracheostomy    24 Hours Vitals Range:  Temp:  [98 °F (36.7 °C)-100.1 °F (37.8 °C)]   Pulse:  []   Resp:  [18-20]   BP: ()/(53-89)   SpO2:  [96 %-100 %]     Labs:    Recent Labs     03/10/24  0500 24  0448 24  0557   * 133* 136   K 4.9 5.0 5.3*    101 102   CO2 26 26 28   BUN 15 16 17   CREATININE 0.6 0.6 0.6   GLU 89 71 84        No results for input(s): "PH", "PCO2", "PO2", "HCO3", "POCSATURATED", "BE" in the last 72 hours.    ASSESSMENT/INTERVENTIONS  Patient resting comfortably. No respiratory concerns at this time.      Last VS   Temp: 100.1 °F (37.8 °C) ( 0753)  Pulse: 103 ( 1108)  Resp: 18 ( 1040)  BP: 106/58 ( 0753)  SpO2: 100 % ( 1108)      Extra trachs at bedside: #6 Shiley Cuffed, #8 Shiley Cuffed  Level of Consciousness: Level of Consciousness (AVPU): alert  Respiratory Effort: Respiratory Effort: Normal, Unlabored Expansion/Accessory Muscle Usage: Expansion/Accessory Muscles/Retractions: no use of accessory muscles, expansion symmetric  All Lung Field Breath Sounds: All Lung Fields Breath Sounds: Anterior:, Posterior:, clear  NATALIA Breath Sounds: coarse  O2 Device/Concentration: 5L/28%  Surgical airway: Yes, Type: Shiley Size: 8, cuffed  Ambu at bedside:       Active Orders   Respiratory Care    Oxygen Continuous     Frequency: Continuous     Number of Occurrences: Until Specified     Order Questions:      Device type: Low flow      Device: Trach Collar      FiO2%: 60      Titrate O2 per Oxygen Titration Protocol: Yes      To maintain SpO2 goal of: >= 90%      Notify MD of: Inability to achieve desired SpO2; " Sudden change in patient status and requires 20% increase in FiO2; Patient requires >60% FiO2    Pulse Oximetry Continuous     Frequency: Continuous     Number of Occurrences: Until Specified    Routine tracheostomy care     Frequency: BID     Number of Occurrences: Until Specified    SUCTION PRN     Frequency: PRN     Number of Occurrences: Until Specified       RECOMMENDATIONS    We recommend: RRT Recs: Continue POC per primary team.      FOLLOW-UP    Please call back the Rapid Response RTJerri RRT at x [phone] for any questions or concerns.

## 2024-03-12 NOTE — SUBJECTIVE & OBJECTIVE
Interval History: NAEON and VSS    Review of Systems   Constitutional:  Negative for chills, fatigue and fever.   HENT:  Negative for ear pain and sinus pain.    Eyes:  Negative for photophobia, pain and visual disturbance.   Respiratory:  Negative for chest tightness, shortness of breath and wheezing.    Cardiovascular:  Negative for chest pain and leg swelling.   Gastrointestinal:  Negative for abdominal pain, blood in stool, constipation, diarrhea, nausea and vomiting.   Endocrine: Negative for polydipsia and polyuria.   Genitourinary:  Negative for difficulty urinating, dysuria, flank pain, frequency, penile pain and testicular pain.   Musculoskeletal:  Positive for arthralgias, joint swelling and myalgias. Negative for back pain, neck pain and neck stiffness.   Skin:  Negative for color change and rash.   Neurological:  Negative for dizziness, syncope, weakness, numbness and headaches.   Psychiatric/Behavioral:  Negative for confusion and sleep disturbance.      Objective:     Vital Signs (Most Recent):  Temp: 98.2 °F (36.8 °C) (03/12/24 1207)  Pulse: (!) 113 (03/12/24 1207)  Resp: 18 (03/12/24 1207)  BP: (!) 99/55 (03/12/24 1207)  SpO2: 96 % (03/12/24 1447) Vital Signs (24h Range):  Temp:  [98 °F (36.7 °C)-100.1 °F (37.8 °C)] 98.2 °F (36.8 °C)  Pulse:  [] 113  Resp:  [18-20] 18  SpO2:  [96 %-100 %] 96 %  BP: ()/(55-89) 99/55     Weight: 59.4 kg (131 lb)  Body mass index is 19.35 kg/m².    Intake/Output Summary (Last 24 hours) at 3/12/2024 1505  Last data filed at 3/12/2024 1104  Gross per 24 hour   Intake 2541.12 ml   Output 1850 ml   Net 691.12 ml         Physical Exam  Vitals and nursing note reviewed.   Constitutional:       General: He is not in acute distress.     Appearance: Normal appearance. He is not ill-appearing.   HENT:      Head: Normocephalic and atraumatic.      Right Ear: External ear normal.      Left Ear: External ear normal.      Nose: Nose normal.   Eyes:      General: No  scleral icterus.     Comments: Blind   Neck:      Comments: Trach collar in place  Cardiovascular:      Rate and Rhythm: Normal rate and regular rhythm.   Pulmonary:      Effort: Pulmonary effort is normal. No respiratory distress.   Abdominal:      General: Abdomen is flat. There is no distension.   Musculoskeletal:         General: Normal range of motion.      Cervical back: Normal range of motion.      Right lower leg: No edema.      Left lower leg: No edema.   Skin:     Coloration: Skin is not jaundiced.      Findings: Erythema, lesion and rash present. No bruising.      Comments: Gangrene BL LE's and LUE   Neurological:      Mental Status: He is alert and oriented to person, place, and time. Mental status is at baseline.             Significant Labs: All pertinent labs within the past 24 hours have been reviewed.    Significant Imaging: I have reviewed all pertinent imaging results/findings within the past 24 hours.
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
## 2024-03-12 NOTE — SUBJECTIVE & OBJECTIVE
Interval History: NAEON. Afebrile. WBC 20 > 18. To OR for bilateral AKA, LUE amp tomorrow. Consented.     Medications:  Continuous Infusions:  Scheduled Meds:   chlorhexidine  15 mL Mouth/Throat BID    enoxparin  40 mg Subcutaneous Daily    famotidine  20 mg Per NG tube BID    folic acid  1 mg Per NG tube Daily    gabapentin  300 mg Per NG tube TID    levetiracetam  750 mg Per NG tube BID    levothyroxine  112 mcg Per NG tube Before breakfast    linezolid  600 mg Intravenous Q12H    meropenem (MERREM) IVPB  2 g Intravenous Q8H    methocarbamoL  500 mg Oral TID    midodrine  10 mg Per NG tube TID    minocycline  100 mg Per NG tube Q12H    polyethylene glycol  17 g Per NG tube BID     PRN Meds:acetaminophen, ALPRAZolam, HYDROcodone-acetaminophen     Review of patient's allergies indicates:   Allergen Reactions    Opioids - morphine analogues Nausea And Vomiting, Anxiety and Other (See Comments)     Objective:     Vital Signs (Most Recent):  Temp: 98 °F (36.7 °C) (03/12/24 0426)  Pulse: (!) 119 (03/12/24 0442)  Resp: 20 (03/12/24 0442)  BP: 113/66 (03/12/24 0426)  SpO2: 100 % (03/12/24 0700) Vital Signs (24h Range):  Temp:  [98 °F (36.7 °C)-99.2 °F (37.3 °C)] 98 °F (36.7 °C)  Pulse:  [] 119  Resp:  [18-20] 20  SpO2:  [96 %-100 %] 100 %  BP: ()/(49-89) 113/66     Weight: 59.4 kg (131 lb)  Body mass index is 19.35 kg/m².    Intake/Output - Last 3 Shifts         03/10 0700 03/11 0659 03/11 0700 03/12 0659 03/12 0700 03/13 0659    P.O. 1668 1154     I.V. (mL/kg)  888.4 (15)     NG/ 100     IV Piggyback 812.2 969.6     Total Intake(mL/kg) 2940.2 (49.5) 3112.1 (52.4)     Urine (mL/kg/hr) 2800 (2) 1550 (1.1)     Stool 450 300     Total Output 3250 1850     Net -309.8 +1262.1            Stool Occurrence 1 x 0 x              Physical Exam  Vitals and nursing note reviewed.   Constitutional:       Appearance: He is ill-appearing.   HENT:      Head: Normocephalic.      Nose: Nose normal.      Comments: NG  tube in place     Mouth/Throat:      Mouth: Mucous membranes are moist.   Eyes:      Pupils: Pupils are equal, round, and reactive to light.   Cardiovascular:      Rate and Rhythm: Normal rate.   Pulmonary:      Effort: Pulmonary effort is normal. No respiratory distress.      Comments: On trach collar 5L  Abdominal:      General: There is no distension.      Palpations: Abdomen is soft.      Tenderness: There is no abdominal tenderness. There is no right CVA tenderness or left CVA tenderness.      Comments: Ostomy in left lower quadrant functional   Musculoskeletal:         General: Deformity and signs of injury present.   Skin:     General: Skin is dry.      Comments: Gangrene BL LE and LUE below elbow   Neurological:      Mental Status: He is alert and oriented to person, place, and time.          Significant Labs:  I have reviewed all pertinent lab results within the past 24 hours.    Significant Diagnostics:  I have reviewed all pertinent imaging results/findings within the past 24 hours.
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
## 2024-03-12 NOTE — PT/OT/SLP PROGRESS
Speech Language Pathology Treatment  Discharge    Patient Name:  Jyoti Mayberry   MRN:  [id number]  1023/1023 A    Admitting Diagnosis: Severe sepsis    Recommendations:                 General Recommendations:  Follow-up not indicated  Diet recommendations:  Regular Diet - IDDSI Level 7, Liquid Diet Level: Thin liquids - IDDSI Level 0   Aspiration Precautions:  speaking valve in place and Standard aspiration precautions   General Precautions: Standard, fall, dental soft  Communication strategies:  none    Assessment:     Jyoti Mayberry is a 28 y.o. male tolerating regular solids and thin liquids with no overt s/s of aspiration or reported difficulties. Patient independent with PMSV. No further skilled acute Speech Therapy services warranted at this time. Please re-consult as needed.      Subjective     Patient awake and cooperative. Family at bedside.   Patient goals: none stated       Objective:     Has the patient been evaluated by SLP for swallowing?   Yes  Keep patient NPO? No     Patient seen for an ongoing swallowing assessment. Patient eating shrimp and debris fries upon entry with PMSV in place. He presents with timely, adequate oral clearance and no overt s/s with all trials. Patient and family deny difficulties with meals. Patient and family are independent with PMSV. SLP provided education on SLP role and d/c from St services. Patient and family in agreement with d/c from ST services.    Goals:   Multidisciplinary Problems       SLP Goals       Not on file              Multidisciplinary Problems (Resolved)          Problem: SLP    Goal Priority Disciplines Outcome   SLP Goal   (Resolved)     SLP Met   Description: Speech Language Pathology Goals  Goals expected to be met by 3/14  1. Pt will tolerate soft & bite sized solids & thin liquids without s/s aspiration & adequate oral phase of swallow   2. Ongoing swallow assessment to ensure pt is on safest least restrictive PO consistencies                        Plan:     Plan of Care reviewed with:  patient, family   SLP Follow-Up:  No       Discharge recommendations:  No Therapy Indicated   Barriers to Discharge:  None    Time Tracking:     SLP Treatment Date:   03/12/24  Speech Start Time:  1400  Speech Stop Time:  1410     Speech Total Time (min):  10 min    Billable Minutes: Self Care/Home Management Training 10    03/12/2024
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
## 2024-03-12 NOTE — PLAN OF CARE
Problem: SLP  Goal: SLP Goal  Description: Speech Language Pathology Goals  Goals expected to be met by 3/14  1. Pt will tolerate soft & bite sized solids & thin liquids without s/s aspiration & adequate oral phase of swallow   2. Ongoing swallow assessment to ensure pt is on safest least restrictive PO consistencies  Outcome: Met  Patient tolerating regular solids and thin liquids with no overt s/s of aspiration or reported difficulties. Patient independent with PMSV. No further skilled acute Speech Therapy services warranted at this time. Please re-consult as needed.

## 2024-03-13 ENCOUNTER — ANESTHESIA (OUTPATIENT)
Dept: SURGERY | Facility: HOSPITAL | Age: 29
DRG: 853 | End: 2024-03-13
Payer: MEDICAID

## 2024-03-13 LAB
ABO + RH BLD: NORMAL
ANION GAP SERPL CALC-SCNC: 7 MMOL/L (ref 8–16)
ANISOCYTOSIS BLD QL SMEAR: SLIGHT
BASOPHILS # BLD AUTO: ABNORMAL K/UL (ref 0–0.2)
BASOPHILS NFR BLD: 0 % (ref 0–1.9)
BLD GP AB SCN CELLS X3 SERPL QL: NORMAL
BLD PROD TYP BPU: NORMAL
BLD PROD TYP BPU: NORMAL
BLOOD UNIT EXPIRATION DATE: NORMAL
BLOOD UNIT EXPIRATION DATE: NORMAL
BLOOD UNIT TYPE CODE: 5100
BLOOD UNIT TYPE CODE: 5100
BLOOD UNIT TYPE: NORMAL
BLOOD UNIT TYPE: NORMAL
BUN SERPL-MCNC: 18 MG/DL (ref 6–20)
CALCIUM SERPL-MCNC: 7.6 MG/DL (ref 8.7–10.5)
CHLORIDE SERPL-SCNC: 102 MMOL/L (ref 95–110)
CO2 SERPL-SCNC: 26 MMOL/L (ref 23–29)
CODING SYSTEM: NORMAL
CODING SYSTEM: NORMAL
CREAT SERPL-MCNC: 0.6 MG/DL (ref 0.5–1.4)
CROSSMATCH INTERPRETATION: NORMAL
CROSSMATCH INTERPRETATION: NORMAL
DIFFERENTIAL METHOD BLD: ABNORMAL
DISPENSE STATUS: NORMAL
DISPENSE STATUS: NORMAL
EOSINOPHIL # BLD AUTO: ABNORMAL K/UL (ref 0–0.5)
EOSINOPHIL NFR BLD: 6 % (ref 0–8)
ERYTHROCYTE [DISTWIDTH] IN BLOOD BY AUTOMATED COUNT: 16.4 % (ref 11.5–14.5)
EST. GFR  (NO RACE VARIABLE): >60 ML/MIN/1.73 M^2
FIBRINOGEN PPP-MCNC: 555 MG/DL (ref 182–400)
GLUCOSE SERPL-MCNC: 75 MG/DL (ref 70–110)
GLUCOSE SERPL-MCNC: 85 MG/DL (ref 70–110)
GLUCOSE SERPL-MCNC: 94 MG/DL (ref 70–110)
GLUCOSE SERPL-MCNC: 96 MG/DL (ref 70–110)
HCO3 UR-SCNC: 25.2 MMOL/L (ref 24–28)
HCO3 UR-SCNC: 25.4 MMOL/L (ref 24–28)
HCO3 UR-SCNC: 26 MMOL/L (ref 24–28)
HCT VFR BLD AUTO: 22.4 % (ref 40–54)
HCT VFR BLD CALC: 22 %PCV (ref 36–54)
HCT VFR BLD CALC: 22 %PCV (ref 36–54)
HCT VFR BLD CALC: 27 %PCV (ref 36–54)
HGB BLD-MCNC: 7.2 G/DL (ref 14–18)
HYPOCHROMIA BLD QL SMEAR: ABNORMAL
IMM GRANULOCYTES # BLD AUTO: ABNORMAL K/UL (ref 0–0.04)
IMM GRANULOCYTES NFR BLD AUTO: ABNORMAL % (ref 0–0.5)
LYMPHOCYTES # BLD AUTO: ABNORMAL K/UL (ref 1–4.8)
LYMPHOCYTES NFR BLD: 16 % (ref 18–48)
MCH RBC QN AUTO: 30.9 PG (ref 27–31)
MCHC RBC AUTO-ENTMCNC: 32.1 G/DL (ref 32–36)
MCV RBC AUTO: 96 FL (ref 82–98)
MONOCYTES # BLD AUTO: ABNORMAL K/UL (ref 0.3–1)
MONOCYTES NFR BLD: 9 % (ref 4–15)
NEUTROPHILS # BLD AUTO: ABNORMAL K/UL (ref 1.8–7.7)
NEUTROPHILS NFR BLD: 68 % (ref 38–73)
NEUTS BAND NFR BLD MANUAL: 1 %
NRBC BLD-RTO: 0 /100 WBC
NUM UNITS TRANS PACKED RBC: NORMAL
NUM UNITS TRANS PACKED RBC: NORMAL
OVALOCYTES BLD QL SMEAR: ABNORMAL
PCO2 BLDA: 36.3 MMHG (ref 35–45)
PCO2 BLDA: 41.9 MMHG (ref 35–45)
PCO2 BLDA: 49.8 MMHG (ref 35–45)
PH SMN: 7.32 [PH] (ref 7.35–7.45)
PH SMN: 7.4 [PH] (ref 7.35–7.45)
PH SMN: 7.45 [PH] (ref 7.35–7.45)
PLATELET # BLD AUTO: 354 K/UL (ref 150–450)
PLATELET BLD QL SMEAR: ABNORMAL
PMV BLD AUTO: 8.2 FL (ref 9.2–12.9)
PO2 BLDA: 186 MMHG (ref 80–100)
PO2 BLDA: 428 MMHG (ref 80–100)
PO2 BLDA: 482 MMHG (ref 80–100)
POC BE: -1 MMOL/L
POC BE: 1 MMOL/L
POC BE: 1 MMOL/L
POC IONIZED CALCIUM: 0.96 MMOL/L (ref 1.06–1.42)
POC IONIZED CALCIUM: 0.97 MMOL/L (ref 1.06–1.42)
POC IONIZED CALCIUM: 1.02 MMOL/L (ref 1.06–1.42)
POC SATURATED O2: 100 % (ref 95–100)
POC TCO2: 26 MMOL/L (ref 23–27)
POC TCO2: 27 MMOL/L (ref 23–27)
POC TCO2: 27 MMOL/L (ref 23–27)
POIKILOCYTOSIS BLD QL SMEAR: SLIGHT
POLYCHROMASIA BLD QL SMEAR: ABNORMAL
POTASSIUM BLD-SCNC: 4.8 MMOL/L (ref 3.5–5.1)
POTASSIUM BLD-SCNC: 4.9 MMOL/L (ref 3.5–5.1)
POTASSIUM BLD-SCNC: 5.3 MMOL/L (ref 3.5–5.1)
POTASSIUM SERPL-SCNC: 5.1 MMOL/L (ref 3.5–5.1)
RBC # BLD AUTO: 2.33 M/UL (ref 4.6–6.2)
SAMPLE: ABNORMAL
SODIUM BLD-SCNC: 136 MMOL/L (ref 136–145)
SODIUM BLD-SCNC: 136 MMOL/L (ref 136–145)
SODIUM BLD-SCNC: 137 MMOL/L (ref 136–145)
SODIUM SERPL-SCNC: 135 MMOL/L (ref 136–145)
SPECIMEN OUTDATE: NORMAL
STOMATOCYTES BLD QL SMEAR: PRESENT
WBC # BLD AUTO: 19.67 K/UL (ref 3.9–12.7)
WBC TOXIC VACUOLES BLD QL SMEAR: PRESENT

## 2024-03-13 PROCEDURE — 63600175 PHARM REV CODE 636 W HCPCS

## 2024-03-13 PROCEDURE — 0Y6D0Z3 DETACHMENT AT LEFT UPPER LEG, LOW, OPEN APPROACH: ICD-10-PCS | Performed by: SURGERY

## 2024-03-13 PROCEDURE — 64415 NJX AA&/STRD BRCH PLXS IMG: CPT | Performed by: STUDENT IN AN ORGANIZED HEALTH CARE EDUCATION/TRAINING PROGRAM

## 2024-03-13 PROCEDURE — 64447 NJX AA&/STRD FEMORAL NRV IMG: CPT | Performed by: STUDENT IN AN ORGANIZED HEALTH CARE EDUCATION/TRAINING PROGRAM

## 2024-03-13 PROCEDURE — 36620 INSERTION CATHETER ARTERY: CPT | Mod: 59,,, | Performed by: STUDENT IN AN ORGANIZED HEALTH CARE EDUCATION/TRAINING PROGRAM

## 2024-03-13 PROCEDURE — 94761 N-INVAS EAR/PLS OXIMETRY MLT: CPT

## 2024-03-13 PROCEDURE — 25000003 PHARM REV CODE 250

## 2024-03-13 PROCEDURE — 25000003 PHARM REV CODE 250: Performed by: ANESTHESIOLOGY

## 2024-03-13 PROCEDURE — 64445 NJX AA&/STRD SCIATIC NRV IMG: CPT | Performed by: STUDENT IN AN ORGANIZED HEALTH CARE EDUCATION/TRAINING PROGRAM

## 2024-03-13 PROCEDURE — P9016 RBC LEUKOCYTES REDUCED: HCPCS

## 2024-03-13 PROCEDURE — 20600001 HC STEP DOWN PRIVATE ROOM

## 2024-03-13 PROCEDURE — 0Y6C0Z3 DETACHMENT AT RIGHT UPPER LEG, LOW, OPEN APPROACH: ICD-10-PCS | Performed by: SURGERY

## 2024-03-13 PROCEDURE — 71000033 HC RECOVERY, INTIAL HOUR: Performed by: SURGERY

## 2024-03-13 PROCEDURE — 86850 RBC ANTIBODY SCREEN: CPT

## 2024-03-13 PROCEDURE — 27590 AMPUTATE LEG AT THIGH: CPT | Mod: 50,,, | Performed by: SURGERY

## 2024-03-13 PROCEDURE — 25900 AMPUTATION OF FOREARM: CPT | Mod: 59,LT,, | Performed by: SURGERY

## 2024-03-13 PROCEDURE — 25000003 PHARM REV CODE 250: Performed by: STUDENT IN AN ORGANIZED HEALTH CARE EDUCATION/TRAINING PROGRAM

## 2024-03-13 PROCEDURE — 27201423 OPTIME MED/SURG SUP & DEVICES STERILE SUPPLY: Performed by: SURGERY

## 2024-03-13 PROCEDURE — 86920 COMPATIBILITY TEST SPIN: CPT

## 2024-03-13 PROCEDURE — 25000003 PHARM REV CODE 250: Performed by: NURSE ANESTHETIST, CERTIFIED REGISTERED

## 2024-03-13 PROCEDURE — 27000221 HC OXYGEN, UP TO 24 HOURS

## 2024-03-13 PROCEDURE — 87075 CULTR BACTERIA EXCEPT BLOOD: CPT | Performed by: SURGERY

## 2024-03-13 PROCEDURE — 80048 BASIC METABOLIC PNL TOTAL CA: CPT

## 2024-03-13 PROCEDURE — 71000039 HC RECOVERY, EACH ADD'L HOUR: Performed by: SURGERY

## 2024-03-13 PROCEDURE — 87077 CULTURE AEROBIC IDENTIFY: CPT | Mod: 59 | Performed by: SURGERY

## 2024-03-13 PROCEDURE — D9220A PRA ANESTHESIA: Mod: CRNA,,, | Performed by: NURSE ANESTHETIST, CERTIFIED REGISTERED

## 2024-03-13 PROCEDURE — 88307 TISSUE EXAM BY PATHOLOGIST: CPT | Mod: 59 | Performed by: STUDENT IN AN ORGANIZED HEALTH CARE EDUCATION/TRAINING PROGRAM

## 2024-03-13 PROCEDURE — 85007 BL SMEAR W/DIFF WBC COUNT: CPT

## 2024-03-13 PROCEDURE — 37000008 HC ANESTHESIA 1ST 15 MINUTES: Performed by: SURGERY

## 2024-03-13 PROCEDURE — D9220A PRA ANESTHESIA: Mod: ANES,,, | Performed by: ANESTHESIOLOGY

## 2024-03-13 PROCEDURE — 87150 DNA/RNA AMPLIFIED PROBE: CPT | Performed by: SURGERY

## 2024-03-13 PROCEDURE — 36415 COLL VENOUS BLD VENIPUNCTURE: CPT

## 2024-03-13 PROCEDURE — 99232 SBSQ HOSP IP/OBS MODERATE 35: CPT | Mod: ,,, | Performed by: STUDENT IN AN ORGANIZED HEALTH CARE EDUCATION/TRAINING PROGRAM

## 2024-03-13 PROCEDURE — 99900026 HC AIRWAY MAINTENANCE (STAT)

## 2024-03-13 PROCEDURE — 36000710: Performed by: SURGERY

## 2024-03-13 PROCEDURE — 87186 SC STD MICRODIL/AGAR DIL: CPT | Mod: 59 | Performed by: SURGERY

## 2024-03-13 PROCEDURE — 63600175 PHARM REV CODE 636 W HCPCS: Performed by: STUDENT IN AN ORGANIZED HEALTH CARE EDUCATION/TRAINING PROGRAM

## 2024-03-13 PROCEDURE — 63600175 PHARM REV CODE 636 W HCPCS: Performed by: NURSE ANESTHETIST, CERTIFIED REGISTERED

## 2024-03-13 PROCEDURE — 87186 SC STD MICRODIL/AGAR DIL: CPT | Performed by: SURGERY

## 2024-03-13 PROCEDURE — 85384 FIBRINOGEN ACTIVITY: CPT

## 2024-03-13 PROCEDURE — 27201037 HC PRESSURE MONITORING SET UP

## 2024-03-13 PROCEDURE — 36000711: Performed by: SURGERY

## 2024-03-13 PROCEDURE — 99900035 HC TECH TIME PER 15 MIN (STAT)

## 2024-03-13 PROCEDURE — 85027 COMPLETE CBC AUTOMATED: CPT

## 2024-03-13 PROCEDURE — 37000009 HC ANESTHESIA EA ADD 15 MINS: Performed by: SURGERY

## 2024-03-13 PROCEDURE — 87070 CULTURE OTHR SPECIMN AEROBIC: CPT | Mod: 59 | Performed by: SURGERY

## 2024-03-13 PROCEDURE — 64450 NJX AA&/STRD OTHER PN/BRANCH: CPT | Mod: 59,50,, | Performed by: STUDENT IN AN ORGANIZED HEALTH CARE EDUCATION/TRAINING PROGRAM

## 2024-03-13 PROCEDURE — 27000207 HC ISOLATION

## 2024-03-13 PROCEDURE — 88307 TISSUE EXAM BY PATHOLOGIST: CPT | Mod: 26,,, | Performed by: STUDENT IN AN ORGANIZED HEALTH CARE EDUCATION/TRAINING PROGRAM

## 2024-03-13 PROCEDURE — 0X6F0Z3 DETACHMENT AT LEFT LOWER ARM, LOW, OPEN APPROACH: ICD-10-PCS | Performed by: SURGERY

## 2024-03-13 PROCEDURE — 12000002 HC ACUTE/MED SURGE SEMI-PRIVATE ROOM

## 2024-03-13 RX ORDER — PHENYLEPHRINE HYDROCHLORIDE 10 MG/ML
INJECTION INTRAVENOUS
Status: DISCONTINUED | OUTPATIENT
Start: 2024-03-13 | End: 2024-03-13

## 2024-03-13 RX ORDER — OXYCODONE HYDROCHLORIDE 10 MG/1
10 TABLET ORAL EVERY 4 HOURS PRN
Status: DISCONTINUED | OUTPATIENT
Start: 2024-03-13 | End: 2024-03-14 | Stop reason: HOSPADM

## 2024-03-13 RX ORDER — MIDAZOLAM HYDROCHLORIDE 1 MG/ML
INJECTION INTRAMUSCULAR; INTRAVENOUS
Status: DISCONTINUED | OUTPATIENT
Start: 2024-03-13 | End: 2024-03-13

## 2024-03-13 RX ORDER — FENTANYL CITRATE 50 UG/ML
25-200 INJECTION, SOLUTION INTRAMUSCULAR; INTRAVENOUS
Status: DISCONTINUED | OUTPATIENT
Start: 2024-03-13 | End: 2024-03-14 | Stop reason: HOSPADM

## 2024-03-13 RX ORDER — OXYCODONE HYDROCHLORIDE 5 MG/1
5 TABLET ORAL
Status: DISCONTINUED | OUTPATIENT
Start: 2024-03-13 | End: 2024-03-14 | Stop reason: HOSPADM

## 2024-03-13 RX ORDER — PHENYLEPHRINE HCL IN 0.9% NACL 20MG/250ML
0-5 PLASTIC BAG, INJECTION (ML) INTRAVENOUS CONTINUOUS
Status: DISCONTINUED | OUTPATIENT
Start: 2024-03-13 | End: 2024-03-14

## 2024-03-13 RX ORDER — HALOPERIDOL 5 MG/ML
0.5 INJECTION INTRAMUSCULAR EVERY 10 MIN PRN
Status: DISCONTINUED | OUTPATIENT
Start: 2024-03-13 | End: 2024-03-14 | Stop reason: HOSPADM

## 2024-03-13 RX ORDER — BUPIVACAINE HYDROCHLORIDE AND EPINEPHRINE 2.5; 5 MG/ML; UG/ML
INJECTION, SOLUTION EPIDURAL; INFILTRATION; INTRACAUDAL; PERINEURAL
Status: COMPLETED | OUTPATIENT
Start: 2024-03-13 | End: 2024-03-13

## 2024-03-13 RX ORDER — SODIUM CHLORIDE 0.9 % (FLUSH) 0.9 %
10 SYRINGE (ML) INJECTION
Status: DISCONTINUED | OUTPATIENT
Start: 2024-03-13 | End: 2024-03-14 | Stop reason: HOSPADM

## 2024-03-13 RX ORDER — MIDAZOLAM HYDROCHLORIDE 1 MG/ML
.5-4 INJECTION INTRAMUSCULAR; INTRAVENOUS
Status: DISCONTINUED | OUTPATIENT
Start: 2024-03-13 | End: 2024-03-14 | Stop reason: HOSPADM

## 2024-03-13 RX ORDER — PROPOFOL 10 MG/ML
VIAL (ML) INTRAVENOUS
Status: DISCONTINUED | OUTPATIENT
Start: 2024-03-13 | End: 2024-03-13

## 2024-03-13 RX ORDER — FENTANYL CITRATE 50 UG/ML
INJECTION, SOLUTION INTRAMUSCULAR; INTRAVENOUS
Status: DISCONTINUED | OUTPATIENT
Start: 2024-03-13 | End: 2024-03-13

## 2024-03-13 RX ORDER — ONDANSETRON HYDROCHLORIDE 2 MG/ML
INJECTION, SOLUTION INTRAVENOUS
Status: DISCONTINUED | OUTPATIENT
Start: 2024-03-13 | End: 2024-03-13

## 2024-03-13 RX ORDER — HYDROCODONE BITARTRATE AND ACETAMINOPHEN 500; 5 MG/1; MG/1
TABLET ORAL
Status: DISCONTINUED | OUTPATIENT
Start: 2024-03-13 | End: 2024-03-15

## 2024-03-13 RX ORDER — ROCURONIUM BROMIDE 10 MG/ML
INJECTION, SOLUTION INTRAVENOUS
Status: DISCONTINUED | OUTPATIENT
Start: 2024-03-13 | End: 2024-03-13

## 2024-03-13 RX ORDER — ONDANSETRON 2 MG/ML
INJECTION INTRAMUSCULAR; INTRAVENOUS
Status: DISCONTINUED | OUTPATIENT
Start: 2024-03-13 | End: 2024-03-13

## 2024-03-13 RX ORDER — HYDROMORPHONE HYDROCHLORIDE 1 MG/ML
0.2 INJECTION, SOLUTION INTRAMUSCULAR; INTRAVENOUS; SUBCUTANEOUS EVERY 5 MIN PRN
Status: DISCONTINUED | OUTPATIENT
Start: 2024-03-13 | End: 2024-03-14 | Stop reason: HOSPADM

## 2024-03-13 RX ORDER — ACETAMINOPHEN 650 MG/20.3ML
650 LIQUID ORAL EVERY 6 HOURS
Status: DISCONTINUED | OUTPATIENT
Start: 2024-03-14 | End: 2024-03-14

## 2024-03-13 RX ADMIN — LINEZOLID 600 MG: 600 INJECTION, SOLUTION INTRAVENOUS at 09:03

## 2024-03-13 RX ADMIN — ROCURONIUM BROMIDE 30 MG: 10 INJECTION, SOLUTION INTRAVENOUS at 01:03

## 2024-03-13 RX ADMIN — MEROPENEM 2 G: 1 INJECTION INTRAVENOUS at 06:03

## 2024-03-13 RX ADMIN — SODIUM CHLORIDE, SODIUM GLUCONATE, SODIUM ACETATE, POTASSIUM CHLORIDE, MAGNESIUM CHLORIDE, SODIUM PHOSPHATE, DIBASIC, AND POTASSIUM PHOSPHATE: .53; .5; .37; .037; .03; .012; .00082 INJECTION, SOLUTION INTRAVENOUS at 04:03

## 2024-03-13 RX ADMIN — METHOCARBAMOL 500 MG: 500 TABLET ORAL at 09:03

## 2024-03-13 RX ADMIN — POLYETHYLENE GLYCOL 3350 17 G: 17 POWDER, FOR SOLUTION ORAL at 09:03

## 2024-03-13 RX ADMIN — SODIUM CHLORIDE 0.4 MCG/KG/MIN: 9 INJECTION, SOLUTION INTRAVENOUS at 02:03

## 2024-03-13 RX ADMIN — LEVETIRACETAM 750 MG: 100 SOLUTION ORAL at 09:03

## 2024-03-13 RX ADMIN — BUPIVACAINE HYDROCHLORIDE AND EPINEPHRINE 15 ML: 2.5; 5 INJECTION, SOLUTION EPIDURAL; INFILTRATION; INTRACAUDAL; PERINEURAL at 01:03

## 2024-03-13 RX ADMIN — MINOCYCLINE HYDROCHLORIDE 100 MG: 100 CAPSULE ORAL at 09:03

## 2024-03-13 RX ADMIN — CHLORHEXIDINE GLUCONATE 0.12% ORAL RINSE 15 ML: 1.2 LIQUID ORAL at 09:03

## 2024-03-13 RX ADMIN — BUPIVACAINE HYDROCHLORIDE AND EPINEPHRINE 30 ML: 2.5; 5 INJECTION, SOLUTION EPIDURAL; INFILTRATION; INTRACAUDAL; PERINEURAL at 01:03

## 2024-03-13 RX ADMIN — ONDANSETRON 0.3 G: 2 INJECTION INTRAMUSCULAR; INTRAVENOUS at 02:03

## 2024-03-13 RX ADMIN — FOLIC ACID 1 MG: 1 TABLET ORAL at 09:03

## 2024-03-13 RX ADMIN — SODIUM CHLORIDE: 0.9 INJECTION, SOLUTION INTRAVENOUS at 01:03

## 2024-03-13 RX ADMIN — ONDANSETRON 4 MG: 2 INJECTION INTRAMUSCULAR; INTRAVENOUS at 05:03

## 2024-03-13 RX ADMIN — GABAPENTIN 300 MG: 300 CAPSULE ORAL at 09:03

## 2024-03-13 RX ADMIN — SUGAMMADEX 200 MG: 100 INJECTION, SOLUTION INTRAVENOUS at 05:03

## 2024-03-13 RX ADMIN — MEROPENEM 2 G: 1 INJECTION INTRAVENOUS at 09:03

## 2024-03-13 RX ADMIN — PHENYLEPHRINE HYDROCHLORIDE 200 MCG: 10 INJECTION INTRAVENOUS at 01:03

## 2024-03-13 RX ADMIN — PHENYLEPHRINE HYDROCHLORIDE 100 MCG: 10 INJECTION INTRAVENOUS at 02:03

## 2024-03-13 RX ADMIN — ROCURONIUM BROMIDE 20 MG: 10 INJECTION, SOLUTION INTRAVENOUS at 03:03

## 2024-03-13 RX ADMIN — MIDODRINE HYDROCHLORIDE 10 MG: 5 TABLET ORAL at 09:03

## 2024-03-13 RX ADMIN — MEROPENEM 2 G: 1 INJECTION INTRAVENOUS at 12:03

## 2024-03-13 RX ADMIN — FENTANYL CITRATE 50 MCG: 50 INJECTION, SOLUTION INTRAMUSCULAR; INTRAVENOUS at 01:03

## 2024-03-13 RX ADMIN — SODIUM CHLORIDE, POTASSIUM CHLORIDE, SODIUM LACTATE AND CALCIUM CHLORIDE 500 ML: 600; 310; 30; 20 INJECTION, SOLUTION INTRAVENOUS at 09:03

## 2024-03-13 RX ADMIN — FENTANYL CITRATE 50 MCG: 50 INJECTION, SOLUTION INTRAMUSCULAR; INTRAVENOUS at 04:03

## 2024-03-13 RX ADMIN — MIDAZOLAM HYDROCHLORIDE 2 MG: 2 INJECTION, SOLUTION INTRAMUSCULAR; INTRAVENOUS at 01:03

## 2024-03-13 RX ADMIN — FAMOTIDINE 20 MG: 40 POWDER, FOR SUSPENSION ORAL at 09:03

## 2024-03-13 RX ADMIN — PHENYLEPHRINE HYDROCHLORIDE 200 MCG: 10 INJECTION INTRAVENOUS at 02:03

## 2024-03-13 RX ADMIN — BUPIVACAINE HYDROCHLORIDE AND EPINEPHRINE BITARTRATE 10 ML: 2.5; .0091 INJECTION, SOLUTION EPIDURAL; INFILTRATION; INTRACAUDAL; PERINEURAL at 01:03

## 2024-03-13 RX ADMIN — LEVOTHYROXINE SODIUM 112 MCG: 112 TABLET ORAL at 05:03

## 2024-03-13 RX ADMIN — PHENYLEPHRINE HYDROCHLORIDE 100 MCG: 10 INJECTION INTRAVENOUS at 04:03

## 2024-03-13 RX ADMIN — Medication 0.2 MCG/KG/MIN: at 09:03

## 2024-03-13 RX ADMIN — MIDODRINE HYDROCHLORIDE 10 MG: 5 TABLET ORAL at 08:03

## 2024-03-13 RX ADMIN — ROCURONIUM BROMIDE 20 MG: 10 INJECTION, SOLUTION INTRAVENOUS at 02:03

## 2024-03-13 RX ADMIN — SODIUM CHLORIDE, POTASSIUM CHLORIDE, SODIUM LACTATE AND CALCIUM CHLORIDE 500 ML: 600; 310; 30; 20 INJECTION, SOLUTION INTRAVENOUS at 07:03

## 2024-03-13 RX ADMIN — PROPOFOL 50 MG: 10 INJECTION, EMULSION INTRAVENOUS at 01:03

## 2024-03-13 NOTE — ANESTHESIA PROCEDURE NOTES
Arterial    Diagnosis: lower ext amputation    Patient location during procedure: done in OR    Staffing  Authorizing Provider: Ruy Flores CRNA  Performing Provider: Ruy Flores CRNA    Staffing  Performed by: Ruy Flores CRNA  Authorized by: Ruy Flores CRNA    Anesthesiologist was present at the time of the procedure.    Preanesthetic Checklist  Completed: patient identified, IV checked, site marked, risks and benefits discussed, surgical consent, monitors and equipment checked, pre-op evaluation, timeout performed and anesthesia consent givenArterial  Skin Prep: chlorhexidine gluconate  Orientation: right  Location: radial    Catheter Size: 20 G  Catheter placement by Ultrasound guidance. Heme positive aspiration all ports.   Vessel Caliber: patent  Needle advanced into vessel with real time Ultrasound guidance.Insertion Attempts: 2

## 2024-03-13 NOTE — ANESTHESIA PROCEDURE NOTES
R adductor canal block    Patient location during procedure: pre-op   Block not for primary anesthetic.  Reason for block: at surgeon's request and post-op pain management   Post-op Pain Location: RLE pain   Start time: 3/13/2024 1:35 PM  Timeout: 3/13/2024 1:35 PM   End time: 3/13/2024 1:40 PM    Staffing  Authorizing Provider: Ino Soares MD  Performing Provider: Shayna Mayen MD    Staffing  Performed by: Shayna Mayen MD  Authorized by: Ino Soares MD    Preanesthetic Checklist  Completed: patient identified, IV checked, site marked, risks and benefits discussed, surgical consent, monitors and equipment checked, pre-op evaluation and timeout performed  Peripheral Block  Patient position: supine  Prep: ChloraPrep  Patient monitoring: heart rate, cardiac monitor, continuous pulse ox, continuous capnometry and frequent blood pressure checks  Block type: adductor canal  Laterality: right  Injection technique: single shot  Needle  Needle type: Stimuplex   Needle gauge: 21 G  Needle length: 4 in  Needle localization: anatomical landmarks and ultrasound guidance   -ultrasound image captured on disc.  Assessment  Injection assessment: negative aspiration, negative parasthesia and local visualized surrounding nerve  Paresthesia pain: none  Heart rate change: no  Slow fractionated injection: yes  Pain Tolerance: no complaints and comfortable throughout block  Medications:    Medications: bupivacaine 0.25%-EPINEPHrine (PF) 1:200,000 injection - Other   15 mL - 3/13/2024 1:40:00 PM    Additional Notes  VSS.  [facility] RN monitoring vitals throughout procedure.  Patient tolerated procedure well.
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
## 2024-03-13 NOTE — ANESTHESIA PROCEDURE NOTES
L supraclavicular single shot    Patient location during procedure: pre-op   Block not for primary anesthetic.  Reason for block: at surgeon's request and post-op pain management   Post-op Pain Location: LUE pain   Start time: 3/13/2024 1:40 PM  Timeout: 3/13/2024 1:40 PM   End time: 3/13/2024 1:45 PM    Staffing  Authorizing Provider: Ino Soares MD  Performing Provider: Shayna Mayen MD    Staffing  Performed by: Shayna Mayen MD  Authorized by: Ino Soares MD    Preanesthetic Checklist  Completed: patient identified, IV checked, site marked, risks and benefits discussed, surgical consent, monitors and equipment checked, pre-op evaluation and timeout performed  Peripheral Block  Patient position: supine  Prep: ChloraPrep  Patient monitoring: heart rate, cardiac monitor, continuous pulse ox, continuous capnometry and frequent blood pressure checks  Block type: supraclavicular  Laterality: left  Injection technique: single shot  Needle  Needle type: Stimuplex   Needle gauge: 22 G  Needle length: 2 in  Needle localization: anatomical landmarks and ultrasound guidance   -ultrasound image captured on disc.  Assessment  Injection assessment: negative aspiration, negative parasthesia and local visualized surrounding nerve  Paresthesia pain: none  Heart rate change: no  Slow fractionated injection: yes  Pain Tolerance: no complaints and comfortable throughout block  Medications:    Medications: bupivacaine 0.25%-EPINEPHrine (PF) 1:200,000 injection - Other   15 mL - 3/13/2024 1:45:00 PM    Additional Notes  VSS.  [facility] RN monitoring vitals throughout procedure.  Patient tolerated procedure well.

## 2024-03-13 NOTE — ASSESSMENT & PLAN NOTE
Patient has hyponatremia which is controlled,We will aim to correct the sodium by 4-6mEq in 24 hours. We will monitor sodium Daily. The hyponatremia is due to Dehydration/hypovolemia. The patient's sodium results have been reviewed and are listed below.  Recent Labs   Lab 03/13/24  0512   *

## 2024-03-13 NOTE — ASSESSMENT & PLAN NOTE
"Stage IV pressure ulcer of bilateral buttock, sacral region  Pressure ulcers of multiple sites  Chronic osteomyelitis    This patient does have evidence of infective focus  My overall impression is sepsis.  Source: Skin and Soft Tissue (location extremities)  Antibiotics given-   Antibiotics (72h ago, onward)      Start     Stop Route Frequency Ordered    03/07/24 0900  minocycline capsule 100 mg         -- PER NG TUBE Every 12 hours 03/07/24 0220 03/07/24 0615  linezolid 600 mg/300 mL IVPB 600 mg         -- IV Every 12 hours (non-standard times) 03/07/24 0220 03/07/24 0600  meropenem (MERREM) 2 g in sodium chloride 0.9% 100 mL IVPB         -- IV Every 8 hours (non-standard times) 03/07/24 0220          Latest lactate reviewed-  No results for input(s): "LACTATE", "POCLAC" in the last 72 hours.  Organ dysfunction indicated by Acute respiratory failure    UCX 02/21 and RCX 02/22 with MDR proteus mirabilis, pseudomonas aeruginosa. Patient has extensive MDRO / ESBL culture data from prior hospitalizations as well. Shock component is now resolved, but leukocytosis, fever, and underlying wounds are still a component as source control has not been achieved.    -- IP consult to general surgery for surgical evaluation; per documentation review, previous surgical evaluation at OSH were evaluating for possible amputations +/- surgical debridements. Plan for surgery 3/13  -- Continue linezolid, meropenem, minocycline per ID recommendations at OSH  -- Weekly CK  -- IP Consult to Wound Care  -- F/U Culture data  -- Turn q2h  "
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
## 2024-03-13 NOTE — ANESTHESIA PROCEDURE NOTES
BLE popliteal single shots    Patient location during procedure: pre-op   Block not for primary anesthetic.  Reason for block: at surgeon's request and post-op pain management   Post-op Pain Location: BLE pain   Start time: 3/13/2024 1:45 PM  Timeout: 3/13/2024 1:45 PM   End time: 3/13/2024 1:55 PM    Staffing  Authorizing Provider: Ino Soares MD  Performing Provider: Shayna Mayen MD    Staffing  Performed by: Shayna Mayen MD  Authorized by: Ino Soares MD    Preanesthetic Checklist  Completed: patient identified, IV checked, site marked, risks and benefits discussed, surgical consent, monitors and equipment checked, pre-op evaluation and timeout performed  Peripheral Block  Patient position: supine  Prep: ChloraPrep  Patient monitoring: heart rate, cardiac monitor, continuous pulse ox, continuous capnometry and frequent blood pressure checks  Block type: popliteal  Laterality: bilateral  Injection technique: single shot  Needle  Needle type: Stimuplex   Needle gauge: 21 G  Needle length: 4 in  Needle localization: anatomical landmarks and ultrasound guidance   -ultrasound image captured on disc.  Assessment  Injection assessment: negative aspiration, negative parasthesia and local visualized surrounding nerve  Paresthesia pain: none  Heart rate change: no  Slow fractionated injection: yes  Pain Tolerance: comfortable throughout block and no complaints  Medications:    Medications: bupivacaine 0.25%-EPINEPHrine (PF) 1:200,000 injection - Other   30 mL - 3/13/2024 1:55:00 PM    Additional Notes  VSS.  [facility] RN monitoring vitals throughout procedure.  Patient tolerated procedure well.     15 cc of 0.25% bupivacaine with 1:300,000 epi administered bilaterally
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
## 2024-03-13 NOTE — RESPIRATORY THERAPY
"RAPID RESPONSE RESPIRATORY THERAPY PROACTIVE NOTE           Time of visit: 902     Code Status: Prior   : 1995  Bed: [facility] A:   MRN: [id number]  Time spent at the bedside: < 15 min    SITUATION    Evaluated patient for: LDA Check     BACKGROUND    Patient has no past medical history on file.  Clinically Significant Surgical Hx: tracheostomy    24 Hours Vitals Range:  Temp:  [97.9 °F (36.6 °C)-99.2 °F (37.3 °C)]   Pulse:  [102-137]   Resp:  [16-20]   BP: ()/(52-62)   SpO2:  [96 %-100 %]     Labs:    Recent Labs     24  0448 24  0557 24  0512   * 136 135*   K 5.0 5.3* 5.1    102 102   CO2 26 28 26   BUN 16 17 18   CREATININE 0.6 0.6 0.6   GLU 71 84 75        No results for input(s): "PH", "PCO2", "PO2", "HCO3", "POCSATURATED", "BE" in the last 72 hours.    ASSESSMENT/INTERVENTIONS  Patient resting comfortably. No respiratory concerns at this time.      Last VS   Temp: 97.9 °F (36.6 °C) (802)  Pulse: 105 (802)  Resp: 20 (802)  BP: 96/56 (802)  SpO2: 99 % (802)      Extra trachs at bedside: #6 Shiley Cuffed, #8 Shiley Cuffed  Level of Consciousness: Level of Consciousness (AVPU): alert  Respiratory Effort: Respiratory Effort: Unlabored Expansion/Accessory Muscle Usage: Expansion/Accessory Muscles/Retractions: no retractions, no use of accessory muscles  All Lung Field Breath Sounds: All Lung Fields Breath Sounds: coarse  NATALIA Breath Sounds: coarse  O2 Device/Concentration: 5L/28%  Surgical airway: Yes, Type: Shiley Size: 8, cuffed  Ambu at bedside:       Active Orders   Respiratory Care    Oxygen Continuous     Frequency: Continuous     Number of Occurrences: Until Specified     Order Questions:      Device type: Low flow      Device: Trach Collar      FiO2%: 60      Titrate O2 per Oxygen Titration Protocol: Yes      To maintain SpO2 goal of: >= 90%      Notify MD of: Inability to achieve desired SpO2; Sudden change in patient status and " requires 20% increase in FiO2; Patient requires >60% FiO2    Pulse Oximetry Continuous     Frequency: Continuous     Number of Occurrences: Until Specified    Routine tracheostomy care     Frequency: BID     Number of Occurrences: Until Specified    SUCTION PRN     Frequency: PRN     Number of Occurrences: Until Specified       RECOMMENDATIONS    We recommend: RRT Recs: Continue POC per primary team.      FOLLOW-UP    Please call back the Rapid Response RT, Jerri Thurman RRT at x [phone] for any questions or concerns.

## 2024-03-13 NOTE — SUBJECTIVE & OBJECTIVE
Interval History: Will undergo amputation with general surgery today. Ophthalmology at bedside this am. No new changes ON.      Objective:     Vital Signs (Most Recent):  Temp: 98.1 °F (36.7 °C) (03/13/24 1057)  Pulse: 98 (03/13/24 1057)  Resp: 20 (03/13/24 1057)  BP: (!) 98/58 (03/13/24 1057)  SpO2: 100 % (03/13/24 1057) Vital Signs (24h Range):  Temp:  [97.9 °F (36.6 °C)-99.2 °F (37.3 °C)] 98.1 °F (36.7 °C)  Pulse:  [] 98  Resp:  [16-20] 20  SpO2:  [96 %-100 %] 100 %  BP: ()/(52-62) 98/58     Weight: 59.4 kg (131 lb)  Body mass index is 19.35 kg/m².    Intake/Output Summary (Last 24 hours) at 3/13/2024 1251  Last data filed at 3/13/2024 0800  Gross per 24 hour   Intake 1272.14 ml   Output 3950 ml   Net -2677.86 ml         Physical Exam  Vitals and nursing note reviewed.   Constitutional:       General: He is not in acute distress.     Appearance: Normal appearance. He is not ill-appearing.   HENT:      Head: Normocephalic and atraumatic.      Right Ear: External ear normal.      Left Ear: External ear normal.      Nose: Nose normal.   Eyes:      General: No scleral icterus.     Comments: Blind   Neck:      Comments: Trach collar in place  Cardiovascular:      Rate and Rhythm: Normal rate and regular rhythm.   Pulmonary:      Effort: Pulmonary effort is normal. No respiratory distress.   Abdominal:      General: Abdomen is flat. There is no distension.   Musculoskeletal:         General: Deformity and signs of injury present. Normal range of motion.      Cervical back: Normal range of motion.      Right lower leg: No edema.      Left lower leg: No edema.   Skin:     Coloration: Skin is not jaundiced.      Findings: No bruising.      Comments: Gangrene BL LE's and LUE   Neurological:      Mental Status: He is alert and oriented to person, place, and time. Mental status is at baseline.             Significant Labs: All pertinent labs within the past 24 hours have been reviewed.    Significant Imaging: I  have reviewed all pertinent imaging results/findings within the past 24 hours.

## 2024-03-13 NOTE — TRANSFER OF CARE
"Anesthesia Transfer of Care Note    Patient: Jyoti Mayberry    Procedure(s) Performed: Procedure(s) (LRB):  AMPUTATION, ABOVE KNEE (Bilateral)  AMPUTATION, UPPER EXTREMITY (Left)    Patient location: PACU    Anesthesia Type: general    Transport from OR: Upon arrival to PACU/ICU, patient attached to 100% O2 by T-piece with adequate spontaneous ventilation    Post pain: adequate analgesia    Post assessment: no apparent anesthetic complications and tolerated procedure well    Post vital signs: stable    Level of consciousness: sedated and responds to stimulation    Nausea/Vomiting: no nausea/vomiting    Complications: none    Transfer of care protocol was followedComments: Report given to PACU, RN. Pt tolerating well, VSS. NAAC.       Last vitals: Visit Vitals  BP (!) 118/56 (BP Location: Left arm, Patient Position: Lying)   Pulse 86   Temp 36.7 °C (98.1 °F) (Temporal)   Resp 16   Ht 5' 9" (1.753 m)   Wt 59.4 kg (131 lb)   SpO2 100%   BMI 19.35 kg/m²     "
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
## 2024-03-13 NOTE — PROGRESS NOTES
Arrived from procedure with hypotension.  Phenylephrine given at bedside by anesthesia and infusion restarted.  Will titrate as able based upon vital signs.

## 2024-03-13 NOTE — ASSESSMENT & PLAN NOTE
>>ASSESSMENT AND PLAN FOR PRESSURE ULCERS OF SKIN OF MULTIPLE TOPOGRAPHIC SITES WRITTEN ON 3/13/2024 12:53 PM BY KELLY HUERTA MD    See sepsis

## 2024-03-13 NOTE — SUBJECTIVE & OBJECTIVE
Interval History: NAEON. Afebrile. To OR for bilateral AKA, LUE amp today. Consented.     Medications:  Continuous Infusions:  Scheduled Meds:   chlorhexidine  15 mL Mouth/Throat BID    famotidine  20 mg Per NG tube BID    folic acid  1 mg Per NG tube Daily    gabapentin  300 mg Per NG tube TID    levetiracetam  750 mg Per NG tube BID    levothyroxine  112 mcg Per NG tube Before breakfast    LIDOcaine  1 patch Transdermal Q24H    linezolid  600 mg Intravenous Q12H    meropenem (MERREM) IVPB  2 g Intravenous Q8H    methocarbamoL  500 mg Oral TID    midodrine  10 mg Per NG tube TID    minocycline  100 mg Per NG tube Q12H    polyethylene glycol  17 g Per NG tube BID     PRN Meds:acetaminophen, ALPRAZolam, HYDROcodone-acetaminophen     Review of patient's allergies indicates:   Allergen Reactions    Opioids - morphine analogues Nausea And Vomiting, Anxiety and Other (See Comments)     Objective:     Vital Signs (Most Recent):  Temp: 98.3 °F (36.8 °C) (03/13/24 0437)  Pulse: 104 (03/13/24 0449)  Resp: 16 (03/13/24 0449)  BP: 103/62 (03/13/24 0437)  SpO2: 100 % (03/13/24 0449) Vital Signs (24h Range):  Temp:  [98.2 °F (36.8 °C)-100.1 °F (37.8 °C)] 98.3 °F (36.8 °C)  Pulse:  [103-137] 104  Resp:  [16-18] 16  SpO2:  [96 %-100 %] 100 %  BP: ()/(52-62) 103/62     Weight: 59.4 kg (131 lb)  Body mass index is 19.35 kg/m².    Intake/Output - Last 3 Shifts         03/11 0700  03/12 0659 03/12 0700  03/13 0659 03/13 0700  03/14 0659    P.O. 1154 737     I.V. (mL/kg) 888.4 (15)      NG/ 840     IV Piggyback 969.6 862.1     Total Intake(mL/kg) 3112.1 (52.4) 2439.1 (41.1)     Urine (mL/kg/hr) 1550 (1.1) 2450 (1.7)     Stool 300 150     Total Output 1850 2600     Net +1262.1 -160.9            Stool Occurrence 0 x               Physical Exam  Vitals and nursing note reviewed.   Constitutional:       Appearance: He is ill-appearing.   HENT:      Head: Normocephalic.      Nose: Nose normal.      Comments: NG tube in place      Mouth/Throat:      Mouth: Mucous membranes are moist.   Eyes:      Pupils: Pupils are equal, round, and reactive to light.   Cardiovascular:      Rate and Rhythm: Normal rate.   Pulmonary:      Effort: Pulmonary effort is normal. No respiratory distress.      Comments: On trach collar 5L  Abdominal:      General: There is no distension.      Palpations: Abdomen is soft.      Tenderness: There is no abdominal tenderness. There is no right CVA tenderness or left CVA tenderness.      Comments: Ostomy in left lower quadrant functional   Musculoskeletal:         General: Deformity and signs of injury present.   Skin:     General: Skin is dry.      Comments: Gangrene BL LE and LUE below elbow   Neurological:      Mental Status: He is alert and oriented to person, place, and time.          Significant Labs:  I have reviewed all pertinent lab results within the past 24 hours.    Significant Diagnostics:  I have reviewed all pertinent imaging results/findings within the past 24 hours.

## 2024-03-13 NOTE — ANESTHESIA PROCEDURE NOTES
L femoral single shot    Patient location during procedure: pre-op   Block not for primary anesthetic.  Reason for block: at surgeon's request and post-op pain management   Post-op Pain Location: LLE pain   Start time: 3/13/2024 1:30 PM  Timeout: 3/13/2024 1:30 PM   End time: 3/13/2024 1:35 PM    Staffing  Authorizing Provider: Ino Soares MD  Performing Provider: Shayna Mayen MD    Staffing  Performed by: Shayna Mayen MD  Authorized by: Ino Soares MD    Preanesthetic Checklist  Completed: patient identified, IV checked, site marked, risks and benefits discussed, surgical consent, monitors and equipment checked, pre-op evaluation and timeout performed  Peripheral Block  Patient position: supine  Prep: ChloraPrep  Patient monitoring: heart rate, cardiac monitor, continuous pulse ox, continuous capnometry and frequent blood pressure checks  Block type: femoral  Laterality: left  Injection technique: single shot  Needle  Needle type: Stimuplex   Needle gauge: 22 G  Needle length: 2 in  Needle localization: anatomical landmarks and ultrasound guidance   -ultrasound image captured on disc.  Assessment  Injection assessment: negative aspiration, negative parasthesia and local visualized surrounding nerve  Paresthesia pain: none  Heart rate change: no  Slow fractionated injection: yes  Pain Tolerance: no complaints and comfortable throughout block  Medications:    Medications: bupivacaine 0.25%-EPINEPHrine (PF) 1:200,000 injection - Other   10 mL - 3/13/2024 1:35:00 PM    Additional Notes  VSS.  [facility] RN monitoring vitals throughout procedure.  Patient tolerated procedure well.

## 2024-03-14 PROBLEM — I95.81 POSTPROCEDURAL HYPOTENSION: Status: ACTIVE | Noted: 2024-03-14

## 2024-03-14 LAB
ALBUMIN SERPL BCP-MCNC: 1 G/DL (ref 3.5–5.2)
ALP SERPL-CCNC: 62 U/L (ref 55–135)
ALT SERPL W/O P-5'-P-CCNC: <5 U/L (ref 10–44)
ANION GAP SERPL CALC-SCNC: 10 MMOL/L (ref 8–16)
ANION GAP SERPL CALC-SCNC: 9 MMOL/L (ref 8–16)
ANISOCYTOSIS BLD QL SMEAR: SLIGHT
AST SERPL-CCNC: 22 U/L (ref 10–40)
BASOPHILS # BLD AUTO: 0.09 K/UL (ref 0–0.2)
BASOPHILS # BLD AUTO: 0.13 K/UL (ref 0–0.2)
BASOPHILS NFR BLD: 0.4 % (ref 0–1.9)
BASOPHILS NFR BLD: 0.6 % (ref 0–1.9)
BILIRUB SERPL-MCNC: 0.2 MG/DL (ref 0.1–1)
BLD PROD TYP BPU: NORMAL
BLD PROD TYP BPU: NORMAL
BLOOD UNIT EXPIRATION DATE: NORMAL
BLOOD UNIT EXPIRATION DATE: NORMAL
BLOOD UNIT TYPE CODE: 5100
BLOOD UNIT TYPE CODE: 5100
BLOOD UNIT TYPE: NORMAL
BLOOD UNIT TYPE: NORMAL
BUN SERPL-MCNC: 16 MG/DL (ref 6–20)
BUN SERPL-MCNC: 16 MG/DL (ref 6–20)
CALCIUM SERPL-MCNC: 6.8 MG/DL (ref 8.7–10.5)
CALCIUM SERPL-MCNC: 6.9 MG/DL (ref 8.7–10.5)
CHLORIDE SERPL-SCNC: 104 MMOL/L (ref 95–110)
CHLORIDE SERPL-SCNC: 105 MMOL/L (ref 95–110)
CO2 SERPL-SCNC: 21 MMOL/L (ref 23–29)
CO2 SERPL-SCNC: 21 MMOL/L (ref 23–29)
CODING SYSTEM: NORMAL
CODING SYSTEM: NORMAL
CREAT SERPL-MCNC: 0.6 MG/DL (ref 0.5–1.4)
CREAT SERPL-MCNC: 0.6 MG/DL (ref 0.5–1.4)
CROSSMATCH INTERPRETATION: NORMAL
CROSSMATCH INTERPRETATION: NORMAL
DIFFERENTIAL METHOD BLD: ABNORMAL
DIFFERENTIAL METHOD BLD: ABNORMAL
DISPENSE STATUS: NORMAL
DISPENSE STATUS: NORMAL
EOSINOPHIL # BLD AUTO: 0 K/UL (ref 0–0.5)
EOSINOPHIL # BLD AUTO: 0.1 K/UL (ref 0–0.5)
EOSINOPHIL NFR BLD: 0 % (ref 0–8)
EOSINOPHIL NFR BLD: 0.5 % (ref 0–8)
ERYTHROCYTE [DISTWIDTH] IN BLOOD BY AUTOMATED COUNT: 15.9 % (ref 11.5–14.5)
ERYTHROCYTE [DISTWIDTH] IN BLOOD BY AUTOMATED COUNT: 17.2 % (ref 11.5–14.5)
EST. GFR  (NO RACE VARIABLE): >60 ML/MIN/1.73 M^2
EST. GFR  (NO RACE VARIABLE): >60 ML/MIN/1.73 M^2
GLUCOSE SERPL-MCNC: 103 MG/DL (ref 70–110)
GLUCOSE SERPL-MCNC: 112 MG/DL (ref 70–110)
HCT VFR BLD AUTO: 20.1 % (ref 40–54)
HCT VFR BLD AUTO: 26.7 % (ref 40–54)
HGB BLD-MCNC: 6.7 G/DL (ref 14–18)
HGB BLD-MCNC: 9.2 G/DL (ref 14–18)
HYPOCHROMIA BLD QL SMEAR: ABNORMAL
IMM GRANULOCYTES # BLD AUTO: 0.1 K/UL (ref 0–0.04)
IMM GRANULOCYTES # BLD AUTO: 0.25 K/UL (ref 0–0.04)
IMM GRANULOCYTES NFR BLD AUTO: 0.5 % (ref 0–0.5)
IMM GRANULOCYTES NFR BLD AUTO: 1 % (ref 0–0.5)
LACTATE SERPL-SCNC: 1.3 MMOL/L (ref 0.5–2.2)
LACTATE SERPL-SCNC: 2.6 MMOL/L (ref 0.5–2.2)
LACTATE SERPL-SCNC: 2.8 MMOL/L (ref 0.5–2.2)
LACTATE SERPL-SCNC: 4 MMOL/L (ref 0.5–2.2)
LYMPHOCYTES # BLD AUTO: 3.5 K/UL (ref 1–4.8)
LYMPHOCYTES # BLD AUTO: 4.1 K/UL (ref 1–4.8)
LYMPHOCYTES NFR BLD: 13.5 % (ref 18–48)
LYMPHOCYTES NFR BLD: 20.1 % (ref 18–48)
MCH RBC QN AUTO: 30.9 PG (ref 27–31)
MCH RBC QN AUTO: 30.9 PG (ref 27–31)
MCHC RBC AUTO-ENTMCNC: 33.3 G/DL (ref 32–36)
MCHC RBC AUTO-ENTMCNC: 34.5 G/DL (ref 32–36)
MCV RBC AUTO: 90 FL (ref 82–98)
MCV RBC AUTO: 93 FL (ref 82–98)
MONOCYTES # BLD AUTO: 2.5 K/UL (ref 0.3–1)
MONOCYTES # BLD AUTO: 3.2 K/UL (ref 0.3–1)
MONOCYTES NFR BLD: 15.5 % (ref 4–15)
MONOCYTES NFR BLD: 9.7 % (ref 4–15)
NEUTROPHILS # BLD AUTO: 12.9 K/UL (ref 1.8–7.7)
NEUTROPHILS # BLD AUTO: 19.2 K/UL (ref 1.8–7.7)
NEUTROPHILS NFR BLD: 62.8 % (ref 38–73)
NEUTROPHILS NFR BLD: 75.4 % (ref 38–73)
NRBC BLD-RTO: 0 /100 WBC
NRBC BLD-RTO: 0 /100 WBC
NUM UNITS TRANS PACKED RBC: NORMAL
NUM UNITS TRANS PACKED RBC: NORMAL
OVALOCYTES BLD QL SMEAR: ABNORMAL
PLATELET # BLD AUTO: 280 K/UL (ref 150–450)
PLATELET # BLD AUTO: 303 K/UL (ref 150–450)
PLATELET BLD QL SMEAR: ABNORMAL
PMV BLD AUTO: 8.6 FL (ref 9.2–12.9)
PMV BLD AUTO: 8.7 FL (ref 9.2–12.9)
POIKILOCYTOSIS BLD QL SMEAR: SLIGHT
POTASSIUM SERPL-SCNC: 5.1 MMOL/L (ref 3.5–5.1)
POTASSIUM SERPL-SCNC: 5.3 MMOL/L (ref 3.5–5.1)
PROT SERPL-MCNC: 4.9 G/DL (ref 6–8.4)
RBC # BLD AUTO: 2.17 M/UL (ref 4.6–6.2)
RBC # BLD AUTO: 2.98 M/UL (ref 4.6–6.2)
SODIUM SERPL-SCNC: 134 MMOL/L (ref 136–145)
SODIUM SERPL-SCNC: 136 MMOL/L (ref 136–145)
TARGETS BLD QL SMEAR: ABNORMAL
WBC # BLD AUTO: 20.58 K/UL (ref 3.9–12.7)
WBC # BLD AUTO: 25.5 K/UL (ref 3.9–12.7)

## 2024-03-14 PROCEDURE — 25000003 PHARM REV CODE 250

## 2024-03-14 PROCEDURE — P9016 RBC LEUKOCYTES REDUCED: HCPCS | Performed by: SURGERY

## 2024-03-14 PROCEDURE — 25000003 PHARM REV CODE 250: Performed by: STUDENT IN AN ORGANIZED HEALTH CARE EDUCATION/TRAINING PROGRAM

## 2024-03-14 PROCEDURE — 99900022

## 2024-03-14 PROCEDURE — 99900035 HC TECH TIME PER 15 MIN (STAT)

## 2024-03-14 PROCEDURE — 85025 COMPLETE CBC W/AUTO DIFF WBC: CPT

## 2024-03-14 PROCEDURE — 99900026 HC AIRWAY MAINTENANCE (STAT)

## 2024-03-14 PROCEDURE — 63600175 PHARM REV CODE 636 W HCPCS

## 2024-03-14 PROCEDURE — P9016 RBC LEUKOCYTES REDUCED: HCPCS

## 2024-03-14 PROCEDURE — 80048 BASIC METABOLIC PNL TOTAL CA: CPT | Mod: XB

## 2024-03-14 PROCEDURE — 36556 INSERT NON-TUNNEL CV CATH: CPT

## 2024-03-14 PROCEDURE — 85025 COMPLETE CBC W/AUTO DIFF WBC: CPT | Mod: 91

## 2024-03-14 PROCEDURE — 80053 COMPREHEN METABOLIC PANEL: CPT

## 2024-03-14 PROCEDURE — 25000003 PHARM REV CODE 250: Mod: JZ,JG

## 2024-03-14 PROCEDURE — 83605 ASSAY OF LACTIC ACID: CPT

## 2024-03-14 PROCEDURE — 20600001 HC STEP DOWN PRIVATE ROOM

## 2024-03-14 PROCEDURE — 86920 COMPATIBILITY TEST SPIN: CPT | Performed by: SURGERY

## 2024-03-14 PROCEDURE — 25000003 PHARM REV CODE 250: Performed by: NURSE PRACTITIONER

## 2024-03-14 PROCEDURE — 86920 COMPATIBILITY TEST SPIN: CPT

## 2024-03-14 PROCEDURE — 94761 N-INVAS EAR/PLS OXIMETRY MLT: CPT

## 2024-03-14 PROCEDURE — 27000221 HC OXYGEN, UP TO 24 HOURS

## 2024-03-14 PROCEDURE — 36430 TRANSFUSION BLD/BLD COMPNT: CPT

## 2024-03-14 PROCEDURE — 27000207 HC ISOLATION

## 2024-03-14 PROCEDURE — 63600175 PHARM REV CODE 636 W HCPCS: Performed by: NURSE PRACTITIONER

## 2024-03-14 PROCEDURE — 83605 ASSAY OF LACTIC ACID: CPT | Mod: 91

## 2024-03-14 RX ORDER — OXYCODONE AND ACETAMINOPHEN 10; 325 MG/1; MG/1
1 TABLET ORAL EVERY 4 HOURS PRN
Status: DISCONTINUED | OUTPATIENT
Start: 2024-03-14 | End: 2024-03-21

## 2024-03-14 RX ORDER — MIDODRINE HYDROCHLORIDE 5 MG/1
10 TABLET ORAL EVERY 8 HOURS
Status: DISCONTINUED | OUTPATIENT
Start: 2024-03-14 | End: 2024-04-04

## 2024-03-14 RX ORDER — HYDROCODONE BITARTRATE AND ACETAMINOPHEN 500; 5 MG/1; MG/1
TABLET ORAL
Status: DISCONTINUED | OUTPATIENT
Start: 2024-03-14 | End: 2024-03-15

## 2024-03-14 RX ORDER — ACETAMINOPHEN 650 MG/20.3ML
650 LIQUID ORAL EVERY 12 HOURS
Status: DISCONTINUED | OUTPATIENT
Start: 2024-03-15 | End: 2024-03-19

## 2024-03-14 RX ORDER — CALCIUM GLUCONATE 20 MG/ML
1 INJECTION, SOLUTION INTRAVENOUS
Status: COMPLETED | OUTPATIENT
Start: 2024-03-14 | End: 2024-03-14

## 2024-03-14 RX ORDER — SODIUM CHLORIDE 9 MG/ML
INJECTION, SOLUTION INTRAVENOUS CONTINUOUS
Status: ACTIVE | OUTPATIENT
Start: 2024-03-14 | End: 2024-03-14

## 2024-03-14 RX ADMIN — LEVETIRACETAM 750 MG: 100 SOLUTION ORAL at 08:03

## 2024-03-14 RX ADMIN — HYDROCODONE BITARTRATE AND ACETAMINOPHEN 1 TABLET: 10; 325 TABLET ORAL at 11:03

## 2024-03-14 RX ADMIN — MIDODRINE HYDROCHLORIDE 10 MG: 5 TABLET ORAL at 08:03

## 2024-03-14 RX ADMIN — METHOCARBAMOL 500 MG: 500 TABLET ORAL at 09:03

## 2024-03-14 RX ADMIN — MINOCYCLINE HYDROCHLORIDE 100 MG: 100 CAPSULE ORAL at 09:03

## 2024-03-14 RX ADMIN — CHLORHEXIDINE GLUCONATE 0.12% ORAL RINSE 15 ML: 1.2 LIQUID ORAL at 08:03

## 2024-03-14 RX ADMIN — LEVOTHYROXINE SODIUM 112 MCG: 112 TABLET ORAL at 05:03

## 2024-03-14 RX ADMIN — LIDOCAINE 5% 1 PATCH: 700 PATCH TOPICAL at 01:03

## 2024-03-14 RX ADMIN — ACETAMINOPHEN 650 MG: 650 SOLUTION ORAL at 07:03

## 2024-03-14 RX ADMIN — METHOCARBAMOL 500 MG: 500 TABLET ORAL at 08:03

## 2024-03-14 RX ADMIN — ALPRAZOLAM 1 MG: 0.5 TABLET ORAL at 04:03

## 2024-03-14 RX ADMIN — LINEZOLID 600 MG: 600 INJECTION, SOLUTION INTRAVENOUS at 08:03

## 2024-03-14 RX ADMIN — MINOCYCLINE HYDROCHLORIDE 100 MG: 100 CAPSULE ORAL at 08:03

## 2024-03-14 RX ADMIN — MIDODRINE HYDROCHLORIDE 10 MG: 5 TABLET ORAL at 09:03

## 2024-03-14 RX ADMIN — SODIUM CHLORIDE, POTASSIUM CHLORIDE, SODIUM LACTATE AND CALCIUM CHLORIDE 500 ML: 600; 310; 30; 20 INJECTION, SOLUTION INTRAVENOUS at 04:03

## 2024-03-14 RX ADMIN — OXYCODONE HYDROCHLORIDE AND ACETAMINOPHEN 1 TABLET: 10; 325 TABLET ORAL at 09:03

## 2024-03-14 RX ADMIN — METHOCARBAMOL 500 MG: 500 TABLET ORAL at 02:03

## 2024-03-14 RX ADMIN — SODIUM CHLORIDE: 9 INJECTION, SOLUTION INTRAVENOUS at 10:03

## 2024-03-14 RX ADMIN — CALCIUM GLUCONATE 1 G: 20 INJECTION, SOLUTION INTRAVENOUS at 05:03

## 2024-03-14 RX ADMIN — LEVETIRACETAM 750 MG: 100 SOLUTION ORAL at 09:03

## 2024-03-14 RX ADMIN — ACETAMINOPHEN 650 MG: 650 SOLUTION ORAL at 01:03

## 2024-03-14 RX ADMIN — MEROPENEM 2 G: 1 INJECTION INTRAVENOUS at 08:03

## 2024-03-14 RX ADMIN — CALCIUM GLUCONATE 1 G: 20 INJECTION, SOLUTION INTRAVENOUS at 04:03

## 2024-03-14 RX ADMIN — FOLIC ACID 1 MG: 1 TABLET ORAL at 10:03

## 2024-03-14 RX ADMIN — ALPRAZOLAM 1 MG: 0.5 TABLET ORAL at 09:03

## 2024-03-14 RX ADMIN — LINEZOLID 600 MG: 600 INJECTION, SOLUTION INTRAVENOUS at 09:03

## 2024-03-14 RX ADMIN — ACETAMINOPHEN 650 MG: 650 SOLUTION ORAL at 05:03

## 2024-03-14 RX ADMIN — HYDROCODONE BITARTRATE AND ACETAMINOPHEN 1 TABLET: 10; 325 TABLET ORAL at 05:03

## 2024-03-14 RX ADMIN — GABAPENTIN 300 MG: 300 CAPSULE ORAL at 09:03

## 2024-03-14 RX ADMIN — SODIUM CHLORIDE, POTASSIUM CHLORIDE, SODIUM LACTATE AND CALCIUM CHLORIDE 1000 ML: 600; 310; 30; 20 INJECTION, SOLUTION INTRAVENOUS at 05:03

## 2024-03-14 RX ADMIN — GABAPENTIN 300 MG: 300 CAPSULE ORAL at 02:03

## 2024-03-14 RX ADMIN — MIDODRINE HYDROCHLORIDE 10 MG: 5 TABLET ORAL at 01:03

## 2024-03-14 RX ADMIN — FAMOTIDINE 20 MG: 40 POWDER, FOR SUSPENSION ORAL at 09:03

## 2024-03-14 RX ADMIN — CHLORHEXIDINE GLUCONATE 0.12% ORAL RINSE 15 ML: 1.2 LIQUID ORAL at 09:03

## 2024-03-14 RX ADMIN — GABAPENTIN 300 MG: 300 CAPSULE ORAL at 08:03

## 2024-03-14 RX ADMIN — MEROPENEM 2 G: 1 INJECTION INTRAVENOUS at 04:03

## 2024-03-14 RX ADMIN — HYDROCODONE BITARTRATE AND ACETAMINOPHEN 1 TABLET: 10; 325 TABLET ORAL at 04:03

## 2024-03-14 RX ADMIN — POLYETHYLENE GLYCOL 3350 17 G: 17 POWDER, FOR SOLUTION ORAL at 08:03
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
## 2024-03-14 NOTE — CLINICAL REVIEW
IP Sepsis Screen (most recent)       Sepsis Screen (IP) - 03/14/24 0107       Is the patient's history or complaint suggestive of a possible infection? Yes  -DD    Are there at least two of the following signs and symptoms present? Yes  -DD    Sepsis signs/symptoms - Hyper or Hypothermia Hyperthermia >100.4 or Hypothermia < 96.8  -DD    Sepsis signs/symptoms - Tachycardia Tachycardia     >90  -DD    Sepsis signs/symptoms - WBC WBC < 4,000 or WBC > 12,000  -DD    Are any of the following organ dysfunction criteria present and not considered to be due to a chronic condition? Yes  -DD    Organ Dysfunction Criteria Lactate > 2.0  -DD    Organ Dysfunction Criteria - Resp Comp Respiratory Compromise: Requiring > 5L NC  -DD    Initiate Sepsis Protocol No  -DD    Reason sepsis not considered Pt. receiving appropriate management  -DD              User Key  (r) = Recorded By, (t) = Taken By, (c) = Cosigned By      Initials Name    Chong Paniagua, JANETT

## 2024-03-14 NOTE — SUBJECTIVE & OBJECTIVE
Interval History: s/p amputation. Hypotensive after surgery, MICU called, got 2.5 L of crystalloids. Transfusing 2 units of prbcs today. Mentating well at bedside.      Objective:     Vital Signs (Most Recent):  Temp: 99.9 °F (37.7 °C) (03/14/24 1200)  Pulse: 107 (03/14/24 1212)  Resp: 20 (03/14/24 1212)  BP: (!) 85/46 (03/14/24 1200)  SpO2: 100 % (03/14/24 1212) Vital Signs (24h Range):  Temp:  [98 °F (36.7 °C)-100.2 °F (37.9 °C)] 99.9 °F (37.7 °C)  Pulse:  [] 107  Resp:  [12-25] 20  SpO2:  [10 %-100 %] 100 %  BP: ()/(38-77) 85/46  Arterial Line BP: ()/(37-71) 126/71     Weight: 59.4 kg (131 lb)  Body mass index is 19.35 kg/m².    Intake/Output Summary (Last 24 hours) at 3/14/2024 1223  Last data filed at 3/14/2024 1116  Gross per 24 hour   Intake 2941.25 ml   Output 1850 ml   Net 1091.25 ml         Physical Exam  Vitals and nursing note reviewed.   Constitutional:       Appearance: He is ill-appearing.   HENT:      Head: Normocephalic.      Nose: Nose normal.      Comments: NG tube in place     Mouth/Throat:      Mouth: Mucous membranes are moist.   Eyes:      Pupils: Pupils are equal, round, and reactive to light.   Cardiovascular:      Rate and Rhythm: Normal rate.   Pulmonary:      Effort: Pulmonary effort is normal. No respiratory distress.      Comments: On trach collar 5L  Abdominal:      General: There is no distension.      Palpations: Abdomen is soft.      Tenderness: There is no abdominal tenderness. There is no right CVA tenderness or left CVA tenderness.   Musculoskeletal:      Comments: AKA in lower extremities in bandages  LUE amputation in dressing     Skin:     General: Skin is dry.   Neurological:      Mental Status: He is alert and oriented to person, place, and time.             Significant Labs: All pertinent labs within the past 24 hours have been reviewed.    Significant Imaging: I have reviewed all pertinent imaging results/findings within the past 24 hours.

## 2024-03-14 NOTE — CARE UPDATE
RAPID RESPONSE NURSE PROACTIVE ROUNDING NOTE       Time of Visit: 0950    Admit Date: 3/7/2024  LOS: 7  Code Status: Prior   Date of Visit: 2024  : 1995  Age: 28 y.o.  Sex: male  Race: Black or   Bed: [address] [place] [address] A:   MRN: [id number]  Was the patient discharged from an ICU this admission? No   Was the patient discharged from a PACU within last 24 hours? Yes   Did the patient receive conscious sedation/general anesthesia in last 24 hours? No  Was the patient in the ED within the past 24 hours? No  Was the patient on NIPPV within the past 24 hours? No   Attending Physician: Suzi Holden*  Primary Service: [facility] MED 3   Time spent at the bedside: 30 - 45 min    SITUATION    Notified by [employer] patient alert.  Reason for alert: hypotension  Called to evaluate the patient for Circulatory    BACKGROUND     Why is the patient in the hospital?: Severe sepsis    Patient has no past medical history on file.    Last Vitals:  Temp: 99.6 °F (37.6 °C) ( 1634)  Pulse: 101 ( 1702)  Resp: 16 ( 1635)  BP: 106/59 ( 1634)  SpO2: 100 % ( 1702)  Arterial Line BP: 126/71 (0)    24 Hours Vitals Range:  Temp:  [98 °F (36.7 °C)-101.7 °F (38.7 °C)]   Pulse:  []   Resp:  [12-25]   BP: ()/(38-77)   SpO2:  [97 %-100 %]   Arterial Line BP: ()/(37-71)     Labs:  Recent Labs     24  0512 24  1425 24  1739 24  0500 24  1625   WBC 19.67*  --   --  25.50* 20.58*   HGB 7.2*  --   --  6.7* 9.2*   HCT 22.4*   < > 22* 20.1* 26.7*     --   --  303 280    < > = values in this interval not displayed.       Recent Labs     24  0512 24  0500 24  0925   * 136 134*   K 5.1 5.1 5.3*    105 104   CO2 26 21* 21*   BUN 18 16 16   CREATININE 0.6 0.6 0.6   GLU 75 112* 103        Recent Labs     24  1425 24  1542 24  1739   PH 7.401 7.450 7.315*   PCO2 41.9 36.3 49.8*   PO2 428* 186* 482*   HCO3  26.0 25.2 25.4   POCSATURATED 100 100 100   BE 1 1 -1        ASSESSMENT    Physical Exam  Constitutional:       Appearance: He is ill-appearing.   Pulmonary:      Effort: Pulmonary effort is normal.   Abdominal:      Palpations: Abdomen is soft.   Skin:     General: Skin is warm and dry.   Neurological:      Mental Status: He is alert and oriented to person, place, and time.       Patient resting in bed, awake and alert. Mentating appropriately. MAP 63. Currently receiving blood transfusion.    INTERVENTIONS    The patient was seen for Cardiac problem. Staff concerns included hypotension. The following interventions were performed: continuous cardiac monitoring continued and continuous pulse ox monitoring continued.    RECOMMENDATIONS    - Continue blood transfusion  - Continue to monitor BP, MAP goal of 60     PROVIDER ESCALATION    Yes/No  Yes    Orders received and case discussed with  Anita Tavares NP.    Disposition: Remain in room 1023.    FOLLOW-UP    Charge Argelia ROWLAND  updated on plan of care. Instructed to call the Rapid Response Nurse, Umm Shahid RN at [phone] for additional questions or concerns.

## 2024-03-14 NOTE — ASSESSMENT & PLAN NOTE
Stage IV pressure ulcer of bilateral buttock, sacral region  Pressure ulcers of multiple sites  Chronic osteomyelitis    This patient does have evidence of infective focus  My overall impression is sepsis.  Source: Skin and Soft Tissue (location extremities)  Antibiotics given-   Antibiotics (72h ago, onward)      Start     Stop Route Frequency Ordered    03/07/24 0900  minocycline capsule 100 mg         -- PER NG TUBE Every 12 hours 03/07/24 0220    03/07/24 0615  linezolid 600 mg/300 mL IVPB 600 mg         -- IV Every 12 hours (non-standard times) 03/07/24 0220 03/07/24 0600  meropenem (MERREM) 2 g in sodium chloride 0.9% 100 mL IVPB         -- IV Every 8 hours (non-standard times) 03/07/24 0220          Latest lactate reviewed-  Recent Labs   Lab 03/14/24  0925   LACTATE 2.6*     Organ dysfunction indicated by Acute respiratory failure    UCX 02/21 and RCX 02/22 with MDR proteus mirabilis, pseudomonas aeruginosa. Patient has extensive MDRO / ESBL culture data from prior hospitalizations as well. Shock component is now resolved, but leukocytosis, fever, and underlying wounds are still a component as source control has not been achieved.    -- IP consult to general surgery for surgical evaluation s/p bilateral AKA and LUE below elbow amputation 3/13  -- Tissue sent to pathology  -- Continue linezolid, meropenem, minocycline per ID recommendations at OSH  -- Weekly CK  -- IP Consult to Wound Care  -- F/U Culture data  -- Turn q2h  -- MAP goal >60

## 2024-03-14 NOTE — SIGNIFICANT EVENT
"Medical Emergency Team Consult Note  Critical Care Medicine    CC: Severe sepsis  Date: 03/14/2024  Admit Date: 3/7/2024  Hospital Length of Stay: 7  MRN: [id number]  The patient location is: Bed [facility]3/1023 A  Dx: Severe sepsis  Code Status: Prior   Chart Reviewed: 03/14/2024, 9:50 AM      SUBJECTIVE:     HPI:  Jyoti Mayberry has no past medical history on file.    Significant Events: Called to the bedside for continue hypotension. Pt currently getting 1PRBC.       OBJECTIVE:     Physical Exam  Vitals and nursing note reviewed.   Constitutional:       Appearance: He is ill-appearing.   Cardiovascular:      Rate and Rhythm: Regular rhythm. Tachycardia present.      Pulses: Normal pulses.      Heart sounds: Normal heart sounds.   Pulmonary:      Effort: Pulmonary effort is normal.      Breath sounds: Normal breath sounds.      Comments: Trach in place, trach collar 28%  Abdominal:      General: Abdomen is flat. Bowel sounds are normal.      Palpations: Abdomen is soft.   Skin:     General: Skin is warm and dry.      Coloration: Skin is pale.      Comments: Multiple wounds s/p multiple amputations   Neurological:      Mental Status: He is alert. He is disoriented.         Last VS: BP (!) 84/48   Pulse 101   Temp 99.9 °F (37.7 °C)   Resp 16   Ht 5' 9" (1.753 m)   Wt 59.4 kg (131 lb)   SpO2 100%   BMI 19.35 kg/m²     24H Vital Sign Range:    Temp:  [98 °F (36.7 °C)-100.2 °F (37.9 °C)]   Pulse:  []   Resp:  [12-25]   BP: ()/(38-77)   SpO2:  [97 %-100 %]   Arterial Line BP: ()/(37-71)     Level of Consciousness (AVPU): alert      Intake/Output Summary (Last 24 hours) at 3/14/2024 0950  Last data filed at 3/14/2024 0304  Gross per 24 hour   Intake 2700 ml   Output 1850 ml   Net 850 ml       Recent Labs     03/12/24  0557 03/13/24  0512 03/13/24  1425 03/13/24  1542 03/13/24  1739 03/14/24  0500   WBC 18.96* 19.67*  --   --   --  25.50*   HGB 7.7* 7.2*  --   --   --  6.7*   HCT 24.3* 22.4*   < > 22* 22* " 20.1*    354  --   --   --  303    < > = values in this interval not displayed.       Recent Labs     03/12/24  0557 03/13/24  0512 03/14/24  0500    135* 136   K 5.3* 5.1 5.1    102 105   CO2 28 26 21*   BUN 17 18 16   CREATININE 0.6 0.6 0.6   GLU 84 75 112*        Recent Labs     03/13/24  1425 03/13/24  1542 03/13/24  1739   PH 7.401 7.450 7.315*   PCO2 41.9 36.3 49.8*   PO2 428* 186* 482*   HCO3 26.0 25.2 25.4   POCSATURATED 100 100 100   BE 1 1 -1        Lab Results   Component Value Date    LACTATE 4.0 (HH) 03/14/2024    LACTATE 1.2 02/22/2024    LACTATE 1.7 02/21/2024         ASSESSMENT AND PLAN :     Hypotension  Pt with hypotension at baseline, normally his systolic is in the 90's. Now with MAP's less than 60 since surgery. Tmax 101 this am. ID following.     --Continue midodrine  --Continue PRBC, post-transfusion CBC  --Ok for MAP goal of 60  --Follow up cultures    Spoke with patient and parents at length. Pt does not want to come to the ICU at this time. I agreed that we could let the unit of blood finish before we made a decision. Patient and family agreed.     Uninterrupted Critical Care/Counseling Time (not including procedures): 35 minutes  Critical care was time spent personally by me on the following activities: development of treatment plan with patient or surrogate and bedside caregivers, discussions with consultants, evaluation of patient's response to treatment, examination of patient, ordering and performing treatments and interventions, ordering and review of laboratory studies, ordering and review of radiographic studies, pulse oximetry, re-evaluation of patient's condition. This critical care time did not overlap with that of any other provider or involve time for any procedures.    Anita Tavares [facility]  Critical Care Medicine  03/14/2024 10:04 AM

## 2024-03-14 NOTE — ASSESSMENT & PLAN NOTE
>>ASSESSMENT AND PLAN FOR GANGRENE WRITTEN ON 3/14/2024 12:33 PM BY KELLY HUERTA MD    See sepsis

## 2024-03-14 NOTE — RESPIRATORY THERAPY
RAPID RESPONSE RESPIRATORY THERAPY PROACTIVE NOTE           Time of visit: 0956     Code Status: Prior   : 1995  Bed: [facility] A:   MRN: [id number]  Time spent at the bedside: < 15 min    SITUATION    Evaluated patient for: LDA Check     BACKGROUND    Patient has no past medical history on file.  Clinically Significant Surgical Hx: tracheostomy    24 Hours Vitals Range:  Temp:  [98 °F (36.7 °C)-100.2 °F (37.9 °C)]   Pulse:  []   Resp:  [12-25]   BP: ()/(38-77)   SpO2:  [10 %-100 %]   Arterial Line BP: ()/(37-71)     Labs:    Recent Labs     24  0512 24  0500 24  0925   * 136 134*   K 5.1 5.1 5.3*    105 104   CO2 26 21* 21*   BUN 18 16 16   CREATININE 0.6 0.6 0.6   GLU 75 112* 103        Recent Labs     24  1425 24  1542 24  1739   PH 7.401 7.450 7.315*   PCO2 41.9 36.3 49.8*   PO2 428* 186* 482*   HCO3 26.0 25.2 25.4   POCSATURATED 100 100 100   BE 1 1 -1       ASSESSMENT/INTERVENTIONS  Pt on 5L 28% trach collar. Shiley size 8 cuffed trach in place. All supplies in room. Extra trachs at bedside - 6 and 8, both cuffed.        Last VS   Temp: 99.9 °F (37.7 °C) ( 1200)  Pulse: 107 ( 121)  Resp: 20 ( 121)  BP: 85/46 ( 1200)  SpO2: 100 % ( 121)  Arterial Line BP: 126/71 (0)      Extra trachs at bedside: 6 and 8, both cuffed.  Level of Consciousness: Level of Consciousness (AVPU): alert  Respiratory Effort: Respiratory Effort: Normal, Unlabored Expansion/Accessory Muscle Usage: Expansion/Accessory Muscles/Retractions: no retractions, expansion symmetric, no use of accessory muscles  All Lung Field Breath Sounds: All Lung Fields Breath Sounds: Anterior:, Posterior:, Lateral:, equal bilaterally, clear  NATALIA Breath Sounds: coarse  O2 Device/Concentration: 5L 28% TC.  Surgical airway: Yes, Type: Shiley Size: 8, cuffed  Ambu at bedside:       Active Orders   Respiratory Care    Oxygen Continuous     Frequency:  Continuous     Number of Occurrences: Until Specified     Order Questions:      Device type: Low flow      Device: Trach Collar      FiO2%: 60      Titrate O2 per Oxygen Titration Protocol: Yes      To maintain SpO2 goal of: >= 90%      Notify MD of: Inability to achieve desired SpO2; Sudden change in patient status and requires 20% increase in FiO2; Patient requires >60% FiO2    Pulse Oximetry Continuous     Frequency: Continuous     Number of Occurrences: Until Specified    Routine tracheostomy care     Frequency: BID     Number of Occurrences: Until Specified    SUCTION PRN     Frequency: PRN     Number of Occurrences: Until Specified       RECOMMENDATIONS    We recommend: RRT Recs: Continue POC per primary team.      FOLLOW-UP    Please call back the Rapid Response RTBryant RRT at x [phone] for any questions or concerns.

## 2024-03-14 NOTE — ASSESSMENT & PLAN NOTE
>>ASSESSMENT AND PLAN FOR PRESSURE ULCERS OF SKIN OF MULTIPLE TOPOGRAPHIC SITES WRITTEN ON 3/14/2024 12:34 PM BY KELLY HUERTA MD    See sepsis

## 2024-03-14 NOTE — NURSING
Patient reporting pain with a blood pressure of 86/50 after arriving to unit from [place]. Dr. Villa with hospital medicine notified and reported that he will reach out to ICU for closer monitoring and will call me back with an update.

## 2024-03-14 NOTE — SUBJECTIVE & OBJECTIVE
History reviewed. No pertinent past medical history.    Past Surgical History:   Procedure Laterality Date    ABOVE-KNEE AMPUTATION Bilateral 3/13/2024    Procedure: AMPUTATION, ABOVE KNEE;  Surgeon: Dexter Wynne MD;  Location: [facility] OR [address] [place] [address];  Service: General;  Laterality: Bilateral;    AMPUTATION OF UPPER EXTREMITY Left 3/13/2024    Procedure: AMPUTATION, UPPER EXTREMITY;  Surgeon: Dexter Wynne MD;  Location: [facility] OR [address] [place] [address];  Service: General;  Laterality: Left;  BELOW ELBOW    ESOPHAGOGASTRODUODENOSCOPY W/ PEG N/A 5/30/2023    Procedure: PEG;  Surgeon: JUSTYN Devine MD;  Location: [facility] ENDOSCOPY;  Service: Gastroenterology;  Laterality: N/A;       Review of patient's allergies indicates:   Allergen Reactions    Opioids - morphine analogues Nausea And Vomiting, Anxiety and Other (See Comments)       Family History    None       Tobacco Use    Smoking status: Never    Smokeless tobacco: Never   Substance and Sexual Activity    Alcohol use: Not Currently    Drug use: Not Currently    Sexual activity: Not Currently      Review of Systems   Constitutional:  Negative for chills and fever.   Respiratory:  Negative for cough and shortness of breath.    Musculoskeletal:         LUE and BLE pain   Skin:  Negative for pallor.   Neurological:  Negative for dizziness, light-headedness and headaches.     Objective:     Vital Signs (Most Recent):  Temp: 98 °F (36.7 °C) (03/14/24 0222)  Pulse: 102 (03/14/24 0426)  Resp: 18 (03/14/24 0222)  BP: (!) 88/50 (03/14/24 0430)  SpO2: 100 % (03/14/24 0311) Vital Signs (24h Range):  Temp:  [97.9 °F (36.6 °C)-99.1 °F (37.3 °C)] 98 °F (36.7 °C)  Pulse:  [] 102  Resp:  [12-25] 18  SpO2:  [97 %-100 %] 100 %  BP: ()/(38-77) 88/50  Arterial Line BP: ()/(37-71) 126/71   Weight: 59.4 kg (131 lb)  Body mass index is 19.35 kg/m².      Intake/Output Summary (Last 24 hours) at 3/14/2024 0442  Last data filed at 3/14/2024 0304  Gross per 24 hour   Intake  2760 ml   Output 3350 ml   Net -590 ml          Physical Exam  Vitals and nursing note reviewed.   Constitutional:       General: He is not in acute distress.     Appearance: He is ill-appearing.   HENT:      Mouth/Throat:      Mouth: Mucous membranes are dry.      Pharynx: Oropharynx is clear.   Eyes:      Pupils: Pupils are equal, round, and reactive to light.   Cardiovascular:      Rate and Rhythm: Normal rate and regular rhythm.      Pulses: Normal pulses.   Pulmonary:      Effort: Pulmonary effort is normal. No respiratory distress.   Abdominal:      Palpations: Abdomen is soft.   Musculoskeletal:      Comments: BLE amputations with CDI dressings. Scant blood noted on LUE dressing.    Skin:     Coloration: Skin is not jaundiced.   Neurological:      General: No focal deficit present.      Mental Status: He is oriented to person, place, and time.      GCS: GCS eye subscore is 4. GCS verbal subscore is 5. GCS motor subscore is 6.            Vents:  Oxygen Concentration (%): 28 (03/14/24 0222)  Lines/Drains/Airways       Central Venous Catheter Line  Duration             Percutaneous Central Line Insertion/Assessment - Triple Lumen  02/21/24 1754 Femoral Vein Right;Femoral Right 21 days              Drain  Duration                  Colostomy  LLQ -- days         Urethral Catheter 02/21/24 2330 Temperature probe 21 days         NG/OG Tube 02/23/24 1459 Right nostril 19 days              Airway  Duration             Adult Surgical Airway 05/25/23 1135 Shiley Cuffed 8.0 / 85 mm 293 days                  Significant Labs:    CBC/Anemia Profile:  Recent Labs   Lab 03/12/24  0557 03/13/24  0512 03/13/24  1425 03/13/24  1542 03/13/24  1739   WBC 18.96* 19.67*  --   --   --    HGB 7.7* 7.2*  --   --   --    HCT 24.3* 22.4* 27* 22* 22*    354  --   --   --    MCV 96 96  --   --   --    RDW 16.4* 16.4*  --   --   --         Chemistries:  Recent Labs   Lab 03/12/24  0557 03/13/24  0512    135*   K 5.3* 5.1   CL  102 102   CO2 28 26   BUN 17 18   CREATININE 0.6 0.6   CALCIUM 7.8* 7.6*       All pertinent labs within the past 24 hours have been reviewed.    Significant Imaging: I have reviewed all pertinent imaging results/findings within the past 24 hours.
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
## 2024-03-14 NOTE — ASSESSMENT & PLAN NOTE
Palliative Care at OSH engaging in ongoing communications with family regarding GOC given extensive comorbid conditions and recurrent hospitalizations. Per documentation review and brief discussion with family on initial transfer admission encounter, patient & family wish to continue all medical and surgical options at this time.     -- Patient & family would greatly benefit from continued Palliative Care discussions  -- IP Consult to [facility] Care services
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
## 2024-03-14 NOTE — BRIEF OP NOTE
[facility] - Surgery ([facility])  Brief Operative Note    SUMMARY     Surgery Date: 3/13/2024     Surgeon(s) and Role:     * Dexter Wynne MD - Primary     * Komal Salazar MD - Resident - Assisting     * Johnny Bingham MD - Resident - Assisting     * Tammie Miller MD - Resident - Assisting    Pre-op Diagnosis:  Gangrene [I96]    Post-op Diagnosis:  Post-Op Diagnosis Codes:     * Gangrene [I96]    Procedure(s) (LRB):  AMPUTATION, ABOVE KNEE (Bilateral)  AMPUTATION, UPPER EXTREMITY (Left)    Anesthesia: General    Implants:  * No implants in log *    Operative Findings: Bilateral above knee amputations.  Left arm below elbow amputation.  See operative report for details.    Estimated Blood Loss: 325 mL    Estimated Blood Loss has been documented.         Specimens:   Specimen (24h ago, onward)       Start     Ordered    03/13/24 1721  Specimen to Pathology, Surgery General Surgery  Once        Comments: Pre-op Diagnosis: Gangrene [I96]Procedure(s):AMPUTATION, ABOVE KNEEAMPUTATION, UPPER EXTREMITY Number of specimens: 3Name of specimens: 1. left lower extremity - perm2. right lower extremity - perm3. left upper extremity - perm     References:    Click here for ordering Quick Tip   Question Answer Comment   Procedure Type: General Surgery    Which provider would you like to cc? DEXTER WYNNE    Release to patient Immediate        03/13/24 1720                    [id number]
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
## 2024-03-14 NOTE — ASSESSMENT & PLAN NOTE
Patient has hyponatremia which is controlled,We will aim to correct the sodium by 4-6mEq in 24 hours. We will monitor sodium Daily. The hyponatremia is due to Dehydration/hypovolemia. The patient's sodium results have been reviewed and are listed below.  Recent Labs   Lab 03/14/24  0925   *

## 2024-03-14 NOTE — ASSESSMENT & PLAN NOTE
Ciruculating blood volume loss after amputation of 3 limbs.     MAP goal >60  Supportive care with blood transfusions prn and crystalloid resuscitation   - Lactate 4.0, trending q4h for resolution

## 2024-03-14 NOTE — PROGRESS NOTES
MD informed of issues with BP related to medication infusion.  MD informed that bedside eval will be performed.

## 2024-03-14 NOTE — ANESTHESIA POSTPROCEDURE EVALUATION
Anesthesia Post Evaluation    Patient: Jyoti Mayberry    Procedure(s) Performed: Procedure(s) (LRB):  AMPUTATION, ABOVE KNEE (Bilateral)  AMPUTATION, UPPER EXTREMITY (Left)    Final Anesthesia Type: general      Patient location during evaluation: PACU  Patient participation: Yes- Able to Participate  Level of consciousness: awake and alert  Post-procedure vital signs: reviewed and stable  Pain management: adequate  Airway patency: patent    PONV status at discharge: No PONV  Anesthetic complications: no      Cardiovascular status: hemodynamically stable  Respiratory status: unassisted (Trach collar)  Hydration status: euvolemic  Follow-up not needed.              Vitals Value Taken Time   BP 85/46 03/14/24 1200   Temp 37.7 °C (99.9 °F) 03/14/24 1200   Pulse 107 03/14/24 1212   Resp 20 03/14/24 1212   SpO2 100 % 03/14/24 1212         Event Time   Out of Recovery 03/14/2024 02:03:00         Pain/Bailee Score: Pain Rating Prior to Med Admin: 9 (3/14/2024 11:05 AM)  Bailee Score: 7 (3/13/2024  9:15 PM)

## 2024-03-14 NOTE — NURSING TRANSFER
Nursing Transfer Note      3/14/2024   2:03 AM    Nurse giving handoff:JANETT Humphreys  Nurse receiving handoff:JANETT Alanis    Reason patient is being transferred: s/p amputation     Transfer To: 1023    Transfer via bed    Transfer with 5Ltrach collar,cardiac monitoring, continuous pulse ox     Transported by RN    Transfer Vital Signs:SEE FLOWSHEET    Telemetry: Box Number [id number]  Order for Tele Monitor? Yes    Additional Lines: Oxygen and Shen Catheter    Medicines sent: none    Any special needs or follow-up needed: routine    Chart send with patient: Yes    Notified: parents    Patient reassessed at: 3/14/23 @0207

## 2024-03-14 NOTE — PROGRESS NOTES
.Adult trach go bag present at patient's bedside with all below listed supplies included Yes    Same size trach and one size smaller   ETCO2   Inner cannula  Suction catheter   Normal Saline bullets   Trach ties  10 cc syringes   Water based lubricant   Obturator     Laryngectomy patients:   Pediatric mask    ETT tube size 6 and 8     Ambu bag and mask (in patients room)    Staff is attesting that all supplies are in the go bag by selecting yes.

## 2024-03-14 NOTE — ANESTHESIA RELEASE NOTE
"Anesthesia Release from PACU Note    Patient: Jyoti Mayberry    Procedure(s) Performed: Procedure(s) (LRB):  AMPUTATION, ABOVE KNEE (Bilateral)  AMPUTATION, UPPER EXTREMITY (Left)    Anesthesia type: general    Post pain: Adequate analgesia    Post assessment: no apparent anesthetic complications    Last Vitals: Visit Vitals  BP (!) 100/56   Pulse 97   Temp 37.1 °C (98.7 °F) (Temporal)   Resp 18   Ht 5' 9" (1.753 m)   Wt 59.4 kg (131 lb)   SpO2 100%   BMI 19.35 kg/m²       Post vital signs: stable    Level of consciousness: awake    Nausea/Vomiting: no nausea/no vomiting    Complications: none    Airway Patency: patent    Respiratory: unassisted    Cardiovascular: stable and blood pressure at baseline    Hydration: euvolemic  "
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
## 2024-03-14 NOTE — SUBJECTIVE & OBJECTIVE
Interval History: Afebrile. Pt with low MAP's overnight MICU consulted and found IVC to be collapsible, 1 L of fluids given, however hgb 6.7. Otherwise pt feeling okay this morning with ostomy function. Pain well controlled.    Medications:  Continuous Infusions:  Scheduled Meds:   acetaminophen  650 mg Per NG tube Q6H    chlorhexidine  15 mL Mouth/Throat BID    famotidine  20 mg Per NG tube BID    folic acid  1 mg Per NG tube Daily    gabapentin  300 mg Per NG tube TID    lactated ringers  1,000 mL Intravenous Once    levetiracetam  750 mg Per NG tube BID    levothyroxine  112 mcg Per NG tube Before breakfast    LIDOcaine  1 patch Transdermal Q24H    linezolid  600 mg Intravenous Q12H    meropenem (MERREM) IVPB  2 g Intravenous Q8H    methocarbamoL  500 mg Oral TID    midodrine  10 mg Per NG tube TID    minocycline  100 mg Per NG tube Q12H    polyethylene glycol  17 g Per NG tube BID     PRN Meds:0.9%  NaCl infusion (for blood administration), 0.9%  NaCl infusion (for blood administration), ALPRAZolam, HYDROcodone-acetaminophen     Review of patient's allergies indicates:   Allergen Reactions    Opioids - morphine analogues Nausea And Vomiting, Anxiety and Other (See Comments)     Objective:     Vital Signs (Most Recent):  Temp: 98 °F (36.7 °C) (03/14/24 0222)  Pulse: 94 (03/14/24 0453)  Resp: 18 (03/14/24 0552)  BP: (!) 85/47 (03/14/24 0547)  SpO2: 100 % (03/14/24 0311) Vital Signs (24h Range):  Temp:  [97.9 °F (36.6 °C)-99.1 °F (37.3 °C)] 98 °F (36.7 °C)  Pulse:  [] 94  Resp:  [12-25] 18  SpO2:  [97 %-100 %] 100 %  BP: ()/(38-77) 85/47  Arterial Line BP: ()/(37-71) 126/71     Weight: 59.4 kg (131 lb)  Body mass index is 19.35 kg/m².    Intake/Output - Last 3 Shifts         03/12 0700 03/13 0659 03/13 0700  03/14 0659 03/14 0700  03/15 0659    P.O. 737      I.V. (mL/kg)       Blood  700     NG/ 60     IV Piggyback 862.1 2000     Total Intake(mL/kg) 2439.1 (41.1) 2760 (46.5)     Urine  (mL/kg/hr) 2450 (1.7) 2975 (2.1)     Stool 150 50     Blood  325     Total Output 2600 3350     Net -160.9 -590            Stool Occurrence  0 x              Physical Exam  Vitals and nursing note reviewed.   Constitutional:       Appearance: He is ill-appearing.   HENT:      Head: Normocephalic.      Nose: Nose normal.      Comments: NG tube in place     Mouth/Throat:      Mouth: Mucous membranes are moist.   Eyes:      Pupils: Pupils are equal, round, and reactive to light.   Cardiovascular:      Rate and Rhythm: Normal rate.   Pulmonary:      Effort: Pulmonary effort is normal. No respiratory distress.      Comments: On trach collar 5L  Abdominal:      General: There is no distension.      Palpations: Abdomen is soft.      Tenderness: There is no abdominal tenderness. There is no right CVA tenderness or left CVA tenderness.      Comments: Ostomy in left lower quadrant functional   Musculoskeletal:      Comments: S/p bilateral AKA, stumps with dressings  S/p left below elbow upper extremity amputation   Skin:     General: Skin is dry.   Neurological:      Mental Status: He is alert and oriented to person, place, and time.          Significant Labs:  I have reviewed all pertinent lab results within the past 24 hours.    Significant Diagnostics:  I have reviewed all pertinent imaging results/findings within the past 24 hours.

## 2024-03-14 NOTE — NURSING
Dr. Villa at bedside. Order noted for 500ml LR bolus. Dr. Villa approved to give pain medication after bolus. Blood pressure midway through bolus was 88/53 and blood pressure at end of bolus was 94/51. At time of pain medication administration(0552), blood pressure was 85/47. Dr. Villa ordered a 1000ml bolus of LR and approved to give pain medication. Medication given. Lab called with critical lab of lactic acid 4. Dr. Villa notified and reported he will report critical lab to oncoming team.
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
## 2024-03-14 NOTE — CARE UPDATE
RAPID RESPONSE NURSE PROACTIVE ROUNDING NOTE       Time of Visit: 0400    Admit Date: 3/7/2024  LOS: 7  Code Status: Prior   Date of Visit: 2024  : 1995  Age: 28 y.o.  Sex: male  Race: Black or   Bed: [address] [place] [address] A:   MRN: [id number]  Was the patient discharged from an ICU this admission? No   Was the patient discharged from a PACU within last 24 hours? Yes   Did the patient receive conscious sedation/general anesthesia in last 24 hours? No  Was the patient in the ED within the past 24 hours? No  Was the patient on NIPPV within the past 24 hours? No   Attending Physician: Suzi Holden*  Primary Service: [facility] 3   Time spent at the bedside: 30 - 45 min    SITUATION    Notified by JULIET.  Reason for alert: Hypotension  Called to evaluate the patient for Circulatory    BACKGROUND     Why is the patient in the hospital?: Severe sepsis    Patient has no past medical history on file.    Last Vitals:  Temp: 98 °F (36.7 °C) ( 0222)  Pulse: 94 ( 0453)  Resp: 18 ( 0552)  BP: 85/47 ( 0547)  SpO2: 100 % ( 0311)  Arterial Line BP: 126/71 (0)    24 Hours Vitals Range:  Temp:  [97.9 °F (36.6 °C)-99.1 °F (37.3 °C)]   Pulse:  []   Resp:  [12-25]   BP: ()/(38-77)   SpO2:  [97 %-100 %]   Arterial Line BP: ()/(37-71)     Labs:  Recent Labs     24  0557 24  0512 24  1425 24  1542 24  1739   WBC 18.96* 19.67*  --   --   --    HGB 7.7* 7.2*  --   --   --    HCT 24.3* 22.4* 27* 22* 22*    354  --   --   --        Recent Labs     24  0557 24  0512    135*   K 5.3* 5.1    102   CO2 28 26   BUN 17 18   CREATININE 0.6 0.6   GLU 84 75        Recent Labs     24  1425 24  1542 24  1739   PH 7.401 7.450 7.315*   PCO2 41.9 36.3 49.8*   PO2 428* 186* 482*   HCO3 26.0 25.2 25.4   POCSATURATED 100 100 100   BE 1 1 -1        ASSESSMENT    Physical Exam  Constitutional:        General: He is not in acute distress.  Cardiovascular:      Rate and Rhythm: Regular rhythm. Tachycardia present.      Pulses: Normal pulses.   Pulmonary:      Effort: No respiratory distress.   Abdominal:      General: There is no distension.   Musculoskeletal:      Right lower leg: No edema.      Left lower leg: No edema.   Skin:     General: Skin is warm.      Capillary Refill: Capillary refill takes 2 to 3 seconds.   Neurological:      General: No focal deficit present.      Mental Status: He is alert. Mental status is at baseline.         INTERVENTIONS    The patient was seen for Cardiac problem. Staff concerns included hypotension. The following interventions were performed: 500cc LR bolus.    RECOMMENDATIONS    Asked to bedside by [facility] JULIET after consult was received for hypotension. On arrival to bedside [facility] performed ultrasound showing collapsible IVC. 500cc LR bolus ordered and infused. Patient asymptomatic and denies subjective complaints minus pain to surgical sites.     Follow up BP s/p fluid resuscitation.     STAT CBC ordered per [facility].     Notify RRT with changes in VSS or hypotension unresponsive to fluids.     PROVIDER ESCALATION    Yes/No  Yes    Orders received and case discussed with  GEE Rodrigues.    Disposition: Remain in room 1023.    FOLLOW-UP    bedside RNNena  updated on plan of care. Instructed to call the Rapid Response Nurse, Kris Renteria RN at [phone] for additional questions or concerns.

## 2024-03-15 LAB
ALBUMIN SERPL BCP-MCNC: 1.1 G/DL (ref 3.5–5.2)
ALP SERPL-CCNC: 102 U/L (ref 55–135)
ALT SERPL W/O P-5'-P-CCNC: 6 U/L (ref 10–44)
ANION GAP SERPL CALC-SCNC: 9 MMOL/L (ref 8–16)
AST SERPL-CCNC: 26 U/L (ref 10–40)
BASOPHILS # BLD AUTO: 0.11 K/UL (ref 0–0.2)
BASOPHILS NFR BLD: 0.6 % (ref 0–1.9)
BILIRUB SERPL-MCNC: 0.2 MG/DL (ref 0.1–1)
BUN SERPL-MCNC: 15 MG/DL (ref 6–20)
CALCIUM SERPL-MCNC: 7 MG/DL (ref 8.7–10.5)
CHLORIDE SERPL-SCNC: 105 MMOL/L (ref 95–110)
CO2 SERPL-SCNC: 22 MMOL/L (ref 23–29)
CREAT SERPL-MCNC: 0.5 MG/DL (ref 0.5–1.4)
DIFFERENTIAL METHOD BLD: ABNORMAL
EOSINOPHIL # BLD AUTO: 0.3 K/UL (ref 0–0.5)
EOSINOPHIL NFR BLD: 1.7 % (ref 0–8)
ERYTHROCYTE [DISTWIDTH] IN BLOOD BY AUTOMATED COUNT: 16.6 % (ref 11.5–14.5)
EST. GFR  (NO RACE VARIABLE): >60 ML/MIN/1.73 M^2
GLUCOSE SERPL-MCNC: 79 MG/DL (ref 70–110)
HCT VFR BLD AUTO: 28.3 % (ref 40–54)
HGB BLD-MCNC: 9.4 G/DL (ref 14–18)
IMM GRANULOCYTES # BLD AUTO: 0.11 K/UL (ref 0–0.04)
IMM GRANULOCYTES NFR BLD AUTO: 0.6 % (ref 0–0.5)
LACTATE SERPL-SCNC: 1.2 MMOL/L (ref 0.5–2.2)
LACTATE SERPL-SCNC: 1.3 MMOL/L (ref 0.5–2.2)
LYMPHOCYTES # BLD AUTO: 3.5 K/UL (ref 1–4.8)
LYMPHOCYTES NFR BLD: 17.5 % (ref 18–48)
MCH RBC QN AUTO: 30.2 PG (ref 27–31)
MCHC RBC AUTO-ENTMCNC: 33.2 G/DL (ref 32–36)
MCV RBC AUTO: 91 FL (ref 82–98)
MONOCYTES # BLD AUTO: 2.5 K/UL (ref 0.3–1)
MONOCYTES NFR BLD: 12.6 % (ref 4–15)
NEUTROPHILS # BLD AUTO: 13.3 K/UL (ref 1.8–7.7)
NEUTROPHILS NFR BLD: 67 % (ref 38–73)
NRBC BLD-RTO: 0 /100 WBC
PLATELET # BLD AUTO: 293 K/UL (ref 150–450)
PLATELET BLD QL SMEAR: ABNORMAL
PMV BLD AUTO: 8.6 FL (ref 9.2–12.9)
POTASSIUM SERPL-SCNC: 4.9 MMOL/L (ref 3.5–5.1)
PROT SERPL-MCNC: 5.6 G/DL (ref 6–8.4)
RBC # BLD AUTO: 3.11 M/UL (ref 4.6–6.2)
SODIUM SERPL-SCNC: 136 MMOL/L (ref 136–145)
WBC # BLD AUTO: 19.75 K/UL (ref 3.9–12.7)

## 2024-03-15 PROCEDURE — 87040 BLOOD CULTURE FOR BACTERIA: CPT

## 2024-03-15 PROCEDURE — 36415 COLL VENOUS BLD VENIPUNCTURE: CPT

## 2024-03-15 PROCEDURE — 63600175 PHARM REV CODE 636 W HCPCS: Mod: JZ,JG | Performed by: PHYSICIAN ASSISTANT

## 2024-03-15 PROCEDURE — 99900035 HC TECH TIME PER 15 MIN (STAT)

## 2024-03-15 PROCEDURE — 25000003 PHARM REV CODE 250

## 2024-03-15 PROCEDURE — 99900026 HC AIRWAY MAINTENANCE (STAT)

## 2024-03-15 PROCEDURE — 27000221 HC OXYGEN, UP TO 24 HOURS

## 2024-03-15 PROCEDURE — 83605 ASSAY OF LACTIC ACID: CPT | Mod: 91

## 2024-03-15 PROCEDURE — 94761 N-INVAS EAR/PLS OXIMETRY MLT: CPT

## 2024-03-15 PROCEDURE — 85025 COMPLETE CBC W/AUTO DIFF WBC: CPT

## 2024-03-15 PROCEDURE — 25000003 PHARM REV CODE 250: Performed by: NURSE PRACTITIONER

## 2024-03-15 PROCEDURE — 99223 1ST HOSP IP/OBS HIGH 75: CPT | Mod: ,,, | Performed by: PHYSICIAN ASSISTANT

## 2024-03-15 PROCEDURE — 63600175 PHARM REV CODE 636 W HCPCS

## 2024-03-15 PROCEDURE — 83605 ASSAY OF LACTIC ACID: CPT

## 2024-03-15 PROCEDURE — 31720 CLEARANCE OF AIRWAYS: CPT

## 2024-03-15 PROCEDURE — 99900022

## 2024-03-15 PROCEDURE — 27000207 HC ISOLATION

## 2024-03-15 PROCEDURE — 99024 POSTOP FOLLOW-UP VISIT: CPT | Mod: ,,, | Performed by: STUDENT IN AN ORGANIZED HEALTH CARE EDUCATION/TRAINING PROGRAM

## 2024-03-15 PROCEDURE — 25000003 PHARM REV CODE 250: Performed by: PHYSICIAN ASSISTANT

## 2024-03-15 PROCEDURE — 80053 COMPREHEN METABOLIC PANEL: CPT

## 2024-03-15 PROCEDURE — 63600175 PHARM REV CODE 636 W HCPCS: Performed by: HOSPITALIST

## 2024-03-15 PROCEDURE — 20600001 HC STEP DOWN PRIVATE ROOM

## 2024-03-15 RX ORDER — MINOCYCLINE HYDROCHLORIDE 100 MG/1
200 CAPSULE ORAL EVERY 12 HOURS
Status: DISCONTINUED | OUTPATIENT
Start: 2024-03-15 | End: 2024-03-16

## 2024-03-15 RX ORDER — ENOXAPARIN SODIUM 100 MG/ML
30 INJECTION SUBCUTANEOUS EVERY 24 HOURS
Status: DISCONTINUED | OUTPATIENT
Start: 2024-03-15 | End: 2024-04-09

## 2024-03-15 RX ADMIN — ACETAMINOPHEN 650 MG: 650 SOLUTION ORAL at 08:03

## 2024-03-15 RX ADMIN — OXYCODONE HYDROCHLORIDE AND ACETAMINOPHEN 1 TABLET: 10; 325 TABLET ORAL at 02:03

## 2024-03-15 RX ADMIN — MEROPENEM 2 G: 1 INJECTION INTRAVENOUS at 01:03

## 2024-03-15 RX ADMIN — MEROPENEM 2 G: 1 INJECTION INTRAVENOUS at 10:03

## 2024-03-15 RX ADMIN — OXYCODONE HYDROCHLORIDE AND ACETAMINOPHEN 1 TABLET: 10; 325 TABLET ORAL at 05:03

## 2024-03-15 RX ADMIN — OXYCODONE HYDROCHLORIDE AND ACETAMINOPHEN 1 TABLET: 10; 325 TABLET ORAL at 01:03

## 2024-03-15 RX ADMIN — FAMOTIDINE 20 MG: 40 POWDER, FOR SUSPENSION ORAL at 08:03

## 2024-03-15 RX ADMIN — ALPRAZOLAM 1 MG: 0.5 TABLET ORAL at 03:03

## 2024-03-15 RX ADMIN — MINOCYCLINE HYDROCHLORIDE 100 MG: 100 CAPSULE ORAL at 10:03

## 2024-03-15 RX ADMIN — ENOXAPARIN SODIUM 30 MG: 30 INJECTION SUBCUTANEOUS at 05:03

## 2024-03-15 RX ADMIN — MINOCYCLINE HYDROCHLORIDE 200 MG: 100 CAPSULE ORAL at 08:03

## 2024-03-15 RX ADMIN — POLYETHYLENE GLYCOL 3350 17 G: 17 POWDER, FOR SOLUTION ORAL at 10:03

## 2024-03-15 RX ADMIN — OXYCODONE HYDROCHLORIDE AND ACETAMINOPHEN 1 TABLET: 10; 325 TABLET ORAL at 08:03

## 2024-03-15 RX ADMIN — LIDOCAINE 5% 1 PATCH: 700 PATCH TOPICAL at 02:03

## 2024-03-15 RX ADMIN — FOLIC ACID 1 MG: 1 TABLET ORAL at 10:03

## 2024-03-15 RX ADMIN — CHLORHEXIDINE GLUCONATE 0.12% ORAL RINSE 15 ML: 1.2 LIQUID ORAL at 10:03

## 2024-03-15 RX ADMIN — MIDODRINE HYDROCHLORIDE 10 MG: 5 TABLET ORAL at 02:03

## 2024-03-15 RX ADMIN — ALPRAZOLAM 1 MG: 0.5 TABLET ORAL at 05:03

## 2024-03-15 RX ADMIN — GABAPENTIN 300 MG: 300 CAPSULE ORAL at 02:03

## 2024-03-15 RX ADMIN — LEVETIRACETAM 750 MG: 100 SOLUTION ORAL at 08:03

## 2024-03-15 RX ADMIN — METHOCARBAMOL 500 MG: 500 TABLET ORAL at 08:03

## 2024-03-15 RX ADMIN — METHOCARBAMOL 500 MG: 500 TABLET ORAL at 02:03

## 2024-03-15 RX ADMIN — MIDODRINE HYDROCHLORIDE 10 MG: 5 TABLET ORAL at 05:03

## 2024-03-15 RX ADMIN — ALPRAZOLAM 1 MG: 0.5 TABLET ORAL at 08:03

## 2024-03-15 RX ADMIN — ACETAMINOPHEN 650 MG: 650 SOLUTION ORAL at 09:03

## 2024-03-15 RX ADMIN — MIDODRINE HYDROCHLORIDE 10 MG: 5 TABLET ORAL at 10:03

## 2024-03-15 RX ADMIN — OXYCODONE HYDROCHLORIDE AND ACETAMINOPHEN 1 TABLET: 10; 325 TABLET ORAL at 09:03

## 2024-03-15 RX ADMIN — CEFTAZIDIME, AVIBACTAM 2.5 G: 2; .5 POWDER, FOR SOLUTION INTRAVENOUS at 05:03

## 2024-03-15 RX ADMIN — GABAPENTIN 300 MG: 300 CAPSULE ORAL at 08:03

## 2024-03-15 RX ADMIN — LINEZOLID 600 MG: 600 INJECTION, SOLUTION INTRAVENOUS at 10:03

## 2024-03-15 RX ADMIN — FAMOTIDINE 20 MG: 40 POWDER, FOR SUSPENSION ORAL at 10:03

## 2024-03-15 RX ADMIN — METHOCARBAMOL 500 MG: 500 TABLET ORAL at 10:03

## 2024-03-15 RX ADMIN — LEVETIRACETAM 750 MG: 100 SOLUTION ORAL at 09:03

## 2024-03-15 RX ADMIN — GABAPENTIN 300 MG: 300 CAPSULE ORAL at 10:03

## 2024-03-15 RX ADMIN — LINEZOLID 600 MG: 600 INJECTION, SOLUTION INTRAVENOUS at 08:03

## 2024-03-15 RX ADMIN — LEVOTHYROXINE SODIUM 112 MCG: 112 TABLET ORAL at 05:03

## 2024-03-15 RX ADMIN — CHLORHEXIDINE GLUCONATE 0.12% ORAL RINSE 15 ML: 1.2 LIQUID ORAL at 09:03

## 2024-03-15 NOTE — ASSESSMENT & PLAN NOTE
Ciruculating blood volume loss after amputation of 3 limbs.     MAP goal >60  Supportive care with blood transfusions prn and crystalloid resuscitation   - Lactate 4.0, now normalized

## 2024-03-15 NOTE — PLAN OF CARE
[facility]    Primary Care Provider: No, Primary Doctor    Expected Discharge Date: 3/21/2024    Reassessment (most recent)       Discharge Reassessment - 03/15/24 1405          Discharge Reassessment    Assessment Type Discharge Planning Reassessment     Did the patient's condition or plan change since previous assessment? No     Discharge Plan discussed with: Patient     Communicated ANDRÉS with patient/caregiver Yes     Discharge Plan A Long-term acute care facility (LTAC)     Discharge Plan B New Nursing Home placement - group home care facility     Transition of Care Barriers None     Why the patient remains in the hospital Requires continued medical care                     Plan for LTAC when medically ready.

## 2024-03-15 NOTE — SUBJECTIVE & OBJECTIVE
History reviewed. No pertinent past medical history.    Past Surgical History:   Procedure Laterality Date    ABOVE-KNEE AMPUTATION Bilateral 3/13/2024    Procedure: AMPUTATION, ABOVE KNEE;  Surgeon: Dexter Wynne MD;  Location: [facility] OR [address] [place] [address];  Service: General;  Laterality: Bilateral;    AMPUTATION OF UPPER EXTREMITY Left 3/13/2024    Procedure: AMPUTATION, UPPER EXTREMITY;  Surgeon: Dexter Wynne MD;  Location: [facility] OR [address] [place] [address];  Service: General;  Laterality: Left;  BELOW ELBOW    ESOPHAGOGASTRODUODENOSCOPY W/ PEG N/A 5/30/2023    Procedure: PEG;  Surgeon: JUSTYN Devine MD;  Location: [facility] ENDOSCOPY;  Service: Gastroenterology;  Laterality: N/A;       Review of patient's allergies indicates:   Allergen Reactions    Opioids - morphine analogues Nausea And Vomiting, Anxiety and Other (See Comments)       Medications:  Medications Prior to Admission   Medication Sig    ALPRAZolam (XANAX) 1 MG tablet Take 1 mg by mouth 3 (three) times daily as needed for Anxiety.    cholecalciferol, vitamin D3, 1,250 mcg (50,000 unit) capsule Take 50,000 Units by mouth every 7 days.    acetaminophen (TYLENOL) 325 MG tablet 2 tablets (650 mg total) by Per G Tube route every 6 (six) hours as needed for Temperature greater than (100.4).    albuterol (PROVENTIL) 2.5 mg /3 mL (0.083 %) nebulizer solution Take 3 mLs (2.5 mg total) by nebulization every 4 (four) hours as needed (shortness of breath). Rescue    enoxaparin (LOVENOX) 40 mg/0.4 mL Syrg Inject 0.4 mLs (40 mg total) into the skin once daily.    folic acid (FOLVITE) 1 MG tablet 1 tablet (1 mg total) by Per G Tube route once daily.    gabapentin (NEURONTIN) 300 MG capsule Take 1 capsule (300 mg total) by mouth 3 (three) times daily.    HYDROcodone-acetaminophen (NORCO)  mg per tablet Take 1 tablet by mouth every 6 (six) hours as needed for Pain.    levETIRAcetam (KEPPRA) 100 mg/mL Soln 7.5 mLs (750 mg total) by Per NG tube route 2 (two) times daily.     levothyroxine (SYNTHROID) 100 MCG tablet Take 1 tablet (100 mcg total) by mouth before breakfast.    linezolid (ZYVOX) 600 mg/300 mL PgBk Inject 300 mLs (600 mg total) into the vein every 12 (twelve) hours.    melatonin (MELATIN) 3 mg tablet Take 2 tablets (6 mg total) by mouth nightly as needed for Insomnia.    midodrine (PROAMATINE) 10 MG tablet 1 tablet (10 mg total) by Per NG tube route 3 (three) times daily.    minocycline (MINOCIN,DYNACIN) 100 MG capsule 1 capsule (100 mg total) by Per NG tube route every 12 (twelve) hours.    ondansetron (ZOFRAN) 4 mg/5 mL solution Take 5 mLs (4 mg total) by mouth every 6 (six) hours as needed for Nausea.    pantoprazole (PROTONIX) 40 mg injection Inject 40 mg into the vein once daily.    sertraline (ZOLOFT) 50 MG tablet Take 1 tablet (50 mg total) by mouth once daily.    sodium chloride 0.9% SolP 100 mL with meropenem 1 gram SolR 2 g Inject 2 g into the vein every 8 (eight) hours.     Antibiotics (From admission, onward)      Start     Stop Route Frequency Ordered    03/07/24 0900  minocycline capsule 100 mg         -- PER NG TUBE Every 12 hours 03/07/24 0220    03/07/24 0615  linezolid 600 mg/300 mL IVPB 600 mg         -- IV Every 12 hours (non-standard times) 03/07/24 0220    03/07/24 0600  meropenem (MERREM) 2 g in sodium chloride 0.9% 100 mL IVPB         -- IV Every 8 hours (non-standard times) 03/07/24 0220          Antifungals (From admission, onward)      None          Antivirals (From admission, onward)      None             There is no immunization history for the selected administration types on file for this patient.    Family History    None       Social History     Socioeconomic History    Marital status: Single   Tobacco Use    Smoking status: Never    Smokeless tobacco: Never   Substance and Sexual Activity    Alcohol use: Not Currently    Drug use: Not Currently    Sexual activity: Not Currently     Social Determinants of Health     Financial Resource Strain:  Low Risk  (3/7/2024)    Overall Financial Resource Strain (CARDIA)     Difficulty of Paying Living Expenses: Not hard at all   Food Insecurity: No Food Insecurity (3/7/2024)    Hunger Vital Sign     Worried About Running Out of Food in the Last Year: Never true     Ran Out of Food in the Last Year: Never true   Transportation Needs: No Transportation Needs (3/7/2024)    PRAPARE - Transportation     Lack of Transportation (Medical): No     Lack of Transportation (Non-Medical): No   Physical Activity: Inactive (3/7/2024)    Exercise Vital Sign     Days of Exercise per Week: 0 days     Minutes of Exercise per Session: 0 min   Stress: Stress Concern Present (3/7/2024)    [place] [employer] - Occupational Stress Questionnaire     Feeling of Stress : Very much   Social Connections: Socially Isolated (3/7/2024)    Social Connection and Isolation Panel [NHANES]     Frequency of Communication with Friends and Family: More than three times a week     Frequency of Social Gatherings with Friends and Family: More than three times a week     Attends Jainism Services: Never     Active Member of Clubs or Organizations: No     Attends Club or Organization Meetings: Never     Marital Status: Never    Housing Stability: Unknown (3/7/2024)    Housing Stability Vital Sign     Unable to Pay for Housing in the Last Year: No     Unstable Housing in the Last Year: No     Review of Systems   Constitutional:  Positive for fever. Negative for fatigue.   HENT: Negative.     Eyes: Negative.    Respiratory:  Negative for cough, shortness of breath and wheezing.    Cardiovascular:  Negative for chest pain and palpitations.   Gastrointestinal:  Negative for abdominal distention, constipation, nausea and vomiting.   Genitourinary: Negative.    Musculoskeletal:  Positive for arthralgias and myalgias.   Skin:  Positive for wound.   Neurological:  Negative for dizziness, seizures and weakness.   Hematological: Negative.     Psychiatric/Behavioral: Negative.       Objective:     Vital Signs (Most Recent):  Temp: 97.7 °F (36.5 °C) (03/15/24 1136)  Pulse: 100 (03/15/24 1136)  Resp: 16 (03/15/24 1408)  BP: 96/61 (03/15/24 1136)  SpO2: 99 % (03/15/24 1136) Vital Signs (24h Range):  Temp:  [97.7 °F (36.5 °C)-99.6 °F (37.6 °C)] 97.7 °F (36.5 °C)  Pulse:  [] 100  Resp:  [16-20] 16  SpO2:  [97 %-100 %] 99 %  BP: ()/(59-65) 96/61     Weight: 59.4 kg (130 lb 15.3 oz)  Body mass index is 19.34 kg/m².    Estimated Creatinine Clearance: 186.5 mL/min (based on SCr of 0.5 mg/dL).     Physical Exam  Constitutional:       General: He is not in acute distress.     Appearance: Normal appearance. He is well-developed. He is ill-appearing. He is not diaphoretic.   HENT:      Head: Normocephalic and atraumatic.      Right Ear: External ear normal.      Left Ear: External ear normal.      Nose: Nose normal.      Comments: NG tube  Eyes:      General:         Right eye: No discharge.         Left eye: No discharge.   Neck:      Comments: Trach collar  Pulmonary:      Effort: Pulmonary effort is normal. No respiratory distress.      Breath sounds: No stridor.   Abdominal:      Comments: ostomy   Musculoskeletal:         General: Deformity present.      Comments: Bilateral AKA and left elbow amputation sites are dressed.   Skin:     General: Skin is dry.      Coloration: Skin is not jaundiced or pale.      Findings: No erythema.   Neurological:      General: No focal deficit present.      Mental Status: He is alert and oriented to person, place, and time. Mental status is at baseline.   Psychiatric:         Mood and Affect: Mood normal.         Behavior: Behavior normal.         Thought Content: Thought content normal.         Judgment: Judgment normal.          Significant Labs: Blood Culture:   Recent Labs   Lab 02/21/24  1144 02/21/24  1145 02/27/24  1848 02/27/24  1946   LABBLOO No growth after 5 days. Gram stain werner bottle: Gram negative rods   Results called to and read back by:Ana Lilia Carver RN 02/23/2024  22:30  FUSOBACTERIUM NUCLEATUM* No growth after 5 days. No growth after 5 days.     CBC:   Recent Labs   Lab 03/14/24  0500 03/14/24  1625 03/15/24  0526   WBC 25.50* 20.58* 19.75*   HGB 6.7* 9.2* 9.4*   HCT 20.1* 26.7* 28.3*    280 293     CMP:   Recent Labs   Lab 03/14/24  0500 03/14/24  0925 03/15/24  0526    134* 136   K 5.1 5.3* 4.9    104 105   CO2 21* 21* 22*   * 103 79   BUN 16 16 15   CREATININE 0.6 0.6 0.5   CALCIUM 6.8* 6.9* 7.0*   PROT  --  4.9* 5.6*   ALBUMIN  --  1.0* 1.1*   BILITOT  --  0.2 0.2   ALKPHOS  --  62 102   AST  --  22 26   ALT  --  <5* 6*   ANIONGAP 10 9 9     Microbiology Results (last 7 days)       Procedure Component Value Units Date/Time    Aerobic culture [[id number]]  (Abnormal) Collected: 03/13/24 1709    Order Status: Completed Specimen: Arm, Left Updated: 03/15/24 1359     Aerobic Bacterial Culture ACINETOBACTER BAUMANNII COMPLEX  Few  Susceptibility pending        PSEUDOMONAS AERUGINOSA  Rare  Susceptibility pending      Narrative:      Left arm proximal margin    Aerobic culture [[id number]]  (Abnormal)  (Susceptibility) Collected: 03/13/24 1535    Order Status: Completed Specimen: Leg, Left Updated: 03/15/24 1221     Aerobic Bacterial Culture PSEUDOMONAS AERUGINOSA   Few  Susceptibility pending  Skin diana also present      Narrative:      Left leg proximal margin    Culture, Anaerobe [[id number]] Collected: 03/13/24 1709    Order Status: Completed Specimen: Arm, Left Updated: 03/15/24 0921     Anaerobic Culture Culture in progress    Narrative:      Left arm proximal margin    Culture, Anaerobe [[id number]] Collected: 03/13/24 1535    Order Status: Completed Specimen: Leg, Left Updated: 03/15/24 0920     Anaerobic Culture Culture in progress    Narrative:      Left leg proximal margin    Blood culture [[id number]] Collected: 03/15/24 0817    Order Status: Sent Specimen: Blood from  Peripheral, Antecubital, Left Updated: 03/15/24 0844    Blood culture [[id number]] Collected: 03/15/24 0818    Order Status: Sent Specimen: Blood from Peripheral, Wrist, Left Updated: 03/15/24 0844    Blood culture [[id number]]     Order Status: Canceled Specimen: Blood     Blood culture [[id number]]     Order Status: Canceled Specimen: Blood           Recent Lab Results         03/15/24  0526   03/15/24  0056   03/14/24  2121   03/14/24  1625        Albumin 1.1                          ALT 6             Anion Gap 9             AST 26             Baso # 0.11       0.13       Basophil % 0.6       0.6       BILIRUBIN TOTAL 0.2  Comment: For infants and newborns, interpretation of results should be based  on gestational age, weight and in agreement with clinical  observations.    Premature Infant recommended reference ranges:  Up to 24 hours.............<8.0 mg/dL  Up to 48 hours............<12.0 mg/dL  3-5 days..................<15.0 mg/dL  6-29 days.................<15.0 mg/dL               BUN 15             Calcium 7.0             Chloride 105             CO2 22             Creatinine 0.5             Differential Method Automated       Automated       eGFR >60.0             Eos # 0.3       0.1       Eos % 1.7       0.5       Glucose 79             Gran # (ANC) 13.3       12.9       Gran % 67.0       62.8       Hematocrit 28.3       26.7       Hemoglobin 9.4       9.2       Immature Grans (Abs) 0.11  Comment: Mild elevation in immature granulocytes is non specific and   can be seen in a variety of conditions including stress response,   acute inflammation, trauma and pregnancy. Correlation with other   laboratory and clinical findings is essential.         0.10  Comment: Mild elevation in immature granulocytes is non specific and   can be seen in a variety of conditions including stress response,   acute inflammation, trauma and pregnancy. Correlation with other   laboratory and clinical findings is  essential.         Immature Granulocytes 0.6       0.5       Lactic Acid Level 1.2  Comment: Falsely low lactic acid results can be found in samples   containing >=13.0 mg/dL total bilirubin and/or >=3.5 mg/dL   direct bilirubin.     1.3  Comment: Falsely low lactic acid results can be found in samples   containing >=13.0 mg/dL total bilirubin and/or >=3.5 mg/dL   direct bilirubin.     1.3  Comment: Falsely low lactic acid results can be found in samples   containing >=13.0 mg/dL total bilirubin and/or >=3.5 mg/dL   direct bilirubin.           Lymph # 3.5       4.1       Lymph % 17.5       20.1       MCH 30.2       30.9       MCHC 33.2       34.5       MCV 91       90       Mono # 2.5       3.2       Mono % 12.6       15.5       MPV 8.6       8.6       nRBC 0       0       Platelet Estimate Appears normal             Platelet Count 293       280       Potassium 4.9             PROTEIN TOTAL 5.6             RBC 3.11       2.98       RDW 16.6       15.9       Sodium 136             WBC 19.75       20.58               Significant Imaging: I have reviewed all pertinent imaging results/findings within the past 24 hours.
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
## 2024-03-15 NOTE — PLAN OF CARE
EOS: No acute changes. Vss. BP holding, map above 65. Xanax and oxy given see MAR. NG tube with tube feeding at 40/goal 40, no residual.     Problem: Adult Inpatient Plan of Care  Goal: Plan of Care Review  Outcome: Ongoing, Progressing  Goal: Patient-Specific Goal (Individualized)  Outcome: Ongoing, Progressing  Goal: Absence of Hospital-Acquired Illness or Injury  Outcome: Ongoing, Progressing  Goal: Optimal Comfort and Wellbeing  Outcome: Ongoing, Progressing  Goal: Readiness for Transition of Care  Outcome: Ongoing, Progressing     Problem: Adjustment to Illness (Sepsis/Septic Shock)  Goal: Optimal Coping  Outcome: Ongoing, Progressing     Problem: Bleeding (Sepsis/Septic Shock)  Goal: Absence of Bleeding  Outcome: Ongoing, Progressing     Problem: Glycemic Control Impaired (Sepsis/Septic Shock)  Goal: Blood Glucose Level Within Desired Range  Outcome: Ongoing, Progressing     Problem: Infection Progression (Sepsis/Septic Shock)  Goal: Absence of Infection Signs and Symptoms  Outcome: Ongoing, Progressing     Problem: Nutrition Impaired (Sepsis/Septic Shock)  Goal: Optimal Nutrition Intake  Outcome: Ongoing, Progressing     Problem: Bariatric Environmental Safety  Goal: Safety Maintained with Care  Outcome: Ongoing, Progressing     Problem: Coping Ineffective  Goal: Effective Coping  Outcome: Ongoing, Progressing     Problem: Infection  Goal: Absence of Infection Signs and Symptoms  Outcome: Ongoing, Progressing     Problem: Impaired Wound Healing  Goal: Optimal Wound Healing  Outcome: Ongoing, Progressing     Problem: Fall Injury Risk  Goal: Absence of Fall and Fall-Related Injury  Outcome: Ongoing, Progressing
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
## 2024-03-15 NOTE — SUBJECTIVE & OBJECTIVE
History reviewed. No pertinent past medical history.    Past Surgical History:   Procedure Laterality Date    ABOVE-KNEE AMPUTATION Bilateral 3/13/2024    Procedure: AMPUTATION, ABOVE KNEE;  Surgeon: Dexter Wynne MD;  Location: [address];  Service: General;  Laterality: Bilateral;    AMPUTATION OF UPPER EXTREMITY Left 3/13/2024    Procedure: AMPUTATION, UPPER EXTREMITY;  Surgeon: Dexter Wynne MD;  Location: [facility] OR [address] [place] [address];  Service: General;  Laterality: Left;  BELOW ELBOW    ESOPHAGOGASTRODUODENOSCOPY W/ PEG N/A 5/30/2023    Procedure: PEG;  Surgeon: JUSTYN Devine MD;  Location: [facility] ENDOSCOPY;  Service: Gastroenterology;  Laterality: N/A;       Review of patient's allergies indicates:   Allergen Reactions    Opioids - morphine analogues Nausea And Vomiting, Anxiety and Other (See Comments)       Medications:  Continuous Infusions:  Scheduled Meds:   acetaminophen  650 mg Per NG tube Q12H    chlorhexidine  15 mL Mouth/Throat BID    enoxparin  30 mg Subcutaneous Q24H (prophylaxis, 1700)    famotidine  20 mg Per NG tube BID    folic acid  1 mg Per NG tube Daily    gabapentin  300 mg Per NG tube TID    levetiracetam  750 mg Per NG tube BID    levothyroxine  112 mcg Per NG tube Before breakfast    LIDOcaine  1 patch Transdermal Q24H    linezolid  600 mg Intravenous Q12H    meropenem (MERREM) IVPB  2 g Intravenous Q8H    methocarbamoL  500 mg Oral TID    midodrine  10 mg Per NG tube Q8H    minocycline  100 mg Per NG tube Q12H    polyethylene glycol  17 g Per NG tube BID     PRN Meds:ALPRAZolam, oxyCODONE-acetaminophen    Family History    None       Tobacco Use    Smoking status: Never    Smokeless tobacco: Never   Substance and Sexual Activity    Alcohol use: Not Currently    Drug use: Not Currently    Sexual activity: Not Currently       Review of Systems   Eyes:  Positive for visual disturbance.     Objective:     Vital Signs (Most Recent):  Temp: 97.7 °F (36.5 °C) (03/15/24  1136)  Pulse: 100 (03/15/24 1136)  Resp: 17 (03/15/24 1119)  BP: 96/61 (03/15/24 1136)  SpO2: 99 % (03/15/24 1136) Vital Signs (24h Range):  Temp:  [97.7 °F (36.5 °C)-101.7 °F (38.7 °C)] 97.7 °F (36.5 °C)  Pulse:  [] 100  Resp:  [16-20] 17  SpO2:  [97 %-100 %] 99 %  BP: ()/(59-65) 96/61     Weight: 59.4 kg (130 lb 15.3 oz)  Body mass index is 19.34 kg/m².       Physical Exam  Constitutional:       Appearance: He is ill-appearing.   Neck:      Trachea: Tracheostomy present.   Pulmonary:      Effort: Pulmonary effort is normal.   Neurological:      Mental Status: He is alert and oriented to person, place, and time.            Review of Symptoms      Symptom Assessment (ESAS 0-10 Scale)  Pain:  0  Dyspnea:  0  Anxiety:  0  Nausea:  0  Depression:  0  Anorexia:  0  Fatigue:  0  Insomnia:  0  Restlessness:  0  Agitation:  0         Pain Assessment:    Location(s): other      Performance Status:  20    Living Arrangements:  Lives in nursing home    Psychosocial/Cultural:   See Palliative Psychosocial Note: Yes  Pt was most recently living in an LTAC facility in [place]. Mom and Dad are both actively involved with pt.   **Primary  to Follow**  Palliative Care  Consult: Yes  Other       Location (Other): generalized to all limbs       Location: generalized       Quality: aching        Quantity: 5/10 in intensity        Chronicity: Onset 2 year(s) ago, stable        Aggravating Factors: none        Alleviating Factors: opiates        Associated Symptoms: none      Advance Care Planning   Advance Directives:   Living Will: No    LaPOST: No    Do Not Resuscitate Status: No      Decision Making:  Patient answered questions  Goals of Care: What is most important right now is to focus on curative/life-prolongation (regardless of treatment burdens). Accordingly, we have decided that the best plan to meet the patient's goals includes continuing with treatment.         Significant Labs: BMP:    Recent Labs   Lab 03/15/24  0526   GLU 79      K 4.9      CO2 22*   BUN 15   CREATININE 0.5   CALCIUM 7.0*       CBC:   Recent Labs   Lab 03/14/24  0500 03/14/24  1625 03/15/24  0526   WBC 25.50* 20.58* 19.75*   HGB 6.7* 9.2* 9.4*   HCT 20.1* 26.7* 28.3*    280 293       CBC:   Recent Labs   Lab 03/15/24  0526   WBC 19.75*   HGB 9.4*   HCT 28.3*   MCV 91          BMP:  Recent Labs   Lab 03/15/24  0526   GLU 79      K 4.9      CO2 22*   BUN 15   CREATININE 0.5   CALCIUM 7.0*       LFT:  Lab Results   Component Value Date    AST 26 03/15/2024    ALKPHOS 102 03/15/2024    BILITOT 0.2 03/15/2024     Albumin:   Albumin   Date Value Ref Range Status   03/15/2024 1.1 (L) 3.5 - 5.2 g/dL Final     Protein:   Total Protein   Date Value Ref Range Status   03/15/2024 5.6 (L) 6.0 - 8.4 g/dL Final     Lactic acid:   Lab Results   Component Value Date    LACTATE 1.2 03/15/2024    LACTATE 1.3 03/15/2024       Significant Imaging: I have reviewed all pertinent imaging results/findings within the past 24 hours.

## 2024-03-15 NOTE — SUBJECTIVE & OBJECTIVE
Interval History: NAEON. Fever yesterday evening to 101.7. HDS. Pain is controlled. Bilateral AKA and RUE dressings changed this AM  Hgb 9.4 this AM    Medications:  Continuous Infusions:  Scheduled Meds:   acetaminophen  650 mg Per NG tube Q12H    chlorhexidine  15 mL Mouth/Throat BID    famotidine  20 mg Per NG tube BID    folic acid  1 mg Per NG tube Daily    gabapentin  300 mg Per NG tube TID    levetiracetam  750 mg Per NG tube BID    levothyroxine  112 mcg Per NG tube Before breakfast    LIDOcaine  1 patch Transdermal Q24H    linezolid  600 mg Intravenous Q12H    meropenem (MERREM) IVPB  2 g Intravenous Q8H    methocarbamoL  500 mg Oral TID    midodrine  10 mg Per NG tube Q8H    minocycline  100 mg Per NG tube Q12H    polyethylene glycol  17 g Per NG tube BID     PRN Meds:0.9%  NaCl infusion (for blood administration), 0.9%  NaCl infusion (for blood administration), 0.9%  NaCl infusion (for blood administration), ALPRAZolam, oxyCODONE-acetaminophen     Review of patient's allergies indicates:   Allergen Reactions    Opioids - morphine analogues Nausea And Vomiting, Anxiety and Other (See Comments)     Objective:     Vital Signs (Most Recent):  Temp: 99 °F (37.2 °C) (03/15/24 0806)  Pulse: 97 (03/15/24 0806)  Resp: 20 (03/15/24 0806)  BP: 98/65 (03/15/24 0806)  SpO2: 97 % (03/15/24 0806) Vital Signs (24h Range):  Temp:  [98.7 °F (37.1 °C)-101.7 °F (38.7 °C)] 99 °F (37.2 °C)  Pulse:  [] 97  Resp:  [16-20] 20  SpO2:  [97 %-100 %] 97 %  BP: ()/(46-65) 98/65     Weight: 59.4 kg (130 lb 15.3 oz)  Body mass index is 19.34 kg/m².    Intake/Output - Last 3 Shifts         03/13 0700  03/14 0659 03/14 0700  03/15 0659 03/15 0700 03/16 0659    P.O.  240     I.V. (mL/kg)  1015.2 (17.1)     Blood 700 591.3     NG/GT 60 900     IV Piggyback 2000 1948.4     Total Intake(mL/kg) 2760 (46.5) 4694.8 (79)     Urine (mL/kg/hr) 2975 (2.1) 2700 (1.9)     Stool 50 300     Blood 325      Total Output 3350 3000     Net -590  +1694.8            Stool Occurrence 0 x 1 x              Physical Exam  Vitals and nursing note reviewed.   Constitutional:       Appearance: He is ill-appearing.   HENT:      Head: Normocephalic.      Nose: Nose normal.      Comments: NG tube in place     Mouth/Throat:      Mouth: Mucous membranes are moist.   Eyes:      Pupils: Pupils are equal, round, and reactive to light.   Cardiovascular:      Rate and Rhythm: Normal rate.   Pulmonary:      Effort: Pulmonary effort is normal. No respiratory distress.      Comments: On trach collar 5L  Abdominal:      General: There is no distension.      Palpations: Abdomen is soft.      Tenderness: There is no abdominal tenderness. There is no right CVA tenderness or left CVA tenderness.      Comments: Ostomy in left lower quadrant functional   Musculoskeletal:      Comments: S/p bilateral AKA, stumps with dressings. 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 Incision are c/d/i   Skin:     General: Skin is dry.   Neurological:      Mental Status: He is alert and oriented to person, place, and time.          Significant Labs:  I have reviewed all pertinent lab results within the past 24 hours.    Significant Diagnostics:  I have reviewed all pertinent imaging results/findings within the past 24 hours.
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
## 2024-03-15 NOTE — ASSESSMENT & PLAN NOTE
Patient has hyponatremia which is controlled,We will aim to correct the sodium by 4-6mEq in 24 hours. We will monitor sodium Daily. The hyponatremia is due to Dehydration/hypovolemia. The patient's sodium results have been reviewed and are listed below.  Recent Labs   Lab 03/15/24  0526

## 2024-03-15 NOTE — SUBJECTIVE & OBJECTIVE
Interval History: NAEON. Pain better controlled with Percocet.      Objective:     Vital Signs (Most Recent):  Temp: 97.7 °F (36.5 °C) (03/15/24 1136)  Pulse: 102 (03/15/24 1513)  Resp: 16 (03/15/24 1513)  BP: 96/61 (03/15/24 1136)  SpO2: 99 % (03/15/24 1513) Vital Signs (24h Range):  Temp:  [97.7 °F (36.5 °C)-99.6 °F (37.6 °C)] 97.7 °F (36.5 °C)  Pulse:  [] 102  Resp:  [16-20] 16  SpO2:  [97 %-100 %] 99 %  BP: ()/(59-65) 96/61     Weight: 59.4 kg (130 lb 15.3 oz)  Body mass index is 19.34 kg/m².    Intake/Output Summary (Last 24 hours) at 3/15/2024 1544  Last data filed at 3/15/2024 0432  Gross per 24 hour   Intake 3923.57 ml   Output 2300 ml   Net 1623.57 ml         Physical Exam  Vitals and nursing note reviewed.   Constitutional:       Appearance: He is ill-appearing.   HENT:      Head: Normocephalic.      Nose: Nose normal.      Comments: NG tube in place     Mouth/Throat:      Mouth: Mucous membranes are moist.   Eyes:      Pupils: Pupils are equal, round, and reactive to light.   Cardiovascular:      Rate and Rhythm: Normal rate.   Pulmonary:      Effort: Pulmonary effort is normal. No respiratory distress.      Comments: On trach collar 5L  Abdominal:      General: There is no distension.      Palpations: Abdomen is soft.      Tenderness: There is no abdominal tenderness. There is no right CVA tenderness or left CVA tenderness.   Musculoskeletal:      Comments: AKA in lower extremities in bandages  LUE amputation in dressing     Skin:     General: Skin is dry.   Neurological:      Mental Status: He is alert and oriented to person, place, and time.             Significant Labs: All pertinent labs within the past 24 hours have been reviewed.    Significant Imaging: I have reviewed all pertinent imaging results/findings within the past 24 hours.

## 2024-03-15 NOTE — ASSESSMENT & PLAN NOTE
Stage IV pressure ulcer of bilateral buttock, sacral region  Pressure ulcers of multiple sites  Chronic osteomyelitis    This patient does have evidence of infective focus  My overall impression is sepsis.  Source: Skin and Soft Tissue (location extremities)  Antibiotics given-   Antibiotics (72h ago, onward)      Start     Stop Route Frequency Ordered    03/15/24 2100  minocycline capsule 200 mg         -- PER NG TUBE Every 12 hours 03/15/24 1510    03/15/24 1615  cefTAZidime-avibactam (AVYCAZ) 2.5 g in dextrose 5 % in water (D5W) 100 mL IVPB (MB+)         -- IV Every 8 hours (non-standard times) 03/15/24 1510    03/07/24 0615  linezolid 600 mg/300 mL IVPB 600 mg         -- IV Every 12 hours (non-standard times) 03/07/24 0220          Latest lactate reviewed-  Recent Labs   Lab 03/15/24  0526   LACTATE 1.2       Organ dysfunction indicated by Acute respiratory failure    UCX 02/21 and RCX 02/22 with MDR proteus mirabilis, pseudomonas aeruginosa. Patient has extensive MDRO / ESBL culture data from prior hospitalizations as well. Shock component is now resolved, but leukocytosis, fever, and underlying wounds are still a component as source control has not been achieved.    -- IP consult to general surgery for surgical evaluation s/p bilateral AKA and LUE below elbow amputation 3/13  -- Tissue sent to pathology  -- Continue linezolid, increase minocycline dose to 200 BID for concern for CRAB, Meropenem discontinued and Avycaz started  -- Weekly CK  -- IP Consult to Wound Care  -- F/U Culture data  -- Turn q2h  -- MAP goal >60

## 2024-03-15 NOTE — ASSESSMENT & PLAN NOTE
Palliative Care at OSH engaging in ongoing communications with family regarding GOC given extensive comorbid conditions and recurrent hospitalizations. Per documentation review and brief discussion with family on initial transfer admission encounter, patient & family wish to continue all medical and surgical options at this time.     -- Patient & family would greatly benefit from continued Palliative Care discussions  -- IP Consult to [facility] Care services

## 2024-03-15 NOTE — ASSESSMENT & PLAN NOTE
>>ASSESSMENT AND PLAN FOR GANGRENE WRITTEN ON 3/15/2024  3:46 PM BY KELLY HUERTA MD    See sepsis
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
## 2024-03-15 NOTE — ASSESSMENT & PLAN NOTE
>>ASSESSMENT AND PLAN FOR PRESSURE ULCERS OF SKIN OF MULTIPLE TOPOGRAPHIC SITES WRITTEN ON 3/15/2024  3:48 PM BY KELLY HUERTA MD    See sepsis

## 2024-03-15 NOTE — RESPIRATORY THERAPY
RAPID RESPONSE RESPIRATORY THERAPY PROACTIVE NOTE           Time of visit: 930     Code Status: Prior   : 1995  Bed: [facility] A:   MRN: [id number]  Time spent at the bedside: < 15 min    SITUATION    Evaluated patient for: LDA Check     BACKGROUND    Patient has no past medical history on file.  Clinically Significant Surgical Hx: tracheostomy    24 Hours Vitals Range:  Temp:  [97.7 °F (36.5 °C)-101.7 °F (38.7 °C)]   Pulse:  []   Resp:  [16-20]   BP: ()/(59-65)   SpO2:  [97 %-100 %]     Labs:    Recent Labs     24  0500 24  0925 03/15/24  0526    134* 136   K 5.1 5.3* 4.9    104 105   CO2 21* 21* 22*   BUN 16 16 15   CREATININE 0.6 0.6 0.5   * 103 79        Recent Labs     24  1425 24  1542 24  1739   PH 7.401 7.450 7.315*   PCO2 41.9 36.3 49.8*   PO2 428* 186* 482*   HCO3 26.0 25.2 25.4   POCSATURATED 100 100 100   BE 1 1 -1       ASSESSMENT/INTERVENTIONS  Pt on 5L 28% trach collar. Shiley size 8 cuffed trach in place. All supplies in room. Extra trachs at bedside - 6 and 8, both cuffed.       Last VS   Temp: 97.7 °F (36.5 °C) (03/15 113)  Pulse: 100 (03/15 1136)  Resp: 17 (03/15 1119)  BP: 96/61 (03/15 1136)  SpO2: 99 % (03/15 1136)      Extra trachs at bedside: 6 and 8, both cuffed.  Level of Consciousness: Level of Consciousness (AVPU): alert  Respiratory Effort: Respiratory Effort: Unlabored Expansion/Accessory Muscle Usage: Expansion/Accessory Muscles/Retractions: no use of accessory muscles, no retractions, expansion symmetric  All Lung Field Breath Sounds: All Lung Fields Breath Sounds: Anterior:, Lateral:, coarse  NATALIA Breath Sounds: coarse  O2 Device/Concentration: 5L 28% TC.  Surgical airway: Yes, Type: Shiley Size: 8, cuffed  Ambu at bedside:       Active Orders   Respiratory Care    Oxygen Continuous     Frequency: Continuous     Number of Occurrences: Until Specified     Order Questions:      Device type: Low flow      Device: Trach  Collar      FiO2%: 60      Titrate O2 per Oxygen Titration Protocol: Yes      To maintain SpO2 goal of: >= 90%      Notify MD of: Inability to achieve desired SpO2; Sudden change in patient status and requires 20% increase in FiO2; Patient requires >60% FiO2    Pulse Oximetry Continuous     Frequency: Continuous     Number of Occurrences: Until Specified    Routine tracheostomy care     Frequency: BID     Number of Occurrences: Until Specified    SUCTION PRN     Frequency: PRN     Number of Occurrences: Until Specified       RECOMMENDATIONS    We recommend: RRT Recs: Continue POC per primary team.      FOLLOW-UP    Please call back the Rapid Response RT, Bryant Mcmillan RRT at x [phone] for any questions or concerns.

## 2024-03-16 LAB
ALBUMIN SERPL BCP-MCNC: 1.1 G/DL (ref 3.5–5.2)
ALP SERPL-CCNC: 122 U/L (ref 55–135)
ALT SERPL W/O P-5'-P-CCNC: 8 U/L (ref 10–44)
ANION GAP SERPL CALC-SCNC: 6 MMOL/L (ref 8–16)
ANISOCYTOSIS BLD QL SMEAR: SLIGHT
AST SERPL-CCNC: 22 U/L (ref 10–40)
BASOPHILS # BLD AUTO: 0.12 K/UL (ref 0–0.2)
BASOPHILS NFR BLD: 0.6 % (ref 0–1.9)
BILIRUB SERPL-MCNC: 0.1 MG/DL (ref 0.1–1)
BUN SERPL-MCNC: 15 MG/DL (ref 6–20)
CALCIUM SERPL-MCNC: 7.1 MG/DL (ref 8.7–10.5)
CHLORIDE SERPL-SCNC: 104 MMOL/L (ref 95–110)
CO2 SERPL-SCNC: 25 MMOL/L (ref 23–29)
CREAT SERPL-MCNC: 0.5 MG/DL (ref 0.5–1.4)
DIFFERENTIAL METHOD BLD: ABNORMAL
EOSINOPHIL # BLD AUTO: 1.1 K/UL (ref 0–0.5)
EOSINOPHIL NFR BLD: 5.4 % (ref 0–8)
ERYTHROCYTE [DISTWIDTH] IN BLOOD BY AUTOMATED COUNT: 16.2 % (ref 11.5–14.5)
EST. GFR  (NO RACE VARIABLE): >60 ML/MIN/1.73 M^2
GLUCOSE SERPL-MCNC: 91 MG/DL (ref 70–110)
HCT VFR BLD AUTO: 28.1 % (ref 40–54)
HGB BLD-MCNC: 9.1 G/DL (ref 14–18)
HYPOCHROMIA BLD QL SMEAR: ABNORMAL
IMM GRANULOCYTES # BLD AUTO: 0.1 K/UL (ref 0–0.04)
IMM GRANULOCYTES NFR BLD AUTO: 0.5 % (ref 0–0.5)
LYMPHOCYTES # BLD AUTO: 3.6 K/UL (ref 1–4.8)
LYMPHOCYTES NFR BLD: 17.7 % (ref 18–48)
MCH RBC QN AUTO: 30 PG (ref 27–31)
MCHC RBC AUTO-ENTMCNC: 32.4 G/DL (ref 32–36)
MCV RBC AUTO: 93 FL (ref 82–98)
MONOCYTES # BLD AUTO: 2.3 K/UL (ref 0.3–1)
MONOCYTES NFR BLD: 11.3 % (ref 4–15)
NEUTROPHILS # BLD AUTO: 13.2 K/UL (ref 1.8–7.7)
NEUTROPHILS NFR BLD: 64.5 % (ref 38–73)
NRBC BLD-RTO: 0 /100 WBC
OVALOCYTES BLD QL SMEAR: ABNORMAL
PLATELET # BLD AUTO: 306 K/UL (ref 150–450)
PLATELET BLD QL SMEAR: ABNORMAL
PMV BLD AUTO: 8.7 FL (ref 9.2–12.9)
POIKILOCYTOSIS BLD QL SMEAR: SLIGHT
POTASSIUM SERPL-SCNC: 4.7 MMOL/L (ref 3.5–5.1)
PROT SERPL-MCNC: 5.7 G/DL (ref 6–8.4)
RBC # BLD AUTO: 3.03 M/UL (ref 4.6–6.2)
SODIUM SERPL-SCNC: 135 MMOL/L (ref 136–145)
SPHEROCYTES BLD QL SMEAR: ABNORMAL
TARGETS BLD QL SMEAR: ABNORMAL
WBC # BLD AUTO: 20.51 K/UL (ref 3.9–12.7)
WBC TOXIC VACUOLES BLD QL SMEAR: PRESENT

## 2024-03-16 PROCEDURE — 25000003 PHARM REV CODE 250: Performed by: NURSE PRACTITIONER

## 2024-03-16 PROCEDURE — 85025 COMPLETE CBC W/AUTO DIFF WBC: CPT

## 2024-03-16 PROCEDURE — 25000003 PHARM REV CODE 250

## 2024-03-16 PROCEDURE — 63600175 PHARM REV CODE 636 W HCPCS

## 2024-03-16 PROCEDURE — 63600175 PHARM REV CODE 636 W HCPCS: Performed by: HOSPITALIST

## 2024-03-16 PROCEDURE — 25000003 PHARM REV CODE 250: Performed by: PHYSICIAN ASSISTANT

## 2024-03-16 PROCEDURE — 27000221 HC OXYGEN, UP TO 24 HOURS

## 2024-03-16 PROCEDURE — 63600175 PHARM REV CODE 636 W HCPCS: Mod: JZ,JG | Performed by: PHYSICIAN ASSISTANT

## 2024-03-16 PROCEDURE — 27000207 HC ISOLATION

## 2024-03-16 PROCEDURE — 20600001 HC STEP DOWN PRIVATE ROOM

## 2024-03-16 PROCEDURE — 25000003 PHARM REV CODE 250: Performed by: HOSPITALIST

## 2024-03-16 PROCEDURE — 99900035 HC TECH TIME PER 15 MIN (STAT)

## 2024-03-16 PROCEDURE — 94761 N-INVAS EAR/PLS OXIMETRY MLT: CPT

## 2024-03-16 PROCEDURE — 80053 COMPREHEN METABOLIC PANEL: CPT

## 2024-03-16 PROCEDURE — 31720 CLEARANCE OF AIRWAYS: CPT

## 2024-03-16 RX ORDER — OXYCODONE HYDROCHLORIDE 5 MG/1
5 TABLET ORAL EVERY 4 HOURS PRN
Status: DISCONTINUED | OUTPATIENT
Start: 2024-03-16 | End: 2024-03-16

## 2024-03-16 RX ORDER — MINOCYCLINE HYDROCHLORIDE 100 MG/1
200 CAPSULE ORAL EVERY 12 HOURS
Status: DISCONTINUED | OUTPATIENT
Start: 2024-03-16 | End: 2024-04-04

## 2024-03-16 RX ORDER — OXYCODONE AND ACETAMINOPHEN 10; 325 MG/1; MG/1
1 TABLET ORAL EVERY 4 HOURS PRN
Status: CANCELLED | OUTPATIENT
Start: 2024-03-16

## 2024-03-16 RX ORDER — OXYCODONE HYDROCHLORIDE 5 MG/1
5 TABLET ORAL EVERY 4 HOURS PRN
Status: CANCELLED | OUTPATIENT
Start: 2024-03-16

## 2024-03-16 RX ORDER — OXYCODONE HCL 10 MG/1
10 TABLET, FILM COATED, EXTENDED RELEASE ORAL EVERY 12 HOURS
Status: DISCONTINUED | OUTPATIENT
Start: 2024-03-16 | End: 2024-03-19

## 2024-03-16 RX ADMIN — CEFTAZIDIME, AVIBACTAM 2.5 G: 2; .5 POWDER, FOR SOLUTION INTRAVENOUS at 09:03

## 2024-03-16 RX ADMIN — LIDOCAINE 5% 1 PATCH: 700 PATCH TOPICAL at 01:03

## 2024-03-16 RX ADMIN — HYPROMELLOSE 2910 1 DROP: 5 SOLUTION/ DROPS OPHTHALMIC at 04:03

## 2024-03-16 RX ADMIN — METHOCARBAMOL 500 MG: 500 TABLET ORAL at 04:03

## 2024-03-16 RX ADMIN — GABAPENTIN 300 MG: 300 CAPSULE ORAL at 10:03

## 2024-03-16 RX ADMIN — LINEZOLID 600 MG: 600 INJECTION, SOLUTION INTRAVENOUS at 09:03

## 2024-03-16 RX ADMIN — OXYCODONE HYDROCHLORIDE 10 MG: 10 TABLET, FILM COATED, EXTENDED RELEASE ORAL at 08:03

## 2024-03-16 RX ADMIN — MIDODRINE HYDROCHLORIDE 10 MG: 5 TABLET ORAL at 10:03

## 2024-03-16 RX ADMIN — POLYETHYLENE GLYCOL 3350 17 G: 17 POWDER, FOR SOLUTION ORAL at 10:03

## 2024-03-16 RX ADMIN — FAMOTIDINE 20 MG: 40 POWDER, FOR SUSPENSION ORAL at 10:03

## 2024-03-16 RX ADMIN — METHOCARBAMOL 500 MG: 500 TABLET ORAL at 10:03

## 2024-03-16 RX ADMIN — MINOCYCLINE HYDROCHLORIDE 200 MG: 100 CAPSULE ORAL at 01:03

## 2024-03-16 RX ADMIN — CEFTAZIDIME, AVIBACTAM 2.5 G: 2; .5 POWDER, FOR SOLUTION INTRAVENOUS at 11:03

## 2024-03-16 RX ADMIN — HYPROMELLOSE 2910 1 DROP: 5 SOLUTION/ DROPS OPHTHALMIC at 09:03

## 2024-03-16 RX ADMIN — LINEZOLID 600 MG: 600 INJECTION, SOLUTION INTRAVENOUS at 10:03

## 2024-03-16 RX ADMIN — ENOXAPARIN SODIUM 30 MG: 30 INJECTION SUBCUTANEOUS at 04:03

## 2024-03-16 RX ADMIN — CHLORHEXIDINE GLUCONATE 0.12% ORAL RINSE 15 ML: 1.2 LIQUID ORAL at 10:03

## 2024-03-16 RX ADMIN — LEVETIRACETAM 750 MG: 100 SOLUTION ORAL at 09:03

## 2024-03-16 RX ADMIN — CHLORHEXIDINE GLUCONATE 0.12% ORAL RINSE 15 ML: 1.2 LIQUID ORAL at 09:03

## 2024-03-16 RX ADMIN — HYPROMELLOSE 2910 1 DROP: 5 SOLUTION/ DROPS OPHTHALMIC at 12:03

## 2024-03-16 RX ADMIN — MICONAZOLE NITRATE: 20 OINTMENT TOPICAL at 10:03

## 2024-03-16 RX ADMIN — OXYCODONE HYDROCHLORIDE AND ACETAMINOPHEN 1 TABLET: 10; 325 TABLET ORAL at 04:03

## 2024-03-16 RX ADMIN — MIDODRINE HYDROCHLORIDE 10 MG: 5 TABLET ORAL at 01:03

## 2024-03-16 RX ADMIN — ALPRAZOLAM 1 MG: 0.5 TABLET ORAL at 11:03

## 2024-03-16 RX ADMIN — ACETAMINOPHEN 650 MG: 650 SOLUTION ORAL at 10:03

## 2024-03-16 RX ADMIN — FAMOTIDINE 20 MG: 40 POWDER, FOR SUSPENSION ORAL at 09:03

## 2024-03-16 RX ADMIN — CEFTAZIDIME, AVIBACTAM 2.5 G: 2; .5 POWDER, FOR SOLUTION INTRAVENOUS at 12:03

## 2024-03-16 RX ADMIN — ACETAMINOPHEN 650 MG: 650 SOLUTION ORAL at 09:03

## 2024-03-16 RX ADMIN — MIDODRINE HYDROCHLORIDE 10 MG: 5 TABLET ORAL at 05:03

## 2024-03-16 RX ADMIN — ALPRAZOLAM 1 MG: 0.5 TABLET ORAL at 08:03

## 2024-03-16 RX ADMIN — GABAPENTIN 300 MG: 300 CAPSULE ORAL at 09:03

## 2024-03-16 RX ADMIN — OXYCODONE HYDROCHLORIDE AND ACETAMINOPHEN 1 TABLET: 10; 325 TABLET ORAL at 10:03

## 2024-03-16 RX ADMIN — LEVETIRACETAM 750 MG: 100 SOLUTION ORAL at 10:03

## 2024-03-16 RX ADMIN — CEFTAZIDIME, AVIBACTAM 2.5 G: 2; .5 POWDER, FOR SOLUTION INTRAVENOUS at 04:03

## 2024-03-16 RX ADMIN — FOLIC ACID 1 MG: 1 TABLET ORAL at 09:03

## 2024-03-16 RX ADMIN — METHOCARBAMOL 500 MG: 500 TABLET ORAL at 09:03

## 2024-03-16 RX ADMIN — MINOCYCLINE HYDROCHLORIDE 200 MG: 100 CAPSULE ORAL at 10:03

## 2024-03-16 RX ADMIN — OXYCODONE HYDROCHLORIDE 10 MG: 10 TABLET, FILM COATED, EXTENDED RELEASE ORAL at 11:03

## 2024-03-16 RX ADMIN — GABAPENTIN 300 MG: 300 CAPSULE ORAL at 04:03

## 2024-03-16 RX ADMIN — LEVOTHYROXINE SODIUM 112 MCG: 112 TABLET ORAL at 05:03

## 2024-03-16 NOTE — SUBJECTIVE & OBJECTIVE
Interval History: Added oxycontin due to uncontrolled pain. Complaining of eye dryness, drops ordered       Objective:     Vital Signs (Most Recent):  Temp: 98.4 °F (36.9 °C) (03/16/24 1259)  Pulse: 102 (03/16/24 1259)  Resp: 20 (03/16/24 1259)  BP: 96/60 (03/16/24 1259)  SpO2: 100 % (03/16/24 1259) Vital Signs (24h Range):  Temp:  [97.5 °F (36.4 °C)-100 °F (37.8 °C)] 98.4 °F (36.9 °C)  Pulse:  [] 102  Resp:  [15-22] 20  SpO2:  [96 %-100 %] 100 %  BP: ()/(56-69) 96/60     Weight: 59.4 kg (130 lb 15.3 oz)  Body mass index is 19.34 kg/m².    Intake/Output Summary (Last 24 hours) at 3/16/2024 1305  Last data filed at 3/16/2024 0520  Gross per 24 hour   Intake 2145.96 ml   Output 3800 ml   Net -1654.04 ml         Physical Exam  Vitals and nursing note reviewed.   Constitutional:       Appearance: He is ill-appearing.   HENT:      Head: Normocephalic.      Nose: Nose normal.      Comments: NG tube in place     Mouth/Throat:      Mouth: Mucous membranes are moist.   Eyes:      Pupils: Pupils are equal, round, and reactive to light.   Cardiovascular:      Rate and Rhythm: Normal rate.   Pulmonary:      Effort: Pulmonary effort is normal. No respiratory distress.      Comments: On trach collar 5L  Abdominal:      General: There is no distension.      Palpations: Abdomen is soft.      Tenderness: There is no abdominal tenderness. There is no right CVA tenderness or left CVA tenderness.   Musculoskeletal:      Comments: AKA in lower extremities in bandages  LUE amputation in dressing     Skin:     General: Skin is dry.   Neurological:      Mental Status: He is alert and oriented to person, place, and time.             Significant Labs: All pertinent labs within the past 24 hours have been reviewed.    Significant Imaging: I have reviewed all pertinent imaging results/findings within the past 24 hours.

## 2024-03-16 NOTE — ASSESSMENT & PLAN NOTE
Palliative Care at OSH engaging in ongoing communications with family regarding GOC given extensive comorbid conditions and recurrent hospitalizations. Per documentation review and brief discussion with family on initial transfer admission encounter, patient & family wish to continue all medical and surgical options at this time.     -- Patient & family would greatly benefit from continued Palliative Care discussions  -- IP Consult to [facility] Care services

## 2024-03-16 NOTE — ASSESSMENT & PLAN NOTE
>>ASSESSMENT AND PLAN FOR PRESSURE ULCERS OF SKIN OF MULTIPLE TOPOGRAPHIC SITES WRITTEN ON 3/16/2024  1:10 PM BY KELLY HUERTA MD    See sepsis

## 2024-03-16 NOTE — PLAN OF CARE
EOS: no acute changes. Vss, bp holding, map above 65. Patient slept most of the night. Mom and dad at bedside. Colostomy intact. Shen to drainage. Tube feed at 40, goal 40.       Problem: Adult Inpatient Plan of Care  Goal: Plan of Care Review  Outcome: Ongoing, Progressing  Goal: Patient-Specific Goal (Individualized)  Outcome: Ongoing, Progressing  Goal: Absence of Hospital-Acquired Illness or Injury  Outcome: Ongoing, Progressing  Goal: Optimal Comfort and Wellbeing  Outcome: Ongoing, Progressing  Goal: Readiness for Transition of Care  Outcome: Ongoing, Progressing     Problem: Adjustment to Illness (Sepsis/Septic Shock)  Goal: Optimal Coping  Outcome: Ongoing, Progressing     Problem: Bleeding (Sepsis/Septic Shock)  Goal: Absence of Bleeding  Outcome: Ongoing, Progressing     Problem: Glycemic Control Impaired (Sepsis/Septic Shock)  Goal: Blood Glucose Level Within Desired Range  Outcome: Ongoing, Progressing     Problem: Infection Progression (Sepsis/Septic Shock)  Goal: Absence of Infection Signs and Symptoms  Outcome: Ongoing, Progressing     Problem: Nutrition Impaired (Sepsis/Septic Shock)  Goal: Optimal Nutrition Intake  Outcome: Ongoing, Progressing     Problem: Bariatric Environmental Safety  Goal: Safety Maintained with Care  Outcome: Ongoing, Progressing     Problem: Coping Ineffective  Goal: Effective Coping  Outcome: Ongoing, Progressing     Problem: Infection  Goal: Absence of Infection Signs and Symptoms  Outcome: Ongoing, Progressing     Problem: Impaired Wound Healing  Goal: Optimal Wound Healing  Outcome: Ongoing, Progressing     Problem: Fall Injury Risk  Goal: Absence of Fall and Fall-Related Injury  Outcome: Ongoing, Progressing

## 2024-03-16 NOTE — ASSESSMENT & PLAN NOTE
UCX from 02/21 growing MDR Proteus mirabilis and Pseudomonas aeruginosa.    -- Should have completed the treatment course for initial UTI; however, given indwelling sterling catheter, risk of re-infection + colonization is high  -- ID consulted, see severe sepsis     the patient was asleep laying on her left side, her respirations appeared 
normal and she was not in any distress at this time.

## 2024-03-16 NOTE — ASSESSMENT & PLAN NOTE
>>ASSESSMENT AND PLAN FOR GANGRENE WRITTEN ON 3/16/2024  1:09 PM BY KELLY HUERTA MD    See sepsis

## 2024-03-16 NOTE — PLAN OF CARE
[facility] Plan of Care Note  Dx Sever Sepsis    Shift Events uneventful    Goals of Care: Pain control    Neuro: Alert    Vital Signs: B/P soft (that is normal for him) HR tachy    Respiratory: Trach RA    Diet: Reg, tube feedings Peptamen @ 40 to NG tube    Is patient tolerating current diet? yes    GTTS: KVO    Urine Output/Bowel Movement: WDL with sterling and Ostomy    Drains/Tubes/Tube Feeds (include total output/shift): NG tube with Peptamen @ 40, Ostomy, sterling     Lines: R groin PICC    Accuchecks:none    Skin: multiple pressure ulcers, B/L AKA, LBE amputation    Fall Risk Score: 16    Activity level? Bed ridden    Any scheduled procedures? none    Any safety concerns? none    Other: none
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
## 2024-03-16 NOTE — ASSESSMENT & PLAN NOTE
Patient has hyponatremia which is controlled,We will aim to correct the sodium by 4-6mEq in 24 hours. We will monitor sodium Daily. The hyponatremia is due to Dehydration/hypovolemia. The patient's sodium results have been reviewed and are listed below.  Recent Labs   Lab 03/16/24  0601   *

## 2024-03-16 NOTE — SUBJECTIVE & OBJECTIVE
Interval History: NAEON.  Afebrile.  Pain controlled.  HDS.  Dressing in place and dry.    Medications:  Continuous Infusions:  Scheduled Meds:   acetaminophen  650 mg Per NG tube Q12H    ceftazidime-avibactam (AVYCAZ) IVPB  2.5 g Intravenous Q8H    chlorhexidine  15 mL Mouth/Throat BID    enoxparin  30 mg Subcutaneous Q24H (prophylaxis, 1700)    famotidine  20 mg Per NG tube BID    folic acid  1 mg Per NG tube Daily    gabapentin  300 mg Per NG tube TID    levetiracetam  750 mg Per NG tube BID    levothyroxine  112 mcg Per NG tube Before breakfast    LIDOcaine  1 patch Transdermal Q24H    linezolid  600 mg Intravenous Q12H    methocarbamoL  500 mg Oral TID    midodrine  10 mg Per NG tube Q8H    minocycline  200 mg Per NG tube Q12H    polyethylene glycol  17 g Per NG tube BID     PRN Meds:ALPRAZolam, oxyCODONE-acetaminophen     Review of patient's allergies indicates:   Allergen Reactions    Opioids - morphine analogues Nausea And Vomiting, Anxiety and Other (See Comments)     Objective:     Vital Signs (Most Recent):  Temp: 97.5 °F (36.4 °C) (03/16/24 0724)  Pulse: 85 (03/16/24 0724)  Resp: 20 (03/16/24 0724)  BP: 106/69 (03/16/24 0724)  SpO2: 100 % (03/16/24 0724) Vital Signs (24h Range):  Temp:  [97.5 °F (36.4 °C)-100 °F (37.8 °C)] 97.5 °F (36.4 °C)  Pulse:  [] 85  Resp:  [15-20] 20  SpO2:  [96 %-100 %] 100 %  BP: ()/(56-69) 106/69     Weight: 59.4 kg (130 lb 15.3 oz)  Body mass index is 19.34 kg/m².    Intake/Output - Last 3 Shifts         03/14 0700  03/15 0659 03/15 0700  03/16 0659 03/16 0700 03/17 0659    P.O. 240 660     I.V. (mL/kg) 1015.2 (17.1)      Blood 591.3      NG/ 1220     IV Piggyback 1948.4 786     Total Intake(mL/kg) 4574.8 (77) 2666 (44.9)     Urine (mL/kg/hr) 2700 (1.9) 3600 (2.5)     Stool 100 200     Blood       Total Output 2800 3800     Net +1774.8 -1134            Stool Occurrence 1 x               Physical Exam  Vitals and nursing note reviewed.   Constitutional:        Appearance: He is ill-appearing.   HENT:      Head: Normocephalic.      Nose: Nose normal.      Comments: NG tube in place     Mouth/Throat:      Mouth: Mucous membranes are moist.   Eyes:      Pupils: Pupils are equal, round, and reactive to light.   Cardiovascular:      Rate and Rhythm: Normal rate.   Pulmonary:      Effort: Pulmonary effort is normal. No respiratory distress.      Comments: On trach collar 5L  Abdominal:      General: There is no distension.      Palpations: Abdomen is soft.      Tenderness: There is no abdominal tenderness. There is no right CVA tenderness or left CVA tenderness.      Comments: Ostomy in left lower quadrant functional   Musculoskeletal:      Comments: S/p bilateral AKA, stumps with dressings. 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 Incision are c/d/i   Skin:     General: Skin is dry.   Neurological:      Mental Status: He is alert and oriented to person, place, and time.          Significant Labs:  I have reviewed all pertinent lab results within the past 24 hours.    Significant Diagnostics:  I have reviewed all pertinent imaging results/findings within the past 24 hours.
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
## 2024-03-16 NOTE — RESPIRATORY THERAPY
RAPID RESPONSE RESPIRATORY THERAPY PROACTIVE NOTE           Time of visit: 1031     Code Status: Prior   : 1995  Bed: [facility] A:   MRN: [id number]  Time spent at the bedside: < 15 min    SITUATION    Evaluated patient for: LDA Check     BACKGROUND    Patient has no past medical history on file.  Clinically Significant Surgical Hx: tracheostomy    24 Hours Vitals Range:  Temp:  [97.5 °F (36.4 °C)-100 °F (37.8 °C)]   Pulse:  []   Resp:  [15-22]   BP: ()/(56-69)   SpO2:  [96 %-100 %]     Labs:    Recent Labs     24  0925 03/15/24  0526 24  0601   * 136 135*   K 5.3* 4.9 4.7    105 104   CO2 21* 22* 25   BUN 16 15 15   CREATININE 0.6 0.5 0.5    79 91        Recent Labs     24  1425 24  1542 24  1739   PH 7.401 7.450 7.315*   PCO2 41.9 36.3 49.8*   PO2 428* 186* 482*   HCO3 26.0 25.2 25.4   POCSATURATED 100 100 100   BE 1 1 -1       ASSESSMENT/INTERVENTIONS  Bedside LDA check and airway supply check completed      Last VS   Temp: 97.5 °F (36.4 °C) (724)  Pulse: 115 ( 1229)  Resp: 22 ( 1229)  BP: 106/69 ( 0724)  SpO2: 99 % ( 122)      Extra trachs at bedside: 6 CN/8 CN  Level of Consciousness: Level of Consciousness (AVPU): alert  Respiratory Effort: Respiratory Effort: Unlabored Expansion/Accessory Muscle Usage: Expansion/Accessory Muscles/Retractions: expansion symmetric, no use of accessory muscles  All Lung Field Breath Sounds: All Lung Fields Breath Sounds: Anterior:, Lateral:, coarse  NATALIA Breath Sounds: coarse  O2 Device/Concentration: 5 L/28% FiO2  Surgical airway: Yes, Type: Shiley Size: 8, cuffed  Ambu at bedside:       Active Orders   Respiratory Care    Oxygen Continuous     Frequency: Continuous     Number of Occurrences: Until Specified     Order Questions:      Device type: Low flow      Device: Trach Collar      FiO2%: 60      Titrate O2 per Oxygen Titration Protocol: Yes      To maintain SpO2 goal of: >= 90%       Notify MD of: Inability to achieve desired SpO2; Sudden change in patient status and requires 20% increase in FiO2; Patient requires >60% FiO2    Pulse Oximetry Continuous     Frequency: Continuous     Number of Occurrences: Until Specified    Routine tracheostomy care     Frequency: BID     Number of Occurrences: Until Specified    SUCTION PRN     Frequency: PRN     Number of Occurrences: Until Specified       RECOMMENDATIONS    We recommend: RRT Recs: Continue POC per primary team.      FOLLOW-UP    Please call back the Rapid Response RT, Heena Howell RRT at x [phone] for any questions or concerns.

## 2024-03-17 PROBLEM — Z93.0 TRACHEOSTOMY DEPENDENCE: Status: ACTIVE | Noted: 2024-03-17

## 2024-03-17 LAB
ALBUMIN SERPL BCP-MCNC: 1.1 G/DL (ref 3.5–5.2)
ALP SERPL-CCNC: 120 U/L (ref 55–135)
ALT SERPL W/O P-5'-P-CCNC: 7 U/L (ref 10–44)
ANION GAP SERPL CALC-SCNC: 5 MMOL/L (ref 8–16)
AST SERPL-CCNC: 24 U/L (ref 10–40)
BASOPHILS # BLD AUTO: 0.09 K/UL (ref 0–0.2)
BASOPHILS NFR BLD: 0.4 % (ref 0–1.9)
BILIRUB SERPL-MCNC: 0.2 MG/DL (ref 0.1–1)
BUN SERPL-MCNC: 20 MG/DL (ref 6–20)
CALCIUM SERPL-MCNC: 7.3 MG/DL (ref 8.7–10.5)
CHLORIDE SERPL-SCNC: 102 MMOL/L (ref 95–110)
CO2 SERPL-SCNC: 27 MMOL/L (ref 23–29)
CREAT SERPL-MCNC: 0.6 MG/DL (ref 0.5–1.4)
DIFFERENTIAL METHOD BLD: ABNORMAL
EOSINOPHIL # BLD AUTO: 1.7 K/UL (ref 0–0.5)
EOSINOPHIL NFR BLD: 8 % (ref 0–8)
ERYTHROCYTE [DISTWIDTH] IN BLOOD BY AUTOMATED COUNT: 15.8 % (ref 11.5–14.5)
EST. GFR  (NO RACE VARIABLE): >60 ML/MIN/1.73 M^2
GLUCOSE SERPL-MCNC: 85 MG/DL (ref 70–110)
HCT VFR BLD AUTO: 27.6 % (ref 40–54)
HGB BLD-MCNC: 8.9 G/DL (ref 14–18)
IMM GRANULOCYTES # BLD AUTO: 0.14 K/UL (ref 0–0.04)
IMM GRANULOCYTES NFR BLD AUTO: 0.7 % (ref 0–0.5)
LYMPHOCYTES # BLD AUTO: 3.8 K/UL (ref 1–4.8)
LYMPHOCYTES NFR BLD: 17.9 % (ref 18–48)
MCH RBC QN AUTO: 30.6 PG (ref 27–31)
MCHC RBC AUTO-ENTMCNC: 32.2 G/DL (ref 32–36)
MCV RBC AUTO: 95 FL (ref 82–98)
MONOCYTES # BLD AUTO: 1.9 K/UL (ref 0.3–1)
MONOCYTES NFR BLD: 9.1 % (ref 4–15)
NEUTROPHILS # BLD AUTO: 13.6 K/UL (ref 1.8–7.7)
NEUTROPHILS NFR BLD: 63.9 % (ref 38–73)
NRBC BLD-RTO: 0 /100 WBC
PLATELET # BLD AUTO: 274 K/UL (ref 150–450)
PMV BLD AUTO: 8.6 FL (ref 9.2–12.9)
POTASSIUM SERPL-SCNC: 5 MMOL/L (ref 3.5–5.1)
PROT SERPL-MCNC: 6.1 G/DL (ref 6–8.4)
RBC # BLD AUTO: 2.91 M/UL (ref 4.6–6.2)
SODIUM SERPL-SCNC: 134 MMOL/L (ref 136–145)
WBC # BLD AUTO: 21.21 K/UL (ref 3.9–12.7)

## 2024-03-17 PROCEDURE — 20600001 HC STEP DOWN PRIVATE ROOM

## 2024-03-17 PROCEDURE — 27000207 HC ISOLATION

## 2024-03-17 PROCEDURE — 63600175 PHARM REV CODE 636 W HCPCS: Performed by: HOSPITALIST

## 2024-03-17 PROCEDURE — 63600175 PHARM REV CODE 636 W HCPCS: Mod: JZ,JG | Performed by: PHYSICIAN ASSISTANT

## 2024-03-17 PROCEDURE — 25000003 PHARM REV CODE 250

## 2024-03-17 PROCEDURE — 80053 COMPREHEN METABOLIC PANEL: CPT

## 2024-03-17 PROCEDURE — 99900026 HC AIRWAY MAINTENANCE (STAT)

## 2024-03-17 PROCEDURE — 25000003 PHARM REV CODE 250: Performed by: HOSPITALIST

## 2024-03-17 PROCEDURE — 99233 SBSQ HOSP IP/OBS HIGH 50: CPT | Mod: ,,, | Performed by: STUDENT IN AN ORGANIZED HEALTH CARE EDUCATION/TRAINING PROGRAM

## 2024-03-17 PROCEDURE — 25000003 PHARM REV CODE 250: Performed by: NURSE PRACTITIONER

## 2024-03-17 PROCEDURE — 27000221 HC OXYGEN, UP TO 24 HOURS

## 2024-03-17 PROCEDURE — 25000003 PHARM REV CODE 250: Performed by: PHYSICIAN ASSISTANT

## 2024-03-17 PROCEDURE — 63600175 PHARM REV CODE 636 W HCPCS

## 2024-03-17 PROCEDURE — 94761 N-INVAS EAR/PLS OXIMETRY MLT: CPT

## 2024-03-17 PROCEDURE — 85025 COMPLETE CBC W/AUTO DIFF WBC: CPT

## 2024-03-17 PROCEDURE — 99900035 HC TECH TIME PER 15 MIN (STAT)

## 2024-03-17 RX ADMIN — FAMOTIDINE 20 MG: 40 POWDER, FOR SUSPENSION ORAL at 08:03

## 2024-03-17 RX ADMIN — OXYCODONE HYDROCHLORIDE AND ACETAMINOPHEN 1 TABLET: 10; 325 TABLET ORAL at 02:03

## 2024-03-17 RX ADMIN — CEFTAZIDIME, AVIBACTAM 2.5 G: 2; .5 POWDER, FOR SOLUTION INTRAVENOUS at 04:03

## 2024-03-17 RX ADMIN — CHLORHEXIDINE GLUCONATE 0.12% ORAL RINSE 15 ML: 1.2 LIQUID ORAL at 08:03

## 2024-03-17 RX ADMIN — METHOCARBAMOL 500 MG: 500 TABLET ORAL at 08:03

## 2024-03-17 RX ADMIN — LEVETIRACETAM 750 MG: 100 SOLUTION ORAL at 10:03

## 2024-03-17 RX ADMIN — MICONAZOLE NITRATE: 20 OINTMENT TOPICAL at 08:03

## 2024-03-17 RX ADMIN — CEFTAZIDIME, AVIBACTAM 2.5 G: 2; .5 POWDER, FOR SOLUTION INTRAVENOUS at 08:03

## 2024-03-17 RX ADMIN — LINEZOLID 600 MG: 600 INJECTION, SOLUTION INTRAVENOUS at 10:03

## 2024-03-17 RX ADMIN — MIDODRINE HYDROCHLORIDE 10 MG: 5 TABLET ORAL at 06:03

## 2024-03-17 RX ADMIN — OXYCODONE HYDROCHLORIDE AND ACETAMINOPHEN 1 TABLET: 10; 325 TABLET ORAL at 06:03

## 2024-03-17 RX ADMIN — ALPRAZOLAM 1 MG: 0.5 TABLET ORAL at 07:03

## 2024-03-17 RX ADMIN — GABAPENTIN 300 MG: 300 CAPSULE ORAL at 08:03

## 2024-03-17 RX ADMIN — OXYCODONE HYDROCHLORIDE 10 MG: 10 TABLET, FILM COATED, EXTENDED RELEASE ORAL at 08:03

## 2024-03-17 RX ADMIN — ACETAMINOPHEN 650 MG: 650 SOLUTION ORAL at 08:03

## 2024-03-17 RX ADMIN — LEVETIRACETAM 750 MG: 100 SOLUTION ORAL at 08:03

## 2024-03-17 RX ADMIN — ENOXAPARIN SODIUM 30 MG: 30 INJECTION SUBCUTANEOUS at 04:03

## 2024-03-17 RX ADMIN — HYPROMELLOSE 2910 1 DROP: 5 SOLUTION/ DROPS OPHTHALMIC at 09:03

## 2024-03-17 RX ADMIN — MINOCYCLINE HYDROCHLORIDE 200 MG: 100 CAPSULE ORAL at 10:03

## 2024-03-17 RX ADMIN — MIDODRINE HYDROCHLORIDE 10 MG: 5 TABLET ORAL at 10:03

## 2024-03-17 RX ADMIN — CEFTAZIDIME, AVIBACTAM 2.5 G: 2; .5 POWDER, FOR SOLUTION INTRAVENOUS at 11:03

## 2024-03-17 RX ADMIN — GABAPENTIN 300 MG: 300 CAPSULE ORAL at 02:03

## 2024-03-17 RX ADMIN — MINOCYCLINE HYDROCHLORIDE 200 MG: 100 CAPSULE ORAL at 08:03

## 2024-03-17 RX ADMIN — LEVOTHYROXINE SODIUM 112 MCG: 112 TABLET ORAL at 06:03

## 2024-03-17 RX ADMIN — ACETAMINOPHEN 650 MG: 650 SOLUTION ORAL at 10:03

## 2024-03-17 RX ADMIN — OXYCODONE HYDROCHLORIDE 10 MG: 10 TABLET, FILM COATED, EXTENDED RELEASE ORAL at 10:03

## 2024-03-17 RX ADMIN — LIDOCAINE 5% 1 PATCH: 700 PATCH TOPICAL at 02:03

## 2024-03-17 RX ADMIN — LINEZOLID 600 MG: 600 INJECTION, SOLUTION INTRAVENOUS at 08:03

## 2024-03-17 RX ADMIN — CHLORHEXIDINE GLUCONATE 0.12% ORAL RINSE 15 ML: 1.2 LIQUID ORAL at 10:03

## 2024-03-17 RX ADMIN — POLYETHYLENE GLYCOL 3350 17 G: 17 POWDER, FOR SOLUTION ORAL at 08:03

## 2024-03-17 RX ADMIN — ALPRAZOLAM 1 MG: 0.5 TABLET ORAL at 10:03

## 2024-03-17 RX ADMIN — METHOCARBAMOL 500 MG: 500 TABLET ORAL at 10:03

## 2024-03-17 RX ADMIN — FOLIC ACID 1 MG: 1 TABLET ORAL at 08:03

## 2024-03-17 RX ADMIN — OXYCODONE HYDROCHLORIDE AND ACETAMINOPHEN 1 TABLET: 10; 325 TABLET ORAL at 07:03

## 2024-03-17 RX ADMIN — FAMOTIDINE 20 MG: 40 POWDER, FOR SUSPENSION ORAL at 10:03

## 2024-03-17 RX ADMIN — MIDODRINE HYDROCHLORIDE 10 MG: 5 TABLET ORAL at 02:03

## 2024-03-17 RX ADMIN — MICONAZOLE NITRATE: 20 OINTMENT TOPICAL at 09:03

## 2024-03-17 RX ADMIN — METHOCARBAMOL 500 MG: 500 TABLET ORAL at 02:03

## 2024-03-17 RX ADMIN — ALPRAZOLAM 1 MG: 0.5 TABLET ORAL at 06:03

## 2024-03-17 RX ADMIN — GABAPENTIN 300 MG: 300 CAPSULE ORAL at 10:03

## 2024-03-17 NOTE — ASSESSMENT & PLAN NOTE
- Oxycontin 10mg BID  - Percocet 10mg q4h PRN  - Patient reports pain is controlled with current regimen

## 2024-03-17 NOTE — ASSESSMENT & PLAN NOTE
- See severe sepsis     Anesthesia Type: 1% lidocaine without epinephrine and a 1:10 solution of 8.4% sodium bicarbonate Bill 59262 For Specimen Handling/Conveyance To Laboratory?: no Anesthesia Volume In Cc: 0 Number Of Curettages: 3 Size After Destruction (Required For Destruction Billing): 1.6 Cautery Type: electrodesiccation Size Of Lesion In Cm: 1 Billing Type: [employer] Bill As?: Note: Bill Malignant Destruction If Path Confirms Malignant Lesion. Only Bill As Shave Removal If Path Comes Back Benign. Do Not Bill Shave Removal On Malignant Lesions.: Malignant Destruction Wound Care: gentamicin 0.1% ointment Body Location Override (Optional - Billing Will Still Be Based On Selected Body Map Location If Applicable): right lateral lower leg Dressing: dry sterile dressing Notification Instructions: Patient will be notified of biopsy results. However, patient instructed to call the office if not contacted within 2 weeks. Was Curettage Performed?: Yes Post-Care Instructions: I reviewed with the patient in detail post-care instructions. Patient is to keep the biopsy site dry overnight, and then apply vaseline twice daily followed by a bandage until healed. Triangulation (Location Of Lesion In Relation To Distance From Anatomical Landmarks): Treated Anesthesia Volume In Cc: 1.5 Lab Facility: [facility] Consent: The provider's intent is to therapeutically remove the lesion in its entirety while at the same time obtaining a tissue sample for histopathologic examination. Written consent was obtained and risks were reviewed including but not limited to scarring, infection, bleeding, scabbing, incomplete removal, nerve damage and allergy to anesthesia. Lab: [facility] Hemostasis: Aluminum Chloride and Electrocautery Detail Level: Detailed
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
## 2024-03-17 NOTE — ASSESSMENT & PLAN NOTE
Patient has hyponatremia which is controlled,We will aim to correct the sodium by 4-6mEq in 24 hours. We will monitor sodium Daily. The hyponatremia is due to Dehydration/hypovolemia. The patient's sodium results have been reviewed and are listed below.  Recent Labs   Lab 03/17/24  0642   *

## 2024-03-17 NOTE — PLAN OF CARE
[facility] Plan of Care Note  Dx Final diagnoses:  [L89.90] Pressure ulcers of skin of multiple topographic sites     Shift Events Tracheal suctioning per family, RN, and RTT. Patient NGT not placed currently so removed per MD order. TF stopped at this time patient unable to take very much PO intake. Awaiting plan for nutrition at this time.    Neuro: AAOx4    Vital Signs: SBP 90-100s, afebrile, telemetry  HR.    Respiratory: 5L Trach collar with speaking valve in place    Diet: Regular    Urine Output/Bowel Movement: Shen high output, amount recorded in flowsheet
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
## 2024-03-17 NOTE — RESPIRATORY THERAPY
"RAPID RESPONSE RESPIRATORY THERAPY PROACTIVE NOTE           Time of visit: 1350     Code Status: Prior   : 1995  Bed: [facility] A:   MRN: [id number]  Time spent at the bedside: < 15 min    SITUATION    Evaluated patient for: LDA Check     BACKGROUND    Patient has no past medical history on file.  Clinically Significant Surgical Hx: tracheostomy    24 Hours Vitals Range:  Temp:  [97.6 °F (36.4 °C)-98.5 °F (36.9 °C)]   Pulse:  []   Resp:  [14-18]   BP: ()/(54-72)   SpO2:  [95 %-100 %]     Labs:    Recent Labs     03/15/24  0526 24  0601 24  0642    135* 134*   K 4.9 4.7 5.0    104 102   CO2 22* 25 27   BUN 15 15 20   CREATININE 0.5 0.5 0.6   GLU 79 91 85        No results for input(s): "PH", "PCO2", "PO2", "HCO3", "POCSATURATED", "BE" in the last 72 hours.      ASSESSMENT/INTERVENTIONS  Bedside LDA check and airway supply check completed      Last VS   Temp: 98.1 °F (36.7 °C) ( 1555)  Pulse: 96 ( 1555)  Resp: 18 ( 1555)  BP: 110/67 ( 1555)  SpO2: 98 % ( 1610)      Extra trachs at bedside: 6 CN/8 CN  Level of Consciousness: Level of Consciousness (AVPU): alert  Respiratory Effort: Respiratory Effort: Unlabored Expansion/Accessory Muscle Usage: Expansion/Accessory Muscles/Retractions: no use of accessory muscles, no retractions  All Lung Field Breath Sounds: All Lung Fields Breath Sounds: Anterior:, Lateral:, coarse  NATALIA Breath Sounds: coarse  O2 Device/Concentration: 5 L/28% FiO2  Surgical airway: Yes, Type: Shiley Size: 8, cuffed  Ambu at bedside:       Active Orders   Respiratory Care    Oxygen Continuous     Frequency: Continuous     Number of Occurrences: Until Specified     Order Questions:      Device type: Low flow      Device: Trach Collar      FiO2%: 60      Titrate O2 per Oxygen Titration Protocol: Yes      To maintain SpO2 goal of: >= 90%      Notify MD of: Inability to achieve desired SpO2; Sudden change in patient status and requires 20% " increase in FiO2; Patient requires >60% FiO2    Pulse Oximetry Continuous     Frequency: Continuous     Number of Occurrences: Until Specified    Routine tracheostomy care     Frequency: BID     Number of Occurrences: Until Specified    SUCTION PRN     Frequency: PRN     Number of Occurrences: Until Specified       RECOMMENDATIONS    We recommend: RRT Recs: Continue POC per primary team.      FOLLOW-UP    Please call back the Rapid Response RT, Heena Howell, WILLIS at x [phone] for any questions or concerns.

## 2024-03-17 NOTE — PROGRESS NOTES
[facility]  Wound Care    Patient Name:  Jyoti Mayberry   MRN:  [id number]  Date: 3/16/2024  Diagnosis: Severe sepsis    History:     History reviewed. No pertinent past medical history.    Social History     Socioeconomic History    Marital status: Single   Tobacco Use    Smoking status: Never    Smokeless tobacco: Never   Substance and Sexual Activity    Alcohol use: Not Currently    Drug use: Not Currently    Sexual activity: Not Currently     Social Determinants of Health     Financial Resource Strain: Low Risk  (3/7/2024)    Overall Financial Resource Strain (CARDIA)     Difficulty of Paying Living Expenses: Not hard at all   Food Insecurity: No Food Insecurity (3/7/2024)    Hunger Vital Sign     Worried About Running Out of Food in the Last Year: Never true     Ran Out of Food in the Last Year: Never true   Transportation Needs: No Transportation Needs (3/7/2024)    PRAPARE - Transportation     Lack of Transportation (Medical): No     Lack of Transportation (Non-Medical): No   Physical Activity: Inactive (3/7/2024)    Exercise Vital Sign     Days of Exercise per Week: 0 days     Minutes of Exercise per Session: 0 min   Stress: Stress Concern Present (3/7/2024)    [place] [employer] - Occupational Stress Questionnaire     Feeling of Stress : Very much   Social Connections: Socially Isolated (3/7/2024)    Social Connection and Isolation Panel [NHANES]     Frequency of Communication with Friends and Family: More than three times a week     Frequency of Social Gatherings with Friends and Family: More than three times a week     Attends Gnosticist Services: Never     Active Member of Clubs or Organizations: No     Attends Club or Organization Meetings: Never     Marital Status: Never    Housing Stability: Unknown (3/7/2024)    Housing Stability Vital Sign     Unable to Pay for Housing in the Last Year: No     Unstable Housing in the Last Year: No       Precautions:     Allergies as  of 03/05/2024    (No Known Allergies)       WO Assessment Details/Treatment     Patient seen for FU wound assmt and ostomy mgmt.      Reviewed chart for this encounter.   See Flow Sheet for findings.    Pt awake- reports recently received pain med per primary nurse. Family present reports MD changed LUE and BLE surgical site dressings this am- with plan to change again Sat/ Sunday. After much encouragement- pt agreed to wound assmt to back/ sacral areas. Wounds continue to with full thickness tissue loss- but appear clean and red with % red tissue base. Dressings continued and changed to Hydrafera foam dressings for consistency, continued absorption, and antibacterial properties to support continued healing. Pt remains on ELIZABETH bed surface for decreased turn surface and continued microclimate mgmt. Perineal care performed. Ostomy site care performed- supplies left at bedside for family to utilize as needed- family exhibits good understanding and competence re wound/ dressing and ostomy care. R hand dressing also changed to Hydrafera and border foam dressing- improvement noted. Pt tolerated care without c/o pain- and repositioned to comfort with pillow support to continue to redistribute pressure.      RECOMMENDATIONS:  R hand/ R upper back/ thoracic spine/ sacrum/ B ischia: clean with Vashe solution. -cover open area with Hydraefera ready.- secure with border foam dressing. Perform care MWF.    Discussed POC with patient / family present and primary nurse.   See EMR for orders & patient education.    Discussed nutrition and the role of protein in wound healing with the patient. Instructed patient to optimize protein for wound healing.    Bedside nursing to continue care & monitoring.  Bedside nursing to maintain pressure injury prevention interventions.  Current documented Gallito score is 14 -with a nutrition sub-scale score of 3.   03/15/24 1430   WOCN Assessment   WOCN Total Time (mins) 90   Visit Date  03/15/24   Visit Time 1430   Consult Type Follow Up   WOCN Speciality Wound   WOCN List colostomy   Continence Type Urinary;Fecal   Ostomy Type Colostomy   Intervention assessed;changed;chart review;coordination of care;orders   Teaching on-going  (pt/ family present- FU skin/ wound assmt.)   Skin Interventions   Device Skin Pressure Protection absorbent pad utilized/changed   Pressure Reduction Devices specialty bed utilized  (immerse surface.)   Pressure Reduction Techniques frequent weight shift encouraged;positioned off wounds;weight shift assistance provided   Skin Protection adhesive use limited;pouching devices used;silicone foam dressing in place;skin sealant/moisture barrier applied   Positioning   Body Position turned;legs elevated   Head of Bed (HOB) Positioning HOB elevated   Positioning/Transfer Devices pillows   Pressure Injury Prevention    Check Moisture Management Pad Done   Sacral Foam Dressing   (dressing changed.)   Heel protection technique Pillow   Heel preventative measures   (B aka)   Check Medical Devices Done        Altered Skin Integrity 02/22/24 Penis Mucosal membrane tissue injury with history of medical device use   Date First Assessed: 02/22/24   Altered Skin Integrity Present on Admission - Did Patient arrive to the hospital with altered skin?: yes  Location: Penis  Description of Altered Skin Integrity: Mucosal membrane tissue injury with history of medical de...   Wound Image    Description of Altered Skin Integrity Mucosal membrane tissue injury with history of medical device use   Dressing Appearance No dressing   Drainage Amount Small   Drainage Characteristics/Odor Serous;Serosanguineous   Appearance Pink;Purple   Care Cleansed with:;Antimicrobial agent  (Vashe solution)   Periwound Care Dry periwound area maintained   Dressing Change Due 03/15/24  (AF cream Q shift)        Altered Skin Integrity 02/22/24 Right dorsal Hand Ulceration   Date First Assessed: 02/22/24   Altered  Skin Integrity Present on Admission - Did Patient arrive to the hospital with altered skin?: yes  Side: Right  Orientation: dorsal  Location: Hand  Primary Wound Type: Ulceration   Wound Image    Description of Altered Skin Integrity Partial thickness tissue loss. Shallow open ulcer with a red or pink wound bed, without slough. Intact or Open/Ruptured Serum-filled blister.   Dressing Appearance Dried drainage   Drainage Amount Scant   Drainage Characteristics/Odor Serosanguineous   Appearance Pink;Red   Tissue loss description Partial thickness   Periwound Area Dry;Intact   Wound Length (cm) 7 cm   Wound Width (cm) 6 cm   Wound Depth (cm) 0.1 cm   Wound Volume (cm^3) 4.2 cm^3   Wound Surface Area (cm^2) 42 cm^2   Care Cleansed with:;Antimicrobial agent  (Vashe solution)   Dressing Changed;Methylene blue/gentian violet;Foam   Periwound Care Dry periwound area maintained   Dressing Change Due 03/18/24        Altered Skin Integrity 05/15/23 1411 Sacral spine Full thickness tissue loss with exposed bone, tendon, or muscle. Often includes undermining and tunneling. May extend into muscle and/or supporting structures.   Final Assessment Date/Final Assessment Time: (c)  (c)   Date First Assessed/Time First Assessed: 05/15/23 1411   Altered Skin Integrity Present on Admission - Did Patient arrive to the hospital with altered skin?: yes  Location: Sacral spine  Descript...   Wound Image    Description of Altered Skin Integrity Full thickness tissue loss with exposed bone, tendon, or muscle. Often includes undermining and tunneling. May extend into muscle and/or supporting structures.   Dressing Appearance Moist drainage   Drainage Amount Small   Drainage Characteristics/Odor Serosanguineous;Sanguineous   Appearance Red   Tissue loss description Full thickness   Periwound Area Macerated   Wound Length (cm) 9.5 cm   Wound Width (cm) 17 cm   Wound Depth (cm) 1.2 cm   Wound Volume (cm^3) 193.8 cm^3   Wound Surface Area (cm^2)  161.5 cm^2   Care Cleansed with:;Antimicrobial agent  (Vashe solution)   Dressing Changed;Methylene blue/gentian violet;Foam   Periwound Care Dry periwound area maintained   Dressing Change Due 03/18/24        Altered Skin Integrity 06/07/23 0900 Left Ischial tuberosity Full thickness tissue loss with exposed bone, tendon, or muscle. Often includes undermining and tunneling. May extend into muscle and/or supporting structures.   Final Assessment Date/Final Assessment Time: (c)  (c)   Date First Assessed/Time First Assessed: 06/07/23 0900   Altered Skin Integrity Present on Admission - Did Patient arrive to the hospital with altered skin?: yes  Side: Left  Location: Ischial tu...   Wound Image    Description of Altered Skin Integrity Full thickness tissue loss with exposed bone, tendon, or muscle. Often includes undermining and tunneling. May extend into muscle and/or supporting structures.   Dressing Appearance Moist drainage   Drainage Amount Small   Drainage Characteristics/Odor Sanguineous   Appearance Red   Tissue loss description Full thickness   Periwound Area Moist   Wound Length (cm) 6 cm   Wound Width (cm) 7 cm   Wound Depth (cm) 0.3 cm   Wound Volume (cm^3) 12.6 cm^3   Wound Surface Area (cm^2) 42 cm^2   Care Cleansed with:;Antimicrobial agent  (Vashe solution)   Dressing Changed;Methylene blue/gentian violet;Foam   Periwound Care Dry periwound area maintained   Dressing Change Due 03/18/24        Altered Skin Integrity 06/07/23 0900 Right Ischial tuberosity Full thickness tissue loss with exposed bone, tendon, or muscle. Often includes undermining and tunneling. May extend into muscle and/or supporting structures.   Final Assessment Date/Final Assessment Time: (c)  (c)   Date First Assessed/Time First Assessed: 06/07/23 0900   Altered Skin Integrity Present on Admission - Did Patient arrive to the hospital with altered skin?: yes  Side: Right  Location: Ischial t...   Wound Image    Description of Altered  Skin Integrity Full thickness tissue loss with exposed bone, tendon, or muscle. Often includes undermining and tunneling. May extend into muscle and/or supporting structures.   Dressing Appearance Moist drainage   Drainage Amount Small   Drainage Characteristics/Odor Sanguineous;Serosanguineous   Appearance Red   Tissue loss description Full thickness   Wound Length (cm) 9 cm   Wound Width (cm) 8 cm   Wound Depth (cm) 0.8 cm   Wound Volume (cm^3) 57.6 cm^3   Wound Surface Area (cm^2) 72 cm^2   Care Cleansed with:;Antimicrobial agent  (Vashe solution)   Dressing Changed;Methylene blue/gentian violet;Foam   Periwound Care Dry periwound area maintained   Dressing Change Due 03/18/24        Altered Skin Integrity 02/22/24 Thoracic spine Full thickness tissue loss with exposed bone, tendon, or muscle. Often includes undermining and tunneling. May extend into muscle and/or supporting structures.   Date First Assessed: 02/22/24   Altered Skin Integrity Present on Admission - Did Patient arrive to the hospital with altered skin?: yes  Location: Thoracic spine  Description of Altered Skin Integrity: Full thickness tissue loss with exposed bone, te...   Wound Image    Description of Altered Skin Integrity Full thickness tissue loss with exposed bone, tendon, or muscle. Often includes undermining and tunneling. May extend into muscle and/or supporting structures.   Dressing Appearance Moist drainage   Drainage Amount Small   Drainage Characteristics/Odor Sanguineous   Appearance Red   Tissue loss description Full thickness   Red (%), Wound Tissue Color 100 %   Periwound Area Dry;Intact   Wound Edges Open   Wound Length (cm) 2.5 cm   Wound Width (cm) 2 cm   Wound Depth (cm) 0.3 cm   Wound Volume (cm^3) 1.5 cm^3   Wound Surface Area (cm^2) 5 cm^2   Care Cleansed with:;Antimicrobial agent  (Vashe solution)   Dressing Changed;Methylene blue/gentian violet;Foam   Periwound Care Dry periwound area maintained   Dressing Change Due  03/18/24        Altered Skin Integrity 02/22/24 Right upper Back Full thickness tissue loss with exposed bone, tendon, or muscle. Often includes undermining and tunneling. May extend into muscle and/or supporting structures.   Date First Assessed: 02/22/24   Altered Skin Integrity Present on Admission - Did Patient arrive to the hospital with altered skin?: yes  Side: Right  Orientation: upper  Location: Back  Description of Altered Skin Integrity: Full thickness tissue los...   Wound Image    Description of Altered Skin Integrity Full thickness tissue loss with exposed bone, tendon, or muscle. Often includes undermining and tunneling. May extend into muscle and/or supporting structures.   Dressing Appearance Moist drainage   Drainage Amount Small   Drainage Characteristics/Odor Sanguineous   Appearance Red   Tissue loss description Full thickness   Red (%), Wound Tissue Color 100 %   Periwound Area Dry;Intact   Wound Edges Open   Wound Length (cm) 5 cm   Wound Width (cm) 5 cm   Wound Depth (cm) 0.3 cm   Wound Volume (cm^3) 7.5 cm^3   Wound Surface Area (cm^2) 25 cm^2   Care Cleansed with:;Antimicrobial agent  (Vashe solution)   Dressing Changed;Methylene blue/gentian violet;Foam   Periwound Care Dry periwound area maintained   Dressing Change Due 03/18/24        Colostomy  LLQ   Placement Date: (c)    Present Prior to Hospital Arrival?: Yes  Location: LLQ   Wound Image    Stomal Appliance 1 piece;Changed   Stoma Appearance round;moist   Stoma Size (in) 29 mm   Site Assessment Pink   Peristomal Assessment Moist   Accessories/Skin Care cleansed w/ water;skin barrier ring;skin sealant   Stoma Function stool   Treatment Bag change;Site care   Tolerance no signs/symptoms of discomfort     Photo taken for reference:    Thoracic spine.    Will continue to follow as needed and/ or as directed until discharge.    Rae Jackson BSN, RN, CWOCN  03/16/2024
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
## 2024-03-17 NOTE — SUBJECTIVE & OBJECTIVE
Interval History: No issues overnight. Tolerating TF, remains on trach collar. Dressings taken down today, bilateral AKAs and LUE amputation site clean/dry    Medications:  Continuous Infusions:  Scheduled Meds:   acetaminophen  650 mg Per NG tube Q12H    artificial tears  1 drop Both Eyes TID    ceftazidime-avibactam (AVYCAZ) IVPB  2.5 g Intravenous Q8H    chlorhexidine  15 mL Mouth/Throat BID    enoxparin  30 mg Subcutaneous Q24H (prophylaxis, 1700)    famotidine  20 mg Per NG tube BID    folic acid  1 mg Per NG tube Daily    gabapentin  300 mg Per NG tube TID    levetiracetam  750 mg Per NG tube BID    levothyroxine  112 mcg Per NG tube Before breakfast    LIDOcaine  1 patch Transdermal Q24H    linezolid  600 mg Intravenous Q12H    methocarbamoL  500 mg Oral TID    miconazole nitrate 2%   Topical (Top) BID    midodrine  10 mg Per NG tube Q8H    minocycline  200 mg Per NG tube Q12H    oxyCODONE  10 mg Oral Q12H    polyethylene glycol  17 g Per NG tube BID     PRN Meds:ALPRAZolam, oxyCODONE-acetaminophen     Review of patient's allergies indicates:   Allergen Reactions    Opioids - morphine analogues Nausea And Vomiting, Anxiety and Other (See Comments)     Objective:     Vital Signs (Most Recent):  Temp: 97.7 °F (36.5 °C) (03/17/24 0843)  Pulse: 70 (03/17/24 0843)  Resp: 16 (03/17/24 0821)  BP: 105/70 (03/17/24 0843)  SpO2: 96 % (03/17/24 0843) Vital Signs (24h Range):  Temp:  [97.7 °F (36.5 °C)-98.5 °F (36.9 °C)] 97.7 °F (36.5 °C)  Pulse:  [] 70  Resp:  [14-22] 16  SpO2:  [96 %-100 %] 96 %  BP: ()/(54-72) 105/70     Weight: 59.4 kg (130 lb 15.3 oz)  Body mass index is 19.34 kg/m².    Intake/Output - Last 3 Shifts         03/15 0700  03/16 0659 03/16 0700  03/17 0659 03/17 0700  03/18 0659    P.O. 660      I.V. (mL/kg)       Blood       NG/GT 1220      IV Piggyback 786 491.7     Total Intake(mL/kg) 2666 (44.9) 491.7 (8.3)     Urine (mL/kg/hr) 3600 (2.5) 2000 (1.4)     Stool 200      Total Output 3800  2000     Net -1134 -1508.3                     Physical Exam  Vitals and nursing note reviewed.   Constitutional:       General: He is not in acute distress.  Cardiovascular:      Rate and Rhythm: Normal rate.      Pulses: Normal pulses.   Pulmonary:      Effort: Pulmonary effort is normal. No respiratory distress.      Comments: Trach collar  Abdominal:      General: There is no distension.      Palpations: Abdomen is soft.      Tenderness: There is no abdominal tenderness.   Musculoskeletal:      Comments: LUE amputation site clean/dry, vaseline gauze to superficial skin sloughing. Bilateral AKA incisions clean/dry, dressings taken down    Neurological:      Mental Status: He is alert.          Significant Labs:  I have reviewed all pertinent lab results within the past 24 hours.  CBC:   Recent Labs   Lab 03/17/24  0642   WBC 21.21*   RBC 2.91*   HGB 8.9*   HCT 27.6*      MCV 95   MCH 30.6   MCHC 32.2     BMP:   Recent Labs   Lab 03/17/24  0642   GLU 85   *   K 5.0      CO2 27   BUN 20   CREATININE 0.6   CALCIUM 7.3*     CMP:   Recent Labs   Lab 03/17/24  0642   GLU 85   CALCIUM 7.3*   ALBUMIN 1.1*   PROT 6.1   *   K 5.0   CO2 27      BUN 20   CREATININE 0.6   ALKPHOS 120   ALT 7*   AST 24   BILITOT 0.2       Significant Diagnostics:  I have reviewed all pertinent imaging results/findings within the past 24 hours.

## 2024-03-17 NOTE — SUBJECTIVE & OBJECTIVE
Interval History: s/p bilateral AKA, and Left forearm amputation 03/13. Surgical proximal margin cxs of Left arm +MDRO (CRAB, and -PSA), and of left AKA (+-PSA). Fever post-op 03/14 of 101.7F, wbc 20 (stable), fluctuating tachycardia (max 130s), otherwise VSS, HDS. Maintained on Iv- Linezolid (sacral wound cx 01/13 +VRE/ENRIQUETA), cetaz/avibactam (clean margin cxs +-PSA), and dougie 200mg BID (clean margin cxs +CRAB). Remains on trach collar. Dressings taken down today, bilateral AKAs and LUE amputation site clean/dry.  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    LEFT arm prior to amputation of forearm, approx at level of red line depicted in image.      Left leg prior to AKA      Rt leg prior to AKA      Lower back /sacrum      Upper back        Review of Systems   Unable to perform ROS: Acuity of condition   Constitutional:  Positive for fever. Negative for fatigue.   HENT: Negative.     Eyes: Negative.    Respiratory:  Negative for cough, shortness of breath and wheezing.    Cardiovascular:  Negative for chest pain and palpitations.   Gastrointestinal:  Negative for abdominal distention, constipation, nausea and vomiting.   Genitourinary: Negative.    Musculoskeletal:  Positive for arthralgias and myalgias.   Skin:  Positive for wound.   Neurological:  Negative for dizziness, seizures and weakness.   Hematological: Negative.    Psychiatric/Behavioral: Negative.       Objective:     Vital Signs (Most Recent):  Temp: 97.7 °F (36.5 °C) (03/17/24 0843)  Pulse: 96 (03/17/24 1050)  Resp: 16 (03/17/24 0821)  BP: 105/70 (03/17/24 0843)  SpO2: 100 % (03/17/24 1050) Vital Signs (24h Range):  Temp:  [97.7 °F (36.5 °C)-98.5 °F (36.9 °C)] 97.7 °F (36.5 °C)  Pulse:  [] 96  Resp:  [14-22] 16  SpO2:  [96 %-100 %] 100 %  BP: ()/(54-72) 105/70     Weight: 59.4 kg  (130 lb 15.3 oz)  Body mass index is 19.34 kg/m².    Estimated Creatinine Clearance: 155.4 mL/min (based on SCr of 0.6 mg/dL).     Physical Exam  Vitals and nursing note reviewed.   Constitutional:       General: He is not in acute distress.     Appearance: He is ill-appearing. He is not toxic-appearing or diaphoretic.   HENT:      Head: Normocephalic and atraumatic.      Nose: No congestion.      Mouth/Throat:      Pharynx: Oropharynx is clear.   Eyes:      General: No scleral icterus.     Conjunctiva/sclera: Conjunctivae normal.   Cardiovascular:      Rate and Rhythm: Normal rate.      Heart sounds: Normal heart sounds. No murmur heard.  Pulmonary:      Effort: Pulmonary effort is normal. No respiratory distress.      Breath sounds: Wheezing and rales present.   Abdominal:      General: Bowel sounds are normal. There is no distension.      Palpations: Abdomen is soft.      Comments: Colostomy LLQ, c/d/I.    Musculoskeletal:         General: Swelling, tenderness and deformity present.      Comments: B/L AKA (done 03/13), surgical staple incision, clean, dry, w/o dehiscence, fluctuance, or gross erythema.  Left mid-forearm amputation (done 03/13), with tissue debridement of necrotic tissue proximally of entire LUE, bandages in place. No active gross drainage.    Skin:     General: Skin is warm.      Findings: Erythema and lesion present. No rash.   Neurological:      Comments: Somnolent on exam, minimally verbal at this time.           Significant Labs: Blood Culture:   Recent Labs   Lab 02/21/24  1145 02/27/24  1848 02/27/24  1946 03/15/24  0817 03/15/24  0818   LABBLOO Gram stain werner bottle: Gram negative rods  Results called to and read back by:Ana Lilia Carver RN 02/23/2024  22:30  FUSOBACTERIUM NUCLEATUM* No growth after 5 days. No growth after 5 days. No Growth to date  No Growth to date No Growth to date  No Growth to date     CBC:   Recent Labs   Lab 03/16/24  0601 03/17/24  0642   WBC 20.51* 21.21*   HGB  9.1* 8.9*   HCT 28.1* 27.6*    274     CMP:   Recent Labs   Lab 03/16/24  0601 03/17/24  0642   * 134*   K 4.7 5.0    102   CO2 25 27   GLU 91 85   BUN 15 20   CREATININE 0.5 0.6   CALCIUM 7.1* 7.3*   PROT 5.7* 6.1   ALBUMIN 1.1* 1.1*   BILITOT 0.1 0.2   ALKPHOS 122 120   AST 22 24   ALT 8* 7*   ANIONGAP 6* 5*     Microbiology Results (last 7 days)       Procedure Component Value Units Date/Time    Aerobic culture [[id number]]  (Abnormal)  (Susceptibility) Collected: 03/13/24 1709    Order Status: Completed Specimen: Arm, Left Updated: 03/17/24 0835     Aerobic Bacterial Culture ACINETOBACTER BAUMANNII COMPLEX  Few        PSEUDOMONAS AERUGINOSA   Rare      Narrative:      Left arm proximal margin    Aerobic culture [[id number]]  (Abnormal)  (Susceptibility) Collected: 03/13/24 1535    Order Status: Completed Specimen: Leg, Left Updated: 03/16/24 1353     Aerobic Bacterial Culture PSEUDOMONAS AERUGINOSA   Few  Susceptibility pending  Skin diana also present      Narrative:      Left leg proximal margin    Blood culture [[id number]] Collected: 03/15/24 0817    Order Status: Completed Specimen: Blood from Peripheral, Antecubital, Left Updated: 03/16/24 1012     Blood Culture, Routine No Growth to date      No Growth to date    Blood culture [[id number]] Collected: 03/15/24 0818    Order Status: Completed Specimen: Blood from Peripheral, Wrist, Left Updated: 03/16/24 1012     Blood Culture, Routine No Growth to date      No Growth to date    Culture, Anaerobe [[id number]] Collected: 03/13/24 1709    Order Status: Completed Specimen: Arm, Left Updated: 03/15/24 0921     Anaerobic Culture Culture in progress    Narrative:      Left arm proximal margin    Culture, Anaerobe [[id number]] Collected: 03/13/24 1535    Order Status: Completed Specimen: Leg, Left Updated: 03/15/24 0920     Anaerobic Culture Culture in progress    Narrative:      Left leg proximal margin    Blood culture [[id number]]      Order Status: Canceled Specimen: Blood     Blood culture [[id number]]     Order Status: Canceled Specimen: Blood           Respiratory Culture:   Recent Labs   Lab 02/22/24  0026   GSRESP <10 epithelial cells per low power field.  Many WBC's  Many Gram negative rods  Rare Gram positive cocci   RESPIRATORYC No S aureus isolated.  PSEUDOMONAS AERUGINOSA  Moderate  *  PROTEUS MIRABILIS  Moderate  Normal respiratory diana also present  *     Wound Culture:   Recent Labs   Lab 03/13/24  1535 03/13/24  1709   LABAERO PSEUDOMONAS AERUGINOSA   Few  Susceptibility pending  Skin diana also present  * ACINETOBACTER BAUMANNII COMPLEX  Few  *  PSEUDOMONAS AERUGINOSA   Rare  *     All pertinent labs within the past 24 hours have been reviewed.    Significant Imaging: I have reviewed all pertinent imaging results/findings within the past 24 hours.    I have personally reviewed records / hospital notes from   service and other specialty providers. I have also reviewed CBC, CMP/BMP,  cultures and imaging with my interpretation as documented in my assessment / plan.    Patient is high risk for infectious complications given pt's age, multiple co-morbidities, and case complexity.      Time: 50 minutes   50% of time spent on face-to-face counseling and coordination of care. Counseling included review of test results, diagnosis, and treatment plan with patient and/or family.

## 2024-03-17 NOTE — ASSESSMENT & PLAN NOTE
Stage IV pressure ulcer of bilateral buttocks, sacral region  Pressure ulcers of multiple sites  Chronic osteomyelitis    This patient does have evidence of infective focus  My overall impression is sepsis.  Source: Skin and Soft Tissue (location extremities)  Antibiotics given-   Antibiotics (72h ago, onward)      Start     Stop Route Frequency Ordered    03/16/24 1330  minocycline capsule 200 mg         -- PER NG TUBE Every 12 hours 03/16/24 1324    03/15/24 1615  cefTAZidime-avibactam (AVYCAZ) 2.5 g in dextrose 5 % in water (D5W) 100 mL IVPB (MB+)         -- IV Every 8 hours (non-standard times) 03/15/24 1510    03/07/24 0615  linezolid 600 mg/300 mL IVPB 600 mg         -- IV Every 12 hours (non-standard times) 03/07/24 0220          Latest lactate reviewed-  Recent Labs   Lab 03/15/24  0526   LACTATE 1.2       Organ dysfunction indicated by Acute respiratory failure    UCX 02/21 and RCX 02/22 with MDR proteus mirabilis, pseudomonas aeruginosa. Patient has extensive MDRO / ESBL culture data from prior hospitalizations as well. Shock component is now resolved, but leukocytosis, fever, and underlying wounds are still a component as source control has not been achieved.    -- IP consult to general surgery for surgical evaluation -- Now s/p bilateral AKA and LUE below elbow amputation 3/13  -- Tissue sent to pathology  -- L arm culture grew Acinetobacter and Pseudomonas. L leg culture grew Pseudomonas  -- Infectious Disease service following, appreciate assistance  -- Continue Linezolid, Minocycline, and Avycaz  -- IP Consult to Wound Care for LUE amputation site   -- Turn q2h  -- MAP goal >60

## 2024-03-17 NOTE — ASSESSMENT & PLAN NOTE
>>ASSESSMENT AND PLAN FOR GANGRENE WRITTEN ON 3/17/2024  2:16 PM BY ANNELISE HERRERA MD    - See severe sepsis

## 2024-03-17 NOTE — ASSESSMENT & PLAN NOTE
>>ASSESSMENT AND PLAN FOR PRESSURE ULCERS OF SKIN OF MULTIPLE TOPOGRAPHIC SITES WRITTEN ON 3/17/2024  2:14 PM BY ANNELISE HERRERA MD    - See severe sepsis
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
## 2024-03-17 NOTE — SUBJECTIVE & OBJECTIVE
Interval History: NAEON. Reports pain well controlled on current regtimen - oxycontin 10mg BID + Percocet 10mg q4h PRN. Eye pain improved as well with addition of eye drops. KUB ordered as there was concern NGT was dislodged --  NGT was absent on film, instructed RN to remove it, was likely coiled in nasopharynx. Prominent bowel loops were also noted, will continue to monitor his tolerance for PO and BMs.     Review of Systems   Constitutional:  Negative for chills and fever.   Eyes:  Positive for pain (improved).   Respiratory:  Negative for cough and shortness of breath.    Gastrointestinal:  Negative for abdominal pain.   Skin:  Positive for wound.     Objective:     Vital Signs (Most Recent):  Temp: 97.8 °F (36.6 °C) (03/17/24 1301)  Pulse: 96 (03/17/24 1301)  Resp: 18 (03/17/24 1301)  BP: (!) 100/56 (03/17/24 1301)  SpO2: 100 % (03/17/24 1301) Vital Signs (24h Range):  Temp:  [97.6 °F (36.4 °C)-98.5 °F (36.9 °C)] 97.8 °F (36.6 °C)  Pulse:  [] 96  Resp:  [14-22] 18  SpO2:  [95 %-100 %] 100 %  BP: ()/(54-72) 100/56     Weight: 59.4 kg (130 lb 15.3 oz)  Body mass index is 19.34 kg/m².    Intake/Output Summary (Last 24 hours) at 3/17/2024 1357  Last data filed at 3/17/2024 1124  Gross per 24 hour   Intake 1141.68 ml   Output 4300 ml   Net -3158.32 ml         Physical Exam  Vitals and nursing note reviewed.   Constitutional:       General: He is not in acute distress.     Appearance: He is ill-appearing. He is not toxic-appearing or diaphoretic.   HENT:      Head: Normocephalic and atraumatic.      Nose: No congestion.      Mouth/Throat:      Pharynx: Oropharynx is clear.   Eyes:      General: No scleral icterus.     Conjunctiva/sclera: Conjunctivae normal.   Cardiovascular:      Rate and Rhythm: Normal rate.      Heart sounds: Normal heart sounds. No murmur heard.  Pulmonary:      Effort: Pulmonary effort is normal. No respiratory distress.      Breath sounds: Wheezing and rales present.   Abdominal:       General: Bowel sounds are normal. There is no distension.      Palpations: Abdomen is soft.      Comments: Colostomy LLQ, c/d/I.    Musculoskeletal:         General: Swelling, tenderness and deformity present.      Comments: B/L AKA (done 03/13), surgical staple incision, clean, dry, w/o dehiscence, fluctuance, or gross erythema.  Left mid-forearm amputation (done 03/13), with tissue debridement of necrotic tissue proximally of entire LUE, bandages in place. No active gross drainage.    Skin:     General: Skin is warm.      Findings: Erythema and lesion present. No rash.   Neurological:      Comments: Somnolent on exam, minimally verbal at this time.              Significant Labs: All pertinent labs within the past 24 hours have been reviewed.  CBC:   Recent Labs   Lab 03/16/24  0601 03/17/24  0642   WBC 20.51* 21.21*   HGB 9.1* 8.9*   HCT 28.1* 27.6*    274     CMP:   Recent Labs   Lab 03/16/24  0601 03/17/24  0642   * 134*   K 4.7 5.0    102   CO2 25 27   GLU 91 85   BUN 15 20   CREATININE 0.5 0.6   CALCIUM 7.1* 7.3*   PROT 5.7* 6.1   ALBUMIN 1.1* 1.1*   BILITOT 0.1 0.2   ALKPHOS 122 120   AST 22 24   ALT 8* 7*   ANIONGAP 6* 5*       Significant Imaging: I have reviewed all pertinent imaging results/findings within the past 24 hours.

## 2024-03-17 NOTE — ASSESSMENT & PLAN NOTE
Ciruculating blood volume loss after amputation of 3 limbs.     - Supportive care with blood transfusions prn and crystalloid resuscitation   - Lactate 4.0, now normalized  - MAP goal > 60    NOW RESOLVED

## 2024-03-18 ENCOUNTER — TELEPHONE (OUTPATIENT)
Dept: OPHTHALMOLOGY | Facility: CLINIC | Age: 29
End: 2024-03-18
Payer: MEDICAID

## 2024-03-18 PROBLEM — T07.XXXA MULTIPLE WOUNDS: Status: ACTIVE | Noted: 2024-03-18

## 2024-03-18 PROBLEM — H26.9 CATARACT: Status: ACTIVE | Noted: 2024-03-18

## 2024-03-18 LAB
ALBUMIN SERPL BCP-MCNC: 1.2 G/DL (ref 3.5–5.2)
ALP SERPL-CCNC: 93 U/L (ref 55–135)
ALT SERPL W/O P-5'-P-CCNC: 8 U/L (ref 10–44)
ANION GAP SERPL CALC-SCNC: 12 MMOL/L (ref 8–16)
ANION GAP SERPL CALC-SCNC: 7 MMOL/L (ref 8–16)
AST SERPL-CCNC: 24 U/L (ref 10–40)
BACTERIA SPEC AEROBE CULT: ABNORMAL
BACTERIA SPEC ANAEROBE CULT: NORMAL
BACTERIA SPEC ANAEROBE CULT: NORMAL
BASOPHILS # BLD AUTO: 0.09 K/UL (ref 0–0.2)
BASOPHILS NFR BLD: 0.4 % (ref 0–1.9)
BILIRUB SERPL-MCNC: 0.2 MG/DL (ref 0.1–1)
BUN SERPL-MCNC: 20 MG/DL (ref 6–20)
BUN SERPL-MCNC: 21 MG/DL (ref 6–20)
CALCIUM SERPL-MCNC: 7.3 MG/DL (ref 8.7–10.5)
CALCIUM SERPL-MCNC: 7.3 MG/DL (ref 8.7–10.5)
CHLORIDE SERPL-SCNC: 98 MMOL/L (ref 95–110)
CHLORIDE SERPL-SCNC: 99 MMOL/L (ref 95–110)
CO2 SERPL-SCNC: 27 MMOL/L (ref 23–29)
CO2 SERPL-SCNC: 28 MMOL/L (ref 23–29)
CREAT SERPL-MCNC: 0.6 MG/DL (ref 0.5–1.4)
CREAT SERPL-MCNC: 0.6 MG/DL (ref 0.5–1.4)
DIFFERENTIAL METHOD BLD: ABNORMAL
EOSINOPHIL # BLD AUTO: 1.5 K/UL (ref 0–0.5)
EOSINOPHIL NFR BLD: 6.7 % (ref 0–8)
ERYTHROCYTE [DISTWIDTH] IN BLOOD BY AUTOMATED COUNT: 15.6 % (ref 11.5–14.5)
EST. GFR  (NO RACE VARIABLE): >60 ML/MIN/1.73 M^2
EST. GFR  (NO RACE VARIABLE): >60 ML/MIN/1.73 M^2
GLUCOSE SERPL-MCNC: 76 MG/DL (ref 70–110)
GLUCOSE SERPL-MCNC: 98 MG/DL (ref 70–110)
HCT VFR BLD AUTO: 25.7 % (ref 40–54)
HGB BLD-MCNC: 8.4 G/DL (ref 14–18)
IMM GRANULOCYTES # BLD AUTO: 0.11 K/UL (ref 0–0.04)
IMM GRANULOCYTES NFR BLD AUTO: 0.5 % (ref 0–0.5)
LYMPHOCYTES # BLD AUTO: 2.9 K/UL (ref 1–4.8)
LYMPHOCYTES NFR BLD: 13.3 % (ref 18–48)
MCH RBC QN AUTO: 30.7 PG (ref 27–31)
MCHC RBC AUTO-ENTMCNC: 32.7 G/DL (ref 32–36)
MCV RBC AUTO: 94 FL (ref 82–98)
MONOCYTES # BLD AUTO: 1.7 K/UL (ref 0.3–1)
MONOCYTES NFR BLD: 8 % (ref 4–15)
NEUTROPHILS # BLD AUTO: 15.3 K/UL (ref 1.8–7.7)
NEUTROPHILS NFR BLD: 71.1 % (ref 38–73)
NRBC BLD-RTO: 0 /100 WBC
PLATELET # BLD AUTO: 317 K/UL (ref 150–450)
PMV BLD AUTO: 8.5 FL (ref 9.2–12.9)
POTASSIUM SERPL-SCNC: 4.9 MMOL/L (ref 3.5–5.1)
POTASSIUM SERPL-SCNC: 5.3 MMOL/L (ref 3.5–5.1)
PROT SERPL-MCNC: 6.1 G/DL (ref 6–8.4)
RBC # BLD AUTO: 2.74 M/UL (ref 4.6–6.2)
SODIUM SERPL-SCNC: 134 MMOL/L (ref 136–145)
SODIUM SERPL-SCNC: 137 MMOL/L (ref 136–145)
WBC # BLD AUTO: 21.5 K/UL (ref 3.9–12.7)

## 2024-03-18 PROCEDURE — 99900026 HC AIRWAY MAINTENANCE (STAT)

## 2024-03-18 PROCEDURE — 25000003 PHARM REV CODE 250

## 2024-03-18 PROCEDURE — 25000003 PHARM REV CODE 250: Performed by: NURSE PRACTITIONER

## 2024-03-18 PROCEDURE — 99900035 HC TECH TIME PER 15 MIN (STAT)

## 2024-03-18 PROCEDURE — 80048 BASIC METABOLIC PNL TOTAL CA: CPT

## 2024-03-18 PROCEDURE — 25000003 PHARM REV CODE 250: Performed by: PHYSICIAN ASSISTANT

## 2024-03-18 PROCEDURE — 25000003 PHARM REV CODE 250: Performed by: HOSPITALIST

## 2024-03-18 PROCEDURE — 36415 COLL VENOUS BLD VENIPUNCTURE: CPT | Performed by: SURGERY

## 2024-03-18 PROCEDURE — 80053 COMPREHEN METABOLIC PANEL: CPT

## 2024-03-18 PROCEDURE — 99233 SBSQ HOSP IP/OBS HIGH 50: CPT | Mod: ,,, | Performed by: INTERNAL MEDICINE

## 2024-03-18 PROCEDURE — 63600175 PHARM REV CODE 636 W HCPCS

## 2024-03-18 PROCEDURE — 27000221 HC OXYGEN, UP TO 24 HOURS

## 2024-03-18 PROCEDURE — 94761 N-INVAS EAR/PLS OXIMETRY MLT: CPT

## 2024-03-18 PROCEDURE — 27000207 HC ISOLATION

## 2024-03-18 PROCEDURE — 63600175 PHARM REV CODE 636 W HCPCS: Performed by: HOSPITALIST

## 2024-03-18 PROCEDURE — 85025 COMPLETE CBC W/AUTO DIFF WBC: CPT

## 2024-03-18 PROCEDURE — 27200966 HC CLOSED SUCTION SYSTEM

## 2024-03-18 PROCEDURE — 20600001 HC STEP DOWN PRIVATE ROOM

## 2024-03-18 PROCEDURE — 63600175 PHARM REV CODE 636 W HCPCS: Mod: JZ,JG | Performed by: PHYSICIAN ASSISTANT

## 2024-03-18 RX ORDER — ERYTHROMYCIN 5 MG/G
OINTMENT OPHTHALMIC NIGHTLY
Status: DISCONTINUED | OUTPATIENT
Start: 2024-03-18 | End: 2024-04-11 | Stop reason: HOSPADM

## 2024-03-18 RX ADMIN — METHOCARBAMOL 500 MG: 500 TABLET ORAL at 10:03

## 2024-03-18 RX ADMIN — OXYCODONE HYDROCHLORIDE AND ACETAMINOPHEN 1 TABLET: 10; 325 TABLET ORAL at 09:03

## 2024-03-18 RX ADMIN — GABAPENTIN 300 MG: 300 CAPSULE ORAL at 10:03

## 2024-03-18 RX ADMIN — CHLORHEXIDINE GLUCONATE 0.12% ORAL RINSE 15 ML: 1.2 LIQUID ORAL at 09:03

## 2024-03-18 RX ADMIN — OXYCODONE HYDROCHLORIDE 10 MG: 10 TABLET, FILM COATED, EXTENDED RELEASE ORAL at 10:03

## 2024-03-18 RX ADMIN — MIDODRINE HYDROCHLORIDE 10 MG: 5 TABLET ORAL at 05:03

## 2024-03-18 RX ADMIN — POLYETHYLENE GLYCOL 3350 17 G: 17 POWDER, FOR SOLUTION ORAL at 09:03

## 2024-03-18 RX ADMIN — LEVETIRACETAM 750 MG: 100 SOLUTION ORAL at 09:03

## 2024-03-18 RX ADMIN — GABAPENTIN 300 MG: 300 CAPSULE ORAL at 03:03

## 2024-03-18 RX ADMIN — METHOCARBAMOL 500 MG: 500 TABLET ORAL at 09:03

## 2024-03-18 RX ADMIN — ALPRAZOLAM 1 MG: 0.5 TABLET ORAL at 09:03

## 2024-03-18 RX ADMIN — HYPROMELLOSE 2910 1 DROP: 5 SOLUTION/ DROPS OPHTHALMIC at 03:03

## 2024-03-18 RX ADMIN — ALPRAZOLAM 1 MG: 0.5 TABLET ORAL at 03:03

## 2024-03-18 RX ADMIN — HYPROMELLOSE 2910 1 DROP: 5 SOLUTION OPHTHALMIC at 05:03

## 2024-03-18 RX ADMIN — GABAPENTIN 300 MG: 300 CAPSULE ORAL at 09:03

## 2024-03-18 RX ADMIN — LEVOTHYROXINE SODIUM 112 MCG: 112 TABLET ORAL at 05:03

## 2024-03-18 RX ADMIN — MIDODRINE HYDROCHLORIDE 10 MG: 5 TABLET ORAL at 03:03

## 2024-03-18 RX ADMIN — OXYCODONE HYDROCHLORIDE 10 MG: 10 TABLET, FILM COATED, EXTENDED RELEASE ORAL at 09:03

## 2024-03-18 RX ADMIN — CEFTAZIDIME, AVIBACTAM 2.5 G: 2; .5 POWDER, FOR SOLUTION INTRAVENOUS at 03:03

## 2024-03-18 RX ADMIN — CEFTAZIDIME, AVIBACTAM 2.5 G: 2; .5 POWDER, FOR SOLUTION INTRAVENOUS at 10:03

## 2024-03-18 RX ADMIN — ALPRAZOLAM 1 MG: 0.5 TABLET ORAL at 05:03

## 2024-03-18 RX ADMIN — ACETAMINOPHEN 650 MG: 650 SOLUTION ORAL at 09:03

## 2024-03-18 RX ADMIN — ACETAMINOPHEN 650 MG: 650 SOLUTION ORAL at 10:03

## 2024-03-18 RX ADMIN — LEVETIRACETAM 750 MG: 100 SOLUTION ORAL at 10:03

## 2024-03-18 RX ADMIN — OXYCODONE HYDROCHLORIDE AND ACETAMINOPHEN 1 TABLET: 10; 325 TABLET ORAL at 05:03

## 2024-03-18 RX ADMIN — HYPROMELLOSE 2910 1 DROP: 5 SOLUTION/ DROPS OPHTHALMIC at 09:03

## 2024-03-18 RX ADMIN — FAMOTIDINE 20 MG: 40 POWDER, FOR SUSPENSION ORAL at 10:03

## 2024-03-18 RX ADMIN — MICONAZOLE NITRATE: 20 OINTMENT TOPICAL at 10:03

## 2024-03-18 RX ADMIN — MIDODRINE HYDROCHLORIDE 10 MG: 5 TABLET ORAL at 09:03

## 2024-03-18 RX ADMIN — FOLIC ACID 1 MG: 1 TABLET ORAL at 10:03

## 2024-03-18 RX ADMIN — LINEZOLID 600 MG: 600 INJECTION, SOLUTION INTRAVENOUS at 10:03

## 2024-03-18 RX ADMIN — LINEZOLID 600 MG: 600 INJECTION, SOLUTION INTRAVENOUS at 09:03

## 2024-03-18 RX ADMIN — METHOCARBAMOL 500 MG: 500 TABLET ORAL at 03:03

## 2024-03-18 RX ADMIN — MINOCYCLINE HYDROCHLORIDE 200 MG: 100 CAPSULE ORAL at 09:03

## 2024-03-18 RX ADMIN — ENOXAPARIN SODIUM 30 MG: 30 INJECTION SUBCUTANEOUS at 05:03

## 2024-03-18 RX ADMIN — OXYCODONE HYDROCHLORIDE AND ACETAMINOPHEN 1 TABLET: 10; 325 TABLET ORAL at 03:03

## 2024-03-18 RX ADMIN — LIDOCAINE 5% 1 PATCH: 700 PATCH TOPICAL at 02:03

## 2024-03-18 RX ADMIN — FAMOTIDINE 20 MG: 40 POWDER, FOR SUSPENSION ORAL at 09:03

## 2024-03-18 RX ADMIN — MINOCYCLINE HYDROCHLORIDE 200 MG: 100 CAPSULE ORAL at 10:03

## 2024-03-18 NOTE — ASSESSMENT & PLAN NOTE
>>ASSESSMENT AND PLAN FOR PRESSURE ULCERS OF SKIN OF MULTIPLE TOPOGRAPHIC SITES WRITTEN ON 3/18/2024  1:49 PM BY KELLY HUERTA MD    - See severe sepsis

## 2024-03-18 NOTE — ASSESSMENT & PLAN NOTE
Seen by opthalmology 3/13 for painless vision loss for weeks, plan at that time was to follow up outpatient for cataracts. Now developed eye pain in the last few days, will re consult optho.

## 2024-03-18 NOTE — SUBJECTIVE & OBJECTIVE
Interval History: NAEO. Arousable, but doesn't answer questions.       Review of Systems   Unable to perform ROS: Acuity of condition   Constitutional:  Negative for chills, diaphoresis, fatigue and fever.   HENT: Negative.     Respiratory:  Negative for cough and shortness of breath.    Cardiovascular:  Negative for chest pain.   Gastrointestinal:  Negative for diarrhea, nausea and vomiting.   Musculoskeletal:  Positive for arthralgias and myalgias.   Skin:  Positive for wound. Negative for rash.   Neurological:  Negative for dizziness and headaches.   Psychiatric/Behavioral:  Negative for agitation and confusion.      Objective:     Vital Signs (Most Recent):  Temp: 97.6 °F (36.4 °C) (03/18/24 1202)  Pulse: (!) 112 (03/18/24 1202)  Resp: 18 (03/18/24 1202)  BP: 117/70 (03/18/24 1202)  SpO2: 99 % (03/18/24 1202) Vital Signs (24h Range):  Temp:  [97.6 °F (36.4 °C)-98.5 °F (36.9 °C)] 97.6 °F (36.4 °C)  Pulse:  [] 112  Resp:  [16-18] 18  SpO2:  [97 %-100 %] 99 %  BP: (102-117)/(55-70) 117/70     Weight: 59.4 kg (130 lb 15.3 oz)  Body mass index is 19.34 kg/m².    Estimated Creatinine Clearance: 155.4 mL/min (based on SCr of 0.6 mg/dL).     Physical Exam  Vitals and nursing note reviewed.   Constitutional:       General: He is not in acute distress.     Appearance: Normal appearance. He is not ill-appearing, toxic-appearing or diaphoretic.   HENT:      Head: Normocephalic and atraumatic.      Nose: No congestion.      Mouth/Throat:      Pharynx: Oropharynx is clear.   Eyes:      General: No scleral icterus.     Conjunctiva/sclera: Conjunctivae normal.   Cardiovascular:      Rate and Rhythm: Normal rate and regular rhythm.      Heart sounds: Normal heart sounds. No murmur heard.  Pulmonary:      Effort: Pulmonary effort is normal. No respiratory distress.      Breath sounds: Rhonchi (bilateral lower lobes) present. No wheezing or rales.      Comments: Has suction device, but not much secretions  Abdominal:       General: Bowel sounds are normal. There is no distension.      Palpations: Abdomen is soft. There is no mass.      Comments: Colostomy LLQ, c/d/I.    Musculoskeletal:         General: Swelling, tenderness and deformity present.      Comments: Bilateral AKA, stables intact, CDI. Without drainage.   Left mid forearm amputation - with petroleum jelly dressing in place.        Skin:     General: Skin is warm.      Findings: Erythema and lesion present. No rash.      Comments: Wounds to left scapula, l spine, and sacrum. See media.     Wound to right hand/forearm, pink, without drainage.      Neurological:      General: No focal deficit present.      Mental Status: He is alert and oriented to person, place, and time.   Psychiatric:         Mood and Affect: Mood normal.         Behavior: Behavior normal.       Right hand:      Right ischial tuberosity:    Left ischial tuberosity:     Sacral spine:     Thoracic spine     Right upper back:         Significant Labs: Blood Culture:   Recent Labs   Lab 02/21/24  1145 02/27/24  1848 02/27/24  1946 03/15/24  0817 03/15/24  0818   LABBLOO Gram stain werner bottle: Gram negative rods  Results called to and read back by:Ana Lilia Carver RN 02/23/2024  22:30  FUSOBACTERIUM NUCLEATUM* No growth after 5 days. No growth after 5 days. No Growth to date  No Growth to date  No Growth to date  No Growth to date No Growth to date  No Growth to date  No Growth to date  No Growth to date       CBC:   Recent Labs   Lab 03/17/24  0642 03/18/24  0550   WBC 21.21* 21.50*   HGB 8.9* 8.4*   HCT 27.6* 25.7*    317       CMP:   Recent Labs   Lab 03/17/24  0642 03/18/24  0550 03/18/24  1300   * 134* 137   K 5.0 5.3* 4.9    99 98   CO2 27 28 27   GLU 85 76 98   BUN 20 20 21*   CREATININE 0.6 0.6 0.6   CALCIUM 7.3* 7.3* 7.3*   PROT 6.1 6.1  --    ALBUMIN 1.1* 1.2*  --    BILITOT 0.2 0.2  --    ALKPHOS 120 93  --    AST 24 24  --    ALT 7* 8*  --    ANIONGAP 5* 7* 12        Microbiology Results (last 7 days)       Procedure Component Value Units Date/Time    Aerobic culture [[id number]]  (Abnormal)  (Susceptibility) Collected: 03/13/24 1709    Order Status: Completed Specimen: Arm, Left Updated: 03/18/24 1147     Aerobic Bacterial Culture ACINETOBACTER BAUMANNII   Few        PSEUDOMONAS AERUGINOSA   Rare      Narrative:      Left arm proximal margin  Previous comment was modified by JNR at  09:58  on  03/18/2024   Previous comment was modified by JNR at  09:58  on  03/18/2024   Previous comment was modified by I/AUT at  08:34  on  03/18/2024     Aerobic culture [[id number]]  (Abnormal)  (Susceptibility) Collected: 03/13/24 1535    Order Status: Completed Specimen: Leg, Left Updated: 03/18/24 1102     Aerobic Bacterial Culture PSEUDOMONAS AERUGINOSA   Few  Skin diana also present      Narrative:      Left leg proximal margin    Blood culture [[id number]] Collected: 03/15/24 0817    Order Status: Completed Specimen: Blood from Peripheral, Antecubital, Left Updated: 03/18/24 1012     Blood Culture, Routine No Growth to date      No Growth to date      No Growth to date      No Growth to date    Blood culture [[id number]] Collected: 03/15/24 0818    Order Status: Completed Specimen: Blood from Peripheral, Wrist, Left Updated: 03/18/24 1012     Blood Culture, Routine No Growth to date      No Growth to date      No Growth to date      No Growth to date    Culture, Anaerobe [[id number]] Collected: 03/13/24 1709    Order Status: Completed Specimen: Arm, Left Updated: 03/18/24 0838     Anaerobic Culture No anaerobes isolated    Narrative:      Left arm proximal margin    Culture, Anaerobe [[id number]] Collected: 03/13/24 1535    Order Status: Completed Specimen: Leg, Left Updated: 03/18/24 0838     Anaerobic Culture No anaerobes isolated    Narrative:      Left leg proximal margin    Blood culture [[id number]]     Order Status: Canceled Specimen: Blood     Blood culture  [[id number]]     Order Status: Canceled Specimen: Blood           Respiratory Culture:   Recent Labs   Lab 02/22/24  0026   GSRESP <10 epithelial cells per low power field.  Many WBC's  Many Gram negative rods  Rare Gram positive cocci   RESPIRATORYC No S aureus isolated.  PSEUDOMONAS AERUGINOSA  Moderate  *  PROTEUS MIRABILIS  Moderate  Normal respiratory diana also present  *       Wound Culture:   Recent Labs   Lab 03/13/24  1535 03/13/24  1709   LABAERO PSEUDOMONAS AERUGINOSA   Few  Skin diana also present  * ACINETOBACTER BAUMANNII   Few  *  PSEUDOMONAS AERUGINOSA   Rare  *       All pertinent labs within the past 24 hours have been reviewed.    Significant Imaging: I have reviewed all pertinent imaging results/findings within the past 24 hours.
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
## 2024-03-18 NOTE — PLAN OF CARE
[facility] - [facility]  Discharge Reassessment    Primary Care Provider: Geri, Primary Doctor    Expected Discharge Date: 3/21/2024    Patient is not medically ready for discharge at this time.   Referral for LTAC placement sent to [facility].    Will likely need placement in LTAC once medically ready. Consulted Optho 3/18 for development of eye pain.       Reassessment (most recent)       Discharge Reassessment - 03/18/24 1449          Discharge Reassessment    Assessment Type Discharge Planning Reassessment     Did the patient's condition or plan change since previous assessment? No     Discharge Plan A Long-term acute care facility (LTAC)     Discharge Plan B New Nursing Home placement - assisted care facility     DME Needed Upon Discharge  none     Transition of Care Barriers None     Why the patient remains in the hospital Requires continued medical care        Post-Acute Status    Post-Acute Authorization Placement     Post-Acute Placement Status Referrals Sent     Coverage MEDICAID - [place]
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
## 2024-03-18 NOTE — ASSESSMENT & PLAN NOTE
>>ASSESSMENT AND PLAN FOR GANGRENE WRITTEN ON 3/18/2024  1:33 PM BY KELLY HUERTA MD    - See severe sepsis

## 2024-03-18 NOTE — ASSESSMENT & PLAN NOTE
"Stage IV pressure ulcer of bilateral buttocks, sacral region  Pressure ulcers of multiple sites  Chronic osteomyelitis    This patient does have evidence of infective focus  My overall impression is sepsis.  Source: Skin and Soft Tissue (location extremities)  Antibiotics given-   Antibiotics (72h ago, onward)    Start     Stop Route Frequency Ordered    03/16/24 1330  minocycline capsule 200 mg         -- PER NG TUBE Every 12 hours 03/16/24 1324    03/15/24 1615  cefTAZidime-avibactam (AVYCAZ) 2.5 g in dextrose 5 % in water (D5W) 100 mL IVPB (MB+)         -- IV Every 8 hours (non-standard times) 03/15/24 1510    03/07/24 0615  linezolid 600 mg/300 mL IVPB 600 mg         -- IV Every 12 hours (non-standard times) 03/07/24 0220        Latest lactate reviewed-  No results for input(s): "LACTATE", "POCLAC" in the last 72 hours.    Organ dysfunction indicated by Acute respiratory failure    UCX 02/21 and RCX 02/22 with MDR proteus mirabilis, pseudomonas aeruginosa. Patient has extensive MDRO / ESBL culture data from prior hospitalizations as well. Shock component is now resolved, but leukocytosis, fever, and underlying wounds are still a component as source control has not been achieved.    -- IP consult to general surgery for surgical evaluation -- Now s/p bilateral AKA and LUE below elbow amputation 3/13  -- Tissue sent to pathology  -- L arm culture grew Acinetobacter and Pseudomonas. L leg culture grew Pseudomonas  -- Infectious Disease service following, appreciate assistance  -- Continue Linezolid, Minocycline, and Avycaz  -- IP Consult to Wound Care for LUE amputation site   -- Turn q2h  -- MAP goal >60    "

## 2024-03-18 NOTE — ASSESSMENT & PLAN NOTE
Patient has hyponatremia which is controlled,We will aim to correct the sodium by 4-6mEq in 24 hours. We will monitor sodium Daily. The hyponatremia is due to Dehydration/hypovolemia. The patient's sodium results have been reviewed and are listed below.  Recent Labs   Lab 03/18/24  0550   *
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
## 2024-03-18 NOTE — RESPIRATORY THERAPY
"RAPID RESPONSE RESPIRATORY THERAPY PROACTIVE NOTE           Time of visit: 920     Code Status: Prior   : 1995  Bed: [address] [place] [address] A:   MRN: [id number]  Time spent at the bedside: < 15 min    SITUATION    Evaluated patient for: LDA Check     BACKGROUND    Patient has no past medical history on file.  Clinically Significant Surgical Hx: tracheostomy    24 Hours Vitals Range:  Temp:  [97.6 °F (36.4 °C)-98.5 °F (36.9 °C)]   Pulse:  []   Resp:  [16-18]   BP: (102-117)/(55-70)   SpO2:  [97 %-100 %]     Labs:    Recent Labs     24  0642 24  0550 24  1300   * 134* 137   K 5.0 5.3* 4.9    99 98   CO2 27 28 27   BUN 20 20 21*   CREATININE 0.6 0.6 0.6   GLU 85 76 98        No results for input(s): "PH", "PCO2", "PO2", "HCO3", "POCSATURATED", "BE" in the last 72 hours.    ASSESSMENT/INTERVENTIONS  Supplies at pt bedside. No other concerns at this time      Last VS   Temp: 97.6 °F (36.4 °C) ( 1202)  Pulse: 108 ( 1511)  Resp: 18 ( 1531)  BP: 117/70 ( 1202)  SpO2: 99 % ( 1202)      Extra trachs at bedside: 6/8 cuffed  Level of Consciousness: Level of Consciousness (AVPU): alert  Respiratory Effort: Respiratory Effort: Unlabored Expansion/Accessory Muscle Usage: Expansion/Accessory Muscles/Retractions: no use of accessory muscles, no retractions, expansion symmetric  All Lung Field Breath Sounds: All Lung Fields Breath Sounds: Anterior:, Lateral:, coarse  NATALIA Breath Sounds: coarse  LLL Breath Sounds: coarse  RUL Breath Sounds: coarse  RML Breath Sounds: coarse  RLL Breath Sounds: coarse  O2 Device/Concentration: 5l 28%  Surgical airway: Yes, Type: Shiley Size: 8, cuffed  Ambu at bedside:       Active Orders   Respiratory Care    Oxygen Continuous     Frequency: Continuous     Number of Occurrences: Until Specified     Order Questions:      Device type: Low flow      Device: Trach Collar      FiO2%: 60      Titrate O2 per Oxygen Titration Protocol: Yes      To maintain " SpO2 goal of: >= 90%      Notify MD of: Inability to achieve desired SpO2; Sudden change in patient status and requires 20% increase in FiO2; Patient requires >60% FiO2    Pulse Oximetry Continuous     Frequency: Continuous     Number of Occurrences: Until Specified    Routine tracheostomy care     Frequency: BID     Number of Occurrences: Until Specified    SUCTION PRN     Frequency: PRN     Number of Occurrences: Until Specified       RECOMMENDATIONS    We recommend: RRT Recs: Continue POC per primary team.      FOLLOW-UP    Please call back the Rapid Response RT, Nadja Velarde RRT at x [phone] for any questions or concerns.
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
## 2024-03-18 NOTE — SUBJECTIVE & OBJECTIVE
Interval History: HDS. No acute events over night. Pain better controlled. Complaining of eye pain, optho re-consulted.      Objective:     Vital Signs (Most Recent):  Temp: 97.6 °F (36.4 °C) (03/18/24 1202)  Pulse: (!) 112 (03/18/24 1202)  Resp: 18 (03/18/24 1202)  BP: 117/70 (03/18/24 1202)  SpO2: 99 % (03/18/24 1202) Vital Signs (24h Range):  Temp:  [97.6 °F (36.4 °C)-98.5 °F (36.9 °C)] 97.6 °F (36.4 °C)  Pulse:  [] 112  Resp:  [16-18] 18  SpO2:  [97 %-100 %] 99 %  BP: (102-117)/(55-70) 117/70     Weight: 59.4 kg (130 lb 15.3 oz)  Body mass index is 19.34 kg/m².    Intake/Output Summary (Last 24 hours) at 3/18/2024 1330  Last data filed at 3/18/2024 1109  Gross per 24 hour   Intake 300 ml   Output 2950 ml   Net -2650 ml         Physical Exam  Vitals and nursing note reviewed.   Constitutional:       Appearance: He is ill-appearing.   HENT:      Head: Normocephalic.      Nose: Nose normal.      Mouth/Throat:      Mouth: Mucous membranes are moist.   Eyes:      Pupils: Pupils are equal, round, and reactive to light.   Cardiovascular:      Rate and Rhythm: Normal rate.   Pulmonary:      Effort: Pulmonary effort is normal. No respiratory distress.      Comments: On trach collar 5L  Abdominal:      General: There is no distension.      Palpations: Abdomen is soft.      Tenderness: There is no abdominal tenderness. There is no right CVA tenderness or left CVA tenderness.   Musculoskeletal:      Comments: AKA in lower extremities, clean, does not appear infected  LUE amputation in dressing     Skin:     General: Skin is dry.   Neurological:      Mental Status: He is alert and oriented to person, place, and time.             Significant Labs: All pertinent labs within the past 24 hours have been reviewed.    Significant Imaging: I have reviewed all pertinent imaging results/findings within the past 24 hours.

## 2024-03-18 NOTE — ASSESSMENT & PLAN NOTE
Palliative Care at [facility] engaging in ongoing communications with family regarding GOC given extensive comorbid conditions and recurrent hospitalizations. Per documentation review and brief discussion with family on initial transfer admission encounter, patient & family wish to continue all medical and surgical options at this time.     -- Patient & family would greatly benefit from continued Palliative Care discussions  -- IP Consult to [facility] Care services

## 2024-03-18 NOTE — PLAN OF CARE
Consult placed due to patient experiencing 'rocks in his eyes' and irritation OU for past several days. AT's helped pain per primary however would like us to take another look prior to discharge. Upon exam, patient sleeping with lids not fully closed. 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 B scan performed showed retina flat and attached with no obvious vitreous opacities. Exam largely unchanged save for the dry eyes seen on exam. Recommend artificial tears 4x/day in both eyes, erythromycin gianni at night prior to sleeping and prior to any longer naps throughout the day. Possibly tape the lids shut when sleeping as well to ensure proper lubrication. OK to discharge per ophtho stand point, will schedule outpatient follow up for cataract surgery eval, patient family will be contacted by  upon discharge.

## 2024-03-18 NOTE — PROGRESS NOTES
General surgery progress note    Bilateral AKA healing well.  Can leave dressing off at this time.  Should have a stump protector.  Staples to be removed in 2-3 weeks.    Left upper extremity healing well.  Recommend wound care for continued dressing changes to raw tissue of upper extremity.  Once has adequate granulation tissue will consider skin grafting however this will likely take weeks before we reach that point.  Skin staples on left arm stump to also be removed in 2-3 weeks.    Please notify general surgery team when patient is to be discharged for setting up follow up in our clinic for wound check and staple removal.    General surgery is available for further assistance as needed.  Please reach out for questions or concerns.    Johnny Bingham MD - PGY V  General Surgery

## 2024-03-19 LAB
ALBUMIN SERPL BCP-MCNC: 1.2 G/DL (ref 3.5–5.2)
ALP SERPL-CCNC: 114 U/L (ref 55–135)
ALT SERPL W/O P-5'-P-CCNC: 10 U/L (ref 10–44)
ANION GAP SERPL CALC-SCNC: 7 MMOL/L (ref 8–16)
ANISOCYTOSIS BLD QL SMEAR: SLIGHT
AST SERPL-CCNC: 23 U/L (ref 10–40)
BASOPHILS # BLD AUTO: 0.13 K/UL (ref 0–0.2)
BASOPHILS NFR BLD: 0.7 % (ref 0–1.9)
BILIRUB SERPL-MCNC: 0.1 MG/DL (ref 0.1–1)
BUN SERPL-MCNC: 31 MG/DL (ref 6–20)
CALCIUM SERPL-MCNC: 7.4 MG/DL (ref 8.7–10.5)
CHLORIDE SERPL-SCNC: 102 MMOL/L (ref 95–110)
CO2 SERPL-SCNC: 28 MMOL/L (ref 23–29)
CREAT SERPL-MCNC: 0.6 MG/DL (ref 0.5–1.4)
DIFFERENTIAL METHOD BLD: ABNORMAL
EOSINOPHIL # BLD AUTO: 2.5 K/UL (ref 0–0.5)
EOSINOPHIL NFR BLD: 12.4 % (ref 0–8)
ERYTHROCYTE [DISTWIDTH] IN BLOOD BY AUTOMATED COUNT: 15.1 % (ref 11.5–14.5)
EST. GFR  (NO RACE VARIABLE): >60 ML/MIN/1.73 M^2
FINAL PATHOLOGIC DIAGNOSIS: NORMAL
GLUCOSE SERPL-MCNC: 84 MG/DL (ref 70–110)
GROSS: NORMAL
HCT VFR BLD AUTO: 27.3 % (ref 40–54)
HGB BLD-MCNC: 8.7 G/DL (ref 14–18)
HYPOCHROMIA BLD QL SMEAR: ABNORMAL
IMM GRANULOCYTES # BLD AUTO: 0.12 K/UL (ref 0–0.04)
IMM GRANULOCYTES NFR BLD AUTO: 0.6 % (ref 0–0.5)
LYMPHOCYTES # BLD AUTO: 2.9 K/UL (ref 1–4.8)
LYMPHOCYTES NFR BLD: 14.8 % (ref 18–48)
Lab: NORMAL
MCH RBC QN AUTO: 30.4 PG (ref 27–31)
MCHC RBC AUTO-ENTMCNC: 31.9 G/DL (ref 32–36)
MCV RBC AUTO: 96 FL (ref 82–98)
MICROSCOPIC EXAM: NORMAL
MONOCYTES # BLD AUTO: 2.2 K/UL (ref 0.3–1)
MONOCYTES NFR BLD: 11.2 % (ref 4–15)
NEUTROPHILS # BLD AUTO: 11.9 K/UL (ref 1.8–7.7)
NEUTROPHILS NFR BLD: 60.3 % (ref 38–73)
NRBC BLD-RTO: 0 /100 WBC
OVALOCYTES BLD QL SMEAR: ABNORMAL
PLATELET # BLD AUTO: 452 K/UL (ref 150–450)
PMV BLD AUTO: 8.5 FL (ref 9.2–12.9)
POIKILOCYTOSIS BLD QL SMEAR: SLIGHT
POLYCHROMASIA BLD QL SMEAR: ABNORMAL
POTASSIUM SERPL-SCNC: 5.2 MMOL/L (ref 3.5–5.1)
PROT SERPL-MCNC: 6.5 G/DL (ref 6–8.4)
RBC # BLD AUTO: 2.86 M/UL (ref 4.6–6.2)
SODIUM SERPL-SCNC: 137 MMOL/L (ref 136–145)
SPHEROCYTES BLD QL SMEAR: ABNORMAL
TARGETS BLD QL SMEAR: ABNORMAL
WBC # BLD AUTO: 19.74 K/UL (ref 3.9–12.7)

## 2024-03-19 PROCEDURE — 27000221 HC OXYGEN, UP TO 24 HOURS

## 2024-03-19 PROCEDURE — 99233 SBSQ HOSP IP/OBS HIGH 50: CPT | Mod: ,,, | Performed by: INTERNAL MEDICINE

## 2024-03-19 PROCEDURE — 20600001 HC STEP DOWN PRIVATE ROOM

## 2024-03-19 PROCEDURE — 27000207 HC ISOLATION

## 2024-03-19 PROCEDURE — 85025 COMPLETE CBC W/AUTO DIFF WBC: CPT

## 2024-03-19 PROCEDURE — 94761 N-INVAS EAR/PLS OXIMETRY MLT: CPT

## 2024-03-19 PROCEDURE — 25000003 PHARM REV CODE 250: Performed by: NURSE PRACTITIONER

## 2024-03-19 PROCEDURE — 27200966 HC CLOSED SUCTION SYSTEM

## 2024-03-19 PROCEDURE — 25000003 PHARM REV CODE 250

## 2024-03-19 PROCEDURE — 99233 SBSQ HOSP IP/OBS HIGH 50: CPT | Mod: ,,, | Performed by: OTOLARYNGOLOGY

## 2024-03-19 PROCEDURE — 27201112

## 2024-03-19 PROCEDURE — 99900035 HC TECH TIME PER 15 MIN (STAT)

## 2024-03-19 PROCEDURE — 63600175 PHARM REV CODE 636 W HCPCS: Mod: JZ,JG | Performed by: PHYSICIAN ASSISTANT

## 2024-03-19 PROCEDURE — 25000003 PHARM REV CODE 250: Performed by: PHYSICIAN ASSISTANT

## 2024-03-19 PROCEDURE — 80053 COMPREHEN METABOLIC PANEL: CPT

## 2024-03-19 PROCEDURE — 63600175 PHARM REV CODE 636 W HCPCS: Performed by: HOSPITALIST

## 2024-03-19 PROCEDURE — 25000003 PHARM REV CODE 250: Performed by: HOSPITALIST

## 2024-03-19 RX ORDER — ACETAMINOPHEN 325 MG/1
650 TABLET ORAL EVERY 12 HOURS
Status: DISCONTINUED | OUTPATIENT
Start: 2024-03-19 | End: 2024-03-21

## 2024-03-19 RX ORDER — LINEZOLID 600 MG/1
600 TABLET, FILM COATED ORAL EVERY 12 HOURS
Status: DISCONTINUED | OUTPATIENT
Start: 2024-03-19 | End: 2024-04-04

## 2024-03-19 RX ORDER — FAMOTIDINE 20 MG/1
20 TABLET, FILM COATED ORAL 2 TIMES DAILY
Status: DISCONTINUED | OUTPATIENT
Start: 2024-03-19 | End: 2024-04-08

## 2024-03-19 RX ORDER — GABAPENTIN 400 MG/1
400 CAPSULE ORAL 3 TIMES DAILY
Status: DISCONTINUED | OUTPATIENT
Start: 2024-03-19 | End: 2024-04-04

## 2024-03-19 RX ORDER — OXYCODONE HCL 10 MG/1
20 TABLET, FILM COATED, EXTENDED RELEASE ORAL EVERY 12 HOURS
Status: DISCONTINUED | OUTPATIENT
Start: 2024-03-19 | End: 2024-03-21

## 2024-03-19 RX ORDER — LEVETIRACETAM 750 MG/1
750 TABLET ORAL 2 TIMES DAILY
Status: DISCONTINUED | OUTPATIENT
Start: 2024-03-19 | End: 2024-04-08

## 2024-03-19 RX ADMIN — MICONAZOLE NITRATE: 20 OINTMENT TOPICAL at 09:03

## 2024-03-19 RX ADMIN — MINOCYCLINE HYDROCHLORIDE 200 MG: 100 CAPSULE ORAL at 09:03

## 2024-03-19 RX ADMIN — LEVETIRACETAM 750 MG: 750 TABLET, FILM COATED ORAL at 09:03

## 2024-03-19 RX ADMIN — HYPROMELLOSE 2910 1 DROP: 5 SOLUTION OPHTHALMIC at 09:03

## 2024-03-19 RX ADMIN — METHOCARBAMOL 500 MG: 500 TABLET ORAL at 09:03

## 2024-03-19 RX ADMIN — ACETAMINOPHEN 650 MG: 325 TABLET ORAL at 12:03

## 2024-03-19 RX ADMIN — CEFTAZIDIME, AVIBACTAM 2.5 G: 2; .5 POWDER, FOR SOLUTION INTRAVENOUS at 05:03

## 2024-03-19 RX ADMIN — CHLORHEXIDINE GLUCONATE 0.12% ORAL RINSE 15 ML: 1.2 LIQUID ORAL at 09:03

## 2024-03-19 RX ADMIN — FAMOTIDINE 20 MG: 40 POWDER, FOR SUSPENSION ORAL at 09:03

## 2024-03-19 RX ADMIN — GABAPENTIN 400 MG: 400 CAPSULE ORAL at 09:03

## 2024-03-19 RX ADMIN — LINEZOLID 600 MG: 600 TABLET, FILM COATED ORAL at 09:03

## 2024-03-19 RX ADMIN — HYPROMELLOSE 2910 1 DROP: 5 SOLUTION OPHTHALMIC at 05:03

## 2024-03-19 RX ADMIN — FAMOTIDINE 20 MG: 20 TABLET ORAL at 09:03

## 2024-03-19 RX ADMIN — LIDOCAINE 5% 1 PATCH: 700 PATCH TOPICAL at 02:03

## 2024-03-19 RX ADMIN — FOLIC ACID 1 MG: 1 TABLET ORAL at 09:03

## 2024-03-19 RX ADMIN — MIDODRINE HYDROCHLORIDE 10 MG: 5 TABLET ORAL at 09:03

## 2024-03-19 RX ADMIN — OXYCODONE HYDROCHLORIDE AND ACETAMINOPHEN 1 TABLET: 10; 325 TABLET ORAL at 12:03

## 2024-03-19 RX ADMIN — MIDODRINE HYDROCHLORIDE 10 MG: 5 TABLET ORAL at 02:03

## 2024-03-19 RX ADMIN — LEVOTHYROXINE SODIUM 112 MCG: 112 TABLET ORAL at 05:03

## 2024-03-19 RX ADMIN — METHOCARBAMOL 500 MG: 500 TABLET ORAL at 02:03

## 2024-03-19 RX ADMIN — ENOXAPARIN SODIUM 30 MG: 30 INJECTION SUBCUTANEOUS at 05:03

## 2024-03-19 RX ADMIN — OXYCODONE HYDROCHLORIDE 10 MG: 10 TABLET, FILM COATED, EXTENDED RELEASE ORAL at 09:03

## 2024-03-19 RX ADMIN — OXYCODONE HYDROCHLORIDE AND ACETAMINOPHEN 1 TABLET: 10; 325 TABLET ORAL at 05:03

## 2024-03-19 RX ADMIN — HYPROMELLOSE 2910 1 DROP: 5 SOLUTION OPHTHALMIC at 12:03

## 2024-03-19 RX ADMIN — MIDODRINE HYDROCHLORIDE 10 MG: 5 TABLET ORAL at 05:03

## 2024-03-19 RX ADMIN — ACETAMINOPHEN 650 MG: 325 TABLET ORAL at 09:03

## 2024-03-19 RX ADMIN — ALPRAZOLAM 1 MG: 0.5 TABLET ORAL at 09:03

## 2024-03-19 RX ADMIN — CEFTAZIDIME, AVIBACTAM 2.5 G: 2; .5 POWDER, FOR SOLUTION INTRAVENOUS at 09:03

## 2024-03-19 RX ADMIN — CEFTAZIDIME, AVIBACTAM 2.5 G: 2; .5 POWDER, FOR SOLUTION INTRAVENOUS at 12:03

## 2024-03-19 RX ADMIN — GABAPENTIN 300 MG: 300 CAPSULE ORAL at 09:03

## 2024-03-19 RX ADMIN — OXYCODONE HYDROCHLORIDE 20 MG: 10 TABLET, FILM COATED, EXTENDED RELEASE ORAL at 09:03

## 2024-03-19 RX ADMIN — LEVETIRACETAM 750 MG: 750 TABLET, FILM COATED ORAL at 12:03

## 2024-03-19 RX ADMIN — GABAPENTIN 400 MG: 400 CAPSULE ORAL at 02:03

## 2024-03-19 NOTE — SUBJECTIVE & OBJECTIVE
Interval History: c/o pain, increased oxycodone dose.      Objective:     Vital Signs (Most Recent):  Temp: 98.3 °F (36.8 °C) (03/19/24 0753)  Pulse: 91 (03/19/24 0753)  Resp: 16 (03/19/24 0753)  BP: 112/71 (03/19/24 0753)  SpO2: 100 % (03/19/24 0753) Vital Signs (24h Range):  Temp:  [97.6 °F (36.4 °C)-98.3 °F (36.8 °C)] 98.3 °F (36.8 °C)  Pulse:  [] 91  Resp:  [16-20] 16  SpO2:  [96 %-100 %] 100 %  BP: ()/(54-77) 112/71     Weight: 59.4 kg (130 lb 15.3 oz)  Body mass index is 19.34 kg/m².    Intake/Output Summary (Last 24 hours) at 3/19/2024 0758  Last data filed at 3/19/2024 0518  Gross per 24 hour   Intake 614 ml   Output 2500 ml   Net -1886 ml         Physical Exam  Vitals and nursing note reviewed.   Constitutional:       General: He is not in acute distress.     Appearance: Normal appearance. He is not ill-appearing, toxic-appearing or diaphoretic.   HENT:      Head: Normocephalic and atraumatic.      Nose: No congestion.      Mouth/Throat:      Pharynx: Oropharynx is clear.   Eyes:      General: No scleral icterus.     Conjunctiva/sclera: Conjunctivae normal.   Cardiovascular:      Rate and Rhythm: Normal rate and regular rhythm.      Heart sounds: Normal heart sounds. No murmur heard.  Pulmonary:      Effort: Pulmonary effort is normal. No respiratory distress.      Breath sounds: No wheezing or rales. Rhonchi: bilateral lower lobes.  Abdominal:      General: Bowel sounds are normal. There is no distension.      Palpations: Abdomen is soft. There is no mass.      Comments: Colostomy LLQ, c/d/I.    Musculoskeletal:         General: Swelling, tenderness and deformity present.      Comments: Bilateral AKA, staples intact  LUE amputation below elbow   Skin:     General: Skin is warm.      Findings: Erythema and lesion present. No rash.      Comments: Wounds to left scapula, l spine, and sacrum. See media.     Wound to right hand/forearm, pink, without drainage.      Neurological:      General: No  focal deficit present.      Mental Status: He is alert and oriented to person, place, and time.   Psychiatric:         Mood and Affect: Mood normal.         Behavior: Behavior normal.           Significant Labs: All pertinent labs within the past 24 hours have been reviewed.    Significant Imaging: I have reviewed all pertinent imaging results/findings within the past 24 hours.

## 2024-03-19 NOTE — CONSULTS
[facility]  Wound Care    Patient Name:  Jyoti Mayberry   MRN:  [id number]  Date: 3/18/2024  Diagnosis: Severe sepsis    History:     History reviewed. No pertinent past medical history.    Social History     Socioeconomic History    Marital status: Single   Tobacco Use    Smoking status: Never    Smokeless tobacco: Never   Substance and Sexual Activity    Alcohol use: Not Currently    Drug use: Not Currently    Sexual activity: Not Currently     Social Determinants of Health     Financial Resource Strain: Low Risk  (3/7/2024)    Overall Financial Resource Strain (CARDIA)     Difficulty of Paying Living Expenses: Not hard at all   Food Insecurity: No Food Insecurity (3/7/2024)    Hunger Vital Sign     Worried About Running Out of Food in the Last Year: Never true     Ran Out of Food in the Last Year: Never true   Transportation Needs: No Transportation Needs (3/7/2024)    PRAPARE - Transportation     Lack of Transportation (Medical): No     Lack of Transportation (Non-Medical): No   Physical Activity: Inactive (3/7/2024)    Exercise Vital Sign     Days of Exercise per Week: 0 days     Minutes of Exercise per Session: 0 min   Stress: Stress Concern Present (3/7/2024)    [employer] - Occupational Stress Questionnaire     Feeling of Stress : Very much   Social Connections: Socially Isolated (3/7/2024)    Social Connection and Isolation Panel [NHANES]     Frequency of Communication with Friends and Family: More than three times a week     Frequency of Social Gatherings with Friends and Family: More than three times a week     Attends Orthodox Services: Never     Active Member of Clubs or Organizations: No     Attends Club or Organization Meetings: Never     Marital Status: Never    Housing Stability: Unknown (3/7/2024)    Housing Stability Vital Sign     Unable to Pay for Housing in the Last Year: No     Unstable Housing in the Last Year: No       Precautions:     Allergies as  of 03/05/2024    (No Known Allergies)       [facility] Assessment Details/Treatment     Patient seen for wound care consultation. -placed by MD for LUE.    Reviewed chart for this encounter.   See Flow Sheet for findings.    Pt awake/ alert- and agreeable to targeted assmt of LUE- care performed as ordered- L arm with red moist area partial thickness area- cleansed with Vashe for antimicrobial property- covered with Aquacel Ag for bacteriocidal effect and absorption to support healing -layered with foam dressing for added absorption and maintenance of moist wound environment. Pt tolerated care without c/o pain- and able to maintain position of  and move as needed for care with minimal rest breaks. Both pt and family present able to voice good basic understanding re care provided and plan of FU care.    RECOMMENDATIONS:  LUE- surgical site: 1)clean with Vashe solution. 2)apply Aquacel Ag to open area- cover with Mepilex transfer dressing. 3)secure with kerlix. Perform care MWF- and as needed.    Discussed POC with patient / family present.  See EMR for orders & patient education.    Discussed nutrition and the role of protein in wound healing with the patient. Instructed patient to optimize protein for wound healing.    Bedside nursing to continue care & monitoring.  Bedside nursing to maintain pressure injury prevention interventions.  Current documented Gallito score is 10 -with a nutrition sub-scale score of 2. IP nutrition consulted for full thickness wounds and nutrition sub scale score -2.     03/18/24 1630   WOCN Assessment   WOCN Total Time (mins) 45   Visit Date 03/18/24   Visit Time 1630   Consult Type New;Follow Up   WOCN Speciality Wound;Ostomy   WOCN List colostomy   Wound surgical  (LUE)   Continence Type Fecal   Ostomy Type Colostomy   Intervention assessed;changed;applied;chart review;orders   Teaching on-going  (pt/ family- LUE consult for wound recs; FU ostomy site check.)   Positioning   Body Position  position maintained   Head of Bed (HOB) Positioning HOB elevated   Positioning/Transfer Devices pillows        Incision/Site 03/13/24 1720 Left Arm other (see comments)   Date First Assessed/Time First Assessed: 03/13/24 1720   Present Prior to Hospital Arrival?: No  Side: Left  Location: Arm  Incision Type: (c) other (see comments)  Closure Method: (c) Sutures;Staples   Wound Image    Incision WDL ex  (staples remain intact distally.)   Dressing Appearance Moist drainage   Drainage Amount Small   Drainage Characteristics/Odor Serosanguineous;No odor   Appearance Pink;Red;Tan   Red (%), Wound Tissue Color 60 %   Periwound Area Dry;Intact;Edematous   Wound Length (cm) 30 cm   Wound Width (cm) 10.2 cm   Wound Depth (cm) 0.2 cm   Wound Volume (cm^3) 61.2 cm^3   Wound Surface Area (cm^2) 306 cm^2   Care Cleansed with:;Antimicrobial agent  (Vashe solution)   Dressing Changed;Hydrofiber;Silver;Foam;Rolled gauze   Periwound Care Dry periwound area maintained   Dressing Change Due 03/20/24        Colostomy  LLQ   Placement Date: (c)    Present Prior to Hospital Arrival?: Yes  Location: LLQ   Stomal Appliance 1 piece;Dry;Intact;No Leakage   Stoma Function stool     Will continue to follow as needed and/ or as directed until discharge.    Rae MADDOXN, RN, CWOCN  03/18/2024

## 2024-03-19 NOTE — SUBJECTIVE & OBJECTIVE
Interval History: NAEO. Awake and alert. Reports body pain all over and some loose stools while taking miralax. Parents at bedside      Review of Systems   Unable to perform ROS: Acuity of condition   Constitutional:  Negative for chills, diaphoresis, fatigue and fever.   HENT: Negative.     Respiratory:  Negative for cough and shortness of breath.    Cardiovascular:  Negative for chest pain.   Gastrointestinal:  Negative for diarrhea, nausea and vomiting.   Musculoskeletal:  Positive for arthralgias and myalgias.   Skin:  Positive for wound. Negative for rash.   Neurological:  Negative for dizziness and headaches.   Psychiatric/Behavioral:  Negative for agitation and confusion.      Objective:     Vital Signs (Most Recent):  Temp: 98 °F (36.7 °C) (03/19/24 1122)  Pulse: 95 (03/19/24 1122)  Resp: 18 (03/19/24 1257)  BP: 116/75 (03/19/24 1122)  SpO2: 100 % (03/19/24 1122) Vital Signs (24h Range):  Temp:  [97.8 °F (36.6 °C)-98.3 °F (36.8 °C)] 98 °F (36.7 °C)  Pulse:  [] 95  Resp:  [16-20] 18  SpO2:  [96 %-100 %] 100 %  BP: ()/(54-77) 116/75     Weight: 59.4 kg (130 lb 15.3 oz)  Body mass index is 19.34 kg/m².    Estimated Creatinine Clearance: 155.4 mL/min (based on SCr of 0.6 mg/dL).     Physical Exam  Vitals and nursing note reviewed.   Constitutional:       General: He is not in acute distress.     Appearance: Normal appearance. He is not ill-appearing, toxic-appearing or diaphoretic.   HENT:      Head: Normocephalic and atraumatic.      Nose: No congestion.      Mouth/Throat:      Pharynx: Oropharynx is clear.   Eyes:      General: No scleral icterus.     Conjunctiva/sclera: Conjunctivae normal.   Cardiovascular:      Rate and Rhythm: Normal rate and regular rhythm.      Heart sounds: Normal heart sounds. No murmur heard.  Pulmonary:      Effort: Pulmonary effort is normal. No respiratory distress.      Breath sounds: Rhonchi (bilateral lower lobes) present. No wheezing or rales.      Comments: Has  suction device, but not much secretions  Abdominal:      General: Bowel sounds are normal. There is no distension.      Palpations: Abdomen is soft. There is no mass.      Comments: Colostomy LLQ, c/d/I.    Musculoskeletal:         General: Swelling, tenderness and deformity present.      Comments: Bilateral AKA, stables intact, CDI. Without drainage.   Left mid forearm amputation - with petroleum jelly dressing in place.        Skin:     General: Skin is warm.      Findings: Erythema and lesion present. No rash.      Comments: Wounds to left scapula, l spine, and sacrum. See media.     Wound to right hand/forearm, pink, without drainage.      Neurological:      General: No focal deficit present.      Mental Status: He is alert and oriented to person, place, and time.   Psychiatric:         Mood and Affect: Mood normal.         Behavior: Behavior normal.       Right hand:      Right ischial tuberosity:    Left ischial tuberosity:     Sacral spine:     Thoracic spine     Right upper back:         Significant Labs: Blood Culture:   Recent Labs   Lab 02/21/24  1145 02/27/24  1848 02/27/24  1946 03/15/24  0817 03/15/24  0818   LABBLOO Gram stain werner bottle: Gram negative rods  Results called to and read back by:Ana Lilia Carver RN 02/23/2024  22:30  FUSOBACTERIUM NUCLEATUM* No growth after 5 days. No growth after 5 days. No Growth to date  No Growth to date  No Growth to date  No Growth to date  No Growth to date No Growth to date  No Growth to date  No Growth to date  No Growth to date  No Growth to date       CBC:   Recent Labs   Lab 03/18/24  0550 03/19/24  0536   WBC 21.50* 19.74*   HGB 8.4* 8.7*   HCT 25.7* 27.3*    452*       CMP:   Recent Labs   Lab 03/18/24  0550 03/18/24  1300 03/19/24  0536   * 137 137   K 5.3* 4.9 5.2*   CL 99 98 102   CO2 28 27 28   GLU 76 98 84   BUN 20 21* 31*   CREATININE 0.6 0.6 0.6   CALCIUM 7.3* 7.3* 7.4*   PROT 6.1  --  6.5   ALBUMIN 1.2*  --  1.2*   BILITOT 0.2   --  0.1   ALKPHOS 93  --  114   AST 24  --  23   ALT 8*  --  10   ANIONGAP 7* 12 7*       Microbiology Results (last 7 days)       Procedure Component Value Units Date/Time    Blood culture [[id number]] Collected: 03/15/24 0818    Order Status: Completed Specimen: Blood from Peripheral, Wrist, Left Updated: 03/19/24 1012     Blood Culture, Routine No Growth to date      No Growth to date      No Growth to date      No Growth to date      No Growth to date    Blood culture [[id number]] Collected: 03/15/24 0817    Order Status: Completed Specimen: Blood from Peripheral, Antecubital, Left Updated: 03/19/24 1012     Blood Culture, Routine No Growth to date      No Growth to date      No Growth to date      No Growth to date      No Growth to date    Aerobic culture [[id number]]  (Abnormal)  (Susceptibility) Collected: 03/13/24 1709    Order Status: Completed Specimen: Arm, Left Updated: 03/18/24 1147     Aerobic Bacterial Culture ACINETOBACTER BAUMANNII   Few        PSEUDOMONAS AERUGINOSA   Rare      Narrative:      Left arm proximal margin  Previous comment was modified by JNR at  09:58  on  03/18/2024   Previous comment was modified by JNR at  09:58  on  03/18/2024   Previous comment was modified by I/AUT at  08:34  on  03/18/2024     Aerobic culture [[id number]]  (Abnormal)  (Susceptibility) Collected: 03/13/24 1535    Order Status: Completed Specimen: Leg, Left Updated: 03/18/24 1102     Aerobic Bacterial Culture PSEUDOMONAS AERUGINOSA   Few  Skin diana also present      Narrative:      Left leg proximal margin    Culture, Anaerobe [[id number]] Collected: 03/13/24 1709    Order Status: Completed Specimen: Arm, Left Updated: 03/18/24 0838     Anaerobic Culture No anaerobes isolated    Narrative:      Left arm proximal margin    Culture, Anaerobe [[id number]] Collected: 03/13/24 1535    Order Status: Completed Specimen: Leg, Left Updated: 03/18/24 0838     Anaerobic Culture No anaerobes isolated    Narrative:       Left leg proximal margin    Blood culture [[id number]]     Order Status: Canceled Specimen: Blood     Blood culture [[id number]]     Order Status: Canceled Specimen: Blood           Respiratory Culture:   Recent Labs   Lab 02/22/24  0026   GSRESP <10 epithelial cells per low power field.  Many WBC's  Many Gram negative rods  Rare Gram positive cocci   RESPIRATORYC No S aureus isolated.  PSEUDOMONAS AERUGINOSA  Moderate  *  PROTEUS MIRABILIS  Moderate  Normal respiratory diana also present  *       Wound Culture:   Recent Labs   Lab 03/13/24  1535 03/13/24  1709   LABAERO PSEUDOMONAS AERUGINOSA   Few  Skin diana also present  * ACINETOBACTER BAUMANNII   Few  *  PSEUDOMONAS AERUGINOSA   Rare  *       All pertinent labs within the past 24 hours have been reviewed.    Significant Imaging: I have reviewed all pertinent imaging results/findings within the past 24 hours.
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
## 2024-03-19 NOTE — ASSESSMENT & PLAN NOTE
"Stage IV pressure ulcer of bilateral buttocks, sacral region  Pressure ulcers of multiple sites  Chronic osteomyelitis    This patient does have evidence of infective focus  My overall impression is sepsis.  Source: Skin and Soft Tissue (location extremities)  Antibiotics given-   Antibiotics (72h ago, onward)      Start     Stop Route Frequency Ordered    03/19/24 0900  linezolid tablet 600 mg         -- PER NG TUBE Every 12 hours 03/19/24 0740    03/18/24 2100  erythromycin 5 mg/gram (0.5 %) ophthalmic ointment         -- Both Eyes Nightly 03/18/24 1616    03/16/24 1330  minocycline capsule 200 mg         -- PER NG TUBE Every 12 hours 03/16/24 1324    03/15/24 1615  cefTAZidime-avibactam (AVYCAZ) 2.5 g in dextrose 5 % in water (D5W) 100 mL IVPB (MB+)         -- IV Every 8 hours (non-standard times) 03/15/24 1510          Latest lactate reviewed-  No results for input(s): "LACTATE", "POCLAC" in the last 72 hours.    Organ dysfunction indicated by Acute respiratory failure    UCX 02/21 and RCX 02/22 with MDR proteus mirabilis, pseudomonas aeruginosa. Patient has extensive MDRO / ESBL culture data from prior hospitalizations as well. Shock component is now resolved, but leukocytosis, fever, and underlying wounds are still a component as source control has not been achieved.    -- IP consult to general surgery for surgical evaluation -- Now s/p bilateral AKA and LUE below elbow amputation 3/13  -- Tissue sent to pathology  -- ID recommending further amputation of LUE for better source control, will discuss with general surgery  -- Will order MRI of  L spine, sacrum, and left scapula to further assess for osteo    -- L arm culture grew Acinetobacter and Pseudomonas. L leg culture grew Pseudomonas  -- Infectious Disease service following, appreciate assistance  -- Continue Linezolid, Minocycline, and Avycaz  -- IP Consult to Wound Care for LUE amputation site   -- Turn q2h  -- MAP goal >60    "

## 2024-03-19 NOTE — ASSESSMENT & PLAN NOTE
Patient has hyponatremia which is controlled,We will aim to correct the sodium by 4-6mEq in 24 hours. We will monitor sodium Daily. The hyponatremia is due to Dehydration/hypovolemia. The patient's sodium results have been reviewed and are listed below.  Recent Labs   Lab 03/19/24  0536
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
## 2024-03-19 NOTE — ASSESSMENT & PLAN NOTE
>>ASSESSMENT AND PLAN FOR PRESSURE ULCERS OF SKIN OF MULTIPLE TOPOGRAPHIC SITES WRITTEN ON 3/19/2024  2:47 PM BY KELLY HUERTA MD    - See severe sepsis

## 2024-03-19 NOTE — ASSESSMENT & PLAN NOTE
>>ASSESSMENT AND PLAN FOR GANGRENE WRITTEN ON 3/19/2024  2:47 PM BY KELLY HUERTA MD    - See severe sepsis
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
## 2024-03-19 NOTE — ASSESSMENT & PLAN NOTE
Palliative Care at OSH engaging in ongoing communications with family regarding GOC given extensive comorbid conditions and recurrent hospitalizations. Per documentation review and brief discussion with family on initial transfer admission encounter, patient & family wish to continue all medical and surgical options at this time.     -- Patient & family would greatly benefit from continued Palliative Care discussions  -- IP Consult to [facility] Care services
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
## 2024-03-19 NOTE — RESPIRATORY THERAPY
"RAPID RESPONSE RESPIRATORY THERAPY PROACTIVE NOTE           Time of visit: 08     Code Status: Prior   : 1995  Bed: [address] [place] [address] A:   MRN: [id number]  Time spent at the bedside: < 15 min    SITUATION    Evaluated patient for: LDA Check     BACKGROUND    Patient has no past medical history on file.  Clinically Significant Surgical Hx: tracheostomy    24 Hours Vitals Range:  Temp:  [97.6 °F (36.4 °C)-98.3 °F (36.8 °C)]   Pulse:  []   Resp:  [16-20]   BP: ()/(54-77)   SpO2:  [96 %-100 %]     Labs:    Recent Labs     24  0550 24  1300 24  0536   * 137 137   K 5.3* 4.9 5.2*   CL 99 98 102   CO2 28 27 28   BUN 20 21* 31*   CREATININE 0.6 0.6 0.6   GLU 76 98 84        No results for input(s): "PH", "PCO2", "PO2", "HCO3", "POCSATURATED", "BE" in the last 72 hours.    ASSESSMENT/INTERVENTIONS  Patient sleeping. All safety supplies at bedside.      Last VS   Temp: 98.3 °F (36.8 °C) ( 0753)  Pulse: 91 (753)  Resp: 16 (753)  BP: 112/71 (753)  SpO2: 100 % (753)      Extra trachs at bedside: 6 & 8 cuff  Level of Consciousness: Level of Consciousness (AVPU): alert  Respiratory Effort: Respiratory Effort: Normal, Unlabored Expansion/Accessory Muscle Usage: Expansion/Accessory Muscles/Retractions: expansion symmetric, no retractions, no use of accessory muscles  All Lung Field Breath Sounds: All Lung Fields Breath Sounds: diminished, crackles, fine  NATALIA Breath Sounds: coarse  LLL Breath Sounds: coarse  RUL Breath Sounds: coarse  RML Breath Sounds: coarse  RLL Breath Sounds: coarse  O2 Device/Concentration: 5L 28% TC  Surgical airway: Yes, Type: Shiley Size: 8, cuffed  Ambu at bedside:       Active Orders   Respiratory Care    Oxygen Continuous     Frequency: Continuous     Number of Occurrences: Until Specified     Order Questions:      Device type: Low flow      Device: Trach Collar      FiO2%: 60      Titrate O2 per Oxygen Titration Protocol: Yes      To " maintain SpO2 goal of: >= 90%      Notify MD of: Inability to achieve desired SpO2; Sudden change in patient status and requires 20% increase in FiO2; Patient requires >60% FiO2    Pulse Oximetry Continuous     Frequency: Continuous     Number of Occurrences: Until Specified    Routine tracheostomy care     Frequency: BID     Number of Occurrences: Until Specified    SUCTION PRN     Frequency: PRN     Number of Occurrences: Until Specified       RECOMMENDATIONS    We recommend: RRT Recs: Continue POC per primary team.      FOLLOW-UP    Please call back the Rapid Response RT, Mayra Mazariegos RRT at x [phone] for any questions or concerns.

## 2024-03-20 LAB
ALBUMIN SERPL BCP-MCNC: 1.3 G/DL (ref 3.5–5.2)
ALP SERPL-CCNC: 130 U/L (ref 55–135)
ALT SERPL W/O P-5'-P-CCNC: 8 U/L (ref 10–44)
ANION GAP SERPL CALC-SCNC: 8 MMOL/L (ref 8–16)
ANISOCYTOSIS BLD QL SMEAR: SLIGHT
AST SERPL-CCNC: 22 U/L (ref 10–40)
BACTERIA BLD CULT: NORMAL
BACTERIA BLD CULT: NORMAL
BASOPHILS # BLD AUTO: 0.14 K/UL (ref 0–0.2)
BASOPHILS NFR BLD: 0.7 % (ref 0–1.9)
BILIRUB SERPL-MCNC: 0.1 MG/DL (ref 0.1–1)
BUN SERPL-MCNC: 37 MG/DL (ref 6–20)
CALCIUM SERPL-MCNC: 7.4 MG/DL (ref 8.7–10.5)
CHLORIDE SERPL-SCNC: 106 MMOL/L (ref 95–110)
CO2 SERPL-SCNC: 24 MMOL/L (ref 23–29)
CREAT SERPL-MCNC: 0.7 MG/DL (ref 0.5–1.4)
DIFFERENTIAL METHOD BLD: ABNORMAL
EOSINOPHIL # BLD AUTO: 2.1 K/UL (ref 0–0.5)
EOSINOPHIL NFR BLD: 9.6 % (ref 0–8)
ERYTHROCYTE [DISTWIDTH] IN BLOOD BY AUTOMATED COUNT: 15.2 % (ref 11.5–14.5)
EST. GFR  (NO RACE VARIABLE): >60 ML/MIN/1.73 M^2
GLUCOSE SERPL-MCNC: 84 MG/DL (ref 70–110)
HCT VFR BLD AUTO: 26.1 % (ref 40–54)
HGB BLD-MCNC: 8.6 G/DL (ref 14–18)
HYPOCHROMIA BLD QL SMEAR: ABNORMAL
IMM GRANULOCYTES # BLD AUTO: 0.16 K/UL (ref 0–0.04)
IMM GRANULOCYTES NFR BLD AUTO: 0.7 % (ref 0–0.5)
LYMPHOCYTES # BLD AUTO: 4 K/UL (ref 1–4.8)
LYMPHOCYTES NFR BLD: 18.7 % (ref 18–48)
MCH RBC QN AUTO: 31.2 PG (ref 27–31)
MCHC RBC AUTO-ENTMCNC: 33 G/DL (ref 32–36)
MCV RBC AUTO: 95 FL (ref 82–98)
MONOCYTES # BLD AUTO: 2.5 K/UL (ref 0.3–1)
MONOCYTES NFR BLD: 11.5 % (ref 4–15)
NEUTROPHILS # BLD AUTO: 12.7 K/UL (ref 1.8–7.7)
NEUTROPHILS NFR BLD: 58.8 % (ref 38–73)
NRBC BLD-RTO: 0 /100 WBC
OHS QRS DURATION: 66 MS
OHS QTC CALCULATION: 466 MS
OVALOCYTES BLD QL SMEAR: ABNORMAL
PLATELET # BLD AUTO: 529 K/UL (ref 150–450)
PLATELET BLD QL SMEAR: ABNORMAL
PMV BLD AUTO: 8.3 FL (ref 9.2–12.9)
POIKILOCYTOSIS BLD QL SMEAR: SLIGHT
POLYCHROMASIA BLD QL SMEAR: ABNORMAL
POTASSIUM SERPL-SCNC: 5.5 MMOL/L (ref 3.5–5.1)
PROT SERPL-MCNC: 6.6 G/DL (ref 6–8.4)
RBC # BLD AUTO: 2.76 M/UL (ref 4.6–6.2)
SODIUM SERPL-SCNC: 138 MMOL/L (ref 136–145)
SPHEROCYTES BLD QL SMEAR: ABNORMAL
WBC # BLD AUTO: 21.48 K/UL (ref 3.9–12.7)

## 2024-03-20 PROCEDURE — 27000221 HC OXYGEN, UP TO 24 HOURS

## 2024-03-20 PROCEDURE — 99900035 HC TECH TIME PER 15 MIN (STAT)

## 2024-03-20 PROCEDURE — 25000003 PHARM REV CODE 250

## 2024-03-20 PROCEDURE — 93010 ELECTROCARDIOGRAM REPORT: CPT | Mod: ,,, | Performed by: INTERNAL MEDICINE

## 2024-03-20 PROCEDURE — 25000003 PHARM REV CODE 250: Performed by: HOSPITALIST

## 2024-03-20 PROCEDURE — 99900026 HC AIRWAY MAINTENANCE (STAT)

## 2024-03-20 PROCEDURE — 94761 N-INVAS EAR/PLS OXIMETRY MLT: CPT

## 2024-03-20 PROCEDURE — 20600001 HC STEP DOWN PRIVATE ROOM

## 2024-03-20 PROCEDURE — 63600175 PHARM REV CODE 636 W HCPCS: Performed by: HOSPITALIST

## 2024-03-20 PROCEDURE — 93005 ELECTROCARDIOGRAM TRACING: CPT

## 2024-03-20 PROCEDURE — 25000003 PHARM REV CODE 250: Performed by: NURSE PRACTITIONER

## 2024-03-20 PROCEDURE — 27200966 HC CLOSED SUCTION SYSTEM

## 2024-03-20 PROCEDURE — 63600175 PHARM REV CODE 636 W HCPCS: Mod: JZ,JG | Performed by: PHYSICIAN ASSISTANT

## 2024-03-20 PROCEDURE — 85025 COMPLETE CBC W/AUTO DIFF WBC: CPT

## 2024-03-20 PROCEDURE — 80053 COMPREHEN METABOLIC PANEL: CPT

## 2024-03-20 PROCEDURE — 25000003 PHARM REV CODE 250: Performed by: PHYSICIAN ASSISTANT

## 2024-03-20 PROCEDURE — 94640 AIRWAY INHALATION TREATMENT: CPT

## 2024-03-20 PROCEDURE — 25000242 PHARM REV CODE 250 ALT 637 W/ HCPCS

## 2024-03-20 PROCEDURE — 27000207 HC ISOLATION

## 2024-03-20 RX ORDER — ALBUTEROL SULFATE 2.5 MG/.5ML
10 SOLUTION RESPIRATORY (INHALATION) ONCE
Status: COMPLETED | OUTPATIENT
Start: 2024-03-20 | End: 2024-03-20

## 2024-03-20 RX ADMIN — LEVOTHYROXINE SODIUM 112 MCG: 112 TABLET ORAL at 06:03

## 2024-03-20 RX ADMIN — MICONAZOLE NITRATE: 20 OINTMENT TOPICAL at 08:03

## 2024-03-20 RX ADMIN — MIDODRINE HYDROCHLORIDE 10 MG: 5 TABLET ORAL at 03:03

## 2024-03-20 RX ADMIN — MINOCYCLINE HYDROCHLORIDE 200 MG: 100 CAPSULE ORAL at 08:03

## 2024-03-20 RX ADMIN — CEFTAZIDIME, AVIBACTAM 2.5 G: 2; .5 POWDER, FOR SOLUTION INTRAVENOUS at 10:03

## 2024-03-20 RX ADMIN — LEVETIRACETAM 750 MG: 750 TABLET, FILM COATED ORAL at 10:03

## 2024-03-20 RX ADMIN — HYPROMELLOSE 2910 1 DROP: 5 SOLUTION OPHTHALMIC at 10:03

## 2024-03-20 RX ADMIN — ERYTHROMYCIN: 5 OINTMENT OPHTHALMIC at 10:03

## 2024-03-20 RX ADMIN — GABAPENTIN 400 MG: 400 CAPSULE ORAL at 10:03

## 2024-03-20 RX ADMIN — OXYCODONE HYDROCHLORIDE AND ACETAMINOPHEN 1 TABLET: 10; 325 TABLET ORAL at 01:03

## 2024-03-20 RX ADMIN — ENOXAPARIN SODIUM 30 MG: 30 INJECTION SUBCUTANEOUS at 05:03

## 2024-03-20 RX ADMIN — OXYCODONE HYDROCHLORIDE AND ACETAMINOPHEN 1 TABLET: 10; 325 TABLET ORAL at 10:03

## 2024-03-20 RX ADMIN — HYPROMELLOSE 2910 1 DROP: 5 SOLUTION OPHTHALMIC at 03:03

## 2024-03-20 RX ADMIN — ACETAMINOPHEN 650 MG: 325 TABLET ORAL at 10:03

## 2024-03-20 RX ADMIN — MIDODRINE HYDROCHLORIDE 10 MG: 5 TABLET ORAL at 06:03

## 2024-03-20 RX ADMIN — OXYCODONE HYDROCHLORIDE 20 MG: 10 TABLET, FILM COATED, EXTENDED RELEASE ORAL at 08:03

## 2024-03-20 RX ADMIN — HYPROMELLOSE 2910 1 DROP: 5 SOLUTION OPHTHALMIC at 08:03

## 2024-03-20 RX ADMIN — LEVETIRACETAM 750 MG: 750 TABLET, FILM COATED ORAL at 08:03

## 2024-03-20 RX ADMIN — ALBUTEROL SULFATE 10 MG: 2.5 SOLUTION RESPIRATORY (INHALATION) at 09:03

## 2024-03-20 RX ADMIN — OXYCODONE HYDROCHLORIDE AND ACETAMINOPHEN 1 TABLET: 10; 325 TABLET ORAL at 06:03

## 2024-03-20 RX ADMIN — CHLORHEXIDINE GLUCONATE 0.12% ORAL RINSE 15 ML: 1.2 LIQUID ORAL at 10:03

## 2024-03-20 RX ADMIN — MICONAZOLE NITRATE: 20 OINTMENT TOPICAL at 10:03

## 2024-03-20 RX ADMIN — GABAPENTIN 400 MG: 400 CAPSULE ORAL at 08:03

## 2024-03-20 RX ADMIN — HYPROMELLOSE 2910 1 DROP: 5 SOLUTION OPHTHALMIC at 05:03

## 2024-03-20 RX ADMIN — METHOCARBAMOL 500 MG: 500 TABLET ORAL at 08:03

## 2024-03-20 RX ADMIN — LIDOCAINE 5% 1 PATCH: 700 PATCH TOPICAL at 03:03

## 2024-03-20 RX ADMIN — SODIUM ZIRCONIUM CYCLOSILICATE 5 G: 5 POWDER, FOR SUSPENSION ORAL at 12:03

## 2024-03-20 RX ADMIN — LINEZOLID 600 MG: 600 TABLET, FILM COATED ORAL at 08:03

## 2024-03-20 RX ADMIN — MINOCYCLINE HYDROCHLORIDE 200 MG: 100 CAPSULE ORAL at 10:03

## 2024-03-20 RX ADMIN — FOLIC ACID 1 MG: 1 TABLET ORAL at 10:03

## 2024-03-20 RX ADMIN — FAMOTIDINE 20 MG: 20 TABLET ORAL at 08:03

## 2024-03-20 RX ADMIN — ALPRAZOLAM 1 MG: 0.5 TABLET ORAL at 08:03

## 2024-03-20 RX ADMIN — CEFTAZIDIME, AVIBACTAM 2.5 G: 2; .5 POWDER, FOR SOLUTION INTRAVENOUS at 08:03

## 2024-03-20 RX ADMIN — CEFTAZIDIME, AVIBACTAM 2.5 G: 2; .5 POWDER, FOR SOLUTION INTRAVENOUS at 01:03

## 2024-03-20 RX ADMIN — LINEZOLID 600 MG: 600 TABLET, FILM COATED ORAL at 10:03

## 2024-03-20 RX ADMIN — GABAPENTIN 400 MG: 400 CAPSULE ORAL at 03:03

## 2024-03-20 RX ADMIN — METHOCARBAMOL 500 MG: 500 TABLET ORAL at 03:03

## 2024-03-20 RX ADMIN — OXYCODONE HYDROCHLORIDE 20 MG: 10 TABLET, FILM COATED, EXTENDED RELEASE ORAL at 10:03

## 2024-03-20 RX ADMIN — METHOCARBAMOL 500 MG: 500 TABLET ORAL at 10:03

## 2024-03-20 RX ADMIN — ACETAMINOPHEN 650 MG: 325 TABLET ORAL at 08:03

## 2024-03-20 RX ADMIN — FAMOTIDINE 20 MG: 20 TABLET ORAL at 10:03

## 2024-03-20 RX ADMIN — MIDODRINE HYDROCHLORIDE 10 MG: 5 TABLET ORAL at 08:03

## 2024-03-20 RX ADMIN — CHLORHEXIDINE GLUCONATE 0.12% ORAL RINSE 15 ML: 1.2 LIQUID ORAL at 08:03

## 2024-03-20 NOTE — ASSESSMENT & PLAN NOTE
Seen by opthalmology 3/13 for painless vision loss for weeks, plan at that time was to follow up outpatient for cataracts. Developed eye pain 2/2 dry eyes, ointment and eyedrops provided.
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
## 2024-03-20 NOTE — RESPIRATORY THERAPY
"RAPID RESPONSE RESPIRATORY THERAPY PROACTIVE NOTE           Time of visit: 833     Code Status: Prior   : 1995  Bed: [address] [place] [address] A:   MRN: [id number]  Time spent at the bedside: < 15 min    SITUATION    Evaluated patient for: LDA Check     BACKGROUND    Patient has no past medical history on file.  Clinically Significant Surgical Hx: tracheostomy    24 Hours Vitals Range:  Temp:  [97.6 °F (36.4 °C)-98.1 °F (36.7 °C)]   Pulse:  []   Resp:  [16-20]   BP: (111-117)/(73-78)   SpO2:  [97 %-100 %]     Labs:    Recent Labs     24  1300 24  0536 24  0439    137 138   K 4.9 5.2* 5.5*   CL 98 102 106   CO2 27 28 24   BUN 21* 31* 37*   CREATININE 0.6 0.6 0.7   GLU 98 84 84        No results for input(s): "PH", "PCO2", "PO2", "HCO3", "POCSATURATED", "BE" in the last 72 hours.      ASSESSMENT/INTERVENTIONS  Bedside LDA check and airway supply check completed      Last VS   Temp: 97.6 °F (36.4 °C) ( 0426)  Pulse: 73 ( 0906)  Resp: 18 ( 1002)  BP: 116/78 ( 0808)  SpO2: 97 % ( 0426)      Extra trachs at bedside: 6 CN/8 CN  Level of Consciousness: Level of Consciousness (AVPU): alert  Respiratory Effort: Respiratory Effort: Unlabored, Normal Expansion/Accessory Muscle Usage: Expansion/Accessory Muscles/Retractions: expansion symmetric  All Lung Field Breath Sounds: All Lung Fields Breath Sounds: Anterior:, coarse, equal bilaterally  NATALIA Breath Sounds: coarse  LLL Breath Sounds: coarse  RUL Breath Sounds: coarse  RML Breath Sounds: coarse  RLL Breath Sounds: coarse  O2 Device/Concentration: 5 L/28% FiO2  Surgical airway: Yes, Type: Shiley Size: 8, cuffed  Ambu at bedside:       Active Orders   Respiratory Care    Inhalation Treatment Once     Frequency: Once     Number of Occurrences: 1 Occurrences    Oxygen Continuous     Frequency: Continuous     Number of Occurrences: Until Specified     Order Questions:      Device type: Low flow      Device: Trach Collar      FiO2%: 60 "      Titrate O2 per Oxygen Titration Protocol: Yes      To maintain SpO2 goal of: >= 90%      Notify MD of: Inability to achieve desired SpO2; Sudden change in patient status and requires 20% increase in FiO2; Patient requires >60% FiO2    Pulse Oximetry Continuous     Frequency: Continuous     Number of Occurrences: Until Specified    Routine tracheostomy care     Frequency: BID     Number of Occurrences: Until Specified    SUCTION PRN     Frequency: PRN     Number of Occurrences: Until Specified       RECOMMENDATIONS    We recommend: RRT Recs: Continue POC per primary team.      FOLLOW-UP    Please call back the Rapid Response RT, Heena Howell RRT at x [phone] for any questions or concerns.

## 2024-03-20 NOTE — ASSESSMENT & PLAN NOTE
>>ASSESSMENT AND PLAN FOR GANGRENE WRITTEN ON 3/20/2024 11:47 AM BY KELLY HUERTA MD    - See severe sepsis

## 2024-03-20 NOTE — ASSESSMENT & PLAN NOTE
"Stage IV pressure ulcer of bilateral buttocks, sacral region  Pressure ulcers of multiple sites  Chronic osteomyelitis    This patient does have evidence of infective focus  My overall impression is sepsis.  Source: Skin and Soft Tissue (location extremities)  Antibiotics given-   Antibiotics (72h ago, onward)      Start     Stop Route Frequency Ordered    03/19/24 0900  linezolid tablet 600 mg         -- PER NG TUBE Every 12 hours 03/19/24 0740    03/18/24 2100  erythromycin 5 mg/gram (0.5 %) ophthalmic ointment         -- Both Eyes Nightly 03/18/24 1616    03/16/24 1330  minocycline capsule 200 mg         -- PER NG TUBE Every 12 hours 03/16/24 1324    03/15/24 1615  cefTAZidime-avibactam (AVYCAZ) 2.5 g in dextrose 5 % in water (D5W) 100 mL IVPB (MB+)         -- IV Every 8 hours (non-standard times) 03/15/24 1510          Latest lactate reviewed-  No results for input(s): "LACTATE", "POCLAC" in the last 72 hours.    Organ dysfunction indicated by Acute respiratory failure    UCX 02/21 and RCX 02/22 with MDR proteus mirabilis, pseudomonas aeruginosa. Patient has extensive MDRO / ESBL culture data from prior hospitalizations as well. Shock component is now resolved, but leukocytosis, fever, and underlying wounds are still a component as source control has not been achieved.    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 L leg culture grew Pseudomonas  -- Infectious Disease service following, appreciate assistance  -- Continue Linezolid, Minocycline, and Avycaz  -- IP Consult to Wound Care for LUE amputation site   -- Turn q2h  -- MAP goal >60    "

## 2024-03-20 NOTE — PLAN OF CARE
03/20/24 1601   Post-Acute Status   Post-Acute Authorization Placement  (LTAC)   Post-Acute Placement Status Patient List Provided   Coverage MEDICAID - [place] BLUE [place]   Discharge Delays None known at this time   Discharge Plan   Discharge Plan A Long-term acute care facility (LTAC)   Discharge Plan B Skilled Nursing Facility  (TBD)     SERGIO reached out to Viv ROBERSON with [facility] to get an update on pt referral. She informed  pt insurance is out-of-network.   SERGIO met w/ pt and pt family to provide update and pt mother stated she would like to try a facility close to [place], [place]. SERGIO provided a list (from pt insurance) to pt family and inform CM dept will f/u to discuss their choice ([place] and [place] area provided).     Discharge Plan A and Plan B have been determined by review of patient's clinical status, future medical and therapeutic needs, and coverage/benefits for post-acute care in coordination with multidisciplinary team members.    ORVILLE Benson   [facility]    Case Management Dept  [phone]

## 2024-03-20 NOTE — ASSESSMENT & PLAN NOTE
>>ASSESSMENT AND PLAN FOR PRESSURE ULCERS OF SKIN OF MULTIPLE TOPOGRAPHIC SITES WRITTEN ON 3/20/2024 11:52 AM BY KELLY HUERTA MD    - See severe sepsis

## 2024-03-20 NOTE — ASSESSMENT & PLAN NOTE
- Oxycontin 20mg BID  - Percocet 10mg q6h PRN  - Patient reports pain is controlled with current regimen

## 2024-03-20 NOTE — SUBJECTIVE & OBJECTIVE
Interval History: Patient slept through the night with increased pain regimen.      Objective:     Vital Signs (Most Recent):  Temp: 97.6 °F (36.4 °C) (03/20/24 0426)  Pulse: (!) 124 (03/20/24 1106)  Resp: 18 (03/20/24 1002)  BP: 116/78 (03/20/24 0808)  SpO2: 97 % (03/20/24 1143) Vital Signs (24h Range):  Temp:  [97.6 °F (36.4 °C)-98.1 °F (36.7 °C)] 97.6 °F (36.4 °C)  Pulse:  [] 124  Resp:  [16-20] 18  SpO2:  [97 %-100 %] 97 %  BP: (111-117)/(73-78) 116/78     Weight: 59.4 kg (130 lb 15.3 oz)  Body mass index is 19.34 kg/m².    Intake/Output Summary (Last 24 hours) at 3/20/2024 1144  Last data filed at 3/20/2024 0617  Gross per 24 hour   Intake 370 ml   Output 1650 ml   Net -1280 ml         Physical Exam  Vitals and nursing note reviewed.   Constitutional:       General: He is not in acute distress.     Appearance: Normal appearance. He is not ill-appearing, toxic-appearing or diaphoretic.   HENT:      Head: Normocephalic and atraumatic.      Nose: No congestion.      Mouth/Throat:      Pharynx: Oropharynx is clear.   Eyes:      General: No scleral icterus.     Conjunctiva/sclera: Conjunctivae normal.   Cardiovascular:      Rate and Rhythm: Normal rate and regular rhythm.      Heart sounds: Normal heart sounds. No murmur heard.  Pulmonary:      Effort: Pulmonary effort is normal. No respiratory distress.      Breath sounds: No wheezing, rhonchi or rales.   Abdominal:      General: Bowel sounds are normal. There is no distension.      Palpations: Abdomen is soft. There is no mass.      Comments: Colostomy LLQ, c/d/I.    Musculoskeletal:         General: Swelling, tenderness and deformity present.      Comments: Bilateral AKA, staples intact  LUE amputation below elbow   Skin:     General: Skin is warm.      Findings: Erythema and lesion present. No rash.      Comments: See media tab for wound images     Neurological:      General: No focal deficit present.      Mental Status: He is alert and oriented to person,  place, and time.   Psychiatric:         Mood and Affect: Mood normal.         Behavior: Behavior normal.             Significant Labs: All pertinent labs within the past 24 hours have been reviewed.    Significant Imaging: I have reviewed all pertinent imaging results/findings within the past 24 hours.

## 2024-03-20 NOTE — ASSESSMENT & PLAN NOTE
Palliative Care at [facility] engaging in ongoing communications with family regarding GOC given extensive comorbid conditions and recurrent hospitalizations. Per documentation review and brief discussion with family on initial transfer admission encounter, patient & family wish to continue all medical and surgical options at this time.     -- Patient & family would greatly benefit from continued Palliative Care discussions  -- IP Consult to [facility] [place] [facility] services

## 2024-03-20 NOTE — PLAN OF CARE
Recommendations     Continue Regular diet w/ 2 Boost with each meal  Continue Francisco BID x 14 days for wound healing  RD Following     Goals: Meet % een/epn by next RD f/u  Nutrition Goal Status: progressing towards goal  Communication of RD Recs: other (comment) (poc
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
## 2024-03-20 NOTE — PLAN OF CARE
SERGIO reached out to Viv Oleary via secure check to get update on pt LTAC referral and asked if there's any additional information needed.     ORVILLE Benson   [facility]    Case Management Dept  [phone]

## 2024-03-20 NOTE — ASSESSMENT & PLAN NOTE
Patient has hyponatremia which is controlled,We will aim to correct the sodium by 4-6mEq in 24 hours. We will monitor sodium Daily. The hyponatremia is due to Dehydration/hypovolemia. The patient's sodium results have been reviewed and are listed below.  Recent Labs   Lab 03/20/24  0439

## 2024-03-20 NOTE — PLAN OF CARE
03/20/24 1306   Post-Acute Status   Post-Acute Authorization Placement  (LTAC)   Post-Acute Placement Status Referrals Sent   Coverage MEDICAID - [place]   Discharge Delays None known at this time   Discharge Plan   Discharge Plan A Long-term acute care facility (LTAC)   Discharge Plan B Skilled Nursing Facility;Home Health;Home with family  (TBD)     SERGIO rec'd a message from Viv fontana/ [facility] with the update that pt insurance is out-of-network. Pt records was sent to [facility] via [email or web address] for review, SERGIO apariciok w/ pt and provided update and provided update to pt team.     Discharge Plan A and Plan B have been determined by review of patient's clinical status, future medical and therapeutic needs, and coverage/benefits for post-acute care in coordination with multidisciplinary team members.    ORVILLE Benson   [facility]    Case Management Dept  [phone]

## 2024-03-21 LAB
ACANTHOCYTES BLD QL SMEAR: PRESENT
ALBUMIN SERPL BCP-MCNC: 1.3 G/DL (ref 3.5–5.2)
ALP SERPL-CCNC: 136 U/L (ref 55–135)
ALT SERPL W/O P-5'-P-CCNC: 10 U/L (ref 10–44)
ANION GAP SERPL CALC-SCNC: 9 MMOL/L (ref 8–16)
ANISOCYTOSIS BLD QL SMEAR: SLIGHT
AST SERPL-CCNC: 28 U/L (ref 10–40)
BASOPHILS # BLD AUTO: 0.16 K/UL (ref 0–0.2)
BASOPHILS NFR BLD: 0.8 % (ref 0–1.9)
BILIRUB SERPL-MCNC: 0.1 MG/DL (ref 0.1–1)
BUN SERPL-MCNC: 40 MG/DL (ref 6–20)
CALCIUM SERPL-MCNC: 7.2 MG/DL (ref 8.7–10.5)
CHLORIDE SERPL-SCNC: 103 MMOL/L (ref 95–110)
CO2 SERPL-SCNC: 24 MMOL/L (ref 23–29)
CREAT SERPL-MCNC: 0.6 MG/DL (ref 0.5–1.4)
DIFFERENTIAL METHOD BLD: ABNORMAL
EOSINOPHIL # BLD AUTO: 1.7 K/UL (ref 0–0.5)
EOSINOPHIL NFR BLD: 9 % (ref 0–8)
ERYTHROCYTE [DISTWIDTH] IN BLOOD BY AUTOMATED COUNT: 15.3 % (ref 11.5–14.5)
EST. GFR  (NO RACE VARIABLE): >60 ML/MIN/1.73 M^2
FACT X PPP CHRO-ACNC: 0.19 IU/ML (ref 0.3–0.7)
GLUCOSE SERPL-MCNC: 79 MG/DL (ref 70–110)
HCT VFR BLD AUTO: 26.1 % (ref 40–54)
HGB BLD-MCNC: 8.3 G/DL (ref 14–18)
HYPOCHROMIA BLD QL SMEAR: ABNORMAL
IMM GRANULOCYTES # BLD AUTO: 0.2 K/UL (ref 0–0.04)
IMM GRANULOCYTES NFR BLD AUTO: 1.1 % (ref 0–0.5)
LYMPHOCYTES # BLD AUTO: 4.2 K/UL (ref 1–4.8)
LYMPHOCYTES NFR BLD: 22.2 % (ref 18–48)
MCH RBC QN AUTO: 30.3 PG (ref 27–31)
MCHC RBC AUTO-ENTMCNC: 31.8 G/DL (ref 32–36)
MCV RBC AUTO: 95 FL (ref 82–98)
MONOCYTES # BLD AUTO: 2.2 K/UL (ref 0.3–1)
MONOCYTES NFR BLD: 11.5 % (ref 4–15)
NEUTROPHILS # BLD AUTO: 10.5 K/UL (ref 1.8–7.7)
NEUTROPHILS NFR BLD: 55.4 % (ref 38–73)
NRBC BLD-RTO: 0 /100 WBC
OVALOCYTES BLD QL SMEAR: ABNORMAL
PLATELET # BLD AUTO: 649 K/UL (ref 150–450)
PMV BLD AUTO: 8.6 FL (ref 9.2–12.9)
POIKILOCYTOSIS BLD QL SMEAR: SLIGHT
POLYCHROMASIA BLD QL SMEAR: ABNORMAL
POTASSIUM SERPL-SCNC: 5.4 MMOL/L (ref 3.5–5.1)
PROT SERPL-MCNC: 6.8 G/DL (ref 6–8.4)
RBC # BLD AUTO: 2.74 M/UL (ref 4.6–6.2)
SODIUM SERPL-SCNC: 136 MMOL/L (ref 136–145)
SPHEROCYTES BLD QL SMEAR: ABNORMAL
WBC # BLD AUTO: 18.94 K/UL (ref 3.9–12.7)

## 2024-03-21 PROCEDURE — 25000003 PHARM REV CODE 250

## 2024-03-21 PROCEDURE — 25000003 PHARM REV CODE 250: Performed by: NURSE PRACTITIONER

## 2024-03-21 PROCEDURE — 25000242 PHARM REV CODE 250 ALT 637 W/ HCPCS

## 2024-03-21 PROCEDURE — 94640 AIRWAY INHALATION TREATMENT: CPT

## 2024-03-21 PROCEDURE — 80053 COMPREHEN METABOLIC PANEL: CPT

## 2024-03-21 PROCEDURE — 20600001 HC STEP DOWN PRIVATE ROOM

## 2024-03-21 PROCEDURE — 63600175 PHARM REV CODE 636 W HCPCS: Mod: JZ,JG | Performed by: PHYSICIAN ASSISTANT

## 2024-03-21 PROCEDURE — 27000207 HC ISOLATION

## 2024-03-21 PROCEDURE — 99900035 HC TECH TIME PER 15 MIN (STAT)

## 2024-03-21 PROCEDURE — 63600175 PHARM REV CODE 636 W HCPCS: Performed by: HOSPITALIST

## 2024-03-21 PROCEDURE — 27000221 HC OXYGEN, UP TO 24 HOURS

## 2024-03-21 PROCEDURE — 85025 COMPLETE CBC W/AUTO DIFF WBC: CPT

## 2024-03-21 PROCEDURE — 99900026 HC AIRWAY MAINTENANCE (STAT)

## 2024-03-21 PROCEDURE — 25000003 PHARM REV CODE 250: Performed by: PHYSICIAN ASSISTANT

## 2024-03-21 PROCEDURE — 63600175 PHARM REV CODE 636 W HCPCS

## 2024-03-21 PROCEDURE — 85520 HEPARIN ASSAY: CPT

## 2024-03-21 PROCEDURE — 25000003 PHARM REV CODE 250: Performed by: HOSPITALIST

## 2024-03-21 PROCEDURE — 94761 N-INVAS EAR/PLS OXIMETRY MLT: CPT

## 2024-03-21 PROCEDURE — 25000003 PHARM REV CODE 250: Performed by: INTERNAL MEDICINE

## 2024-03-21 RX ORDER — OXYCODONE HYDROCHLORIDE 10 MG/1
10 TABLET ORAL EVERY 6 HOURS PRN
Status: DISCONTINUED | OUTPATIENT
Start: 2024-03-21 | End: 2024-03-22

## 2024-03-21 RX ORDER — SODIUM CHLORIDE, SODIUM LACTATE, POTASSIUM CHLORIDE, CALCIUM CHLORIDE 600; 310; 30; 20 MG/100ML; MG/100ML; MG/100ML; MG/100ML
INJECTION, SOLUTION INTRAVENOUS CONTINUOUS
Status: ACTIVE | OUTPATIENT
Start: 2024-03-21 | End: 2024-03-21

## 2024-03-21 RX ORDER — OXYCODONE HCL 10 MG/1
30 TABLET, FILM COATED, EXTENDED RELEASE ORAL EVERY 12 HOURS
Status: DISCONTINUED | OUTPATIENT
Start: 2024-03-21 | End: 2024-03-23

## 2024-03-21 RX ORDER — ALBUTEROL SULFATE 2.5 MG/.5ML
10 SOLUTION RESPIRATORY (INHALATION) ONCE
Status: COMPLETED | OUTPATIENT
Start: 2024-03-21 | End: 2024-03-21

## 2024-03-21 RX ORDER — DOXYLAMINE SUCCINATE 25 MG
TABLET ORAL 2 TIMES DAILY
Status: DISCONTINUED | OUTPATIENT
Start: 2024-03-21 | End: 2024-04-11 | Stop reason: HOSPADM

## 2024-03-21 RX ORDER — ACETAMINOPHEN 500 MG
1000 TABLET ORAL EVERY 8 HOURS
Status: DISCONTINUED | OUTPATIENT
Start: 2024-03-21 | End: 2024-04-01

## 2024-03-21 RX ADMIN — OXYCODONE HYDROCHLORIDE 30 MG: 10 TABLET, FILM COATED, EXTENDED RELEASE ORAL at 09:03

## 2024-03-21 RX ADMIN — GABAPENTIN 400 MG: 400 CAPSULE ORAL at 09:03

## 2024-03-21 RX ADMIN — GABAPENTIN 400 MG: 400 CAPSULE ORAL at 03:03

## 2024-03-21 RX ADMIN — HYPROMELLOSE 2910 1 DROP: 5 SOLUTION OPHTHALMIC at 08:03

## 2024-03-21 RX ADMIN — ALPRAZOLAM 1 MG: 0.5 TABLET ORAL at 09:03

## 2024-03-21 RX ADMIN — ALBUTEROL SULFATE 10 MG: 2.5 SOLUTION RESPIRATORY (INHALATION) at 07:03

## 2024-03-21 RX ADMIN — FOLIC ACID 1 MG: 1 TABLET ORAL at 08:03

## 2024-03-21 RX ADMIN — HYPROMELLOSE 2910 1 DROP: 5 SOLUTION OPHTHALMIC at 03:03

## 2024-03-21 RX ADMIN — METHOCARBAMOL 500 MG: 500 TABLET ORAL at 08:03

## 2024-03-21 RX ADMIN — FAMOTIDINE 20 MG: 20 TABLET ORAL at 09:03

## 2024-03-21 RX ADMIN — METHOCARBAMOL 500 MG: 500 TABLET ORAL at 09:03

## 2024-03-21 RX ADMIN — CHLORHEXIDINE GLUCONATE 0.12% ORAL RINSE 15 ML: 1.2 LIQUID ORAL at 08:03

## 2024-03-21 RX ADMIN — HYPROMELLOSE 2910 1 DROP: 5 SOLUTION OPHTHALMIC at 09:03

## 2024-03-21 RX ADMIN — MIDODRINE HYDROCHLORIDE 10 MG: 5 TABLET ORAL at 05:03

## 2024-03-21 RX ADMIN — OXYCODONE HYDROCHLORIDE 15 MG: 10 TABLET ORAL at 03:03

## 2024-03-21 RX ADMIN — ACETAMINOPHEN 1000 MG: 500 TABLET ORAL at 03:03

## 2024-03-21 RX ADMIN — LINEZOLID 600 MG: 600 TABLET, FILM COATED ORAL at 09:03

## 2024-03-21 RX ADMIN — MINOCYCLINE HYDROCHLORIDE 200 MG: 100 CAPSULE ORAL at 09:03

## 2024-03-21 RX ADMIN — ENOXAPARIN SODIUM 30 MG: 30 INJECTION SUBCUTANEOUS at 05:03

## 2024-03-21 RX ADMIN — CEFTAZIDIME, AVIBACTAM 2.5 G: 2; .5 POWDER, FOR SOLUTION INTRAVENOUS at 05:03

## 2024-03-21 RX ADMIN — LEVETIRACETAM 750 MG: 750 TABLET, FILM COATED ORAL at 08:03

## 2024-03-21 RX ADMIN — MIDODRINE HYDROCHLORIDE 10 MG: 5 TABLET ORAL at 09:03

## 2024-03-21 RX ADMIN — MICONAZOLE NITRATE: 20 CREAM TOPICAL at 03:03

## 2024-03-21 RX ADMIN — CHLORHEXIDINE GLUCONATE 0.12% ORAL RINSE 15 ML: 1.2 LIQUID ORAL at 09:03

## 2024-03-21 RX ADMIN — ACETAMINOPHEN 1000 MG: 500 TABLET ORAL at 09:03

## 2024-03-21 RX ADMIN — MIDODRINE HYDROCHLORIDE 10 MG: 5 TABLET ORAL at 03:03

## 2024-03-21 RX ADMIN — MINOCYCLINE HYDROCHLORIDE 200 MG: 100 CAPSULE ORAL at 08:03

## 2024-03-21 RX ADMIN — LINEZOLID 600 MG: 600 TABLET, FILM COATED ORAL at 08:03

## 2024-03-21 RX ADMIN — CEFTAZIDIME, AVIBACTAM 2.5 G: 2; .5 POWDER, FOR SOLUTION INTRAVENOUS at 03:03

## 2024-03-21 RX ADMIN — SODIUM CHLORIDE, POTASSIUM CHLORIDE, SODIUM LACTATE AND CALCIUM CHLORIDE: 600; 310; 30; 20 INJECTION, SOLUTION INTRAVENOUS at 08:03

## 2024-03-21 RX ADMIN — ERYTHROMYCIN: 5 OINTMENT OPHTHALMIC at 09:03

## 2024-03-21 RX ADMIN — LEVETIRACETAM 750 MG: 750 TABLET, FILM COATED ORAL at 09:03

## 2024-03-21 RX ADMIN — OXYCODONE HYDROCHLORIDE 20 MG: 10 TABLET, FILM COATED, EXTENDED RELEASE ORAL at 08:03

## 2024-03-21 RX ADMIN — ACETAMINOPHEN 650 MG: 325 TABLET ORAL at 08:03

## 2024-03-21 RX ADMIN — CEFTAZIDIME, AVIBACTAM 2.5 G: 2; .5 POWDER, FOR SOLUTION INTRAVENOUS at 09:03

## 2024-03-21 RX ADMIN — METHOCARBAMOL 500 MG: 500 TABLET ORAL at 03:03

## 2024-03-21 RX ADMIN — LIDOCAINE 5% 1 PATCH: 700 PATCH TOPICAL at 03:03

## 2024-03-21 RX ADMIN — MICONAZOLE NITRATE: 20 CREAM TOPICAL at 09:03

## 2024-03-21 RX ADMIN — GABAPENTIN 400 MG: 400 CAPSULE ORAL at 08:03

## 2024-03-21 RX ADMIN — HYPROMELLOSE 2910 1 DROP: 5 SOLUTION OPHTHALMIC at 05:03

## 2024-03-21 RX ADMIN — SODIUM ZIRCONIUM CYCLOSILICATE 5 G: 5 POWDER, FOR SUSPENSION ORAL at 08:03

## 2024-03-21 RX ADMIN — OXYCODONE HYDROCHLORIDE AND ACETAMINOPHEN 1 TABLET: 10; 325 TABLET ORAL at 08:03

## 2024-03-21 RX ADMIN — LEVOTHYROXINE SODIUM 112 MCG: 112 TABLET ORAL at 05:03

## 2024-03-21 NOTE — PLAN OF CARE
[facility] Plan of Care Note  Dx Sever Sepsis     Shift Events uneventful     Goals of Care: Pain control     Neuro: Alert     Vital Signs: B/P soft (that is normal for him) HR tachy     Respiratory: Trach #8 SHILEY TRACH COLLAR 5LITER 28% HUMIDIFICATION     Diet: Reg     Is patient tolerating current diet? yes     GTTS: KVO     Urine Output/Bowel Movement: WDL with sterling and Ostomy     Drains/Tubes/Tube Feeds (include total output/shift)Ostomy, sterling      Lines: R groin PICC 03/19/24     Accuchecks:none     Skin: multiple pressure ulcers, B/L AKA, LBE amputation     Fall Risk Score: 13     Activity level? Bed ridden     Any scheduled procedures? none     Any safety concerns? none     Other: none

## 2024-03-21 NOTE — PLAN OF CARE
03/21/24 1443   Discharge Reassessment   Assessment Type Discharge Planning Reassessment   Did the patient's condition or plan change since previous assessment? No   Discharge Plan discussed with: Parent(s);Patient   Name(s) and Number(s) Ann (mother) [phone]   Communicated ANDRÉS with patient/caregiver Date not available/Unable to determine   Discharge Plan A Long-term acute care facility (LTAC)   Discharge Plan B Long-term acute care facility (LTAC);New Nursing Home placement - senior care care facility  (TBD)   DME Needed Upon Discharge    (TBD)   Post-Acute Status   Post-Acute Authorization Placement  (LTAC)   Post-Acute Placement Status Referrals Sent   Coverage MEDICAID - [place]   Discharge Delays None known at this time     Pt is medically ready for LTAC, awaiting for acceptance. CM will continue to send referrals and reach out to providers.   Providers:   [facility] (currently no Medicaid beds until possible next week per Marly)   [facility] (out of network)   [facility] (out of network)   [facility] (currently in review) : Nadja   [facility] (currently in review) : Darlin     Discharge Plan A and Plan B have been determined by review of patient's clinical status, future medical and therapeutic needs, and coverage/benefits for post-acute care in coordination with multidisciplinary team members.    ORVILLE Benson   [facility]    Case Management Dept  [phone]
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
## 2024-03-21 NOTE — PLAN OF CARE
Spoke with [facility] about status of ceferidocol susceptibilities. Was told it should take 5-7 days to get results. They are looking into status of results and will get back to me

## 2024-03-21 NOTE — ASSESSMENT & PLAN NOTE
"Stage IV pressure ulcer of bilateral buttocks, sacral region  Pressure ulcers of multiple sites  Chronic osteomyelitis    This patient does have evidence of infective focus  My overall impression is sepsis.  Source: Skin and Soft Tissue (location extremities)  Antibiotics given-   Antibiotics (72h ago, onward)      Start     Stop Route Frequency Ordered    03/19/24 0900  linezolid tablet 600 mg         -- PER NG TUBE Every 12 hours 03/19/24 0740    03/18/24 2100  erythromycin 5 mg/gram (0.5 %) ophthalmic ointment         -- Both Eyes Nightly 03/18/24 1616    03/16/24 1330  minocycline capsule 200 mg         -- PER NG TUBE Every 12 hours 03/16/24 1324    03/15/24 1615  cefTAZidime-avibactam (AVYCAZ) 2.5 g in dextrose 5 % in water (D5W) 100 mL IVPB (MB+)         -- IV Every 8 hours (non-standard times) 03/15/24 1510          Latest lactate reviewed-  No results for input(s): "LACTATE", "POCLAC" in the last 72 hours.    Organ dysfunction indicated by Acute respiratory failure    UCX 02/21 and RCX 02/22 with MDR proteus mirabilis, pseudomonas aeruginosa. Patient has extensive MDRO / ESBL culture data from prior hospitalizations as well. Shock component is now resolved, but leukocytosis, fever, and underlying wounds are still a component as source control has not been achieved.    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 L leg culture grew Pseudomonas  -- Continue Linezolid, Minocycline, and Avycaz  -- IP Consult to Wound Care for LUE amputation site   -- Turn q2h  -- MAP goal >60    "

## 2024-03-21 NOTE — PLAN OF CARE
03/21/24 0921   Post-Acute Status   Post-Acute Authorization Placement  (LTAC)   Post-Acute Placement Status Referrals Sent   Coverage MEDICAID [place]   Discharge Delays None known at this time   Discharge Plan   Discharge Plan A Long-term acute care facility (LTAC)   Discharge Plan B Long-term acute care facility (LTAC);Skilled Nursing Facility  (TBD)     SW with pt mother to review discharge recommendation of LTAC and is agreeable to plan.     Patient/family provided list of facilities in-network with patient's payor plan. Providers that are owned, operated, or affiliated with [facility] are included on the list.     Notified that referral sent to below listed facilities from in-network list based on proximity to home/family support:   [facility] (referral sent via careport)-  3/21 @ 10:00am SW rec'd call from [place] (currently no medicaid beds)    2.   [facility] (referral sent via careport)   3.  [facility]   4.  [facility] (Pt mother aware referral was sent to provider as well via careport). SW rec'd message in careport from provider (out-of-network)       Patient/family instructed to identify preference.    Preferred Facility: (if more than 1, listed in order of descending preference)  [facility]     If an additional preferred facility not listed above is identified, additional referral to be sent. If above facilities unable to accept, will send additional referrals to in-network providers.     2:25pm: SERGIO reached out to Nadja at [facility] [phone] to get update on pt referral, she ask can referral be resent due to her not seeing in care port.     Discharge Plan A and Plan B have been determined by review of patient's clinical status, future medical and therapeutic needs, and coverage/benefits for post-acute care in coordination with multidisciplinary team members.    ORVILLE Benson   [facility]  [place]    Case Management Dept  [phone]

## 2024-03-21 NOTE — RESPIRATORY THERAPY
"RAPID RESPONSE RESPIRATORY THERAPY PROACTIVE NOTE           Time of visit: 912     Code Status: Prior   : 1995  Bed: [facility]:   MRN: [id number]  Time spent at the bedside: < 15 min    SITUATION    Evaluated patient for: LDA Check     BACKGROUND    Patient has no past medical history on file.  Clinically Significant Surgical Hx: tracheostomy    24 Hours Vitals Range:  Temp:  [97.9 °F (36.6 °C)-99 °F (37.2 °C)]   Pulse:  []   Resp:  [14-20]   BP: (104-114)/(65-73)   SpO2:  [96 %-100 %]     Labs:    Recent Labs     24  0536 24  0439 24  0529    138 136   K 5.2* 5.5* 5.4*    106 103   CO2 28 24 24   BUN 31* 37* 40*   CREATININE 0.6 0.7 0.6   GLU 84 84 79        No results for input(s): "PH", "PCO2", "PO2", "HCO3", "POCSATURATED", "BE" in the last 72 hours.    ASSESSMENT/INTERVENTIONS  Patient resting comfortably. No respiratory concerns at this time.      Last VS   Temp: 97.9 °F (36.6 °C) ( 0734)  Pulse: 87 ( 1055)  Resp: 15 ( 0846)  BP: 106/67 ( 0734)  SpO2: 100 % ( 1055)      Extra trachs at bedside: #6 Shiley Cuffed, #8 Shiley Cuffed  Level of Consciousness: Level of Consciousness (AVPU): alert  Respiratory Effort: Respiratory Effort: Normal, Unlabored Expansion/Accessory Muscle Usage: Expansion/Accessory Muscles/Retractions: no use of accessory muscles, no retractions, expansion symmetric  All Lung Field Breath Sounds: All Lung Fields Breath Sounds: Anterior:, Lateral:, coarse  NATALIA Breath Sounds: coarse  LLL Breath Sounds: coarse  RUL Breath Sounds: coarse  RML Breath Sounds: coarse  RLL Breath Sounds: coarse  O2 Device/Concentration: 5L/21%  Surgical airway: Yes, Type: Shiley Size: 8, cuffed  Ambu at bedside:       Active Orders   Respiratory Care    Inhalation Treatment Once     Frequency: Once     Number of Occurrences: 1 Occurrences    Inhalation Treatment Once     Frequency: Once     Number of Occurrences: 1 Occurrences    Oxygen Continuous "     Frequency: Continuous     Number of Occurrences: Until Specified     Order Questions:      Device type: Low flow      Device: Trach Collar      FiO2%: 60      Titrate O2 per Oxygen Titration Protocol: Yes      To maintain SpO2 goal of: >= 90%      Notify MD of: Inability to achieve desired SpO2; Sudden change in patient status and requires 20% increase in FiO2; Patient requires >60% FiO2    Pulse Oximetry Continuous     Frequency: Continuous     Number of Occurrences: Until Specified    Routine tracheostomy care     Frequency: BID     Number of Occurrences: Until Specified    SUCTION PRN     Frequency: PRN     Number of Occurrences: Until Specified       RECOMMENDATIONS    We recommend: RRT Recs: Continue POC per primary team.      FOLLOW-UP    Please call back the Rapid Response RTJerri RRT at x [phone] for any questions or concerns.

## 2024-03-21 NOTE — SUBJECTIVE & OBJECTIVE
Interval History: C/o pain this morning, could not sleep last night due to pain. Increasing MM pain meds.      Objective:     Vital Signs (Most Recent):  Temp: 99.1 °F (37.3 °C) (03/21/24 1225)  Pulse: 84 (03/21/24 1225)  Resp: 14 (03/21/24 1225)  BP: 106/70 (03/21/24 1225)  SpO2: 97 % (03/21/24 1225) Vital Signs (24h Range):  Temp:  [97.9 °F (36.6 °C)-99.1 °F (37.3 °C)] 99.1 °F (37.3 °C)  Pulse:  [] 84  Resp:  [14-20] 14  SpO2:  [97 %-100 %] 97 %  BP: (104-114)/(65-73) 106/70     Weight: 59.4 kg (130 lb 15.3 oz)  Body mass index is 19.34 kg/m².    Intake/Output Summary (Last 24 hours) at 3/21/2024 1348  Last data filed at 3/21/2024 1139  Gross per 24 hour   Intake 850 ml   Output 2650 ml   Net -1800 ml         Physical Exam  Vitals and nursing note reviewed.   Constitutional:       General: He is not in acute distress.     Appearance: Normal appearance. He is not ill-appearing, toxic-appearing or diaphoretic.   HENT:      Head: Normocephalic and atraumatic.      Nose: No congestion.      Mouth/Throat:      Pharynx: Oropharynx is clear.   Eyes:      General: No scleral icterus.     Conjunctiva/sclera: Conjunctivae normal.   Cardiovascular:      Rate and Rhythm: Normal rate and regular rhythm.      Heart sounds: Normal heart sounds. No murmur heard.  Pulmonary:      Effort: Pulmonary effort is normal. No respiratory distress.      Breath sounds: No wheezing, rhonchi or rales.   Abdominal:      General: Bowel sounds are normal. There is no distension.      Palpations: Abdomen is soft. There is no mass.      Comments: Colostomy LLQ filled with loose stools   Musculoskeletal:         General: Swelling, tenderness and deformity present.      Comments: Bilateral AKA, staples intact  LUE amputation below elbow   Skin:     General: Skin is warm.      Findings: Erythema and lesion present. No rash.      Comments: See media tab for wound images     Neurological:      General: No focal deficit present.      Mental  Status: He is alert and oriented to person, place, and time.   Psychiatric:         Mood and Affect: Mood normal.         Behavior: Behavior normal.             Significant Labs: All pertinent labs within the past 24 hours have been reviewed.    Significant Imaging: I have reviewed all pertinent imaging results/findings within the past 24 hours.

## 2024-03-21 NOTE — ASSESSMENT & PLAN NOTE
Palliative Care at [facility] engaging in ongoing communications with family regarding GOC given extensive comorbid conditions and recurrent hospitalizations. Per documentation review and brief discussion with family on initial transfer admission encounter, patient & family wish to continue all medical and surgical options at this time.     -- Patient & family would greatly benefit from continued Palliative Care discussions  -- IP Consult to [facility] Care services

## 2024-03-21 NOTE — ASSESSMENT & PLAN NOTE
>>ASSESSMENT AND PLAN FOR PRESSURE ULCERS OF SKIN OF MULTIPLE TOPOGRAPHIC SITES WRITTEN ON 3/21/2024  2:50 PM BY KELLY HUERTA MD    - See severe sepsis

## 2024-03-21 NOTE — ASSESSMENT & PLAN NOTE
>>ASSESSMENT AND PLAN FOR GANGRENE WRITTEN ON 3/21/2024  1:50 PM BY KELLY HUERTA MD    - See severe sepsis

## 2024-03-22 PROBLEM — I95.81 POSTPROCEDURAL HYPOTENSION: Status: RESOLVED | Noted: 2024-03-14 | Resolved: 2024-03-22

## 2024-03-22 LAB
ALBUMIN SERPL BCP-MCNC: 1.3 G/DL (ref 3.5–5.2)
ALP SERPL-CCNC: 118 U/L (ref 55–135)
ALT SERPL W/O P-5'-P-CCNC: 10 U/L (ref 10–44)
ANION GAP SERPL CALC-SCNC: 7 MMOL/L (ref 8–16)
AST SERPL-CCNC: 24 U/L (ref 10–40)
BASOPHILS # BLD AUTO: 0.16 K/UL (ref 0–0.2)
BASOPHILS NFR BLD: 0.9 % (ref 0–1.9)
BILIRUB SERPL-MCNC: 0.2 MG/DL (ref 0.1–1)
BUN SERPL-MCNC: 38 MG/DL (ref 6–20)
CALCIUM SERPL-MCNC: 7.1 MG/DL (ref 8.7–10.5)
CHLORIDE SERPL-SCNC: 103 MMOL/L (ref 95–110)
CO2 SERPL-SCNC: 25 MMOL/L (ref 23–29)
CREAT SERPL-MCNC: 0.7 MG/DL (ref 0.5–1.4)
DIFFERENTIAL METHOD BLD: ABNORMAL
EOSINOPHIL # BLD AUTO: 1.8 K/UL (ref 0–0.5)
EOSINOPHIL NFR BLD: 10.1 % (ref 0–8)
ERYTHROCYTE [DISTWIDTH] IN BLOOD BY AUTOMATED COUNT: 15.1 % (ref 11.5–14.5)
EST. GFR  (NO RACE VARIABLE): >60 ML/MIN/1.73 M^2
GLUCOSE SERPL-MCNC: 68 MG/DL (ref 70–110)
HCT VFR BLD AUTO: 26.7 % (ref 40–54)
HGB BLD-MCNC: 8.5 G/DL (ref 14–18)
IMM GRANULOCYTES # BLD AUTO: 0.19 K/UL (ref 0–0.04)
IMM GRANULOCYTES NFR BLD AUTO: 1 % (ref 0–0.5)
LYMPHOCYTES # BLD AUTO: 4.2 K/UL (ref 1–4.8)
LYMPHOCYTES NFR BLD: 23.1 % (ref 18–48)
MCH RBC QN AUTO: 30.6 PG (ref 27–31)
MCHC RBC AUTO-ENTMCNC: 31.8 G/DL (ref 32–36)
MCV RBC AUTO: 96 FL (ref 82–98)
MONOCYTES # BLD AUTO: 1.8 K/UL (ref 0.3–1)
MONOCYTES NFR BLD: 9.6 % (ref 4–15)
NEUTROPHILS # BLD AUTO: 10.1 K/UL (ref 1.8–7.7)
NEUTROPHILS NFR BLD: 55.3 % (ref 38–73)
NRBC BLD-RTO: 0 /100 WBC
PLATELET # BLD AUTO: 732 K/UL (ref 150–450)
PLATELET BLD QL SMEAR: ABNORMAL
PMV BLD AUTO: 8.4 FL (ref 9.2–12.9)
POTASSIUM SERPL-SCNC: 4.8 MMOL/L (ref 3.5–5.1)
PROT SERPL-MCNC: 6.8 G/DL (ref 6–8.4)
RBC # BLD AUTO: 2.78 M/UL (ref 4.6–6.2)
SODIUM SERPL-SCNC: 135 MMOL/L (ref 136–145)
WBC # BLD AUTO: 18.24 K/UL (ref 3.9–12.7)

## 2024-03-22 PROCEDURE — 25000003 PHARM REV CODE 250

## 2024-03-22 PROCEDURE — 25000003 PHARM REV CODE 250: Performed by: NURSE PRACTITIONER

## 2024-03-22 PROCEDURE — 99900035 HC TECH TIME PER 15 MIN (STAT)

## 2024-03-22 PROCEDURE — 20600001 HC STEP DOWN PRIVATE ROOM

## 2024-03-22 PROCEDURE — 85025 COMPLETE CBC W/AUTO DIFF WBC: CPT

## 2024-03-22 PROCEDURE — 25000003 PHARM REV CODE 250: Performed by: HOSPITALIST

## 2024-03-22 PROCEDURE — 27100171 HC OXYGEN HIGH FLOW UP TO 24 HOURS

## 2024-03-22 PROCEDURE — 27000221 HC OXYGEN, UP TO 24 HOURS

## 2024-03-22 PROCEDURE — 94761 N-INVAS EAR/PLS OXIMETRY MLT: CPT

## 2024-03-22 PROCEDURE — 99900026 HC AIRWAY MAINTENANCE (STAT)

## 2024-03-22 PROCEDURE — 63600175 PHARM REV CODE 636 W HCPCS: Performed by: HOSPITALIST

## 2024-03-22 PROCEDURE — 80053 COMPREHEN METABOLIC PANEL: CPT

## 2024-03-22 PROCEDURE — 25000003 PHARM REV CODE 250: Performed by: PHYSICIAN ASSISTANT

## 2024-03-22 PROCEDURE — 63600175 PHARM REV CODE 636 W HCPCS: Mod: JZ,JG | Performed by: PHYSICIAN ASSISTANT

## 2024-03-22 PROCEDURE — 27000207 HC ISOLATION

## 2024-03-22 RX ORDER — OXYCODONE HYDROCHLORIDE 10 MG/1
10 TABLET ORAL EVERY 6 HOURS PRN
Status: DISCONTINUED | OUTPATIENT
Start: 2024-03-22 | End: 2024-04-11 | Stop reason: HOSPADM

## 2024-03-22 RX ORDER — NALOXONE HCL 0.4 MG/ML
0.4 VIAL (ML) INJECTION
Status: DISCONTINUED | OUTPATIENT
Start: 2024-03-22 | End: 2024-04-11 | Stop reason: HOSPADM

## 2024-03-22 RX ORDER — OXYCODONE HYDROCHLORIDE 5 MG/1
5 TABLET ORAL EVERY 6 HOURS PRN
Status: DISCONTINUED | OUTPATIENT
Start: 2024-03-22 | End: 2024-04-11 | Stop reason: HOSPADM

## 2024-03-22 RX ORDER — POLYETHYLENE GLYCOL 3350 17 G/17G
17 POWDER, FOR SOLUTION ORAL DAILY
Status: DISCONTINUED | OUTPATIENT
Start: 2024-03-23 | End: 2024-04-04

## 2024-03-22 RX ADMIN — ACETAMINOPHEN 1000 MG: 500 TABLET ORAL at 02:03

## 2024-03-22 RX ADMIN — MIDODRINE HYDROCHLORIDE 10 MG: 5 TABLET ORAL at 02:03

## 2024-03-22 RX ADMIN — GABAPENTIN 400 MG: 400 CAPSULE ORAL at 09:03

## 2024-03-22 RX ADMIN — HYPROMELLOSE 2910 1 DROP: 5 SOLUTION OPHTHALMIC at 09:03

## 2024-03-22 RX ADMIN — MINOCYCLINE HYDROCHLORIDE 200 MG: 100 CAPSULE ORAL at 09:03

## 2024-03-22 RX ADMIN — ENOXAPARIN SODIUM 30 MG: 30 INJECTION SUBCUTANEOUS at 06:03

## 2024-03-22 RX ADMIN — MIDODRINE HYDROCHLORIDE 10 MG: 5 TABLET ORAL at 09:03

## 2024-03-22 RX ADMIN — MICONAZOLE NITRATE: 20 CREAM TOPICAL at 09:03

## 2024-03-22 RX ADMIN — LEVETIRACETAM 750 MG: 750 TABLET, FILM COATED ORAL at 09:03

## 2024-03-22 RX ADMIN — SODIUM ZIRCONIUM CYCLOSILICATE 5 G: 5 POWDER, FOR SUSPENSION ORAL at 09:03

## 2024-03-22 RX ADMIN — LINEZOLID 600 MG: 600 TABLET, FILM COATED ORAL at 09:03

## 2024-03-22 RX ADMIN — OXYCODONE HYDROCHLORIDE 30 MG: 10 TABLET, FILM COATED, EXTENDED RELEASE ORAL at 09:03

## 2024-03-22 RX ADMIN — FAMOTIDINE 20 MG: 20 TABLET ORAL at 09:03

## 2024-03-22 RX ADMIN — MIDODRINE HYDROCHLORIDE 10 MG: 5 TABLET ORAL at 05:03

## 2024-03-22 RX ADMIN — CHLORHEXIDINE GLUCONATE 0.12% ORAL RINSE 15 ML: 1.2 LIQUID ORAL at 09:03

## 2024-03-22 RX ADMIN — CEFTAZIDIME, AVIBACTAM 2.5 G: 2; .5 POWDER, FOR SOLUTION INTRAVENOUS at 05:03

## 2024-03-22 RX ADMIN — OXYCODONE HYDROCHLORIDE 15 MG: 10 TABLET ORAL at 02:03

## 2024-03-22 RX ADMIN — LEVOTHYROXINE SODIUM 112 MCG: 112 TABLET ORAL at 05:03

## 2024-03-22 RX ADMIN — CEFTAZIDIME, AVIBACTAM 2.5 G: 2; .5 POWDER, FOR SOLUTION INTRAVENOUS at 01:03

## 2024-03-22 RX ADMIN — LIDOCAINE 5% 1 PATCH: 700 PATCH TOPICAL at 02:03

## 2024-03-22 RX ADMIN — METHOCARBAMOL 500 MG: 500 TABLET ORAL at 09:03

## 2024-03-22 RX ADMIN — GABAPENTIN 400 MG: 400 CAPSULE ORAL at 02:03

## 2024-03-22 RX ADMIN — FOLIC ACID 1 MG: 1 TABLET ORAL at 09:03

## 2024-03-22 RX ADMIN — CEFTAZIDIME, AVIBACTAM 2.5 G: 2; .5 POWDER, FOR SOLUTION INTRAVENOUS at 10:03

## 2024-03-22 RX ADMIN — ACETAMINOPHEN 1000 MG: 500 TABLET ORAL at 05:03

## 2024-03-22 RX ADMIN — POLYETHYLENE GLYCOL 3350 17 G: 17 POWDER, FOR SOLUTION ORAL at 09:03

## 2024-03-22 RX ADMIN — CHLORHEXIDINE GLUCONATE 0.12% ORAL RINSE 15 ML: 1.2 LIQUID ORAL at 11:03

## 2024-03-22 RX ADMIN — HYPROMELLOSE 2910 1 DROP: 5 SOLUTION OPHTHALMIC at 01:03

## 2024-03-22 RX ADMIN — ACETAMINOPHEN 1000 MG: 500 TABLET ORAL at 09:03

## 2024-03-22 RX ADMIN — ERYTHROMYCIN: 5 OINTMENT OPHTHALMIC at 10:03

## 2024-03-22 RX ADMIN — HYPROMELLOSE 2910 1 DROP: 5 SOLUTION OPHTHALMIC at 06:03

## 2024-03-22 RX ADMIN — METHOCARBAMOL 500 MG: 500 TABLET ORAL at 02:03

## 2024-03-22 NOTE — RESPIRATORY THERAPY
"RAPID RESPONSE RESPIRATORY THERAPY PROACTIVE NOTE           Time of visit: 822     Code Status: Prior   : 1995  Bed: [facility] A:   MRN: [id number]  Time spent at the bedside: < 15 min    SITUATION    Evaluated patient for: LDA Check     BACKGROUND    Patient has no past medical history on file.  Clinically Significant Surgical Hx: tracheostomy    24 Hours Vitals Range:  Temp:  [96.1 °F (35.6 °C)-99.6 °F (37.6 °C)]   Pulse:  []   Resp:  [16-19]   BP: ()/(53-76)   SpO2:  [98 %-100 %]     Labs:    Recent Labs     24  0439 24  0529 24  0514    136 135*   K 5.5* 5.4* 4.8    103 103   CO2 24 24 25   BUN 37* 40* 38*   CREATININE 0.7 0.6 0.7   GLU 84 79 68*        No results for input(s): "PH", "PCO2", "PO2", "HCO3", "POCSATURATED", "BE" in the last 72 hours.    ASSESSMENT/INTERVENTIONS  Pt resting comfortably with no respiratory interventions required at this time.      Last VS   Temp: 97.6 °F (36.4 °C) ( 1211)  Pulse: 88 ( 1506)  Resp: 16 ( 1430)  BP: 95/59 ( 1211)  SpO2: 100 % ( 1506)      Extra trachs at bedside: y  Level of Consciousness: Level of Consciousness (AVPU): alert  Respiratory Effort: Respiratory Effort: Normal, Unlabored Expansion/Accessory Muscle Usage: Expansion/Accessory Muscles/Retractions: no use of accessory muscles, no retractions, expansion symmetric  All Lung Field Breath Sounds: All Lung Fields Breath Sounds: Anterior:, Lateral:, coarse  NATALIA Breath Sounds: coarse  LLL Breath Sounds: coarse  RUL Breath Sounds: coarse  RML Breath Sounds: coarse  RLL Breath Sounds: coarse  O2 Device/Concentration: 5/28% T.C.  Surgical airway: Yes, Type: Shiley Size: 6, cuffed  Ambu at bedside:       Active Orders   Respiratory Care    Oxygen Continuous     Frequency: Continuous     Number of Occurrences: Until Specified     Order Questions:      Device type: Low flow      Device: Trach Collar      FiO2%: 60      Titrate O2 per Oxygen Titration " Protocol: Yes      To maintain SpO2 goal of: >= 90%      Notify MD of: Inability to achieve desired SpO2; Sudden change in patient status and requires 20% increase in FiO2; Patient requires >60% FiO2    Pulse Oximetry Continuous     Frequency: Continuous     Number of Occurrences: Until Specified    Routine tracheostomy care     Frequency: BID     Number of Occurrences: Until Specified    SUCTION PRN     Frequency: PRN     Number of Occurrences: Until Specified       RECOMMENDATIONS    We recommend: RRT Recs: Continue POC per primary team.      FOLLOW-UP    Please call back the Rapid Response RT, Nickolas Beltre RRT at x [phone] for any questions or concerns.

## 2024-03-22 NOTE — PLAN OF CARE
Still awaiting ceferidocol susceptibilities. Spoke with send  out lab. Specimen didn't arrive at [facility] until 3/19 and will take 5-7 days from that time to return, so results should be back 3/25 - 3/37. I will be off service by then. Please reconsult ID when these results are back for final recs.

## 2024-03-22 NOTE — PLAN OF CARE
03/22/24 1210   Post-Acute Status   Post-Acute Placement Status Referrals Sent   Discharge Plan   Discharge Plan A Long-term acute care facility (LTAC)   Discharge Plan B Long-term acute care facility (LTAC)       Met with patient to review discharge recommendation of LTAC and is agreeable to plan    Patient/family provided list of facilities in-network with patient's payor plan. Providers that are owned, operated, or affiliated with [facility] are included on the list.     Notified that referral sent to below listed facilities from in-network list based on proximity to home/family support:     [facility] Phone: [phone]      * COVID-19: NOT able to accept COVID-19 positive patients [address] [place] [address] -  3/22/2024 14:09 (CT)  -  -  -  -       [facility] (aka [facility]) Phone: [phone]    case by case based on symptoms  * COVID-19: NOT able to accept COVID-19 positive patients,* COVID-19: Services not specified [address] [place] [address] -  3/22/2024 14:09 (CT)  -  -  -  -       [facility] ([facility]) Phone: [phone]    case by case basis  * COVID-19: Positive patients under care,* COVID-19: Willing/Equipped to accept COVID-19 positive patients,* High-Risk Isolation Patients: Willing/Equipped to accept High-Risk Isolation Patients [address] [place] [address] -  3/22/2024 14:09 (CT)  -  -  -  -       [facility] Phone: [phone]    1 negative test  * COVID-19: Willing/Equipped to accept COVID-19 positive patients,* High-Risk Isolation Patients: Willing/Equipped to accept High-Risk Isolation Patients [address] [place] [address] -  3/22/2024 14:09 (CT)  -  -  -  -       [facility] Phone: [phone]    20 days from onset of  symptoms  * COVID-19: Services not specified,* COVID-19: Willing/Equipped to accept COVID-19 positive patients,* High-Risk Isolation Patients: Willing/Equipped to accept High-Risk Isolation Patients [address], [address] [place] [address] -  3/22/2024 14:09 (CT)  -  -  -  -       [facility] [address] Phone: [phone]    case by case basis  * COVID-19: Willing/Equipped to accept COVID-19 positive patients,* High-Risk Isolation Patients: Willing/Equipped to accept High-Risk Isolation Patients [address] [place] [address] -  3/22/2024 14:09 (CT)  -  -  -  -       [facility] Phone: [phone]      * COVID-19: Positive patients under care,* COVID-19: Willing/Equipped to accept COVID-19 positive patients,* High-Risk Isolation Patients: Willing/Equipped to accept High-Risk Isolation Patients [address] [facility], [address]th [place] [address] -  3/22/2024 14:09 (CT)  -  -  -  -       [facility] Phone: [phone]    We do not require a covid test prior to admit unless patient has Covid -19 signs and symptoms present.  * COVID-19: Willing/Equipped to accept COVID-19 positive patients,* High-Risk Isolation Patients: Willing/Equipped to accept High-Risk Isolation Patients [address], [address] [address] -  3/22/2024 14:09 (CT)  -  -  -  -       [facility] Phone: [phone]      * COVID-19: NOT able to accept COVID-19 positive patients,* COVID-19: Services not specified [address] [facility] [place] [address] -  3/22/2024 14:09 (CT)  -  -  -  -       [facility] Phone: [phone]      * COVID-19: Positive patients under care,* COVID-19: Willing/Equipped to accept COVID-19 positive patients [address] [address] -  3/22/2024 14:09 (CT)  -  -  -  -       [facility] [place]  Phone: [phone]      * COVID-19: Willing/Equipped to accept COVID-19 positive patients,* High-Risk Isolation Patients: Willing/Equipped to accept High-Risk Isolation Patients [address] [place] [place] [address] -  3/22/2024 14:09 (CT)  -  -  -  -       [facility] Phone: [phone]    COVID POS with symptoms  * COVID-19: Positive patients under care,* COVID-19: Services not specified,* COVID-19: Willing/Equipped to accept COVID-19 positive patients,* High-Risk Isolation Patients: Willing/Equipped to accept High-Risk Isolation Patients [address] [place] [address] -  3/22/2024 14:09 (CT)  -  -  -  -       [facility] Phone: [phone]      * COVID-19: Willing/Equipped to accept COVID-19 positive patients,* High-Risk Isolation Patients: Willing/Equipped to accept High-Risk Isolation Patients [address] [place] [address] -  3/22/2024 14:09 (CT)  -  -  -  -           Patient/family instructed to identify preference.    Preferred Facility: (if more than 1, listed in order of descending preference)  [facility]     If an additional preferred facility not listed above is identified, additional referral to be sent. If above facilities unable to accept, will send additional referrals to in-network providers.

## 2024-03-22 NOTE — ASSESSMENT & PLAN NOTE
"Stage IV pressure ulcer of bilateral buttocks, sacral region  Pressure ulcers of multiple sites  Chronic osteomyelitis    This patient does have evidence of infective focus  My overall impression is sepsis.  Source: Skin and Soft Tissue (location extremities)  Antibiotics given-   Antibiotics (72h ago, onward)      Start     Stop Route Frequency Ordered    03/19/24 0900  linezolid tablet 600 mg         -- PER NG TUBE Every 12 hours 03/19/24 0740    03/18/24 2100  erythromycin 5 mg/gram (0.5 %) ophthalmic ointment         -- Both Eyes Nightly 03/18/24 1616    03/16/24 1330  minocycline capsule 200 mg         -- PER NG TUBE Every 12 hours 03/16/24 1324    03/15/24 1615  cefTAZidime-avibactam (AVYCAZ) 2.5 g in dextrose 5 % in water (D5W) 100 mL IVPB (MB+)         -- IV Every 8 hours (non-standard times) 03/15/24 1510          Latest lactate reviewed-  No results for input(s): "LACTATE", "POCLAC" in the last 72 hours.    Organ dysfunction indicated by Acute respiratory failure    UCX 02/21 and RCX 02/22 with MDR proteus mirabilis, pseudomonas aeruginosa. Patient has extensive MDRO / ESBL culture data from prior hospitalizations as well. Shock component is now resolved, but leukocytosis, fever, and underlying wounds are still a component as source control has not been achieved.    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 L leg culture grew Pseudomonas. Pending micro about status of ceferidocol susceptibilities   -- Continue Linezolid, Minocycline, and Avycaz  -- IP Consult to Wound Care for LUE amputation site   -- Turn q2h  -- MAP goal >60    "

## 2024-03-22 NOTE — PLAN OF CARE
Michele York [facility]  Discharge Reassessment    Primary Care Provider: No, Primary Doctor    Expected Discharge Date: 3/27/2024    Reassessment (most recent)       Discharge Reassessment - 03/22/24 1211          Discharge Reassessment    Assessment Type Discharge Planning Reassessment     Did the patient's condition or plan change since previous assessment? No     Discharge Plan discussed with: Patient     Communicated ANDRÉS with patient/caregiver Yes     Discharge Plan A Long-term acute care facility (LTAC)     Discharge Plan B Long-term acute care facility (LTAC)     Transition of Care Barriers None     Why the patient remains in the hospital Requires continued medical care                     An extended LTAC referral sent today.     No accepting LTAC at this time.     Family prefers LTAC near [facility].     Will call LTACs near [facility].

## 2024-03-22 NOTE — ASSESSMENT & PLAN NOTE
>>ASSESSMENT AND PLAN FOR PRESSURE ULCERS OF SKIN OF MULTIPLE TOPOGRAPHIC SITES WRITTEN ON 3/22/2024  8:04 AM BY FRANKY BADILLO MD    - See severe sepsis

## 2024-03-22 NOTE — SUBJECTIVE & OBJECTIVE
Interval History: NAEON and VSS    Review of Systems   Constitutional:  Negative for chills, fatigue and fever.   HENT:  Negative for ear pain and sinus pain.    Eyes:  Negative for photophobia, pain and visual disturbance.   Respiratory:  Negative for chest tightness, shortness of breath and wheezing.    Cardiovascular:  Negative for chest pain and leg swelling.   Gastrointestinal:  Negative for abdominal pain, blood in stool, constipation, diarrhea, nausea and vomiting.   Endocrine: Negative for polydipsia and polyuria.   Genitourinary:  Negative for difficulty urinating, dysuria, flank pain, frequency, penile pain and testicular pain.   Musculoskeletal:  Positive for arthralgias, back pain, myalgias, neck pain and neck stiffness.   Skin:  Negative for color change and rash.   Neurological:  Negative for dizziness, syncope, weakness, numbness and headaches.   Psychiatric/Behavioral:  Positive for confusion, decreased concentration and sleep disturbance.      Objective:     Vital Signs (Most Recent):  Temp: 97.6 °F (36.4 °C) (03/22/24 0539)  Pulse: 66 (03/22/24 0753)  Resp: 16 (03/22/24 0547)  BP: 98/63 (03/22/24 0539)  SpO2: 100 % (03/22/24 0753) Vital Signs (24h Range):  Temp:  [97.6 °F (36.4 °C)-99.6 °F (37.6 °C)] 97.6 °F (36.4 °C)  Pulse:  [] 66  Resp:  [14-19] 16  SpO2:  [97 %-100 %] 100 %  BP: ()/(53-76) 98/63     Weight: 59.4 kg (130 lb 15.3 oz)  Body mass index is 19.34 kg/m².    Intake/Output Summary (Last 24 hours) at 3/22/2024 0801  Last data filed at 3/22/2024 0600  Gross per 24 hour   Intake 729.94 ml   Output 4050 ml   Net -3320.06 ml         Physical Exam  Vitals and nursing note reviewed.   Constitutional:       General: He is not in acute distress.     Appearance: Normal appearance. He is not ill-appearing, toxic-appearing or diaphoretic.   HENT:      Head: Normocephalic and atraumatic.      Nose: No congestion.      Mouth/Throat:      Pharynx: Oropharynx is clear.   Eyes:      General:  No scleral icterus.     Conjunctiva/sclera: Conjunctivae normal.   Cardiovascular:      Rate and Rhythm: Normal rate and regular rhythm.      Heart sounds: Normal heart sounds. No murmur heard.  Pulmonary:      Effort: Pulmonary effort is normal. No respiratory distress.      Breath sounds: No wheezing, rhonchi or rales.   Abdominal:      General: Bowel sounds are normal. There is no distension.      Palpations: Abdomen is soft. There is no mass.      Comments: Colostomy LLQ filled with loose stools   Musculoskeletal:         General: Swelling, tenderness and deformity present.      Comments: Bilateral AKA, staples intact  LUE amputation below elbow   Skin:     General: Skin is warm.      Findings: Erythema and lesion present. No rash.      Comments: See media tab for wound images     Neurological:      General: No focal deficit present.      Mental Status: He is alert and oriented to person, place, and time.   Psychiatric:         Mood and Affect: Mood normal.         Behavior: Behavior normal.             Significant Labs: All pertinent labs within the past 24 hours have been reviewed.  CBC:   Recent Labs   Lab 03/21/24  0529 03/22/24  0514   WBC 18.94* 18.24*   HGB 8.3* 8.5*   HCT 26.1* 26.7*   * 732*     CMP:   Recent Labs   Lab 03/21/24  0529 03/22/24  0514    135*   K 5.4* 4.8    103   CO2 24 25   GLU 79 68*   BUN 40* 38*   CREATININE 0.6 0.7   CALCIUM 7.2* 7.1*   PROT 6.8 6.8   ALBUMIN 1.3* 1.3*   BILITOT 0.1 0.2   ALKPHOS 136* 118   AST 28 24   ALT 10 10   ANIONGAP 9 7*       Significant Imaging: I have reviewed all pertinent imaging results/findings within the past 24 hours.

## 2024-03-22 NOTE — PROGRESS NOTES
Mom and Dad of this pt want to go back to the way his pain meds were before. He was getting the scheduled Oxy and then PRN Percocet in between.    Right now he is getting 30 of 12 hr oxy, with 10 or 15 mg of PRN Oxy q 6hr. They say he is too drowsy with this regiment.

## 2024-03-22 NOTE — PLAN OF CARE
[facility] Plan of Care Note  Dx Severe Sepsis    [facility] Wound care came and addressed his wounds    Goals of Care: Pain control    Neuro: A & O x 4    Vital Signs: WDL    Respiratory: Pt has trach / O2 @ 5 L/28%    Diet: regular    Is patient tolerating current diet? yes    GTTS: KVO for antibiotics    Urine Output/Bowel Movement: Chronic sterling, Ostomy    Drains/Tubes/Tube Feeds (include total output/shift): colostomy    Lines: Rt Femoral PICC    Accuchecks:none    Skin: multiple wounds, b/l AKA, rt MANJEET    Fall Risk Score: 13    Activity level? X 2 assist    Any scheduled procedures? none    Any safety concerns? none    Other: none
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
## 2024-03-22 NOTE — ASSESSMENT & PLAN NOTE
>>ASSESSMENT AND PLAN FOR GANGRENE WRITTEN ON 3/22/2024  8:04 AM BY FRANKY BADILLO MD    - See severe sepsis

## 2024-03-23 LAB
ALBUMIN SERPL BCP-MCNC: 1.4 G/DL (ref 3.5–5.2)
ALP SERPL-CCNC: 131 U/L (ref 55–135)
ALT SERPL W/O P-5'-P-CCNC: 12 U/L (ref 10–44)
ANION GAP SERPL CALC-SCNC: 11 MMOL/L (ref 8–16)
AST SERPL-CCNC: 27 U/L (ref 10–40)
BASOPHILS # BLD AUTO: 0.15 K/UL (ref 0–0.2)
BASOPHILS NFR BLD: 0.8 % (ref 0–1.9)
BILIRUB SERPL-MCNC: 0.1 MG/DL (ref 0.1–1)
BUN SERPL-MCNC: 38 MG/DL (ref 6–20)
CALCIUM SERPL-MCNC: 6.9 MG/DL (ref 8.7–10.5)
CHLORIDE SERPL-SCNC: 104 MMOL/L (ref 95–110)
CO2 SERPL-SCNC: 22 MMOL/L (ref 23–29)
CREAT SERPL-MCNC: 0.6 MG/DL (ref 0.5–1.4)
DIFFERENTIAL METHOD BLD: ABNORMAL
EOSINOPHIL # BLD AUTO: 1.5 K/UL (ref 0–0.5)
EOSINOPHIL NFR BLD: 8 % (ref 0–8)
ERYTHROCYTE [DISTWIDTH] IN BLOOD BY AUTOMATED COUNT: 14.9 % (ref 11.5–14.5)
EST. GFR  (NO RACE VARIABLE): >60 ML/MIN/1.73 M^2
GLUCOSE SERPL-MCNC: 75 MG/DL (ref 70–110)
HCT VFR BLD AUTO: 26.3 % (ref 40–54)
HGB BLD-MCNC: 8.5 G/DL (ref 14–18)
IMM GRANULOCYTES # BLD AUTO: 0.19 K/UL (ref 0–0.04)
IMM GRANULOCYTES NFR BLD AUTO: 1 % (ref 0–0.5)
LYMPHOCYTES # BLD AUTO: 4.3 K/UL (ref 1–4.8)
LYMPHOCYTES NFR BLD: 22.6 % (ref 18–48)
MCH RBC QN AUTO: 30.5 PG (ref 27–31)
MCHC RBC AUTO-ENTMCNC: 32.3 G/DL (ref 32–36)
MCV RBC AUTO: 94 FL (ref 82–98)
MONOCYTES # BLD AUTO: 1.8 K/UL (ref 0.3–1)
MONOCYTES NFR BLD: 9.3 % (ref 4–15)
NEUTROPHILS # BLD AUTO: 11.2 K/UL (ref 1.8–7.7)
NEUTROPHILS NFR BLD: 58.3 % (ref 38–73)
NRBC BLD-RTO: 0 /100 WBC
PLATELET # BLD AUTO: 791 K/UL (ref 150–450)
PLATELET BLD QL SMEAR: ABNORMAL
PMV BLD AUTO: 8.2 FL (ref 9.2–12.9)
POTASSIUM SERPL-SCNC: 4.7 MMOL/L (ref 3.5–5.1)
PROT SERPL-MCNC: 6.9 G/DL (ref 6–8.4)
RBC # BLD AUTO: 2.79 M/UL (ref 4.6–6.2)
SODIUM SERPL-SCNC: 137 MMOL/L (ref 136–145)
WBC # BLD AUTO: 19.11 K/UL (ref 3.9–12.7)

## 2024-03-23 PROCEDURE — 20600001 HC STEP DOWN PRIVATE ROOM

## 2024-03-23 PROCEDURE — 25000003 PHARM REV CODE 250

## 2024-03-23 PROCEDURE — 63600175 PHARM REV CODE 636 W HCPCS: Performed by: HOSPITALIST

## 2024-03-23 PROCEDURE — 25000003 PHARM REV CODE 250: Performed by: HOSPITALIST

## 2024-03-23 PROCEDURE — 27000207 HC ISOLATION

## 2024-03-23 PROCEDURE — 27000221 HC OXYGEN, UP TO 24 HOURS

## 2024-03-23 PROCEDURE — 93005 ELECTROCARDIOGRAM TRACING: CPT

## 2024-03-23 PROCEDURE — 80053 COMPREHEN METABOLIC PANEL: CPT

## 2024-03-23 PROCEDURE — 25000003 PHARM REV CODE 250: Performed by: NURSE PRACTITIONER

## 2024-03-23 PROCEDURE — 99900035 HC TECH TIME PER 15 MIN (STAT)

## 2024-03-23 PROCEDURE — 63600175 PHARM REV CODE 636 W HCPCS: Mod: JZ,JG | Performed by: PHYSICIAN ASSISTANT

## 2024-03-23 PROCEDURE — 25000003 PHARM REV CODE 250: Performed by: PHYSICIAN ASSISTANT

## 2024-03-23 PROCEDURE — 93010 ELECTROCARDIOGRAM REPORT: CPT | Mod: ,,, | Performed by: INTERNAL MEDICINE

## 2024-03-23 PROCEDURE — 85025 COMPLETE CBC W/AUTO DIFF WBC: CPT

## 2024-03-23 PROCEDURE — 94761 N-INVAS EAR/PLS OXIMETRY MLT: CPT

## 2024-03-23 RX ORDER — ALPRAZOLAM 0.5 MG/1
1 TABLET ORAL 3 TIMES DAILY PRN
Status: DISCONTINUED | OUTPATIENT
Start: 2024-03-23 | End: 2024-04-11

## 2024-03-23 RX ORDER — OXYCODONE HCL 10 MG/1
20 TABLET, FILM COATED, EXTENDED RELEASE ORAL DAILY
Status: DISCONTINUED | OUTPATIENT
Start: 2024-03-23 | End: 2024-04-11 | Stop reason: HOSPADM

## 2024-03-23 RX ORDER — DIAZEPAM 5 MG/1
5 TABLET ORAL EVERY 8 HOURS PRN
Status: DISCONTINUED | OUTPATIENT
Start: 2024-03-23 | End: 2024-03-23

## 2024-03-23 RX ORDER — OXYCODONE HCL 10 MG/1
30 TABLET, FILM COATED, EXTENDED RELEASE ORAL NIGHTLY
Status: DISCONTINUED | OUTPATIENT
Start: 2024-03-23 | End: 2024-04-11 | Stop reason: HOSPADM

## 2024-03-23 RX ADMIN — MIDODRINE HYDROCHLORIDE 10 MG: 5 TABLET ORAL at 12:03

## 2024-03-23 RX ADMIN — ERYTHROMYCIN: 5 OINTMENT OPHTHALMIC at 09:03

## 2024-03-23 RX ADMIN — MIDODRINE HYDROCHLORIDE 10 MG: 5 TABLET ORAL at 05:03

## 2024-03-23 RX ADMIN — ACETAMINOPHEN 1000 MG: 500 TABLET ORAL at 09:03

## 2024-03-23 RX ADMIN — MIDODRINE HYDROCHLORIDE 10 MG: 5 TABLET ORAL at 09:03

## 2024-03-23 RX ADMIN — LINEZOLID 600 MG: 600 TABLET, FILM COATED ORAL at 09:03

## 2024-03-23 RX ADMIN — ACETAMINOPHEN 1000 MG: 500 TABLET ORAL at 12:03

## 2024-03-23 RX ADMIN — METHOCARBAMOL 500 MG: 500 TABLET ORAL at 03:03

## 2024-03-23 RX ADMIN — FAMOTIDINE 20 MG: 20 TABLET ORAL at 09:03

## 2024-03-23 RX ADMIN — METHOCARBAMOL 500 MG: 500 TABLET ORAL at 09:03

## 2024-03-23 RX ADMIN — MICONAZOLE NITRATE: 20 CREAM TOPICAL at 09:03

## 2024-03-23 RX ADMIN — LEVETIRACETAM 750 MG: 750 TABLET, FILM COATED ORAL at 09:03

## 2024-03-23 RX ADMIN — OXYCODONE HYDROCHLORIDE 20 MG: 10 TABLET, FILM COATED, EXTENDED RELEASE ORAL at 11:03

## 2024-03-23 RX ADMIN — ALPRAZOLAM 1 MG: 0.5 TABLET ORAL at 12:03

## 2024-03-23 RX ADMIN — OXYCODONE HYDROCHLORIDE 10 MG: 10 TABLET ORAL at 12:03

## 2024-03-23 RX ADMIN — OXYCODONE HYDROCHLORIDE 10 MG: 10 TABLET ORAL at 06:03

## 2024-03-23 RX ADMIN — SODIUM ZIRCONIUM CYCLOSILICATE 5 G: 5 POWDER, FOR SUSPENSION ORAL at 09:03

## 2024-03-23 RX ADMIN — MINOCYCLINE HYDROCHLORIDE 200 MG: 100 CAPSULE ORAL at 09:03

## 2024-03-23 RX ADMIN — CEFTAZIDIME, AVIBACTAM 2.5 G: 2; .5 POWDER, FOR SOLUTION INTRAVENOUS at 11:03

## 2024-03-23 RX ADMIN — LEVOTHYROXINE SODIUM 112 MCG: 112 TABLET ORAL at 05:03

## 2024-03-23 RX ADMIN — CEFTAZIDIME, AVIBACTAM 2.5 G: 2; .5 POWDER, FOR SOLUTION INTRAVENOUS at 05:03

## 2024-03-23 RX ADMIN — ACETAMINOPHEN 1000 MG: 500 TABLET ORAL at 05:03

## 2024-03-23 RX ADMIN — GABAPENTIN 400 MG: 400 CAPSULE ORAL at 09:03

## 2024-03-23 RX ADMIN — HYPROMELLOSE 2910 1 DROP: 5 SOLUTION OPHTHALMIC at 12:03

## 2024-03-23 RX ADMIN — CHLORHEXIDINE GLUCONATE 0.12% ORAL RINSE 15 ML: 1.2 LIQUID ORAL at 09:03

## 2024-03-23 RX ADMIN — GABAPENTIN 400 MG: 400 CAPSULE ORAL at 03:03

## 2024-03-23 RX ADMIN — HYPROMELLOSE 2910 1 DROP: 5 SOLUTION OPHTHALMIC at 05:03

## 2024-03-23 RX ADMIN — HYPROMELLOSE 2910 1 DROP: 5 SOLUTION OPHTHALMIC at 09:03

## 2024-03-23 RX ADMIN — POLYETHYLENE GLYCOL 3350 17 G: 17 POWDER, FOR SOLUTION ORAL at 09:03

## 2024-03-23 RX ADMIN — OXYCODONE HYDROCHLORIDE 30 MG: 10 TABLET, FILM COATED, EXTENDED RELEASE ORAL at 09:03

## 2024-03-23 RX ADMIN — FOLIC ACID 1 MG: 1 TABLET ORAL at 09:03

## 2024-03-23 RX ADMIN — LIDOCAINE 5% 1 PATCH: 700 PATCH TOPICAL at 12:03

## 2024-03-23 RX ADMIN — CEFTAZIDIME, AVIBACTAM 2.5 G: 2; .5 POWDER, FOR SOLUTION INTRAVENOUS at 01:03

## 2024-03-23 RX ADMIN — ENOXAPARIN SODIUM 30 MG: 30 INJECTION SUBCUTANEOUS at 05:03

## 2024-03-23 RX ADMIN — ALPRAZOLAM 1 MG: 0.5 TABLET ORAL at 05:03
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
## 2024-03-23 NOTE — RESPIRATORY THERAPY
"RAPID RESPONSE RESPIRATORY THERAPY PROACTIVE NOTE           Time of visit: 726     Code Status: Prior   : 1995  Bed: [facility] A:   MRN: [id number]  Time spent at the bedside: < 15 min    SITUATION    Evaluated patient for: LDA Check     BACKGROUND    Patient has no past medical history on file.  Clinically Significant Surgical Hx: tracheostomy    24 Hours Vitals Range:  Temp:  [96.1 °F (35.6 °C)-99.1 °F (37.3 °C)]   Pulse:  []   Resp:  [16-18]   BP: ()/(55-79)   SpO2:  [98 %-100 %]     Labs:    Recent Labs     24  0529 24  0514 24  05    135* 137   K 5.4* 4.8 4.7    103 104   CO2 24 25 22*   BUN 40* 38* 38*   CREATININE 0.6 0.7 0.6   GLU 79 68* 75        No results for input(s): "PH", "PCO2", "PO2", "HCO3", "POCSATURATED", "BE" in the last 72 hours.    ASSESSMENT/INTERVENTIONS  Patient resting comfortably. All supplies visible at bedside. No respiratory concerns at this time.      Last VS   Temp: 97.5 °F (36.4 °C) (829)  Pulse: 81 (829)  Resp: 18 (829)  BP: 115/70 (829)  SpO2: 100 % (829)      Extra trachs at bedside: 6 & 8 cuff  Level of Consciousness: Level of Consciousness (AVPU): alert  Respiratory Effort: Respiratory Effort: Normal, Unlabored Expansion/Accessory Muscle Usage: Expansion/Accessory Muscles/Retractions: no use of accessory muscles, no retractions, expansion symmetric  All Lung Field Breath Sounds: All Lung Fields Breath Sounds: Anterior:, Lateral:, coarse, diminished  NATALIA Breath Sounds: coarse  LLL Breath Sounds: coarse  RUL Breath Sounds: coarse  RML Breath Sounds: coarse  RLL Breath Sounds: coarse  O2 Device/Concentration: 5L 28% TC  Surgical airway: Yes, Type: Shiley Size: 8, cuffed  Ambu at bedside:       Active Orders   Respiratory Care    Oxygen Continuous     Frequency: Continuous     Number of Occurrences: Until Specified     Order Questions:      Device type: Low flow      Device: Trach Collar      FiO2%: " 60      Titrate O2 per Oxygen Titration Protocol: Yes      To maintain SpO2 goal of: >= 90%      Notify MD of: Inability to achieve desired SpO2; Sudden change in patient status and requires 20% increase in FiO2; Patient requires >60% FiO2    Pulse Oximetry Continuous     Frequency: Continuous     Number of Occurrences: Until Specified    Routine tracheostomy care     Frequency: BID     Number of Occurrences: Until Specified    SUCTION PRN     Frequency: PRN     Number of Occurrences: Until Specified       RECOMMENDATIONS    We recommend: RRT Recs: Continue POC per primary team.      FOLLOW-UP    Please call back the Rapid Response RT, Mayra Mazariegos, RRT at x [phone] for any questions or concerns.

## 2024-03-23 NOTE — ASSESSMENT & PLAN NOTE
Patient has hyponatremia which is controlled,We will aim to correct the sodium by 4-6mEq in 24 hours. We will monitor sodium Daily. The hyponatremia is due to Dehydration/hypovolemia. The patient's sodium results have been reviewed and are listed below.  Recent Labs   Lab 03/23/24  0552

## 2024-03-23 NOTE — ASSESSMENT & PLAN NOTE
- Oxycontin 20 qam 30 qpm  - Oxycodone 10mg q6h PRN  - Gabapentin  - Robaxin  - Scheduled tylenol

## 2024-03-23 NOTE — ASSESSMENT & PLAN NOTE
>>ASSESSMENT AND PLAN FOR GANGRENE WRITTEN ON 3/23/2024 11:10 AM BY KELLY HUERTA MD    - See severe sepsis

## 2024-03-23 NOTE — ASSESSMENT & PLAN NOTE
>>ASSESSMENT AND PLAN FOR PRESSURE ULCERS OF SKIN OF MULTIPLE TOPOGRAPHIC SITES WRITTEN ON 3/23/2024 11:11 AM BY KELLY HUERTA MD    - See severe sepsis

## 2024-03-23 NOTE — ASSESSMENT & PLAN NOTE
Palliative Care at [facility]SH engaging in ongoing communications with family regarding GOC given extensive comorbid conditions and recurrent hospitalizations. Per documentation review and brief discussion with family on initial transfer admission encounter, patient & family wish to continue all medical and surgical options at this time.     -- Patient & family would greatly benefit from continued Palliative Care discussions  -- IP Consult to [facility] Care services

## 2024-03-23 NOTE — SUBJECTIVE & OBJECTIVE
Interval History: Family reporting patient oversedated with current regimen, down titrating narcotics. Reporting waking up sweaty and anxious with tachycardia. Alprazolam changed to Diazepam.      Objective:     Vital Signs (Most Recent):  Temp: 97.5 °F (36.4 °C) (03/23/24 0829)  Pulse: (!) 120 (03/23/24 0922)  Resp: 18 (03/23/24 0829)  BP: 115/70 (03/23/24 0829)  SpO2: 99 % (03/23/24 0922) Vital Signs (24h Range):  Temp:  [97.3 °F (36.3 °C)-99.1 °F (37.3 °C)] 97.5 °F (36.4 °C)  Pulse:  [] 120  Resp:  [16-18] 18  SpO2:  [98 %-100 %] 99 %  BP: ()/(55-79) 115/70     Weight: 59.4 kg (130 lb 15.3 oz)  Body mass index is 19.34 kg/m².    Intake/Output Summary (Last 24 hours) at 3/23/2024 1108  Last data filed at 3/23/2024 0930  Gross per 24 hour   Intake 420 ml   Output 3325 ml   Net -2905 ml         Physical Exam  Vitals and nursing note reviewed.   Constitutional:       General: He is not in acute distress.     Appearance: Normal appearance. He is not ill-appearing, toxic-appearing or diaphoretic.   HENT:      Head: Normocephalic and atraumatic.      Nose: No congestion.      Mouth/Throat:      Pharynx: Oropharynx is clear.   Eyes:      General: No scleral icterus.     Conjunctiva/sclera: Conjunctivae normal.   Cardiovascular:      Rate and Rhythm: Normal rate and regular rhythm.      Heart sounds: Normal heart sounds. No murmur heard.  Pulmonary:      Effort: Pulmonary effort is normal. No respiratory distress.      Breath sounds: No wheezing, rhonchi or rales.   Abdominal:      General: Bowel sounds are normal. There is no distension.      Palpations: Abdomen is soft. There is no mass.   Musculoskeletal:         General: Swelling, tenderness and deformity present.      Comments: Bilateral AKA, staples intact  LUE amputation below elbow   Skin:     General: Skin is warm.      Findings: Erythema and lesion present. No rash.      Comments: See media tab for wound images     Neurological:      General: No  focal deficit present.      Mental Status: He is alert and oriented to person, place, and time.   Psychiatric:         Mood and Affect: Mood normal.         Behavior: Behavior normal.             Significant Labs: All pertinent labs within the past 24 hours have been reviewed.    Significant Imaging: I have reviewed all pertinent imaging results/findings within the past 24 hours.

## 2024-03-23 NOTE — PLAN OF CARE
Pt in bed, no s/s of acute distress, no c/o voiced, safety measures in place, call light in reach  Problem: Adult Inpatient Plan of Care  Goal: Plan of Care Review  Outcome: Ongoing, Progressing  Goal: Absence of Hospital-Acquired Illness or Injury  Outcome: Ongoing, Progressing  Goal: Optimal Comfort and Wellbeing  Outcome: Ongoing, Progressing  Goal: Readiness for Transition of Care  Outcome: Ongoing, Progressing     Problem: Adjustment to Illness (Sepsis/Septic Shock)  Goal: Optimal Coping  Outcome: Ongoing, Progressing     Problem: Bleeding (Sepsis/Septic Shock)  Goal: Absence of Bleeding  Outcome: Ongoing, Progressing     Problem: Infection Progression (Sepsis/Septic Shock)  Goal: Absence of Infection Signs and Symptoms  Outcome: Ongoing, Progressing     Problem: Nutrition Impaired (Sepsis/Septic Shock)  Goal: Optimal Nutrition Intake  Outcome: Ongoing, Progressing     Problem: Bariatric Environmental Safety  Goal: Safety Maintained with Care  Outcome: Ongoing, Progressing     Problem: Coping Ineffective  Goal: Effective Coping  Outcome: Ongoing, Progressing     Problem: Infection  Goal: Absence of Infection Signs and Symptoms  Outcome: Ongoing, Progressing     Problem: Impaired Wound Healing  Goal: Optimal Wound Healing  Outcome: Ongoing, Progressing     Problem: Fall Injury Risk  Goal: Absence of Fall and Fall-Related Injury  Outcome: Ongoing, Progressing

## 2024-03-24 LAB
ALBUMIN SERPL BCP-MCNC: 1.5 G/DL (ref 3.5–5.2)
ALP SERPL-CCNC: 138 U/L (ref 55–135)
ALT SERPL W/O P-5'-P-CCNC: 13 U/L (ref 10–44)
ANION GAP SERPL CALC-SCNC: 7 MMOL/L (ref 8–16)
AST SERPL-CCNC: 23 U/L (ref 10–40)
BASOPHILS # BLD AUTO: 0.16 K/UL (ref 0–0.2)
BASOPHILS NFR BLD: 1 % (ref 0–1.9)
BILIRUB SERPL-MCNC: 0.2 MG/DL (ref 0.1–1)
BUN SERPL-MCNC: 51 MG/DL (ref 6–20)
CALCIUM SERPL-MCNC: 7.2 MG/DL (ref 8.7–10.5)
CHLORIDE SERPL-SCNC: 103 MMOL/L (ref 95–110)
CO2 SERPL-SCNC: 24 MMOL/L (ref 23–29)
CREAT SERPL-MCNC: 0.7 MG/DL (ref 0.5–1.4)
DIFFERENTIAL METHOD BLD: ABNORMAL
EOSINOPHIL # BLD AUTO: 1.4 K/UL (ref 0–0.5)
EOSINOPHIL NFR BLD: 8.4 % (ref 0–8)
ERYTHROCYTE [DISTWIDTH] IN BLOOD BY AUTOMATED COUNT: 15 % (ref 11.5–14.5)
EST. GFR  (NO RACE VARIABLE): >60 ML/MIN/1.73 M^2
GLUCOSE SERPL-MCNC: 76 MG/DL (ref 70–110)
HCT VFR BLD AUTO: 25.9 % (ref 40–54)
HGB BLD-MCNC: 8.3 G/DL (ref 14–18)
HYPOCHROMIA BLD QL SMEAR: ABNORMAL
IMM GRANULOCYTES # BLD AUTO: 0.17 K/UL (ref 0–0.04)
IMM GRANULOCYTES NFR BLD AUTO: 1 % (ref 0–0.5)
LYMPHOCYTES # BLD AUTO: 4.6 K/UL (ref 1–4.8)
LYMPHOCYTES NFR BLD: 27.9 % (ref 18–48)
MCH RBC QN AUTO: 30.7 PG (ref 27–31)
MCHC RBC AUTO-ENTMCNC: 32 G/DL (ref 32–36)
MCV RBC AUTO: 96 FL (ref 82–98)
MONOCYTES # BLD AUTO: 1.9 K/UL (ref 0.3–1)
MONOCYTES NFR BLD: 11.7 % (ref 4–15)
NEUTROPHILS # BLD AUTO: 8.3 K/UL (ref 1.8–7.7)
NEUTROPHILS NFR BLD: 50 % (ref 38–73)
NRBC BLD-RTO: 0 /100 WBC
OHS QRS DURATION: 66 MS
OHS QTC CALCULATION: 503 MS
PLATELET # BLD AUTO: 842 K/UL (ref 150–450)
PLATELET BLD QL SMEAR: ABNORMAL
PMV BLD AUTO: 8 FL (ref 9.2–12.9)
POTASSIUM SERPL-SCNC: 4.5 MMOL/L (ref 3.5–5.1)
PROT SERPL-MCNC: 7.3 G/DL (ref 6–8.4)
RBC # BLD AUTO: 2.7 M/UL (ref 4.6–6.2)
SODIUM SERPL-SCNC: 134 MMOL/L (ref 136–145)
WBC # BLD AUTO: 16.65 K/UL (ref 3.9–12.7)

## 2024-03-24 PROCEDURE — 99900035 HC TECH TIME PER 15 MIN (STAT)

## 2024-03-24 PROCEDURE — 25000003 PHARM REV CODE 250

## 2024-03-24 PROCEDURE — 25000003 PHARM REV CODE 250: Performed by: HOSPITALIST

## 2024-03-24 PROCEDURE — 80053 COMPREHEN METABOLIC PANEL: CPT

## 2024-03-24 PROCEDURE — 27100171 HC OXYGEN HIGH FLOW UP TO 24 HOURS

## 2024-03-24 PROCEDURE — 63600175 PHARM REV CODE 636 W HCPCS: Mod: JZ,JG | Performed by: PHYSICIAN ASSISTANT

## 2024-03-24 PROCEDURE — 63600175 PHARM REV CODE 636 W HCPCS: Performed by: HOSPITALIST

## 2024-03-24 PROCEDURE — 63600175 PHARM REV CODE 636 W HCPCS

## 2024-03-24 PROCEDURE — 27000221 HC OXYGEN, UP TO 24 HOURS

## 2024-03-24 PROCEDURE — 25000003 PHARM REV CODE 250: Performed by: PHYSICIAN ASSISTANT

## 2024-03-24 PROCEDURE — 85025 COMPLETE CBC W/AUTO DIFF WBC: CPT

## 2024-03-24 PROCEDURE — 94761 N-INVAS EAR/PLS OXIMETRY MLT: CPT

## 2024-03-24 PROCEDURE — 20600001 HC STEP DOWN PRIVATE ROOM

## 2024-03-24 PROCEDURE — 25000003 PHARM REV CODE 250: Performed by: NURSE PRACTITIONER

## 2024-03-24 PROCEDURE — 31720 CLEARANCE OF AIRWAYS: CPT

## 2024-03-24 PROCEDURE — 27000207 HC ISOLATION

## 2024-03-24 RX ORDER — SODIUM CHLORIDE, SODIUM LACTATE, POTASSIUM CHLORIDE, CALCIUM CHLORIDE 600; 310; 30; 20 MG/100ML; MG/100ML; MG/100ML; MG/100ML
INJECTION, SOLUTION INTRAVENOUS CONTINUOUS
Status: ACTIVE | OUTPATIENT
Start: 2024-03-24 | End: 2024-03-25

## 2024-03-24 RX ADMIN — POLYETHYLENE GLYCOL 3350 17 G: 17 POWDER, FOR SOLUTION ORAL at 08:03

## 2024-03-24 RX ADMIN — LEVETIRACETAM 750 MG: 750 TABLET, FILM COATED ORAL at 08:03

## 2024-03-24 RX ADMIN — SODIUM CHLORIDE, POTASSIUM CHLORIDE, SODIUM LACTATE AND CALCIUM CHLORIDE: 600; 310; 30; 20 INJECTION, SOLUTION INTRAVENOUS at 02:03

## 2024-03-24 RX ADMIN — MIDODRINE HYDROCHLORIDE 10 MG: 5 TABLET ORAL at 01:03

## 2024-03-24 RX ADMIN — GABAPENTIN 400 MG: 400 CAPSULE ORAL at 01:03

## 2024-03-24 RX ADMIN — ACETAMINOPHEN 1000 MG: 500 TABLET ORAL at 05:03

## 2024-03-24 RX ADMIN — MIDODRINE HYDROCHLORIDE 10 MG: 5 TABLET ORAL at 05:03

## 2024-03-24 RX ADMIN — METHOCARBAMOL 500 MG: 500 TABLET ORAL at 08:03

## 2024-03-24 RX ADMIN — LEVOTHYROXINE SODIUM 112 MCG: 112 TABLET ORAL at 05:03

## 2024-03-24 RX ADMIN — OXYCODONE HYDROCHLORIDE 10 MG: 10 TABLET ORAL at 02:03

## 2024-03-24 RX ADMIN — CEFTAZIDIME, AVIBACTAM 2.5 G: 2; .5 POWDER, FOR SOLUTION INTRAVENOUS at 08:03

## 2024-03-24 RX ADMIN — METHOCARBAMOL 500 MG: 500 TABLET ORAL at 01:03

## 2024-03-24 RX ADMIN — CHLORHEXIDINE GLUCONATE 0.12% ORAL RINSE 15 ML: 1.2 LIQUID ORAL at 08:03

## 2024-03-24 RX ADMIN — FAMOTIDINE 20 MG: 20 TABLET ORAL at 08:03

## 2024-03-24 RX ADMIN — HYPROMELLOSE 2910 1 DROP: 5 SOLUTION OPHTHALMIC at 06:03

## 2024-03-24 RX ADMIN — ENOXAPARIN SODIUM 30 MG: 30 INJECTION SUBCUTANEOUS at 06:03

## 2024-03-24 RX ADMIN — ACETAMINOPHEN 1000 MG: 500 TABLET ORAL at 08:03

## 2024-03-24 RX ADMIN — GABAPENTIN 400 MG: 400 CAPSULE ORAL at 08:03

## 2024-03-24 RX ADMIN — HYPROMELLOSE 2910 1 DROP: 5 SOLUTION OPHTHALMIC at 08:03

## 2024-03-24 RX ADMIN — MIDODRINE HYDROCHLORIDE 10 MG: 5 TABLET ORAL at 08:03

## 2024-03-24 RX ADMIN — OXYCODONE HYDROCHLORIDE 20 MG: 10 TABLET, FILM COATED, EXTENDED RELEASE ORAL at 08:03

## 2024-03-24 RX ADMIN — ACETAMINOPHEN 1000 MG: 500 TABLET ORAL at 01:03

## 2024-03-24 RX ADMIN — FOLIC ACID 1 MG: 1 TABLET ORAL at 08:03

## 2024-03-24 RX ADMIN — MICONAZOLE NITRATE: 20 CREAM TOPICAL at 09:03

## 2024-03-24 RX ADMIN — LIDOCAINE 5% 1 PATCH: 700 PATCH TOPICAL at 01:03

## 2024-03-24 RX ADMIN — CEFTAZIDIME, AVIBACTAM 2.5 G: 2; .5 POWDER, FOR SOLUTION INTRAVENOUS at 05:03

## 2024-03-24 RX ADMIN — HYPROMELLOSE 2910 1 DROP: 5 SOLUTION OPHTHALMIC at 02:03

## 2024-03-24 RX ADMIN — SODIUM ZIRCONIUM CYCLOSILICATE 5 G: 5 POWDER, FOR SUSPENSION ORAL at 08:03

## 2024-03-24 RX ADMIN — ALPRAZOLAM 1 MG: 0.5 TABLET ORAL at 08:03

## 2024-03-24 RX ADMIN — OXYCODONE HYDROCHLORIDE 30 MG: 10 TABLET, FILM COATED, EXTENDED RELEASE ORAL at 08:03

## 2024-03-24 RX ADMIN — CEFTAZIDIME, AVIBACTAM 2.5 G: 2; .5 POWDER, FOR SOLUTION INTRAVENOUS at 01:03

## 2024-03-24 RX ADMIN — MINOCYCLINE HYDROCHLORIDE 200 MG: 100 CAPSULE ORAL at 08:03

## 2024-03-24 RX ADMIN — LINEZOLID 600 MG: 600 TABLET, FILM COATED ORAL at 08:03

## 2024-03-24 RX ADMIN — ERYTHROMYCIN: 5 OINTMENT OPHTHALMIC at 08:03

## 2024-03-24 NOTE — SUBJECTIVE & OBJECTIVE
Interval History: Continuing to titrate medications. Slept through the night per parents. No complaints at bedside today. Reports leaking from L arm.      Objective:     Vital Signs (Most Recent):  Temp: 98.5 °F (36.9 °C) (03/24/24 0545)  Pulse: 77 (03/24/24 0756)  Resp: 16 (03/24/24 0536)  BP: (!) 98/58 (03/24/24 0756)  SpO2: 99 % (03/24/24 0756) Vital Signs (24h Range):  Temp:  [97.5 °F (36.4 °C)-98.6 °F (37 °C)] 98.5 °F (36.9 °C)  Pulse:  [] 77  Resp:  [16-18] 16  SpO2:  [97 %-100 %] 99 %  BP: ()/(55-68) 98/58     Weight: 59.4 kg (130 lb 15.3 oz)  Body mass index is 19.34 kg/m².    Intake/Output Summary (Last 24 hours) at 3/24/2024 0842  Last data filed at 3/23/2024 1824  Gross per 24 hour   Intake 700 ml   Output 1350 ml   Net -650 ml         Physical Exam  Vitals and nursing note reviewed.   Constitutional:       General: He is not in acute distress.     Appearance: Normal appearance. He is not ill-appearing, toxic-appearing or diaphoretic.   HENT:      Head: Normocephalic and atraumatic.      Nose: No congestion.      Mouth/Throat:      Pharynx: Oropharynx is clear.   Eyes:      General: No scleral icterus.     Conjunctiva/sclera: Conjunctivae normal.   Cardiovascular:      Rate and Rhythm: Normal rate and regular rhythm.      Heart sounds: Normal heart sounds. No murmur heard.  Pulmonary:      Effort: Pulmonary effort is normal. No respiratory distress.      Breath sounds: No wheezing, rhonchi or rales.   Abdominal:      General: Bowel sounds are normal. There is no distension.      Palpations: Abdomen is soft. There is no mass.   Musculoskeletal:         General: Swelling, tenderness and deformity present.      Comments: Bilateral AKA, staples intact  LUE amputation below elbow   Skin:     General: Skin is warm.      Findings: Erythema and lesion present. No rash.      Comments: See media tab for wound images     Neurological:      General: No focal deficit present.      Mental Status: He is  alert and oriented to person, place, and time.   Psychiatric:         Mood and Affect: Mood normal.         Behavior: Behavior normal.             Significant Labs: All pertinent labs within the past 24 hours have been reviewed.    Significant Imaging: I have reviewed all pertinent imaging results/findings within the past 24 hours.

## 2024-03-24 NOTE — ASSESSMENT & PLAN NOTE
Patient has hyponatremia which is controlled,We will aim to correct the sodium by 4-6mEq in 24 hours. We will monitor sodium Daily. The hyponatremia is due to Dehydration/hypovolemia. The patient's sodium results have been reviewed and are listed below.  Recent Labs   Lab 03/24/24  0541   *

## 2024-03-24 NOTE — RESPIRATORY THERAPY
"RAPID RESPONSE RESPIRATORY THERAPY PROACTIVE NOTE           Time of visit: 823     Code Status: Prior   : 1995  [facility] A:   MRN: [id number]  Time spent at the bedside: < 15 min    SITUATION    Evaluated patient for: LDA Check     BACKGROUND    Patient has no past medical history on file.  Clinically Significant Surgical Hx: tracheostomy    24 Hours Vitals Range:  Temp:  [97.5 °F (36.4 °C)-98.7 °F (37.1 °C)]   Pulse:  []   Resp:  [16-18]   BP: ()/(55-68)   SpO2:  [97 %-100 %]     Labs:    Recent Labs     24  0514 24  0552 24  0541   * 137 134*   K 4.8 4.7 4.5    104 103   CO2 25 22* 24   BUN 38* 38* 51*   CREATININE 0.7 0.6 0.7   GLU 68* 75 76        No results for input(s): "PH", "PCO2", "PO2", "HCO3", "POCSATURATED", "BE" in the last 72 hours.    ASSESSMENT/INTERVENTIONS    Patient resting comfortably. All supplies visible at bedside. No respiratory concerns at this time.       Last VS   Temp: 98.7 °F (37.1 °C) ( 0756)  Pulse: 96 ( 0852)  Resp: 16 ( 0843)  BP: 98/58 ( 0756)  SpO2: 99 % ( 0852)      Extra trachs at bedside: 6 & 8 cuff  Level of Consciousness: Level of Consciousness (AVPU): alert  Respiratory Effort: Respiratory Effort: Normal, Unlabored Expansion/Accessory Muscle Usage: Expansion/Accessory Muscles/Retractions: no use of accessory muscles, no retractions, expansion symmetric  All Lung Field Breath Sounds: All Lung Fields Breath Sounds: Anterior:, Lateral:, diminished  NATALIA Breath Sounds: coarse  LLL Breath Sounds: diminished  RUL Breath Sounds: coarse  RML Breath Sounds: coarse  RLL Breath Sounds: diminished  O2 Device/Concentration: found on RA, put back on 5L 28% TC  Surgical airway: Yes, Type: Shiley Size: 8, cuffed  Ambu at bedside:       Active Orders   Respiratory Care    Oxygen Continuous     Frequency: Continuous     Number of Occurrences: Until Specified     Order Questions:      Device type: Low flow      " Device: Trach Collar      FiO2%: 60      Titrate O2 per Oxygen Titration Protocol: Yes      To maintain SpO2 goal of: >= 90%      Notify MD of: Inability to achieve desired SpO2; Sudden change in patient status and requires 20% increase in FiO2; Patient requires >60% FiO2    Pulse Oximetry Continuous     Frequency: Continuous     Number of Occurrences: Until Specified    Routine tracheostomy care     Frequency: BID     Number of Occurrences: Until Specified    SUCTION PRN     Frequency: PRN     Number of Occurrences: Until Specified       RECOMMENDATIONS    We recommend: RRT Recs: Continue POC per primary team.      FOLLOW-UP    Please call back the Rapid Response RT, Mayra Mazariegos, RRT at x [phone] for any questions or concerns.

## 2024-03-24 NOTE — ASSESSMENT & PLAN NOTE
>>ASSESSMENT AND PLAN FOR GANGRENE WRITTEN ON 3/24/2024  8:43 AM BY KELLY HUERTA MD    - See severe sepsis

## 2024-03-24 NOTE — PLAN OF CARE
03/24/24 1707   Post-Acute Status   Post-Acute Authorization Placement   Coverage Medicaid - [employer] [place])   Discharge Plan   Discharge Plan A Long-term acute care facility (LTAC)     Update on LTAC placement:    [facility] Phone: [phone]    We do not require a covid test prior to admit unless patient has Covid -19 signs and symptoms present.  * COVID-19: Willing/Equipped to accept COVID-19 positive patients,* High-Risk Isolation Patients: Willing/Equipped to accept High-Risk Isolation Patients [address], [address] [place] [address] 3/22/2024 12:10 (CT)  3/22/2024 14:09 (CT)  -  3/22/2024 13:19 (CT)  3/22/2024 13:19 (CT)  Response: Interested, but need more information  Comments: Ricardo Zhao, do you have any in network denials ? We can not get approval without in network denials and an SCA. Let me know please. Thanks, Viv   Waiting for you     [facility] Phone: [phone]    case by case based on symptoms  * COVID-19: NOT able to accept COVID-19 positive patients,* COVID-19: Services not specified [address] [place] [address] 3/22/2024 12:11 (CT)  3/22/2024 15:40 (CT)  -  3/22/2024 13:03 (CT)  3/22/2024 13:03 (CT)  Response: No, unable to accept patient  Reason: Other (see comments)  Comments: Unable to accept s/t to very high med cost with avycaz and minocycline. Thank you for the referral!  Waiting for recipient     [facility] Phone: [phone]    1 negative test  * COVID-19: Willing/Equipped to accept COVID-19 positive patients,* High-Risk Isolation Patients: Willing/Equipped to accept High-Risk Isolation Patients [address] [facility] [address] [place] [address] 3/22/2024 12:10 (CT)  3/22/2024 14:09 (CT)  -  3/22/2024 13:58 (CT)  3/22/2024 13:58 (CT)  Response: No, unable to accept patient  Reason: Out of Network  Waiting for you      [facility] Phone: [phone]      * COVID-19: Willing/Equipped to accept COVID-19 positive patients,* High-Risk Isolation Patients: Willing/Equipped to accept High-Risk Isolation Patients [address] [place] [address] 3/22/2024 12:10 (CT)  3/22/2024 14:09 (CT)  -  3/22/2024 12:15 (CT)  3/22/2024 12:15 (CT)  Response: No, unable to accept patient  Reason: Other (see comments)  Comments: Administrative Denial. Thank you for the referral.  Waiting for you     [facility] Phone: [phone]    COVID POS with symptoms  * COVID-19: Positive patients under care,* COVID-19: Services not specified,* COVID-19: Willing/Equipped to accept COVID-19 positive patients,* High-Risk Isolation Patients: Willing/Equipped to accept High-Risk Isolation Patients [address] [place] [address] 3/22/2024 12:11 (CT)  3/22/2024 14:09 (CT)  -  3/22/2024 12:15 (CT)  3/22/2024 12:15 (CT)  Response: No, unable to accept patient  Reason: Other (see comments)  Comments: Administrative Denial. Thank you for the referral.  Waiting for you     [facility] Phone: [phone]    case by case basis  * COVID-19: Willing/Equipped to accept COVID-19 positive patients,* High-Risk Isolation Patients: Willing/Equipped to accept High-Risk Isolation Patients [address] [place] [address] 3/22/2024 12:10 (CT)  3/22/2024 14:09 (CT)  -  3/22/2024 12:12 (CT)  3/22/2024 12:12 (CT)  Response: Referral Received  Waiting for recipient     [facility] Phone: [phone]      * COVID-19: Willing/Equipped to accept COVID-19 positive patients,* High-Risk Isolation Patients: Willing/Equipped to accept High-Risk Isolation Patients [address] [place] [address] 3/22/2024 12:10 (CT)  3/22/2024 14:09 (CT)  -  3/22/2024 13:20 (CT)  3/22/2024 13:20 (CT)  Response: Referral Received  Comments: SENT FOR  REVIEW   Waiting for recipient     [facility] Phone: [phone]      * COVID-19: NOT able to accept COVID-19 positive patients [address] [place] [address] 3/22/2024 12:10 (CT)  3/22/2024 14:09 (CT)  -  -  -  -  Waiting for recipient     [facility] Phone: [phone]    case by case basis  * COVID-19: Positive patients under care,* COVID-19: Willing/Equipped to accept COVID-19 positive patients,* High-Risk Isolation Patients: Willing/Equipped to accept High-Risk Isolation Patients [address] [facility] [address] [place] [address] 3/22/2024 12:10 (CT)  3/22/2024 14:09 (CT)  -  -  -  -  Waiting for recipient     [facility] Phone: [phone]    20 days from onset of symptoms  * COVID-19: Services not specified,* COVID-19: Willing/Equipped to accept COVID-19 positive patients,* High-Risk Isolation Patients: Willing/Equipped to accept High-Risk Isolation Patients [address] [place] [address] 3/22/2024 12:11 (CT)  3/22/2024 14:09 (CT)  -  -  -  -  Waiting for recipient    Response Notes   [facility] Phone: [phone]      * COVID-19: Positive patients under care,* COVID-19: Willing/Equipped to accept COVID-19 positive patients,* High-Risk Isolation Patients: Willing/Equipped to accept High-Risk Isolation Patients [address] [place] [address] 3/22/2024 12:10 (CT)  3/22/2024 14:09 (CT)  -  -  -  -  Waiting for recipient     [facility] Phone: [phone]      * COVID-19: NOT able to accept COVID-19 positive patients,* COVID-19: Services not specified [address] [place] [address] 3/22/2024 12:11 (CT)  3/22/2024 14:09 (CT)  -  -  -  -  Waiting for recipient    Response Notes   [facility] Phone: [phone]      *  COVID-19: Positive patients under care,* COVID-19: Willing/Equipped to accept COVID-19 positive patients [address] [place] [address] 3/22/2024 12:11 (CT)  3/22/2024 14:09 (CT)  -  -           Discharge Plan A and Plan B have been determined by review of patient's clinical status, future medical and therapeutic needs, and coverage/benefits for post-acute care in coordination with multidisciplinary team members.        Karena Sandoval LMSW  PRN - [facility]  [phone]

## 2024-03-24 NOTE — ASSESSMENT & PLAN NOTE
Palliative Care at [facility] engaging in ongoing communications with family regarding GOC given extensive comorbid conditions and recurrent hospitalizations. Per documentation review and brief discussion with family on initial transfer admission encounter, patient & family wish to continue all medical and surgical options at this time.     -- Patient & family would greatly benefit from continued Palliative Care discussions  -- IP Consult to [facility] Care services

## 2024-03-24 NOTE — ASSESSMENT & PLAN NOTE
>>ASSESSMENT AND PLAN FOR PRESSURE ULCERS OF SKIN OF MULTIPLE TOPOGRAPHIC SITES WRITTEN ON 3/24/2024  8:43 AM BY KELLY HUERTA MD    - See severe sepsis
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
## 2024-03-24 NOTE — PLAN OF CARE
[facility] Plan of Care Note  Dx Severe Sepsis    Shift Events Pain mgmt, IV Abx, Wound care as ordered, no acute changes this shift    Goals of Care: Pain control, no s/s sepsis, safety    Neuro: A & O x 4    Vital Signs: WDL    Respiratory: Pt has trach / O2 @ 5 L/28%    Diet: regular    Is patient tolerating current diet? yes    GTTS: KVO for antibiotics    Urine Output/Bowel Movement: Chronic sterling, Ostomy    Drains/Tubes/Tube Feeds (include total output/shift): colostomy    Lines: Rt Femoral CVC    Accuchecks:none    Skin: multiple wounds, b/l AKA, rt MANJEET    Fall Risk Score: 13    Activity level? X 2 assist    Any scheduled procedures? none    Other: Awaiting LTAC placement

## 2024-03-25 LAB
ALBUMIN SERPL BCP-MCNC: 1.5 G/DL (ref 3.5–5.2)
ALP SERPL-CCNC: 134 U/L (ref 55–135)
ALT SERPL W/O P-5'-P-CCNC: 12 U/L (ref 10–44)
ANION GAP SERPL CALC-SCNC: 9 MMOL/L (ref 8–16)
AST SERPL-CCNC: 27 U/L (ref 10–40)
BASOPHILS # BLD AUTO: 0.2 K/UL (ref 0–0.2)
BASOPHILS NFR BLD: 1.3 % (ref 0–1.9)
BILIRUB SERPL-MCNC: 0.1 MG/DL (ref 0.1–1)
BUN SERPL-MCNC: 47 MG/DL (ref 6–20)
CALCIUM SERPL-MCNC: 7.2 MG/DL (ref 8.7–10.5)
CHLORIDE SERPL-SCNC: 102 MMOL/L (ref 95–110)
CO2 SERPL-SCNC: 23 MMOL/L (ref 23–29)
CREAT SERPL-MCNC: 0.7 MG/DL (ref 0.5–1.4)
DIFFERENTIAL METHOD BLD: ABNORMAL
EOSINOPHIL # BLD AUTO: 1.4 K/UL (ref 0–0.5)
EOSINOPHIL NFR BLD: 9.1 % (ref 0–8)
ERYTHROCYTE [DISTWIDTH] IN BLOOD BY AUTOMATED COUNT: 14.7 % (ref 11.5–14.5)
EST. GFR  (NO RACE VARIABLE): >60 ML/MIN/1.73 M^2
GLUCOSE SERPL-MCNC: 70 MG/DL (ref 70–110)
HCT VFR BLD AUTO: 25.5 % (ref 40–54)
HGB BLD-MCNC: 8.1 G/DL (ref 14–18)
IMM GRANULOCYTES # BLD AUTO: 0.14 K/UL (ref 0–0.04)
IMM GRANULOCYTES NFR BLD AUTO: 0.9 % (ref 0–0.5)
LYMPHOCYTES # BLD AUTO: 4.4 K/UL (ref 1–4.8)
LYMPHOCYTES NFR BLD: 29.6 % (ref 18–48)
MCH RBC QN AUTO: 30.6 PG (ref 27–31)
MCHC RBC AUTO-ENTMCNC: 31.8 G/DL (ref 32–36)
MCV RBC AUTO: 96 FL (ref 82–98)
MONOCYTES # BLD AUTO: 1.7 K/UL (ref 0.3–1)
MONOCYTES NFR BLD: 11.4 % (ref 4–15)
NEUTROPHILS # BLD AUTO: 7.1 K/UL (ref 1.8–7.7)
NEUTROPHILS NFR BLD: 47.7 % (ref 38–73)
NRBC BLD-RTO: 0 /100 WBC
PLATELET # BLD AUTO: 814 K/UL (ref 150–450)
PMV BLD AUTO: 8.3 FL (ref 9.2–12.9)
POTASSIUM SERPL-SCNC: 4.4 MMOL/L (ref 3.5–5.1)
PROT SERPL-MCNC: 7.1 G/DL (ref 6–8.4)
RBC # BLD AUTO: 2.65 M/UL (ref 4.6–6.2)
SODIUM SERPL-SCNC: 134 MMOL/L (ref 136–145)
WBC # BLD AUTO: 14.92 K/UL (ref 3.9–12.7)

## 2024-03-25 PROCEDURE — 25000003 PHARM REV CODE 250

## 2024-03-25 PROCEDURE — 25000003 PHARM REV CODE 250: Performed by: HOSPITALIST

## 2024-03-25 PROCEDURE — 99900035 HC TECH TIME PER 15 MIN (STAT)

## 2024-03-25 PROCEDURE — 63600175 PHARM REV CODE 636 W HCPCS: Performed by: HOSPITALIST

## 2024-03-25 PROCEDURE — 99900026 HC AIRWAY MAINTENANCE (STAT)

## 2024-03-25 PROCEDURE — 27000221 HC OXYGEN, UP TO 24 HOURS

## 2024-03-25 PROCEDURE — 31720 CLEARANCE OF AIRWAYS: CPT

## 2024-03-25 PROCEDURE — 25000003 PHARM REV CODE 250: Performed by: PHYSICIAN ASSISTANT

## 2024-03-25 PROCEDURE — 25000003 PHARM REV CODE 250: Performed by: NURSE PRACTITIONER

## 2024-03-25 PROCEDURE — 63600175 PHARM REV CODE 636 W HCPCS: Mod: JZ,JG | Performed by: PHYSICIAN ASSISTANT

## 2024-03-25 PROCEDURE — 80053 COMPREHEN METABOLIC PANEL: CPT

## 2024-03-25 PROCEDURE — 94761 N-INVAS EAR/PLS OXIMETRY MLT: CPT

## 2024-03-25 PROCEDURE — 27000207 HC ISOLATION

## 2024-03-25 PROCEDURE — 85025 COMPLETE CBC W/AUTO DIFF WBC: CPT

## 2024-03-25 PROCEDURE — 20600001 HC STEP DOWN PRIVATE ROOM

## 2024-03-25 RX ADMIN — CHLORHEXIDINE GLUCONATE 0.12% ORAL RINSE 15 ML: 1.2 LIQUID ORAL at 08:03

## 2024-03-25 RX ADMIN — CEFTAZIDIME, AVIBACTAM 2.5 G: 2; .5 POWDER, FOR SOLUTION INTRAVENOUS at 09:03

## 2024-03-25 RX ADMIN — POLYETHYLENE GLYCOL 3350 17 G: 17 POWDER, FOR SOLUTION ORAL at 08:03

## 2024-03-25 RX ADMIN — METHOCARBAMOL 500 MG: 500 TABLET ORAL at 02:03

## 2024-03-25 RX ADMIN — ACETAMINOPHEN 1000 MG: 500 TABLET ORAL at 05:03

## 2024-03-25 RX ADMIN — FOLIC ACID 1 MG: 1 TABLET ORAL at 08:03

## 2024-03-25 RX ADMIN — OXYCODONE HYDROCHLORIDE 20 MG: 10 TABLET, FILM COATED, EXTENDED RELEASE ORAL at 08:03

## 2024-03-25 RX ADMIN — LEVETIRACETAM 750 MG: 750 TABLET, FILM COATED ORAL at 08:03

## 2024-03-25 RX ADMIN — SODIUM CHLORIDE 1000 ML: 9 INJECTION, SOLUTION INTRAVENOUS at 02:03

## 2024-03-25 RX ADMIN — LEVOTHYROXINE SODIUM 112 MCG: 112 TABLET ORAL at 05:03

## 2024-03-25 RX ADMIN — ALPRAZOLAM 1 MG: 0.5 TABLET ORAL at 06:03

## 2024-03-25 RX ADMIN — FAMOTIDINE 20 MG: 20 TABLET ORAL at 09:03

## 2024-03-25 RX ADMIN — HYPROMELLOSE 2910 1 DROP: 5 SOLUTION OPHTHALMIC at 10:03

## 2024-03-25 RX ADMIN — CEFTAZIDIME, AVIBACTAM 2.5 G: 2; .5 POWDER, FOR SOLUTION INTRAVENOUS at 02:03

## 2024-03-25 RX ADMIN — CHLORHEXIDINE GLUCONATE 0.12% ORAL RINSE 15 ML: 1.2 LIQUID ORAL at 09:03

## 2024-03-25 RX ADMIN — MICONAZOLE NITRATE: 20 CREAM TOPICAL at 09:03

## 2024-03-25 RX ADMIN — GABAPENTIN 400 MG: 400 CAPSULE ORAL at 02:03

## 2024-03-25 RX ADMIN — METHOCARBAMOL 500 MG: 500 TABLET ORAL at 09:03

## 2024-03-25 RX ADMIN — OXYCODONE HYDROCHLORIDE 10 MG: 10 TABLET ORAL at 02:03

## 2024-03-25 RX ADMIN — SODIUM ZIRCONIUM CYCLOSILICATE 5 G: 5 POWDER, FOR SUSPENSION ORAL at 08:03

## 2024-03-25 RX ADMIN — MIDODRINE HYDROCHLORIDE 10 MG: 5 TABLET ORAL at 01:03

## 2024-03-25 RX ADMIN — MINOCYCLINE HYDROCHLORIDE 200 MG: 100 CAPSULE ORAL at 08:03

## 2024-03-25 RX ADMIN — LINEZOLID 600 MG: 600 TABLET, FILM COATED ORAL at 09:03

## 2024-03-25 RX ADMIN — GABAPENTIN 400 MG: 400 CAPSULE ORAL at 08:03

## 2024-03-25 RX ADMIN — HYPROMELLOSE 2910 1 DROP: 5 SOLUTION OPHTHALMIC at 05:03

## 2024-03-25 RX ADMIN — ACETAMINOPHEN 1000 MG: 500 TABLET ORAL at 01:03

## 2024-03-25 RX ADMIN — ACETAMINOPHEN 1000 MG: 500 TABLET ORAL at 09:03

## 2024-03-25 RX ADMIN — GABAPENTIN 400 MG: 400 CAPSULE ORAL at 09:03

## 2024-03-25 RX ADMIN — LEVETIRACETAM 750 MG: 750 TABLET, FILM COATED ORAL at 09:03

## 2024-03-25 RX ADMIN — ENOXAPARIN SODIUM 30 MG: 30 INJECTION SUBCUTANEOUS at 05:03

## 2024-03-25 RX ADMIN — MIDODRINE HYDROCHLORIDE 10 MG: 5 TABLET ORAL at 09:03

## 2024-03-25 RX ADMIN — METHOCARBAMOL 500 MG: 500 TABLET ORAL at 08:03

## 2024-03-25 RX ADMIN — ALPRAZOLAM 1 MG: 0.5 TABLET ORAL at 09:03

## 2024-03-25 RX ADMIN — CEFTAZIDIME, AVIBACTAM 2.5 G: 2; .5 POWDER, FOR SOLUTION INTRAVENOUS at 04:03

## 2024-03-25 RX ADMIN — MIDODRINE HYDROCHLORIDE 10 MG: 5 TABLET ORAL at 05:03

## 2024-03-25 RX ADMIN — HYPROMELLOSE 2910 1 DROP: 5 SOLUTION OPHTHALMIC at 08:03

## 2024-03-25 RX ADMIN — LINEZOLID 600 MG: 600 TABLET, FILM COATED ORAL at 08:03

## 2024-03-25 RX ADMIN — MINOCYCLINE HYDROCHLORIDE 200 MG: 100 CAPSULE ORAL at 09:03

## 2024-03-25 RX ADMIN — HYPROMELLOSE 2910 1 DROP: 5 SOLUTION OPHTHALMIC at 02:03

## 2024-03-25 RX ADMIN — FAMOTIDINE 20 MG: 20 TABLET ORAL at 08:03

## 2024-03-25 RX ADMIN — OXYCODONE HYDROCHLORIDE 30 MG: 10 TABLET, FILM COATED, EXTENDED RELEASE ORAL at 09:03

## 2024-03-25 RX ADMIN — LIDOCAINE 5% 1 PATCH: 700 PATCH TOPICAL at 01:03

## 2024-03-25 NOTE — SUBJECTIVE & OBJECTIVE
Interval History: No complaints overnight, patient slept well. Reporting minimal pain this morning.      Objective:     Vital Signs (Most Recent):  Temp: 98.6 °F (37 °C) (03/25/24 0734)  Pulse: 60 (03/25/24 0828)  Resp: 18 (03/25/24 0830)  BP: (!) 95/55 (03/25/24 0734)  SpO2: 100 % (03/25/24 0828) Vital Signs (24h Range):  Temp:  [97.4 °F (36.3 °C)-98.6 °F (37 °C)] 98.6 °F (37 °C)  Pulse:  [] 60  Resp:  [16-19] 18  SpO2:  [96 %-100 %] 100 %  BP: ()/(54-74) 95/55     Weight: 59.4 kg (130 lb 15.3 oz)  Body mass index is 19.34 kg/m².    Intake/Output Summary (Last 24 hours) at 3/25/2024 0937  Last data filed at 3/25/2024 0500  Gross per 24 hour   Intake --   Output 2350 ml   Net -2350 ml         Physical Exam  Vitals and nursing note reviewed.   Constitutional:       General: He is not in acute distress.     Appearance: Normal appearance. He is not ill-appearing, toxic-appearing or diaphoretic.   HENT:      Head: Normocephalic and atraumatic.      Nose: No congestion.      Mouth/Throat:      Pharynx: Oropharynx is clear.   Eyes:      General: No scleral icterus.     Conjunctiva/sclera: Conjunctivae normal.   Cardiovascular:      Rate and Rhythm: Normal rate and regular rhythm.      Heart sounds: Normal heart sounds. No murmur heard.  Pulmonary:      Effort: Pulmonary effort is normal. No respiratory distress.      Breath sounds: No wheezing, rhonchi or rales.   Abdominal:      General: Bowel sounds are normal. There is no distension.      Palpations: Abdomen is soft. There is no mass.   Musculoskeletal:         General: Swelling, tenderness and deformity present.      Comments: Bilateral AKA, staples intact  LUE amputation below elbow   Skin:     General: Skin is warm.      Findings: Erythema and lesion present. No rash.      Comments: See media tab for wound images     Neurological:      General: No focal deficit present.      Mental Status: He is alert and oriented to person, place, and time.    Psychiatric:         Mood and Affect: Mood normal.         Behavior: Behavior normal.             Significant Labs: All pertinent labs within the past 24 hours have been reviewed.    Significant Imaging: I have reviewed all pertinent imaging results/findings within the past 24 hours.

## 2024-03-25 NOTE — PLAN OF CARE
Problem: Adult Inpatient Plan of Care  Goal: Plan of Care Review  Outcome: Ongoing, Progressing  Goal: Patient-Specific Goal (Individualized)  Outcome: Ongoing, Progressing  Goal: Absence of Hospital-Acquired Illness or Injury  Outcome: Ongoing, Progressing  Goal: Optimal Comfort and Wellbeing  Outcome: Ongoing, Progressing  Goal: Readiness for Transition of Care  Outcome: Ongoing, Progressing     Problem: Adjustment to Illness (Sepsis/Septic Shock)  Goal: Optimal Coping  Outcome: Ongoing, Progressing     Problem: Bleeding (Sepsis/Septic Shock)  Goal: Absence of Bleeding  Outcome: Ongoing, Progressing     Problem: Glycemic Control Impaired (Sepsis/Septic Shock)  Goal: Blood Glucose Level Within Desired Range  Outcome: Ongoing, Progressing     Problem: Infection Progression (Sepsis/Septic Shock)  Goal: Absence of Infection Signs and Symptoms  Outcome: Ongoing, Progressing     Problem: Nutrition Impaired (Sepsis/Septic Shock)  Goal: Optimal Nutrition Intake  Outcome: Ongoing, Progressing     Problem: Coping Ineffective  Goal: Effective Coping  Outcome: Ongoing, Progressing     Problem: Infection  Goal: Absence of Infection Signs and Symptoms  Outcome: Ongoing, Progressing     Problem: Impaired Wound Healing  Goal: Optimal Wound Healing  Outcome: Ongoing, Progressing     Problem: Fall Injury Risk  Goal: Absence of Fall and Fall-Related Injury  Outcome: Ongoing, Progressing

## 2024-03-25 NOTE — PLAN OF CARE
[facility]  Discharge Reassessment    Primary Care Provider: No, Primary Doctor    Expected Discharge Date: 3/27/2024    Reassessment (most recent)       Discharge Reassessment - 03/25/24 1123          Discharge Reassessment    Assessment Type Discharge Planning Reassessment     Did the patient's condition or plan change since previous assessment? No     Discharge Plan discussed with: Patient     Communicated ANDRÉS with patient/caregiver Yes     Discharge Plan A Long-term acute care facility (LTAC)     Discharge Plan B Long-term acute care facility (LTAC)     Transition of Care Barriers None     Why the patient remains in the hospital Requires continued medical care                   Our ID consultants are waiting on a specificity for cefedericol sensitivities.     Results will be available between today and Wednesday.     LTAC will reassess after final recs from ID.

## 2024-03-25 NOTE — NURSING
[facility] Plan of Care Note  Dx: Severe sepsis    Shift Events: shift uneventful    Goals of Care: pain management    Neuro: A&Ox4    Vital Signs: WDL    Respiratory: ATC 5L    Diet: reg    Is patient tolerating current diet? yes    GTTS: none    Urine Output/Bowel Movement: colostomy/sterling    Drains/Tubes/Tube Feeds (include total output/shift): none    Lines: femoral CVC      Accuchecks:none    Skin: multiple wounds    Fall Risk Score: see flowsheet    Activity level? See flowsheet    Any scheduled procedures? none    Any safety concerns? falls    Other: none

## 2024-03-25 NOTE — CARE UPDATE
RAPID RESPONSE NURSE ROUND       Rounding completed with charge RNOsmani for tachcyardia, hypotension reports patient VS close to baseline, HR on telemetry currently mid 80s. No additional concerns verbalized at this time. Instructed to call [phone] for further concerns or assistance.

## 2024-03-25 NOTE — ASSESSMENT & PLAN NOTE
Patient has hyponatremia which is controlled,We will aim to correct the sodium by 4-6mEq in 24 hours. We will monitor sodium Daily. The hyponatremia is due to Dehydration/hypovolemia. The patient's sodium results have been reviewed and are listed below.  Recent Labs   Lab 03/25/24  0502   *

## 2024-03-25 NOTE — ASSESSMENT & PLAN NOTE
>>ASSESSMENT AND PLAN FOR GANGRENE WRITTEN ON 3/25/2024  9:40 AM BY KELLY HUERTA MD    - See severe sepsis

## 2024-03-25 NOTE — PLAN OF CARE
[facility] Plan of Care Note  Dx Severe Sepsis    Shift Events Pain mgmt, IV Abx, Wound care as ordered, no acute changes this shift    Goals of Care: Pain control, no s/s sepsis, safety    Neuro: A & O x 4    Vital Signs: WDL    Respiratory: Pt has trach / O2 @ 5 L/28%    Diet: regular    Is patient tolerating current diet? yes    GTTS: KVO for antibiotics    Urine Output/Bowel Movement: Chronic sterling, Ostomy    Drains/Tubes/Tube Feeds (include total output/shift): colostomy    Lines: Rt Femoral CVC    Accuchecks:none    Skin: multiple wounds, b/l AKA, rt MANJEET    Fall Risk Score: 13    Activity level? X 2 assist    Any scheduled procedures? none    Other: Awaiting LTAC placement

## 2024-03-25 NOTE — ASSESSMENT & PLAN NOTE
>>ASSESSMENT AND PLAN FOR PRESSURE ULCERS OF SKIN OF MULTIPLE TOPOGRAPHIC SITES WRITTEN ON 3/25/2024  9:40 AM BY KELLY HUERTA MD    - See severe sepsis

## 2024-03-25 NOTE — ASSESSMENT & PLAN NOTE
Palliative Care at OSH engaging in ongoing communications with family regarding GOC given extensive comorbid conditions and recurrent hospitalizations. Per documentation review and brief discussion with family on initial transfer admission encounter, patient & family wish to continue all medical and surgical options at this time.     -- Patient & family would greatly benefit from continued Palliative Care discussions  -- IP Consult to [facility] Care services

## 2024-03-25 NOTE — RESPIRATORY THERAPY
"RAPID RESPONSE RESPIRATORY THERAPY PROACTIVE NOTE           Time of visit: 925     Code Status: Prior   : 1995  Bed: [facility] A:   MRN: [id number]  Time spent at the bedside: < 15 min    SITUATION    Evaluated patient for: LDA Check     BACKGROUND    Patient has no past medical history on file.  Clinically Significant Surgical Hx: tracheostomy    24 Hours Vitals Range:  Temp:  [97.4 °F (36.3 °C)-98.6 °F (37 °C)]   Pulse:  []   Resp:  [16-19]   BP: ()/(54-74)   SpO2:  [96 %-100 %]     Labs:    Recent Labs     24  0552 24  0541 24  0502    134* 134*   K 4.7 4.5 4.4    103 102   CO2 22* 24 23   BUN 38* 51* 47*   CREATININE 0.6 0.7 0.7   GLU 75 76 70        No results for input(s): "PH", "PCO2", "PO2", "HCO3", "POCSATURATED", "BE" in the last 72 hours.    ASSESSMENT/INTERVENTIONS  Patient resting comfortably. No respiratory concerns at this time.      Last VS   Temp: 98.6 °F (37 °C) (734)  Pulse: 60 ( 08)  Resp: 18 ( 0830)  BP: 95/55 (0734)  SpO2: 100 % (828)      Extra trachs at bedside: #6 Shiley Cuffed, #8 Shiley Cuffed  Level of Consciousness: Level of Consciousness (AVPU): alert  Respiratory Effort: Respiratory Effort: Normal, Unlabored Expansion/Accessory Muscle Usage: Expansion/Accessory Muscles/Retractions: expansion symmetric, no retractions, no use of accessory muscles  All Lung Field Breath Sounds: All Lung Fields Breath Sounds: Anterior:, Lateral:, coarse  NATALIA Breath Sounds: coarse  LLL Breath Sounds: diminished  RUL Breath Sounds: coarse  RML Breath Sounds: coarse  RLL Breath Sounds: diminished  O2 Device/Concentration: 5L/28%  Surgical airway: Yes, Type: Shiley Size: 8, cuffed  Ambu at bedside:       Active Orders   Respiratory Care    Oxygen Continuous     Frequency: Continuous     Number of Occurrences: Until Specified     Order Questions:      Device type: Low flow      Device: Trach Collar      FiO2%: 60      Titrate O2 " per Oxygen Titration Protocol: Yes      To maintain SpO2 goal of: >= 90%      Notify MD of: Inability to achieve desired SpO2; Sudden change in patient status and requires 20% increase in FiO2; Patient requires >60% FiO2    Pulse Oximetry Continuous     Frequency: Continuous     Number of Occurrences: Until Specified    Routine tracheostomy care     Frequency: BID     Number of Occurrences: Until Specified    SUCTION PRN     Frequency: PRN     Number of Occurrences: Until Specified       RECOMMENDATIONS    We recommend: RRT Recs: Continue POC per primary team.      FOLLOW-UP    Please call back the Rapid Response RT, Jerri Thurman RRT at x [phone] for any questions or concerns.
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
## 2024-03-26 LAB
ARUP MISCELLANEOUS TEST 1: NORMAL
Lab: NORMAL
Lab: NORMAL

## 2024-03-26 PROCEDURE — 25000003 PHARM REV CODE 250

## 2024-03-26 PROCEDURE — 63600175 PHARM REV CODE 636 W HCPCS: Performed by: HOSPITALIST

## 2024-03-26 PROCEDURE — 20600001 HC STEP DOWN PRIVATE ROOM

## 2024-03-26 PROCEDURE — 25000003 PHARM REV CODE 250: Performed by: HOSPITALIST

## 2024-03-26 PROCEDURE — 27000221 HC OXYGEN, UP TO 24 HOURS

## 2024-03-26 PROCEDURE — 63600175 PHARM REV CODE 636 W HCPCS: Mod: JZ,JG | Performed by: PHYSICIAN ASSISTANT

## 2024-03-26 PROCEDURE — 27000207 HC ISOLATION

## 2024-03-26 PROCEDURE — 25000003 PHARM REV CODE 250: Performed by: PHYSICIAN ASSISTANT

## 2024-03-26 PROCEDURE — 25000003 PHARM REV CODE 250: Performed by: NURSE PRACTITIONER

## 2024-03-26 PROCEDURE — 99900035 HC TECH TIME PER 15 MIN (STAT)

## 2024-03-26 PROCEDURE — 27100171 HC OXYGEN HIGH FLOW UP TO 24 HOURS

## 2024-03-26 PROCEDURE — 94761 N-INVAS EAR/PLS OXIMETRY MLT: CPT

## 2024-03-26 PROCEDURE — 99900026 HC AIRWAY MAINTENANCE (STAT)

## 2024-03-26 RX ADMIN — MIDODRINE HYDROCHLORIDE 10 MG: 5 TABLET ORAL at 09:03

## 2024-03-26 RX ADMIN — ACETAMINOPHEN 1000 MG: 500 TABLET ORAL at 01:03

## 2024-03-26 RX ADMIN — ERYTHROMYCIN: 5 OINTMENT OPHTHALMIC at 08:03

## 2024-03-26 RX ADMIN — ENOXAPARIN SODIUM 30 MG: 30 INJECTION SUBCUTANEOUS at 05:03

## 2024-03-26 RX ADMIN — CEFTAZIDIME, AVIBACTAM 2.5 G: 2; .5 POWDER, FOR SOLUTION INTRAVENOUS at 05:03

## 2024-03-26 RX ADMIN — LEVETIRACETAM 750 MG: 750 TABLET, FILM COATED ORAL at 09:03

## 2024-03-26 RX ADMIN — LINEZOLID 600 MG: 600 TABLET, FILM COATED ORAL at 09:03

## 2024-03-26 RX ADMIN — FAMOTIDINE 20 MG: 20 TABLET ORAL at 09:03

## 2024-03-26 RX ADMIN — HYPROMELLOSE 2910 1 DROP: 5 SOLUTION OPHTHALMIC at 05:03

## 2024-03-26 RX ADMIN — MINOCYCLINE HYDROCHLORIDE 200 MG: 100 CAPSULE ORAL at 09:03

## 2024-03-26 RX ADMIN — CHLORHEXIDINE GLUCONATE 0.12% ORAL RINSE 15 ML: 1.2 LIQUID ORAL at 09:03

## 2024-03-26 RX ADMIN — CEFTAZIDIME, AVIBACTAM 2.5 G: 2; .5 POWDER, FOR SOLUTION INTRAVENOUS at 12:03

## 2024-03-26 RX ADMIN — METHOCARBAMOL 500 MG: 500 TABLET ORAL at 03:03

## 2024-03-26 RX ADMIN — LIDOCAINE 5% 1 PATCH: 700 PATCH TOPICAL at 01:03

## 2024-03-26 RX ADMIN — CEFTAZIDIME, AVIBACTAM 2.5 G: 2; .5 POWDER, FOR SOLUTION INTRAVENOUS at 08:03

## 2024-03-26 RX ADMIN — OXYCODONE HYDROCHLORIDE 10 MG: 10 TABLET ORAL at 01:03

## 2024-03-26 RX ADMIN — METHOCARBAMOL 500 MG: 500 TABLET ORAL at 09:03

## 2024-03-26 RX ADMIN — HYPROMELLOSE 2910 1 DROP: 5 SOLUTION OPHTHALMIC at 08:03

## 2024-03-26 RX ADMIN — SODIUM ZIRCONIUM CYCLOSILICATE 5 G: 5 POWDER, FOR SUSPENSION ORAL at 09:03

## 2024-03-26 RX ADMIN — GABAPENTIN 400 MG: 400 CAPSULE ORAL at 09:03

## 2024-03-26 RX ADMIN — ACETAMINOPHEN 1000 MG: 500 TABLET ORAL at 06:03

## 2024-03-26 RX ADMIN — OXYCODONE HYDROCHLORIDE 20 MG: 10 TABLET, FILM COATED, EXTENDED RELEASE ORAL at 09:03

## 2024-03-26 RX ADMIN — OXYCODONE HYDROCHLORIDE 30 MG: 10 TABLET, FILM COATED, EXTENDED RELEASE ORAL at 09:03

## 2024-03-26 RX ADMIN — FOLIC ACID 1 MG: 1 TABLET ORAL at 09:03

## 2024-03-26 RX ADMIN — LEVOTHYROXINE SODIUM 112 MCG: 112 TABLET ORAL at 06:03

## 2024-03-26 RX ADMIN — HYPROMELLOSE 2910 1 DROP: 5 SOLUTION OPHTHALMIC at 12:03

## 2024-03-26 RX ADMIN — GABAPENTIN 400 MG: 400 CAPSULE ORAL at 03:03

## 2024-03-26 RX ADMIN — MICONAZOLE NITRATE: 20 CREAM TOPICAL at 09:03

## 2024-03-26 RX ADMIN — HYPROMELLOSE 2910 1 DROP: 5 SOLUTION OPHTHALMIC at 10:03

## 2024-03-26 RX ADMIN — MIDODRINE HYDROCHLORIDE 10 MG: 5 TABLET ORAL at 01:03

## 2024-03-26 RX ADMIN — ALPRAZOLAM 1 MG: 0.5 TABLET ORAL at 10:03

## 2024-03-26 RX ADMIN — MIDODRINE HYDROCHLORIDE 10 MG: 5 TABLET ORAL at 06:03

## 2024-03-26 RX ADMIN — ALPRAZOLAM 1 MG: 0.5 TABLET ORAL at 09:03

## 2024-03-26 RX ADMIN — MICONAZOLE NITRATE: 20 CREAM TOPICAL at 11:03

## 2024-03-26 NOTE — SUBJECTIVE & OBJECTIVE
Interval History: No new changes today. Pending placement      Objective:     Vital Signs (Most Recent):  Temp: 97.8 °F (36.6 °C) (03/26/24 0732)  Pulse: 69 (03/26/24 1116)  Resp: 18 (03/26/24 1114)  BP: 118/73 (03/26/24 0732)  SpO2: 99 % (03/26/24 1116) Vital Signs (24h Range):  Temp:  [96 °F (35.6 °C)-97.8 °F (36.6 °C)] 97.8 °F (36.6 °C)  Pulse:  [58-90] 69  Resp:  [16-18] 18  SpO2:  [88 %-100 %] 99 %  BP: ()/(56-76) 118/73     Weight: 59.4 kg (130 lb 15.3 oz)  Body mass index is 19.34 kg/m².    Intake/Output Summary (Last 24 hours) at 3/26/2024 1139  Last data filed at 3/26/2024 1127  Gross per 24 hour   Intake 740 ml   Output 2650 ml   Net -1910 ml         Physical Exam  Vitals and nursing note reviewed.   Constitutional:       General: He is not in acute distress.     Appearance: Normal appearance. He is not ill-appearing, toxic-appearing or diaphoretic.   HENT:      Head: Normocephalic and atraumatic.      Nose: No congestion.      Mouth/Throat:      Pharynx: Oropharynx is clear.   Eyes:      General: No scleral icterus.     Conjunctiva/sclera: Conjunctivae normal.   Cardiovascular:      Rate and Rhythm: Normal rate and regular rhythm.      Heart sounds: Normal heart sounds. No murmur heard.  Pulmonary:      Effort: Pulmonary effort is normal. No respiratory distress.      Breath sounds: No wheezing, rhonchi or rales.   Abdominal:      General: Bowel sounds are normal. There is no distension.      Palpations: Abdomen is soft. There is no mass.   Musculoskeletal:         General: Swelling, tenderness and deformity present.      Comments: Bilateral AKA, staples intact  LUE amputation below elbow   Skin:     General: Skin is warm.      Findings: Erythema and lesion present. No rash.      Comments: See media tab for wound images     Neurological:      General: No focal deficit present.      Mental Status: He is alert and oriented to person, place, and time.   Psychiatric:         Mood and Affect: Mood  normal.         Behavior: Behavior normal.             Significant Labs: All pertinent labs within the past 24 hours have been reviewed.    Significant Imaging: I have reviewed all pertinent imaging results/findings within the past 24 hours.

## 2024-03-26 NOTE — ASSESSMENT & PLAN NOTE
"Stage IV pressure ulcer of bilateral buttocks, sacral region  Pressure ulcers of multiple sites  Chronic osteomyelitis    This patient does have evidence of infective focus  My overall impression is sepsis.  Source: Skin and Soft Tissue (location extremities)  Antibiotics given-   Antibiotics (72h ago, onward)      Start     Stop Route Frequency Ordered    03/19/24 0900  linezolid tablet 600 mg         -- PER NG TUBE Every 12 hours 03/19/24 0740    03/18/24 2100  erythromycin 5 mg/gram (0.5 %) ophthalmic ointment         -- Both Eyes Nightly 03/18/24 1616    03/16/24 1330  minocycline capsule 200 mg         -- PER NG TUBE Every 12 hours 03/16/24 1324    03/15/24 1615  cefTAZidime-avibactam (AVYCAZ) 2.5 g in dextrose 5 % in water (D5W) 100 mL IVPB (MB+)         -- IV Every 8 hours (non-standard times) 03/15/24 1510          Latest lactate reviewed-  No results for input(s): "LACTATE", "POCLAC" in the last 72 hours.    Organ dysfunction indicated by Acute respiratory failure    UCX 02/21 and RCX 02/22 with MDR proteus mirabilis, pseudomonas aeruginosa. Patient has extensive MDRO / ESBL culture data from prior hospitalizations as well. Shock component is now resolved, but leukocytosis, fever, and underlying wounds are still a component as source control has not been achieved.    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 L leg culture grew Pseudomonas.   -- Continue Linezolid, Minocycline, and Avycaz  -- IP Consult to Wound Care for LUE amputation site   -- Turn q2h  -- MAP goal >60    "

## 2024-03-26 NOTE — PLAN OF CARE
Problem: Adult Inpatient Plan of Care  Goal: Plan of Care Review  Outcome: Ongoing, Progressing  Goal: Patient-Specific Goal (Individualized)  Outcome: Ongoing, Progressing  Goal: Absence of Hospital-Acquired Illness or Injury  Outcome: Ongoing, Progressing  Goal: Optimal Comfort and Wellbeing  Outcome: Ongoing, Progressing  Goal: Readiness for Transition of Care  Outcome: Ongoing, Progressing     Problem: Infection Progression (Sepsis/Septic Shock)  Goal: Absence of Infection Signs and Symptoms  Outcome: Ongoing, Progressing     Problem: Nutrition Impaired (Sepsis/Septic Shock)  Goal: Optimal Nutrition Intake  Outcome: Ongoing, Progressing     Problem: Coping Ineffective  Goal: Effective Coping  Outcome: Ongoing, Progressing     Problem: Infection  Goal: Absence of Infection Signs and Symptoms  Outcome: Ongoing, Progressing     Problem: Impaired Wound Healing  Goal: Optimal Wound Healing  Outcome: Ongoing, Progressing     Problem: Fall Injury Risk  Goal: Absence of Fall and Fall-Related Injury  Outcome: Ongoing, Progressing

## 2024-03-26 NOTE — ASSESSMENT & PLAN NOTE
"Patient has hyponatremia which is controlled,We will aim to correct the sodium by 4-6mEq in 24 hours. We will monitor sodium Daily. The hyponatremia is due to Dehydration/hypovolemia. The patient's sodium results have been reviewed and are listed below.  No results for input(s): "NA" in the last 24 hours.    "

## 2024-03-26 NOTE — NURSING
.[facility] Plan of Care Note      Dx: Severe sepsis     Shift Events : None    Goals of Care: Prevent further infection and wound healing    Neuro: aox4    Vital Signs:  wdl    Respiratory: trach collar    Diet: regular    Is patient tolerating current diet? yes    GTTS: none    Urine Output/Bowel Movement:  adequate    Drains/Tubes/Tube Feeds (include total output/shift):  FC, Colostomy    Lines:  Right femoral Central Line      Accuchecks: None    Skin: Multiple wounds    Fall Risk Score: 16    Activity level? Complete assist    Any scheduled procedures? None    Any safety concerns?  Fall risk    Other:
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
## 2024-03-26 NOTE — ASSESSMENT & PLAN NOTE
>>ASSESSMENT AND PLAN FOR GANGRENE WRITTEN ON 3/26/2024 11:42 AM BY KELLY HUERTA MD    - See severe sepsis

## 2024-03-26 NOTE — RESPIRATORY THERAPY
"RAPID RESPONSE RESPIRATORY THERAPY PROACTIVE NOTE           Time of visit: 0845     Code Status: Prior   : 1995  Bed: [address] [place] [address] A:   MRN: [id number]  Time spent at the bedside: < 15 min    SITUATION    Evaluated patient for: LDA Check     BACKGROUND    Patient has no past medical history on file.  Clinically Significant Surgical Hx: tracheostomy    24 Hours Vitals Range:  Temp:  [96 °F (35.6 °C)-97.8 °F (36.6 °C)]   Pulse:  [58-90]   Resp:  [16-18]   BP: ()/(56-76)   SpO2:  [88 %-100 %]     Labs:    Recent Labs     24  0541 24  0502   * 134*   K 4.5 4.4    102   CO2 24 23   BUN 51* 47*   CREATININE 0.7 0.7   GLU 76 70        No results for input(s): "PH", "PCO2", "PO2", "HCO3", "POCSATURATED", "BE" in the last 72 hours.    ASSESSMENT/INTERVENTIONS  Patient resting comfortably. No respiratory concerns at this time.      Last VS   Temp: 97.8 °F (36.6 °C) ( 0732)  Pulse: 78 ( 0849)  Resp: 16 ( 0956)  BP: 118/73 ( 0732)  SpO2: 97 % ( 0849)      Extra trachs at bedside: #6 Shiley Cuffed, #8 Shiley Cuffed  Level of Consciousness: Level of Consciousness (AVPU): alert  Respiratory Effort: Respiratory Effort: Unlabored Expansion/Accessory Muscle Usage: Expansion/Accessory Muscles/Retractions: no retractions, no use of accessory muscles  All Lung Field Breath Sounds: All Lung Fields Breath Sounds: coarse  NATALIA Breath Sounds: coarse  LLL Breath Sounds: diminished  RUL Breath Sounds: coarse  RML Breath Sounds: coarse  RLL Breath Sounds: diminished  O2 Device/Concentration: 5L/28%  Surgical airway: Yes, Type: Shiley Size: 8, cuffed  Ambu at bedside:       Active Orders   Respiratory Care    Oxygen Continuous     Frequency: Continuous     Number of Occurrences: Until Specified     Order Questions:      Device type: Low flow      Device: Trach Collar      FiO2%: 60      Titrate O2 per Oxygen Titration Protocol: Yes      To maintain SpO2 goal of: >= 90%      Notify MD of: " Inability to achieve desired SpO2; Sudden change in patient status and requires 20% increase in FiO2; Patient requires >60% FiO2    Pulse Oximetry Continuous     Frequency: Continuous     Number of Occurrences: Until Specified    Routine tracheostomy care     Frequency: BID     Number of Occurrences: Until Specified    SUCTION PRN     Frequency: PRN     Number of Occurrences: Until Specified       RECOMMENDATIONS    We recommend: RRT Recs: Continue POC per primary team.      FOLLOW-UP    Please call back the Rapid Response RT, Jerri Thurman RRT at x [phone] for any questions or concerns.

## 2024-03-26 NOTE — ASSESSMENT & PLAN NOTE
>>ASSESSMENT AND PLAN FOR PRESSURE ULCERS OF SKIN OF MULTIPLE TOPOGRAPHIC SITES WRITTEN ON 3/26/2024 11:43 AM BY KELLY HUERTA MD    - See severe sepsis

## 2024-03-27 PROBLEM — E87.1 HYPONATREMIA: Status: RESOLVED | Noted: 2024-03-07 | Resolved: 2024-03-27

## 2024-03-27 PROBLEM — L89.314 STAGE IV PRESSURE ULCER OF RIGHT BUTTOCK: Chronic | Status: RESOLVED | Noted: 2023-05-18 | Resolved: 2024-03-27

## 2024-03-27 PROBLEM — L89.324 STAGE IV PRESSURE ULCER OF LEFT BUTTOCK: Chronic | Status: RESOLVED | Noted: 2023-05-18 | Resolved: 2024-03-27

## 2024-03-27 PROBLEM — S61.402A: Chronic | Status: RESOLVED | Noted: 2023-05-18 | Resolved: 2024-03-27

## 2024-03-27 LAB
ALBUMIN SERPL BCP-MCNC: 1.5 G/DL (ref 3.5–5.2)
ALP SERPL-CCNC: 141 U/L (ref 55–135)
ALT SERPL W/O P-5'-P-CCNC: 11 U/L (ref 10–44)
ANION GAP SERPL CALC-SCNC: 9 MMOL/L (ref 8–16)
ANISOCYTOSIS BLD QL SMEAR: SLIGHT
AST SERPL-CCNC: 22 U/L (ref 10–40)
BASO STIPL BLD QL SMEAR: ABNORMAL
BASOPHILS # BLD AUTO: 0.19 K/UL (ref 0–0.2)
BASOPHILS NFR BLD: 1.1 % (ref 0–1.9)
BILIRUB SERPL-MCNC: 0.1 MG/DL (ref 0.1–1)
BUN SERPL-MCNC: 34 MG/DL (ref 6–20)
CALCIUM SERPL-MCNC: 7.4 MG/DL (ref 8.7–10.5)
CHLORIDE SERPL-SCNC: 105 MMOL/L (ref 95–110)
CO2 SERPL-SCNC: 23 MMOL/L (ref 23–29)
CREAT SERPL-MCNC: 0.7 MG/DL (ref 0.5–1.4)
DIFFERENTIAL METHOD BLD: ABNORMAL
EOSINOPHIL # BLD AUTO: 1.3 K/UL (ref 0–0.5)
EOSINOPHIL NFR BLD: 7.3 % (ref 0–8)
ERYTHROCYTE [DISTWIDTH] IN BLOOD BY AUTOMATED COUNT: 14.6 % (ref 11.5–14.5)
EST. GFR  (NO RACE VARIABLE): >60 ML/MIN/1.73 M^2
GLUCOSE SERPL-MCNC: 74 MG/DL (ref 70–110)
HCT VFR BLD AUTO: 25.4 % (ref 40–54)
HGB BLD-MCNC: 8.1 G/DL (ref 14–18)
IMM GRANULOCYTES # BLD AUTO: 0.1 K/UL (ref 0–0.04)
IMM GRANULOCYTES NFR BLD AUTO: 0.6 % (ref 0–0.5)
LYMPHOCYTES # BLD AUTO: 4.7 K/UL (ref 1–4.8)
LYMPHOCYTES NFR BLD: 26.5 % (ref 18–48)
MCH RBC QN AUTO: 30.2 PG (ref 27–31)
MCHC RBC AUTO-ENTMCNC: 31.9 G/DL (ref 32–36)
MCV RBC AUTO: 95 FL (ref 82–98)
MONOCYTES # BLD AUTO: 1.5 K/UL (ref 0.3–1)
MONOCYTES NFR BLD: 8.5 % (ref 4–15)
NEUTROPHILS # BLD AUTO: 9.9 K/UL (ref 1.8–7.7)
NEUTROPHILS NFR BLD: 56 % (ref 38–73)
NRBC BLD-RTO: 0 /100 WBC
PLATELET # BLD AUTO: 732 K/UL (ref 150–450)
PLATELET BLD QL SMEAR: ABNORMAL
PMV BLD AUTO: 8 FL (ref 9.2–12.9)
POIKILOCYTOSIS BLD QL SMEAR: SLIGHT
POTASSIUM SERPL-SCNC: 4.4 MMOL/L (ref 3.5–5.1)
PROT SERPL-MCNC: 7.3 G/DL (ref 6–8.4)
RBC # BLD AUTO: 2.68 M/UL (ref 4.6–6.2)
SODIUM SERPL-SCNC: 137 MMOL/L (ref 136–145)
TARGETS BLD QL SMEAR: ABNORMAL
WBC # BLD AUTO: 17.58 K/UL (ref 3.9–12.7)

## 2024-03-27 PROCEDURE — 27000221 HC OXYGEN, UP TO 24 HOURS

## 2024-03-27 PROCEDURE — 63600175 PHARM REV CODE 636 W HCPCS: Performed by: HOSPITALIST

## 2024-03-27 PROCEDURE — 94640 AIRWAY INHALATION TREATMENT: CPT

## 2024-03-27 PROCEDURE — 25000003 PHARM REV CODE 250

## 2024-03-27 PROCEDURE — 80053 COMPREHEN METABOLIC PANEL: CPT

## 2024-03-27 PROCEDURE — 25000003 PHARM REV CODE 250: Performed by: PHYSICIAN ASSISTANT

## 2024-03-27 PROCEDURE — 36415 COLL VENOUS BLD VENIPUNCTURE: CPT

## 2024-03-27 PROCEDURE — 25000003 PHARM REV CODE 250: Performed by: HOSPITALIST

## 2024-03-27 PROCEDURE — 20600001 HC STEP DOWN PRIVATE ROOM

## 2024-03-27 PROCEDURE — 99900026 HC AIRWAY MAINTENANCE (STAT)

## 2024-03-27 PROCEDURE — 63600175 PHARM REV CODE 636 W HCPCS: Mod: JZ,JG | Performed by: PHYSICIAN ASSISTANT

## 2024-03-27 PROCEDURE — 25000003 PHARM REV CODE 250: Performed by: INTERNAL MEDICINE

## 2024-03-27 PROCEDURE — 99900035 HC TECH TIME PER 15 MIN (STAT)

## 2024-03-27 PROCEDURE — 94761 N-INVAS EAR/PLS OXIMETRY MLT: CPT

## 2024-03-27 PROCEDURE — 27000207 HC ISOLATION

## 2024-03-27 PROCEDURE — 25000003 PHARM REV CODE 250: Performed by: NURSE PRACTITIONER

## 2024-03-27 PROCEDURE — 85025 COMPLETE CBC W/AUTO DIFF WBC: CPT

## 2024-03-27 RX ORDER — SODIUM CHLORIDE FOR INHALATION 3 %
4 VIAL, NEBULIZER (ML) INHALATION ONCE
Status: COMPLETED | OUTPATIENT
Start: 2024-03-28 | End: 2024-03-27

## 2024-03-27 RX ADMIN — CHLORHEXIDINE GLUCONATE 0.12% ORAL RINSE 15 ML: 1.2 LIQUID ORAL at 09:03

## 2024-03-27 RX ADMIN — METHOCARBAMOL 500 MG: 500 TABLET ORAL at 08:03

## 2024-03-27 RX ADMIN — SODIUM CHLORIDE SOLN NEBU 3% 4 ML: 3 NEBU SOLN at 11:03

## 2024-03-27 RX ADMIN — FAMOTIDINE 20 MG: 20 TABLET ORAL at 08:03

## 2024-03-27 RX ADMIN — ACETAMINOPHEN 1000 MG: 500 TABLET ORAL at 01:03

## 2024-03-27 RX ADMIN — LIDOCAINE 5% 1 PATCH: 700 PATCH TOPICAL at 01:03

## 2024-03-27 RX ADMIN — HYPROMELLOSE 2910 1 DROP: 5 SOLUTION OPHTHALMIC at 05:03

## 2024-03-27 RX ADMIN — OXYCODONE HYDROCHLORIDE 30 MG: 10 TABLET, FILM COATED, EXTENDED RELEASE ORAL at 08:03

## 2024-03-27 RX ADMIN — LEVETIRACETAM 750 MG: 750 TABLET, FILM COATED ORAL at 08:03

## 2024-03-27 RX ADMIN — HYPROMELLOSE 2910 1 DROP: 5 SOLUTION OPHTHALMIC at 08:03

## 2024-03-27 RX ADMIN — ACETAMINOPHEN 1000 MG: 500 TABLET ORAL at 05:03

## 2024-03-27 RX ADMIN — MICONAZOLE NITRATE: 20 CREAM TOPICAL at 09:03

## 2024-03-27 RX ADMIN — LINEZOLID 600 MG: 600 TABLET, FILM COATED ORAL at 08:03

## 2024-03-27 RX ADMIN — CEFTAZIDIME, AVIBACTAM 2.5 G: 2; .5 POWDER, FOR SOLUTION INTRAVENOUS at 08:03

## 2024-03-27 RX ADMIN — GABAPENTIN 400 MG: 400 CAPSULE ORAL at 02:03

## 2024-03-27 RX ADMIN — MIDODRINE HYDROCHLORIDE 10 MG: 5 TABLET ORAL at 05:03

## 2024-03-27 RX ADMIN — SODIUM ZIRCONIUM CYCLOSILICATE 5 G: 5 POWDER, FOR SUSPENSION ORAL at 08:03

## 2024-03-27 RX ADMIN — GABAPENTIN 400 MG: 400 CAPSULE ORAL at 08:03

## 2024-03-27 RX ADMIN — MINOCYCLINE HYDROCHLORIDE 200 MG: 100 CAPSULE ORAL at 08:03

## 2024-03-27 RX ADMIN — ERYTHROMYCIN: 5 OINTMENT OPHTHALMIC at 08:03

## 2024-03-27 RX ADMIN — CHLORHEXIDINE GLUCONATE 0.12% ORAL RINSE 15 ML: 1.2 LIQUID ORAL at 08:03

## 2024-03-27 RX ADMIN — CEFTAZIDIME, AVIBACTAM 2.5 G: 2; .5 POWDER, FOR SOLUTION INTRAVENOUS at 05:03

## 2024-03-27 RX ADMIN — ACETAMINOPHEN 1000 MG: 500 TABLET ORAL at 09:03

## 2024-03-27 RX ADMIN — LEVOTHYROXINE SODIUM 112 MCG: 112 TABLET ORAL at 05:03

## 2024-03-27 RX ADMIN — ENOXAPARIN SODIUM 30 MG: 30 INJECTION SUBCUTANEOUS at 05:03

## 2024-03-27 RX ADMIN — FOLIC ACID 1 MG: 1 TABLET ORAL at 08:03

## 2024-03-27 RX ADMIN — HYPROMELLOSE 2910 1 DROP: 5 SOLUTION OPHTHALMIC at 01:03

## 2024-03-27 RX ADMIN — OXYCODONE HYDROCHLORIDE 20 MG: 10 TABLET, FILM COATED, EXTENDED RELEASE ORAL at 08:03

## 2024-03-27 RX ADMIN — POLYETHYLENE GLYCOL 3350 17 G: 17 POWDER, FOR SOLUTION ORAL at 08:03

## 2024-03-27 RX ADMIN — MIDODRINE HYDROCHLORIDE 10 MG: 5 TABLET ORAL at 09:03

## 2024-03-27 RX ADMIN — CEFTAZIDIME, AVIBACTAM 2.5 G: 2; .5 POWDER, FOR SOLUTION INTRAVENOUS at 01:03

## 2024-03-27 RX ADMIN — ALPRAZOLAM 1 MG: 0.5 TABLET ORAL at 09:03

## 2024-03-27 RX ADMIN — METHOCARBAMOL 500 MG: 500 TABLET ORAL at 02:03

## 2024-03-27 RX ADMIN — MIDODRINE HYDROCHLORIDE 10 MG: 5 TABLET ORAL at 01:03

## 2024-03-27 NOTE — PLAN OF CARE
Recommendations    Continue Regular diet w/ 2 Boost with each meal  Continue Francisco BID x 14 days for wound healing  RD Following    Goals: Meet % een/epn by next RD f/u  Nutrition Goal Status: progressing towards goal  Communication of RD Recs: other (comment) (poc)

## 2024-03-27 NOTE — ASSESSMENT & PLAN NOTE
>>ASSESSMENT AND PLAN FOR GANGRENE WRITTEN ON 3/27/2024  2:22 PM BY TIA ROUSE MD    - See severe sepsis

## 2024-03-27 NOTE — RESPIRATORY THERAPY
"RAPID RESPONSE RESPIRATORY THERAPY PROACTIVE NOTE           Time of visit: 1535     Code Status: Prior   : 1995  Bed: [address] [place] [address] A:   MRN: [id number]  Time spent at the bedside: < 15 min    SITUATION    Evaluated patient for: LDA Check     BACKGROUND    Patient has no past medical history on file.  Clinically Significant Surgical Hx: tracheostomy    24 Hours Vitals Range:  Temp:  [96.8 °F (36 °C)-98.6 °F (37 °C)]   Pulse:  []   Resp:  [17-20]   BP: ()/(52-72)   SpO2:  [88 %-100 %]     Labs:    Recent Labs     24  0502 24  0639   * 137   K 4.4 4.4    105   CO2 23 23   BUN 47* 34*   CREATININE 0.7 0.7   GLU 70 74        No results for input(s): "PH", "PCO2", "PO2", "HCO3", "POCSATURATED", "BE" in the last 72 hours.    ASSESSMENT/INTERVENTIONS  Resting comfortably with no respiratory needs at this time      Last VS   Temp: 96.8 °F (36 °C) (1612)  Pulse: 86 (1612)  Resp: 18 (1612)  BP: 125/64 (1612)  SpO2: 98 % (1612)      Extra trachs at bedside: y  Level of Consciousness: Level of Consciousness (AVPU): alert  Respiratory Effort: Respiratory Effort: Normal, Unlabored Expansion/Accessory Muscle Usage: Expansion/Accessory Muscles/Retractions: no use of accessory muscles, no retractions, expansion symmetric  All Lung Field Breath Sounds: All Lung Fields Breath Sounds: coarse, diminished  NATALIA Breath Sounds: coarse  LLL Breath Sounds: diminished  RUL Breath Sounds: coarse  RML Breath Sounds: coarse  RLL Breath Sounds: diminished  O2 Device/Concentration: 5/28%  Surgical airway: Yes, Type: Shiley Size: 8, cuffed  Ambu at bedside:       Active Orders   Respiratory Care    Oxygen Continuous     Frequency: Continuous     Number of Occurrences: Until Specified     Order Questions:      Device type: Low flow      Device: Trach Collar      FiO2%: 60      Titrate O2 per Oxygen Titration Protocol: Yes      To maintain SpO2 goal of: >= 90%      Notify MD of: " Inability to achieve desired SpO2; Sudden change in patient status and requires 20% increase in FiO2; Patient requires >60% FiO2    Pulse Oximetry Continuous     Frequency: Continuous     Number of Occurrences: Until Specified    Routine tracheostomy care     Frequency: BID     Number of Occurrences: Until Specified    SUCTION PRN     Frequency: PRN     Number of Occurrences: Until Specified       RECOMMENDATIONS    We recommend: RRT Recs: Continue POC per primary team.      FOLLOW-UP    Please call back the Rapid Response RT, Nickolas Beltre RRT at x [phone] for any questions or concerns.

## 2024-03-27 NOTE — PLAN OF CARE
Michele SOLIS  Discharge Reassessment    Primary Care Provider: No, Primary Doctor    Expected Discharge Date: 4/8/2024    Reassessment (most recent)       Discharge Reassessment - 03/27/24 1443          Discharge Reassessment    Assessment Type Discharge Planning Reassessment     Did the patient's condition or plan change since previous assessment? No     Discharge Plan discussed with: Patient     Communicated ANDRÉS with patient/caregiver Yes     Discharge Plan A Long-term acute care facility (LTAC)     Discharge Plan B Long-term acute care facility (LTAC)     Transition of Care Barriers None     Why the patient remains in the hospital Requires continued medical care                     [facility] in [place] will submit for Auth with care out of [facility].     The carve out is requesting patients insurance company cover cost of med.

## 2024-03-27 NOTE — CARE UPDATE
Attempted to call patient's mother and father x2 for updates, but no one picked up the phone and voicemail not set up.

## 2024-03-27 NOTE — NURSING
.[facility] Plan of Care Note        Dx: Severe sepsis      Shift Events : suction secretions PRN     Goals of Care: Prevent further infection and wound healing     Neuro: aox4     Vital Signs:  wdl     Respiratory: trach collar     Diet: regular     Is patient tolerating current diet? yes     GTTS: none     Urine Output/Bowel Movement:  adequate     Drains/Tubes/Tube Feeds (include total output/shift):  FC, Colostomy     Lines:  Right femoral Central Line        Accuchecks: None     Skin: Multiple wounds     Fall Risk Score: 16     Activity level? Complete assist     Any scheduled procedures? None     Any safety concerns?  Fall risk     Other:
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
## 2024-03-27 NOTE — NURSING
.[facility] Plan of Care Note        Dx: Severe sepsis      Shift Events : suction secretions PRN, frequent turns     Goals of Care: Prevent further infection and wound healing     Neuro: aox4     Vital Signs:  wdl     Respiratory: trach collar     Diet: regular     Is patient tolerating current diet? yes     GTTS: none     Urine Output/Bowel Movement:  adequate     Drains/Tubes/Tube Feeds (include total output/shift):  FC, Colostomy     Lines:  Right femoral Central Line        Accuchecks: None     Skin: Multiple wounds     Fall Risk Score: 16     Activity level? Complete assist     Any scheduled procedures? None     Any safety concerns?  Fall risk     Other: D/C after run of abx

## 2024-03-27 NOTE — NURSING
[facility] Plan of Care Note  Dx Final diagnoses:  [L89.90] Pressure ulcers of skin of multiple topographic sites     Shift Events No acute events, eating more of meals tolerating well    Neuro: AAOx4    Vital Signs: SBP 90-100s. NSR on tele    Respiratory: Trach collar 6L 28%    Diet: Regular    Urine Output/Bowel Movement: Shen and colostomy with thick brown stool
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
## 2024-03-27 NOTE — SUBJECTIVE & OBJECTIVE
Interval History: No new changes today.   WBC on labs stable/slightly increasing, BUN improving.   Pending placement. Avycaz remains a cost barrier.     Review of Systems   Constitutional: Negative.  Negative for chills and fever.   Respiratory:  Positive for sputum production. Negative for cough.    Cardiovascular:  Negative for chest pain.   Gastrointestinal: Negative.    Neurological: Negative.    All other systems reviewed and are negative.      Objective:     Vital Signs (Most Recent):  Temp: 98.6 °F (37 °C) (03/27/24 1220)  Pulse: 88 (03/27/24 1258)  Resp: 20 (03/27/24 1220)  BP: (!) 99/52 (03/27/24 1220)  SpO2: (!) 88 % (03/27/24 1258) Vital Signs (24h Range):  Temp:  [97.2 °F (36.2 °C)-98.6 °F (37 °C)] 98.6 °F (37 °C)  Pulse:  [] 88  Resp:  [16-20] 20  SpO2:  [88 %-100 %] 88 %  BP: ()/(52-72) 99/52     Weight: 59.4 kg (130 lb 15.3 oz)  Body mass index is 19.34 kg/m².    Intake/Output Summary (Last 24 hours) at 3/27/2024 1415  Last data filed at 3/27/2024 0917  Gross per 24 hour   Intake 1023.68 ml   Output 1700 ml   Net -676.32 ml           Physical Exam  Vitals and nursing note reviewed.   Constitutional:       Appearance: He is not ill-appearing.   Neck:      Comments: Tracheostomy in place  Cardiovascular:      Rate and Rhythm: Normal rate and regular rhythm.      Heart sounds: Normal heart sounds.   Pulmonary:      Effort: Pulmonary effort is normal.      Breath sounds: Normal breath sounds. No rales.   Abdominal:      General: There is no distension.      Palpations: Abdomen is soft.      Comments: Colostomy bag LLQ   Musculoskeletal:         General: No swelling or tenderness.      Comments: Bilateral AKA, staples intact  LUE amputation below elbow  R groin femoral line for IV access, dressing clean and dry   Skin:     General: Skin is warm.      Findings: Erythema and lesion present. No rash.      Comments: See media tab for wound images     Neurological:      General: No focal deficit  present.      Mental Status: He is alert and oriented to person, place, and time.   Psychiatric:         Mood and Affect: Mood normal.         Behavior: Behavior normal.             Significant Labs: All pertinent labs within the past 24 hours have been reviewed.    Significant Imaging: I have reviewed all pertinent imaging results/findings within the past 24 hours.

## 2024-03-28 LAB
ALBUMIN SERPL BCP-MCNC: 1.6 G/DL (ref 3.5–5.2)
ALP SERPL-CCNC: 142 U/L (ref 55–135)
ALT SERPL W/O P-5'-P-CCNC: 11 U/L (ref 10–44)
ANION GAP SERPL CALC-SCNC: 11 MMOL/L (ref 8–16)
AST SERPL-CCNC: 25 U/L (ref 10–40)
BASOPHILS # BLD AUTO: 0.16 K/UL (ref 0–0.2)
BASOPHILS NFR BLD: 1 % (ref 0–1.9)
BILIRUB SERPL-MCNC: 0.2 MG/DL (ref 0.1–1)
BUN SERPL-MCNC: 31 MG/DL (ref 6–20)
CALCIUM SERPL-MCNC: 7.8 MG/DL (ref 8.7–10.5)
CHLORIDE SERPL-SCNC: 102 MMOL/L (ref 95–110)
CO2 SERPL-SCNC: 22 MMOL/L (ref 23–29)
CREAT SERPL-MCNC: 0.8 MG/DL (ref 0.5–1.4)
DIFFERENTIAL METHOD BLD: ABNORMAL
EOSINOPHIL # BLD AUTO: 1.3 K/UL (ref 0–0.5)
EOSINOPHIL NFR BLD: 7.7 % (ref 0–8)
ERYTHROCYTE [DISTWIDTH] IN BLOOD BY AUTOMATED COUNT: 14.6 % (ref 11.5–14.5)
EST. GFR  (NO RACE VARIABLE): >60 ML/MIN/1.73 M^2
GLUCOSE SERPL-MCNC: 68 MG/DL (ref 70–110)
HCT VFR BLD AUTO: 26 % (ref 40–54)
HGB BLD-MCNC: 8.4 G/DL (ref 14–18)
IMM GRANULOCYTES # BLD AUTO: 0.08 K/UL (ref 0–0.04)
IMM GRANULOCYTES NFR BLD AUTO: 0.5 % (ref 0–0.5)
LYMPHOCYTES # BLD AUTO: 4.3 K/UL (ref 1–4.8)
LYMPHOCYTES NFR BLD: 26.6 % (ref 18–48)
MCH RBC QN AUTO: 30.7 PG (ref 27–31)
MCHC RBC AUTO-ENTMCNC: 32.3 G/DL (ref 32–36)
MCV RBC AUTO: 95 FL (ref 82–98)
MONOCYTES # BLD AUTO: 1.3 K/UL (ref 0.3–1)
MONOCYTES NFR BLD: 8.1 % (ref 4–15)
NEUTROPHILS # BLD AUTO: 9.1 K/UL (ref 1.8–7.7)
NEUTROPHILS NFR BLD: 56.1 % (ref 38–73)
NRBC BLD-RTO: 0 /100 WBC
PLATELET # BLD AUTO: 689 K/UL (ref 150–450)
PMV BLD AUTO: 8.1 FL (ref 9.2–12.9)
POCT GLUCOSE: 73 MG/DL (ref 70–110)
POCT GLUCOSE: 77 MG/DL (ref 70–110)
POTASSIUM SERPL-SCNC: 4.3 MMOL/L (ref 3.5–5.1)
PROT SERPL-MCNC: 7.6 G/DL (ref 6–8.4)
RBC # BLD AUTO: 2.74 M/UL (ref 4.6–6.2)
SODIUM SERPL-SCNC: 135 MMOL/L (ref 136–145)
WBC # BLD AUTO: 16.26 K/UL (ref 3.9–12.7)

## 2024-03-28 PROCEDURE — 25000003 PHARM REV CODE 250

## 2024-03-28 PROCEDURE — 27200966 HC CLOSED SUCTION SYSTEM

## 2024-03-28 PROCEDURE — 94761 N-INVAS EAR/PLS OXIMETRY MLT: CPT

## 2024-03-28 PROCEDURE — 25000003 PHARM REV CODE 250: Performed by: HOSPITALIST

## 2024-03-28 PROCEDURE — 80053 COMPREHEN METABOLIC PANEL: CPT

## 2024-03-28 PROCEDURE — 25000003 PHARM REV CODE 250: Performed by: PHYSICIAN ASSISTANT

## 2024-03-28 PROCEDURE — 25000003 PHARM REV CODE 250: Performed by: NURSE PRACTITIONER

## 2024-03-28 PROCEDURE — 99900026 HC AIRWAY MAINTENANCE (STAT)

## 2024-03-28 PROCEDURE — 99900035 HC TECH TIME PER 15 MIN (STAT)

## 2024-03-28 PROCEDURE — 27000221 HC OXYGEN, UP TO 24 HOURS

## 2024-03-28 PROCEDURE — 20600001 HC STEP DOWN PRIVATE ROOM

## 2024-03-28 PROCEDURE — 27000207 HC ISOLATION

## 2024-03-28 PROCEDURE — 63600175 PHARM REV CODE 636 W HCPCS: Mod: JZ,JG | Performed by: PHYSICIAN ASSISTANT

## 2024-03-28 PROCEDURE — 63600175 PHARM REV CODE 636 W HCPCS: Performed by: HOSPITALIST

## 2024-03-28 PROCEDURE — 25000242 PHARM REV CODE 250 ALT 637 W/ HCPCS

## 2024-03-28 PROCEDURE — 85025 COMPLETE CBC W/AUTO DIFF WBC: CPT

## 2024-03-28 RX ADMIN — MINOCYCLINE HYDROCHLORIDE 200 MG: 100 CAPSULE ORAL at 10:03

## 2024-03-28 RX ADMIN — SODIUM ZIRCONIUM CYCLOSILICATE 5 G: 5 POWDER, FOR SUSPENSION ORAL at 10:03

## 2024-03-28 RX ADMIN — ALPRAZOLAM 1 MG: 0.5 TABLET ORAL at 07:03

## 2024-03-28 RX ADMIN — CHLORHEXIDINE GLUCONATE 0.12% ORAL RINSE 15 ML: 1.2 LIQUID ORAL at 09:03

## 2024-03-28 RX ADMIN — FOLIC ACID 1 MG: 1 TABLET ORAL at 10:03

## 2024-03-28 RX ADMIN — LEVETIRACETAM 750 MG: 750 TABLET, FILM COATED ORAL at 09:03

## 2024-03-28 RX ADMIN — CEFTAZIDIME, AVIBACTAM 2.5 G: 2; .5 POWDER, FOR SOLUTION INTRAVENOUS at 03:03

## 2024-03-28 RX ADMIN — OXYCODONE HYDROCHLORIDE 30 MG: 10 TABLET, FILM COATED, EXTENDED RELEASE ORAL at 09:03

## 2024-03-28 RX ADMIN — MIDODRINE HYDROCHLORIDE 10 MG: 5 TABLET ORAL at 03:03

## 2024-03-28 RX ADMIN — ERYTHROMYCIN: 5 OINTMENT OPHTHALMIC at 09:03

## 2024-03-28 RX ADMIN — FAMOTIDINE 20 MG: 20 TABLET ORAL at 09:03

## 2024-03-28 RX ADMIN — ALPRAZOLAM 1 MG: 0.5 TABLET ORAL at 06:03

## 2024-03-28 RX ADMIN — MICONAZOLE NITRATE: 20 CREAM TOPICAL at 09:03

## 2024-03-28 RX ADMIN — ACETAMINOPHEN 1000 MG: 500 TABLET ORAL at 03:03

## 2024-03-28 RX ADMIN — OXYCODONE HYDROCHLORIDE 20 MG: 10 TABLET, FILM COATED, EXTENDED RELEASE ORAL at 09:03

## 2024-03-28 RX ADMIN — GABAPENTIN 400 MG: 400 CAPSULE ORAL at 10:03

## 2024-03-28 RX ADMIN — LIDOCAINE 5% 1 PATCH: 700 PATCH TOPICAL at 03:03

## 2024-03-28 RX ADMIN — HYPROMELLOSE 2910 1 DROP: 5 SOLUTION OPHTHALMIC at 11:03

## 2024-03-28 RX ADMIN — GABAPENTIN 400 MG: 400 CAPSULE ORAL at 09:03

## 2024-03-28 RX ADMIN — HYPROMELLOSE 2910 1 DROP: 5 SOLUTION OPHTHALMIC at 09:03

## 2024-03-28 RX ADMIN — MIDODRINE HYDROCHLORIDE 10 MG: 5 TABLET ORAL at 09:03

## 2024-03-28 RX ADMIN — OXYCODONE HYDROCHLORIDE 10 MG: 10 TABLET ORAL at 05:03

## 2024-03-28 RX ADMIN — GABAPENTIN 400 MG: 400 CAPSULE ORAL at 03:03

## 2024-03-28 RX ADMIN — OXYCODONE HYDROCHLORIDE 10 MG: 10 TABLET ORAL at 03:03

## 2024-03-28 RX ADMIN — LEVETIRACETAM 750 MG: 750 TABLET, FILM COATED ORAL at 10:03

## 2024-03-28 RX ADMIN — POLYETHYLENE GLYCOL 3350 17 G: 17 POWDER, FOR SOLUTION ORAL at 10:03

## 2024-03-28 RX ADMIN — CEFTAZIDIME, AVIBACTAM 2.5 G: 2; .5 POWDER, FOR SOLUTION INTRAVENOUS at 05:03

## 2024-03-28 RX ADMIN — MICONAZOLE NITRATE: 20 CREAM TOPICAL at 11:03

## 2024-03-28 RX ADMIN — LEVOTHYROXINE SODIUM 112 MCG: 112 TABLET ORAL at 05:03

## 2024-03-28 RX ADMIN — ACETAMINOPHEN 1000 MG: 500 TABLET ORAL at 05:03

## 2024-03-28 RX ADMIN — ENOXAPARIN SODIUM 30 MG: 30 INJECTION SUBCUTANEOUS at 06:03

## 2024-03-28 RX ADMIN — MINOCYCLINE HYDROCHLORIDE 200 MG: 100 CAPSULE ORAL at 09:03

## 2024-03-28 RX ADMIN — FAMOTIDINE 20 MG: 20 TABLET ORAL at 10:03

## 2024-03-28 RX ADMIN — METHOCARBAMOL 500 MG: 500 TABLET ORAL at 09:03

## 2024-03-28 RX ADMIN — MIDODRINE HYDROCHLORIDE 10 MG: 5 TABLET ORAL at 05:03

## 2024-03-28 RX ADMIN — LINEZOLID 600 MG: 600 TABLET, FILM COATED ORAL at 10:03

## 2024-03-28 RX ADMIN — CHLORHEXIDINE GLUCONATE 0.12% ORAL RINSE 15 ML: 1.2 LIQUID ORAL at 11:03

## 2024-03-28 RX ADMIN — CEFTAZIDIME, AVIBACTAM 2.5 G: 2; .5 POWDER, FOR SOLUTION INTRAVENOUS at 09:03

## 2024-03-28 RX ADMIN — METHOCARBAMOL 500 MG: 500 TABLET ORAL at 10:03

## 2024-03-28 RX ADMIN — HYPROMELLOSE 2910 1 DROP: 5 SOLUTION OPHTHALMIC at 03:03

## 2024-03-28 RX ADMIN — ACETAMINOPHEN 1000 MG: 500 TABLET ORAL at 09:03

## 2024-03-28 RX ADMIN — LINEZOLID 600 MG: 600 TABLET, FILM COATED ORAL at 09:03

## 2024-03-28 RX ADMIN — METHOCARBAMOL 500 MG: 500 TABLET ORAL at 03:03

## 2024-03-28 RX ADMIN — HYPROMELLOSE 2910 1 DROP: 5 SOLUTION OPHTHALMIC at 05:03

## 2024-03-28 NOTE — ASSESSMENT & PLAN NOTE
>>ASSESSMENT AND PLAN FOR PRESSURE ULCERS OF SKIN OF MULTIPLE TOPOGRAPHIC SITES WRITTEN ON 3/28/2024 12:30 PM BY KELLY HUERTA MD    - See severe sepsis
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
## 2024-03-28 NOTE — SUBJECTIVE & OBJECTIVE
Interval History: Comfortable at bedside. No complaints. Pending LTAC       Objective:     Vital Signs (Most Recent):  Temp: 98.1 °F (36.7 °C) (03/28/24 0831)  Pulse: 82 (03/28/24 1111)  Resp: 14 (03/28/24 0926)  BP: 121/74 (03/28/24 0831)  SpO2: 100 % (03/28/24 1111) Vital Signs (24h Range):  Temp:  [96.8 °F (36 °C)-98.3 °F (36.8 °C)] 98.1 °F (36.7 °C)  Pulse:  [59-88] 82  Resp:  [14-18] 14  SpO2:  [88 %-100 %] 100 %  BP: ()/(61-76) 121/74     Weight: 59.4 kg (130 lb 15.3 oz)  Body mass index is 19.34 kg/m².    Intake/Output Summary (Last 24 hours) at 3/28/2024 1229  Last data filed at 3/28/2024 0602  Gross per 24 hour   Intake 840 ml   Output 1480 ml   Net -640 ml         Physical Exam  Vitals and nursing note reviewed.   Constitutional:       Appearance: He is not ill-appearing.   Neck:      Comments: Tracheostomy in place  Cardiovascular:      Rate and Rhythm: Normal rate and regular rhythm.      Heart sounds: Normal heart sounds.   Pulmonary:      Effort: Pulmonary effort is normal.      Breath sounds: Normal breath sounds. No rales.   Abdominal:      General: There is no distension.      Palpations: Abdomen is soft.      Comments: Colostomy bag LLQ   Musculoskeletal:         General: No swelling or tenderness.      Comments: Bilateral AKA, staples intact  LUE amputation below elbow  R groin femoral line for IV access, dressing clean and dry   Skin:     General: Skin is warm.      Findings: Erythema and lesion present. No rash.      Comments: See media tab for wound images     Neurological:      General: No focal deficit present.      Mental Status: He is alert and oriented to person, place, and time.   Psychiatric:         Mood and Affect: Mood normal.         Behavior: Behavior normal.             Significant Labs: All pertinent labs within the past 24 hours have been reviewed.    Significant Imaging: I have reviewed all pertinent imaging results/findings within the past 24 hours.

## 2024-03-28 NOTE — PLAN OF CARE
Michele SOLIS  Discharge Reassessment    Primary Care Provider: No, Primary Doctor    Expected Discharge Date:     Reassessment (most recent)       Discharge Reassessment - 03/28/24 9616          Discharge Reassessment    Assessment Type Discharge Planning Reassessment     Did the patient's condition or plan change since previous assessment? No     Discharge Plan discussed with: Patient     Communicated ANDRÉS with patient/caregiver Yes     Discharge Plan A Home with family     Discharge Plan B Home with family     Transition of Care Barriers None     Why the patient remains in the hospital Requires continued medical care                   Insurance Company denied carve out for Avycaz. [facility] can't take patient until patient off of Avycaz.

## 2024-03-28 NOTE — PLAN OF CARE
All LTACs have denied patient for admission except [facility] in [place].     [facility] Phone: [phone]    We do not require a covid test prior to admit unless patient has Covid -19 signs and symptoms present.  * COVID-19: Willing/Equipped to accept COVID-19 positive patients,* High-Risk Isolation Patients: Willing/Equipped to accept High-Risk Isolation Patients [address] [place] [address] 3/22/2024 12:10 (CT)  3/22/2024 14:09 (CT)  -  3/22/2024 13:19 (CT)  3/22/2024 13:19 (CT)  Response: Interested, but need more information  Comments: Ricardo Zhao, do you have any in network denials ? We can not get approval without in network denials and an SCA. Let me know please. Thanks, Viv   Waiting for you     [facility] Phone: [phone]      * COVID-19: NOT able to accept COVID-19 positive patients [address] [place] [address] 3/22/2024 12:10 (CT)  3/22/2024 14:09 (CT)  -  3/25/2024 8:58 (CT)  3/25/2024 8:58 (CT)  Response: No, unable to accept patient  Reason: Care Needs Exceed Current Capacity  Waiting for you     [facility] Phone: [phone]    case by case based on symptoms  * COVID-19: NOT able to accept COVID-19 positive patients,* COVID-19: Services not specified [address], [address] [place] [address] 3/22/2024 12:11 (CT)  3/22/2024 15:40 (CT)  -  3/22/2024 13:03 (CT)  3/22/2024 13:03 (CT)  Response: No, unable to accept patient  Reason: Other (see comments)  Comments: Unable to accept s/t to very high med cost with avycaz and minocycline. Thank you for the referral!  Waiting for recipient     [facility] ([facility]) Phone: [phone]    case by case basis  * COVID-19: Positive patients under care,* COVID-19: Willing/Equipped to accept COVID-19 positive patients,* High-Risk  Isolation Patients: Willing/Equipped to accept High-Risk Isolation Patients [address] [place] [address] 3/22/2024 12:10 (CT)  3/22/2024 14:09 (CT)  -  3/26/2024 14:14 (CT)  3/26/2024 14:14 (CT)  Response: No, unable to accept patient  Reason: No Available Bed  Waiting for you     [facility] Phone: [phone]    1 negative test  * COVID-19: Willing/Equipped to accept COVID-19 positive patients,* High-Risk Isolation Patients: Willing/Equipped to accept High-Risk Isolation Patients [address] [facility] [address] [place] [address] 3/22/2024 12:10 (CT)  3/22/2024 14:09 (CT)  -  3/22/2024 13:58 (CT)  3/22/2024 13:58 (CT)  Response: No, unable to accept patient  Reason: Out of Network  Waiting for you     [facility] Phone: [phone]      * COVID-19: Positive patients under care,* COVID-19: Willing/Equipped to accept COVID-19 positive patients,* High-Risk Isolation Patients: Willing/Equipped to accept High-Risk Isolation Patients [address] [place] [address] 3/22/2024 12:10 (CT)  3/22/2024 14:09 (CT)  -  3/25/2024 8:58 (CT)  3/25/2024 8:58 (CT)  Response: No, unable to accept patient  Reason: Care Needs Exceed Current Capacity  Waiting for [facility] Phone: [phone]      * COVID-19: Willing/Equipped to accept COVID-19 positive patients,* High-Risk Isolation Patients: Willing/Equipped to accept High-Risk Isolation Patients [address] [place] [address] [place] [address] 3/22/2024 12:10 (CT)  3/22/2024 14:09 (CT)  -  3/22/2024 12:15 (CT)  3/22/2024 12:15 (CT)  Response: No, unable to accept patient  Reason: Other (see comments)  Comments: Administrative Denial. Thank you for the referral.  Waiting for you     [facility] Phone: [phone]    COVID POS with symptoms  * COVID-19: Positive patients under care,* COVID-19:  Services not specified,* COVID-19: Willing/Equipped to accept COVID-19 positive patients,* High-Risk Isolation Patients: Willing/Equipped to accept High-Risk Isolation Patients [address] [facility] [place] [address] 3/22/2024 12:11 (CT)  3/22/2024 14:09 (CT)  -  3/22/2024 12:15 (CT)  3/22/2024 12:15 (CT)  Response: No, unable to accept patient  Reason: Other (see comments)  Comments: Administrative Denial. Thank you for the referral.  Waiting for you     [facility] Phone: [phone]    case by case basis  * COVID-19: Willing/Equipped to accept COVID-19 positive patients,* High-Risk Isolation Patients: Willing/Equipped to accept High-Risk Isolation Patients [address] [facility] [place] [address] 3/22/2024 12:10 (CT)  3/22/2024 14:09 (CT)  -  3/22/2024 12:12 (CT)  3/25/2024 15:24 (CT)  Response: Referral Received  Comments: The Avycaz will have to be discontinued for us to be able to accept him. Please call with any questions Sheridan [phone]  Waiting for recipient     [facility] Phone: [phone]      * COVID-19: Willing/Equipped to accept COVID-19 positive patients,* High-Risk Isolation Patients: Willing/Equipped to accept High-Risk Isolation Patients [address] [place] [address] 3/22/2024 12:10 (CT)  3/22/2024 14:09 (CT)  -  3/22/2024 13:20 (CT)  3/22/2024 13:20 (CT)  Response: Referral Received  Comments: SENT FOR REVIEW   Waiting for recipient     [DELETED] [facility] Phone: [phone]    20 days from onset of symptoms  * COVID-19: Services not specified,* COVID-19: Willing/Equipped to accept COVID-19 positive patients,* High-Risk Isolation Patients: Willing/Equipped to accept High-Risk Isolation Patients [address] [place] [address] 3/22/2024 12:11 (CT)  3/22/2024 14:09 (CT)  -  -  -  -  Waiting for recipient    Response Notes   [facility]  Phone: [phone]      * COVID-19: NOT able to accept COVID-19 positive patients,* COVID-19: Services not specified [address] [facility] [place] [address] 3/22/2024 12:11 (CT)  3/22/2024 14:09 (CT)  -  -  -  -  Waiting for recipient    Response Notes   [facility] Phone: [phone]      * COVID-19: Positive patients under care,* COVID-19: Willing/Equipped to accept COVID-19 positive patients [address] [place] [address] 3/22/2024 12:11 (CT)  3/22/2024 14:09 (CT)  -  -  -  -  Waiting for recipient    Response Notes

## 2024-03-28 NOTE — RESPIRATORY THERAPY
"RAPID RESPONSE RESPIRATORY THERAPY PROACTIVE NOTE           Time of visit: 826     Code Status: Prior   : 1995  Bed: [address] [place] [address] A:   MRN: [id number]  Time spent at the bedside: < 15 min    SITUATION    Evaluated patient for: LDA Check     BACKGROUND    Patient has no past medical history on file.  Clinically Significant Surgical Hx: tracheostomy    24 Hours Vitals Range:  Temp:  [96.8 °F (36 °C)-98.6 °F (37 °C)]   Pulse:  [59-88]   Resp:  [14-20]   BP: ()/(52-76)   SpO2:  [88 %-100 %]     Labs:    Recent Labs     24  0639 24  0538    135*   K 4.4 4.3    102   CO2 23 22*   BUN 34* 31*   CREATININE 0.7 0.8   GLU 74 68*        No results for input(s): "PH", "PCO2", "PO2", "HCO3", "POCSATURATED", "BE" in the last 72 hours.    ASSESSMENT/INTERVENTIONS  Patient resting comfortably. No respiratory concerns at this time.      Last VS   Temp: 98.1 °F (36.7 °C) (831)  Pulse: 61 (831)  Resp: 14 (926)  BP: 121/74 (831)  SpO2: 99 % (831)      Extra trachs at bedside: #6 Shiley Cuffed, #8 Shiley Cuffed  Level of Consciousness: Level of Consciousness (AVPU): alert  Respiratory Effort: Respiratory Effort: Normal, Unlabored Expansion/Accessory Muscle Usage: Expansion/Accessory Muscles/Retractions: no use of accessory muscles, no retractions, expansion symmetric  All Lung Field Breath Sounds: All Lung Fields Breath Sounds: Anterior:, Lateral:, diminished, coarse  NATALIA Breath Sounds: coarse  LLL Breath Sounds: diminished  RUL Breath Sounds: coarse  RML Breath Sounds: coarse  RLL Breath Sounds: diminished  O2 Device/Concentration: 5L/28%  Surgical airway: Yes, Type: Shiley Size: 8, cuffed  Ambu at bedside:       Active Orders   Respiratory Care    Inhalation Treatment Once     Frequency: Once     Number of Occurrences: 1 Occurrences    Oxygen Continuous     Frequency: Continuous     Number of Occurrences: Until Specified     Order Questions:      Device type: Low flow "      Device: Trach Collar      FiO2%: 60      Titrate O2 per Oxygen Titration Protocol: Yes      To maintain SpO2 goal of: >= 90%      Notify MD of: Inability to achieve desired SpO2; Sudden change in patient status and requires 20% increase in FiO2; Patient requires >60% FiO2    Pulse Oximetry Continuous     Frequency: Continuous     Number of Occurrences: Until Specified    Routine tracheostomy care     Frequency: BID     Number of Occurrences: Until Specified    SUCTION PRN     Frequency: PRN     Number of Occurrences: Until Specified       RECOMMENDATIONS    We recommend: RRT Recs: Continue POC per primary team.      FOLLOW-UP    Please call back the Rapid Response RT, Jerri Thurman RRT at x [phone] for any questions or concerns.

## 2024-03-28 NOTE — ASSESSMENT & PLAN NOTE
>>ASSESSMENT AND PLAN FOR GANGRENE WRITTEN ON 3/28/2024 12:30 PM BY KELLY HUERTA MD    - See severe sepsis

## 2024-03-28 NOTE — ASSESSMENT & PLAN NOTE
Palliative Care at [facility] engaging in ongoing communications with family regarding GOC given extensive comorbid conditions and recurrent hospitalizations. Per documentation review and brief discussion with family on initial transfer admission encounter, patient & family wish to continue all medical and surgical options at this time.     -- Patient & family would greatly benefit from continued Palliative Care discussions  -- IP Consult to [facility] Care services

## 2024-03-28 NOTE — PLAN OF CARE
.[facility] Plan of Care Note        Dx: Severe sepsis      Shift Events : suction secretions PRN, frequent turns, Pain management, safety, Antibiotics     Goals of Care: Prevent further infection and wound healing, Safety, pain management,      Neuro: AAOx4     Vital Signs:  wdl     Respiratory: trach collar 5L/28%     Diet: regular ( feeding assistance needed)     Is patient tolerating current diet? yes     GTTS: none     Urine Output/Bowel Movement:  adequate     Drains/Tubes/Tube Feeds (include total output/shift):  Shen catheter, Colostomy     Lines:  Right femoral Central Line        Accuchecks: None     Skin: Multiple wounds     Fall Risk Score: 16     Activity level? Complete assist, Turn Q 2 hrs     Any scheduled procedures? None     Any safety concerns?  Fall risk     Other: D/C after run of abx

## 2024-03-29 PROBLEM — Z51.5 PALLIATIVE CARE ENCOUNTER: Status: RESOLVED | Noted: 2024-03-07 | Resolved: 2024-03-29

## 2024-03-29 PROBLEM — N30.00 ACUTE CYSTITIS: Status: RESOLVED | Noted: 2024-02-29 | Resolved: 2024-03-29

## 2024-03-29 LAB
ALBUMIN SERPL BCP-MCNC: 1.6 G/DL (ref 3.5–5.2)
ALP SERPL-CCNC: 160 U/L (ref 55–135)
ALT SERPL W/O P-5'-P-CCNC: 12 U/L (ref 10–44)
ANION GAP SERPL CALC-SCNC: 9 MMOL/L (ref 8–16)
AST SERPL-CCNC: 28 U/L (ref 10–40)
BASOPHILS # BLD AUTO: 0.2 K/UL (ref 0–0.2)
BASOPHILS NFR BLD: 1.3 % (ref 0–1.9)
BILIRUB SERPL-MCNC: 0.1 MG/DL (ref 0.1–1)
BUN SERPL-MCNC: 27 MG/DL (ref 6–20)
CALCIUM SERPL-MCNC: 8.2 MG/DL (ref 8.7–10.5)
CHLORIDE SERPL-SCNC: 100 MMOL/L (ref 95–110)
CO2 SERPL-SCNC: 25 MMOL/L (ref 23–29)
CREAT SERPL-MCNC: 0.7 MG/DL (ref 0.5–1.4)
DIFFERENTIAL METHOD BLD: ABNORMAL
EOSINOPHIL # BLD AUTO: 1.2 K/UL (ref 0–0.5)
EOSINOPHIL NFR BLD: 8.1 % (ref 0–8)
ERYTHROCYTE [DISTWIDTH] IN BLOOD BY AUTOMATED COUNT: 14.6 % (ref 11.5–14.5)
EST. GFR  (NO RACE VARIABLE): >60 ML/MIN/1.73 M^2
GLUCOSE SERPL-MCNC: 60 MG/DL (ref 70–110)
HCT VFR BLD AUTO: 26.2 % (ref 40–54)
HGB BLD-MCNC: 8.4 G/DL (ref 14–18)
IMM GRANULOCYTES # BLD AUTO: 0.05 K/UL (ref 0–0.04)
IMM GRANULOCYTES NFR BLD AUTO: 0.3 % (ref 0–0.5)
LYMPHOCYTES # BLD AUTO: 3.8 K/UL (ref 1–4.8)
LYMPHOCYTES NFR BLD: 25 % (ref 18–48)
MCH RBC QN AUTO: 31 PG (ref 27–31)
MCHC RBC AUTO-ENTMCNC: 32.1 G/DL (ref 32–36)
MCV RBC AUTO: 97 FL (ref 82–98)
MONOCYTES # BLD AUTO: 1.2 K/UL (ref 0.3–1)
MONOCYTES NFR BLD: 7.8 % (ref 4–15)
NEUTROPHILS # BLD AUTO: 8.7 K/UL (ref 1.8–7.7)
NEUTROPHILS NFR BLD: 57.5 % (ref 38–73)
NRBC BLD-RTO: 1 /100 WBC
PLATELET # BLD AUTO: 651 K/UL (ref 150–450)
PMV BLD AUTO: 8.3 FL (ref 9.2–12.9)
POCT GLUCOSE: 67 MG/DL (ref 70–110)
POCT GLUCOSE: 73 MG/DL (ref 70–110)
POCT GLUCOSE: 80 MG/DL (ref 70–110)
POTASSIUM SERPL-SCNC: 4.4 MMOL/L (ref 3.5–5.1)
PROT SERPL-MCNC: 7.7 G/DL (ref 6–8.4)
RBC # BLD AUTO: 2.71 M/UL (ref 4.6–6.2)
SODIUM SERPL-SCNC: 134 MMOL/L (ref 136–145)
WBC # BLD AUTO: 15.21 K/UL (ref 3.9–12.7)

## 2024-03-29 PROCEDURE — 99223 1ST HOSP IP/OBS HIGH 75: CPT | Mod: ,,, | Performed by: INTERNAL MEDICINE

## 2024-03-29 PROCEDURE — 99900035 HC TECH TIME PER 15 MIN (STAT)

## 2024-03-29 PROCEDURE — 27000207 HC ISOLATION

## 2024-03-29 PROCEDURE — 25000003 PHARM REV CODE 250: Performed by: NURSE PRACTITIONER

## 2024-03-29 PROCEDURE — 27000221 HC OXYGEN, UP TO 24 HOURS

## 2024-03-29 PROCEDURE — 80053 COMPREHEN METABOLIC PANEL: CPT

## 2024-03-29 PROCEDURE — 25000003 PHARM REV CODE 250: Performed by: PHYSICIAN ASSISTANT

## 2024-03-29 PROCEDURE — 85025 COMPLETE CBC W/AUTO DIFF WBC: CPT

## 2024-03-29 PROCEDURE — 25000003 PHARM REV CODE 250

## 2024-03-29 PROCEDURE — 25000003 PHARM REV CODE 250: Performed by: HOSPITALIST

## 2024-03-29 PROCEDURE — C1751 CATH, INF, PER/CENT/MIDLINE: HCPCS

## 2024-03-29 PROCEDURE — 20600001 HC STEP DOWN PRIVATE ROOM

## 2024-03-29 PROCEDURE — 36410 VNPNXR 3YR/> PHY/QHP DX/THER: CPT

## 2024-03-29 PROCEDURE — 94761 N-INVAS EAR/PLS OXIMETRY MLT: CPT

## 2024-03-29 PROCEDURE — 63600175 PHARM REV CODE 636 W HCPCS: Performed by: HOSPITALIST

## 2024-03-29 PROCEDURE — 99900026 HC AIRWAY MAINTENANCE (STAT)

## 2024-03-29 PROCEDURE — 63600175 PHARM REV CODE 636 W HCPCS: Mod: JZ,JG | Performed by: PHYSICIAN ASSISTANT

## 2024-03-29 PROCEDURE — 76937 US GUIDE VASCULAR ACCESS: CPT

## 2024-03-29 RX ADMIN — ENOXAPARIN SODIUM 30 MG: 30 INJECTION SUBCUTANEOUS at 06:03

## 2024-03-29 RX ADMIN — FAMOTIDINE 20 MG: 20 TABLET ORAL at 09:03

## 2024-03-29 RX ADMIN — LEVETIRACETAM 750 MG: 750 TABLET, FILM COATED ORAL at 08:03

## 2024-03-29 RX ADMIN — ACETAMINOPHEN 1000 MG: 500 TABLET ORAL at 09:03

## 2024-03-29 RX ADMIN — CEFTAZIDIME, AVIBACTAM 2.5 G: 2; .5 POWDER, FOR SOLUTION INTRAVENOUS at 05:03

## 2024-03-29 RX ADMIN — LINEZOLID 600 MG: 600 TABLET, FILM COATED ORAL at 09:03

## 2024-03-29 RX ADMIN — HYPROMELLOSE 2910 1 DROP: 5 SOLUTION OPHTHALMIC at 09:03

## 2024-03-29 RX ADMIN — ALPRAZOLAM 1 MG: 0.5 TABLET ORAL at 09:03

## 2024-03-29 RX ADMIN — HYPROMELLOSE 2910 1 DROP: 5 SOLUTION OPHTHALMIC at 01:03

## 2024-03-29 RX ADMIN — CEFTAZIDIME, AVIBACTAM 2.5 G: 2; .5 POWDER, FOR SOLUTION INTRAVENOUS at 01:03

## 2024-03-29 RX ADMIN — METHOCARBAMOL 500 MG: 500 TABLET ORAL at 02:03

## 2024-03-29 RX ADMIN — LIDOCAINE 5% 1 PATCH: 700 PATCH TOPICAL at 01:03

## 2024-03-29 RX ADMIN — GABAPENTIN 400 MG: 400 CAPSULE ORAL at 08:03

## 2024-03-29 RX ADMIN — ALPRAZOLAM 1 MG: 0.5 TABLET ORAL at 01:03

## 2024-03-29 RX ADMIN — SODIUM ZIRCONIUM CYCLOSILICATE 5 G: 5 POWDER, FOR SUSPENSION ORAL at 09:03

## 2024-03-29 RX ADMIN — OXYCODONE HYDROCHLORIDE 20 MG: 10 TABLET, FILM COATED, EXTENDED RELEASE ORAL at 09:03

## 2024-03-29 RX ADMIN — MINOCYCLINE HYDROCHLORIDE 200 MG: 100 CAPSULE ORAL at 09:03

## 2024-03-29 RX ADMIN — MINOCYCLINE HYDROCHLORIDE 200 MG: 100 CAPSULE ORAL at 08:03

## 2024-03-29 RX ADMIN — LEVETIRACETAM 750 MG: 750 TABLET, FILM COATED ORAL at 09:03

## 2024-03-29 RX ADMIN — MIDODRINE HYDROCHLORIDE 10 MG: 5 TABLET ORAL at 01:03

## 2024-03-29 RX ADMIN — OXYCODONE HYDROCHLORIDE 10 MG: 10 TABLET ORAL at 06:03

## 2024-03-29 RX ADMIN — LINEZOLID 600 MG: 600 TABLET, FILM COATED ORAL at 08:03

## 2024-03-29 RX ADMIN — METHOCARBAMOL 500 MG: 500 TABLET ORAL at 08:03

## 2024-03-29 RX ADMIN — OXYCODONE HYDROCHLORIDE 30 MG: 10 TABLET, FILM COATED, EXTENDED RELEASE ORAL at 09:03

## 2024-03-29 RX ADMIN — CHLORHEXIDINE GLUCONATE 0.12% ORAL RINSE 15 ML: 1.2 LIQUID ORAL at 08:03

## 2024-03-29 RX ADMIN — FAMOTIDINE 20 MG: 20 TABLET ORAL at 08:03

## 2024-03-29 RX ADMIN — HYPROMELLOSE 2910 1 DROP: 5 SOLUTION OPHTHALMIC at 06:03

## 2024-03-29 RX ADMIN — MICONAZOLE NITRATE: 20 CREAM TOPICAL at 08:03

## 2024-03-29 RX ADMIN — GABAPENTIN 400 MG: 400 CAPSULE ORAL at 02:03

## 2024-03-29 RX ADMIN — POLYETHYLENE GLYCOL 3350 17 G: 17 POWDER, FOR SOLUTION ORAL at 09:03

## 2024-03-29 RX ADMIN — GABAPENTIN 400 MG: 400 CAPSULE ORAL at 09:03

## 2024-03-29 RX ADMIN — MIDODRINE HYDROCHLORIDE 10 MG: 5 TABLET ORAL at 09:03

## 2024-03-29 RX ADMIN — CEFTAZIDIME, AVIBACTAM 2.5 G: 2; .5 POWDER, FOR SOLUTION INTRAVENOUS at 08:03

## 2024-03-29 RX ADMIN — HYPROMELLOSE 2910 1 DROP: 5 SOLUTION OPHTHALMIC at 08:03

## 2024-03-29 RX ADMIN — CHLORHEXIDINE GLUCONATE 0.12% ORAL RINSE 15 ML: 1.2 LIQUID ORAL at 09:03

## 2024-03-29 RX ADMIN — MIDODRINE HYDROCHLORIDE 10 MG: 5 TABLET ORAL at 05:03

## 2024-03-29 RX ADMIN — METHOCARBAMOL 500 MG: 500 TABLET ORAL at 09:03

## 2024-03-29 RX ADMIN — MICONAZOLE NITRATE: 20 CREAM TOPICAL at 09:03

## 2024-03-29 RX ADMIN — ERYTHROMYCIN: 5 OINTMENT OPHTHALMIC at 08:03

## 2024-03-29 RX ADMIN — ACETAMINOPHEN 1000 MG: 500 TABLET ORAL at 01:03

## 2024-03-29 RX ADMIN — LEVOTHYROXINE SODIUM 112 MCG: 112 TABLET ORAL at 05:03

## 2024-03-29 RX ADMIN — FOLIC ACID 1 MG: 1 TABLET ORAL at 09:03
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
## 2024-03-29 NOTE — ASSESSMENT & PLAN NOTE
>>ASSESSMENT AND PLAN FOR PRESSURE ULCERS OF SKIN OF MULTIPLE TOPOGRAPHIC SITES WRITTEN ON 3/29/2024 12:11 PM BY KELLY HUERTA MD    - See severe sepsis

## 2024-03-29 NOTE — CONSULTS
Single lumen 22G, 8CM midline placed in the right cephalic vein. Needle advanced into the vessel under real time ultrasound guidance. Image recorded and saved.    Max dwell date 4/27/24.  Lot number: [id number]

## 2024-03-29 NOTE — RESPIRATORY THERAPY
"RAPID RESPONSE RESPIRATORY THERAPY PROACTIVE NOTE           Time of visit: 0855     Code Status: Prior   : 1995  Bed: [facility] A:   MRN: [id number]  Time spent at the bedside: < 15 min    SITUATION    Evaluated patient for: LDA Check     BACKGROUND    Patient has no past medical history on file.  Clinically Significant Surgical Hx: tracheostomy    24 Hours Vitals Range:  Temp:  [96.5 °F (35.8 °C)-98 °F (36.7 °C)]   Pulse:  [59-94]   Resp:  [14-20]   BP: ()/(52-91)   SpO2:  [98 %-100 %]     Labs:    Recent Labs     24  0639 24  0538 24  0441    135* 134*   K 4.4 4.3 4.4    102 100   CO2 23 22* 25   BUN 34* 31* 27*   CREATININE 0.7 0.8 0.7   GLU 74 68* 60*        No results for input(s): "PH", "PCO2", "PO2", "HCO3", "POCSATURATED", "BE" in the last 72 hours.    ASSESSMENT/INTERVENTIONS  Supplies at bedside      Last VS   Temp: 98 °F (36.7 °C) ( 1149)  Pulse: 68 ( 1149)  Resp: 20 ( 114)  BP: 114/76 ( 114)  SpO2: 99 % ( 114)      Extra trachs at bedside: 6/8 cuffed  Level of Consciousness: Level of Consciousness (AVPU): alert  Respiratory Effort: Respiratory Effort: Normal, Unlabored Expansion/Accessory Muscle Usage: Expansion/Accessory Muscles/Retractions: expansion symmetric  All Lung Field Breath Sounds: All Lung Fields Breath Sounds: Anterior:, Lateral:, coarse, diminished  NATALIA Breath Sounds: coarse  LLL Breath Sounds: diminished  RUL Breath Sounds: coarse  RML Breath Sounds: coarse  RLL Breath Sounds: diminished  O2 Device/Concentration: 5l 28%  Surgical airway: Yes, Type: Shiley Size: 8, cuffed  Ambu at bedside:       Active Orders   Respiratory Care    Oxygen Continuous     Frequency: Continuous     Number of Occurrences: Until Specified     Order Questions:      Device type: Low flow      Device: Trach Collar      FiO2%: 60      Titrate O2 per Oxygen Titration Protocol: Yes      To maintain SpO2 goal of: >= 90%      Notify MD of: Inability to " achieve desired SpO2; Sudden change in patient status and requires 20% increase in FiO2; Patient requires >60% FiO2    Pulse Oximetry Continuous     Frequency: Continuous     Number of Occurrences: Until Specified    Routine tracheostomy care     Frequency: BID     Number of Occurrences: Until Specified    SUCTION PRN     Frequency: PRN     Number of Occurrences: Until Specified       RECOMMENDATIONS    We recommend: RRT Recs: Continue POC per primary team.      FOLLOW-UP    Please call back the Rapid Response RT, Nadja Velarde, RRT at x [phone] for any questions or concerns.
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
## 2024-03-29 NOTE — SUBJECTIVE & OBJECTIVE
History reviewed. No pertinent past medical history.    Past Surgical History:   Procedure Laterality Date    ABOVE-KNEE AMPUTATION Bilateral 3/13/2024    Procedure: AMPUTATION, ABOVE KNEE;  Surgeon: Dextre Wynne MD;  Location: [facility] OR [address] [place] [address];  Service: General;  Laterality: Bilateral;    AMPUTATION OF UPPER EXTREMITY Left 3/13/2024    Procedure: AMPUTATION, UPPER EXTREMITY;  Surgeon: Dexter Wynne MD;  Location: [facility] OR [address] [place] [address];  Service: General;  Laterality: Left;  BELOW ELBOW    ESOPHAGOGASTRODUODENOSCOPY W/ PEG N/A 5/30/2023    Procedure: PEG;  Surgeon: JUSTYN Devine MD;  Location: [facility] ENDOSCOPY;  Service: Gastroenterology;  Laterality: N/A;       Review of patient's allergies indicates:   Allergen Reactions    Opioids - morphine analogues Nausea And Vomiting, Anxiety and Other (See Comments)       Medications:  Medications Prior to Admission   Medication Sig    ALPRAZolam (XANAX) 1 MG tablet Take 1 mg by mouth 3 (three) times daily as needed for Anxiety.    cholecalciferol, vitamin D3, 1,250 mcg (50,000 unit) capsule Take 50,000 Units by mouth every 7 days.    acetaminophen (TYLENOL) 325 MG tablet 2 tablets (650 mg total) by Per G Tube route every 6 (six) hours as needed for Temperature greater than (100.4).    albuterol (PROVENTIL) 2.5 mg /3 mL (0.083 %) nebulizer solution Take 3 mLs (2.5 mg total) by nebulization every 4 (four) hours as needed (shortness of breath). Rescue    enoxaparin (LOVENOX) 40 mg/0.4 mL Syrg Inject 0.4 mLs (40 mg total) into the skin once daily.    folic acid (FOLVITE) 1 MG tablet 1 tablet (1 mg total) by Per G Tube route once daily.    gabapentin (NEURONTIN) 300 MG capsule Take 1 capsule (300 mg total) by mouth 3 (three) times daily.    HYDROcodone-acetaminophen (NORCO)  mg per tablet Take 1 tablet by mouth every 6 (six) hours as needed for Pain.    levETIRAcetam (KEPPRA) 100 mg/mL Soln 7.5 mLs (750 mg total) by Per NG tube route 2 (two) times daily.     levothyroxine (SYNTHROID) 100 MCG tablet Take 1 tablet (100 mcg total) by mouth before breakfast.    linezolid (ZYVOX) 600 mg/300 mL PgBk Inject 300 mLs (600 mg total) into the vein every 12 (twelve) hours.    melatonin (MELATIN) 3 mg tablet Take 2 tablets (6 mg total) by mouth nightly as needed for Insomnia.    midodrine (PROAMATINE) 10 MG tablet 1 tablet (10 mg total) by Per NG tube route 3 (three) times daily.    minocycline (MINOCIN,DYNACIN) 100 MG capsule 1 capsule (100 mg total) by Per NG tube route every 12 (twelve) hours.    ondansetron (ZOFRAN) 4 mg/5 mL solution Take 5 mLs (4 mg total) by mouth every 6 (six) hours as needed for Nausea.    pantoprazole (PROTONIX) 40 mg injection Inject 40 mg into the vein once daily.    sertraline (ZOLOFT) 50 MG tablet Take 1 tablet (50 mg total) by mouth once daily.    sodium chloride 0.9% SolP 100 mL with meropenem 1 gram SolR 2 g Inject 2 g into the vein every 8 (eight) hours.     Antibiotics (From admission, onward)      Start     Stop Route Frequency Ordered    03/19/24 0900  linezolid tablet 600 mg         -- PER NG TUBE Every 12 hours 03/19/24 0740    03/18/24 2100  erythromycin 5 mg/gram (0.5 %) ophthalmic ointment         -- Both Eyes Nightly 03/18/24 1616    03/16/24 1330  minocycline capsule 200 mg         -- PER NG TUBE Every 12 hours 03/16/24 1324    03/15/24 1615  cefTAZidime-avibactam (AVYCAZ) 2.5 g in dextrose 5 % in water (D5W) 100 mL IVPB (MB+)         -- IV Every 8 hours (non-standard times) 03/15/24 1510          Antifungals (From admission, onward)      Start     Stop Route Frequency Ordered    03/21/24 1300  miconazole 2 % cream         -- Top 2 times daily 03/21/24 1151          Antivirals (From admission, onward)      None             There is no immunization history for the selected administration types on file for this patient.    Family History    None       Social History     Socioeconomic History    Marital status: Single   Tobacco Use     Smoking status: Never    Smokeless tobacco: Never   Substance and Sexual Activity    Alcohol use: Not Currently    Drug use: Not Currently    Sexual activity: Not Currently     Social Determinants of Health     Financial Resource Strain: Low Risk  (3/7/2024)    Overall Financial Resource Strain (CARDIA)     Difficulty of Paying Living Expenses: Not hard at all   Food Insecurity: No Food Insecurity (3/7/2024)    Hunger Vital Sign     Worried About Running Out of Food in the Last Year: Never true     Ran Out of Food in the Last Year: Never true   Transportation Needs: No Transportation Needs (3/7/2024)    PRAPARE - Transportation     Lack of Transportation (Medical): No     Lack of Transportation (Non-Medical): No   Physical Activity: Inactive (3/7/2024)    Exercise Vital Sign     Days of Exercise per Week: 0 days     Minutes of Exercise per Session: 0 min   Stress: Stress Concern Present (3/7/2024)    [employer] - Occupational Stress Questionnaire     Feeling of Stress : Very much   Social Connections: Socially Isolated (3/7/2024)    Social Connection and Isolation Panel [NHANES]     Frequency of Communication with Friends and Family: More than three times a week     Frequency of Social Gatherings with Friends and Family: More than three times a week     Attends Yarsani Services: Never     Active Member of Clubs or Organizations: No     Attends Club or Organization Meetings: Never     Marital Status: Never    Housing Stability: Unknown (3/7/2024)    Housing Stability Vital Sign     Unable to Pay for Housing in the Last Year: No     Unstable Housing in the Last Year: No     Review of Systems   Constitutional:  Negative for chills and fever.   Respiratory:  Negative for cough and shortness of breath.    Cardiovascular:  Negative for chest pain.   Gastrointestinal:  Negative for abdominal pain, constipation, diarrhea, nausea and vomiting.   Musculoskeletal:  Negative for  arthralgias and myalgias.   Skin:  Negative for rash.   Neurological:  Negative for headaches.     Objective:     Vital Signs (Most Recent):  Temp: 97.7 °F (36.5 °C) (03/29/24 0734)  Pulse: 61 (03/29/24 0801)  Resp: 14 (03/29/24 0914)  BP: 115/71 (03/29/24 0734)  SpO2: 98 % (03/29/24 0801) Vital Signs (24h Range):  Temp:  [96.5 °F (35.8 °C)-98 °F (36.7 °C)] 97.7 °F (36.5 °C)  Pulse:  [59-94] 61  Resp:  [14-20] 14  SpO2:  [98 %-100 %] 98 %  BP: ()/(52-91) 115/71     Weight: 59.4 kg (130 lb 15.3 oz)  Body mass index is 19.34 kg/m².    Estimated Creatinine Clearance: 133.2 mL/min (based on SCr of 0.7 mg/dL).     Physical Exam  Vitals reviewed.   Constitutional:       General: He is not in acute distress.     Appearance: Normal appearance. He is not ill-appearing.   HENT:      Head: Normocephalic and atraumatic.   Eyes:      Extraocular Movements: Extraocular movements intact.      Conjunctiva/sclera: Conjunctivae normal.   Neck:      Comments: Trach in place  Cardiovascular:      Rate and Rhythm: Normal rate and regular rhythm.      Heart sounds: No murmur heard.  Pulmonary:      Effort: Pulmonary effort is normal. No respiratory distress.      Breath sounds: Normal breath sounds.   Abdominal:      General: Abdomen is flat. Bowel sounds are normal.      Palpations: Abdomen is soft.      Tenderness: There is no abdominal tenderness.   Musculoskeletal:      Comments: S/p NIURKA AKA, surgical sites appear to be healing well. LUE below elbow amputation, wrapped in dressing that is clean, dry, intact   Skin:     General: Skin is warm and dry.   Neurological:      Mental Status: He is alert and oriented to person, place, and time.   Psychiatric:         Mood and Affect: Mood normal.         Behavior: Behavior normal.         Thought Content: Thought content normal.          Significant Labs: All pertinent labs within the past 24 hours have been reviewed.  Recent Lab Results         03/29/24  0610   03/29/24  0542    03/29/24  0441   03/29/24  0038        Albumin     1.6         ALP     160         ALT     12         Anion Gap     9         AST     28         Baso #     0.20         Basophil %     1.3         BILIRUBIN TOTAL     0.1  Comment: For infants and newborns, interpretation of results should be based  on gestational age, weight and in agreement with clinical  observations.    Premature Infant recommended reference ranges:  Up to 24 hours.............<8.0 mg/dL  Up to 48 hours............<12.0 mg/dL  3-5 days..................<15.0 mg/dL  6-29 days.................<15.0 mg/dL           BUN     27         Calcium     8.2         Chloride     100         CO2     25         Creatinine     0.7         Differential Method     Automated         eGFR     >60.0         Eos #     1.2         Eos %     8.1         Glucose     60         Gran # (ANC)     8.7         Gran %     57.5         Hematocrit     26.2         Hemoglobin     8.4         Immature Grans (Abs)     0.05  Comment: Mild elevation in immature granulocytes is non specific and   can be seen in a variety of conditions including stress response,   acute inflammation, trauma and pregnancy. Correlation with other   laboratory and clinical findings is essential.           Immature Granulocytes     0.3         Lymph #     3.8         Lymph %     25.0         MCH     31.0         MCHC     32.1         MCV     97         Mono #     1.2         Mono %     7.8         MPV     8.3         nRBC     1         Platelet Count     651         POCT Glucose 73   67     80       Potassium     4.4         PROTEIN TOTAL     7.7         RBC     2.71         RDW     14.6         Sodium     134         WBC     15.21                 Significant Imaging: I have reviewed all pertinent imaging results/findings within the past 24 hours.

## 2024-03-29 NOTE — PLAN OF CARE
.[facility] Plan of Care Note        Dx: Severe sepsis      Shift Events : suction secretions PRN, frequent turns, Pain management, safety, Antibiotics, Anti-anxiety medication      Goals of Care: Prevent further infection and wound healing, Safety, pain management,      Neuro: AAOx4     Vital Signs:  wdl     Respiratory: trach collar 5L/28%     Diet: regular ( feeding assistance needed)     Is patient tolerating current diet? yes     GTTS: none     Urine Output/Bowel Movement:  adequate     Drains/Tubes/Tube Feeds (include total output/shift):  Shen catheter, Colostomy     Lines:  Right femoral Central Line        Accuchecks: None     Skin: Multiple wounds     Fall Risk Score: 16     Activity level? Complete assist, Turn Q 2 hrs     Any scheduled procedures? None     Any safety concerns?  Fall risk     Other: D/C after run of abx
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
## 2024-03-29 NOTE — PLAN OF CARE
Outpatient Antibiotic Therapy Plan:    Please send referral to [facility].    1) Infection: gangrene    2) Discharge Antibiotics:    Intravenous antibiotics:  Avycaz 2.5g IV q 8 hours     Oral antibiotics:  Linezolid 600mg PO q 12 hours   Minocycline 200mg PO q 12 hours     3) Therapy Duration:  6 weeks    Estimated end date of IV antibiotics: 04/24/24    4) Outpatient Weekly Labs:    Order the following labs to be drawn on Mondays:   CBC  CMP   CRP    5) Fax Lab Results to Infectious Diseases Provider: Dr. Marcelino    [facility] Fax Number: [phone]    6) Outpatient Infectious Diseases Follow-up    Follow-up appointment will be arranged by the ID clinic and will be found in the patient's appointments tab.    Prior to discharge, please ensure the patient's follow-up has been scheduled.    If there is still no follow-up scheduled prior to discharge, please send an EPIC message to Darlin Jacobs in Infectious Diseases.

## 2024-03-29 NOTE — NURSING
Nurses Note -- 4 Eyes      3/28/2024   11:46 PM      Skin assessed during: Daily Assessment      [] No Altered Skin Integrity Present    []Prevention Measures Documented      [x] Yes- Altered Skin Integrity Present or Discovered   [] LDA Added if Not in Epic (Describe Wound)   [x] New Altered Skin Integrity was Present on Admit and Documented in LDA   [] Wound Image Taken    Wound Care Consulted? Yes    Attending Nurse:  Gaviota Harris RN    Second RN/Staff Member:  Ino Jerome

## 2024-03-29 NOTE — SUBJECTIVE & OBJECTIVE
Interval History: No new changes.      Objective:     Vital Signs (Most Recent):  Temp: 98 °F (36.7 °C) (03/29/24 1149)  Pulse: 68 (03/29/24 1149)  Resp: 20 (03/29/24 1149)  BP: 114/76 (03/29/24 1149)  SpO2: 99 % (03/29/24 1149) Vital Signs (24h Range):  Temp:  [96.5 °F (35.8 °C)-98 °F (36.7 °C)] 98 °F (36.7 °C)  Pulse:  [59-94] 68  Resp:  [14-20] 20  SpO2:  [98 %-100 %] 99 %  BP: ()/(52-91) 114/76     Weight: 59.4 kg (130 lb 15.3 oz)  Body mass index is 19.34 kg/m².    Intake/Output Summary (Last 24 hours) at 3/29/2024 1210  Last data filed at 3/29/2024 0618  Gross per 24 hour   Intake 535.5 ml   Output 3050 ml   Net -2514.5 ml         Physical Exam  Vitals and nursing note reviewed.   Constitutional:       Appearance: He is not ill-appearing.   Neck:      Comments: Tracheostomy in place  Cardiovascular:      Rate and Rhythm: Normal rate and regular rhythm.      Heart sounds: Normal heart sounds.   Pulmonary:      Effort: Pulmonary effort is normal.      Breath sounds: Normal breath sounds. No rales.   Abdominal:      General: There is no distension.      Palpations: Abdomen is soft.      Comments: Colostomy bag LLQ   Musculoskeletal:         General: No swelling or tenderness.      Comments: Bilateral AKA, staples intact  LUE amputation below elbow  R groin femoral line for IV access, dressing clean and dry   Skin:     General: Skin is warm.      Findings: Erythema and lesion present. No rash.      Comments: See media tab for wound images     Neurological:      General: No focal deficit present.      Mental Status: He is alert and oriented to person, place, and time.   Psychiatric:         Mood and Affect: Mood normal.         Behavior: Behavior normal.             Significant Labs: All pertinent labs within the past 24 hours have been reviewed.    Significant Imaging: I have reviewed all pertinent imaging results/findings within the past 24 hours.

## 2024-03-29 NOTE — ASSESSMENT & PLAN NOTE
>>ASSESSMENT AND PLAN FOR GANGRENE WRITTEN ON 3/29/2024 12:11 PM BY KELLY HUERTA MD    - See severe sepsis
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
## 2024-03-30 LAB
POCT GLUCOSE: 71 MG/DL (ref 70–110)
POCT GLUCOSE: 80 MG/DL (ref 70–110)

## 2024-03-30 PROCEDURE — 94760 N-INVAS EAR/PLS OXIMETRY 1: CPT

## 2024-03-30 PROCEDURE — 27000221 HC OXYGEN, UP TO 24 HOURS

## 2024-03-30 PROCEDURE — 25000003 PHARM REV CODE 250

## 2024-03-30 PROCEDURE — 94761 N-INVAS EAR/PLS OXIMETRY MLT: CPT

## 2024-03-30 PROCEDURE — 20600001 HC STEP DOWN PRIVATE ROOM

## 2024-03-30 PROCEDURE — 27200966 HC CLOSED SUCTION SYSTEM

## 2024-03-30 PROCEDURE — 99900035 HC TECH TIME PER 15 MIN (STAT)

## 2024-03-30 PROCEDURE — 27000207 HC ISOLATION

## 2024-03-30 PROCEDURE — 63600175 PHARM REV CODE 636 W HCPCS: Mod: JZ,JG | Performed by: PHYSICIAN ASSISTANT

## 2024-03-30 PROCEDURE — 63600175 PHARM REV CODE 636 W HCPCS: Performed by: HOSPITALIST

## 2024-03-30 PROCEDURE — 25000003 PHARM REV CODE 250: Performed by: NURSE PRACTITIONER

## 2024-03-30 PROCEDURE — 25000003 PHARM REV CODE 250: Performed by: INTERNAL MEDICINE

## 2024-03-30 PROCEDURE — 25000003 PHARM REV CODE 250: Performed by: PHYSICIAN ASSISTANT

## 2024-03-30 PROCEDURE — 25000003 PHARM REV CODE 250: Performed by: HOSPITALIST

## 2024-03-30 RX ADMIN — MICONAZOLE NITRATE: 20 CREAM TOPICAL at 11:03

## 2024-03-30 RX ADMIN — HYPROMELLOSE 2910 1 DROP: 5 SOLUTION OPHTHALMIC at 09:03

## 2024-03-30 RX ADMIN — OXYCODONE HYDROCHLORIDE 30 MG: 10 TABLET, FILM COATED, EXTENDED RELEASE ORAL at 09:03

## 2024-03-30 RX ADMIN — LINEZOLID 600 MG: 600 TABLET, FILM COATED ORAL at 11:03

## 2024-03-30 RX ADMIN — MIDODRINE HYDROCHLORIDE 10 MG: 5 TABLET ORAL at 02:03

## 2024-03-30 RX ADMIN — CHLORHEXIDINE GLUCONATE 0.12% ORAL RINSE 15 ML: 1.2 LIQUID ORAL at 09:03

## 2024-03-30 RX ADMIN — MIDODRINE HYDROCHLORIDE 10 MG: 5 TABLET ORAL at 09:03

## 2024-03-30 RX ADMIN — OXYCODONE HYDROCHLORIDE 10 MG: 10 TABLET ORAL at 06:03

## 2024-03-30 RX ADMIN — CEFTAZIDIME, AVIBACTAM 2.5 G: 2; .5 POWDER, FOR SOLUTION INTRAVENOUS at 05:03

## 2024-03-30 RX ADMIN — FAMOTIDINE 20 MG: 20 TABLET ORAL at 09:03

## 2024-03-30 RX ADMIN — LEVETIRACETAM 750 MG: 750 TABLET, FILM COATED ORAL at 09:03

## 2024-03-30 RX ADMIN — HYPROMELLOSE 2910 1 DROP: 5 SOLUTION OPHTHALMIC at 05:03

## 2024-03-30 RX ADMIN — MICONAZOLE NITRATE: 20 CREAM TOPICAL at 10:03

## 2024-03-30 RX ADMIN — LIDOCAINE 5% 1 PATCH: 700 PATCH TOPICAL at 06:03

## 2024-03-30 RX ADMIN — ACETAMINOPHEN 1000 MG: 500 TABLET ORAL at 05:03

## 2024-03-30 RX ADMIN — ERYTHROMYCIN: 5 OINTMENT OPHTHALMIC at 09:03

## 2024-03-30 RX ADMIN — SODIUM ZIRCONIUM CYCLOSILICATE 5 G: 5 POWDER, FOR SUSPENSION ORAL at 11:03

## 2024-03-30 RX ADMIN — ENOXAPARIN SODIUM 30 MG: 30 INJECTION SUBCUTANEOUS at 06:03

## 2024-03-30 RX ADMIN — LEVOTHYROXINE SODIUM 112 MCG: 112 TABLET ORAL at 05:03

## 2024-03-30 RX ADMIN — MINOCYCLINE HYDROCHLORIDE 200 MG: 100 CAPSULE ORAL at 09:03

## 2024-03-30 RX ADMIN — HYPROMELLOSE 2910 1 DROP: 5 SOLUTION OPHTHALMIC at 01:03

## 2024-03-30 RX ADMIN — MIDODRINE HYDROCHLORIDE 10 MG: 5 TABLET ORAL at 05:03

## 2024-03-30 RX ADMIN — OXYCODONE HYDROCHLORIDE 20 MG: 10 TABLET, FILM COATED, EXTENDED RELEASE ORAL at 11:03

## 2024-03-30 RX ADMIN — FAMOTIDINE 20 MG: 20 TABLET ORAL at 11:03

## 2024-03-30 RX ADMIN — HYPROMELLOSE 2910 1 DROP: 5 SOLUTION OPHTHALMIC at 11:03

## 2024-03-30 RX ADMIN — LINEZOLID 600 MG: 600 TABLET, FILM COATED ORAL at 09:03

## 2024-03-30 RX ADMIN — GABAPENTIN 400 MG: 400 CAPSULE ORAL at 06:03

## 2024-03-30 RX ADMIN — CEFTAZIDIME, AVIBACTAM 2.5 G: 2; .5 POWDER, FOR SOLUTION INTRAVENOUS at 09:03

## 2024-03-30 RX ADMIN — FOLIC ACID 1 MG: 1 TABLET ORAL at 11:03

## 2024-03-30 RX ADMIN — POLYETHYLENE GLYCOL 3350 17 G: 17 POWDER, FOR SOLUTION ORAL at 11:03

## 2024-03-30 RX ADMIN — METHOCARBAMOL 500 MG: 500 TABLET ORAL at 09:03

## 2024-03-30 RX ADMIN — METHOCARBAMOL 500 MG: 500 TABLET ORAL at 11:03

## 2024-03-30 RX ADMIN — GABAPENTIN 400 MG: 400 CAPSULE ORAL at 09:03

## 2024-03-30 RX ADMIN — MINOCYCLINE HYDROCHLORIDE 200 MG: 100 CAPSULE ORAL at 11:03

## 2024-03-30 RX ADMIN — ALPRAZOLAM 1 MG: 0.5 TABLET ORAL at 06:03

## 2024-03-30 RX ADMIN — LEVETIRACETAM 750 MG: 750 TABLET, FILM COATED ORAL at 11:03

## 2024-03-30 RX ADMIN — GABAPENTIN 400 MG: 400 CAPSULE ORAL at 11:03

## 2024-03-30 RX ADMIN — CEFTAZIDIME, AVIBACTAM 2.5 G: 2; .5 POWDER, FOR SOLUTION INTRAVENOUS at 01:03

## 2024-03-30 RX ADMIN — METHOCARBAMOL 500 MG: 500 TABLET ORAL at 02:03

## 2024-03-30 RX ADMIN — CHLORHEXIDINE GLUCONATE 0.12% ORAL RINSE 15 ML: 1.2 LIQUID ORAL at 11:03

## 2024-03-30 RX ADMIN — ACETAMINOPHEN 1000 MG: 500 TABLET ORAL at 06:03

## 2024-03-30 NOTE — PLAN OF CARE
.[facility] Plan of Care Note        Dx: Severe sepsis      Shift Events : suction secretions PRN, frequent turns, Pain management, safety, Antibiotics, Anti-anxiety medication      Goals of Care: Prevent further infection and wound healing, Safety, pain management,      Neuro: AAOx4     Vital Signs:  wdl     Respiratory: trach collar 5L/28%     Diet: regular ( feeding assistance needed)     Is patient tolerating current diet? yes     GTTS: none     Urine Output/Bowel Movement:  adequate     Drains/Tubes/Tube Feeds (include total output/shift):  Shen catheter, Colostomy     Lines:  Right femoral Central Line        Accuchecks: None     Skin: Multiple wounds     Fall Risk Score: 16     Activity level? Complete assist, Turn Q 2 hrs     Any scheduled procedures? None     Any safety concerns?  Fall risk     Other: D/C after run of abx

## 2024-03-30 NOTE — NURSING
Nurses Note -- 4 Eyes      3/30/2024   12:40 AM      Skin assessed during: Daily Assessment      [] No Altered Skin Integrity Present    []Prevention Measures Documented      [x] Yes- Altered Skin Integrity Present or Discovered   [x] LDA Added if Not in Epic (Describe Wound)   [] New Altered Skin Integrity was Present on Admit and Documented in LDA   [] Wound Image Taken    Wound Care Consulted? Yes    Attending Nurse:  Gaviota Bragg RN/Staff Member:  Ino Jerome RN

## 2024-03-30 NOTE — CARE UPDATE
Note: Rt attempted to take off Speaking Valve from Trach pt.. The mother and pt refused it. RT attempted to re-educate the important of not sleeping with speaking valve at night. Mother stated No..

## 2024-03-30 NOTE — SUBJECTIVE & OBJECTIVE
Interval History: VSS. CELIA.      Objective:     Vital Signs (Most Recent):  Temp: 97.6 °F (36.4 °C) (03/30/24 0804)  Pulse: 91 (03/30/24 0804)  Resp: 16 (03/30/24 0804)  BP: 107/61 (03/30/24 0804)  SpO2: 99 % (03/30/24 0804) Vital Signs (24h Range):  Temp:  [97.6 °F (36.4 °C)-98.5 °F (36.9 °C)] 97.6 °F (36.4 °C)  Pulse:  [] 91  Resp:  [16-20] 16  SpO2:  [97 %-100 %] 99 %  BP: (106-114)/(61-76) 107/61     Weight: 59.4 kg (130 lb 15.3 oz)  Body mass index is 19.34 kg/m².    Intake/Output Summary (Last 24 hours) at 3/30/2024 1006  Last data filed at 3/30/2024 0620  Gross per 24 hour   Intake 998 ml   Output 1800 ml   Net -802 ml         Physical Exam  Vitals and nursing note reviewed.   Constitutional:       Appearance: He is not ill-appearing.   Neck:      Comments: Tracheostomy in place  Cardiovascular:      Rate and Rhythm: Normal rate and regular rhythm.      Heart sounds: Normal heart sounds.   Pulmonary:      Effort: Pulmonary effort is normal.      Breath sounds: Normal breath sounds. No rales.   Abdominal:      General: There is no distension.      Palpations: Abdomen is soft.      Comments: Colostomy bag LLQ   Musculoskeletal:         General: No swelling or tenderness.      Comments: Bilateral AKA, staples intact  LUE amputation below elbow  R groin femoral line for IV access, dressing clean and dry   Skin:     General: Skin is warm.      Findings: Erythema and lesion present. No rash.      Comments: See media tab for wound images     Neurological:      General: No focal deficit present.      Mental Status: He is alert and oriented to person, place, and time.   Psychiatric:         Mood and Affect: Mood normal.         Behavior: Behavior normal.             Significant Labs: All pertinent labs within the past 24 hours have been reviewed.    Significant Imaging: I have reviewed all pertinent imaging results/findings within the past 24 hours.

## 2024-03-30 NOTE — RESPIRATORY THERAPY
"RAPID RESPONSE RESPIRATORY THERAPY PROACTIVE NOTE           Time of visit: 1039     Code Status: Prior   : 1995  Bed: [facility] A:   MRN: [id number]  Time spent at the bedside: < 15 min    SITUATION    Evaluated patient for: LDA Check     BACKGROUND    Patient has no past medical history on file.  Clinically Significant Surgical Hx: tracheostomy    24 Hours Vitals Range:  Temp:  [97.6 °F (36.4 °C)-98.5 °F (36.9 °C)]   Pulse:  []   Resp:  [16-20]   BP: (106-114)/(61-76)   SpO2:  [97 %-100 %]     Labs:    Recent Labs     24  0538 24  0441   * 134*   K 4.3 4.4    100   CO2 22* 25   BUN 31* 27*   CREATININE 0.8 0.7   GLU 68* 60*        No results for input(s): "PH", "PCO2", "PO2", "HCO3", "POCSATURATED", "BE" in the last 72 hours.    ASSESSMENT/INTERVENTIONS  Patient resting comfortably. No respiratory concerns at this time.      Last VS   Temp: 97.6 °F (36.4 °C) ( 0804)  Pulse: 99 ( 1036)  Resp: 18 ( 0900)  BP: 107/61 ( 0804)  SpO2: 100 % ( 1036)      Extra trachs at bedside: #6 Shiley Cuffed, #8 Shiley Cuffed  Level of Consciousness: Level of Consciousness (AVPU): alert  Respiratory Effort: Respiratory Effort: Normal, Unlabored Expansion/Accessory Muscle Usage: Expansion/Accessory Muscles/Retractions: expansion symmetric, no retractions, no use of accessory muscles  All Lung Field Breath Sounds: All Lung Fields Breath Sounds: Anterior:, Posterior:, clear, diminished  NATALIA Breath Sounds: coarse  LLL Breath Sounds: diminished  RUL Breath Sounds: coarse  RML Breath Sounds: coarse  RLL Breath Sounds: diminished  O2 Device/Concentration: 5L/21%  Surgical airway: Yes, Type: Shiley Size: 8, cuffed  Ambu at bedside:       Active Orders   Respiratory Care    Oxygen Continuous     Frequency: Continuous     Number of Occurrences: Until Specified     Order Questions:      Device type: Low flow      Device: Trach Collar      FiO2%: 60      Titrate O2 per Oxygen Titration " Protocol: Yes      To maintain SpO2 goal of: >= 90%      Notify MD of: Inability to achieve desired SpO2; Sudden change in patient status and requires 20% increase in FiO2; Patient requires >60% FiO2    Pulse Oximetry Continuous     Frequency: Continuous     Number of Occurrences: Until Specified    Routine tracheostomy care     Frequency: BID     Number of Occurrences: Until Specified    SUCTION PRN     Frequency: PRN     Number of Occurrences: Until Specified       RECOMMENDATIONS    We recommend: RRT Recs: Continue POC per primary team.      FOLLOW-UP    Please call back the Rapid Response RT, Jerri Thurman RRT at x [phone] for any questions or concerns.

## 2024-03-30 NOTE — ASSESSMENT & PLAN NOTE
>>ASSESSMENT AND PLAN FOR GANGRENE WRITTEN ON 3/30/2024 10:06 AM BY KELLY HUERTA MD    - See severe sepsis

## 2024-03-30 NOTE — ASSESSMENT & PLAN NOTE
>>ASSESSMENT AND PLAN FOR PRESSURE ULCERS OF SKIN OF MULTIPLE TOPOGRAPHIC SITES WRITTEN ON 3/30/2024 10:07 AM BY KELLY HUERTA MD    - See severe sepsis
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
## 2024-03-31 PROCEDURE — 63600175 PHARM REV CODE 636 W HCPCS: Mod: JZ,JG | Performed by: PHYSICIAN ASSISTANT

## 2024-03-31 PROCEDURE — 25000003 PHARM REV CODE 250

## 2024-03-31 PROCEDURE — 25000003 PHARM REV CODE 250: Performed by: PHYSICIAN ASSISTANT

## 2024-03-31 PROCEDURE — 27000221 HC OXYGEN, UP TO 24 HOURS

## 2024-03-31 PROCEDURE — 99900026 HC AIRWAY MAINTENANCE (STAT)

## 2024-03-31 PROCEDURE — 20600001 HC STEP DOWN PRIVATE ROOM

## 2024-03-31 PROCEDURE — 94761 N-INVAS EAR/PLS OXIMETRY MLT: CPT

## 2024-03-31 PROCEDURE — 63600175 PHARM REV CODE 636 W HCPCS: Performed by: HOSPITALIST

## 2024-03-31 PROCEDURE — 25000003 PHARM REV CODE 250: Performed by: HOSPITALIST

## 2024-03-31 PROCEDURE — 25000003 PHARM REV CODE 250: Performed by: NURSE PRACTITIONER

## 2024-03-31 PROCEDURE — 99900035 HC TECH TIME PER 15 MIN (STAT)

## 2024-03-31 PROCEDURE — 27000207 HC ISOLATION

## 2024-03-31 RX ADMIN — POLYETHYLENE GLYCOL 3350 17 G: 17 POWDER, FOR SOLUTION ORAL at 08:03

## 2024-03-31 RX ADMIN — METHOCARBAMOL 500 MG: 500 TABLET ORAL at 03:03

## 2024-03-31 RX ADMIN — MIDODRINE HYDROCHLORIDE 10 MG: 5 TABLET ORAL at 10:03

## 2024-03-31 RX ADMIN — CEFTAZIDIME, AVIBACTAM 2.5 G: 2; .5 POWDER, FOR SOLUTION INTRAVENOUS at 01:03

## 2024-03-31 RX ADMIN — LEVETIRACETAM 750 MG: 750 TABLET, FILM COATED ORAL at 09:03

## 2024-03-31 RX ADMIN — HYPROMELLOSE 2910 1 DROP: 5 SOLUTION OPHTHALMIC at 06:03

## 2024-03-31 RX ADMIN — OXYCODONE HYDROCHLORIDE 20 MG: 10 TABLET, FILM COATED, EXTENDED RELEASE ORAL at 09:03

## 2024-03-31 RX ADMIN — CEFTAZIDIME, AVIBACTAM 2.5 G: 2; .5 POWDER, FOR SOLUTION INTRAVENOUS at 09:03

## 2024-03-31 RX ADMIN — ACETAMINOPHEN 1000 MG: 500 TABLET ORAL at 10:03

## 2024-03-31 RX ADMIN — ERYTHROMYCIN: 5 OINTMENT OPHTHALMIC at 09:03

## 2024-03-31 RX ADMIN — HYPROMELLOSE 2910 1 DROP: 5 SOLUTION OPHTHALMIC at 01:03

## 2024-03-31 RX ADMIN — METHOCARBAMOL 500 MG: 500 TABLET ORAL at 09:03

## 2024-03-31 RX ADMIN — GABAPENTIN 400 MG: 400 CAPSULE ORAL at 03:03

## 2024-03-31 RX ADMIN — HYPROMELLOSE 2910 1 DROP: 5 SOLUTION OPHTHALMIC at 09:03

## 2024-03-31 RX ADMIN — MICONAZOLE NITRATE: 20 CREAM TOPICAL at 09:03

## 2024-03-31 RX ADMIN — ENOXAPARIN SODIUM 30 MG: 30 INJECTION SUBCUTANEOUS at 06:03

## 2024-03-31 RX ADMIN — ACETAMINOPHEN 1000 MG: 500 TABLET ORAL at 03:03

## 2024-03-31 RX ADMIN — OXYCODONE HYDROCHLORIDE 10 MG: 10 TABLET ORAL at 12:03

## 2024-03-31 RX ADMIN — CEFTAZIDIME, AVIBACTAM 2.5 G: 2; .5 POWDER, FOR SOLUTION INTRAVENOUS at 05:03

## 2024-03-31 RX ADMIN — LIDOCAINE 5% 1 PATCH: 700 PATCH TOPICAL at 03:03

## 2024-03-31 RX ADMIN — GABAPENTIN 400 MG: 400 CAPSULE ORAL at 09:03

## 2024-03-31 RX ADMIN — CHLORHEXIDINE GLUCONATE 0.12% ORAL RINSE 15 ML: 1.2 LIQUID ORAL at 09:03

## 2024-03-31 RX ADMIN — LINEZOLID 600 MG: 600 TABLET, FILM COATED ORAL at 09:03

## 2024-03-31 RX ADMIN — SODIUM ZIRCONIUM CYCLOSILICATE 5 G: 5 POWDER, FOR SUSPENSION ORAL at 08:03

## 2024-03-31 RX ADMIN — OXYCODONE HYDROCHLORIDE 10 MG: 10 TABLET ORAL at 03:03

## 2024-03-31 RX ADMIN — FOLIC ACID 1 MG: 1 TABLET ORAL at 09:03

## 2024-03-31 RX ADMIN — ACETAMINOPHEN 1000 MG: 500 TABLET ORAL at 05:03

## 2024-03-31 RX ADMIN — MIDODRINE HYDROCHLORIDE 10 MG: 5 TABLET ORAL at 03:03

## 2024-03-31 RX ADMIN — MINOCYCLINE HYDROCHLORIDE 200 MG: 100 CAPSULE ORAL at 09:03

## 2024-03-31 RX ADMIN — LEVOTHYROXINE SODIUM 112 MCG: 112 TABLET ORAL at 05:03

## 2024-03-31 RX ADMIN — FAMOTIDINE 20 MG: 20 TABLET ORAL at 09:03

## 2024-03-31 RX ADMIN — MIDODRINE HYDROCHLORIDE 10 MG: 5 TABLET ORAL at 05:03

## 2024-03-31 RX ADMIN — OXYCODONE HYDROCHLORIDE 30 MG: 10 TABLET, FILM COATED, EXTENDED RELEASE ORAL at 09:03

## 2024-03-31 NOTE — SUBJECTIVE & OBJECTIVE
Interval History: NAEON.      Objective:     Vital Signs (Most Recent):  Temp: 97.5 °F (36.4 °C) (03/31/24 0828)  Pulse: 61 (03/31/24 0828)  Resp: 16 (03/31/24 0900)  BP: 128/77 (03/31/24 0828)  SpO2: 99 % (03/31/24 0828) Vital Signs (24h Range):  Temp:  [97.3 °F (36.3 °C)-97.6 °F (36.4 °C)] 97.5 °F (36.4 °C)  Pulse:  [55-86] 61  Resp:  [10-20] 16  SpO2:  [96 %-100 %] 99 %  BP: (102-139)/(70-90) 128/77     Weight: 59.4 kg (130 lb 15.3 oz)  Body mass index is 19.34 kg/m².    Intake/Output Summary (Last 24 hours) at 3/31/2024 1037  Last data filed at 3/30/2024 2200  Gross per 24 hour   Intake 536.26 ml   Output 1100 ml   Net -563.74 ml         Physical Exam  Vitals and nursing note reviewed.   Constitutional:       Appearance: He is not ill-appearing.   Neck:      Comments: Tracheostomy in place  Cardiovascular:      Rate and Rhythm: Normal rate and regular rhythm.      Heart sounds: Normal heart sounds.   Pulmonary:      Effort: Pulmonary effort is normal.      Breath sounds: Normal breath sounds. No rales.   Abdominal:      General: There is no distension.      Palpations: Abdomen is soft.      Comments: Colostomy bag LLQ   Musculoskeletal:         General: No swelling or tenderness.      Comments: Bilateral AKA, staples intact  LUE amputation below elbow  R groin femoral line for IV access, dressing clean and dry   Skin:     General: Skin is warm.      Findings: Erythema and lesion present. No rash.      Comments: See media tab for wound images     Neurological:      General: No focal deficit present.      Mental Status: He is alert and oriented to person, place, and time.   Psychiatric:         Mood and Affect: Mood normal.         Behavior: Behavior normal.             Significant Labs: All pertinent labs within the past 24 hours have been reviewed.    Significant Imaging: I have reviewed all pertinent imaging results/findings within the past 24 hours.

## 2024-03-31 NOTE — ASSESSMENT & PLAN NOTE
>>ASSESSMENT AND PLAN FOR GANGRENE WRITTEN ON 3/31/2024 10:38 AM BY KELLY HUERTA MD    - See severe sepsis

## 2024-03-31 NOTE — ASSESSMENT & PLAN NOTE
>>ASSESSMENT AND PLAN FOR PRESSURE ULCERS OF SKIN OF MULTIPLE TOPOGRAPHIC SITES WRITTEN ON 3/31/2024 10:38 AM BY KELLY HUERTA MD    - See severe sepsis
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
## 2024-03-31 NOTE — RESPIRATORY THERAPY
"RAPID RESPONSE RESPIRATORY THERAPY PROACTIVE NOTE           Time of visit: 922     Code Status: Prior   : 1995  Bed: [facility] A:   MRN: [id number]  Time spent at the bedside: < 15 min    SITUATION    Evaluated patient for: LDA Check     BACKGROUND    Patient has no past medical history on file.  Clinically Significant Surgical Hx: tracheostomy    24 Hours Vitals Range:  Temp:  [97.3 °F (36.3 °C)-97.6 °F (36.4 °C)]   Pulse:  [55-99]   Resp:  [10-20]   BP: (102-139)/(70-90)   SpO2:  [96 %-100 %]     Labs:    Recent Labs     24  0441   *   K 4.4      CO2 25   BUN 27*   CREATININE 0.7   GLU 60*        No results for input(s): "PH", "PCO2", "PO2", "HCO3", "POCSATURATED", "BE" in the last 72 hours.    ASSESSMENT/INTERVENTIONS  Pt resting comfortably, voiced no need for respiratory interventions during encounter. Trach clean and secure, all supplies at bedside.      Last VS   Temp: 97.5 °F (36.4 °C) (828)  Pulse: 61 (828)  Resp: 16 ( 0900)  BP: 128/77 (828)  SpO2: 99 % (828)      Extra trachs at bedside: 8.0 & 6.0 Shiley cuffed  Level of Consciousness: Level of Consciousness (AVPU): alert  Respiratory Effort: Respiratory Effort: Normal, Unlabored Expansion/Accessory Muscle Usage: Expansion/Accessory Muscles/Retractions: expansion symmetric, no retractions, no use of accessory muscles  All Lung Field Breath Sounds: All Lung Fields Breath Sounds: diminished  NATALIA Breath Sounds: coarse  LLL Breath Sounds: diminished  RUL Breath Sounds: coarse  RML Breath Sounds: diminished  RLL Breath Sounds: diminished  O2 Device/Concentration: trach collar 5L/21%  Surgical airway: Yes, Type: Shiley Size: 8, cuffed  Ambu at bedside:       Active Orders   Respiratory Care    Oxygen Continuous     Frequency: Continuous     Number of Occurrences: Until Specified     Order Questions:      Device type: Low flow      Device: Trach Collar      FiO2%: 60      Titrate O2 per Oxygen Titration " Protocol: Yes      To maintain SpO2 goal of: >= 90%      Notify MD of: Inability to achieve desired SpO2; Sudden change in patient status and requires 20% increase in FiO2; Patient requires >60% FiO2    Pulse Oximetry Continuous     Frequency: Continuous     Number of Occurrences: Until Specified    Routine tracheostomy care     Frequency: BID     Number of Occurrences: Until Specified    SUCTION PRN     Frequency: PRN     Number of Occurrences: Until Specified       RECOMMENDATIONS    We recommend: RRT Recs: Continue POC per primary team.      FOLLOW-UP    Please call back the Rapid Response RT, Francesco Caballero RRT at x [phone] for any questions or concerns.

## 2024-04-01 LAB
ALBUMIN SERPL BCP-MCNC: 1.7 G/DL (ref 3.5–5.2)
ALP SERPL-CCNC: 183 U/L (ref 55–135)
ALT SERPL W/O P-5'-P-CCNC: 42 U/L (ref 10–44)
ANION GAP SERPL CALC-SCNC: 11 MMOL/L (ref 8–16)
ANISOCYTOSIS BLD QL SMEAR: SLIGHT
AST SERPL-CCNC: 78 U/L (ref 10–40)
BASOPHILS # BLD AUTO: 0.19 K/UL (ref 0–0.2)
BASOPHILS NFR BLD: 1 % (ref 0–1.9)
BILIRUB SERPL-MCNC: 0.2 MG/DL (ref 0.1–1)
BUN SERPL-MCNC: 45 MG/DL (ref 6–20)
CALCIUM SERPL-MCNC: 8.8 MG/DL (ref 8.7–10.5)
CHLORIDE SERPL-SCNC: 99 MMOL/L (ref 95–110)
CO2 SERPL-SCNC: 23 MMOL/L (ref 23–29)
CREAT SERPL-MCNC: 1.1 MG/DL (ref 0.5–1.4)
DIFFERENTIAL METHOD BLD: ABNORMAL
EOSINOPHIL # BLD AUTO: 1 K/UL (ref 0–0.5)
EOSINOPHIL NFR BLD: 5 % (ref 0–8)
ERYTHROCYTE [DISTWIDTH] IN BLOOD BY AUTOMATED COUNT: 14.3 % (ref 11.5–14.5)
EST. GFR  (NO RACE VARIABLE): >60 ML/MIN/1.73 M^2
GLUCOSE SERPL-MCNC: 87 MG/DL (ref 70–110)
HCT VFR BLD AUTO: 27.1 % (ref 40–54)
HGB BLD-MCNC: 8.7 G/DL (ref 14–18)
HYPOCHROMIA BLD QL SMEAR: ABNORMAL
IMM GRANULOCYTES # BLD AUTO: 0.06 K/UL (ref 0–0.04)
IMM GRANULOCYTES NFR BLD AUTO: 0.3 % (ref 0–0.5)
LYMPHOCYTES # BLD AUTO: 4.5 K/UL (ref 1–4.8)
LYMPHOCYTES NFR BLD: 23.2 % (ref 18–48)
MCH RBC QN AUTO: 29.8 PG (ref 27–31)
MCHC RBC AUTO-ENTMCNC: 32.1 G/DL (ref 32–36)
MCV RBC AUTO: 93 FL (ref 82–98)
MONOCYTES # BLD AUTO: 1.1 K/UL (ref 0.3–1)
MONOCYTES NFR BLD: 5.5 % (ref 4–15)
NEUTROPHILS # BLD AUTO: 12.5 K/UL (ref 1.8–7.7)
NEUTROPHILS NFR BLD: 65 % (ref 38–73)
NRBC BLD-RTO: 0 /100 WBC
OVALOCYTES BLD QL SMEAR: ABNORMAL
PLATELET # BLD AUTO: 471 K/UL (ref 150–450)
PLATELET BLD QL SMEAR: ABNORMAL
PMV BLD AUTO: 8.2 FL (ref 9.2–12.9)
POIKILOCYTOSIS BLD QL SMEAR: SLIGHT
POLYCHROMASIA BLD QL SMEAR: ABNORMAL
POTASSIUM SERPL-SCNC: 4 MMOL/L (ref 3.5–5.1)
PROT SERPL-MCNC: 8 G/DL (ref 6–8.4)
RBC # BLD AUTO: 2.92 M/UL (ref 4.6–6.2)
SODIUM SERPL-SCNC: 133 MMOL/L (ref 136–145)
SPHEROCYTES BLD QL SMEAR: ABNORMAL
WBC # BLD AUTO: 19.2 K/UL (ref 3.9–12.7)

## 2024-04-01 PROCEDURE — 25000003 PHARM REV CODE 250

## 2024-04-01 PROCEDURE — 99900022

## 2024-04-01 PROCEDURE — 99900035 HC TECH TIME PER 15 MIN (STAT)

## 2024-04-01 PROCEDURE — 99900026 HC AIRWAY MAINTENANCE (STAT)

## 2024-04-01 PROCEDURE — 80053 COMPREHEN METABOLIC PANEL: CPT

## 2024-04-01 PROCEDURE — 27000207 HC ISOLATION

## 2024-04-01 PROCEDURE — 25000003 PHARM REV CODE 250: Performed by: HOSPITALIST

## 2024-04-01 PROCEDURE — 27000221 HC OXYGEN, UP TO 24 HOURS

## 2024-04-01 PROCEDURE — 63600175 PHARM REV CODE 636 W HCPCS: Performed by: HOSPITALIST

## 2024-04-01 PROCEDURE — 94761 N-INVAS EAR/PLS OXIMETRY MLT: CPT

## 2024-04-01 PROCEDURE — 20600001 HC STEP DOWN PRIVATE ROOM

## 2024-04-01 PROCEDURE — 63600175 PHARM REV CODE 636 W HCPCS: Mod: JZ,JG | Performed by: PHYSICIAN ASSISTANT

## 2024-04-01 PROCEDURE — 85025 COMPLETE CBC W/AUTO DIFF WBC: CPT

## 2024-04-01 PROCEDURE — 25000003 PHARM REV CODE 250: Performed by: PHYSICIAN ASSISTANT

## 2024-04-01 PROCEDURE — 25000003 PHARM REV CODE 250: Performed by: NURSE PRACTITIONER

## 2024-04-01 RX ORDER — ACETAMINOPHEN 650 MG/20.3ML
650 LIQUID ORAL EVERY 6 HOURS PRN
Status: DISCONTINUED | OUTPATIENT
Start: 2024-04-01 | End: 2024-04-11 | Stop reason: HOSPADM

## 2024-04-01 RX ORDER — SODIUM CHLORIDE 9 MG/ML
INJECTION, SOLUTION INTRAVENOUS CONTINUOUS
Status: ACTIVE | OUTPATIENT
Start: 2024-04-01 | End: 2024-04-01

## 2024-04-01 RX ADMIN — CHLORHEXIDINE GLUCONATE 0.12% ORAL RINSE 15 ML: 1.2 LIQUID ORAL at 09:04

## 2024-04-01 RX ADMIN — OXYCODONE HYDROCHLORIDE 20 MG: 10 TABLET, FILM COATED, EXTENDED RELEASE ORAL at 08:04

## 2024-04-01 RX ADMIN — LEVETIRACETAM 750 MG: 750 TABLET, FILM COATED ORAL at 08:04

## 2024-04-01 RX ADMIN — POLYETHYLENE GLYCOL 3350 17 G: 17 POWDER, FOR SOLUTION ORAL at 08:04

## 2024-04-01 RX ADMIN — METHOCARBAMOL 500 MG: 500 TABLET ORAL at 09:04

## 2024-04-01 RX ADMIN — GABAPENTIN 400 MG: 400 CAPSULE ORAL at 08:04

## 2024-04-01 RX ADMIN — FAMOTIDINE 20 MG: 20 TABLET ORAL at 08:04

## 2024-04-01 RX ADMIN — GABAPENTIN 400 MG: 400 CAPSULE ORAL at 09:04

## 2024-04-01 RX ADMIN — SODIUM CHLORIDE: 9 INJECTION, SOLUTION INTRAVENOUS at 08:04

## 2024-04-01 RX ADMIN — ERYTHROMYCIN: 5 OINTMENT OPHTHALMIC at 09:04

## 2024-04-01 RX ADMIN — LIDOCAINE 5% 1 PATCH: 700 PATCH TOPICAL at 02:04

## 2024-04-01 RX ADMIN — SODIUM ZIRCONIUM CYCLOSILICATE 5 G: 5 POWDER, FOR SUSPENSION ORAL at 08:04

## 2024-04-01 RX ADMIN — OXYCODONE HYDROCHLORIDE 10 MG: 10 TABLET ORAL at 02:04

## 2024-04-01 RX ADMIN — MINOCYCLINE HYDROCHLORIDE 200 MG: 100 CAPSULE ORAL at 08:04

## 2024-04-01 RX ADMIN — MINOCYCLINE HYDROCHLORIDE 200 MG: 100 CAPSULE ORAL at 09:04

## 2024-04-01 RX ADMIN — CEFTAZIDIME, AVIBACTAM 2.5 G: 2; .5 POWDER, FOR SOLUTION INTRAVENOUS at 02:04

## 2024-04-01 RX ADMIN — CEFTAZIDIME, AVIBACTAM 2.5 G: 2; .5 POWDER, FOR SOLUTION INTRAVENOUS at 09:04

## 2024-04-01 RX ADMIN — FOLIC ACID 1 MG: 1 TABLET ORAL at 08:04

## 2024-04-01 RX ADMIN — HYPROMELLOSE 2910 1 DROP: 5 SOLUTION OPHTHALMIC at 09:04

## 2024-04-01 RX ADMIN — LINEZOLID 600 MG: 600 TABLET, FILM COATED ORAL at 09:04

## 2024-04-01 RX ADMIN — FAMOTIDINE 20 MG: 20 TABLET ORAL at 09:04

## 2024-04-01 RX ADMIN — LINEZOLID 600 MG: 600 TABLET, FILM COATED ORAL at 08:04

## 2024-04-01 RX ADMIN — LEVOTHYROXINE SODIUM 112 MCG: 112 TABLET ORAL at 05:04

## 2024-04-01 RX ADMIN — ENOXAPARIN SODIUM 30 MG: 30 INJECTION SUBCUTANEOUS at 05:04

## 2024-04-01 RX ADMIN — MICONAZOLE NITRATE: 20 CREAM TOPICAL at 08:04

## 2024-04-01 RX ADMIN — METHOCARBAMOL 500 MG: 500 TABLET ORAL at 08:04

## 2024-04-01 RX ADMIN — MIDODRINE HYDROCHLORIDE 10 MG: 5 TABLET ORAL at 05:04

## 2024-04-01 RX ADMIN — CEFTAZIDIME, AVIBACTAM 2.5 G: 2; .5 POWDER, FOR SOLUTION INTRAVENOUS at 05:04

## 2024-04-01 RX ADMIN — HYPROMELLOSE 2910 1 DROP: 5 SOLUTION OPHTHALMIC at 05:04

## 2024-04-01 RX ADMIN — ALPRAZOLAM 1 MG: 0.5 TABLET ORAL at 05:04

## 2024-04-01 RX ADMIN — MIDODRINE HYDROCHLORIDE 10 MG: 5 TABLET ORAL at 02:04

## 2024-04-01 RX ADMIN — HYPROMELLOSE 2910 1 DROP: 5 SOLUTION OPHTHALMIC at 02:04

## 2024-04-01 RX ADMIN — MIDODRINE HYDROCHLORIDE 10 MG: 5 TABLET ORAL at 09:04

## 2024-04-01 RX ADMIN — OXYCODONE HYDROCHLORIDE 30 MG: 10 TABLET, FILM COATED, EXTENDED RELEASE ORAL at 09:04

## 2024-04-01 RX ADMIN — METHOCARBAMOL 500 MG: 500 TABLET ORAL at 02:04

## 2024-04-01 RX ADMIN — LEVETIRACETAM 750 MG: 750 TABLET, FILM COATED ORAL at 09:04

## 2024-04-01 RX ADMIN — HYPROMELLOSE 2910 1 DROP: 5 SOLUTION OPHTHALMIC at 08:04

## 2024-04-01 RX ADMIN — GABAPENTIN 400 MG: 400 CAPSULE ORAL at 02:04

## 2024-04-01 RX ADMIN — MICONAZOLE NITRATE: 20 CREAM TOPICAL at 09:04
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
## 2024-04-01 NOTE — RESPIRATORY THERAPY
RT entered room to suction and do trach care. PT said he did not need suctioning. RT offered to reddy split gauze , inner cannula and trach ties and patient said not now. Will return before end of shift.

## 2024-04-01 NOTE — NURSING
Wound noted along incision site of LLE. MD Trejo notified. Verbal order to upload images in chart. Wound care consulted.

## 2024-04-01 NOTE — ASSESSMENT & PLAN NOTE
"Stage IV pressure ulcer of bilateral buttocks, sacral region  Pressure ulcers of multiple sites  Chronic osteomyelitis    This patient does have evidence of infective focus  My overall impression is sepsis.  Source: Skin and Soft Tissue (location extremities)  Antibiotics given-   Antibiotics (72h ago, onward)      Start     Stop Route Frequency Ordered    03/19/24 0900  linezolid tablet 600 mg         -- PER NG TUBE Every 12 hours 03/19/24 0740    03/18/24 2100  erythromycin 5 mg/gram (0.5 %) ophthalmic ointment         -- Both Eyes Nightly 03/18/24 1616    03/16/24 1330  minocycline capsule 200 mg         -- PER NG TUBE Every 12 hours 03/16/24 1324    03/15/24 1615  cefTAZidime-avibactam (AVYCAZ) 2.5 g in dextrose 5 % in water (D5W) 100 mL IVPB (MB+)         -- IV Every 8 hours (non-standard times) 03/15/24 1510          Latest lactate reviewed-  No results for input(s): "LACTATE", "POCLAC" in the last 72 hours.    Organ dysfunction indicated by Acute respiratory failure    UCX 02/21 and RCX 02/22 with MDR proteus mirabilis, pseudomonas aeruginosa. Patient has extensive MDRO / ESBL culture data from prior hospitalizations as well. Shock component is now resolved, but leukocytosis, fever, and underlying wounds are still a component as source control has not been achieved.    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 L leg culture grew Pseudomonas.   -- Continue Linezolid, Minocycline, and Avycaz - end date of abx 4/24/24. Unable to go to LTAC due to cost of Avycaz.  -- IP Consult to Wound Care amputation sites   -- Turn q2h  -- MAP goal >60  "

## 2024-04-01 NOTE — RESPIRATORY THERAPY
RAPID RESPONSE RESPIRATORY CHART CHECK       Chart check completed.  Please call [phone] for further concerns or assistance.

## 2024-04-01 NOTE — SUBJECTIVE & OBJECTIVE
Interval History: NAEON. Patient was noted to have a new wound over his left lower extremity, new since 3/22. Picture in media tab. Wound care reconsulted.       Objective:     Vital Signs (Most Recent):  Temp: 98.1 °F (36.7 °C) (04/01/24 0819)  Pulse: 77 (04/01/24 0819)  Resp: 18 (04/01/24 0854)  BP: 107/71 (04/01/24 0819)  SpO2: 100 % (04/01/24 0819) Vital Signs (24h Range):  Temp:  [97.6 °F (36.4 °C)-98.9 °F (37.2 °C)] 98.1 °F (36.7 °C)  Pulse:  [68-82] 77  Resp:  [12-20] 18  SpO2:  [99 %-100 %] 100 %  BP: (107-120)/(63-76) 107/71     Weight: 59.4 kg (130 lb 15.3 oz)  Body mass index is 19.34 kg/m².    Intake/Output Summary (Last 24 hours) at 4/1/2024 0915  Last data filed at 4/1/2024 0600  Gross per 24 hour   Intake 596.93 ml   Output 2325 ml   Net -1728.07 ml           Physical Exam  Vitals and nursing note reviewed.   Constitutional:       Appearance: He is not ill-appearing.   Neck:      Comments: Tracheostomy in place  Cardiovascular:      Rate and Rhythm: Normal rate and regular rhythm.      Heart sounds: Normal heart sounds.   Pulmonary:      Effort: Pulmonary effort is normal.      Breath sounds: Normal breath sounds. No rales.   Abdominal:      General: There is no distension.      Palpations: Abdomen is soft.      Comments: Colostomy bag LLQ   Musculoskeletal:         General: No swelling or tenderness.      Comments: Bilateral AKA, staples intact. LLE with skin breakdown near staples  LUE amputation below elbow  Midline RUE.    Skin:     General: Skin is warm.      Findings: Erythema and lesion present. No rash.      Comments: See media tab for wound images     Neurological:      General: No focal deficit present.      Mental Status: He is alert and oriented to person, place, and time.   Psychiatric:         Mood and Affect: Mood normal.         Behavior: Behavior normal.             Significant Labs: All pertinent labs within the past 24 hours have been reviewed.    Significant Imaging: I have  reviewed all pertinent imaging results/findings within the past 24 hours.  Review of Systems

## 2024-04-01 NOTE — RESPIRATORY THERAPY
"RAPID RESPONSE RESPIRATORY THERAPY PROACTIVE NOTE           Time of visit: 1240     Code Status: Prior   : 1995  Bed: [facility]/Critical access hospital A:   MRN: [id number]  Time spent at the bedside: < 15 min    SITUATION    Evaluated patient for: LDA Check     BACKGROUND    Patient has no past medical history on file.  Clinically Significant Surgical Hx: tracheostomy    24 Hours Vitals Range:  Temp:  [97.4 °F (36.3 °C)-98.9 °F (37.2 °C)]   Pulse:  [61-86]   Resp:  [12-20]   BP: (107-123)/(63-84)   SpO2:  [99 %-100 %]     Labs:    Recent Labs     24  0202   *   K 4.0   CL 99   CO2 23   BUN 45*   CREATININE 1.1   GLU 87        No results for input(s): "PH", "PCO2", "PO2", "HCO3", "POCSATURATED", "BE" in the last 72 hours.    ASSESSMENT/INTERVENTIONS  Supplies at bedside      Last VS   Temp: 97.4 °F (36.3 °C) ( 1154)  Pulse: 61 ( 1506)  Resp: 16 ( 1423)  BP: 123/84 ( 1154)  SpO2: 100 % ( 1230)      Extra trachs at bedside:   Level of Consciousness: Level of Consciousness (AVPU): alert  Respiratory Effort: Respiratory Effort: Unlabored Expansion/Accessory Muscle Usage: Expansion/Accessory Muscles/Retractions: no use of accessory muscles, no retractions, expansion symmetric  All Lung Field Breath Sounds: All Lung Fields Breath Sounds: Anterior:, Lateral:, coarse  NATALIA Breath Sounds: coarse  LLL Breath Sounds: diminished  RUL Breath Sounds: coarse  RML Breath Sounds: diminished  RLL Breath Sounds: diminished  O2 Device/Concentration: 5l 21%  Surgical airway: Yes, Type: Shiley Size: 8, cuffed  Ambu at bedside:       Active Orders   Respiratory Care    Oxygen Continuous     Frequency: Continuous     Number of Occurrences: Until Specified     Order Questions:      Device type: Low flow      Device: Trach Collar      FiO2%: 60      Titrate O2 per Oxygen Titration Protocol: Yes      To maintain SpO2 goal of: >= 90%      Notify MD of: Inability to achieve desired SpO2; Sudden change in patient status and " requires 20% increase in FiO2; Patient requires >60% FiO2    Pulse Oximetry Continuous     Frequency: Continuous     Number of Occurrences: Until Specified    Routine tracheostomy care     Frequency: BID     Number of Occurrences: Until Specified    SUCTION PRN     Frequency: PRN     Number of Occurrences: Until Specified       RECOMMENDATIONS    We recommend: RRT Recs: Continue POC per primary team.      FOLLOW-UP    Please call back the Rapid Response RT, Nadja Velarde, RRT at x [phone] for any questions or concerns.

## 2024-04-02 PROBLEM — T07.XXXA MULTIPLE WOUNDS: Status: ACTIVE | Noted: 2024-03-09

## 2024-04-02 PROBLEM — I96 GANGRENE: Status: ACTIVE | Noted: 2024-04-02

## 2024-04-02 PROBLEM — T07.XXXA MULTIPLE WOUNDS: Status: RESOLVED | Noted: 2024-03-09 | Resolved: 2024-04-02

## 2024-04-02 PROBLEM — A41.9 SEVERE SEPSIS: Status: RESOLVED | Noted: 2024-02-21 | Resolved: 2024-04-02

## 2024-04-02 PROBLEM — R65.20 SEVERE SEPSIS: Status: RESOLVED | Noted: 2024-02-21 | Resolved: 2024-04-02

## 2024-04-02 PROCEDURE — 25000003 PHARM REV CODE 250: Performed by: PHYSICIAN ASSISTANT

## 2024-04-02 PROCEDURE — 63600175 PHARM REV CODE 636 W HCPCS: Performed by: HOSPITALIST

## 2024-04-02 PROCEDURE — 63600175 PHARM REV CODE 636 W HCPCS: Mod: JZ,JG | Performed by: PHYSICIAN ASSISTANT

## 2024-04-02 PROCEDURE — 99900026 HC AIRWAY MAINTENANCE (STAT)

## 2024-04-02 PROCEDURE — 25000003 PHARM REV CODE 250

## 2024-04-02 PROCEDURE — 27000207 HC ISOLATION

## 2024-04-02 PROCEDURE — 27000221 HC OXYGEN, UP TO 24 HOURS

## 2024-04-02 PROCEDURE — 25000003 PHARM REV CODE 250: Performed by: HOSPITALIST

## 2024-04-02 PROCEDURE — 25000003 PHARM REV CODE 250: Performed by: NURSE PRACTITIONER

## 2024-04-02 PROCEDURE — 94761 N-INVAS EAR/PLS OXIMETRY MLT: CPT

## 2024-04-02 PROCEDURE — 99900035 HC TECH TIME PER 15 MIN (STAT)

## 2024-04-02 PROCEDURE — 27100171 HC OXYGEN HIGH FLOW UP TO 24 HOURS

## 2024-04-02 PROCEDURE — 20600001 HC STEP DOWN PRIVATE ROOM

## 2024-04-02 RX ADMIN — HYPROMELLOSE 2910 1 DROP: 5 SOLUTION OPHTHALMIC at 05:04

## 2024-04-02 RX ADMIN — METHOCARBAMOL 500 MG: 500 TABLET ORAL at 08:04

## 2024-04-02 RX ADMIN — MINOCYCLINE HYDROCHLORIDE 200 MG: 100 CAPSULE ORAL at 08:04

## 2024-04-02 RX ADMIN — GABAPENTIN 400 MG: 400 CAPSULE ORAL at 10:04

## 2024-04-02 RX ADMIN — HYPROMELLOSE 2910 1 DROP: 5 SOLUTION OPHTHALMIC at 10:04

## 2024-04-02 RX ADMIN — CHLORHEXIDINE GLUCONATE 0.12% ORAL RINSE 15 ML: 1.2 LIQUID ORAL at 10:04

## 2024-04-02 RX ADMIN — LEVOTHYROXINE SODIUM 112 MCG: 112 TABLET ORAL at 05:04

## 2024-04-02 RX ADMIN — OXYCODONE HYDROCHLORIDE 30 MG: 10 TABLET, FILM COATED, EXTENDED RELEASE ORAL at 10:04

## 2024-04-02 RX ADMIN — HYPROMELLOSE 2910 1 DROP: 5 SOLUTION OPHTHALMIC at 02:04

## 2024-04-02 RX ADMIN — MINOCYCLINE HYDROCHLORIDE 200 MG: 100 CAPSULE ORAL at 10:04

## 2024-04-02 RX ADMIN — MICONAZOLE NITRATE: 20 CREAM TOPICAL at 10:04

## 2024-04-02 RX ADMIN — CEFTAZIDIME, AVIBACTAM 2.5 G: 2; .5 POWDER, FOR SOLUTION INTRAVENOUS at 04:04

## 2024-04-02 RX ADMIN — ENOXAPARIN SODIUM 30 MG: 30 INJECTION SUBCUTANEOUS at 05:04

## 2024-04-02 RX ADMIN — GABAPENTIN 400 MG: 400 CAPSULE ORAL at 08:04

## 2024-04-02 RX ADMIN — LEVETIRACETAM 750 MG: 750 TABLET, FILM COATED ORAL at 10:04

## 2024-04-02 RX ADMIN — FOLIC ACID 1 MG: 1 TABLET ORAL at 08:04

## 2024-04-02 RX ADMIN — MIDODRINE HYDROCHLORIDE 10 MG: 5 TABLET ORAL at 05:04

## 2024-04-02 RX ADMIN — MIDODRINE HYDROCHLORIDE 10 MG: 5 TABLET ORAL at 10:04

## 2024-04-02 RX ADMIN — ALPRAZOLAM 1 MG: 0.5 TABLET ORAL at 02:04

## 2024-04-02 RX ADMIN — SODIUM ZIRCONIUM CYCLOSILICATE 5 G: 5 POWDER, FOR SUSPENSION ORAL at 08:04

## 2024-04-02 RX ADMIN — MIDODRINE HYDROCHLORIDE 10 MG: 5 TABLET ORAL at 02:04

## 2024-04-02 RX ADMIN — POLYETHYLENE GLYCOL 3350 17 G: 17 POWDER, FOR SOLUTION ORAL at 08:04

## 2024-04-02 RX ADMIN — LINEZOLID 600 MG: 600 TABLET, FILM COATED ORAL at 10:04

## 2024-04-02 RX ADMIN — OXYCODONE HYDROCHLORIDE 10 MG: 10 TABLET ORAL at 05:04

## 2024-04-02 RX ADMIN — FAMOTIDINE 20 MG: 20 TABLET ORAL at 08:04

## 2024-04-02 RX ADMIN — ALPRAZOLAM 1 MG: 0.5 TABLET ORAL at 05:04

## 2024-04-02 RX ADMIN — CHLORHEXIDINE GLUCONATE 0.12% ORAL RINSE 15 ML: 1.2 LIQUID ORAL at 08:04

## 2024-04-02 RX ADMIN — GABAPENTIN 400 MG: 400 CAPSULE ORAL at 02:04

## 2024-04-02 RX ADMIN — HYPROMELLOSE 2910 1 DROP: 5 SOLUTION OPHTHALMIC at 08:04

## 2024-04-02 RX ADMIN — LINEZOLID 600 MG: 600 TABLET, FILM COATED ORAL at 08:04

## 2024-04-02 RX ADMIN — FAMOTIDINE 20 MG: 20 TABLET ORAL at 10:04

## 2024-04-02 RX ADMIN — LEVETIRACETAM 750 MG: 750 TABLET, FILM COATED ORAL at 08:04

## 2024-04-02 RX ADMIN — ERYTHROMYCIN: 5 OINTMENT OPHTHALMIC at 10:04

## 2024-04-02 RX ADMIN — MICONAZOLE NITRATE: 20 CREAM TOPICAL at 08:04

## 2024-04-02 RX ADMIN — METHOCARBAMOL 500 MG: 500 TABLET ORAL at 02:04

## 2024-04-02 RX ADMIN — CEFTAZIDIME, AVIBACTAM 2.5 G: 2; .5 POWDER, FOR SOLUTION INTRAVENOUS at 10:04

## 2024-04-02 RX ADMIN — OXYCODONE HYDROCHLORIDE 20 MG: 10 TABLET, FILM COATED, EXTENDED RELEASE ORAL at 08:04

## 2024-04-02 RX ADMIN — LIDOCAINE 5% 1 PATCH: 700 PATCH TOPICAL at 02:04

## 2024-04-02 RX ADMIN — METHOCARBAMOL 500 MG: 500 TABLET ORAL at 10:04

## 2024-04-02 RX ADMIN — CEFTAZIDIME, AVIBACTAM 2.5 G: 2; .5 POWDER, FOR SOLUTION INTRAVENOUS at 02:04

## 2024-04-02 RX ADMIN — OXYCODONE HYDROCHLORIDE 10 MG: 10 TABLET ORAL at 11:04

## 2024-04-02 NOTE — ASSESSMENT & PLAN NOTE
Palliative Care at OSH engaging in ongoing communications with family regarding GOC given extensive comorbid conditions and recurrent hospitalizations. Per documentation review and brief discussion with family on initial transfer admission encounter, patient & family wish to continue all medical and surgical options at this time.
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
## 2024-04-02 NOTE — PLAN OF CARE
[facility]  Discharge Reassessment    Primary Care Provider: No, Primary Doctor    Expected Discharge Date: 4/24/2024    Reassessment (most recent)       Discharge Reassessment - 04/02/24 1543          Discharge Reassessment    Assessment Type Discharge Planning Reassessment     Did the patient's condition or plan change since previous assessment? No     Discharge Plan discussed with: Patient     Communicated ANDRÉS with patient/caregiver Yes     Discharge Plan A Long-term acute care facility (LTAC)     Discharge Plan B Long-term acute care facility (LTAC)     Transition of Care Barriers None     Why the patient remains in the hospital Requires continued medical care                   Patient was denied by all LTACs in our database except [facility] ( [facility] ) due to patient being on Avycaz. [facility] stated they can't take patient with Avycaz. I spoke to patients primary team. Patient will be on Avycaz for several more weeks. Per notes, patient will be on Avycaz until 4/24/24.     I spoke with patients Insurance Company regarding possible carve out of Avycaz. I was told by patients Insurance company the accepting LTAC facility would have to submit for Care Out. [facility] submitted for Auth with Carve Out of Avycaz on 3/27/24. Carve Out of Avycaz was denied by Insurance Company per [facility] Admissions Rep. [facility] reconfirmed they can accept patient when off Avycaz.     Leadership team notified above information.

## 2024-04-02 NOTE — PLAN OF CARE
Case  sent an email to escalate the antibiotics expense and ask for the payor to do a carve out wit the accepting LTAC. Pavel SANON aware of all the above information.

## 2024-04-02 NOTE — ASSESSMENT & PLAN NOTE
>>ASSESSMENT AND PLAN FOR PRESSURE ULCERS OF SKIN OF MULTIPLE TOPOGRAPHIC SITES WRITTEN ON 4/2/2024 11:54 AM BY KELLY HUERTA MD    - See severe sepsis

## 2024-04-02 NOTE — SUBJECTIVE & OBJECTIVE
Interval History: No changes, no complaints. Has small amount of drainage on L stump. Does not appear infected.      Objective:     Vital Signs (Most Recent):  Temp: 98.2 °F (36.8 °C) (04/02/24 1124)  Pulse: 73 (04/02/24 1131)  Resp: 18 (04/02/24 1132)  BP: 105/72 (04/02/24 1124)  SpO2: 100 % (04/02/24 1131) Vital Signs (24h Range):  Temp:  [97.4 °F (36.3 °C)-98.2 °F (36.8 °C)] 98.2 °F (36.8 °C)  Pulse:  [] 73  Resp:  [14-20] 18  SpO2:  [98 %-100 %] 100 %  BP: ()/(56-84) 105/72     Weight: 59.4 kg (130 lb 15.3 oz)  Body mass index is 19.34 kg/m².    Intake/Output Summary (Last 24 hours) at 4/2/2024 1151  Last data filed at 4/2/2024 0913  Gross per 24 hour   Intake 1253.39 ml   Output 1875 ml   Net -621.61 ml         Physical Exam  Vitals and nursing note reviewed.   Constitutional:       Appearance: He is not ill-appearing.   Neck:      Comments: Tracheostomy in place  Cardiovascular:      Rate and Rhythm: Normal rate and regular rhythm.      Heart sounds: Normal heart sounds.   Pulmonary:      Effort: Pulmonary effort is normal.      Breath sounds: Normal breath sounds. No rales.   Abdominal:      General: There is no distension.      Palpations: Abdomen is soft.      Comments: Colostomy bag LLQ   Musculoskeletal:         General: No swelling or tenderness.      Comments: Bilateral AKA, staples intact. LLE with skin breakdown near staples  LUE amputation below elbow  Midline RUE.    Skin:     General: Skin is warm.      Findings: Erythema and lesion present. No rash.      Comments: See media tab for wound images     Neurological:      General: No focal deficit present.      Mental Status: He is alert and oriented to person, place, and time.   Psychiatric:         Mood and Affect: Mood normal.         Behavior: Behavior normal.             Significant Labs: All pertinent labs within the past 24 hours have been reviewed.    Significant Imaging: I have reviewed all pertinent imaging results/findings within  the past 24 hours.

## 2024-04-02 NOTE — ASSESSMENT & PLAN NOTE
>>ASSESSMENT AND PLAN FOR GANGRENE WRITTEN ON 4/2/2024 11:53 AM BY KELLY HUERTA MD    - See severe sepsis

## 2024-04-02 NOTE — RESPIRATORY THERAPY
"RAPID RESPONSE RESPIRATORY THERAPY PROACTIVE NOTE           Time of visit: 1143     Code Status: Prior   : 1995  Bed: [facility]:   MRN: [id number]  Time spent at the bedside: < 15 min    SITUATION    Evaluated patient for: LDA Check     BACKGROUND    Patient has no past medical history on file.  Clinically Significant Surgical Hx: tracheostomy    24 Hours Vitals Range:  Temp:  [97.4 °F (36.3 °C)-98.2 °F (36.8 °C)]   Pulse:  []   Resp:  [14-20]   BP: ()/(56-75)   SpO2:  [98 %-100 %]     Labs:    Recent Labs     24  0202   *   K 4.0   CL 99   CO2 23   BUN 45*   CREATININE 1.1   GLU 87        No results for input(s): "PH", "PCO2", "PO2", "HCO3", "POCSATURATED", "BE" in the last 72 hours.    ASSESSMENT/INTERVENTIONS  Pt on 5L 28% trach collar. Shiley size 8 cuffed trach in place. All supplies in room. Extra trachs at bedside - 6 and 8, both cuffed.       Last VS   Temp: 98.2 °F (36.8 °C) ( 1124)  Pulse: 75 ( 1233)  Resp: 18 ( 1132)  BP: 105/72 ( 1124)  SpO2: 98 % ( 1233)      Extra trachs at bedside: 6 and 8, both cuffed.  Level of Consciousness: Level of Consciousness (AVPU): alert  Respiratory Effort: Respiratory Effort: Unlabored Expansion/Accessory Muscle Usage: Expansion/Accessory Muscles/Retractions: no use of accessory muscles  All Lung Field Breath Sounds: All Lung Fields Breath Sounds: Anterior:, Lateral:, coarse  NATALIA Breath Sounds: coarse  LLL Breath Sounds: diminished  RUL Breath Sounds: coarse  RML Breath Sounds: diminished  RLL Breath Sounds: diminished  O2 Device/Concentration: 5L 28% TC.  Surgical airway: Yes, Type: Shiley Size: 8, cuffed  Ambu at bedside:       Active Orders   Respiratory Care    Oxygen PRN     Frequency: PRN     Number of Occurrences: Until Specified     Order Questions:      Device type: Low flow      Device: Trach Collar      FiO2%: 60      Titrate O2 per Oxygen Titration Protocol: Yes      To maintain SpO2 goal of: >= 90%      " Notify MD of: Inability to achieve desired SpO2; Sudden change in patient status and requires 20% increase in FiO2; Patient requires >60% FiO2    Pulse Oximetry Continuous     Frequency: Continuous     Number of Occurrences: Until Specified    Routine tracheostomy care     Frequency: BID     Number of Occurrences: Until Specified    SUCTION PRN     Frequency: PRN     Number of Occurrences: Until Specified       RECOMMENDATIONS    We recommend: RRT Recs: Continue POC per primary team.      FOLLOW-UP    Please call back the Rapid Response RT, Bryant Mcmillan RRT at x [phone] for any questions or concerns.
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
## 2024-04-03 PROCEDURE — 27200966 HC CLOSED SUCTION SYSTEM

## 2024-04-03 PROCEDURE — 99900026 HC AIRWAY MAINTENANCE (STAT)

## 2024-04-03 PROCEDURE — 25000003 PHARM REV CODE 250

## 2024-04-03 PROCEDURE — 25000003 PHARM REV CODE 250: Performed by: HOSPITALIST

## 2024-04-03 PROCEDURE — 25000003 PHARM REV CODE 250: Performed by: PHYSICIAN ASSISTANT

## 2024-04-03 PROCEDURE — 27000221 HC OXYGEN, UP TO 24 HOURS

## 2024-04-03 PROCEDURE — 99900035 HC TECH TIME PER 15 MIN (STAT)

## 2024-04-03 PROCEDURE — 20600001 HC STEP DOWN PRIVATE ROOM

## 2024-04-03 PROCEDURE — 27000207 HC ISOLATION

## 2024-04-03 PROCEDURE — 63600175 PHARM REV CODE 636 W HCPCS: Performed by: HOSPITALIST

## 2024-04-03 PROCEDURE — 94761 N-INVAS EAR/PLS OXIMETRY MLT: CPT

## 2024-04-03 PROCEDURE — 25000003 PHARM REV CODE 250: Performed by: NURSE PRACTITIONER

## 2024-04-03 PROCEDURE — 63600175 PHARM REV CODE 636 W HCPCS: Mod: JZ,JG | Performed by: PHYSICIAN ASSISTANT

## 2024-04-03 RX ADMIN — HYPROMELLOSE 2910 1 DROP: 5 SOLUTION OPHTHALMIC at 10:04

## 2024-04-03 RX ADMIN — METHOCARBAMOL 500 MG: 500 TABLET ORAL at 10:04

## 2024-04-03 RX ADMIN — ALPRAZOLAM 1 MG: 0.5 TABLET ORAL at 05:04

## 2024-04-03 RX ADMIN — MICONAZOLE NITRATE: 20 CREAM TOPICAL at 10:04

## 2024-04-03 RX ADMIN — LIDOCAINE 5% 1 PATCH: 700 PATCH TOPICAL at 01:04

## 2024-04-03 RX ADMIN — LINEZOLID 600 MG: 600 TABLET, FILM COATED ORAL at 10:04

## 2024-04-03 RX ADMIN — HYPROMELLOSE 2910 1 DROP: 5 SOLUTION OPHTHALMIC at 05:04

## 2024-04-03 RX ADMIN — CHLORHEXIDINE GLUCONATE 0.12% ORAL RINSE 15 ML: 1.2 LIQUID ORAL at 10:04

## 2024-04-03 RX ADMIN — ENOXAPARIN SODIUM 30 MG: 30 INJECTION SUBCUTANEOUS at 05:04

## 2024-04-03 RX ADMIN — FAMOTIDINE 20 MG: 20 TABLET ORAL at 10:04

## 2024-04-03 RX ADMIN — METHOCARBAMOL 500 MG: 500 TABLET ORAL at 03:04

## 2024-04-03 RX ADMIN — LEVOTHYROXINE SODIUM 112 MCG: 112 TABLET ORAL at 05:04

## 2024-04-03 RX ADMIN — OXYCODONE HYDROCHLORIDE 10 MG: 10 TABLET ORAL at 01:04

## 2024-04-03 RX ADMIN — MIDODRINE HYDROCHLORIDE 10 MG: 5 TABLET ORAL at 01:04

## 2024-04-03 RX ADMIN — GABAPENTIN 400 MG: 400 CAPSULE ORAL at 10:04

## 2024-04-03 RX ADMIN — SODIUM ZIRCONIUM CYCLOSILICATE 5 G: 5 POWDER, FOR SUSPENSION ORAL at 10:04

## 2024-04-03 RX ADMIN — OXYCODONE HYDROCHLORIDE 20 MG: 10 TABLET, FILM COATED, EXTENDED RELEASE ORAL at 10:04

## 2024-04-03 RX ADMIN — MINOCYCLINE HYDROCHLORIDE 200 MG: 100 CAPSULE ORAL at 10:04

## 2024-04-03 RX ADMIN — MIDODRINE HYDROCHLORIDE 10 MG: 5 TABLET ORAL at 05:04

## 2024-04-03 RX ADMIN — GABAPENTIN 400 MG: 400 CAPSULE ORAL at 03:04

## 2024-04-03 RX ADMIN — CEFTAZIDIME, AVIBACTAM 2.5 G: 2; .5 POWDER, FOR SOLUTION INTRAVENOUS at 10:04

## 2024-04-03 RX ADMIN — OXYCODONE HYDROCHLORIDE 30 MG: 10 TABLET, FILM COATED, EXTENDED RELEASE ORAL at 10:04

## 2024-04-03 RX ADMIN — LEVETIRACETAM 750 MG: 750 TABLET, FILM COATED ORAL at 10:04

## 2024-04-03 RX ADMIN — CEFTAZIDIME, AVIBACTAM 2.5 G: 2; .5 POWDER, FOR SOLUTION INTRAVENOUS at 12:04

## 2024-04-03 RX ADMIN — ALPRAZOLAM 1 MG: 0.5 TABLET ORAL at 10:04

## 2024-04-03 RX ADMIN — CEFTAZIDIME, AVIBACTAM 2.5 G: 2; .5 POWDER, FOR SOLUTION INTRAVENOUS at 05:04

## 2024-04-03 RX ADMIN — MIDODRINE HYDROCHLORIDE 10 MG: 5 TABLET ORAL at 10:04

## 2024-04-03 RX ADMIN — HYPROMELLOSE 2910 1 DROP: 5 SOLUTION OPHTHALMIC at 12:04

## 2024-04-03 RX ADMIN — FOLIC ACID 1 MG: 1 TABLET ORAL at 10:04

## 2024-04-03 NOTE — PLAN OF CARE
Recommendations    Continue Regular diet w/ 2 Boost with each meal  Continue Francisco BID x 14 days for wound healing  RD Following     Goals: Meet % een/epn by next RD f/u  Nutrition Goal Status: progressing towards goal  Communication of RD Recs: reviewed with RN (4/3/24)
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
## 2024-04-03 NOTE — PLAN OF CARE
[facility] Plan of Care Note  Dx Gangrene/Severe Sepsis    Shift Events none    Goals of Care: pain management, respiratory, wound healing, antibiotics, monitor I/O's    Neuro: AAO x 4    Vital Signs: VSS    Respiratory: Trach 5/28%    Diet: Regular ( need assistance)    Is patient tolerating current diet? yes    GTTS: none    Urine Output/Bowel Movement: adequate urine output with sterling and bm with ostomy in place    Drains/Tubes/Tube Feeds (include total output/shift): Sterling , Colostomy    Lines: R midline    Accuchecks: none    Skin: generalized wounds with mepilex and staples BEATRICE    Fall Risk Score: 16    Activity level? Bedrest , full assist    Any scheduled procedures? none    Any safety concerns? Aspiration, fall precautions    Other: none    Problem: Adult Inpatient Plan of Care  Goal: Plan of Care Review  Outcome: Ongoing, Progressing  Goal: Patient-Specific Goal (Individualized)  Outcome: Ongoing, Progressing  Goal: Absence of Hospital-Acquired Illness or Injury  Outcome: Ongoing, Progressing  Goal: Optimal Comfort and Wellbeing  Outcome: Ongoing, Progressing  Goal: Readiness for Transition of Care  Outcome: Ongoing, Progressing     Problem: Adjustment to Illness (Sepsis/Septic Shock)  Goal: Optimal Coping  Outcome: Ongoing, Progressing     Problem: Bleeding (Sepsis/Septic Shock)  Goal: Absence of Bleeding  Outcome: Ongoing, Progressing     Problem: Glycemic Control Impaired (Sepsis/Septic Shock)  Goal: Blood Glucose Level Within Desired Range  Outcome: Ongoing, Progressing     Problem: Infection Progression (Sepsis/Septic Shock)  Goal: Absence of Infection Signs and Symptoms  Outcome: Ongoing, Progressing     Problem: Nutrition Impaired (Sepsis/Septic Shock)  Goal: Optimal Nutrition Intake  Outcome: Ongoing, Progressing     Problem: Coping Ineffective  Goal: Effective Coping  Outcome: Ongoing, Progressing     Problem: Infection  Goal: Absence of Infection Signs and Symptoms  Outcome: Ongoing, Progressing      Problem: Impaired Wound Healing  Goal: Optimal Wound Healing  Outcome: Ongoing, Progressing     Problem: Fall Injury Risk  Goal: Absence of Fall and Fall-Related Injury  Outcome: Ongoing, Progressing

## 2024-04-03 NOTE — PLAN OF CARE
[facility]  Discharge Reassessment    Primary Care Provider: No, Primary Doctor    Expected Discharge Date: 4/24/2024    Reassessment (most recent)       Discharge Reassessment - 04/03/24 1335          Discharge Reassessment    Assessment Type Discharge Planning Reassessment     Did the patient's condition or plan change since previous assessment? No     Discharge Plan discussed with: Patient     Communicated ANDRÉS with patient/caregiver Yes     Discharge Plan A Long-term acute care facility (LTAC)     Discharge Plan B Long-term acute care facility (LTAC)     Transition of Care Barriers None     Why the patient remains in the hospital Requires continued medical care                   Contacted [facility] to review patient for possible acceptance.     [facility] will review patient this after noon for possible acceptance.     If accepted, they will submit for Auth with [facility] [employer].       List of LTAC denials.     [facility] Phone: [phone]      * COVID-19: NOT able to accept COVID-19 positive patients [address] [place] [address] 3/22/2024 12:10 (CT)  3/22/2024 14:09 (CT)  -  3/25/2024 8:58 (CT)  3/25/2024 8:58 (CT)  Response: No, unable to accept patient  Reason: Care Needs Exceed Current Capacity  Waiting for you     [facility] Phone: [phone]    case by case based on symptoms  * COVID-19: NOT able to accept COVID-19 positive patients,* COVID-19: Services not specified [address] [place] [address] 3/22/2024 12:11 (CT)  3/22/2024 15:40 (CT)  -  3/22/2024 13:03 (CT)  3/22/2024 13:03 (CT)  Response: No, unable to accept patient  Reason: Other (see comments)  Comments: Unable to accept s/t to very high med cost with avycaz and minocycline. Thank you for the referral!  Waiting for recipient     [facility]  Phone: [phone]    case by case basis  * COVID-19: Positive patients under care,* COVID-19: Willing/Equipped to accept COVID-19 positive patients,* High-Risk Isolation Patients: Willing/Equipped to accept High-Risk Isolation Patients [address] [place] [address] 3/22/2024 12:10 (CT)  3/22/2024 14:09 (CT)  -  3/26/2024 14:14 (CT)  3/26/2024 14:14 (CT)  Response: No, unable to accept patient  Reason: No Available Bed  Waiting for you     [facility] Phone: [phone]    1 negative test  * COVID-19: Willing/Equipped to accept COVID-19 positive patients,* High-Risk Isolation Patients: Willing/Equipped to accept High-Risk Isolation Patients [address] [facility] [address] [place] [address] 3/22/2024 12:10 (CT)  3/22/2024 14:09 (CT)  -  3/22/2024 13:58 (CT)  3/22/2024 13:58 (CT)  Response: No, unable to accept patient  Reason: Out of Network  Waiting for you     [facility] Phone: [phone]      * COVID-19: Positive patients under care,* COVID-19: Willing/Equipped to accept COVID-19 positive patients,* High-Risk Isolation Patients: Willing/Equipped to accept High-Risk Isolation Patients [address] [place] [address] 3/22/2024 12:10 (CT)  3/22/2024 14:09 (CT)  -  3/25/2024 8:58 (CT)  3/25/2024 8:58 (CT)  Response: No, unable to accept patient  Reason: Care Needs Exceed Current Capacity  Waiting for you     [facility] Phone: [phone]      * COVID-19: Willing/Equipped to accept COVID-19 positive patients,* High-Risk Isolation Patients: Willing/Equipped to accept High-Risk Isolation Patients [address] [place] [address] 3/22/2024 12:10 (CT)  3/22/2024 14:09 (CT)  -  3/22/2024 12:15 (CT)  3/22/2024 12:15 (CT)  Response: No, unable to accept patient  Reason: Other (see comments)  Comments: Administrative Denial. Thank you for the referral.   Waiting for you     [facility] Phone: [phone]    COVID POS with symptoms  * COVID-19: Positive patients under care,* COVID-19: Services not specified,* COVID-19: Willing/Equipped to accept COVID-19 positive patients,* High-Risk Isolation Patients: Willing/Equipped to accept High-Risk Isolation Patients [address] [place] [address] 3/22/2024 12:11 (CT)  3/22/2024 14:09 (CT)  -  3/22/2024 12:15 (CT)  3/22/2024 12:15 (CT)  Response: No, unable to accept patient  Reason: Other (see comments)  Comments: Administrative Denial. Thank you for the referral.  Waiting for you     [facility] Phone: [phone]      * COVID-19: Willing/Equipped to accept COVID-19 positive patients,* High-Risk Isolation Patients: Willing/Equipped to accept High-Risk Isolation Patients [address] [place] [address] 3/22/2024 12:10 (CT)  4/3/2024 13:40 (CT)  -  3/22/2024 13:20 (CT)  4/3/2024 12:18 (CT)  Response: No, unable to accept patient  Reason: Other (see comments)  Comments: UNABLE TO ACCOMODATE. THANKS FOR REFERRAL  Waiting for you

## 2024-04-03 NOTE — RESPIRATORY THERAPY
"RAPID RESPONSE RESPIRATORY THERAPY PROACTIVE NOTE           Time of visit: 920    Code Status: Prior   : 1995  Bed: [facility]   MRN: [id number]  Time spent at the bedside: < 15 min    SITUATION    Evaluated patient for: LDA Check     BACKGROUND    Patient has no past medical history on file.  Clinically Significant Surgical Hx: tracheostomy    24 Hours Vitals Range:  Temp:  [97 °F (36.1 °C)-98.5 °F (36.9 °C)]   Pulse:  []   Resp:  [12-19]   BP: ()/(60-80)   SpO2:  [97 %-100 %]     Labs:    Recent Labs     24  0202   *   K 4.0   CL 99   CO2 23   BUN 45*   CREATININE 1.1   GLU 87        No results for input(s): "PH", "PCO2", "PO2", "HCO3", "POCSATURATED", "BE" in the last 72 hours.    ASSESSMENT/INTERVENTIONS  Pt resting comfortably, voiced no need for respiratory interventions during encounter. Trach clean and secure, all supplies at bedside.      Last VS   Temp: 97 °F (36.1 °C) ( 06)  Pulse: 88 ( 1044)  Resp: 18 ( 1044)  BP: 109/69 ( 1044)  SpO2: 98 % ( 1044)      Extra trachs at bedside: 8.0 & 6.0 Shiley cuffed  Level of Consciousness: Level of Consciousness (AVPU): alert  Respiratory Effort: Respiratory Effort: Unlabored Expansion/Accessory Muscle Usage: Expansion/Accessory Muscles/Retractions: no retractions, no use of accessory muscles  All Lung Field Breath Sounds: All Lung Fields Breath Sounds: diminished  NATALIA Breath Sounds: coarse  LLL Breath Sounds: diminished  RUL Breath Sounds: coarse  RML Breath Sounds: diminished  RLL Breath Sounds: diminished  O2 Device/Concentration: trach collar 5L/21%  Surgical airway: Yes, Type: Shiley Size: 8, cuffed  Ambu at bedside: Yes     Active Orders   Respiratory Care    Oxygen PRN     Frequency: PRN     Number of Occurrences: Until Specified     Order Questions:      Device type: Low flow      Device: Trach Collar      FiO2%: 60      Titrate O2 per Oxygen Titration Protocol: Yes      To maintain SpO2 goal of: >= " 90%      Notify MD of: Inability to achieve desired SpO2; Sudden change in patient status and requires 20% increase in FiO2; Patient requires >60% FiO2    Pulse Oximetry Continuous     Frequency: Continuous     Number of Occurrences: Until Specified    Routine tracheostomy care     Frequency: BID     Number of Occurrences: Until Specified    SUCTION PRN     Frequency: PRN     Number of Occurrences: Until Specified       RECOMMENDATIONS    We recommend: RRT Recs: Continue POC per primary team.      FOLLOW-UP    Please call back the Rapid Response RT, Francesco Caballero, RRT at x [phone] for any questions or concerns.

## 2024-04-03 NOTE — SUBJECTIVE & OBJECTIVE
Interval History: No acute changes overnight. Wound care done yesterday.       Objective:     Vital Signs (Most Recent):  Temp: 97 °F (36.1 °C) (04/03/24 0644)  Pulse: 73 (04/03/24 0759)  Resp: 18 (04/03/24 0752)  BP: 125/80 (04/03/24 0752)  SpO2: 99 % (04/03/24 0759) Vital Signs (24h Range):  Temp:  [97 °F (36.1 °C)-98.5 °F (36.9 °C)] 97 °F (36.1 °C)  Pulse:  [] 73  Resp:  [16-19] 18  SpO2:  [97 %-100 %] 99 %  BP: ()/(60-80) 125/80     Weight: 59.4 kg (130 lb 15.3 oz)  Body mass index is 19.34 kg/m².    Intake/Output Summary (Last 24 hours) at 4/3/2024 1010  Last data filed at 4/3/2024 0958  Gross per 24 hour   Intake 360 ml   Output 2050 ml   Net -1690 ml         Physical Exam  Vitals and nursing note reviewed.   Constitutional:       Appearance: He is not ill-appearing.   Neck:      Comments: Tracheostomy in place  Cardiovascular:      Rate and Rhythm: Normal rate and regular rhythm.      Heart sounds: Normal heart sounds.   Pulmonary:      Effort: Pulmonary effort is normal.      Breath sounds: Normal breath sounds. No rales.   Abdominal:      General: There is no distension.      Palpations: Abdomen is soft.      Comments: Colostomy bag LLQ   Musculoskeletal:         General: No swelling or tenderness.      Comments: Bilateral AKA, staples intact. LLE with skin breakdown near staples  LUE amputation below elbow  Midline RUE.    Skin:     General: Skin is warm.      Findings: Erythema and lesion present. No rash.      Comments: See media tab for wound images     Neurological:      General: No focal deficit present.      Mental Status: He is alert and oriented to person, place, and time.   Psychiatric:         Mood and Affect: Mood normal.         Behavior: Behavior normal.             Significant Labs: All pertinent labs within the past 24 hours have been reviewed.    Significant Imaging: I have reviewed all pertinent imaging results/findings within the past 24 hours.
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
## 2024-04-03 NOTE — CONSULTS
[facility]  Wound Care    Patient Name:  Jyoti Mayberry   MRN:  [id number]  Date: 4/2/2024  Diagnosis: Gangrene    History:     History reviewed. No pertinent past medical history.    Social History     Socioeconomic History    Marital status: Single   Tobacco Use    Smoking status: Never    Smokeless tobacco: Never   Substance and Sexual Activity    Alcohol use: Not Currently    Drug use: Not Currently    Sexual activity: Not Currently     Social Determinants of Health     Financial Resource Strain: Low Risk  (3/7/2024)    Overall Financial Resource Strain (CARDIA)     Difficulty of Paying Living Expenses: Not hard at all   Food Insecurity: No Food Insecurity (3/7/2024)    Hunger Vital Sign     Worried About Running Out of Food in the Last Year: Never true     Ran Out of Food in the Last Year: Never true   Transportation Needs: No Transportation Needs (3/7/2024)    PRAPARE - Transportation     Lack of Transportation (Medical): No     Lack of Transportation (Non-Medical): No   Physical Activity: Inactive (3/7/2024)    Exercise Vital Sign     Days of Exercise per Week: 0 days     Minutes of Exercise per Session: 0 min   Stress: Stress Concern Present (3/7/2024)    [employer] - Occupational Stress Questionnaire     Feeling of Stress : Very much   Social Connections: Socially Isolated (3/7/2024)    Social Connection and Isolation Panel [NHANES]     Frequency of Communication with Friends and Family: More than three times a week     Frequency of Social Gatherings with Friends and Family: More than three times a week     Attends Jehovah's witness Services: Never     Active Member of Clubs or Organizations: No     Attends Club or Organization Meetings: Never     Marital Status: Never    Housing Stability: Unknown (3/7/2024)    Housing Stability Vital Sign     Unable to Pay for Housing in the Last Year: No     Unstable Housing in the Last Year: No       Precautions:     Allergies as of  03/05/2024    (No Known Allergies)       [facility] Assessment Details/Treatment     Patient seen for wound care consultation. -placed by RN for LLE incision site.    Reviewed chart for this encounter.   See Flow Sheet for findings.    Pt resting with eyes closed- agreeable to targeted assmt of LLE secondary to explained new consult for incision site. Incision remains intact with approximated edges/ staples; however- mid incision at inferior aspect- shallow full thickness erosion present- red moist. Cleansed with Vashe solution for antimicrobial property- and covered with border foam dressing for absorption, maintenance of moist wound environment, and added protection. LLQ ostomy intact without evident undermined stool present. Pt refused local wound care/ assmt to LUE and posterior aspect of torso/ sacrum stating wound care due MWF. Plan made together with pt that he would be amenable to local care tomorrow after lunch- agreed at approx 1300. Mom on phone via [employer]- reviewed plan of care and new consult/ findings today. All in agreement with plan- will return tomorrow.    RECOMMENDATIONS:  LLE- incision: 1)clean with Vashe solution. 2)cover with border foam dressing. Perform care MWF.    Discussed POC with patient and mother.  See EMR for orders     Bedside nursing to continue care & monitoring.  Bedside nursing to maintain pressure injury prevention interventions.  Current documented Gallito score is 9 with a nutrition sub scale score of 4.     04/02/24 1600   WOCN Assessment   WOCN Total Time (mins) 30   Visit Date 04/02/24   Visit Time 1600   Consult Type New;Follow Up   WOCN Speciality Wound;Ostomy   WOCN List colostomy   Wound surgical   Intervention assessed;changed;applied;chart review;orders   Teaching on-going  (pt- new consult for LLE incision. Mom(phone)/ pt- plan for TBSA skin/ wound assmt- care tomorrow.- plan for approx 1:00.)   Positioning   Body Position turned   Head of Bed (HOB) Positioning HOB at 30  degrees   Positioning/Transfer Devices pillows   Pressure Injury Prevention    Check Moisture Management Pad Done   Check Medical Devices Done        Incision/Site 03/13/24 1559 Left Leg other (see comments)   Date First Assessed/Time First Assessed: 03/13/24 1559   Present Prior to Hospital Arrival?: No  Side: Left  Location: Leg  Incision Type: (c) other (see comments)  Closure Method: (c) Sutures;Staples   Wound Image    Incision WDL ex   Dressing Appearance No dressing   Drainage Amount Small   Drainage Characteristics/Odor Sanguineous   Appearance Red;Moist;Staples intact   Red (%), Wound Tissue Color 100 %   Periwound Area Dry;Intact   Care Cleansed with:;Antimicrobial agent  (Vashe solution)   Dressing Applied;Foam   Dressing Change Due 04/05/24        Incision/Site 03/13/24 1559 Right Leg other (see comments)   Date First Assessed/Time First Assessed: 03/13/24 1559   Present Prior to Hospital Arrival?: No  Side: Right  Location: Leg  Incision Type: (c) other (see comments)  Closure Method: (c) Sutures;Staples   Wound Image         Colostomy  LLQ   Placement Date: (c)    Present Prior to Hospital Arrival?: Yes  Location: LLQ   Stomal Appliance Intact;No Leakage   Peristomal Assessment Clean   Stoma Function stool;flatus        Urethral Catheter 02/21/24 2330 Temperature probe   Placement Date/Time: 02/21/24 2330   Present Prior to Hospital Arrival?: No  Hand Hygiene: Performed  Inserted by: JANETT   Name: JANETT Sy  Name of 2nd Person Present for Insertion: JANETT Guerrero  Insertion attempts (enter comment if more than 2 a...   Site Assessment Clean;Intact   Collection Container Urimeter   Securement Method secured to top of thigh w/ adhesive device   Catheter Care Performed yes   Reason for Continuing Urinary Catheterization Non-healing sacral/perineal wound   Adult Surgical Airway 05/25/23 1135 Shiley Cuffed 8.0 / 85 mm   Placement Date/Time: 05/25/23 1135   Inserted by: MD  Placed By: ICU physician   Type: Tracheostomy  Brand: Shiley  Airway Device Style: Cuffed  Airway Device Size: 8.0 / 85 mm   Status Secured   Site Assessment Clean;Dry   Ties Assessment Clean;Dry     Will continue to follow as needed and/ or as directed until discharge.    Rae Jackson BSN, RN, CWOCN  04/02/2024

## 2024-04-04 PROCEDURE — 25000003 PHARM REV CODE 250

## 2024-04-04 PROCEDURE — 27000221 HC OXYGEN, UP TO 24 HOURS

## 2024-04-04 PROCEDURE — 63600175 PHARM REV CODE 636 W HCPCS: Mod: JZ,JG | Performed by: PHYSICIAN ASSISTANT

## 2024-04-04 PROCEDURE — 27200966 HC CLOSED SUCTION SYSTEM

## 2024-04-04 PROCEDURE — 63600175 PHARM REV CODE 636 W HCPCS: Performed by: HOSPITALIST

## 2024-04-04 PROCEDURE — 25000003 PHARM REV CODE 250: Performed by: HOSPITALIST

## 2024-04-04 PROCEDURE — 94761 N-INVAS EAR/PLS OXIMETRY MLT: CPT

## 2024-04-04 PROCEDURE — 20600001 HC STEP DOWN PRIVATE ROOM

## 2024-04-04 PROCEDURE — 99900026 HC AIRWAY MAINTENANCE (STAT)

## 2024-04-04 PROCEDURE — 99900035 HC TECH TIME PER 15 MIN (STAT)

## 2024-04-04 PROCEDURE — 27000207 HC ISOLATION

## 2024-04-04 PROCEDURE — 25000003 PHARM REV CODE 250: Performed by: NURSE PRACTITIONER

## 2024-04-04 PROCEDURE — 25000003 PHARM REV CODE 250: Performed by: PHYSICIAN ASSISTANT

## 2024-04-04 RX ORDER — FOLIC ACID 1 MG/1
1 TABLET ORAL DAILY
Status: DISCONTINUED | OUTPATIENT
Start: 2024-04-05 | End: 2024-04-11 | Stop reason: HOSPADM

## 2024-04-04 RX ORDER — LINEZOLID 600 MG/1
600 TABLET, FILM COATED ORAL EVERY 12 HOURS
Status: DISCONTINUED | OUTPATIENT
Start: 2024-04-04 | End: 2024-04-08

## 2024-04-04 RX ORDER — MINOCYCLINE HYDROCHLORIDE 100 MG/1
200 CAPSULE ORAL EVERY 12 HOURS
Status: DISCONTINUED | OUTPATIENT
Start: 2024-04-04 | End: 2024-04-07

## 2024-04-04 RX ORDER — GABAPENTIN 400 MG/1
400 CAPSULE ORAL 3 TIMES DAILY
Status: DISCONTINUED | OUTPATIENT
Start: 2024-04-04 | End: 2024-04-07

## 2024-04-04 RX ORDER — MIDODRINE HYDROCHLORIDE 5 MG/1
10 TABLET ORAL EVERY 8 HOURS
Status: DISCONTINUED | OUTPATIENT
Start: 2024-04-04 | End: 2024-04-08

## 2024-04-04 RX ORDER — POLYETHYLENE GLYCOL 3350 17 G/17G
17 POWDER, FOR SOLUTION ORAL DAILY
Status: DISCONTINUED | OUTPATIENT
Start: 2024-04-05 | End: 2024-04-08

## 2024-04-04 RX ORDER — LEVOTHYROXINE SODIUM 112 UG/1
112 TABLET ORAL
Status: DISCONTINUED | OUTPATIENT
Start: 2024-04-05 | End: 2024-04-08

## 2024-04-04 RX ADMIN — ERYTHROMYCIN: 5 OINTMENT OPHTHALMIC at 12:04

## 2024-04-04 RX ADMIN — METHOCARBAMOL 500 MG: 500 TABLET ORAL at 09:04

## 2024-04-04 RX ADMIN — GABAPENTIN 400 MG: 400 CAPSULE ORAL at 09:04

## 2024-04-04 RX ADMIN — CEFTAZIDIME, AVIBACTAM 2.5 G: 2; .5 POWDER, FOR SOLUTION INTRAVENOUS at 05:04

## 2024-04-04 RX ADMIN — LEVOTHYROXINE SODIUM 112 MCG: 112 TABLET ORAL at 05:04

## 2024-04-04 RX ADMIN — HYPROMELLOSE 2910 1 DROP: 5 SOLUTION OPHTHALMIC at 01:04

## 2024-04-04 RX ADMIN — GABAPENTIN 400 MG: 400 CAPSULE ORAL at 02:04

## 2024-04-04 RX ADMIN — POLYETHYLENE GLYCOL 3350 17 G: 17 POWDER, FOR SOLUTION ORAL at 09:04

## 2024-04-04 RX ADMIN — ENOXAPARIN SODIUM 30 MG: 30 INJECTION SUBCUTANEOUS at 04:04

## 2024-04-04 RX ADMIN — HYPROMELLOSE 2910 1 DROP: 5 SOLUTION OPHTHALMIC at 04:04

## 2024-04-04 RX ADMIN — FAMOTIDINE 20 MG: 20 TABLET ORAL at 09:04

## 2024-04-04 RX ADMIN — METHOCARBAMOL 500 MG: 500 TABLET ORAL at 02:04

## 2024-04-04 RX ADMIN — LEVETIRACETAM 750 MG: 750 TABLET, FILM COATED ORAL at 09:04

## 2024-04-04 RX ADMIN — MIDODRINE HYDROCHLORIDE 10 MG: 5 TABLET ORAL at 09:04

## 2024-04-04 RX ADMIN — MIDODRINE HYDROCHLORIDE 10 MG: 5 TABLET ORAL at 01:04

## 2024-04-04 RX ADMIN — CEFTAZIDIME, AVIBACTAM 2.5 G: 2; .5 POWDER, FOR SOLUTION INTRAVENOUS at 09:04

## 2024-04-04 RX ADMIN — LIDOCAINE 5% 1 PATCH: 700 PATCH TOPICAL at 01:04

## 2024-04-04 RX ADMIN — CHLORHEXIDINE GLUCONATE 0.12% ORAL RINSE 15 ML: 1.2 LIQUID ORAL at 09:04

## 2024-04-04 RX ADMIN — MIDODRINE HYDROCHLORIDE 10 MG: 5 TABLET ORAL at 05:04

## 2024-04-04 RX ADMIN — OXYCODONE HYDROCHLORIDE 30 MG: 10 TABLET, FILM COATED, EXTENDED RELEASE ORAL at 09:04

## 2024-04-04 RX ADMIN — HYPROMELLOSE 2910 1 DROP: 5 SOLUTION OPHTHALMIC at 09:04

## 2024-04-04 RX ADMIN — LINEZOLID 600 MG: 600 TABLET, FILM COATED ORAL at 09:04

## 2024-04-04 RX ADMIN — MICONAZOLE NITRATE: 20 CREAM TOPICAL at 09:04

## 2024-04-04 RX ADMIN — OXYCODONE HYDROCHLORIDE 20 MG: 10 TABLET, FILM COATED, EXTENDED RELEASE ORAL at 09:04

## 2024-04-04 RX ADMIN — MINOCYCLINE HYDROCHLORIDE 200 MG: 100 CAPSULE ORAL at 09:04

## 2024-04-04 RX ADMIN — CHLORHEXIDINE GLUCONATE 0.12% ORAL RINSE 15 ML: 1.2 LIQUID ORAL at 12:04

## 2024-04-04 RX ADMIN — HYPROMELLOSE 2910 1 DROP: 5 SOLUTION OPHTHALMIC at 10:04

## 2024-04-04 RX ADMIN — ERYTHROMYCIN: 5 OINTMENT OPHTHALMIC at 09:04

## 2024-04-04 RX ADMIN — SODIUM ZIRCONIUM CYCLOSILICATE 5 G: 5 POWDER, FOR SUSPENSION ORAL at 09:04

## 2024-04-04 RX ADMIN — FOLIC ACID 1 MG: 1 TABLET ORAL at 09:04

## 2024-04-04 RX ADMIN — CEFTAZIDIME, AVIBACTAM 2.5 G: 2; .5 POWDER, FOR SOLUTION INTRAVENOUS at 01:04

## 2024-04-04 NOTE — PROGRESS NOTES
[facility]  Wound Care    Patient Name:  Jyoti Mayberry   MRN:  [id number]  Date: 4/3/2024  Diagnosis: Gangrene    History:     History reviewed. No pertinent past medical history.    Social History     Socioeconomic History    Marital status: Single   Tobacco Use    Smoking status: Never    Smokeless tobacco: Never   Substance and Sexual Activity    Alcohol use: Not Currently    Drug use: Not Currently    Sexual activity: Not Currently     Social Determinants of Health     Financial Resource Strain: Low Risk  (3/7/2024)    Overall Financial Resource Strain (CARDIA)     Difficulty of Paying Living Expenses: Not hard at all   Food Insecurity: No Food Insecurity (3/7/2024)    Hunger Vital Sign     Worried About Running Out of Food in the Last Year: Never true     Ran Out of Food in the Last Year: Never true   Transportation Needs: No Transportation Needs (3/7/2024)    PRAPARE - Transportation     Lack of Transportation (Medical): No     Lack of Transportation (Non-Medical): No   Physical Activity: Inactive (3/7/2024)    Exercise Vital Sign     Days of Exercise per Week: 0 days     Minutes of Exercise per Session: 0 min   Stress: Stress Concern Present (3/7/2024)    [employer] - Occupational Stress Questionnaire     Feeling of Stress : Very much   Social Connections: Socially Isolated (3/7/2024)    Social Connection and Isolation Panel [NHANES]     Frequency of Communication with Friends and Family: More than three times a week     Frequency of Social Gatherings with Friends and Family: More than three times a week     Attends Tenriism Services: Never     Active Member of Clubs or Organizations: No     Attends Club or Organization Meetings: Never     Marital Status: Never    Housing Stability: Unknown (3/7/2024)    Housing Stability Vital Sign     Unable to Pay for Housing in the Last Year: No     Unstable Housing in the Last Year: No       Precautions:     Allergies as of  03/05/2024    (No Known Allergies)       [facility] Assessment Details/Treatment   Patient seen for FU visit as planned with pt for TBSA skin assmt and continued local wound care with updated wound eval.    Reviewed chart for this encounter.   See Flow Sheet for findings.      RECOMMENDATIONS- local wound care continued as follows:  LUE- surgical site: 1)clean with Vashe solution. 2)apply Aquacel Ag to open area- cover with Mepilex transfer dressing. 3)secure with kerlix. Perform care Tue/ Friday.    R hand/ R upper back/ thoracic spine/ sacrum/ B ischia: 1)clean with Vashe solution. 2)cover open areas with Hydrafera ready. 3)secure with border foam dressings. Perform care MWF.     LLE- incision: 1)clean with Vashe solution. 2)cover with border foam dressing. Perform care MWF.     Discussed POC with patient and primary nurse.   See EMR for orders & patient education.    Bedside nursing to continue care & monitoring.  Bedside nursing to maintain pressure injury prevention interventions.  Current documented Gallito score is 10 with a nutrition sub scale score of 4.- IP nutrition following.     04/03/24 1345   WOCN Assessment   WOCN Total Time (mins) 75   Visit Date 04/03/24   Visit Time 1345   Consult Type Follow Up   WOCN Speciality Wound;Ostomy   WOCN List colostomy   Ostomy Type Colostomy   Intervention assessed;changed;applied;orders   Teaching on-going  (pt-  skinn/ wound assmt with continued care.)   Skin Interventions   Dehiscence Prevention/Management wound/incision splinting encouraged   Device Skin Pressure Protection absorbent pad utilized/changed;pressure points protected;skin-to-device areas padded;skin-to-skin areas padded   Pressure Reduction Devices positioning supports utilized;specialty bed utilized  (immerse surface.)   Skin Protection incontinence pads utilized;adhesive use limited;drying agents applied;pouching devices used;silicone foam dressing in place;skin sealant/moisture barrier applied    Positioning   Body Position turned;legs elevated;sitting up in bed   Head of Bed (HOB) Positioning HOB elevated   Positioning/Transfer Devices pillows   Pressure Injury Prevention    Check Moisture Management Pad Done   Sacral Foam Dressing Replace   Check Medical Devices Done        Altered Skin Integrity 02/22/24 Penis Mucosal membrane tissue injury with history of medical device use   Date First Assessed: 02/22/24   Altered Skin Integrity Present on Admission - Did Patient arrive to the hospital with altered skin?: yes  Location: Penis  Description of Altered Skin Integrity: Mucosal membrane tissue injury with history of medical de...   Wound Image         Altered Skin Integrity 02/22/24 Right dorsal Hand Ulceration   Date First Assessed: 02/22/24   Altered Skin Integrity Present on Admission - Did Patient arrive to the hospital with altered skin?: yes  Side: Right  Orientation: dorsal  Location: Hand  Primary Wound Type: Ulceration   Wound Image         Altered Skin Integrity 05/15/23 1411 Sacral spine Full thickness tissue loss with exposed bone, tendon, or muscle. Often includes undermining and tunneling. May extend into muscle and/or supporting structures.   Final Assessment Date/Final Assessment Time: (c)  (c)   Date First Assessed/Time First Assessed: 05/15/23 1411   Altered Skin Integrity Present on Admission - Did Patient arrive to the hospital with altered skin?: yes  Location: Sacral spine  Descript...   Wound Image         Altered Skin Integrity 06/07/23 0900 Left Ischial tuberosity Full thickness tissue loss with exposed bone, tendon, or muscle. Often includes undermining and tunneling. May extend into muscle and/or supporting structures.   Final Assessment Date/Final Assessment Time: (c)  (c)   Date First Assessed/Time First Assessed: 06/07/23 0900   Altered Skin Integrity Present on Admission - Did Patient arrive to the hospital with altered skin?: yes  Side: Left  Location: Ischial tu...   Wound  Image         Altered Skin Integrity 06/07/23 0900 Right Ischial tuberosity Full thickness tissue loss with exposed bone, tendon, or muscle. Often includes undermining and tunneling. May extend into muscle and/or supporting structures.   Final Assessment Date/Final Assessment Time: (c)  (c)   Date First Assessed/Time First Assessed: 06/07/23 0900   Altered Skin Integrity Present on Admission - Did Patient arrive to the hospital with altered skin?: yes  Side: Right  Location: Ischial t...   Wound Image         Altered Skin Integrity 02/22/24 Thoracic spine Full thickness tissue loss with exposed bone, tendon, or muscle. Often includes undermining and tunneling. May extend into muscle and/or supporting structures.   Date First Assessed: 02/22/24   Altered Skin Integrity Present on Admission - Did Patient arrive to the hospital with altered skin?: yes  Location: Thoracic spine  Description of Altered Skin Integrity: Full thickness tissue loss with exposed bone, te...   Wound Image          Altered Skin Integrity 02/22/24 Right upper Back Full thickness tissue loss with exposed bone, tendon, or muscle. Often includes undermining and tunneling. May extend into muscle and/or supporting structures.   Date First Assessed: 02/22/24   Altered Skin Integrity Present on Admission - Did Patient arrive to the hospital with altered skin?: yes  Side: Right  Orientation: upper  Location: Back  Description of Altered Skin Integrity: Full thickness tissue los...   Wound Image         Altered Skin Integrity 02/22/24 Right posterior Elbow Ulceration   Date First Assessed: 02/22/24   Altered Skin Integrity Present on Admission - Did Patient arrive to the hospital with altered skin?: yes  Side: Right  Orientation: posterior  Location: Elbow  Primary Wound Type: Ulceration   Wound Image         Incision/Site 03/13/24 1559 Left Leg other (see comments)   Date First Assessed/Time First Assessed: 03/13/24 1559   Present Prior to Hospital  Arrival?: No  Side: Left  Location: Leg  Incision Type: (c) other (see comments)  Closure Method: (c) Sutures;Staples   Wound Image         Incision/Site 03/13/24 1559 Right Leg other (see comments)   Date First Assessed/Time First Assessed: 03/13/24 1559   Present Prior to Hospital Arrival?: No  Side: Right  Location: Leg  Incision Type: (c) other (see comments)  Closure Method: (c) Sutures;Staples   Wound Image         Incision/Site 03/13/24 1720 Left Arm other (see comments)   Date First Assessed/Time First Assessed: 03/13/24 1720   Present Prior to Hospital Arrival?: No  Side: Left  Location: Arm  Incision Type: (c) other (see comments)  Closure Method: (c) Sutures;Staples   Wound Image          Colostomy  LLQ   Placement Date: (c)    Present Prior to Hospital Arrival?: Yes  Location: LLQ   Wound Image  Peristomal skin care performed with liquid skin protectant beneath ostomy pouch.     Photos take for reference:   Back  Sacrum/ B ischia      Will continue to follow as needed and/ or as directed until discharge.    Rae Jackson BSN, RN, CWOCN  04/03/2024

## 2024-04-04 NOTE — RESPIRATORY THERAPY
"RAPID RESPONSE RESPIRATORY THERAPY PROACTIVE NOTE           Time of visit: 905     Code Status: Prior   : 1995  Bed: [facility] A:   MRN: [id number]  Time spent at the bedside: < 15 min    SITUATION    Evaluated patient for: LDA Check     BACKGROUND    Patient has no past medical history on file.  Clinically Significant Surgical Hx: tracheostomy    24 Hours Vitals Range:  Temp:  [97 °F (36.1 °C)-98.2 °F (36.8 °C)]   Pulse:  []   Resp:  [12-20]   BP: ()/(56-85)   SpO2:  [90 %-99 %]     Labs:    No results for input(s): "NA", "K", "CL", "CO2", "BUN", "CREATININE", "GLU", "PHOS", "MG" in the last 72 hours.    Invalid input(s): "CMP", "TBIL"     No results for input(s): "PH", "PCO2", "PO2", "HCO3", "POCSATURATED", "BE" in the last 72 hours.    ASSESSMENT/INTERVENTIONS  Patient resting comfortably. No respiratory concerns at this time.      Last VS   Temp: 98.2 °F (36.8 °C) (820)  Pulse: 103 (844)  Resp: 20 (919)  BP: 132/85 (820)  SpO2: 98 % (844)      Extra trachs at bedside: #6 Shiley Cuffed, #8 Shiley Cuffed  Level of Consciousness: Level of Consciousness (AVPU): alert  Respiratory Effort: Respiratory Effort: Unlabored Expansion/Accessory Muscle Usage: Expansion/Accessory Muscles/Retractions: no retractions, no use of accessory muscles  All Lung Field Breath Sounds: All Lung Fields Breath Sounds: Anterior:, diminished  NATALIA Breath Sounds: clear  LLL Breath Sounds: clear  RUL Breath Sounds: clear  RML Breath Sounds: clear  RLL Breath Sounds: clear  O2 Device/Concentration: 5L/21%  Surgical airway: Yes, Type: Shiley Size: 8, cuffed  Ambu at bedside:       Active Orders   Respiratory Care    Oxygen PRN     Frequency: PRN     Number of Occurrences: Until Specified     Order Questions:      Device type: Low flow      Device: Trach Collar      FiO2%: 60      Titrate O2 per Oxygen Titration Protocol: Yes      To maintain SpO2 goal of: >= 90%      Notify MD of: Inability to " achieve desired SpO2; Sudden change in patient status and requires 20% increase in FiO2; Patient requires >60% FiO2    Pulse Oximetry Continuous     Frequency: Continuous     Number of Occurrences: Until Specified    Routine tracheostomy care     Frequency: BID     Number of Occurrences: Until Specified    SUCTION PRN     Frequency: PRN     Number of Occurrences: Until Specified       RECOMMENDATIONS    We recommend: RRT Recs: Continue POC per primary team.      FOLLOW-UP    Please call back the Rapid Response RT, Jerri Thurman RRT at x [phone] for any questions or concerns.
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
## 2024-04-04 NOTE — SUBJECTIVE & OBJECTIVE
Interval History: NAEON. No complaints this morning. Exam stable.      Objective:     Vital Signs (Most Recent):  Temp: 98.2 °F (36.8 °C) (04/04/24 0820)  Pulse: 103 (04/04/24 0844)  Resp: 12 (04/04/24 0820)  BP: 132/85 (04/04/24 0820)  SpO2: (!) 91 % (04/04/24 0844) Vital Signs (24h Range):  Temp:  [97 °F (36.1 °C)-98.2 °F (36.8 °C)] 98.2 °F (36.8 °C)  Pulse:  [] 103  Resp:  [12-18] 12  SpO2:  [90 %-99 %] 91 %  BP: ()/(56-85) 132/85     Weight: 59.4 kg (130 lb 15.3 oz)  Body mass index is 19.34 kg/m².    Intake/Output Summary (Last 24 hours) at 4/4/2024 0854  Last data filed at 4/4/2024 0643  Gross per 24 hour   Intake --   Output 970 ml   Net -970 ml         Physical Exam  Vitals and nursing note reviewed.   Constitutional:       Appearance: He is not ill-appearing.   Neck:      Comments: Tracheostomy in place  Cardiovascular:      Rate and Rhythm: Normal rate and regular rhythm.      Heart sounds: Normal heart sounds.   Pulmonary:      Effort: Pulmonary effort is normal.      Breath sounds: Normal breath sounds. No rales.   Abdominal:      General: There is no distension.      Palpations: Abdomen is soft.      Comments: Colostomy bag LLQ   Musculoskeletal:         General: No swelling or tenderness.      Comments: Bilateral AKA, staples intact. LLE with skin breakdown near staples  LUE amputation below elbow  Midline RUE.    Skin:     General: Skin is warm.      Findings: Erythema and lesion present. No rash.      Comments: See media tab for wound images     Neurological:      General: No focal deficit present.      Mental Status: He is alert and oriented to person, place, and time.   Psychiatric:         Mood and Affect: Mood normal.         Behavior: Behavior normal.             Significant Labs: All pertinent labs within the past 24 hours have been reviewed.    Significant Imaging: I have reviewed all pertinent imaging results/findings within the past 24 hours.
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
## 2024-04-04 NOTE — PLAN OF CARE
"[facility] Plan of Care Note    Dx:   Pressure ulcers of skin of multiple topographic sites [L89.90]    Shift Events: Pt had an uneventful shift.    Goals of Care: Goal of care ongoing and progressing.    Neuro: A/Ox4    Vital Signs: BP (!) 118/56 (BP Location: Right arm, Patient Position: Lying)   Pulse 86   Temp 97 °F (36.1 °C) (Axillary)   Resp 17   Ht 5' 9" (1.753 m)   Wt 59.4 kg (130 lb 15.3 oz)   SpO2 98%   BMI 19.34 kg/m²     Respiratory: 5L 28% Trach collar    Diet: Diet Adult Regular (IDDSI Level 7)  Dietary nutrition supplements Francisco - Any flavor,Tube Feedings/Formulas [facility]; Peptamen AF; 40; NG; Tube Feeding Bag,Tube Feedings/Formulas [facility]; Peptamen AF; 40; NG; Tube Feeding Bag,Dietary nutrition supplements Boost Plus - Chocolate    Is patient tolerating current diet? Yes    GTTS: NA    Urine Output/Bowel Movement:   No intake/output data recorded.  Last Bowel Movement: 04/03/24 Colostomy , Shen       Drains/Tubes/Tube Feeds (include total output/shift):   No intake/output data recorded. Shen       Lines: Midline cathether      Accuchecks: NA    Skin: multiple wounds    Fall Risk Score: 16    Activity level? Primarily in Bed    Any scheduled procedures? NA    Any safety concerns? NA    Problem: Adult Inpatient Plan of Care  Goal: Plan of Care Review  Outcome: Ongoing, Progressing  Goal: Patient-Specific Goal (Individualized)  Outcome: Ongoing, Progressing  Goal: Absence of Hospital-Acquired Illness or Injury  Outcome: Ongoing, Progressing  Goal: Optimal Comfort and Wellbeing  Outcome: Ongoing, Progressing  Goal: Readiness for Transition of Care  Outcome: Ongoing, Progressing     Problem: Adjustment to Illness (Sepsis/Septic Shock)  Goal: Optimal Coping  Outcome: Ongoing, Progressing     Problem: Infection  Goal: Absence of Infection Signs and Symptoms  Outcome: Ongoing, Progressing     "

## 2024-04-04 NOTE — PROGRESS NOTES
[facility]  Wound Care    Patient Name:  Jyoti Mayberry   MRN:  [id number]  Date: 4/4/2024  Diagnosis: Gangrene    History:     History reviewed. No pertinent past medical history.    Social History     Socioeconomic History    Marital status: Single   Tobacco Use    Smoking status: Never    Smokeless tobacco: Never   Substance and Sexual Activity    Alcohol use: Not Currently    Drug use: Not Currently    Sexual activity: Not Currently     Social Determinants of Health     Financial Resource Strain: Low Risk  (3/7/2024)    Overall Financial Resource Strain (CARDIA)     Difficulty of Paying Living Expenses: Not hard at all   Food Insecurity: No Food Insecurity (3/7/2024)    Hunger Vital Sign     Worried About Running Out of Food in the Last Year: Never true     Ran Out of Food in the Last Year: Never true   Transportation Needs: No Transportation Needs (3/7/2024)    PRAPARE - Transportation     Lack of Transportation (Medical): No     Lack of Transportation (Non-Medical): No   Physical Activity: Inactive (3/7/2024)    Exercise Vital Sign     Days of Exercise per Week: 0 days     Minutes of Exercise per Session: 0 min   Stress: Stress Concern Present (3/7/2024)    [employer] - Occupational Stress Questionnaire     Feeling of Stress : Very much   Social Connections: Socially Isolated (3/7/2024)    Social Connection and Isolation Panel [NHANES]     Frequency of Communication with Friends and Family: More than three times a week     Frequency of Social Gatherings with Friends and Family: More than three times a week     Attends Worship Services: Never     Active Member of Clubs or Organizations: No     Attends Club or Organization Meetings: Never     Marital Status: Never    Housing Stability: Unknown (3/7/2024)    Housing Stability Vital Sign     Unable to Pay for Housing in the Last Year: No     Unstable Housing in the Last Year: No       Precautions:     Allergies as of  03/05/2024    (No Known Allergies)       WO Assessment Details/Treatment     Follow up visit for ostomy site check - present pouch with good seal and no evident undermined stool noted beneath flange. Pt continued to rest with eyes closed- no acute distress noted.     04/04/24 1515   WOCN Assessment   WOCN Total Time (mins) 15   Visit Date 04/04/24   Visit Time 1515   Consult Type Follow Up   WOCN Speciality Ostomy   Ostomy Type Colostomy   Intervention assessed;chart review   Teaching on-going  (ostomy site check.)        Colostomy  LLQ   Placement Date: (c)    Present Prior to Hospital Arrival?: Yes  Location: LLQ   Stomal Appliance Intact;No Leakage   Stoma Appearance round;moist;pink   Stoma Function stool     Will continue to follow as needed and/ or as directed until discharge.    Rae Jacksno BSN, RN, CWOCN  04/04/2024

## 2024-04-05 LAB
ALBUMIN SERPL BCP-MCNC: 1.7 G/DL (ref 3.5–5.2)
ALP SERPL-CCNC: 184 U/L (ref 55–135)
ALT SERPL W/O P-5'-P-CCNC: 23 U/L (ref 10–44)
ANION GAP SERPL CALC-SCNC: 13 MMOL/L (ref 8–16)
AST SERPL-CCNC: 26 U/L (ref 10–40)
BASOPHILS # BLD AUTO: 0.19 K/UL (ref 0–0.2)
BASOPHILS NFR BLD: 1.4 % (ref 0–1.9)
BILIRUB SERPL-MCNC: 0.3 MG/DL (ref 0.1–1)
BUN SERPL-MCNC: 48 MG/DL (ref 6–20)
CALCIUM SERPL-MCNC: 9.8 MG/DL (ref 8.7–10.5)
CHLORIDE SERPL-SCNC: 101 MMOL/L (ref 95–110)
CO2 SERPL-SCNC: 21 MMOL/L (ref 23–29)
CREAT SERPL-MCNC: 0.9 MG/DL (ref 0.5–1.4)
DIFFERENTIAL METHOD BLD: ABNORMAL
EOSINOPHIL # BLD AUTO: 0.6 K/UL (ref 0–0.5)
EOSINOPHIL NFR BLD: 4.5 % (ref 0–8)
ERYTHROCYTE [DISTWIDTH] IN BLOOD BY AUTOMATED COUNT: 14.2 % (ref 11.5–14.5)
EST. GFR  (NO RACE VARIABLE): >60 ML/MIN/1.73 M^2
GLUCOSE SERPL-MCNC: 79 MG/DL (ref 70–110)
HCT VFR BLD AUTO: 24.8 % (ref 40–54)
HGB BLD-MCNC: 8 G/DL (ref 14–18)
IMM GRANULOCYTES # BLD AUTO: 0.04 K/UL (ref 0–0.04)
IMM GRANULOCYTES NFR BLD AUTO: 0.3 % (ref 0–0.5)
LYMPHOCYTES # BLD AUTO: 3.5 K/UL (ref 1–4.8)
LYMPHOCYTES NFR BLD: 25.2 % (ref 18–48)
MCH RBC QN AUTO: 30.3 PG (ref 27–31)
MCHC RBC AUTO-ENTMCNC: 32.3 G/DL (ref 32–36)
MCV RBC AUTO: 94 FL (ref 82–98)
MONOCYTES # BLD AUTO: 1.1 K/UL (ref 0.3–1)
MONOCYTES NFR BLD: 7.6 % (ref 4–15)
NEUTROPHILS # BLD AUTO: 8.5 K/UL (ref 1.8–7.7)
NEUTROPHILS NFR BLD: 61 % (ref 38–73)
NRBC BLD-RTO: 0 /100 WBC
PLATELET # BLD AUTO: 229 K/UL (ref 150–450)
PMV BLD AUTO: 9.6 FL (ref 9.2–12.9)
POTASSIUM SERPL-SCNC: 4.3 MMOL/L (ref 3.5–5.1)
PROT SERPL-MCNC: 7.8 G/DL (ref 6–8.4)
RBC # BLD AUTO: 2.64 M/UL (ref 4.6–6.2)
SODIUM SERPL-SCNC: 135 MMOL/L (ref 136–145)
WBC # BLD AUTO: 13.94 K/UL (ref 3.9–12.7)

## 2024-04-05 PROCEDURE — 94761 N-INVAS EAR/PLS OXIMETRY MLT: CPT

## 2024-04-05 PROCEDURE — 25000003 PHARM REV CODE 250

## 2024-04-05 PROCEDURE — 25000003 PHARM REV CODE 250: Performed by: HOSPITALIST

## 2024-04-05 PROCEDURE — 99900035 HC TECH TIME PER 15 MIN (STAT)

## 2024-04-05 PROCEDURE — 99900026 HC AIRWAY MAINTENANCE (STAT)

## 2024-04-05 PROCEDURE — 36415 COLL VENOUS BLD VENIPUNCTURE: CPT

## 2024-04-05 PROCEDURE — 20600001 HC STEP DOWN PRIVATE ROOM

## 2024-04-05 PROCEDURE — 99900022

## 2024-04-05 PROCEDURE — 85025 COMPLETE CBC W/AUTO DIFF WBC: CPT

## 2024-04-05 PROCEDURE — 27000207 HC ISOLATION

## 2024-04-05 PROCEDURE — 25000003 PHARM REV CODE 250: Performed by: PHYSICIAN ASSISTANT

## 2024-04-05 PROCEDURE — 63600175 PHARM REV CODE 636 W HCPCS: Mod: JZ,JG | Performed by: PHYSICIAN ASSISTANT

## 2024-04-05 PROCEDURE — 63600175 PHARM REV CODE 636 W HCPCS: Performed by: HOSPITALIST

## 2024-04-05 PROCEDURE — 80053 COMPREHEN METABOLIC PANEL: CPT

## 2024-04-05 RX ADMIN — METHOCARBAMOL 500 MG: 500 TABLET ORAL at 09:04

## 2024-04-05 RX ADMIN — LEVETIRACETAM 750 MG: 750 TABLET, FILM COATED ORAL at 09:04

## 2024-04-05 RX ADMIN — GABAPENTIN 400 MG: 400 CAPSULE ORAL at 09:04

## 2024-04-05 RX ADMIN — CHLORHEXIDINE GLUCONATE 0.12% ORAL RINSE 15 ML: 1.2 LIQUID ORAL at 09:04

## 2024-04-05 RX ADMIN — MINOCYCLINE HYDROCHLORIDE 200 MG: 100 CAPSULE ORAL at 09:04

## 2024-04-05 RX ADMIN — METHOCARBAMOL 500 MG: 500 TABLET ORAL at 03:04

## 2024-04-05 RX ADMIN — MIDODRINE HYDROCHLORIDE 10 MG: 5 TABLET ORAL at 05:04

## 2024-04-05 RX ADMIN — FOLIC ACID 1 MG: 1 TABLET ORAL at 09:04

## 2024-04-05 RX ADMIN — OXYCODONE HYDROCHLORIDE 20 MG: 10 TABLET, FILM COATED, EXTENDED RELEASE ORAL at 09:04

## 2024-04-05 RX ADMIN — HYPROMELLOSE 2910 1 DROP: 5 SOLUTION OPHTHALMIC at 09:04

## 2024-04-05 RX ADMIN — MIDODRINE HYDROCHLORIDE 10 MG: 5 TABLET ORAL at 09:04

## 2024-04-05 RX ADMIN — CEFTAZIDIME, AVIBACTAM 2.5 G: 2; .5 POWDER, FOR SOLUTION INTRAVENOUS at 01:04

## 2024-04-05 RX ADMIN — MICONAZOLE NITRATE: 20 CREAM TOPICAL at 09:04

## 2024-04-05 RX ADMIN — SODIUM ZIRCONIUM CYCLOSILICATE 5 G: 5 POWDER, FOR SUSPENSION ORAL at 09:04

## 2024-04-05 RX ADMIN — LINEZOLID 600 MG: 600 TABLET, FILM COATED ORAL at 09:04

## 2024-04-05 RX ADMIN — OXYCODONE HYDROCHLORIDE 30 MG: 10 TABLET, FILM COATED, EXTENDED RELEASE ORAL at 09:04

## 2024-04-05 RX ADMIN — OXYCODONE HYDROCHLORIDE 10 MG: 10 TABLET ORAL at 03:04

## 2024-04-05 RX ADMIN — LIDOCAINE 5% 1 PATCH: 700 PATCH TOPICAL at 01:04

## 2024-04-05 RX ADMIN — FAMOTIDINE 20 MG: 20 TABLET ORAL at 09:04

## 2024-04-05 RX ADMIN — ERYTHROMYCIN: 5 OINTMENT OPHTHALMIC at 09:04

## 2024-04-05 RX ADMIN — GABAPENTIN 400 MG: 400 CAPSULE ORAL at 03:04

## 2024-04-05 RX ADMIN — ENOXAPARIN SODIUM 30 MG: 30 INJECTION SUBCUTANEOUS at 04:04

## 2024-04-05 RX ADMIN — LEVOTHYROXINE SODIUM 112 MCG: 112 TABLET ORAL at 05:04

## 2024-04-05 RX ADMIN — CEFTAZIDIME, AVIBACTAM 2.5 G: 2; .5 POWDER, FOR SOLUTION INTRAVENOUS at 05:04

## 2024-04-05 RX ADMIN — HYPROMELLOSE 2910 1 DROP: 5 SOLUTION OPHTHALMIC at 01:04

## 2024-04-05 RX ADMIN — CEFTAZIDIME, AVIBACTAM 2.5 G: 2; .5 POWDER, FOR SOLUTION INTRAVENOUS at 09:04

## 2024-04-05 RX ADMIN — MIDODRINE HYDROCHLORIDE 10 MG: 5 TABLET ORAL at 01:04

## 2024-04-05 NOTE — SUBJECTIVE & OBJECTIVE
Interval History: No changes.       Objective:     Vital Signs (Most Recent):  Temp: 98 °F (36.7 °C) (04/05/24 1158)  Pulse: 66 (04/05/24 1158)  Resp: 10 (04/05/24 1158)  BP: 107/61 (04/05/24 1158)  SpO2: 99 % (04/05/24 1158) Vital Signs (24h Range):  Temp:  [98 °F (36.7 °C)-99.1 °F (37.3 °C)] 98 °F (36.7 °C)  Pulse:  [] 66  Resp:  [10-20] 10  SpO2:  [95 %-100 %] 99 %  BP: (107-123)/(61-73) 107/61     Weight: 59.4 kg (130 lb 15.3 oz)  Body mass index is 19.34 kg/m².    Intake/Output Summary (Last 24 hours) at 4/5/2024 1314  Last data filed at 4/5/2024 0942  Gross per 24 hour   Intake 800 ml   Output 2520 ml   Net -1720 ml         Physical Exam  Vitals and nursing note reviewed.   Constitutional:       Appearance: He is not ill-appearing.   Neck:      Comments: Tracheostomy in place  Cardiovascular:      Rate and Rhythm: Normal rate and regular rhythm.      Heart sounds: Normal heart sounds.   Pulmonary:      Effort: Pulmonary effort is normal.      Breath sounds: Normal breath sounds. No rales.   Abdominal:      General: There is no distension.      Palpations: Abdomen is soft.      Comments: Colostomy bag LLQ   Musculoskeletal:         General: No swelling or tenderness.      Comments: Bilateral AKA, staples intact. LLE with skin breakdown near staples  LUE amputation below elbow  Midline RUE.    Skin:     General: Skin is warm.      Findings: Erythema and lesion present. No rash.      Comments: See media tab for wound images     Neurological:      General: No focal deficit present.      Mental Status: He is alert and oriented to person, place, and time.   Psychiatric:         Mood and Affect: Mood normal.         Behavior: Behavior normal.             Significant Labs: All pertinent labs within the past 24 hours have been reviewed.    Significant Imaging: I have reviewed all pertinent imaging results/findings within the past 24 hours.
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
## 2024-04-05 NOTE — PLAN OF CARE
[facility]  Discharge Reassessment    Primary Care Provider: No, Primary Doctor    Expected Discharge Date: 4/25/2024    Reassessment (most recent)       Discharge Reassessment - 04/05/24 0934          Discharge Reassessment    Assessment Type Discharge Planning Reassessment     Did the patient's condition or plan change since previous assessment? No     Discharge Plan discussed with: Patient     Communicated ANDRÉS with patient/caregiver Yes     Discharge Plan A Long-term acute care facility (LTAC)     Discharge Plan B Long-term acute care facility (LTAC)     Transition of Care Barriers None     Why the patient remains in the hospital Requires continued medical care                     [employer] suggested we contact an out of network LTAC to submit for Auth with carve out of Avycaz. [employer] states they will negotiate a cost for Avycaz with [facility].     [facility] is the only [facility] willing to accept patient with Carve Out of Avycaz. [facility] declined patient after [employer] negotiations for Avycaz. [facility] would be in the red 1,200 dollars daily after negotiations with [employer].     [facility] is an in network LTAC who denied patient after they were unable to get carve out for Avycaz. [facility] will accept patient after Avycaz completion.     All other LTACs in our database has denied patient. I will attach list of LTAC denials below.     [facility] Phone: [phone]      * COVID-19: NOT able to accept COVID-19 positive patients [address] [place] [address] 3/22/2024 12:10 (CT)  3/22/2024 14:09 (CT)  -  3/25/2024 8:58 (CT)  3/25/2024 8:58 (CT)  Response: No, unable to accept patient  Reason: Care Needs Exceed Current Capacity  Waiting for you   [facility] Phone: [phone]    case by case based on symptoms  * COVID-19: NOT able to accept COVID-19 positive patients,* COVID-19:  Services not specified [address] [place] [address] 3/22/2024 12:11 (CT)  3/22/2024 15:40 (CT)  -  3/22/2024 13:03 (CT)  3/22/2024 13:03 (CT)  Response: No, unable to accept patient  Reason: Other (see comments)  Comments: Unable to accept s/t to very high med cost with avycaz and minocycline. Thank you for the referral!  Waiting for recipient   [facility] Phone: [phone]    case by case basis  * COVID-19: Positive patients under care,* COVID-19: Willing/Equipped to accept COVID-19 positive patients,* High-Risk Isolation Patients: Willing/Equipped to accept High-Risk Isolation Patients [address] [facility] [place] [address] 3/22/2024 12:10 (CT)  3/22/2024 14:09 (CT)  -  3/26/2024 14:14 (CT)  3/26/2024 14:14 (CT)  Response: No, unable to accept patient  Reason: No Available Bed  Waiting for you   [facility] Phone: [phone]    1 negative test  * COVID-19: Willing/Equipped to accept COVID-19 positive patients,* High-Risk Isolation Patients: Willing/Equipped to accept High-Risk Isolation Patients [address] [facility] [address] [place] [address] 3/22/2024 12:10 (CT)  3/22/2024 14:09 (CT)  -  3/22/2024 13:58 (CT)  3/22/2024 13:58 (CT)  Response: No, unable to accept patient  Reason: Out of Network  Waiting for you   [facility] Phone: [phone]      * COVID-19: Positive patients under care,* COVID-19: Willing/Equipped to accept COVID-19 positive patients,* High-Risk Isolation Patients: Willing/Equipped to accept High-Risk Isolation Patients [address] [place] [address] 3/22/2024 12:10 (CT)  3/22/2024 14:09 (CT)  -  3/25/2024 8:58 (CT)  3/25/2024 8:58 (CT)  Response: No, unable to accept patient  Reason: Care Needs Exceed Current Capacity  Waiting for [facility] Phone: [phone]      * COVID-19: Willing/Equipped to accept COVID-19 positive patients,* High-Risk Isolation Patients: Willing/Equipped to accept High-Risk Isolation Patients [address] [place] [address] 3/22/2024 12:10 (CT)  3/22/2024 14:09 (CT)  -  3/22/2024 12:15 (CT)  3/22/2024 12:15 (CT)  Response: No, unable to accept patient  Reason: Other (see comments)  Comments: Administrative Denial. Thank you for the referral.  Waiting for you   [facility] Phone: [phone]    COVID POS with symptoms  * COVID-19: Positive patients under care,* COVID-19: Services not specified,* COVID-19: Willing/Equipped to accept COVID-19 positive patients,* High-Risk Isolation Patients: Willing/Equipped to accept High-Risk Isolation Patients [address] [place] [address] 3/22/2024 12:11 (CT)  3/22/2024 14:09 (CT)  -  3/22/2024 12:15 (CT)  3/22/2024 12:15 (CT)  Response: No, unable to accept patient  Reason: Other (see comments)  Comments: Administrative Denial. Thank you for the referral.  Waiting for you   [facility] Phone: [phone]      * COVID-19: Willing/Equipped to accept COVID-19 positive patients,* High-Risk Isolation Patients: Willing/Equipped to accept High-Risk Isolation Patients [address] [place] [address] 3/22/2024 12:10 (CT)  4/3/2024 13:40 (CT)  -  3/22/2024 13:20 (CT)  4/3/2024 12:18 (CT)  Response: No, unable to accept patient  Reason: Other (see comments)  Comments: UNABLE TO ACCOMODATE. THANKS FOR REFERRAL  Waiting for you   [facility] Phone: [phone]    case by case basis  * COVID-19: Willing/Equipped to accept COVID-19 positive patients,* High-Risk Isolation Patients: Willing/Equipped to accept High-Risk Isolation Patients [address] [place] [address] 3/22/2024 12:10 (CT)  3/22/2024 14:09 (CT)  -  3/22/2024 12:12 (CT)  3/25/2024 15:24 (CT)  Response: Referral  Received  Comments: The Avycaz will have to be discontinued for us to be able to accept him. Please call with any questions Sheridan [phone]  Waiting for recipient

## 2024-04-05 NOTE — RESPIRATORY THERAPY
"RAPID RESPONSE RESPIRATORY THERAPY PROACTIVE NOTE           Time of visit: 1329     Code Status: Prior   : 1995  Bed: [facility]:   MRN: [id number]  Time spent at the bedside: < 15 min    SITUATION    Evaluated patient for: LDA Check     BACKGROUND    Patient has no past medical history on file.  Clinically Significant Surgical Hx: tracheostomy    24 Hours Vitals Range:  Temp:  [98 °F (36.7 °C)-99.1 °F (37.3 °C)]   Pulse:  []   Resp:  [10-20]   BP: (107-123)/(61-73)   SpO2:  [95 %-100 %]     Labs:    No results for input(s): "NA", "K", "CL", "CO2", "BUN", "CREATININE", "GLU", "PHOS", "MG" in the last 72 hours.    Invalid input(s): "CMP", "TBIL"     No results for input(s): "PH", "PCO2", "PO2", "HCO3", "POCSATURATED", "BE" in the last 72 hours.    ASSESSMENT/INTERVENTIONS  Pt resting comfortably with no respiratory needs at this time.      Last VS   Temp: 98 °F (36.7 °C) ( 115)  Pulse: 66 ( 115)  Resp: 10 (1158)  BP: 107/61 ( 115)  SpO2: 99 % ( 115)      Extra trachs at bedside: y  Level of Consciousness: Level of Consciousness (AVPU): alert  Respiratory Effort: Respiratory Effort: Unlabored Expansion/Accessory Muscle Usage: Expansion/Accessory Muscles/Retractions: no use of accessory muscles  All Lung Field Breath Sounds: All Lung Fields Breath Sounds: coarse  NATALIA Breath Sounds: clear  LLL Breath Sounds: clear  RUL Breath Sounds: clear  RML Breath Sounds: clear  RLL Breath Sounds: clear  O2 Device/Concentration: 5/21%  Surgical airway: Yes, Type: Shiley Size: 8, cuffed  Ambu at bedside:       Active Orders   Respiratory Care    Oxygen PRN     Frequency: PRN     Number of Occurrences: Until Specified     Order Questions:      Device type: Low flow      Device: Trach Collar      FiO2%: 60      Titrate O2 per Oxygen Titration Protocol: Yes      To maintain SpO2 goal of: >= 90%      Notify MD of: Inability to achieve desired SpO2; Sudden change in patient status and requires 20% " increase in FiO2; Patient requires >60% FiO2    Pulse Oximetry Continuous     Frequency: Continuous     Number of Occurrences: Until Specified    Routine tracheostomy care     Frequency: BID     Number of Occurrences: Until Specified    SUCTION PRN     Frequency: PRN     Number of Occurrences: Until Specified       RECOMMENDATIONS    We recommend: RRT Recs: Continue POC per primary team.      FOLLOW-UP    Please call back the Rapid Response RT, Nicoklas Beltre RRT at x [phone] for any questions or concerns.

## 2024-04-05 NOTE — NURSING
[facility] Plan of Care Note  Dx Final diagnoses:  [L89.90] Pressure ulcers of skin of multiple topographic sites     Shift Events No acute events this shift    Neuro: AAOx4    Vital Signs: Stable    Respiratory: Trach collar 28% 5L    Diet: Regular    Urine Output/Bowel Movement: stool per colostomy/ sterling with adequate urine ouput

## 2024-04-06 PROCEDURE — 27000207 HC ISOLATION

## 2024-04-06 PROCEDURE — 99900035 HC TECH TIME PER 15 MIN (STAT)

## 2024-04-06 PROCEDURE — 25000003 PHARM REV CODE 250

## 2024-04-06 PROCEDURE — 63600175 PHARM REV CODE 636 W HCPCS: Mod: JZ,JG | Performed by: PHYSICIAN ASSISTANT

## 2024-04-06 PROCEDURE — 94761 N-INVAS EAR/PLS OXIMETRY MLT: CPT

## 2024-04-06 PROCEDURE — 99900026 HC AIRWAY MAINTENANCE (STAT)

## 2024-04-06 PROCEDURE — 25000003 PHARM REV CODE 250: Performed by: HOSPITALIST

## 2024-04-06 PROCEDURE — 25000003 PHARM REV CODE 250: Performed by: PHYSICIAN ASSISTANT

## 2024-04-06 PROCEDURE — 20600001 HC STEP DOWN PRIVATE ROOM

## 2024-04-06 PROCEDURE — 63600175 PHARM REV CODE 636 W HCPCS: Performed by: HOSPITALIST

## 2024-04-06 RX ADMIN — FOLIC ACID 1 MG: 1 TABLET ORAL at 09:04

## 2024-04-06 RX ADMIN — MINOCYCLINE HYDROCHLORIDE 200 MG: 100 CAPSULE ORAL at 09:04

## 2024-04-06 RX ADMIN — LINEZOLID 600 MG: 600 TABLET, FILM COATED ORAL at 09:04

## 2024-04-06 RX ADMIN — FAMOTIDINE 20 MG: 20 TABLET ORAL at 09:04

## 2024-04-06 RX ADMIN — CEFTAZIDIME, AVIBACTAM 2.5 G: 2; .5 POWDER, FOR SOLUTION INTRAVENOUS at 02:04

## 2024-04-06 RX ADMIN — HYPROMELLOSE 2910 1 DROP: 5 SOLUTION OPHTHALMIC at 09:04

## 2024-04-06 RX ADMIN — CHLORHEXIDINE GLUCONATE 0.12% ORAL RINSE 15 ML: 1.2 LIQUID ORAL at 09:04

## 2024-04-06 RX ADMIN — GABAPENTIN 400 MG: 400 CAPSULE ORAL at 02:04

## 2024-04-06 RX ADMIN — MIDODRINE HYDROCHLORIDE 10 MG: 5 TABLET ORAL at 02:04

## 2024-04-06 RX ADMIN — MICONAZOLE NITRATE: 20 CREAM TOPICAL at 09:04

## 2024-04-06 RX ADMIN — GABAPENTIN 400 MG: 400 CAPSULE ORAL at 08:04

## 2024-04-06 RX ADMIN — METHOCARBAMOL 500 MG: 500 TABLET ORAL at 08:04

## 2024-04-06 RX ADMIN — CEFTAZIDIME, AVIBACTAM 2.5 G: 2; .5 POWDER, FOR SOLUTION INTRAVENOUS at 05:04

## 2024-04-06 RX ADMIN — MINOCYCLINE HYDROCHLORIDE 200 MG: 100 CAPSULE ORAL at 08:04

## 2024-04-06 RX ADMIN — LEVOTHYROXINE SODIUM 112 MCG: 112 TABLET ORAL at 05:04

## 2024-04-06 RX ADMIN — MIDODRINE HYDROCHLORIDE 10 MG: 5 TABLET ORAL at 05:04

## 2024-04-06 RX ADMIN — SODIUM ZIRCONIUM CYCLOSILICATE 5 G: 5 POWDER, FOR SUSPENSION ORAL at 09:04

## 2024-04-06 RX ADMIN — HYPROMELLOSE 2910 1 DROP: 5 SOLUTION OPHTHALMIC at 02:04

## 2024-04-06 RX ADMIN — FAMOTIDINE 20 MG: 20 TABLET ORAL at 08:04

## 2024-04-06 RX ADMIN — METHOCARBAMOL 500 MG: 500 TABLET ORAL at 02:04

## 2024-04-06 RX ADMIN — LEVETIRACETAM 750 MG: 750 TABLET, FILM COATED ORAL at 08:04

## 2024-04-06 RX ADMIN — METHOCARBAMOL 500 MG: 500 TABLET ORAL at 09:04

## 2024-04-06 RX ADMIN — ERYTHROMYCIN: 5 OINTMENT OPHTHALMIC at 08:04

## 2024-04-06 RX ADMIN — HYPROMELLOSE 2910 1 DROP: 5 SOLUTION OPHTHALMIC at 08:04

## 2024-04-06 RX ADMIN — MIDODRINE HYDROCHLORIDE 10 MG: 5 TABLET ORAL at 10:04

## 2024-04-06 RX ADMIN — CEFTAZIDIME, AVIBACTAM 2.5 G: 2; .5 POWDER, FOR SOLUTION INTRAVENOUS at 09:04

## 2024-04-06 RX ADMIN — LEVETIRACETAM 750 MG: 750 TABLET, FILM COATED ORAL at 09:04

## 2024-04-06 RX ADMIN — LINEZOLID 600 MG: 600 TABLET, FILM COATED ORAL at 08:04

## 2024-04-06 RX ADMIN — ENOXAPARIN SODIUM 30 MG: 30 INJECTION SUBCUTANEOUS at 04:04

## 2024-04-06 RX ADMIN — HYPROMELLOSE 2910 1 DROP: 5 SOLUTION OPHTHALMIC at 04:04

## 2024-04-06 RX ADMIN — GABAPENTIN 400 MG: 400 CAPSULE ORAL at 09:04

## 2024-04-06 RX ADMIN — LIDOCAINE 5% 1 PATCH: 700 PATCH TOPICAL at 02:04

## 2024-04-06 RX ADMIN — CHLORHEXIDINE GLUCONATE 0.12% ORAL RINSE 15 ML: 1.2 LIQUID ORAL at 08:04

## 2024-04-06 RX ADMIN — MICONAZOLE NITRATE: 20 CREAM TOPICAL at 08:04

## 2024-04-06 RX ADMIN — OXYCODONE HYDROCHLORIDE 20 MG: 10 TABLET, FILM COATED, EXTENDED RELEASE ORAL at 09:04

## 2024-04-06 RX ADMIN — OXYCODONE HYDROCHLORIDE 30 MG: 10 TABLET, FILM COATED, EXTENDED RELEASE ORAL at 08:04
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
## 2024-04-06 NOTE — SUBJECTIVE & OBJECTIVE
Interval History: Seen at bedside. No changes in management at this time.     Objective:     Vital Signs (Most Recent):  Temp: 97.5 °F (36.4 °C) (04/06/24 0428)  Pulse: 70 (04/06/24 0717)  Resp: 16 (04/06/24 0931)  BP: 107/69 (04/06/24 0717)  SpO2: 97 % (04/06/24 0717) Vital Signs (24h Range):  Temp:  [97.5 °F (36.4 °C)-99.6 °F (37.6 °C)] 97.5 °F (36.4 °C)  Pulse:  [66-91] 70  Resp:  [10-18] 16  SpO2:  [97 %-100 %] 97 %  BP: (103-110)/(61-69) 107/69     Weight: 59.4 kg (130 lb 15.3 oz)  Body mass index is 19.34 kg/m².    Intake/Output Summary (Last 24 hours) at 4/6/2024 1022  Last data filed at 4/6/2024 0623  Gross per 24 hour   Intake 480 ml   Output 1450 ml   Net -970 ml           Physical Exam  Vitals and nursing note reviewed.   Constitutional:       Appearance: He is not ill-appearing.   Neck:      Comments: Tracheostomy in place  Cardiovascular:      Rate and Rhythm: Normal rate and regular rhythm.      Heart sounds: Normal heart sounds.   Pulmonary:      Effort: Pulmonary effort is normal.      Breath sounds: Normal breath sounds. No rales.   Abdominal:      General: There is no distension.      Palpations: Abdomen is soft.      Comments: Colostomy bag LLQ   Musculoskeletal:         General: No swelling or tenderness.      Comments: Bilateral AKA, staples intact. LLE with skin breakdown near staples  LUE amputation below elbow  Midline RUE.    Skin:     General: Skin is warm.      Findings: Erythema and lesion present. No rash.      Comments: See media tab for wound images     Neurological:      General: No focal deficit present.      Mental Status: He is alert and oriented to person, place, and time.   Psychiatric:         Mood and Affect: Mood normal.         Behavior: Behavior normal.             Significant Labs: All pertinent labs within the past 24 hours have been reviewed.    Significant Imaging: I have reviewed all pertinent imaging results/findings within the past 24 hours.

## 2024-04-06 NOTE — PLAN OF CARE
Pt continues progressing towards goals. Pt is still expecting D/C to SNF on 4/24.    Problem: Adult Inpatient Plan of Care  Goal: Plan of Care Review  Outcome: Ongoing, Progressing  Goal: Patient-Specific Goal (Individualized)  Outcome: Ongoing, Progressing  Goal: Absence of Hospital-Acquired Illness or Injury  Outcome: Ongoing, Progressing  Goal: Optimal Comfort and Wellbeing  Outcome: Ongoing, Progressing  Goal: Readiness for Transition of Care  Outcome: Ongoing, Progressing     Problem: Adjustment to Illness (Sepsis/Septic Shock)  Goal: Optimal Coping  Outcome: Ongoing, Progressing     Problem: Bleeding (Sepsis/Septic Shock)  Goal: Absence of Bleeding  Outcome: Ongoing, Progressing     Problem: Glycemic Control Impaired (Sepsis/Septic Shock)  Goal: Blood Glucose Level Within Desired Range  Outcome: Ongoing, Progressing     Problem: Infection Progression (Sepsis/Septic Shock)  Goal: Absence of Infection Signs and Symptoms  Outcome: Ongoing, Progressing     Problem: Nutrition Impaired (Sepsis/Septic Shock)  Goal: Optimal Nutrition Intake  Outcome: Ongoing, Progressing     Problem: Coping Ineffective  Goal: Effective Coping  Outcome: Ongoing, Progressing     Problem: Infection  Goal: Absence of Infection Signs and Symptoms  Outcome: Ongoing, Progressing     Problem: Impaired Wound Healing  Goal: Optimal Wound Healing  Outcome: Ongoing, Progressing     Problem: Fall Injury Risk  Goal: Absence of Fall and Fall-Related Injury  Outcome: Ongoing, Progressing

## 2024-04-06 NOTE — RESPIRATORY THERAPY
"RAPID RESPONSE RESPIRATORY THERAPY PROACTIVE NOTE           Time of visit: 0855     Code Status: Prior   : 1995  Bed: [facility] A:   MRN: [id number]  Time spent at the bedside: < 15 min    SITUATION    Evaluated patient for: LDA Check     BACKGROUND    Patient has no past medical history on file.  Clinically Significant Surgical Hx: tracheostomy    24 Hours Vitals Range:  Temp:  [97.5 °F (36.4 °C)-99.6 °F (37.6 °C)]   Pulse:  [66-91]   Resp:  [10-18]   BP: (103-110)/(61-69)   SpO2:  [97 %-100 %]     Labs:    Recent Labs     24  1432   *   K 4.3      CO2 21*   BUN 48*   CREATININE 0.9   GLU 79        No results for input(s): "PH", "PCO2", "PO2", "HCO3", "POCSATURATED", "BE" in the last 72 hours.    ASSESSMENT/INTERVENTIONS  Patient resting comfortably. No respiratory concerns at this time.      Last VS   Temp: 97.5 °F (36.4 °C) ( 0428)  Pulse: 70 (717)  Resp: 16 (931)  BP: 107/69 ( 0717)  SpO2: 98 % ( 0900)      Extra trachs at bedside: #6 Shiley Cuffed, #8 Shiley Cuffed  Level of Consciousness: Level of Consciousness (AVPU): alert  Respiratory Effort: Respiratory Effort: Normal, Unlabored Expansion/Accessory Muscle Usage: Expansion/Accessory Muscles/Retractions: expansion symmetric, no retractions, no use of accessory muscles  All Lung Field Breath Sounds: All Lung Fields Breath Sounds: Anterior:, rhonchi  NATALIA Breath Sounds: clear  LLL Breath Sounds: clear  RUL Breath Sounds: clear  RML Breath Sounds: clear  RLL Breath Sounds: clear  O2 Device/Concentration: 5L/21%  Surgical airway: Yes, Type: Shiley Size: 8, cuffed  Ambu at bedside:       Active Orders   Respiratory Care    Oxygen PRN     Frequency: PRN     Number of Occurrences: Until Specified     Order Questions:      Device type: Low flow      Device: Trach Collar      FiO2%: 60      Titrate O2 per Oxygen Titration Protocol: Yes      To maintain SpO2 goal of: >= 90%      Notify MD of: Inability to achieve " desired SpO2; Sudden change in patient status and requires 20% increase in FiO2; Patient requires >60% FiO2    Pulse Oximetry Continuous     Frequency: Continuous     Number of Occurrences: Until Specified    Routine tracheostomy care     Frequency: BID     Number of Occurrences: Until Specified    SUCTION PRN     Frequency: PRN     Number of Occurrences: Until Specified       RECOMMENDATIONS    We recommend: RRT Recs: Continue POC per primary team.      FOLLOW-UP    Please call back the Rapid Response RT, Jerri Thurman RRT at x [phone] for any questions or concerns.

## 2024-04-07 ENCOUNTER — DOCUMENTATION ONLY (OUTPATIENT)
Dept: NEUROLOGY | Facility: CLINIC | Age: 29
End: 2024-04-07
Payer: MEDICAID

## 2024-04-07 PROBLEM — G93.41 ACUTE METABOLIC ENCEPHALOPATHY: Status: ACTIVE | Noted: 2024-04-07

## 2024-04-07 PROBLEM — N17.9 AKI (ACUTE KIDNEY INJURY): Status: ACTIVE | Noted: 2024-04-07

## 2024-04-07 LAB
ALBUMIN SERPL BCP-MCNC: 1.6 G/DL (ref 3.5–5.2)
ALBUMIN SERPL BCP-MCNC: 1.7 G/DL (ref 3.5–5.2)
ALLENS TEST: ABNORMAL
ALP SERPL-CCNC: 168 U/L (ref 55–135)
ALP SERPL-CCNC: 185 U/L (ref 55–135)
ALT SERPL W/O P-5'-P-CCNC: 21 U/L (ref 10–44)
ALT SERPL W/O P-5'-P-CCNC: 21 U/L (ref 10–44)
ANION GAP SERPL CALC-SCNC: 14 MMOL/L (ref 8–16)
ANION GAP SERPL CALC-SCNC: 18 MMOL/L (ref 8–16)
ANION GAP SERPL CALC-SCNC: 19 MMOL/L (ref 8–16)
AST SERPL-CCNC: 31 U/L (ref 10–40)
AST SERPL-CCNC: 31 U/L (ref 10–40)
BACTERIA #/AREA URNS AUTO: ABNORMAL /HPF
BASOPHILS # BLD AUTO: 0.12 K/UL (ref 0–0.2)
BASOPHILS # BLD AUTO: 0.14 K/UL (ref 0–0.2)
BASOPHILS # BLD AUTO: 0.18 K/UL (ref 0–0.2)
BASOPHILS NFR BLD: 0.7 % (ref 0–1.9)
BASOPHILS NFR BLD: 0.7 % (ref 0–1.9)
BASOPHILS NFR BLD: 1.2 % (ref 0–1.9)
BILIRUB SERPL-MCNC: 0.3 MG/DL (ref 0.1–1)
BILIRUB SERPL-MCNC: 0.3 MG/DL (ref 0.1–1)
BILIRUB UR QL STRIP: NEGATIVE
BUN SERPL-MCNC: 45 MG/DL (ref 6–20)
BUN SERPL-MCNC: 47 MG/DL (ref 6–20)
BUN SERPL-MCNC: 48 MG/DL (ref 6–20)
CALCIUM SERPL-MCNC: 10.5 MG/DL (ref 8.7–10.5)
CALCIUM SERPL-MCNC: 9.5 MG/DL (ref 8.7–10.5)
CALCIUM SERPL-MCNC: 9.9 MG/DL (ref 8.7–10.5)
CHLORIDE SERPL-SCNC: 102 MMOL/L (ref 95–110)
CHLORIDE SERPL-SCNC: 102 MMOL/L (ref 95–110)
CHLORIDE SERPL-SCNC: 99 MMOL/L (ref 95–110)
CLARITY UR REFRACT.AUTO: ABNORMAL
CO2 SERPL-SCNC: 14 MMOL/L (ref 23–29)
CO2 SERPL-SCNC: 16 MMOL/L (ref 23–29)
CO2 SERPL-SCNC: 17 MMOL/L (ref 23–29)
COLOR UR AUTO: COLORLESS
CREAT SERPL-MCNC: 1.1 MG/DL (ref 0.5–1.4)
CREAT SERPL-MCNC: 1.1 MG/DL (ref 0.5–1.4)
CREAT SERPL-MCNC: 1.2 MG/DL (ref 0.5–1.4)
DIFFERENTIAL METHOD BLD: ABNORMAL
EOSINOPHIL # BLD AUTO: 0.1 K/UL (ref 0–0.5)
EOSINOPHIL # BLD AUTO: 0.1 K/UL (ref 0–0.5)
EOSINOPHIL # BLD AUTO: 0.2 K/UL (ref 0–0.5)
EOSINOPHIL NFR BLD: 0.6 % (ref 0–8)
EOSINOPHIL NFR BLD: 0.7 % (ref 0–8)
EOSINOPHIL NFR BLD: 1.5 % (ref 0–8)
ERYTHROCYTE [DISTWIDTH] IN BLOOD BY AUTOMATED COUNT: 13.9 % (ref 11.5–14.5)
EST. GFR  (NO RACE VARIABLE): >60 ML/MIN/1.73 M^2
GLUCOSE SERPL-MCNC: 76 MG/DL (ref 70–110)
GLUCOSE SERPL-MCNC: 88 MG/DL (ref 70–110)
GLUCOSE SERPL-MCNC: 96 MG/DL (ref 70–110)
GLUCOSE UR QL STRIP: NEGATIVE
HCO3 UR-SCNC: 19.3 MMOL/L (ref 24–28)
HCT VFR BLD AUTO: 22.2 % (ref 40–54)
HCT VFR BLD AUTO: 25.4 % (ref 40–54)
HCT VFR BLD AUTO: 26.1 % (ref 40–54)
HGB BLD-MCNC: 7.1 G/DL (ref 14–18)
HGB BLD-MCNC: 8.1 G/DL (ref 14–18)
HGB BLD-MCNC: 8.2 G/DL (ref 14–18)
HGB UR QL STRIP: ABNORMAL
HYALINE CASTS UR QL AUTO: 0 /LPF
IMM GRANULOCYTES # BLD AUTO: 0.05 K/UL (ref 0–0.04)
IMM GRANULOCYTES # BLD AUTO: 0.06 K/UL (ref 0–0.04)
IMM GRANULOCYTES # BLD AUTO: 0.11 K/UL (ref 0–0.04)
IMM GRANULOCYTES NFR BLD AUTO: 0.3 % (ref 0–0.5)
IMM GRANULOCYTES NFR BLD AUTO: 0.3 % (ref 0–0.5)
IMM GRANULOCYTES NFR BLD AUTO: 0.5 % (ref 0–0.5)
KETONES UR QL STRIP: NEGATIVE
LACTATE SERPL-SCNC: 4.4 MMOL/L (ref 0.5–2.2)
LACTATE SERPL-SCNC: 4.5 MMOL/L (ref 0.5–2.2)
LACTATE SERPL-SCNC: 4.7 MMOL/L (ref 0.5–2.2)
LACTATE SERPL-SCNC: 4.8 MMOL/L (ref 0.5–2.2)
LACTATE SERPL-SCNC: 5.4 MMOL/L (ref 0.5–2.2)
LEUKOCYTE ESTERASE UR QL STRIP: ABNORMAL
LYMPHOCYTES # BLD AUTO: 4.5 K/UL (ref 1–4.8)
LYMPHOCYTES # BLD AUTO: 4.6 K/UL (ref 1–4.8)
LYMPHOCYTES # BLD AUTO: 4.9 K/UL (ref 1–4.8)
LYMPHOCYTES NFR BLD: 21.4 % (ref 18–48)
LYMPHOCYTES NFR BLD: 25.9 % (ref 18–48)
LYMPHOCYTES NFR BLD: 32.3 % (ref 18–48)
MAGNESIUM SERPL-MCNC: 1.2 MG/DL (ref 1.6–2.6)
MCH RBC QN AUTO: 29.6 PG (ref 27–31)
MCH RBC QN AUTO: 29.7 PG (ref 27–31)
MCH RBC QN AUTO: 30 PG (ref 27–31)
MCHC RBC AUTO-ENTMCNC: 31.4 G/DL (ref 32–36)
MCHC RBC AUTO-ENTMCNC: 31.9 G/DL (ref 32–36)
MCHC RBC AUTO-ENTMCNC: 32 G/DL (ref 32–36)
MCV RBC AUTO: 93 FL (ref 82–98)
MCV RBC AUTO: 94 FL (ref 82–98)
MCV RBC AUTO: 95 FL (ref 82–98)
MICROSCOPIC COMMENT: ABNORMAL
MONOCYTES # BLD AUTO: 0.7 K/UL (ref 0.3–1)
MONOCYTES # BLD AUTO: 1.3 K/UL (ref 0.3–1)
MONOCYTES # BLD AUTO: 1.5 K/UL (ref 0.3–1)
MONOCYTES NFR BLD: 4.7 % (ref 4–15)
MONOCYTES NFR BLD: 7.1 % (ref 4–15)
MONOCYTES NFR BLD: 7.5 % (ref 4–15)
NEUTROPHILS # BLD AUTO: 11.5 K/UL (ref 1.8–7.7)
NEUTROPHILS # BLD AUTO: 14.7 K/UL (ref 1.8–7.7)
NEUTROPHILS # BLD AUTO: 9 K/UL (ref 1.8–7.7)
NEUTROPHILS NFR BLD: 60 % (ref 38–73)
NEUTROPHILS NFR BLD: 64.9 % (ref 38–73)
NEUTROPHILS NFR BLD: 69.7 % (ref 38–73)
NITRITE UR QL STRIP: NEGATIVE
NRBC BLD-RTO: 0 /100 WBC
PCO2 BLDA: 35 MMHG (ref 35–45)
PH SMN: 7.35 [PH] (ref 7.35–7.45)
PH UR STRIP: 7 [PH] (ref 5–8)
PHOSPHATE SERPL-MCNC: 4.7 MG/DL (ref 2.7–4.5)
PLATELET # BLD AUTO: 126 K/UL (ref 150–450)
PLATELET # BLD AUTO: 137 K/UL (ref 150–450)
PLATELET # BLD AUTO: 178 K/UL (ref 150–450)
PMV BLD AUTO: 10 FL (ref 9.2–12.9)
PMV BLD AUTO: 10.3 FL (ref 9.2–12.9)
PMV BLD AUTO: 10.4 FL (ref 9.2–12.9)
PO2 BLDA: 36 MMHG (ref 40–60)
POC BE: -6 MMOL/L
POC SATURATED O2: 67 % (ref 95–100)
POC TCO2: 20 MMOL/L (ref 24–29)
POCT GLUCOSE: 128 MG/DL (ref 70–110)
POCT GLUCOSE: 75 MG/DL (ref 70–110)
POTASSIUM SERPL-SCNC: 4.1 MMOL/L (ref 3.5–5.1)
POTASSIUM SERPL-SCNC: 4.1 MMOL/L (ref 3.5–5.1)
POTASSIUM SERPL-SCNC: 4.4 MMOL/L (ref 3.5–5.1)
PROT SERPL-MCNC: 7.1 G/DL (ref 6–8.4)
PROT SERPL-MCNC: 7.7 G/DL (ref 6–8.4)
PROT UR QL STRIP: ABNORMAL
RBC # BLD AUTO: 2.4 M/UL (ref 4.6–6.2)
RBC # BLD AUTO: 2.7 M/UL (ref 4.6–6.2)
RBC # BLD AUTO: 2.76 M/UL (ref 4.6–6.2)
RBC #/AREA URNS AUTO: >100 /HPF (ref 0–4)
SAMPLE: ABNORMAL
SITE: ABNORMAL
SODIUM SERPL-SCNC: 132 MMOL/L (ref 136–145)
SODIUM SERPL-SCNC: 134 MMOL/L (ref 136–145)
SODIUM SERPL-SCNC: 135 MMOL/L (ref 136–145)
SP GR UR STRIP: 1.01 (ref 1–1.03)
SQUAMOUS #/AREA URNS AUTO: 0 /HPF
URN SPEC COLLECT METH UR: ABNORMAL
WBC # BLD AUTO: 15 K/UL (ref 3.9–12.7)
WBC # BLD AUTO: 17.76 K/UL (ref 3.9–12.7)
WBC # BLD AUTO: 21.15 K/UL (ref 3.9–12.7)
WBC #/AREA URNS AUTO: 15 /HPF (ref 0–5)

## 2024-04-07 PROCEDURE — 25000003 PHARM REV CODE 250: Performed by: HOSPITALIST

## 2024-04-07 PROCEDURE — 80053 COMPREHEN METABOLIC PANEL: CPT | Mod: 91

## 2024-04-07 PROCEDURE — 81001 URINALYSIS AUTO W/SCOPE: CPT | Performed by: HOSPITALIST

## 2024-04-07 PROCEDURE — 87205 SMEAR GRAM STAIN: CPT | Performed by: STUDENT IN AN ORGANIZED HEALTH CARE EDUCATION/TRAINING PROGRAM

## 2024-04-07 PROCEDURE — 99291 CRITICAL CARE FIRST HOUR: CPT | Mod: ,,, | Performed by: INTERNAL MEDICINE

## 2024-04-07 PROCEDURE — 87070 CULTURE OTHR SPECIMN AEROBIC: CPT | Performed by: STUDENT IN AN ORGANIZED HEALTH CARE EDUCATION/TRAINING PROGRAM

## 2024-04-07 PROCEDURE — 36415 COLL VENOUS BLD VENIPUNCTURE: CPT | Performed by: HOSPITALIST

## 2024-04-07 PROCEDURE — 27100171 HC OXYGEN HIGH FLOW UP TO 24 HOURS

## 2024-04-07 PROCEDURE — 63600175 PHARM REV CODE 636 W HCPCS: Performed by: HOSPITALIST

## 2024-04-07 PROCEDURE — 83605 ASSAY OF LACTIC ACID: CPT | Performed by: HOSPITALIST

## 2024-04-07 PROCEDURE — 25000003 PHARM REV CODE 250: Performed by: STUDENT IN AN ORGANIZED HEALTH CARE EDUCATION/TRAINING PROGRAM

## 2024-04-07 PROCEDURE — 99900035 HC TECH TIME PER 15 MIN (STAT)

## 2024-04-07 PROCEDURE — 27000221 HC OXYGEN, UP TO 24 HOURS

## 2024-04-07 PROCEDURE — 99291 CRITICAL CARE FIRST HOUR: CPT | Mod: ,,, | Performed by: STUDENT IN AN ORGANIZED HEALTH CARE EDUCATION/TRAINING PROGRAM

## 2024-04-07 PROCEDURE — 63600175 PHARM REV CODE 636 W HCPCS: Mod: JZ,JG | Performed by: PHYSICIAN ASSISTANT

## 2024-04-07 PROCEDURE — 87086 URINE CULTURE/COLONY COUNT: CPT | Performed by: HOSPITALIST

## 2024-04-07 PROCEDURE — 63600175 PHARM REV CODE 636 W HCPCS

## 2024-04-07 PROCEDURE — 99900026 HC AIRWAY MAINTENANCE (STAT)

## 2024-04-07 PROCEDURE — 25000003 PHARM REV CODE 250

## 2024-04-07 PROCEDURE — 63600175 PHARM REV CODE 636 W HCPCS: Performed by: STUDENT IN AN ORGANIZED HEALTH CARE EDUCATION/TRAINING PROGRAM

## 2024-04-07 PROCEDURE — 36415 COLL VENOUS BLD VENIPUNCTURE: CPT | Performed by: STUDENT IN AN ORGANIZED HEALTH CARE EDUCATION/TRAINING PROGRAM

## 2024-04-07 PROCEDURE — 63600175 PHARM REV CODE 636 W HCPCS: Mod: JZ,JG

## 2024-04-07 PROCEDURE — 20000000 HC ICU ROOM

## 2024-04-07 PROCEDURE — 3E03317 INTRODUCTION OF OTHER THROMBOLYTIC INTO PERIPHERAL VEIN, PERCUTANEOUS APPROACH: ICD-10-PCS | Performed by: INTERNAL MEDICINE

## 2024-04-07 PROCEDURE — 25000003 PHARM REV CODE 250: Performed by: INTERNAL MEDICINE

## 2024-04-07 PROCEDURE — 83605 ASSAY OF LACTIC ACID: CPT | Mod: 91

## 2024-04-07 PROCEDURE — 83735 ASSAY OF MAGNESIUM: CPT | Performed by: HOSPITALIST

## 2024-04-07 PROCEDURE — 87040 BLOOD CULTURE FOR BACTERIA: CPT | Performed by: STUDENT IN AN ORGANIZED HEALTH CARE EDUCATION/TRAINING PROGRAM

## 2024-04-07 PROCEDURE — 84100 ASSAY OF PHOSPHORUS: CPT | Performed by: HOSPITALIST

## 2024-04-07 PROCEDURE — 27000207 HC ISOLATION

## 2024-04-07 PROCEDURE — 87106 FUNGI IDENTIFICATION YEAST: CPT | Performed by: STUDENT IN AN ORGANIZED HEALTH CARE EDUCATION/TRAINING PROGRAM

## 2024-04-07 PROCEDURE — 85025 COMPLETE CBC W/AUTO DIFF WBC: CPT | Mod: 91

## 2024-04-07 PROCEDURE — 80053 COMPREHEN METABOLIC PANEL: CPT

## 2024-04-07 PROCEDURE — 85025 COMPLETE CBC W/AUTO DIFF WBC: CPT | Performed by: STUDENT IN AN ORGANIZED HEALTH CARE EDUCATION/TRAINING PROGRAM

## 2024-04-07 PROCEDURE — 94761 N-INVAS EAR/PLS OXIMETRY MLT: CPT

## 2024-04-07 PROCEDURE — 63600175 PHARM REV CODE 636 W HCPCS: Mod: JZ,JG | Performed by: STUDENT IN AN ORGANIZED HEALTH CARE EDUCATION/TRAINING PROGRAM

## 2024-04-07 PROCEDURE — 80048 BASIC METABOLIC PNL TOTAL CA: CPT | Performed by: HOSPITALIST

## 2024-04-07 PROCEDURE — 25000003 PHARM REV CODE 250: Performed by: PHYSICIAN ASSISTANT

## 2024-04-07 PROCEDURE — 63600175 PHARM REV CODE 636 W HCPCS: Performed by: INTERNAL MEDICINE

## 2024-04-07 PROCEDURE — 83605 ASSAY OF LACTIC ACID: CPT | Mod: 91 | Performed by: STUDENT IN AN ORGANIZED HEALTH CARE EDUCATION/TRAINING PROGRAM

## 2024-04-07 RX ORDER — SODIUM CHLORIDE 9 MG/ML
INJECTION, SOLUTION INTRAVENOUS CONTINUOUS
Status: DISCONTINUED | OUTPATIENT
Start: 2024-04-07 | End: 2024-04-11 | Stop reason: HOSPADM

## 2024-04-07 RX ORDER — MAGNESIUM SULFATE HEPTAHYDRATE 40 MG/ML
2 INJECTION, SOLUTION INTRAVENOUS ONCE
Status: COMPLETED | OUTPATIENT
Start: 2024-04-07 | End: 2024-04-07

## 2024-04-07 RX ORDER — NOREPINEPHRINE BITARTRATE/D5W 4MG/250ML
0-.2 PLASTIC BAG, INJECTION (ML) INTRAVENOUS CONTINUOUS
Status: DISCONTINUED | OUTPATIENT
Start: 2024-04-07 | End: 2024-04-08

## 2024-04-07 RX ORDER — NOREPINEPHRINE BITARTRATE/D5W 4MG/250ML
0-3 PLASTIC BAG, INJECTION (ML) INTRAVENOUS CONTINUOUS
Status: DISCONTINUED | OUTPATIENT
Start: 2024-04-07 | End: 2024-04-07

## 2024-04-07 RX ADMIN — MAGNESIUM SULFATE 2 G: 2 INJECTION INTRAVENOUS at 03:04

## 2024-04-07 RX ADMIN — LEVOTHYROXINE SODIUM 112 MCG: 112 TABLET ORAL at 05:04

## 2024-04-07 RX ADMIN — LINEZOLID 600 MG: 600 TABLET, FILM COATED ORAL at 10:04

## 2024-04-07 RX ADMIN — NOREPINEPHRINE BITARTRATE 0.02 MCG/KG/MIN: 4 INJECTION, SOLUTION INTRAVENOUS at 02:04

## 2024-04-07 RX ADMIN — ACETAMINOPHEN 650 MG: 650 SOLUTION ORAL at 12:04

## 2024-04-07 RX ADMIN — ERAVACYCLINE 45.4 MG: 50 INJECTION, POWDER, LYOPHILIZED, FOR SOLUTION INTRAVENOUS at 05:04

## 2024-04-07 RX ADMIN — CHLORHEXIDINE GLUCONATE 0.12% ORAL RINSE 15 ML: 1.2 LIQUID ORAL at 10:04

## 2024-04-07 RX ADMIN — OXYCODONE HYDROCHLORIDE 30 MG: 10 TABLET, FILM COATED, EXTENDED RELEASE ORAL at 09:04

## 2024-04-07 RX ADMIN — HYPROMELLOSE 2910 1 DROP: 5 SOLUTION OPHTHALMIC at 02:04

## 2024-04-07 RX ADMIN — SODIUM ZIRCONIUM CYCLOSILICATE 5 G: 5 POWDER, FOR SUSPENSION ORAL at 10:04

## 2024-04-07 RX ADMIN — MIDODRINE HYDROCHLORIDE 10 MG: 5 TABLET ORAL at 02:04

## 2024-04-07 RX ADMIN — SODIUM CHLORIDE, POTASSIUM CHLORIDE, SODIUM LACTATE AND CALCIUM CHLORIDE 500 ML: 600; 310; 30; 20 INJECTION, SOLUTION INTRAVENOUS at 10:04

## 2024-04-07 RX ADMIN — HYPROMELLOSE 2910 1 DROP: 5 SOLUTION OPHTHALMIC at 09:04

## 2024-04-07 RX ADMIN — MIDODRINE HYDROCHLORIDE 10 MG: 5 TABLET ORAL at 09:04

## 2024-04-07 RX ADMIN — CEFTAZIDIME, AVIBACTAM 2.5 G: 2; .5 POWDER, FOR SOLUTION INTRAVENOUS at 05:04

## 2024-04-07 RX ADMIN — MICAFUNGIN SODIUM 100 MG: 100 INJECTION, POWDER, LYOPHILIZED, FOR SOLUTION INTRAVENOUS at 11:04

## 2024-04-07 RX ADMIN — LEVETIRACETAM 750 MG: 750 TABLET, FILM COATED ORAL at 09:04

## 2024-04-07 RX ADMIN — FAMOTIDINE 20 MG: 20 TABLET ORAL at 09:04

## 2024-04-07 RX ADMIN — LEVETIRACETAM 750 MG: 750 TABLET, FILM COATED ORAL at 10:04

## 2024-04-07 RX ADMIN — MICONAZOLE NITRATE: 20 CREAM TOPICAL at 09:04

## 2024-04-07 RX ADMIN — SODIUM CHLORIDE: 9 INJECTION, SOLUTION INTRAVENOUS at 11:04

## 2024-04-07 RX ADMIN — AMPICILLIN SODIUM AND SULBACTAM SODIUM: 2; 1 INJECTION, POWDER, FOR SOLUTION INTRAMUSCULAR; INTRAVENOUS at 06:04

## 2024-04-07 RX ADMIN — CHLORHEXIDINE GLUCONATE 0.12% ORAL RINSE 15 ML: 1.2 LIQUID ORAL at 09:04

## 2024-04-07 RX ADMIN — MICONAZOLE NITRATE: 20 CREAM TOPICAL at 10:04

## 2024-04-07 RX ADMIN — DEXTROSE MONOHYDRATE 250 ML: 50 INJECTION, SOLUTION INTRAVENOUS at 12:04

## 2024-04-07 RX ADMIN — MIDODRINE HYDROCHLORIDE 10 MG: 5 TABLET ORAL at 05:04

## 2024-04-07 RX ADMIN — HYPROMELLOSE 2910 1 DROP: 5 SOLUTION OPHTHALMIC at 10:04

## 2024-04-07 RX ADMIN — ENOXAPARIN SODIUM 30 MG: 30 INJECTION SUBCUTANEOUS at 06:04

## 2024-04-07 RX ADMIN — ALTEPLASE 2 MG: 2.2 INJECTION, POWDER, LYOPHILIZED, FOR SOLUTION INTRAVENOUS at 02:04

## 2024-04-07 RX ADMIN — LINEZOLID 600 MG: 600 TABLET, FILM COATED ORAL at 09:04

## 2024-04-07 RX ADMIN — ERYTHROMYCIN: 5 OINTMENT OPHTHALMIC at 09:04

## 2024-04-07 RX ADMIN — SODIUM CHLORIDE, POTASSIUM CHLORIDE, SODIUM LACTATE AND CALCIUM CHLORIDE 500 ML: 600; 310; 30; 20 INJECTION, SOLUTION INTRAVENOUS at 08:04

## 2024-04-07 RX ADMIN — SODIUM CHLORIDE, POTASSIUM CHLORIDE, SODIUM LACTATE AND CALCIUM CHLORIDE 500 ML: 600; 310; 30; 20 INJECTION, SOLUTION INTRAVENOUS at 02:04

## 2024-04-07 NOTE — CARE UPDATE
"RAPID RESPONSE NURSE PROACTIVE ROUNDING NOTE       Time of Visit: 0128    Admit Date: 3/7/2024  LOS: 31  Code Status: Prior   Date of Visit: 2024  : 1995  Age: 28 y.o.  Sex: male  Race: Black or   Bed: [address] [place] [address] A:   MRN: [id number]  Was the patient discharged from an ICU this admission? No   Was the patient discharged from a PACU within last 24 hours? No   Did the patient receive conscious sedation/general anesthesia in last 24 hours? No  Was the patient in the ED within the past 24 hours? No  Was the patient on NIPPV within the past 24 hours? No   Attending Physician: Roberto Tamayo MD  Primary Service: [facility] MED 3   Time spent at the bedside: 30 - 45 min    SITUATION    Notified by bedside RN via phone call.  Reason for alert: AMS  Called to evaluate the patient for Neuro    BACKGROUND     Why is the patient in the hospital?: Gangrene    Patient has no past medical history on file.    Last Vitals:  Temp: 100.9 °F (38.3 °C) ( 0040)  Pulse: 97 ( 0100)  Resp: 18 ( 0014)  BP: 121/75 ( 0100)  SpO2: 95 % ( 0100)    24 Hours Vitals Range:  Temp:  [97.5 °F (36.4 °C)-100.9 °F (38.3 °C)]   Pulse:  []   Resp:  [16-18]   BP: ()/(51-80)   SpO2:  [95 %-100 %]     Labs:  Recent Labs     24  1432   WBC 13.94*   HGB 8.0*   HCT 24.8*          Recent Labs     24  1432 24  0129   * 134*   K 4.3 4.4    99   CO2 21* 17*   BUN 48* 47*   CREATININE 0.9 1.2   GLU 79 88   PHOS  --  4.7*   MG  --  1.2*        No results for input(s): "PH", "PCO2", "PO2", "HCO3", "POCSATURATED", "BE" in the last 72 hours.     ASSESSMENT    Physical Exam  Constitutional:       Appearance: He is ill-appearing and diaphoretic.   HENT:      Mouth/Throat:      Mouth: Mucous membranes are moist.   Pulmonary:      Effort: Pulmonary effort is normal.      Comments: Tracheostomy   Abdominal:      Comments: L colostomy    Genitourinary:     Comments: Split " penile head   Musculoskeletal:      Comments: L arm amputation and bilateral BKAs    Neurological:      Mental Status: He is alert and oriented to person, place, and time.   Psychiatric:         Attention and Perception: He perceives auditory and visual hallucinations.         INTERVENTIONS    The patient was seen for Neurological problem. Staff concerns included mental status change. The following interventions were performed: POCT glucose, BMP, D5 bolus 250 ml IV bolus, continuous pulse ox monitoring continued , continuous cardiac monitoring continued, and Mg, Phos, Lactic, sterling exchange, and urinalysis.    RECOMMENDATIONS    Delirium precautions   Follow up with labs   Glucose checks q6hrs   Continue cardiac and pulse ox monitoring     PROVIDER ESCALATION    Yes/No  Yes    Orders received and case discussed with Dr. Heard .    Disposition: Remain in room 1023.    FOLLOW-UP    Bedside Heena ROWLAND  updated on plan of care. Instructed to call the Rapid Response Nurse, Maegan Benito, RN at [phone] for additional questions or concerns.

## 2024-04-07 NOTE — NURSING
Pt began to decline overnight with softening blood pressures, tachycardia, altered mental status, and increased respirations. Lab work showed increased lactic acid and Rapid team was called to evaluate. Pt was transferred to SICU after evaluation by Critical Care Provider and once report was called to Tona ROWLAND. Pt moved to rm 6077 and family was notified by the provider.

## 2024-04-07 NOTE — PLAN OF CARE
MICU DAILY GOALS     Family/Goals of care/Code Status   Code Status: Full Code    24H Vital Sign Range  Temp:  [96.9 °F (36.1 °C)-100.9 °F (38.3 °C)]   Pulse:  []   Resp:  [14-25]   BP: ()/(50-80)   SpO2:  [80 %-100 %]      Shift Events (include procedures and significant events)   Patient stepped up from the floor d/t AMS and hypotension. LR 1L bolus given. Wound care completed. IV abx changed. CT head completed. EEG ordered.     AWAKE RASS: Goal - RASS Goal: 0-->alert and calm  Actual - RASS (Jorgensen Agitation-Sedation Scale): alert and calm    Restraint necessity: Not necessary   BREATHE SBT: Not intubated    Coordinate A & B, analgesics/sedatives Pain: managed   SAT: Not intubated   Delirium CAM-ICU: Overall CAM-ICU: Positive   Early(intubated/ Progressive (non-intubated) Mobility MOVE Screen (INTUBATED ONLY): NA    Activity: Activity Management: Patient unable to perform activities   Feeding/Nutrition Diet order: Diet/Nutrition Received: regular,     Thrombus DVT prophylaxis: VTE Required Core Measure: Pharmacological prophylaxis initiated/maintained   HOB Elevation Head of Bed (HOB) Positioning: HOB elevated   Ulcer Prophylaxis GI: yes   Glucose control managed     Skin Skin assessed during: Transfer    Sacrum intact/not altered? No  Heels intact/not altered? No  Surgical wound? Yes    CHECK ONE!   (no altered skin or altered skin) and sub boxes:  [] No Altered Skin Integrity Present    []Prevention Measures Documented    [x] Altered Skin Integrity Present or Discovered   [x] LDA present in EPIC, daily doc completed              [] LDA added if not in EPIC (describe wound).                    When describing wound, do not stage, use descriptive words only.    [x] Wound Image Taken (required on admit,                   transfer/discharge and every Tuesday)    Wound Care Consulted? No    4 EYES:  Attending Nurse (1st set of eyes): Ramila Bragg RN/Staff Member (2nd set of eyes): Torres Rdz  Function no issues    Indwelling Catheter Necessity [REMOVED]      Urethral Catheter 02/21/24 2330 Temperature probe-Reason for Continuing Urinary Catheterization: Non-healing sacral/perineal wound    [REMOVED] Percutaneous Central Line - Triple Lumen  02/21/24 1754 Femoral Vein Right;Femoral Right-Line Necessity Review: Longterm central access required  Shen    De-escalation Antibiotics No        VS and assessment per flow sheet, patient progressing towards goals as tolerated, plan of care reviewed with  patient , all concerns addressed, will continue to monitor.   no

## 2024-04-07 NOTE — PROGRESS NOTES
"Entered room to place EEG on pt. Introduced myself and explained test and hookup process. Pt states "he's good". I then explained to patient that primary team wants this test to be completed. Pt states  " I don't want it" . Informed primary team of the above. Team canceled order.    "

## 2024-04-07 NOTE — CODE/ RAPID DOCUMENTATION
RAPID RESPONSE NURSE PROACTIVE ROUNDING NOTE       Time of Visit: 820    Admit Date: 3/7/2024  LOS: 31  Code Status: Full Code   Date of Visit: 2024  : 1995  Age: 28 y.o.  Sex: male  Race: Black or   Bed: 6077/6077 A:   MRN: [id number]  Was the patient discharged from an ICU this admission? No   Was the patient discharged from a PACU within last 24 hours? No   Did the patient receive conscious sedation/general anesthesia in last 24 hours? No  Was the patient in the ED within the past 24 hours? No  Was the patient on NIPPV within the past 24 hours? No   Attending Physician: Leif Barrera MD  Primary Service: [facility] MED 3   Time spent at the bedside: 45 - 60 min    SITUATION    Notified by bedside RN via phone call.  Reason for alert: increased lethargy and hypotension  Called to evaluate the patient for Circulatory    BACKGROUND     Why is the patient in the hospital?: Gangrene    Patient has no past medical history on file.    Last Vitals:  Temp: 99.7 °F (37.6 °C) ( 0807)  Pulse: 123 ( 1005)  Resp: 24 ( 1005)  BP: 102/58 ( 1005)  SpO2: 97 % ( 1005)    24 Hours Vitals Range:  Temp:  [97.5 °F (36.4 °C)-100.9 °F (38.3 °C)]   Pulse:  []   Resp:  [18-24]   BP: ()/(51-80)   SpO2:  [80 %-100 %]     Labs:  Recent Labs     24  1432 24  0443   WBC 13.94* 15.00*   HGB 8.0* 8.2*   HCT 24.8* 26.1*    178       Recent Labs     24  1432 24  0129   * 134*   K 4.3 4.4    99   CO2 21* 17*   BUN 48* 47*   CREATININE 0.9 1.2   GLU 79 88   PHOS  --  4.7*   MG  --  1.2*        Recent Labs     24  0843   PH 7.349*   PCO2 35.0   PO2 36*   HCO3 19.3*   POCSATURATED 67   BE -6*        ASSESSMENT    Physical Exam  Eyes:      Comments: Edema to bilateral eye lids.   Neurological:      Mental Status: He is lethargic.      GCS: GCS eye subscore is 4. GCS verbal subscore is 4. GCS motor subscore is 6.      Comments: Oriented to  self and year only,  Pt does not know where he is at this time.         INTERVENTIONS    The patient was seen for Cardiac problem. Staff concerns included hypotension and increased lethargy. The following interventions were performed: normal saline 500 ml IV bolus , critical care consult, and lactic acid.  VBG done and results shown to Harvey Medrano and Jonel.    RECOMMENDATIONS    Fluid bolus.  Repeat lactic acid.  Transfer to ICU.    PROVIDER ESCALATION    Yes/No  Yes    Orders received and case discussed with Dr. Medrano .    Disposition: Tx in ICU bed 6077.    FOLLOW-UP    Charge RNOsmani  updated on plan of care. Instructed to call the Rapid Response Nurse, Radha Meraz RN at [phone] for additional questions or concerns.

## 2024-04-07 NOTE — NURSING
Rapid team, Dr. Remy with  3 and [facility].  Orders are being placed for transfer to MICU.

## 2024-04-07 NOTE — SUBJECTIVE & OBJECTIVE
Interval History: ID called back after transfer to ICU for encephalopathy, fever and labile Bps. Patient is altered during exam, unable to obtain history.     Review of Systems   Reason unable to perform ROS: mental status.     Objective:     Vital Signs (Most Recent):  Temp: 97.9 °F (36.6 °C) (04/07/24 1101)  Pulse: 105 (04/07/24 1203)  Resp: (!) 22 (04/07/24 1203)  BP: (!) 87/53 (04/07/24 1203)  SpO2: 96 % (04/07/24 1203) Vital Signs (24h Range):  Temp:  [97.5 °F (36.4 °C)-100.9 °F (38.3 °C)] 97.9 °F (36.6 °C)  Pulse:  [] 105  Resp:  [18-25] 22  SpO2:  [80 %-100 %] 96 %  BP: ()/(50-80) 87/53     Weight: 59.4 kg (130 lb 15.3 oz)  Body mass index is 19.34 kg/m².    Estimated Creatinine Clearance: 77.7 mL/min (based on SCr of 1.2 mg/dL).     Physical Exam  Vitals and nursing note reviewed.   Constitutional:       Appearance: He is ill-appearing. He is not toxic-appearing.   HENT:      Head: Normocephalic.   Neck:      Comments: Trach collar  Pulmonary:      Effort: Pulmonary effort is normal. No respiratory distress.   Abdominal:      Palpations: Abdomen is soft.      Comments: Ostomy bag with soft stool   Genitourinary:     Comments: sterling  Musculoskeletal:      Comments: L below elbow amputation, bilateral AKAs with well healing incision site. R hand with wound and purulence noted    Neurological:      Mental Status: He is disoriented.          Significant Labs:   Microbiology Results (last 7 days)       Procedure Component Value Units Date/Time    Culture, Respiratory with Gram Stain [[id number]]     Order Status: No result Specimen: Respiratory     Blood culture [[id number]] Collected: 04/07/24 0509    Order Status: Sent Specimen: Blood Updated: 04/07/24 0541    Blood culture [[id number]] Collected: 04/07/24 0442    Order Status: Sent Specimen: Blood Updated: 04/07/24 0506    Urine culture [[id number]] Collected: 04/07/24 0121    Order Status: No result Specimen: Urine Updated: 04/07/24 0155             Significant Imaging: I have reviewed all pertinent imaging results/findings within the past 24 hours.

## 2024-04-07 NOTE — CONSULTS
Patient seen and evaluated after rapids called on the floor.  Worsening hypotension and concern for mental status changes in setting of sepsis.  Patient to be transferred to MICU for further care.  Full H&P to follow.    Kirit Rodas MD  [facility]

## 2024-04-07 NOTE — RESPIRATORY THERAPY
Called to patient room by nurse low oxygen sats 81 to 88 %.bagged and lavagged.  Patient was on room  air now on oxygen O2 sats 99 %. Rapid at bedside.

## 2024-04-07 NOTE — ASSESSMENT & PLAN NOTE
On morning of 4/7, MICU consulted with concern of hypotension and worsening mentation.  Responded well with IVF.  Concerning for worsening infection and micafungin added by hospital team.      - F/u CTH  - IVF for pressure support as patient appears slightly hypovolemic  - Vasopressors if needed for MAP goal >65  - trending lactic  - hold sedating medications in setting of SHARMAINE  - holding Avycaz initially with concern for mentation, F/u EEG

## 2024-04-07 NOTE — ASSESSMENT & PLAN NOTE
Worsening renal function to 1.2 on day of transfer to MICU from 0.7 associated with worsening mentation and hypotension on long term antibiotics.    - IVF as needed  - avoid nephrotoxic agents  - MAP goal >65  - consider repeat imaging if continuing to decline

## 2024-04-07 NOTE — EICU
[facility] Day 1 information sent to family.    Pt waking up numerous times overnight mumbling incoherently at times however redirectable  Focused on wanting to be discharged "I can go  I can walk"  No irritability noted however appears preoccupied
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
## 2024-04-07 NOTE — ASSESSMENT & PLAN NOTE
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  Continuing to have positive cultures and  acinetobacter and pseudomonas.    - ID consulted and recommendation of avycaz, linezolid, minicycline for duration of 6 weeks with EOT of 4/24/2024.  F/u ID recs   - wound care following for chronic skin breakdown  - unable to be transferred to LTAC for insurance and medication issues, will complete course of Abx in hospital
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
## 2024-04-07 NOTE — RESPIRATORY THERAPY
RAPID RESPONSE RESPIRATORY THERAPY PROACTIVE NOTE           Time of visit: 820     Code Status: Full Code   : 1995  Bed: 6077/6077 A:   MRN: [id number]  Time spent at the bedside: 15 -30 min    SITUATION    Evaluated patient for: Hypotension, Increased Lactic, Decreased O2 saturations    BACKGROUND    Why is the patient in the hospital?: Gangrene    Patient has no past medical history on file.    24 Hours Vitals Range:  Temp:  [97.5 °F (36.4 °C)-100.9 °F (38.3 °C)]   Pulse:  []   Resp:  [18-25]   BP: ()/(51-80)   SpO2:  [80 %-100 %]     Labs:    Recent Labs     24  1432 24  0129   * 134*   K 4.3 4.4    99   CO2 21* 17*   BUN 48* 47*   CREATININE 0.9 1.2   GLU 79 88   PHOS  --  4.7*   MG  --  1.2*        Recent Labs     24  0843   PH 7.349*   PCO2 35.0   PO2 36*   HCO3 19.3*   POCSATURATED 67   BE -6*       ASSESSMENT/INTERVENTIONS  Increased oxygen support. Completed a VBG.     Last VS   Temp: 99.7 °F (37.6 °C) ( 08)  Pulse: 106 ( 1101)  Resp: 25 ( 110)  BP: 92/56 ( 1101)  SpO2: 91 % ( 110)    Level of Consciousness: Level of Consciousness (AVPU): responds to voice  Respiratory Effort: Respiratory Effort: Unlabored Expansion/Accessory Muscle Usage: Expansion/Accessory Muscles/Retractions: expansion symmetric, no use of accessory muscles  All Lung Field Breath Sounds: All Lung Fields Breath Sounds: Anterior:, Lateral:, diminished  NATALIA Breath Sounds: clear  LLL Breath Sounds: clear  RUL Breath Sounds: clear  RML Breath Sounds: clear  RLL Breath Sounds: clear  O2 Device/Concentration: T-collar 10L/80%  NIPPV: No Surgical airway: No  ETCO2 monitored:    Ambu at bedside:      Active Orders   Respiratory Care    Oxygen PRN     Frequency: PRN     Number of Occurrences: Until Specified     Order Questions:      Device type: Low flow      Device: Trach Collar      FiO2%: 60      Titrate O2 per Oxygen Titration Protocol: Yes      To maintain SpO2  goal of: >= 90%      Notify MD of: Inability to achieve desired SpO2; Sudden change in patient status and requires 20% increase in FiO2; Patient requires >60% FiO2    Pulse Oximetry Continuous     Frequency: Continuous     Number of Occurrences: Until Specified    Routine tracheostomy care     Frequency: BID     Number of Occurrences: Until Specified    SUCTION PRN     Frequency: PRN     Number of Occurrences: Until Specified       RECOMMENDATIONS    We recommend: RRT Recs: Move to higher level of care    FOLLOW-UP    Please call back the Rapid Response RT, Avril Kamara, RRT at x [phone] for any questions or concerns.

## 2024-04-07 NOTE — PLAN OF CARE
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    Neuro: A/Ox4 visual and auditory hallucinations present    Vital Signs: /75   Pulse 97   Temp (!) 100.9 °F (38.3 °C)   Resp 18   Ht 5' 9" (1.753 m)   Wt 59.4 kg (130 lb 15.3 oz)   SpO2 99%   BMI 19.34 kg/m²     Respiratory: 5L 28% Trach collar    Diet: Diet Adult Regular (IDDSI Level 7)      Is patient tolerating current diet? Yes    GTTS: NA    Urine Output/Bowel Movement:   I/O this shift:  In: -   Out: 400 [Urine:400]  Last Bowel Movement: 04/05/24 Colostomy , Shen       Drains/Tubes/Tube Feeds (include total output/shift):   I/O this shift:  In: -   Out: 400 [Urine:400] Shen       Lines: Midline cathether      Accuchecks: prn    Skin: multiple wounds    Fall Risk Score: 16    Activity level? Primarily in Bed    Any scheduled procedures? NA    Any safety concerns? NA    Problem: Adult Inpatient Plan of Care  Goal: Plan of Care Review  Outcome: Ongoing, Progressing  Goal: Patient-Specific Goal (Individualized)  Outcome: Ongoing, Progressing  Goal: Absence of Hospital-Acquired Illness or Injury  Outcome: Ongoing, Progressing  Goal: Optimal Comfort and Wellbeing  Outcome: Ongoing, Progressing  Goal: Readiness for Transition of Care  Outcome: Ongoing, Progressing     Problem: Adjustment to Illness (Sepsis/Septic Shock)  Goal: Optimal Coping  Outcome: Ongoing, Progressing     Problem: Infection  Goal: Absence of Infection Signs and Symptoms  Outcome: Ongoing, Progressing     "

## 2024-04-07 NOTE — NURSING
Notified Dr. Remy, HM 3, of patient's blood pressure now 85/53, after bolus, o2 sats now in 80s on room air, heartrate 120s, last lactic acid 4.7.  Dr. Remy is coming to the floor to assess patient, placed order for critical care consult.  [place] team already notified and on the way.

## 2024-04-07 NOTE — SUBJECTIVE & OBJECTIVE
History reviewed. No pertinent past medical history.    Past Surgical History:   Procedure Laterality Date    ABOVE-KNEE AMPUTATION Bilateral 3/13/2024    Procedure: AMPUTATION, ABOVE KNEE;  Surgeon: Dexter Wynne MD;  Location: [facility] OR [address] [place] [address];  Service: General;  Laterality: Bilateral;    AMPUTATION OF UPPER EXTREMITY Left 3/13/2024    Procedure: AMPUTATION, UPPER EXTREMITY;  Surgeon: Dexter Wynne MD;  Location: [facility] OR [address] [place] [address];  Service: General;  Laterality: Left;  BELOW ELBOW    ESOPHAGOGASTRODUODENOSCOPY W/ PEG N/A 5/30/2023    Procedure: PEG;  Surgeon: JUSTYN Devine MD;  Location: [facility] ENDOSCOPY;  Service: Gastroenterology;  Laterality: N/A;       Review of patient's allergies indicates:   Allergen Reactions    Opioids - morphine analogues Nausea And Vomiting, Anxiety and Other (See Comments)       Family History    None       Tobacco Use    Smoking status: Never    Smokeless tobacco: Never   Substance and Sexual Activity    Alcohol use: Not Currently    Drug use: Not Currently    Sexual activity: Not Currently      Review of Systems   Unable to perform ROS: Mental status change     Objective:     Vital Signs (Most Recent):  Temp: 99.7 °F (37.6 °C) (04/07/24 0807)  Pulse: (!) 114 (04/07/24 0807)  Resp: (!) 24 (04/07/24 0812)  BP: (!) 85/53 (04/07/24 0807)  SpO2: 99 % (04/07/24 0812) Vital Signs (24h Range):  Temp:  [97.5 °F (36.4 °C)-100.9 °F (38.3 °C)] 99.7 °F (37.6 °C)  Pulse:  [] 114  Resp:  [16-24] 24  SpO2:  [80 %-100 %] 99 %  BP: ()/(51-80) 85/53   Weight: 59.4 kg (130 lb 15.3 oz)  Body mass index is 19.34 kg/m².      Intake/Output Summary (Last 24 hours) at 4/7/2024 0916  Last data filed at 4/7/2024 0058  Gross per 24 hour   Intake 240 ml   Output 875 ml   Net -635 ml          Physical Exam  Vitals and nursing note reviewed.   Constitutional:       General: He is not in acute distress.     Appearance: He is ill-appearing.   HENT:      Mouth/Throat:      Mouth: Mucous  membranes are dry.   Neck:      Comments: Tracheostomy in place  Cardiovascular:      Rate and Rhythm: Normal rate and regular rhythm.      Heart sounds: Normal heart sounds.   Pulmonary:      Effort: Pulmonary effort is normal.      Breath sounds: Normal breath sounds. No rales.   Abdominal:      General: There is no distension.      Palpations: Abdomen is soft.      Comments: Colostomy bag LLQ   Musculoskeletal:         General: No swelling or tenderness.      Comments: Bilateral AKA, staples intact. LLE with skin breakdown near staples  LUE amputation below elbow  Midline RUE.    Skin:     General: Skin is warm.      Findings: Erythema and lesion present. No rash.      Comments: See media tab for wound images     Neurological:      General: No focal deficit present.      Mental Status: He is lethargic.            Vents:  Oxygen Concentration (%): 80 (04/07/24 0812)  Lines/Drains/Airways       Drain  Duration                  Colostomy  LLQ -- days         Urethral Catheter 04/07/24 0111 Non-latex 16 Fr. <1 day              Airway  Duration             Adult Surgical Airway 05/25/23 1135 Shiley Cuffed 8.0 / 85 mm 317 days              Peripheral Intravenous Line  Duration                  Midline Catheter - Single Lumen 03/29/24 1456 Right 22g x 8cm 8 days                  Significant Labs:    CBC/Anemia Profile:  Recent Labs   Lab 04/05/24  1432 04/07/24  0443   WBC 13.94* 15.00*   HGB 8.0* 8.2*   HCT 24.8* 26.1*    178   MCV 94 95   RDW 14.2 13.9        Chemistries:  Recent Labs   Lab 04/05/24  1432 04/07/24  0129   * 134*   K 4.3 4.4    99   CO2 21* 17*   BUN 48* 47*   CREATININE 0.9 1.2   CALCIUM 9.8 10.5   ALBUMIN 1.7*  --    PROT 7.8  --    BILITOT 0.3  --    ALKPHOS 184*  --    ALT 23  --    AST 26  --    MG  --  1.2*   PHOS  --  4.7*       All pertinent labs within the past 24 hours have been reviewed.    Significant Imaging: I have reviewed all pertinent imaging results/findings within  the past 24 hours.

## 2024-04-07 NOTE — NURSING
Patient transferred from the floor.   Vitals and assessment completed.   Wound care completed per orders.   Team notified.   RT notified.

## 2024-04-07 NOTE — ASSESSMENT & PLAN NOTE
Seen by opthalmology 3/13 for painless vision loss for weeks, plan at that time was to follow up outpatient for cataracts. Developed eye pain 2/2 dry eyes    - ointment and eyedrops provided.  - continue artificial tears
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
## 2024-04-07 NOTE — CARE UPDATE
Pt received on 10L/50% trach collar wearing speaking valve. Pt transitioned to humidified trach collar 10L/40% and is tolerating well at this time.   Spare trachs at bedside along with suction catheters - will bring remaining trach supplies to bedside.

## 2024-04-08 ENCOUNTER — DOCUMENTATION ONLY (OUTPATIENT)
Dept: NEUROLOGY | Facility: CLINIC | Age: 29
End: 2024-04-08
Payer: MEDICAID

## 2024-04-08 PROBLEM — I95.89 CHRONIC HYPOTENSION: Chronic | Status: ACTIVE | Noted: 2024-04-08

## 2024-04-08 LAB
ALBUMIN SERPL BCP-MCNC: 1.6 G/DL (ref 3.5–5.2)
ALBUMIN SERPL BCP-MCNC: 1.6 G/DL (ref 3.5–5.2)
ALP SERPL-CCNC: 162 U/L (ref 55–135)
ALP SERPL-CCNC: 167 U/L (ref 55–135)
ALT SERPL W/O P-5'-P-CCNC: 17 U/L (ref 10–44)
ALT SERPL W/O P-5'-P-CCNC: 18 U/L (ref 10–44)
ANION GAP SERPL CALC-SCNC: 16 MMOL/L (ref 8–16)
ANION GAP SERPL CALC-SCNC: 17 MMOL/L (ref 8–16)
AST SERPL-CCNC: 21 U/L (ref 10–40)
AST SERPL-CCNC: 23 U/L (ref 10–40)
BACTERIA UR CULT: NO GROWTH
BASOPHILS # BLD AUTO: 0.11 K/UL (ref 0–0.2)
BASOPHILS # BLD AUTO: 0.15 K/UL (ref 0–0.2)
BASOPHILS NFR BLD: 0.6 % (ref 0–1.9)
BASOPHILS NFR BLD: 0.7 % (ref 0–1.9)
BILIRUB SERPL-MCNC: 0.2 MG/DL (ref 0.1–1)
BILIRUB SERPL-MCNC: 0.3 MG/DL (ref 0.1–1)
BUN SERPL-MCNC: 43 MG/DL (ref 6–20)
BUN SERPL-MCNC: 44 MG/DL (ref 6–20)
CALCIUM SERPL-MCNC: 9.6 MG/DL (ref 8.7–10.5)
CALCIUM SERPL-MCNC: 9.6 MG/DL (ref 8.7–10.5)
CHLORIDE SERPL-SCNC: 106 MMOL/L (ref 95–110)
CHLORIDE SERPL-SCNC: 106 MMOL/L (ref 95–110)
CO2 SERPL-SCNC: 16 MMOL/L (ref 23–29)
CO2 SERPL-SCNC: 16 MMOL/L (ref 23–29)
CREAT SERPL-MCNC: 1.1 MG/DL (ref 0.5–1.4)
CREAT SERPL-MCNC: 1.3 MG/DL (ref 0.5–1.4)
DIFFERENTIAL METHOD BLD: ABNORMAL
DIFFERENTIAL METHOD BLD: ABNORMAL
EOSINOPHIL # BLD AUTO: 0.3 K/UL (ref 0–0.5)
EOSINOPHIL # BLD AUTO: 0.4 K/UL (ref 0–0.5)
EOSINOPHIL NFR BLD: 1.6 % (ref 0–8)
EOSINOPHIL NFR BLD: 1.9 % (ref 0–8)
ERYTHROCYTE [DISTWIDTH] IN BLOOD BY AUTOMATED COUNT: 14.2 % (ref 11.5–14.5)
ERYTHROCYTE [DISTWIDTH] IN BLOOD BY AUTOMATED COUNT: 14.3 % (ref 11.5–14.5)
EST. GFR  (NO RACE VARIABLE): >60 ML/MIN/1.73 M^2
EST. GFR  (NO RACE VARIABLE): >60 ML/MIN/1.73 M^2
GLUCOSE SERPL-MCNC: 100 MG/DL (ref 70–110)
GLUCOSE SERPL-MCNC: 87 MG/DL (ref 70–110)
HCT VFR BLD AUTO: 22.3 % (ref 40–54)
HCT VFR BLD AUTO: 23.8 % (ref 40–54)
HGB BLD-MCNC: 7.2 G/DL (ref 14–18)
HGB BLD-MCNC: 7.6 G/DL (ref 14–18)
IMM GRANULOCYTES # BLD AUTO: 0.08 K/UL (ref 0–0.04)
IMM GRANULOCYTES # BLD AUTO: 0.08 K/UL (ref 0–0.04)
IMM GRANULOCYTES NFR BLD AUTO: 0.4 % (ref 0–0.5)
IMM GRANULOCYTES NFR BLD AUTO: 0.4 % (ref 0–0.5)
LACTATE SERPL-SCNC: 3.2 MMOL/L (ref 0.5–2.2)
LACTATE SERPL-SCNC: 3.7 MMOL/L (ref 0.5–2.2)
LYMPHOCYTES # BLD AUTO: 2.8 K/UL (ref 1–4.8)
LYMPHOCYTES # BLD AUTO: 2.9 K/UL (ref 1–4.8)
LYMPHOCYTES NFR BLD: 13.2 % (ref 18–48)
LYMPHOCYTES NFR BLD: 15.3 % (ref 18–48)
MAGNESIUM SERPL-MCNC: 1.4 MG/DL (ref 1.6–2.6)
MCH RBC QN AUTO: 30.5 PG (ref 27–31)
MCH RBC QN AUTO: 30.5 PG (ref 27–31)
MCHC RBC AUTO-ENTMCNC: 31.9 G/DL (ref 32–36)
MCHC RBC AUTO-ENTMCNC: 32.3 G/DL (ref 32–36)
MCV RBC AUTO: 95 FL (ref 82–98)
MCV RBC AUTO: 96 FL (ref 82–98)
MONOCYTES # BLD AUTO: 1.3 K/UL (ref 0.3–1)
MONOCYTES # BLD AUTO: 1.4 K/UL (ref 0.3–1)
MONOCYTES NFR BLD: 6.5 % (ref 4–15)
MONOCYTES NFR BLD: 6.6 % (ref 4–15)
NEUTROPHILS # BLD AUTO: 14.3 K/UL (ref 1.8–7.7)
NEUTROPHILS # BLD AUTO: 16.2 K/UL (ref 1.8–7.7)
NEUTROPHILS NFR BLD: 75.2 % (ref 38–73)
NEUTROPHILS NFR BLD: 77.6 % (ref 38–73)
NRBC BLD-RTO: 0 /100 WBC
NRBC BLD-RTO: 0 /100 WBC
PLATELET # BLD AUTO: 104 K/UL (ref 150–450)
PLATELET # BLD AUTO: 97 K/UL (ref 150–450)
PMV BLD AUTO: 11.3 FL (ref 9.2–12.9)
PMV BLD AUTO: 11.4 FL (ref 9.2–12.9)
POTASSIUM SERPL-SCNC: 3.9 MMOL/L (ref 3.5–5.1)
POTASSIUM SERPL-SCNC: 3.9 MMOL/L (ref 3.5–5.1)
PROT SERPL-MCNC: 7.2 G/DL (ref 6–8.4)
PROT SERPL-MCNC: 7.4 G/DL (ref 6–8.4)
RBC # BLD AUTO: 2.36 M/UL (ref 4.6–6.2)
RBC # BLD AUTO: 2.49 M/UL (ref 4.6–6.2)
SODIUM SERPL-SCNC: 138 MMOL/L (ref 136–145)
SODIUM SERPL-SCNC: 139 MMOL/L (ref 136–145)
T4 FREE SERPL-MCNC: 0.74 NG/DL (ref 0.71–1.51)
TSH SERPL DL<=0.005 MIU/L-ACNC: 13.01 UIU/ML (ref 0.4–4)
WBC # BLD AUTO: 19 K/UL (ref 3.9–12.7)
WBC # BLD AUTO: 20.84 K/UL (ref 3.9–12.7)

## 2024-04-08 PROCEDURE — 99900035 HC TECH TIME PER 15 MIN (STAT)

## 2024-04-08 PROCEDURE — 63600175 PHARM REV CODE 636 W HCPCS: Performed by: INTERNAL MEDICINE

## 2024-04-08 PROCEDURE — 63600175 PHARM REV CODE 636 W HCPCS

## 2024-04-08 PROCEDURE — 94761 N-INVAS EAR/PLS OXIMETRY MLT: CPT

## 2024-04-08 PROCEDURE — 25000003 PHARM REV CODE 250: Performed by: STUDENT IN AN ORGANIZED HEALTH CARE EDUCATION/TRAINING PROGRAM

## 2024-04-08 PROCEDURE — 27000221 HC OXYGEN, UP TO 24 HOURS

## 2024-04-08 PROCEDURE — 84439 ASSAY OF FREE THYROXINE: CPT

## 2024-04-08 PROCEDURE — 83735 ASSAY OF MAGNESIUM: CPT

## 2024-04-08 PROCEDURE — 84443 ASSAY THYROID STIM HORMONE: CPT

## 2024-04-08 PROCEDURE — 85025 COMPLETE CBC W/AUTO DIFF WBC: CPT | Mod: 91

## 2024-04-08 PROCEDURE — 25000003 PHARM REV CODE 250: Performed by: HOSPITALIST

## 2024-04-08 PROCEDURE — 63600175 PHARM REV CODE 636 W HCPCS: Mod: JZ,JG | Performed by: STUDENT IN AN ORGANIZED HEALTH CARE EDUCATION/TRAINING PROGRAM

## 2024-04-08 PROCEDURE — 25000003 PHARM REV CODE 250: Performed by: INTERNAL MEDICINE

## 2024-04-08 PROCEDURE — 20000000 HC ICU ROOM

## 2024-04-08 PROCEDURE — 27100171 HC OXYGEN HIGH FLOW UP TO 24 HOURS

## 2024-04-08 PROCEDURE — 63600175 PHARM REV CODE 636 W HCPCS: Performed by: STUDENT IN AN ORGANIZED HEALTH CARE EDUCATION/TRAINING PROGRAM

## 2024-04-08 PROCEDURE — 27000207 HC ISOLATION

## 2024-04-08 PROCEDURE — 83605 ASSAY OF LACTIC ACID: CPT | Performed by: STUDENT IN AN ORGANIZED HEALTH CARE EDUCATION/TRAINING PROGRAM

## 2024-04-08 PROCEDURE — 99291 CRITICAL CARE FIRST HOUR: CPT | Mod: ,,, | Performed by: INTERNAL MEDICINE

## 2024-04-08 PROCEDURE — 80053 COMPREHEN METABOLIC PANEL: CPT

## 2024-04-08 PROCEDURE — 99900026 HC AIRWAY MAINTENANCE (STAT)

## 2024-04-08 RX ORDER — MIDODRINE HYDROCHLORIDE 5 MG/1
15 TABLET ORAL EVERY 8 HOURS
Status: DISCONTINUED | OUTPATIENT
Start: 2024-04-08 | End: 2024-04-11 | Stop reason: HOSPADM

## 2024-04-08 RX ORDER — NOREPINEPHRINE BITARTRATE/D5W 4MG/250ML
0-.2 PLASTIC BAG, INJECTION (ML) INTRAVENOUS CONTINUOUS
Status: ACTIVE | OUTPATIENT
Start: 2024-04-08 | End: 2024-04-09

## 2024-04-08 RX ORDER — LEVETIRACETAM 500 MG/5ML
750 INJECTION, SOLUTION, CONCENTRATE INTRAVENOUS EVERY 12 HOURS
Status: DISCONTINUED | OUTPATIENT
Start: 2024-04-08 | End: 2024-04-11 | Stop reason: HOSPADM

## 2024-04-08 RX ORDER — NOREPINEPHRINE BITARTRATE/D5W 4MG/250ML
0-.2 PLASTIC BAG, INJECTION (ML) INTRAVENOUS CONTINUOUS
Status: ACTIVE | OUTPATIENT
Start: 2024-04-08 | End: 2024-04-08

## 2024-04-08 RX ORDER — MAGNESIUM SULFATE HEPTAHYDRATE 40 MG/ML
2 INJECTION, SOLUTION INTRAVENOUS
Status: DISPENSED | OUTPATIENT
Start: 2024-04-08 | End: 2024-04-08

## 2024-04-08 RX ORDER — MAGNESIUM SULFATE HEPTAHYDRATE 40 MG/ML
2 INJECTION, SOLUTION INTRAVENOUS ONCE
Status: DISCONTINUED | OUTPATIENT
Start: 2024-04-08 | End: 2024-04-08

## 2024-04-08 RX ORDER — LINEZOLID 2 MG/ML
600 INJECTION, SOLUTION INTRAVENOUS
Status: DISCONTINUED | OUTPATIENT
Start: 2024-04-08 | End: 2024-04-09

## 2024-04-08 RX ADMIN — MIDODRINE HYDROCHLORIDE 10 MG: 5 TABLET ORAL at 05:04

## 2024-04-08 RX ADMIN — LEVOTHYROXINE SODIUM 112 MCG: 112 TABLET ORAL at 05:04

## 2024-04-08 RX ADMIN — NOREPINEPHRINE BITARTRATE 0.02 MCG/KG/MIN: 4 INJECTION, SOLUTION INTRAVENOUS at 03:04

## 2024-04-08 RX ADMIN — AMPICILLIN SODIUM AND SULBACTAM SODIUM: 2; 1 INJECTION, POWDER, FOR SOLUTION INTRAMUSCULAR; INTRAVENOUS at 07:04

## 2024-04-08 RX ADMIN — ERAVACYCLINE 45.4 MG: 50 INJECTION, POWDER, LYOPHILIZED, FOR SOLUTION INTRAVENOUS at 09:04

## 2024-04-08 RX ADMIN — SODIUM CHLORIDE, POTASSIUM CHLORIDE, SODIUM LACTATE AND CALCIUM CHLORIDE 1000 ML: 600; 310; 30; 20 INJECTION, SOLUTION INTRAVENOUS at 11:04

## 2024-04-08 RX ADMIN — LEVETIRACETAM 750 MG: 100 INJECTION INTRAVENOUS at 03:04

## 2024-04-08 RX ADMIN — ERAVACYCLINE 45.4 MG: 50 INJECTION, POWDER, LYOPHILIZED, FOR SOLUTION INTRAVENOUS at 08:04

## 2024-04-08 RX ADMIN — AMPICILLIN SODIUM AND SULBACTAM SODIUM: 2; 1 INJECTION, POWDER, FOR SOLUTION INTRAMUSCULAR; INTRAVENOUS at 02:04

## 2024-04-08 RX ADMIN — MICAFUNGIN SODIUM 100 MG: 100 INJECTION, POWDER, LYOPHILIZED, FOR SOLUTION INTRAVENOUS at 02:04

## 2024-04-08 RX ADMIN — LINEZOLID 600 MG: 600 INJECTION, SOLUTION INTRAVENOUS at 03:04

## 2024-04-08 RX ADMIN — MAGNESIUM SULFATE HEPTAHYDRATE 2 G: 40 INJECTION, SOLUTION INTRAVENOUS at 11:04

## 2024-04-08 RX ADMIN — MIDODRINE HYDROCHLORIDE 5 MG: 5 TABLET ORAL at 09:04

## 2024-04-08 RX ADMIN — AMPICILLIN SODIUM AND SULBACTAM SODIUM: 2; 1 INJECTION, POWDER, FOR SOLUTION INTRAMUSCULAR; INTRAVENOUS at 12:04

## 2024-04-08 RX ADMIN — SODIUM CHLORIDE: 9 INJECTION, SOLUTION INTRAVENOUS at 08:04

## 2024-04-08 NOTE — PLAN OF CARE
MICU DAILY GOALS     Family/Goals of care/Code Status   Code Status: Full Code    24H Vital Sign Range  Temp:  [97.6 °F (36.4 °C)-98.4 °F (36.9 °C)]   Pulse:  []   Resp:  [10-22]   BP: ()/(43-79)   SpO2:  [94 %-100 %]      Shift Events (include procedures and significant events)   No acute events throughout shift. Fent and prop gtts continued per order. Levo continued per order to maintain MAP >=65. Tube feeds started. Resp culture ordered. CT chest ordered. Echo also ordered.     AWAKE RASS: Goal - RASS Goal: 0-->alert and calm  Actual - RASS (Jorgensen Agitation-Sedation Scale): alert and calm    Restraint necessity: Removing medical devices   BREATHE SBT: Not attempted    Coordinate A & B, analgesics/sedatives Pain: managed   SAT: Pass   Delirium CAM-ICU: Overall CAM-ICU: Positive   Early(intubated/ Progressive (non-intubated) Mobility MOVE Screen (INTUBATED ONLY): Fail    Activity: Activity Management: Patient unable to perform activities   Feeding/Nutrition Diet order: Diet/Nutrition Received: regular,     Thrombus DVT prophylaxis: VTE Required Core Measure: Pharmacological prophylaxis initiated/maintained   HOB Elevation Head of Bed (HOB) Positioning: HOB elevated   Ulcer Prophylaxis GI: yes   Glucose control managed     Skin Skin assessed during: Daily Assessment    Sacrum intact/not altered? No  Heels intact/not altered? Yes  Surgical wound? No    CHECK ONE!   (no altered skin or altered skin) and sub boxes:  [] No Altered Skin Integrity Present    []Prevention Measures Documented    [x] Altered Skin Integrity Present or Discovered   [x] LDA present in EPIC, daily doc completed              [] LDA added if not in EPIC (describe wound).                    When describing wound, do not stage, use descriptive words only.    [x] Wound Image Taken (required on admit,                   transfer/discharge and every Tuesday)    Wound Care Consulted? No    4 EYES:  Attending Nurse (1st set of eyes):      Second RN/Staff Member (2nd set of eyes):    Bowel Function constipation    Indwelling Catheter Necessity [REMOVED]      Urethral Catheter 02/21/24 2330 Temperature probe-Reason for Continuing Urinary Catheterization: Non-healing sacral/perineal wound    [REMOVED] Percutaneous Central Line - Triple Lumen  02/21/24 1754 Femoral Vein Right;Femoral Right-Line Necessity Review: Longterm central access required     De-escalation Antibiotics No        VS and assessment per flow sheet, patient progressing towards goals as tolerated, plan of care reviewed with family, all concerns addressed, will continue to monitor.

## 2024-04-08 NOTE — ASSESSMENT & PLAN NOTE
Last . TSH repeat this morning 13, FT4 0.74. Pt does have hypotension and was apparently altered yesterday so despite normal FT4 could be symptomatic.     Plan:  - Will increase home LT4 from 112 mcg to 137.5 mcg q.d  - Repeat TSH in 2 weeks

## 2024-04-08 NOTE — ASSESSMENT & PLAN NOTE
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  Continuing to have positive cultures and  acinetobacter and pseudomonas.    - ID consulted and previous recs of avycaz, linezolid, minicycline for duration of 6 weeks with EOT of 4/24/2024; seen in ICU and new recs as below:  - okay to continue micafungin for now, but if blood cultures are no growth x 48 hours, would stop  - stop minocycline  - start eravacycline   - start high dose amp-sulbactam for CRAB  - start tobramycin for anti-pseudomonal coverage   - pharmacy discussing obtaining imipenem-cilastin-relebactam for anti-pseudomonal coverage (other option would be amp-sulbactam + pip-tazo to avoid aminoglycoside)   - wound care following for chronic skin breakdown  - unable to be transferred to LTAC for insurance and medication issues, will complete course of Abx in hospital

## 2024-04-08 NOTE — NURSING
"Patient refused to answer orientation questions. Patient also refused blood draw and morning meds. Patient stated "fuck off. Dont fucking touch me".    Md notified.  "

## 2024-04-08 NOTE — ASSESSMENT & PLAN NOTE
Worsening renal function to 1.2 on day of transfer to MICU from 0.7 associated with worsening mentation and hypotension on long term antibiotics. Improved and stable now at 1.1. Given lactic acidosis, believe pt was hypovolemic and now fluid resuscitating.     Plan:  - LR bolus x1 liter  - avoid nephrotoxic agents  - MAP goal >60  - consider repeat imaging if continuing to decline

## 2024-04-08 NOTE — ASSESSMENT & PLAN NOTE
On morning of 4/7, MICU consulted with concern of hypotension and worsening mentation.  Responded well with IVF.  Concerning for worsening infection and micafungin added by hospital team.      - CTH  - IVF for pressure support as patient appears slightly hypovolemic  - Vasopressors if needed for MAP goal >65  - trending lactic  - hold sedating medications in setting of SHARMAINE  - holding Avycaz initially with concern for mentation, F/u EEG

## 2024-04-08 NOTE — ASSESSMENT & PLAN NOTE
- Saturating well on 3L Trach collar; will remove supplemental O2 satting well w/ Passey-fernandez valve in place  - Suction PRN  - Routine Trach Care  - Consider decannulation at later date as patient has not been vent dependent for extended period of time

## 2024-04-08 NOTE — PROGRESS NOTES
[facility] - Cardiac Medical ICU  Wound Care    Patient Name:  Jyoti Mayberry   MRN:  [id number]  Date: 4/8/2024  Diagnosis: Gangrene    History:     History reviewed. No pertinent past medical history.    Social History     Socioeconomic History    Marital status: Single   Tobacco Use    Smoking status: Never    Smokeless tobacco: Never   Substance and Sexual Activity    Alcohol use: Not Currently    Drug use: Not Currently    Sexual activity: Not Currently     Social Determinants of Health     Financial Resource Strain: Low Risk  (3/7/2024)    Overall Financial Resource Strain (CARDIA)     Difficulty of Paying Living Expenses: Not hard at all   Food Insecurity: No Food Insecurity (3/7/2024)    Hunger Vital Sign     Worried About Running Out of Food in the Last Year: Never true     Ran Out of Food in the Last Year: Never true   Transportation Needs: No Transportation Needs (3/7/2024)    PRAPARE - Transportation     Lack of Transportation (Medical): No     Lack of Transportation (Non-Medical): No   Physical Activity: Inactive (3/7/2024)    Exercise Vital Sign     Days of Exercise per Week: 0 days     Minutes of Exercise per Session: 0 min   Stress: Stress Concern Present (3/7/2024)    [employer] - Occupational Stress Questionnaire     Feeling of Stress : Very much   Social Connections: Socially Isolated (3/7/2024)    Social Connection and Isolation Panel [NHANES]     Frequency of Communication with Friends and Family: More than three times a week     Frequency of Social Gatherings with Friends and Family: More than three times a week     Attends Mosque Services: Never     Active Member of Clubs or Organizations: No     Attends Club or Organization Meetings: Never     Marital Status: Never    Housing Stability: Unknown (3/7/2024)    Housing Stability Vital Sign     Unable to Pay for Housing in the Last Year: No     Unstable Housing in the Last Year: No       Precautions:      Allergies as of 03/05/2024    (No Known Allergies)       [facility] Assessment Details/Treatment     Patient is Aox4 and agreeable to only an ostomy pouch check. Primary RN at bedside. Primary RN reports patient has refused morning medications and labs. Colostomy pouch clean, dry, and intact with no leakage. Supplies left at bedside. Primary RN notified to secure chat inpatient wound care if he is agreeable to pouch change. No other issues or concerns at this time. Follow up daily for ostomy needs. Wound care to be done Monday/Thursday.           04/08/24 0915   WO Assessment   [facility] Total Time (mins) 30   Visit Date 04/08/24   Visit Time 0915   Consult Type New   WO Speciality Ostomy   Intervention assessed;chart review;coordination of care   Teaching on-going        Colostomy  LLQ   Placement Date: (c)    Present Prior to Hospital Arrival?: Yes  Location: LLQ   Stomal Appliance 2 piece;Clean;Dry;Intact;No Leakage   Site Assessment Clean;Intact       Orders placed.   Ramon MCADAMS, RN  04/08/2024

## 2024-04-08 NOTE — PLAN OF CARE
[facility] - Cardiac Medical ICU  Discharge Reassessment    Primary Care Provider: No, Primary Doctor    Expected Discharge Date: 4/25/2024    Reassessment (most recent)       Discharge Reassessment - 04/08/24 1504          Discharge Reassessment    Assessment Type Discharge Planning Reassessment     Did the patient's condition or plan change since previous assessment? Yes     Discharge Plan discussed with: Patient;Parent(s)     Name(s) and Number(s) Ann (Mother) [phone]     Communicated ANDRÉS with patient/caregiver Date not available/Unable to determine     Discharge Plan A Long-term acute care facility (LTAC)     Discharge Plan B Hospital Transfer   [facility]    DME Needed Upon Discharge  other (see comments)   TBD    Transition of Care Barriers Underinsured;Other (see comments)   Patient currently on Avycaz abx need curve out before any LTAC would accept.       Post-Acute Status    Post-Acute Authorization Placement;Other   Waiting for ID recs to determine discharge plan.    Post-Acute Placement Status Pending medical clearance/testing     Coverage Medicaid - [employer] [place] [facility] Resources/Appts/Education Provided Provided patient/caregiver with written discharge plan information     Discharge Delays Medication Delay (Cost, Insurance)   patient on expensive abx (Avycaz)                  Per medical tx team, patient will step down once patient has a map of >60 because of his hypotensive.  Patient currently on Levophed and if patient remains off levophed for 6 hrs then team will step patient down.      ID is following patient and abx (Avycaz) may change. Waiting to see ID new recs if abx remains the same then tx team will consider transferring patient back to [facility].    Discharge Plan A and Plan B have been determined by review of patient's clinical status, future medical and therapeutic needs, and coverage/benefits for post-acute care in coordination with  multidisciplinary team members.      Be Soni LMSW  [facility]  [phone]

## 2024-04-08 NOTE — NURSING
MICU DAILY GOALS     Family/Goals of care/Code Status   Code Status: Full Code    24H Vital Sign Range  Temp:  [97.6 °F (36.4 °C)-98.4 °F (36.9 °C)]   Pulse:  []   Resp:  [10-22]   BP: ()/(43-79)   SpO2:  [94 %-100 %]      Shift Events (include procedures and significant events)   Psy consult placed. Levo turned back on to maintain MAP >=60. 1L LR given. Mag replaced. IV abx continued.    AWAKE RASS: Goal - RASS Goal: 0-->alert and calm  Actual - RASS (Jorgensen Agitation-Sedation Scale): alert and calm    Restraint necessity: Not necessary   BREATHE SBT: Not intubated    Coordinate A & B, analgesics/sedatives Pain: managed   SAT: Not intubated   Delirium CAM-ICU: Overall CAM-ICU: Positive   Early(intubated/ Progressive (non-intubated) Mobility MOVE Screen (INTUBATED ONLY): NA    Activity: Activity Management: Patient unable to perform activities   Feeding/Nutrition Diet order: Diet/Nutrition Received: regular,     Thrombus DVT prophylaxis: VTE Required Core Measure: Pharmacological prophylaxis initiated/maintained   HOB Elevation Head of Bed (HOB) Positioning: HOB elevated, HOB at 15 degrees   Ulcer Prophylaxis GI: yes   Glucose control managed     Skin Skin assessed during: Daily Assessment    Sacrum intact/not altered? No  Heels intact/not altered? No  Surgical wound? Yes    CHECK ONE!   (no altered skin or altered skin) and sub boxes:  [] No Altered Skin Integrity Present    []Prevention Measures Documented    [x] Altered Skin Integrity Present or Discovered   [x] LDA present in EPIC, daily doc completed              [] LDA added if not in EPIC (describe wound).                    When describing wound, do not stage, use descriptive words only.    [] Wound Image Taken (required on admit,                   transfer/discharge and every Tuesday)    Wound Care Consulted? No    4 EYES:  Attending Nurse (1st set of eyes):     Second RN/Staff Member (2nd set of eyes):    Bowel Function no issues     Indwelling Catheter Necessity [REMOVED]      Urethral Catheter 02/21/24 2330 Temperature probe-Reason for Continuing Urinary Catheterization: Non-healing sacral/perineal wound    [REMOVED] Percutaneous Central Line - Triple Lumen  02/21/24 1754 Femoral Vein Right;Femoral Right-Line Necessity Review: Longterm central access required  Shen   De-escalation Antibiotics Yes        VS and assessment per flow sheet, patient progressing towards goals as tolerated, plan of care reviewed with  patient , all concerns addressed, will continue to monitor.
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
## 2024-04-08 NOTE — SUBJECTIVE & OBJECTIVE
Interval History/Significant Events: LINDA, able to wean vasopressors currently at 0.02 with MAPs in 80s. Awake, tired and appears disinterested in interview this morning. AAOx4, states pain at 9/10 chronic and generalized. Spoke more with nursing, made requests and refusing medications.    Review of Systems   Constitutional:  Positive for fatigue. Negative for fever.   Respiratory:  Negative for shortness of breath.    Cardiovascular:  Negative for chest pain.   Musculoskeletal:         Chronic generalized pain 9/10 this morning     Objective:     Vital Signs (Most Recent):  Temp: 98.4 °F (36.9 °C) (04/08/24 0300)  Pulse: 82 (04/08/24 0729)  Resp: 13 (04/08/24 0729)  BP: 114/68 (04/08/24 0600)  SpO2: 100 % (04/08/24 0729) Vital Signs (24h Range):  Temp:  [96.9 °F (36.1 °C)-98.4 °F (36.9 °C)] 98.4 °F (36.9 °C)  Pulse:  [] 82  Resp:  [12-25] 13  SpO2:  [91 %-100 %] 100 %  BP: ()/(50-79) 114/68   Weight: 45.4 kg (100 lb)  Body mass index is 14.77 kg/m².      Intake/Output Summary (Last 24 hours) at 4/8/2024 0908  Last data filed at 4/8/2024 0800  Gross per 24 hour   Intake 4531.3 ml   Output 2330 ml   Net 2201.3 ml          Physical Exam  Vitals reviewed.   Constitutional:       General: He is not in acute distress.     Appearance: He is ill-appearing. He is not toxic-appearing or diaphoretic.      Comments: cachectic   HENT:      Head: Normocephalic.      Mouth/Throat:      Mouth: Mucous membranes are dry.      Pharynx: Oropharynx is clear.   Eyes:      Comments: Pt refused to open eyes   Cardiovascular:      Rate and Rhythm: Normal rate and regular rhythm.      Heart sounds: Normal heart sounds.   Pulmonary:      Effort: Pulmonary effort is normal.      Breath sounds: Normal breath sounds.   Abdominal:      General: Abdomen is flat. Bowel sounds are normal. There is no distension.      Palpations: Abdomen is soft.      Tenderness: There is no abdominal tenderness.      Comments: Ostomy bag in place with  brown stool noted   Musculoskeletal:      Comments: Pt has bilateral AKA's as well as LUE below the elbow amputation   Skin:     General: Skin is warm and dry.      Capillary Refill: Capillary refill takes less than 2 seconds.   Neurological:      Mental Status: He is oriented to person, place, and time.      Motor: No weakness.      Comments: Pt is quadraplegic            Vents:  Oxygen Concentration (%): 35 (04/08/24 0729)  Lines/Drains/Airways       Drain  Duration                  Colostomy  LLQ -- days         Urethral Catheter 04/07/24 0111 Non-latex 16 Fr. 1 day              Airway  Duration             Adult Surgical Airway 05/25/23 1135 Shiley Cuffed 8.0 / 85 mm 318 days              Peripheral Intravenous Line  Duration                  Midline Catheter - Single Lumen 03/29/24 1456 Right 22g x 8cm 9 days         Peripheral IV - Single Lumen 04/07/24 1154 20 G Posterior;Right Wrist <1 day                  Significant Labs:    CBC/Anemia Profile:  Recent Labs   Lab 04/07/24  0443 04/07/24  1431 04/07/24  1829   WBC 15.00* 17.76* 21.15*   HGB 8.2* 7.1* 8.1*   HCT 26.1* 22.2* 25.4*    126* 137*   MCV 95 93 94   RDW 13.9 13.9 13.9        Chemistries:  Recent Labs   Lab 04/07/24  0129 04/07/24  1431 04/07/24  1829 04/08/24  0219   * 132* 135*  --    K 4.4 4.1 4.1  --    CL 99 102 102  --    CO2 17* 16* 14*  --    BUN 47* 48* 45*  --    CREATININE 1.2 1.1 1.1  --    CALCIUM 10.5 9.5 9.9  --    ALBUMIN  --  1.6* 1.7*  --    PROT  --  7.1 7.7  --    BILITOT  --  0.3 0.3  --    ALKPHOS  --  168* 185*  --    ALT  --  21 21  --    AST  --  31 31  --    MG 1.2*  --   --  1.4*   PHOS 4.7*  --   --   --        All pertinent labs within the past 24 hours have been reviewed.    Significant Imaging:  I have reviewed all pertinent imaging results/findings within the past 24 hours.
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
## 2024-04-08 NOTE — RESPIRATORY THERAPY
O2 Device/Concentration: Flow (L/min): 5, Oxygen Concentration (%): 28,  , Flow (L/min): 5    Plan of Care:  Patient remained on trach collar with documented settings. Trach site remains clean. Trach is secure and patent. Patient has decline trach care when asked multiple times, but does not appear in any distress. Has not requested any thing respiratory related. Supplies at bedside.

## 2024-04-08 NOTE — PROGRESS NOTES
"2 attempts made to perform study on patient. Mr. Mayberry is refusing to have the exam done and states " He doesn't want it" and is shaking his head no when tech proceeds to touch his head while explaining the procedure.     "

## 2024-04-09 PROBLEM — R62.7 FAILURE TO THRIVE IN ADULT: Status: ACTIVE | Noted: 2024-04-09

## 2024-04-09 PROCEDURE — 25000003 PHARM REV CODE 250: Performed by: INTERNAL MEDICINE

## 2024-04-09 PROCEDURE — 63600175 PHARM REV CODE 636 W HCPCS: Performed by: STUDENT IN AN ORGANIZED HEALTH CARE EDUCATION/TRAINING PROGRAM

## 2024-04-09 PROCEDURE — 63600175 PHARM REV CODE 636 W HCPCS: Performed by: INTERNAL MEDICINE

## 2024-04-09 PROCEDURE — 25000003 PHARM REV CODE 250

## 2024-04-09 PROCEDURE — 20000000 HC ICU ROOM

## 2024-04-09 PROCEDURE — 99291 CRITICAL CARE FIRST HOUR: CPT | Mod: ,,, | Performed by: INTERNAL MEDICINE

## 2024-04-09 PROCEDURE — 27100171 HC OXYGEN HIGH FLOW UP TO 24 HOURS

## 2024-04-09 PROCEDURE — 25000003 PHARM REV CODE 250: Performed by: STUDENT IN AN ORGANIZED HEALTH CARE EDUCATION/TRAINING PROGRAM

## 2024-04-09 PROCEDURE — 63600175 PHARM REV CODE 636 W HCPCS: Mod: JZ,JG | Performed by: STUDENT IN AN ORGANIZED HEALTH CARE EDUCATION/TRAINING PROGRAM

## 2024-04-09 PROCEDURE — 99232 SBSQ HOSP IP/OBS MODERATE 35: CPT | Mod: ,,, | Performed by: OTOLARYNGOLOGY

## 2024-04-09 PROCEDURE — 99233 SBSQ HOSP IP/OBS HIGH 50: CPT | Mod: ,,, | Performed by: INTERNAL MEDICINE

## 2024-04-09 PROCEDURE — 27000207 HC ISOLATION

## 2024-04-09 PROCEDURE — 99900035 HC TECH TIME PER 15 MIN (STAT)

## 2024-04-09 PROCEDURE — 94761 N-INVAS EAR/PLS OXIMETRY MLT: CPT

## 2024-04-09 RX ORDER — HEPARIN SODIUM 5000 [USP'U]/ML
5000 INJECTION, SOLUTION INTRAVENOUS; SUBCUTANEOUS EVERY 12 HOURS
Status: DISCONTINUED | OUTPATIENT
Start: 2024-04-09 | End: 2024-04-11

## 2024-04-09 RX ORDER — MUPIROCIN 20 MG/G
OINTMENT TOPICAL 2 TIMES DAILY
Status: DISCONTINUED | OUTPATIENT
Start: 2024-04-09 | End: 2024-04-11

## 2024-04-09 RX ORDER — NOREPINEPHRINE BITARTRATE/D5W 4MG/250ML
0-.2 PLASTIC BAG, INJECTION (ML) INTRAVENOUS CONTINUOUS
Status: DISPENSED | OUTPATIENT
Start: 2024-04-09 | End: 2024-04-10

## 2024-04-09 RX ADMIN — AMPICILLIN SODIUM AND SULBACTAM SODIUM: 2; 1 INJECTION, POWDER, FOR SOLUTION INTRAMUSCULAR; INTRAVENOUS at 04:04

## 2024-04-09 RX ADMIN — AMPICILLIN SODIUM AND SULBACTAM SODIUM: 2; 1 INJECTION, POWDER, FOR SOLUTION INTRAMUSCULAR; INTRAVENOUS at 10:04

## 2024-04-09 RX ADMIN — OXYCODONE HYDROCHLORIDE 30 MG: 10 TABLET, FILM COATED, EXTENDED RELEASE ORAL at 11:04

## 2024-04-09 RX ADMIN — ERAVACYCLINE 45.4 MG: 50 INJECTION, POWDER, LYOPHILIZED, FOR SOLUTION INTRAVENOUS at 10:04

## 2024-04-09 RX ADMIN — LINEZOLID 600 MG: 600 INJECTION, SOLUTION INTRAVENOUS at 03:04

## 2024-04-09 RX ADMIN — LEVETIRACETAM 750 MG: 100 INJECTION INTRAVENOUS at 10:04

## 2024-04-09 RX ADMIN — AMPICILLIN SODIUM AND SULBACTAM SODIUM: 2; 1 INJECTION, POWDER, FOR SOLUTION INTRAMUSCULAR; INTRAVENOUS at 12:04

## 2024-04-09 RX ADMIN — SODIUM CHLORIDE, POTASSIUM CHLORIDE, SODIUM LACTATE AND CALCIUM CHLORIDE 1000 ML: 600; 310; 30; 20 INJECTION, SOLUTION INTRAVENOUS at 08:04

## 2024-04-09 NOTE — ASSESSMENT & PLAN NOTE
Last . TSH repeat this morning 13, FT4 0.74. Pt does have hypotension and was apparently altered yesterday so despite normal FT4 could be symptomatic.     Plan:  - Increased LT4 from 112 mcg to 137.5 mcg q.d on 4/9  - Repeat TSH in 2 weeks

## 2024-04-09 NOTE — HOSPITAL COURSE
Mr. Mayberry was sent to [facility] for complicated multiple amputation of his bilateral AKA and left below elbow amputation secondary to gangrene. Was on the floor on long term antibiotics then upgraded on 4/7 to ICU for hypotension and AMS. He was started on peripheral norepi, unable to wean off initially as he's been refusing all medications including midodrine. Pt disinterested at this time in discussing GOC or treatment, psych consulted and see 4/11 with minimal recs given pt refusal to participate. ID on board, adjusted regimen now on eravacycline and Unasyn as well as recommended starting topical tobramycin which we do not have on formulary. Given patient's refusal to participate in care and no additional benefit to being at this facility, transfer initiated back to [facility]. On 4/11 patient allowed for lab draw and found to be anemic requiring transfusion x1 of pRBC, taking his midodrine and PO meds weaned off norepi for > 6 hours with MAPs > 70 so able to be transferred to [facility] to telemetry bed.

## 2024-04-09 NOTE — ASSESSMENT & PLAN NOTE
- Saturating well on 28% Trach collar; will wean supplemental O2 to room air; satting well w/ Passey-fernandez valve in place  - Suction PRN  - Routine Trach Care  - Consider decannulation at later date as patient has not been vent dependent for extended period of time

## 2024-04-09 NOTE — ASSESSMENT & PLAN NOTE
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  Continuing to have positive cultures and  acinetobacter and pseudomonas.    - ID consulted and previous recs of avycaz, linezolid, minicycline for duration of 6 weeks with EOT of 4/24/2024; seen in ICU 4/5, will reach out for updated recs.  - Stopping micafungin, NGTD on cultures. Continue eravacycline, Unasyn for CRAB; may benefit from tobramycin though Cr 1.3 and baseline Cr 0.6. Stopping linezolid after 6 week course of wound enterococcus, likely adequate treatment.   - pharmacy discussing obtaining imipenem-cilastin-relebactam for anti-pseudomonal coverage (other option would be amp-sulbactam + pip-tazo to avoid aminoglycoside)   - wound care following for chronic skin breakdown  - unable to be transferred to LTAC for insurance and medication issues, will complete course of Abx in hospital

## 2024-04-09 NOTE — PLAN OF CARE
SERGIO has send an updated referral to [facility] in [place], [place] for re consideration for LTAC placement.  SERGIO will consult with medical management tx team about initiating patient back to [facility] Tx team was agreeable.  SERGIO has contacted patient flow center to initiate hospital return.     SERGIO will f/u with dispo.    Be Soni LMSW  [facility]  [phone]

## 2024-04-09 NOTE — PROGRESS NOTES
[facility]  Wound Care    Patient Name:  Jyoti Mayberry   MRN:  [id number]  Date: 4/9/2024  Diagnosis: Gangrene    History:     History reviewed. No pertinent past medical history.    Social History     Socioeconomic History    Marital status: Single   Tobacco Use    Smoking status: Never    Smokeless tobacco: Never   Substance and Sexual Activity    Alcohol use: Not Currently    Drug use: Not Currently    Sexual activity: Not Currently     Social Determinants of Health     Financial Resource Strain: Low Risk  (3/7/2024)    Overall Financial Resource Strain (CARDIA)     Difficulty of Paying Living Expenses: Not hard at all   Food Insecurity: No Food Insecurity (3/7/2024)    Hunger Vital Sign     Worried About Running Out of Food in the Last Year: Never true     Ran Out of Food in the Last Year: Never true   Transportation Needs: No Transportation Needs (3/7/2024)    PRAPARE - Transportation     Lack of Transportation (Medical): No     Lack of Transportation (Non-Medical): No   Physical Activity: Inactive (3/7/2024)    Exercise Vital Sign     Days of Exercise per Week: 0 days     Minutes of Exercise per Session: 0 min   Stress: Stress Concern Present (3/7/2024)    [employer] - Occupational Stress Questionnaire     Feeling of Stress : Very much   Social Connections: Socially Isolated (3/7/2024)    Social Connection and Isolation Panel [NHANES]     Frequency of Communication with Friends and Family: More than three times a week     Frequency of Social Gatherings with Friends and Family: More than three times a week     Attends Church Services: Never     Active Member of Clubs or Organizations: No     Attends Club or Organization Meetings: Never     Marital Status: Never    Housing Stability: Unknown (3/7/2024)    Housing Stability Vital Sign     Unable to Pay for Housing in the Last Year: No     Unstable Housing in the Last Year: No       Precautions:      Allergies as of 03/05/2024    (No Known Allergies)       [facility] Assessment Details/Treatment     Patient seen for wound care consultation.   Reviewed chart for this encounter.   See Flow Sheet for findings.      RECOMMENDATIONS: Patient is a follow up for ostomy care. Per primary RN, patient is refusing pouch change, medications, and labs. Patient also refusing staff to empty ostomy pouch. Patient refused inpatient ostomy care pouch change but allowed pouch to be emptied. Pouch currently clean, dry, and intact with no leakage. Supplies left at bedside. No other needs or concerns at this time. Inpatient ostomy/wound care to follow up daily for ostomy and Monday/Thursday foir wound care.    Discussed POC with patient and primary nurse.   See EMR for orders & patient education.    Discussed nutrition and the role of protein in wound healing with the patient. Instructed patient to optimize protein for wound healing.    Bedside nursing to continue care & monitoring.  Bedside nursing to maintain pressure injury prevention interventions.            04/09/24 0935   WOCN Assessment   WOCN Total Time (mins) 30   Visit Date 04/09/24   Visit Time 0935   Consult Type Follow Up   WOCN Speciality Wound   Intervention assessed;changed;applied;chart review;coordination of care;orders   Teaching on-going        Colostomy  LLQ   Placement Date: (c)    Present Prior to Hospital Arrival?: Yes  Location: LLQ   Stomal Appliance 2 piece;Clean;Dry;Intact   Stoma Appearance round;rosebud appearance   Site Assessment Clean;Intact   Stoma Function stool   Tolerance no signs/symptoms of discomfort       Orders placed.   Ramon MCADAMS, RN  04/09/2024

## 2024-04-09 NOTE — SUBJECTIVE & OBJECTIVE
Interval History/Significant Events: NAEO. Refusing medications and blood draws, minimal interaction during exam this morning. Does indicate he would like to continue treatment, refusing palliative consult. Psych consult pending. Weaning norepi 0.01 this morning but having difficulty turning off as pt refusing midodrine and all oral meds. Labs last night showed slight increase in Cr and lactic acid 3.2, receiving 1L bolus LR this morning.    Review of Systems   Unable to perform ROS: Other (patient refusing exam this morning)   Musculoskeletal:         Chronic generalized pain 9/10 this morning     Objective:     Vital Signs (Most Recent):  Temp: 98 °F (36.7 °C) (04/09/24 0915)  Pulse: 62 (04/09/24 1145)  Resp: 12 (04/09/24 1145)  BP: (!) 86/53 (04/09/24 1145)  SpO2: 98 % (04/09/24 1145) Vital Signs (24h Range):  Temp:  [97.5 °F (36.4 °C)-98.4 °F (36.9 °C)] 98 °F (36.7 °C)  Pulse:  [62-87] 62  Resp:  [10-42] 12  SpO2:  [96 %-100 %] 98 %  BP: ()/(41-72) 86/53   Weight: 45.4 kg (100 lb)  Body mass index is 14.77 kg/m².      Intake/Output Summary (Last 24 hours) at 4/9/2024 1208  Last data filed at 4/9/2024 1151  Gross per 24 hour   Intake 3020.28 ml   Output 2185 ml   Net 835.28 ml          Physical Exam  Vitals reviewed.   Constitutional:       General: He is not in acute distress.     Appearance: He is ill-appearing. He is not toxic-appearing or diaphoretic.      Comments: Cachectic     HENT:      Head: Normocephalic and atraumatic.      Mouth/Throat:      Mouth: Mucous membranes are dry.   Eyes:      Extraocular Movements: Extraocular movements intact.   Cardiovascular:      Rate and Rhythm: Normal rate and regular rhythm.      Pulses: Normal pulses.      Heart sounds: Normal heart sounds. No murmur heard.     No friction rub. No gallop.   Pulmonary:      Effort: Pulmonary effort is normal. No respiratory distress.      Breath sounds: Normal breath sounds. No wheezing, rhonchi or rales.      Comments: Trach  with TC 28% humidified  Abdominal:      General: Abdomen is flat. Bowel sounds are normal. There is no distension.      Palpations: Abdomen is soft.      Tenderness: There is no abdominal tenderness.      Comments: Ostomy with stool in bag   Musculoskeletal:      Comments: Bilateral AKA, staples intact with some old dried blood noted to LLE. DSG to LUE below the elbow amputation CDI.   Skin:     General: Skin is warm and dry.      Capillary Refill: Capillary refill takes 2 to 3 seconds.   Neurological:      General: No focal deficit present.      Mental Status: He is alert and oriented to person, place, and time. Mental status is at baseline.            Vents:  Oxygen Concentration (%): 28 (04/09/24 1000)  Lines/Drains/Airways       Drain  Duration                  Colostomy  LLQ -- days         Urethral Catheter 04/07/24 0111 Non-latex 16 Fr. 2 days              Airway  Duration             Adult Surgical Airway 05/25/23 1135 Shiley Cuffed 8.0 / 85 mm 320 days              Peripheral Intravenous Line  Duration                  Midline Catheter - Single Lumen 03/29/24 1456 Right 22g x 8cm 10 days         Peripheral IV - Single Lumen 04/07/24 1154 20 G Posterior;Right Wrist 2 days                  Significant Labs:    CBC/Anemia Profile:  Recent Labs   Lab 04/07/24  1829 04/08/24  1137 04/08/24  1807   WBC 21.15* 20.84* 19.00*   HGB 8.1* 7.6* 7.2*   HCT 25.4* 23.8* 22.3*   * 104* 97*   MCV 94 96 95   RDW 13.9 14.2 14.3        Chemistries:  Recent Labs   Lab 04/07/24 1829 04/08/24  0219 04/08/24  1137 04/08/24  1807   *  --  138 139   K 4.1  --  3.9 3.9     --  106 106   CO2 14*  --  16* 16*   BUN 45*  --  44* 43*   CREATININE 1.1  --  1.1 1.3   CALCIUM 9.9  --  9.6 9.6   ALBUMIN 1.7*  --  1.6* 1.6*   PROT 7.7  --  7.4 7.2   BILITOT 0.3  --  0.3 0.2   ALKPHOS 185*  --  167* 162*   ALT 21  --  18 17   AST 31  --  23 21   MG  --  1.4*  --   --        All pertinent labs within the past 24 hours have  been reviewed.    Significant Imaging:  I have reviewed all pertinent imaging results/findings within the past 24 hours.  No new imaging

## 2024-04-09 NOTE — PLAN OF CARE
MICU DAILY GOALS     Family/Goals of care/Code Status   Code Status: Full Code    24H Vital Sign Range  Temp:  [97.5 °F (36.4 °C)-98.4 °F (36.9 °C)]   Pulse:  [68-95]   Resp:  [10-42]   BP: ()/(43-72)   SpO2:  [97 %-100 %]      Shift Events (include procedures and significant events)   Throughout shift patient refused care, oral medications, a bath, and labs. Patient was agitated and hostile towards staff  and used inappropriate language. MD and NP notified. VSS.     AWAKE RASS: Goal - RASS Goal: 0-->alert and calm  Actual - RASS (Jorgensen Agitation-Sedation Scale): alert and calm    Restraint necessity: Not necessary   BREATHE SBT: Not intubated    Coordinate A & B, analgesics/sedatives Pain: managed   SAT: Not intubated   Delirium CAM-ICU: Overall CAM-ICU: Positive   Early(intubated/ Progressive (non-intubated) Mobility MOVE Screen (INTUBATED ONLY): Not intubated    Activity: Activity Management: Patient unable to perform activities (quadraplegic)   Feeding/Nutrition Diet order: Diet/Nutrition Received: regular,     Thrombus DVT prophylaxis: VTE Required Core Measure: Pharmacological prophylaxis initiated/maintained   HOB Elevation Head of Bed (HOB) Positioning: HOB at 30-45 degrees   Ulcer Prophylaxis GI: yes   Glucose control managed Glycemic Management: blood glucose monitored   Skin Skin assessed during: Daily Assessment    Sacrum intact/not altered? No  Heels intact/not altered? N/A, pt. Has bilateral AKAs  Surgical wound? Yes    CHECK ONE!   (no altered skin or altered skin) and sub boxes:  [] No Altered Skin Integrity Present    []Prevention Measures Documented    [x] Altered Skin Integrity Present or Discovered   [x] LDA present in EPIC, daily doc completed              [] LDA added if not in EPIC (describe wound).                    When describing wound, do not stage, use descriptive words only.    [] Wound Image Taken (required on admit,                   transfer/discharge and every  Tuesday)    Wound Care Consulted? Yes    4 EYES:  Attending Nurse (1st set of eyes): Josefina Koch RN    Second RN/Staff Member (2nd set of eyes): JANETT Corral   Bowel Function no issues    Indwelling Catheter Necessity Urinary catheter present; patient critically ill          De-escalation Antibiotics No        VS and assessment per flow sheet, patient progressing towards goals as tolerated, plan of care reviewed with patient and patient's mother, all concerns addressed, will continue to monitor.
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
## 2024-04-09 NOTE — ASSESSMENT & PLAN NOTE
Patient on Midodrine 10 mg TID since this hospitalization. Hypotension responsive to fluids intermittently but MAPS < 60 following. Refusing midodrine which was increased to 15 mg TID.     Plan:  - Continue midodrine 15 mg TID  - Wean norepi for MAP > 60  - LR bolus x1 liter now; repeat lactic with labs once pt allows  - May need intermittent bolus strategy to maintain euvolemia as PO intake poor these last few days

## 2024-04-09 NOTE — SUBJECTIVE & OBJECTIVE
Interval History: Doing well on Unasyn alone    Review of Systems   All other systems reviewed and are negative.    Objective:     Vital Signs (Most Recent):  Temp: 97.4 °F (36.3 °C) (04/09/24 1345)  Pulse: 65 (04/09/24 1648)  Resp: 12 (04/09/24 1648)  BP: (!) 84/50 (04/09/24 1648)  SpO2: 96 % (04/09/24 1648) Vital Signs (24h Range):  Temp:  [97.4 °F (36.3 °C)-98.4 °F (36.9 °C)] 97.4 °F (36.3 °C)  Pulse:  [57-87] 65  Resp:  [10-42] 12  SpO2:  [96 %-100 %] 96 %  BP: ()/(41-72) 84/50     Weight: 45.4 kg (100 lb)  Body mass index is 14.77 kg/m².    Estimated Creatinine Clearance: 54.8 mL/min (based on SCr of 1.3 mg/dL).     Physical Exam  Vitals and nursing note reviewed.   HENT:      Head: Normocephalic.   Neck:      Comments: Trach collar  Pulmonary:      Effort: Pulmonary effort is normal. No respiratory distress.   Abdominal:      General: Abdomen is flat. Bowel sounds are normal.      Palpations: Abdomen is soft.   Musculoskeletal:      Comments: L below elbow amputation, bilateral AKAs with well healing incision site. R hand with wound and purulence noted    Neurological:      General: No focal deficit present.      Mental Status: He is oriented to person, place, and time.          Significant Labs: CBC:   Recent Labs   Lab 04/07/24 1829 04/08/24 1137 04/08/24 1807   WBC 21.15* 20.84* 19.00*   HGB 8.1* 7.6* 7.2*   HCT 25.4* 23.8* 22.3*   * 104* 97*     CMP:   Recent Labs   Lab 04/07/24 1829 04/08/24 1137 04/08/24  1807   * 138 139   K 4.1 3.9 3.9    106 106   CO2 14* 16* 16*   GLU 96 87 100   BUN 45* 44* 43*   CREATININE 1.1 1.1 1.3   CALCIUM 9.9 9.6 9.6   PROT 7.7 7.4 7.2   ALBUMIN 1.7* 1.6* 1.6*   BILITOT 0.3 0.3 0.2   ALKPHOS 185* 167* 162*   AST 31 23 21   ALT 21 18 17   ANIONGAP 19* 16 17*     Wound Culture:   Recent Labs   Lab 03/13/24  1535 03/13/24  1709   LABAERO PSEUDOMONAS AERUGINOSA   Few  Skin diana also present  * ACINETOBACTER BAUMANNII   Few  *  PSEUDOMONAS  AERUGINOSA   Rare  *       Significant Imaging: None

## 2024-04-09 NOTE — NURSING
"Attempted to assess patient. Patient stated, "I don't want your help. I'll spit in your face." Educated patient that he was in the hospital and that we are just trying to help him while he is here. Patient refused medications and assessment. Charge nurse made aware of patients behavior as well as critical care team.   "
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
## 2024-04-09 NOTE — ASSESSMENT & PLAN NOTE
Worsening renal function to 1.2 on day of transfer to MICU from 0.7 associated with worsening mentation and hypotension on long term antibiotics. Last Cr 1.2 last night with lactic acid 3.2, likely volume down still.    Plan:  - LR bolus x1 liter again this morning. Poor PO intake but significant IVF with abx and bolus. Will continue to bolus pending labs/clinical exam if appears hypovolemic.  - avoid nephrotoxic agents  - MAP goal >60  - consider repeat imaging if continuing to decline

## 2024-04-09 NOTE — NURSING
"Responded to pt's BP alarm. Upon entering the room pt stated "get out of my space." Reoriented pt to place and informed him that his blood pressure was low and let him know I was there to increase the medication to ensure it did not drop too low. Pt stated "man I don't care, just get out." Informed pt I was there to do my job and help him. Levophed infusion was increased and scheduled antibiotics were administered.    "
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
## 2024-04-10 LAB
BACTERIA SPEC AEROBE CULT: ABNORMAL
BACTERIA SPEC AEROBE CULT: ABNORMAL
GRAM STN SPEC: ABNORMAL
GRAM STN SPEC: ABNORMAL

## 2024-04-10 PROCEDURE — 94761 N-INVAS EAR/PLS OXIMETRY MLT: CPT

## 2024-04-10 PROCEDURE — 63600175 PHARM REV CODE 636 W HCPCS: Performed by: STUDENT IN AN ORGANIZED HEALTH CARE EDUCATION/TRAINING PROGRAM

## 2024-04-10 PROCEDURE — 25000003 PHARM REV CODE 250

## 2024-04-10 PROCEDURE — 20000000 HC ICU ROOM

## 2024-04-10 PROCEDURE — 25000003 PHARM REV CODE 250: Performed by: INTERNAL MEDICINE

## 2024-04-10 PROCEDURE — 25000003 PHARM REV CODE 250: Performed by: STUDENT IN AN ORGANIZED HEALTH CARE EDUCATION/TRAINING PROGRAM

## 2024-04-10 PROCEDURE — 27000207 HC ISOLATION

## 2024-04-10 PROCEDURE — 27000221 HC OXYGEN, UP TO 24 HOURS

## 2024-04-10 PROCEDURE — 99900035 HC TECH TIME PER 15 MIN (STAT)

## 2024-04-10 PROCEDURE — 90792 PSYCH DIAG EVAL W/MED SRVCS: CPT | Mod: AF,HB,, | Performed by: PSYCHIATRY & NEUROLOGY

## 2024-04-10 PROCEDURE — 63600175 PHARM REV CODE 636 W HCPCS: Performed by: INTERNAL MEDICINE

## 2024-04-10 PROCEDURE — 63600175 PHARM REV CODE 636 W HCPCS: Mod: JZ,JG | Performed by: STUDENT IN AN ORGANIZED HEALTH CARE EDUCATION/TRAINING PROGRAM

## 2024-04-10 PROCEDURE — 99233 SBSQ HOSP IP/OBS HIGH 50: CPT | Mod: ,,, | Performed by: INTERNAL MEDICINE

## 2024-04-10 RX ORDER — NOREPINEPHRINE BITARTRATE/D5W 4MG/250ML
0-.2 PLASTIC BAG, INJECTION (ML) INTRAVENOUS CONTINUOUS
Status: DISCONTINUED | OUTPATIENT
Start: 2024-04-11 | End: 2024-04-11

## 2024-04-10 RX ADMIN — LEVOTHYROXINE SODIUM 137 MCG: 25 TABLET ORAL at 06:04

## 2024-04-10 RX ADMIN — OXYCODONE HYDROCHLORIDE 20 MG: 10 TABLET, FILM COATED, EXTENDED RELEASE ORAL at 11:04

## 2024-04-10 RX ADMIN — ERAVACYCLINE 45.4 MG: 50 INJECTION, POWDER, LYOPHILIZED, FOR SOLUTION INTRAVENOUS at 08:04

## 2024-04-10 RX ADMIN — SODIUM CHLORIDE, SODIUM LACTATE, POTASSIUM CHLORIDE, AND CALCIUM CHLORIDE 1000 ML: .6; .31; .03; .02 INJECTION, SOLUTION INTRAVENOUS at 07:04

## 2024-04-10 RX ADMIN — MIDODRINE HYDROCHLORIDE 15 MG: 5 TABLET ORAL at 01:04

## 2024-04-10 RX ADMIN — MIDODRINE HYDROCHLORIDE 15 MG: 5 TABLET ORAL at 06:04

## 2024-04-10 RX ADMIN — ALPRAZOLAM 1 MG: 0.5 TABLET ORAL at 11:04

## 2024-04-10 RX ADMIN — AMPICILLIN SODIUM AND SULBACTAM SODIUM: 2; 1 INJECTION, POWDER, FOR SOLUTION INTRAMUSCULAR; INTRAVENOUS at 11:04

## 2024-04-10 RX ADMIN — LEVETIRACETAM 750 MG: 100 INJECTION INTRAVENOUS at 11:04

## 2024-04-10 RX ADMIN — LEVETIRACETAM 750 MG: 100 INJECTION INTRAVENOUS at 09:04

## 2024-04-10 NOTE — RESPIRATORY THERAPY
Patient has refused trach care as of now and has refused suction. Patient said he will call when he needs suction. RN is aware.
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
## 2024-04-10 NOTE — ASSESSMENT & PLAN NOTE
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  Continuing to have positive cultures and  acinetobacter and pseudomonas.    - ID consulted and previous recs of avycaz, linezolid, minicycline for duration of 6 weeks with EOT of 4/24/2024; seen in ICU 4/5, will reach out for updated recs.  - Stopping micafungin, NGTD on cultures. Continue eravacycline, Unasyn for CRAB; recommended topical tobramycin for pseudomonal coverage- will add today  - Stopped linezolid 4/9 after 6 week course of wound enterococcus, likely adequate treatment. Stopped micafungin 4/9 as no indication for antifungal  - wound care following for chronic skin breakdown  - unable to be transferred to LTAC for insurance and medication issues, will complete course of Abx in hospital
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
## 2024-04-10 NOTE — ASSESSMENT & PLAN NOTE
Worsening renal function to 1.2 on day of transfer to MICU from 0.7 associated with worsening mentation and hypotension on long term antibiotics. Last Cr 1.2 last night with lactic acid 3.2, likely volume down still.    Plan:  - pt declining labs; unsure of renal function, will bolus IVF if I&O's and physical exam indicate but mucous membranes improved today  - avoid nephrotoxic agents  - MAP goal >60  - consider repeat imaging if continuing to decline
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
## 2024-04-10 NOTE — PLAN OF CARE
SERGIO spoke with Leonardo, patient flow center/[phone], regarding update on return to [facility]; He relayed that [facility] is waiting for an available ICU bed.    SERGIO will continue to follow.    Be Soni, CHERYL  [facility]  [phone]

## 2024-04-10 NOTE — SUBJECTIVE & OBJECTIVE
Interval History/Significant Events: NAEO. Patient continuing to refuse medications/labs last night but accepted midodrine this morning. Remains on low dose peripheral levo 0.01, MAPs at 70. Pt interacting a little more this morning, states he'll take oral medications, pain at 9/10. Pending transfer to [facility] once bed available.    Review of Systems   Musculoskeletal:         Generalized MSK pain 9/10     Objective:     Vital Signs (Most Recent):  Temp: 97.7 °F (36.5 °C) (04/10/24 0315)  Pulse: 109 (04/10/24 0630)  Resp: 13 (04/10/24 0630)  BP: 102/66 (04/10/24 0630)  SpO2: 98 % (04/10/24 0630) Vital Signs (24h Range):  Temp:  [97.4 °F (36.3 °C)-98 °F (36.7 °C)] 97.7 °F (36.5 °C)  Pulse:  [] 109  Resp:  [9-20] 13  SpO2:  [95 %-99 %] 98 %  BP: ()/(41-77) 102/66   Weight: 45.4 kg (100 lb)  Body mass index is 14.77 kg/m².      Intake/Output Summary (Last 24 hours) at 4/10/2024 0853  Last data filed at 4/10/2024 0645  Gross per 24 hour   Intake 1718.51 ml   Output 1090 ml   Net 628.51 ml          Physical Exam  Vitals and nursing note reviewed.   Constitutional:       General: He is not in acute distress.     Appearance: He is ill-appearing. He is not toxic-appearing or diaphoretic.      Comments: Cachectic     HENT:      Head: Normocephalic and atraumatic.      Mouth/Throat:      Mouth: Mucous membranes are dry.   Eyes:      Extraocular Movements: Extraocular movements intact.   Cardiovascular:      Rate and Rhythm: Normal rate and regular rhythm.      Pulses: Normal pulses.      Heart sounds: Normal heart sounds. No murmur heard.     No friction rub. No gallop.   Pulmonary:      Effort: Pulmonary effort is normal. No respiratory distress.      Breath sounds: Normal breath sounds. No wheezing, rhonchi or rales.      Comments: Trach with TC 28% humidified  Abdominal:      General: Abdomen is flat. Bowel sounds are normal. There is no distension.      Palpations: Abdomen is soft.      Tenderness:  There is no abdominal tenderness.      Comments: Ostomy with stool in bag   Musculoskeletal:      Comments: Bilateral AKA, staples intact with some old dried blood noted to LLE. DSG to LUE below the elbow amputation CDI.   Skin:     General: Skin is warm and dry.      Capillary Refill: Capillary refill takes 2 to 3 seconds.   Neurological:      General: No focal deficit present.      Mental Status: He is alert and oriented to person, place, and time. Mental status is at baseline.            Vents:  Oxygen Concentration (%): 28 (04/10/24 0600)  Lines/Drains/Airways       Drain  Duration                  Colostomy  LLQ -- days              Airway  Duration             Adult Surgical Airway 05/25/23 1135 Shiley Cuffed 8.0 / 85 mm 320 days              Peripheral Intravenous Line  Duration                  Midline Catheter - Single Lumen 03/29/24 1456 Right 22g x 8cm 11 days         Peripheral IV - Single Lumen 04/07/24 1154 20 G Posterior;Right Wrist 2 days                  Significant Labs:    CBC/Anemia Profile:  Recent Labs   Lab 04/08/24  1137 04/08/24  1807   WBC 20.84* 19.00*   HGB 7.6* 7.2*   HCT 23.8* 22.3*   * 97*   MCV 96 95   RDW 14.2 14.3        Chemistries:  Recent Labs   Lab 04/08/24  1137 04/08/24  1807    139   K 3.9 3.9    106   CO2 16* 16*   BUN 44* 43*   CREATININE 1.1 1.3   CALCIUM 9.6 9.6   ALBUMIN 1.6* 1.6*   PROT 7.4 7.2   BILITOT 0.3 0.2   ALKPHOS 167* 162*   ALT 18 17   AST 23 21       No new labs, refusing lab draws at this time.    Significant Imaging:  No new imaging.

## 2024-04-10 NOTE — ASSESSMENT & PLAN NOTE
Patient has had worsening FTT, refusing medications, blood draws, poor PO intake. Remains interested in antibiotic therapy but disinterested in other medications (oral) and refusing palliative consult.     Plan:  -- Psych consult pending as pt did not participate 4/9, appreciate recs  -- Plan to transfer back to [facility]

## 2024-04-11 ENCOUNTER — HOSPITAL ENCOUNTER (INPATIENT)
Facility: HOSPITAL | Age: 29
LOS: 6 days | Discharge: NURSING, REHAB OR LONG-TERM CARE | DRG: 871 | End: 2024-04-17
Attending: FAMILY MEDICINE | Admitting: INTERNAL MEDICINE
Payer: MEDICAID

## 2024-04-11 VITALS
DIASTOLIC BLOOD PRESSURE: 58 MMHG | RESPIRATION RATE: 11 BRPM | TEMPERATURE: 98 F | SYSTOLIC BLOOD PRESSURE: 110 MMHG | BODY MASS INDEX: 14.82 KG/M2 | OXYGEN SATURATION: 98 % | HEIGHT: 69 IN | HEART RATE: 76 BPM | WEIGHT: 100.06 LBS

## 2024-04-11 DIAGNOSIS — A41.9 SEPTIC SHOCK: Primary | ICD-10-CM

## 2024-04-11 DIAGNOSIS — I96 GANGRENE: ICD-10-CM

## 2024-04-11 DIAGNOSIS — Z93.0 TRACHEOSTOMY DEPENDENCE: ICD-10-CM

## 2024-04-11 DIAGNOSIS — R65.21 SEPTIC SHOCK: Primary | ICD-10-CM

## 2024-04-11 DIAGNOSIS — G82.50 QUADRIPLEGIA: Chronic | ICD-10-CM

## 2024-04-11 PROBLEM — D63.8 ANEMIA OF CHRONIC DISEASE: Status: ACTIVE | Noted: 2024-04-11

## 2024-04-11 PROBLEM — G89.21: Status: ACTIVE | Noted: 2024-03-07

## 2024-04-11 PROBLEM — Z89.9: Status: ACTIVE | Noted: 2024-03-07

## 2024-04-11 PROBLEM — J96.11 CHRONIC HYPOXIC RESPIRATORY FAILURE: Status: ACTIVE | Noted: 2024-04-11

## 2024-04-11 PROBLEM — G93.40 ENCEPHALOPATHY: Status: ACTIVE | Noted: 2024-04-07

## 2024-04-11 LAB
ABO + RH BLD: NORMAL
ALBUMIN SERPL BCP-MCNC: 1.4 G/DL (ref 3.5–5.2)
ALBUMIN SERPL BCP-MCNC: 1.4 G/DL (ref 3.5–5.2)
ALP SERPL-CCNC: 162 U/L (ref 55–135)
ALP SERPL-CCNC: 178 U/L (ref 55–135)
ALT SERPL W/O P-5'-P-CCNC: 10 U/L (ref 10–44)
ALT SERPL W/O P-5'-P-CCNC: 8 U/L (ref 10–44)
ANION GAP SERPL CALC-SCNC: 13 MMOL/L (ref 8–16)
ANION GAP SERPL CALC-SCNC: 14 MMOL/L (ref 8–16)
ANISOCYTOSIS BLD QL SMEAR: SLIGHT
APTT PPP: 33 SEC (ref 21–32)
AST SERPL-CCNC: 13 U/L (ref 10–40)
AST SERPL-CCNC: 14 U/L (ref 10–40)
BASOPHILS # BLD AUTO: 0.06 K/UL (ref 0–0.2)
BASOPHILS # BLD AUTO: 0.09 K/UL (ref 0–0.2)
BASOPHILS NFR BLD: 0.4 % (ref 0–1.9)
BASOPHILS NFR BLD: 0.5 % (ref 0–1.9)
BILIRUB SERPL-MCNC: 0.2 MG/DL (ref 0.1–1)
BILIRUB SERPL-MCNC: 0.4 MG/DL (ref 0.1–1)
BLD GP AB SCN CELLS X3 SERPL QL: NORMAL
BLD PROD TYP BPU: NORMAL
BLOOD UNIT EXPIRATION DATE: NORMAL
BLOOD UNIT TYPE CODE: 5100
BLOOD UNIT TYPE: NORMAL
BUN SERPL-MCNC: 43 MG/DL (ref 6–20)
BUN SERPL-MCNC: 45 MG/DL (ref 6–20)
CALCIUM SERPL-MCNC: 9.2 MG/DL (ref 8.7–10.5)
CALCIUM SERPL-MCNC: 9.3 MG/DL (ref 8.7–10.5)
CHLORIDE SERPL-SCNC: 109 MMOL/L (ref 95–110)
CHLORIDE SERPL-SCNC: 110 MMOL/L (ref 95–110)
CO2 SERPL-SCNC: 20 MMOL/L (ref 23–29)
CO2 SERPL-SCNC: 20 MMOL/L (ref 23–29)
CODING SYSTEM: NORMAL
CREAT SERPL-MCNC: 1 MG/DL (ref 0.5–1.4)
CREAT SERPL-MCNC: 1.1 MG/DL (ref 0.5–1.4)
CROSSMATCH INTERPRETATION: NORMAL
D DIMER PPP IA.FEU-MCNC: 1.02 MG/L FEU
DIFFERENTIAL METHOD BLD: ABNORMAL
DIFFERENTIAL METHOD BLD: ABNORMAL
DISPENSE STATUS: NORMAL
EOSINOPHIL # BLD AUTO: 0.5 K/UL (ref 0–0.5)
EOSINOPHIL # BLD AUTO: 0.7 K/UL (ref 0–0.5)
EOSINOPHIL NFR BLD: 3.1 % (ref 0–8)
EOSINOPHIL NFR BLD: 4.1 % (ref 0–8)
ERYTHROCYTE [DISTWIDTH] IN BLOOD BY AUTOMATED COUNT: 14.6 % (ref 11.5–14.5)
ERYTHROCYTE [DISTWIDTH] IN BLOOD BY AUTOMATED COUNT: 14.9 % (ref 11.5–14.5)
EST. GFR  (NO RACE VARIABLE): >60 ML/MIN/1.73 M^2
EST. GFR  (NO RACE VARIABLE): >60 ML/MIN/1.73 M^2
FIBRINOGEN PPP-MCNC: 225 MG/DL (ref 182–400)
GLUCOSE SERPL-MCNC: 121 MG/DL (ref 70–110)
GLUCOSE SERPL-MCNC: 89 MG/DL (ref 70–110)
HCT VFR BLD AUTO: 20.4 % (ref 40–54)
HCT VFR BLD AUTO: 22 % (ref 40–54)
HGB BLD-MCNC: 6.3 G/DL (ref 14–18)
HGB BLD-MCNC: 7.2 G/DL (ref 14–18)
HYPOCHROMIA BLD QL SMEAR: ABNORMAL
IMM GRANULOCYTES # BLD AUTO: 0.08 K/UL (ref 0–0.04)
IMM GRANULOCYTES # BLD AUTO: 0.09 K/UL (ref 0–0.04)
IMM GRANULOCYTES NFR BLD AUTO: 0.5 % (ref 0–0.5)
IMM GRANULOCYTES NFR BLD AUTO: 0.5 % (ref 0–0.5)
INR PPP: 1.1 (ref 0.8–1.2)
LACTATE SERPL-SCNC: 2.4 MMOL/L (ref 0.5–2.2)
LDH SERPL L TO P-CCNC: 185 U/L (ref 110–260)
LYMPHOCYTES # BLD AUTO: 2.2 K/UL (ref 1–4.8)
LYMPHOCYTES # BLD AUTO: 3.3 K/UL (ref 1–4.8)
LYMPHOCYTES NFR BLD: 14.2 % (ref 18–48)
LYMPHOCYTES NFR BLD: 18.3 % (ref 18–48)
MAGNESIUM SERPL-MCNC: 1.6 MG/DL (ref 1.6–2.6)
MAGNESIUM SERPL-MCNC: 1.8 MG/DL (ref 1.6–2.6)
MCH RBC QN AUTO: 29.2 PG (ref 27–31)
MCH RBC QN AUTO: 29.6 PG (ref 27–31)
MCHC RBC AUTO-ENTMCNC: 30.9 G/DL (ref 32–36)
MCHC RBC AUTO-ENTMCNC: 32.7 G/DL (ref 32–36)
MCV RBC AUTO: 91 FL (ref 82–98)
MCV RBC AUTO: 94 FL (ref 82–98)
MONOCYTES # BLD AUTO: 1.8 K/UL (ref 0.3–1)
MONOCYTES # BLD AUTO: 2.1 K/UL (ref 0.3–1)
MONOCYTES NFR BLD: 11.3 % (ref 4–15)
MONOCYTES NFR BLD: 11.6 % (ref 4–15)
NEUTROPHILS # BLD AUTO: 10.8 K/UL (ref 1.8–7.7)
NEUTROPHILS # BLD AUTO: 11.8 K/UL (ref 1.8–7.7)
NEUTROPHILS NFR BLD: 65.3 % (ref 38–73)
NEUTROPHILS NFR BLD: 70.2 % (ref 38–73)
NRBC BLD-RTO: 0 /100 WBC
NRBC BLD-RTO: 0 /100 WBC
OVALOCYTES BLD QL SMEAR: ABNORMAL
PLATELET # BLD AUTO: 58 K/UL (ref 150–450)
PLATELET # BLD AUTO: 70 K/UL (ref 150–450)
PMV BLD AUTO: 10.7 FL (ref 9.2–12.9)
PMV BLD AUTO: 11.6 FL (ref 9.2–12.9)
POIKILOCYTOSIS BLD QL SMEAR: SLIGHT
POLYCHROMASIA BLD QL SMEAR: ABNORMAL
POTASSIUM SERPL-SCNC: 3.3 MMOL/L (ref 3.5–5.1)
POTASSIUM SERPL-SCNC: 4.2 MMOL/L (ref 3.5–5.1)
PROT SERPL-MCNC: 6.4 G/DL (ref 6–8.4)
PROT SERPL-MCNC: 6.5 G/DL (ref 6–8.4)
PROTHROMBIN TIME: 12.4 SEC (ref 9–12.5)
RBC # BLD AUTO: 2.16 M/UL (ref 4.6–6.2)
RBC # BLD AUTO: 2.43 M/UL (ref 4.6–6.2)
SODIUM SERPL-SCNC: 142 MMOL/L (ref 136–145)
SODIUM SERPL-SCNC: 144 MMOL/L (ref 136–145)
SPECIMEN OUTDATE: NORMAL
SPHEROCYTES BLD QL SMEAR: ABNORMAL
TRANS ERYTHROCYTES VOL PATIENT: NORMAL ML
WBC # BLD AUTO: 15.37 K/UL (ref 3.9–12.7)
WBC # BLD AUTO: 18.11 K/UL (ref 3.9–12.7)

## 2024-04-11 PROCEDURE — 99233 SBSQ HOSP IP/OBS HIGH 50: CPT | Mod: ,,, | Performed by: INTERNAL MEDICINE

## 2024-04-11 PROCEDURE — 36430 TRANSFUSION BLD/BLD COMPNT: CPT

## 2024-04-11 PROCEDURE — 85379 FIBRIN DEGRADATION QUANT: CPT

## 2024-04-11 PROCEDURE — 63600175 PHARM REV CODE 636 W HCPCS: Performed by: INTERNAL MEDICINE

## 2024-04-11 PROCEDURE — 85384 FIBRINOGEN ACTIVITY: CPT

## 2024-04-11 PROCEDURE — 86850 RBC ANTIBODY SCREEN: CPT

## 2024-04-11 PROCEDURE — 63600175 PHARM REV CODE 636 W HCPCS: Mod: JZ,JG | Performed by: STUDENT IN AN ORGANIZED HEALTH CARE EDUCATION/TRAINING PROGRAM

## 2024-04-11 PROCEDURE — A4216 STERILE WATER/SALINE, 10 ML: HCPCS | Performed by: NURSE PRACTITIONER

## 2024-04-11 PROCEDURE — 25000003 PHARM REV CODE 250: Performed by: NURSE PRACTITIONER

## 2024-04-11 PROCEDURE — 27000221 HC OXYGEN, UP TO 24 HOURS

## 2024-04-11 PROCEDURE — 25000003 PHARM REV CODE 250: Performed by: STUDENT IN AN ORGANIZED HEALTH CARE EDUCATION/TRAINING PROGRAM

## 2024-04-11 PROCEDURE — C1751 CATH, INF, PER/CENT/MIDLINE: HCPCS

## 2024-04-11 PROCEDURE — 25000003 PHARM REV CODE 250: Performed by: HOSPITALIST

## 2024-04-11 PROCEDURE — 83615 LACTATE (LD) (LDH) ENZYME: CPT

## 2024-04-11 PROCEDURE — 85025 COMPLETE CBC W/AUTO DIFF WBC: CPT | Mod: 91 | Performed by: NURSE PRACTITIONER

## 2024-04-11 PROCEDURE — 63600175 PHARM REV CODE 636 W HCPCS: Performed by: STUDENT IN AN ORGANIZED HEALTH CARE EDUCATION/TRAINING PROGRAM

## 2024-04-11 PROCEDURE — 85730 THROMBOPLASTIN TIME PARTIAL: CPT

## 2024-04-11 PROCEDURE — 83605 ASSAY OF LACTIC ACID: CPT | Performed by: NURSE PRACTITIONER

## 2024-04-11 PROCEDURE — 99222 1ST HOSP IP/OBS MODERATE 55: CPT | Mod: ,,, | Performed by: SURGERY

## 2024-04-11 PROCEDURE — 27000207 HC ISOLATION

## 2024-04-11 PROCEDURE — 63600175 PHARM REV CODE 636 W HCPCS: Performed by: NURSE PRACTITIONER

## 2024-04-11 PROCEDURE — 87040 BLOOD CULTURE FOR BACTERIA: CPT | Performed by: NURSE PRACTITIONER

## 2024-04-11 PROCEDURE — 83735 ASSAY OF MAGNESIUM: CPT

## 2024-04-11 PROCEDURE — 25000003 PHARM REV CODE 250

## 2024-04-11 PROCEDURE — 85610 PROTHROMBIN TIME: CPT

## 2024-04-11 PROCEDURE — 63600175 PHARM REV CODE 636 W HCPCS

## 2024-04-11 PROCEDURE — 99900035 HC TECH TIME PER 15 MIN (STAT)

## 2024-04-11 PROCEDURE — 85025 COMPLETE CBC W/AUTO DIFF WBC: CPT

## 2024-04-11 PROCEDURE — P9021 RED BLOOD CELLS UNIT: HCPCS

## 2024-04-11 PROCEDURE — 80053 COMPREHEN METABOLIC PANEL: CPT | Mod: 91 | Performed by: NURSE PRACTITIONER

## 2024-04-11 PROCEDURE — 21400001 HC TELEMETRY ROOM

## 2024-04-11 PROCEDURE — 86920 COMPATIBILITY TEST SPIN: CPT

## 2024-04-11 PROCEDURE — 94761 N-INVAS EAR/PLS OXIMETRY MLT: CPT

## 2024-04-11 PROCEDURE — 25000003 PHARM REV CODE 250: Performed by: INTERNAL MEDICINE

## 2024-04-11 PROCEDURE — 83735 ASSAY OF MAGNESIUM: CPT | Mod: 91 | Performed by: NURSE PRACTITIONER

## 2024-04-11 PROCEDURE — 80053 COMPREHEN METABOLIC PANEL: CPT

## 2024-04-11 PROCEDURE — 36410 VNPNXR 3YR/> PHY/QHP DX/THER: CPT

## 2024-04-11 RX ORDER — FOLIC ACID 1 MG/1
1 TABLET ORAL DAILY
Status: CANCELLED | OUTPATIENT
Start: 2024-04-12

## 2024-04-11 RX ORDER — OXYCODONE HCL 10 MG/1
30 TABLET, FILM COATED, EXTENDED RELEASE ORAL NIGHTLY
Status: DISCONTINUED | OUTPATIENT
Start: 2024-04-11 | End: 2024-04-17 | Stop reason: HOSPADM

## 2024-04-11 RX ORDER — SODIUM CHLORIDE 0.9 % (FLUSH) 0.9 %
3 SYRINGE (ML) INJECTION EVERY 8 HOURS
Status: DISCONTINUED | OUTPATIENT
Start: 2024-04-11 | End: 2024-04-17 | Stop reason: HOSPADM

## 2024-04-11 RX ORDER — LEVETIRACETAM 500 MG/5ML
750 INJECTION, SOLUTION, CONCENTRATE INTRAVENOUS EVERY 12 HOURS
Status: CANCELLED | OUTPATIENT
Start: 2024-04-11

## 2024-04-11 RX ORDER — OXYCODONE HCL 10 MG/1
30 TABLET, FILM COATED, EXTENDED RELEASE ORAL NIGHTLY
Status: CANCELLED | OUTPATIENT
Start: 2024-04-11

## 2024-04-11 RX ORDER — OXYCODONE HYDROCHLORIDE 5 MG/1
5 TABLET ORAL EVERY 6 HOURS PRN
Status: CANCELLED | OUTPATIENT
Start: 2024-04-11

## 2024-04-11 RX ORDER — ALPRAZOLAM 0.5 MG/1
0.5 TABLET ORAL 3 TIMES DAILY PRN
Status: DISCONTINUED | OUTPATIENT
Start: 2024-04-11 | End: 2024-04-17 | Stop reason: HOSPADM

## 2024-04-11 RX ORDER — SERTRALINE HYDROCHLORIDE 50 MG/1
50 TABLET, FILM COATED ORAL DAILY
Status: DISCONTINUED | OUTPATIENT
Start: 2024-04-12 | End: 2024-04-17 | Stop reason: HOSPADM

## 2024-04-11 RX ORDER — MIDODRINE HYDROCHLORIDE 5 MG/1
15 TABLET ORAL EVERY 8 HOURS
Status: CANCELLED | OUTPATIENT
Start: 2024-04-11

## 2024-04-11 RX ORDER — OXYCODONE HYDROCHLORIDE 5 MG/1
10 TABLET ORAL EVERY 6 HOURS PRN
Status: DISCONTINUED | OUTPATIENT
Start: 2024-04-11 | End: 2024-04-17 | Stop reason: HOSPADM

## 2024-04-11 RX ORDER — NALOXONE HCL 0.4 MG/ML
0.02 VIAL (ML) INJECTION
Status: DISCONTINUED | OUTPATIENT
Start: 2024-04-11 | End: 2024-04-11 | Stop reason: SDUPTHER

## 2024-04-11 RX ORDER — OXYCODONE HYDROCHLORIDE 10 MG/1
10 TABLET ORAL EVERY 6 HOURS PRN
Status: CANCELLED | OUTPATIENT
Start: 2024-04-11

## 2024-04-11 RX ORDER — ERYTHROMYCIN 5 MG/G
OINTMENT OPHTHALMIC NIGHTLY
Status: DISCONTINUED | OUTPATIENT
Start: 2024-04-11 | End: 2024-04-17 | Stop reason: HOSPADM

## 2024-04-11 RX ORDER — HYDROCODONE BITARTRATE AND ACETAMINOPHEN 500; 5 MG/1; MG/1
TABLET ORAL
Status: CANCELLED | OUTPATIENT
Start: 2024-04-11

## 2024-04-11 RX ORDER — ALPRAZOLAM 0.5 MG/1
0.5 TABLET ORAL 3 TIMES DAILY PRN
Status: DISCONTINUED | OUTPATIENT
Start: 2024-04-11 | End: 2024-04-11 | Stop reason: HOSPADM

## 2024-04-11 RX ORDER — OXYCODONE HYDROCHLORIDE 5 MG/1
5 TABLET ORAL EVERY 6 HOURS PRN
Status: DISCONTINUED | OUTPATIENT
Start: 2024-04-11 | End: 2024-04-17 | Stop reason: HOSPADM

## 2024-04-11 RX ORDER — NALOXONE HCL 0.4 MG/ML
0.4 VIAL (ML) INJECTION
Status: DISCONTINUED | OUTPATIENT
Start: 2024-04-11 | End: 2024-04-17 | Stop reason: HOSPADM

## 2024-04-11 RX ORDER — IBUPROFEN 200 MG
16 TABLET ORAL
Status: DISCONTINUED | OUTPATIENT
Start: 2024-04-11 | End: 2024-04-17 | Stop reason: HOSPADM

## 2024-04-11 RX ORDER — OXYCODONE HCL 10 MG/1
20 TABLET, FILM COATED, EXTENDED RELEASE ORAL DAILY
Status: CANCELLED | OUTPATIENT
Start: 2024-04-12

## 2024-04-11 RX ORDER — DOXYLAMINE SUCCINATE 25 MG
TABLET ORAL 2 TIMES DAILY
Status: CANCELLED | OUTPATIENT
Start: 2024-04-11

## 2024-04-11 RX ORDER — NALOXONE HCL 0.4 MG/ML
0.4 VIAL (ML) INJECTION
Status: CANCELLED | OUTPATIENT
Start: 2024-04-11

## 2024-04-11 RX ORDER — OXYCODONE HCL 10 MG/1
20 TABLET, FILM COATED, EXTENDED RELEASE ORAL DAILY
Status: DISCONTINUED | OUTPATIENT
Start: 2024-04-12 | End: 2024-04-17 | Stop reason: HOSPADM

## 2024-04-11 RX ORDER — SODIUM CHLORIDE 9 MG/ML
INJECTION, SOLUTION INTRAVENOUS CONTINUOUS
Status: CANCELLED | OUTPATIENT
Start: 2024-04-11

## 2024-04-11 RX ORDER — ONDANSETRON HYDROCHLORIDE 2 MG/ML
4 INJECTION, SOLUTION INTRAVENOUS EVERY 8 HOURS PRN
Status: DISCONTINUED | OUTPATIENT
Start: 2024-04-11 | End: 2024-04-17 | Stop reason: HOSPADM

## 2024-04-11 RX ORDER — ACETAMINOPHEN 650 MG/20.3ML
650 LIQUID ORAL EVERY 6 HOURS PRN
Status: CANCELLED | OUTPATIENT
Start: 2024-04-11

## 2024-04-11 RX ORDER — ALPRAZOLAM 0.5 MG/1
0.5 TABLET ORAL 3 TIMES DAILY PRN
Status: CANCELLED | OUTPATIENT
Start: 2024-04-11

## 2024-04-11 RX ORDER — MIDODRINE HYDROCHLORIDE 5 MG/1
15 TABLET ORAL EVERY 8 HOURS
Status: DISCONTINUED | OUTPATIENT
Start: 2024-04-11 | End: 2024-04-17 | Stop reason: HOSPADM

## 2024-04-11 RX ORDER — TALC
6 POWDER (GRAM) TOPICAL NIGHTLY PRN
Status: DISCONTINUED | OUTPATIENT
Start: 2024-04-11 | End: 2024-04-17 | Stop reason: HOSPADM

## 2024-04-11 RX ORDER — POLYETHYLENE GLYCOL 3350 17 G/17G
17 POWDER, FOR SOLUTION ORAL DAILY PRN
Status: DISCONTINUED | OUTPATIENT
Start: 2024-04-11 | End: 2024-04-17 | Stop reason: HOSPADM

## 2024-04-11 RX ORDER — ACETAMINOPHEN 650 MG/20.3ML
650 LIQUID ORAL EVERY 6 HOURS PRN
Status: DISCONTINUED | OUTPATIENT
Start: 2024-04-11 | End: 2024-04-17 | Stop reason: HOSPADM

## 2024-04-11 RX ORDER — MAGNESIUM SULFATE HEPTAHYDRATE 40 MG/ML
2 INJECTION, SOLUTION INTRAVENOUS ONCE
Status: COMPLETED | OUTPATIENT
Start: 2024-04-11 | End: 2024-04-11

## 2024-04-11 RX ORDER — PANTOPRAZOLE SODIUM 40 MG/10ML
40 INJECTION, POWDER, LYOPHILIZED, FOR SOLUTION INTRAVENOUS DAILY
Status: DISCONTINUED | OUTPATIENT
Start: 2024-04-12 | End: 2024-04-17 | Stop reason: HOSPADM

## 2024-04-11 RX ORDER — HYDROCODONE BITARTRATE AND ACETAMINOPHEN 500; 5 MG/1; MG/1
TABLET ORAL
Status: DISCONTINUED | OUTPATIENT
Start: 2024-04-11 | End: 2024-04-17 | Stop reason: HOSPADM

## 2024-04-11 RX ORDER — ERYTHROMYCIN 5 MG/G
OINTMENT OPHTHALMIC NIGHTLY
Status: CANCELLED | OUTPATIENT
Start: 2024-04-11

## 2024-04-11 RX ORDER — DOXYLAMINE SUCCINATE 25 MG
TABLET ORAL 2 TIMES DAILY
Status: DISCONTINUED | OUTPATIENT
Start: 2024-04-11 | End: 2024-04-12

## 2024-04-11 RX ORDER — FOLIC ACID 1 MG/1
1 TABLET ORAL DAILY
Status: DISCONTINUED | OUTPATIENT
Start: 2024-04-12 | End: 2024-04-17 | Stop reason: HOSPADM

## 2024-04-11 RX ORDER — SODIUM CHLORIDE 9 MG/ML
INJECTION, SOLUTION INTRAVENOUS CONTINUOUS
Status: DISCONTINUED | OUTPATIENT
Start: 2024-04-11 | End: 2024-04-17 | Stop reason: HOSPADM

## 2024-04-11 RX ORDER — HEPARIN SODIUM 5000 [USP'U]/ML
5000 INJECTION, SOLUTION INTRAVENOUS; SUBCUTANEOUS EVERY 12 HOURS
Status: DISCONTINUED | OUTPATIENT
Start: 2024-04-11 | End: 2024-04-17 | Stop reason: HOSPADM

## 2024-04-11 RX ORDER — GLUCAGON 1 MG
1 KIT INJECTION
Status: DISCONTINUED | OUTPATIENT
Start: 2024-04-11 | End: 2024-04-17 | Stop reason: HOSPADM

## 2024-04-11 RX ORDER — IBUPROFEN 200 MG
24 TABLET ORAL
Status: DISCONTINUED | OUTPATIENT
Start: 2024-04-11 | End: 2024-04-17 | Stop reason: HOSPADM

## 2024-04-11 RX ORDER — HYDROCODONE BITARTRATE AND ACETAMINOPHEN 500; 5 MG/1; MG/1
TABLET ORAL
Status: DISCONTINUED | OUTPATIENT
Start: 2024-04-11 | End: 2024-04-11 | Stop reason: HOSPADM

## 2024-04-11 RX ADMIN — HYPROMELLOSE 2910 1 DROP: 5 SOLUTION OPHTHALMIC at 08:04

## 2024-04-11 RX ADMIN — Medication 3 ML: at 11:04

## 2024-04-11 RX ADMIN — ERAVACYCLINE 45.4 MG: 50 INJECTION, POWDER, LYOPHILIZED, FOR SOLUTION INTRAVENOUS at 08:04

## 2024-04-11 RX ADMIN — POTASSIUM BICARBONATE 50 MEQ: 978 TABLET, EFFERVESCENT ORAL at 01:04

## 2024-04-11 RX ADMIN — SODIUM CHLORIDE: 9 INJECTION, SOLUTION INTRAVENOUS at 11:04

## 2024-04-11 RX ADMIN — MIDODRINE HYDROCHLORIDE 15 MG: 5 TABLET ORAL at 01:04

## 2024-04-11 RX ADMIN — OXYCODONE HYDROCHLORIDE 30 MG: 10 TABLET, FILM COATED, EXTENDED RELEASE ORAL at 11:04

## 2024-04-11 RX ADMIN — LEVOTHYROXINE SODIUM 137 MCG: 25 TABLET ORAL at 05:04

## 2024-04-11 RX ADMIN — AMPICILLIN SODIUM AND SULBACTAM SODIUM: 2; 1 INJECTION, POWDER, FOR SOLUTION INTRAMUSCULAR; INTRAVENOUS at 01:04

## 2024-04-11 RX ADMIN — OXYCODONE HYDROCHLORIDE 20 MG: 10 TABLET, FILM COATED, EXTENDED RELEASE ORAL at 08:04

## 2024-04-11 RX ADMIN — MAGNESIUM SULFATE HEPTAHYDRATE 2 G: 40 INJECTION, SOLUTION INTRAVENOUS at 01:04

## 2024-04-11 RX ADMIN — FOLIC ACID 1 MG: 1 TABLET ORAL at 08:04

## 2024-04-11 RX ADMIN — HEPARIN SODIUM 5000 UNITS: 5000 INJECTION INTRAVENOUS; SUBCUTANEOUS at 11:04

## 2024-04-11 RX ADMIN — LEVETIRACETAM 750 MG: 100 INJECTION, SOLUTION INTRAVENOUS at 11:04

## 2024-04-11 RX ADMIN — MICONAZOLE NITRATE: 20 CREAM TOPICAL at 11:04

## 2024-04-11 RX ADMIN — AMPICILLIN AND SULBACTAM: 1; 2 INJECTION, POWDER, FOR SOLUTION INTRAMUSCULAR; INTRAVENOUS at 11:04

## 2024-04-11 RX ADMIN — MIDODRINE HYDROCHLORIDE 15 MG: 5 TABLET ORAL at 11:04

## 2024-04-11 RX ADMIN — ERYTHROMYCIN: 5 OINTMENT OPHTHALMIC at 11:04

## 2024-04-11 RX ADMIN — AMPICILLIN SODIUM AND SULBACTAM SODIUM: 2; 1 INJECTION, POWDER, FOR SOLUTION INTRAMUSCULAR; INTRAVENOUS at 05:04

## 2024-04-11 RX ADMIN — OXYCODONE HYDROCHLORIDE 30 MG: 10 TABLET, FILM COATED, EXTENDED RELEASE ORAL at 12:04

## 2024-04-11 RX ADMIN — MIDODRINE HYDROCHLORIDE 15 MG: 5 TABLET ORAL at 05:04

## 2024-04-11 RX ADMIN — LEVETIRACETAM 750 MG: 100 INJECTION INTRAVENOUS at 08:04

## 2024-04-11 RX ADMIN — MICONAZOLE NITRATE: 20 CREAM TOPICAL at 08:04

## 2024-04-11 RX ADMIN — MIDODRINE HYDROCHLORIDE 15 MG: 5 TABLET ORAL at 12:04

## 2024-04-11 NOTE — ASSESSMENT & PLAN NOTE
Patient has had worsening FTT, refusing medications, blood draws, poor PO intake. Remains interested in antibiotic therapy but disinterested in other medications (oral) and refusing palliative consult.     Plan:  -- Psych consulted, appreciate recs  -- Plan to transfer back to [facility]

## 2024-04-11 NOTE — PLAN OF CARE
Problem: Adult Inpatient Plan of Care  Goal: Plan of Care Review  Outcome: Ongoing, Progressing  Goal: Patient-Specific Goal (Individualized)  Outcome: Ongoing, Progressing  Goal: Absence of Hospital-Acquired Illness or Injury  Outcome: Ongoing, Progressing  Goal: Optimal Comfort and Wellbeing  Outcome: Ongoing, Progressing  Goal: Readiness for Transition of Care  Outcome: Ongoing, Progressing     Problem: Adjustment to Illness (Sepsis/Septic Shock)  Goal: Optimal Coping  Outcome: Ongoing, Progressing     Problem: Bleeding (Sepsis/Septic Shock)  Goal: Absence of Bleeding  Outcome: Ongoing, Progressing     Problem: Glycemic Control Impaired (Sepsis/Septic Shock)  Goal: Blood Glucose Level Within Desired Range  Outcome: Ongoing, Progressing     Problem: Infection Progression (Sepsis/Septic Shock)  Goal: Absence of Infection Signs and Symptoms  Outcome: Ongoing, Progressing     Problem: Nutrition Impaired (Sepsis/Septic Shock)  Goal: Optimal Nutrition Intake  Outcome: Ongoing, Progressing     Problem: Coping Ineffective  Goal: Effective Coping  Outcome: Ongoing, Progressing     Problem: Infection  Goal: Absence of Infection Signs and Symptoms  Outcome: Ongoing, Progressing     Problem: Impaired Wound Healing  Goal: Optimal Wound Healing  Outcome: Ongoing, Progressing     Problem: Fall Injury Risk  Goal: Absence of Fall and Fall-Related Injury  Outcome: Ongoing, Progressing     Problem: Fluid and Electrolyte Imbalance (Acute Kidney Injury/Impairment)  Goal: Fluid and Electrolyte Balance  Outcome: Ongoing, Progressing     Problem: Oral Intake Inadequate (Acute Kidney Injury/Impairment)  Goal: Optimal Nutrition Intake  Outcome: Ongoing, Progressing     Problem: Renal Function Impairment (Acute Kidney Injury/Impairment)  Goal: Effective Renal Function  Outcome: Ongoing, Progressing     Pt uncooperative for most of the day. Pt was amenable to taking midodrine, Xanax and oxycodone medication. Pt refused lunch tray this  afternoon, and is sleeping when dinner trays arrived. Trays left at bedside incase pt would like to eat something later. Pt BP began to drop in the evening, provider notified and RN told to restart Levophed gtt and 1L/LR ordered for pt. Plan of care reviewed with pt for the day, questions encouraged if any, no questions asked from pt. Pt IV unable to draw back blood, providers notified and provider spoke directly with pt regarding a peripheral stick for labs, pt would not allow it. VSS end of shift with levophed infusing at 0.01mcg/kg/min.

## 2024-04-11 NOTE — PROGRESS NOTES
[facility] - Telemetry ([facility])  Wound Care    Patient Name:  Jyoti Mayberry   MRN:  [id number]  Date: 4/11/2024  Diagnosis: <principal problem not specified>    History:     No past medical history on file.    Social History     Socioeconomic History    Marital status: Single   Tobacco Use    Smoking status: Never    Smokeless tobacco: Never   Substance and Sexual Activity    Alcohol use: Not Currently    Drug use: Not Currently    Sexual activity: Not Currently     Social Determinants of Health     Financial Resource Strain: Low Risk  (3/7/2024)    Overall Financial Resource Strain (CARDIA)     Difficulty of Paying Living Expenses: Not hard at all   Food Insecurity: No Food Insecurity (3/7/2024)    Hunger Vital Sign     Worried About Running Out of Food in the Last Year: Never true     Ran Out of Food in the Last Year: Never true   Transportation Needs: No Transportation Needs (3/7/2024)    PRAPARE - Transportation     Lack of Transportation (Medical): No     Lack of Transportation (Non-Medical): No   Physical Activity: Inactive (3/7/2024)    Exercise Vital Sign     Days of Exercise per Week: 0 days     Minutes of Exercise per Session: 0 min   Stress: Stress Concern Present (3/7/2024)    [employer] [place] [employer] - Occupational Stress Questionnaire     Feeling of Stress : Very much   Social Connections: Socially Isolated (3/7/2024)    Social Connection and Isolation Panel [NHANES]     Frequency of Communication with Friends and Family: More than three times a week     Frequency of Social Gatherings with Friends and Family: More than three times a week     Attends Holiness Services: Never     Active Member of Clubs or Organizations: No     Attends Club or Organization Meetings: Never     Marital Status: Never    Housing Stability: Unknown (3/7/2024)    Housing Stability Vital Sign     Unable to Pay for Housing in the Last Year: No     Unstable Housing in the Last Year: No       Precautions:      Allergies as of 04/09/2024 - Reviewed 03/13/2024   Allergen Reaction Noted    Opioids - morphine analogues Nausea And Vomiting, Anxiety, and Other (See Comments) 03/11/2024       WO Assessment Details/Treatment     Patient seen for ostomy site check. Pt known to [facility] from this admision- last seen with wound eval and local wound care yesterday 4/10. Order for dressing change Mon/  Thursday- however, since done 4/10, will modify to perform care Tue/ Friday.     Reviewed chart for this encounter.   See Flow Sheet for findings.    Pt resting with eyes closed- awakens easily to verbal stimuli. -agreeable to ostomy site check. Current pouch with good seal noted; no evident undermined stool present. Emptied 450cc sludge-like stool from pouch.       Discussed POC with patient and primary nurse- updated.   See EMR for orders     Discussed nutrition and the role of protein in wound healing with the patient. Instructed patient to optimize protein for wound healing.    Bedside nursing to continue care & monitoring.  Bedside nursing to maintain pressure injury prevention interventions.  Current documented Gallito score is 11 with a nutrition sub scale score of 2. IP nutrition following.     04/11/24 1100   WOCN Assessment   WOCN Total Time (mins) 30   Visit Date 04/11/24   Visit Time 1100   Consult Type Follow Up   WOCN Speciality Ostomy   WOCN List colostomy   Ostomy Type Colostomy   Intervention assessed;chart review   Teaching on-going   Skin Interventions   Pressure Reduction Devices specialty bed utilized  (Immerse surface)   Positioning   Body Position position maintained   Head of Bed (HOB) Positioning HOB elevated   Positioning/Transfer Devices pillows        Colostomy  LLQ   Placement Date: (c)    Present Prior to Hospital Arrival?: Yes  Location: LLQ   Stomal Appliance 1 piece;No Leakage;Dry;Intact   Stoma Function stool   Output (mL) 450 mL     Will continue to follow as needed and/ or as directed until  discharge.    Rae Jackson BSN, RN, CWOCN  04/11/2024

## 2024-04-11 NOTE — NURSING
Report called to tele nurse at [facility]. Sent patient with EMS transport. Patients phone and other belongings sent with him.VSS.
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
## 2024-04-11 NOTE — ASSESSMENT & PLAN NOTE
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  Continuing to have positive cultures and  acinetobacter and pseudomonas.    - ID consulted and previous recs of avycaz, linezolid, minicycline for duration of 6 weeks with EOT of 4/24/2024; seen in ICU 4/5, will reach out for updated recs.  - Stopping micafungin, NGTD on cultures. Continue eravacycline, Unasyn for CRAB; no topical tobramycin on formulary, will discuss other options for topical therapy  - Stopped linezolid 4/9 after 6 week course of wound enterococcus, likely adequate treatment. Stopped micafungin 4/9 as no indication for antifungal  - wound care following for chronic skin breakdown  - unable to be transferred to LTAC for insurance and medication issues, will complete course of Abx in hospital

## 2024-04-11 NOTE — SUBJECTIVE & OBJECTIVE
Interval History/Significant Events: NAEO. Refusing labs yesterday, on low dose levo 0.01 with MAPS in 70s, continues to drop below 60 once off pressors. Urine output remains adequate. Minimally interactive, endorsing pain and states he will allow us to obtain labs this morning. Pending transfer to [facility].     Review of Systems   Musculoskeletal:         Generalized MSK pain 9/10     Objective:     Vital Signs (Most Recent):  Temp: 97.8 °F (36.6 °C) (04/11/24 0315)  Pulse: 72 (04/11/24 0810)  Resp: 11 (04/11/24 0810)  BP: (!) 102/55 (04/11/24 0715)  SpO2: 98 % (04/11/24 0810) Vital Signs (24h Range):  Temp:  [97.6 °F (36.4 °C)-97.8 °F (36.6 °C)] 97.8 °F (36.6 °C)  Pulse:  [] 72  Resp:  [10-19] 11  SpO2:  [61 %-100 %] 98 %  BP: ()/(45-71) 102/55   Weight: 45.4 kg (100 lb 1.4 oz)  Body mass index is 14.78 kg/m².      Intake/Output Summary (Last 24 hours) at 4/11/2024 0822  Last data filed at 4/11/2024 0500  Gross per 24 hour   Intake 2154.98 ml   Output 1600 ml   Net 554.98 ml          Physical Exam  Vitals and nursing note reviewed.   Constitutional:       General: He is not in acute distress.     Appearance: He is ill-appearing. He is not toxic-appearing or diaphoretic.      Comments: Cachectic     HENT:      Head: Normocephalic and atraumatic.      Mouth/Throat:      Mouth: Mucous membranes are dry.      Pharynx: Oropharynx is clear.   Eyes:      Extraocular Movements: Extraocular movements intact.   Cardiovascular:      Rate and Rhythm: Normal rate and regular rhythm.      Pulses: Normal pulses.      Heart sounds: Normal heart sounds. No murmur heard.     No friction rub. No gallop.   Pulmonary:      Effort: Pulmonary effort is normal. No respiratory distress.      Breath sounds: Normal breath sounds. No wheezing, rhonchi or rales.      Comments: Trach with TC 28% humidified  Passey fernandez in place  Abdominal:      General: Abdomen is flat. Bowel sounds are normal. There is no distension.       "Palpations: Abdomen is soft.      Tenderness: There is no abdominal tenderness.      Comments: Ostomy with stool in bag   Musculoskeletal:      Comments: Bilateral AKA, staples intact with some old dried blood noted to LLE. DSG to LUE below the elbow amputation CDI.   Skin:     General: Skin is warm and dry.      Capillary Refill: Capillary refill takes 2 to 3 seconds.   Neurological:      General: No focal deficit present.      Mental Status: He is alert and oriented to person, place, and time. Mental status is at baseline.            Vents:  Oxygen Concentration (%): 28 (04/11/24 0810)  Lines/Drains/Airways       Drain  Duration                  Colostomy  LLQ -- days    Male External Urinary Catheter 04/10/24 1809 Medium <1 day              Airway  Duration             Adult Surgical Airway 05/25/23 1135 Shiley Cuffed 8.0 / 85 mm 321 days              Peripheral Intravenous Line  Duration                  Midline Catheter - Single Lumen 03/29/24 1456 Right 22g x 8cm 12 days         Peripheral IV - Single Lumen 04/07/24 1154 20 G Posterior;Right Wrist 3 days                  Significant Labs:    CBC/Anemia Profile:  No results for input(s): "WBC", "HGB", "HCT", "PLT", "MCV", "RDW", "IRON", "FERRITIN", "RETIC", "FOLATE", "[id number]", "OCCULTBLOOD" in the last 48 hours.     Chemistries:  No results for input(s): "NA", "K", "CL", "CO2", "BUN", "CREATININE", "CALCIUM", "ALBUMIN", "PROT", "BILITOT", "ALKPHOS", "ALT", "AST", "GLUCOSE", "MG", "PHOS" in the last 48 hours.    None    Significant Imaging:  I have reviewed all pertinent imaging results/findings within the past 24 hours.  none  "

## 2024-04-11 NOTE — CARE UPDATE
Patient off of peripheral norepinephrine 0.01 since around 9am this morning. MAPs > 70s, mentation at baseline, good urine output, renal function at baseline. S/p 1 unit pRBC for hemoglobin 6.2 with goal > 7. Discussed with staff, patient stable for transfer to [facility] with stepdown to telemetry unit.     Vianney Williamson MD  [employer] IM PGY III

## 2024-04-11 NOTE — PLAN OF CARE
Transportation has been arranged by patient transfer center for hospital return to [facility] [place] [facility] [place].  SERGIO spoke with Russ, patient flow center, [id number], to get update on ETA of EMS (4:40 pm).      SERGIO has left a vmsg for Ann Carrier, mtr @ [phone] with ETA of EMS.    SERGIO left packet with JANETT Holden.     Be Soni LMSW  [facility]  X [phone]

## 2024-04-11 NOTE — PLAN OF CARE
SW received a call from Pamela Enrique CM/ph# [phone] for [facility] and requested updated documents (MAR and CMICU progress note).      SERGIO contacted Althea, admission coordinator for [facility], ph#[phone] and provided her with updated documents through [facility].      11:27 AM  Received a call from leila Mccoy/cp# [phone] ([facility]) who conveyed that patient is under review and they will have a decision today.     Be Soni, CHERYL  [facility]  X [phone]

## 2024-04-11 NOTE — ASSESSMENT & PLAN NOTE
Worsening renal function to 1.2 on day of transfer to MICU from 0.7 associated with worsening mentation and hypotension on long term antibiotics. Last Cr 1.2 last night with lactic acid 3.2, likely volume down still.    Plan:  - pt has been refusing labs but states he will allow us to draw today 4/11  - 1 liter LR given yesterday foor poor PO intake and hypotension, adequate u/o  - avoid nephrotoxic agents  - MAP goal >60  - consider repeat imaging if continuing to decline
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
## 2024-04-12 LAB
ABO + RH BLD: NORMAL
ALBUMIN SERPL BCP-MCNC: 1.3 G/DL (ref 3.5–5.2)
ALP SERPL-CCNC: 146 U/L (ref 55–135)
ALT SERPL W/O P-5'-P-CCNC: 7 U/L (ref 10–44)
ANION GAP SERPL CALC-SCNC: 11 MMOL/L (ref 8–16)
AST SERPL-CCNC: 13 U/L (ref 10–40)
BACTERIA BLD CULT: NORMAL
BACTERIA BLD CULT: NORMAL
BASOPHILS # BLD AUTO: 0.06 K/UL (ref 0–0.2)
BASOPHILS NFR BLD: 0.5 % (ref 0–1.9)
BILIRUB SERPL-MCNC: 0.3 MG/DL (ref 0.1–1)
BLD GP AB SCN CELLS X3 SERPL QL: NORMAL
BLD PROD TYP BPU: NORMAL
BLOOD UNIT EXPIRATION DATE: NORMAL
BLOOD UNIT TYPE CODE: 5100
BLOOD UNIT TYPE: NORMAL
BUN SERPL-MCNC: 38 MG/DL (ref 6–20)
CALCIUM SERPL-MCNC: 8.9 MG/DL (ref 8.7–10.5)
CHLORIDE SERPL-SCNC: 107 MMOL/L (ref 95–110)
CO2 SERPL-SCNC: 21 MMOL/L (ref 23–29)
CODING SYSTEM: NORMAL
CREAT SERPL-MCNC: 1 MG/DL (ref 0.5–1.4)
CROSSMATCH INTERPRETATION: NORMAL
DIFFERENTIAL METHOD BLD: ABNORMAL
DISPENSE STATUS: NORMAL
EOSINOPHIL # BLD AUTO: 0.4 K/UL (ref 0–0.5)
EOSINOPHIL NFR BLD: 3 % (ref 0–8)
ERYTHROCYTE [DISTWIDTH] IN BLOOD BY AUTOMATED COUNT: 14.8 % (ref 11.5–14.5)
EST. GFR  (NO RACE VARIABLE): >60 ML/MIN/1.73 M^2
GLUCOSE SERPL-MCNC: 112 MG/DL (ref 70–110)
HCT VFR BLD AUTO: 20.6 % (ref 40–54)
HGB BLD-MCNC: 6.7 G/DL (ref 14–18)
IMM GRANULOCYTES # BLD AUTO: 0.04 K/UL (ref 0–0.04)
IMM GRANULOCYTES NFR BLD AUTO: 0.3 % (ref 0–0.5)
LYMPHOCYTES # BLD AUTO: 2.7 K/UL (ref 1–4.8)
LYMPHOCYTES NFR BLD: 21.2 % (ref 18–48)
MAGNESIUM SERPL-MCNC: 1.6 MG/DL (ref 1.6–2.6)
MCH RBC QN AUTO: 29.3 PG (ref 27–31)
MCHC RBC AUTO-ENTMCNC: 32.5 G/DL (ref 32–36)
MCV RBC AUTO: 90 FL (ref 82–98)
MONOCYTES # BLD AUTO: 1.9 K/UL (ref 0.3–1)
MONOCYTES NFR BLD: 14.5 % (ref 4–15)
NEUTROPHILS # BLD AUTO: 7.7 K/UL (ref 1.8–7.7)
NEUTROPHILS NFR BLD: 60.5 % (ref 38–73)
NRBC BLD-RTO: 0 /100 WBC
NUM UNITS TRANS PACKED RBC: NORMAL
PLATELET # BLD AUTO: 54 K/UL (ref 150–450)
PMV BLD AUTO: 11.6 FL (ref 9.2–12.9)
POTASSIUM SERPL-SCNC: 3.9 MMOL/L (ref 3.5–5.1)
PROT SERPL-MCNC: 6.1 G/DL (ref 6–8.4)
RBC # BLD AUTO: 2.29 M/UL (ref 4.6–6.2)
SODIUM SERPL-SCNC: 139 MMOL/L (ref 136–145)
SPECIMEN OUTDATE: NORMAL
T4 FREE SERPL-MCNC: 0.71 NG/DL (ref 0.71–1.51)
TSH SERPL DL<=0.005 MIU/L-ACNC: 36.76 UIU/ML (ref 0.4–4)
WBC # BLD AUTO: 12.79 K/UL (ref 3.9–12.7)

## 2024-04-12 PROCEDURE — 27000207 HC ISOLATION

## 2024-04-12 PROCEDURE — 86901 BLOOD TYPING SEROLOGIC RH(D): CPT | Performed by: FAMILY MEDICINE

## 2024-04-12 PROCEDURE — 94761 N-INVAS EAR/PLS OXIMETRY MLT: CPT

## 2024-04-12 PROCEDURE — 30233N1 TRANSFUSION OF NONAUTOLOGOUS RED BLOOD CELLS INTO PERIPHERAL VEIN, PERCUTANEOUS APPROACH: ICD-10-PCS | Performed by: EMERGENCY MEDICINE

## 2024-04-12 PROCEDURE — 25000003 PHARM REV CODE 250: Performed by: NURSE PRACTITIONER

## 2024-04-12 PROCEDURE — 36430 TRANSFUSION BLD/BLD COMPNT: CPT

## 2024-04-12 PROCEDURE — A4216 STERILE WATER/SALINE, 10 ML: HCPCS | Performed by: NURSE PRACTITIONER

## 2024-04-12 PROCEDURE — 99223 1ST HOSP IP/OBS HIGH 75: CPT | Mod: NSCH,,, | Performed by: INTERNAL MEDICINE

## 2024-04-12 PROCEDURE — 63600175 PHARM REV CODE 636 W HCPCS: Performed by: NURSE PRACTITIONER

## 2024-04-12 PROCEDURE — 83735 ASSAY OF MAGNESIUM: CPT | Performed by: NURSE PRACTITIONER

## 2024-04-12 PROCEDURE — 99900035 HC TECH TIME PER 15 MIN (STAT)

## 2024-04-12 PROCEDURE — 25000003 PHARM REV CODE 250: Performed by: INTERNAL MEDICINE

## 2024-04-12 PROCEDURE — P9016 RBC LEUKOCYTES REDUCED: HCPCS | Performed by: FAMILY MEDICINE

## 2024-04-12 PROCEDURE — 86920 COMPATIBILITY TEST SPIN: CPT | Performed by: FAMILY MEDICINE

## 2024-04-12 PROCEDURE — 99900026 HC AIRWAY MAINTENANCE (STAT)

## 2024-04-12 PROCEDURE — 84439 ASSAY OF FREE THYROXINE: CPT | Performed by: NURSE PRACTITIONER

## 2024-04-12 PROCEDURE — 25000003 PHARM REV CODE 250: Performed by: STUDENT IN AN ORGANIZED HEALTH CARE EDUCATION/TRAINING PROGRAM

## 2024-04-12 PROCEDURE — 63600175 PHARM REV CODE 636 W HCPCS: Mod: JZ,JG | Performed by: INTERNAL MEDICINE

## 2024-04-12 PROCEDURE — 31720 CLEARANCE OF AIRWAYS: CPT

## 2024-04-12 PROCEDURE — 84443 ASSAY THYROID STIM HORMONE: CPT | Performed by: NURSE PRACTITIONER

## 2024-04-12 PROCEDURE — C9113 INJ PANTOPRAZOLE SODIUM, VIA: HCPCS | Performed by: NURSE PRACTITIONER

## 2024-04-12 PROCEDURE — 21400001 HC TELEMETRY ROOM

## 2024-04-12 PROCEDURE — 63600175 PHARM REV CODE 636 W HCPCS: Performed by: STUDENT IN AN ORGANIZED HEALTH CARE EDUCATION/TRAINING PROGRAM

## 2024-04-12 PROCEDURE — 80053 COMPREHEN METABOLIC PANEL: CPT | Performed by: NURSE PRACTITIONER

## 2024-04-12 PROCEDURE — 85025 COMPLETE CBC W/AUTO DIFF WBC: CPT | Performed by: NURSE PRACTITIONER

## 2024-04-12 RX ORDER — MINOCYCLINE HYDROCHLORIDE 50 MG/1
100 CAPSULE ORAL EVERY 12 HOURS
Status: DISCONTINUED | OUTPATIENT
Start: 2024-04-12 | End: 2024-04-17 | Stop reason: HOSPADM

## 2024-04-12 RX ORDER — HYDROCODONE BITARTRATE AND ACETAMINOPHEN 500; 5 MG/1; MG/1
TABLET ORAL
Status: DISCONTINUED | OUTPATIENT
Start: 2024-04-12 | End: 2024-04-17 | Stop reason: HOSPADM

## 2024-04-12 RX ADMIN — LEVOTHYROXINE SODIUM 137 MCG: 0.11 TABLET ORAL at 05:04

## 2024-04-12 RX ADMIN — HEPARIN SODIUM 5000 UNITS: 5000 INJECTION INTRAVENOUS; SUBCUTANEOUS at 10:04

## 2024-04-12 RX ADMIN — PANTOPRAZOLE SODIUM 40 MG: 40 INJECTION, POWDER, FOR SOLUTION INTRAVENOUS at 10:04

## 2024-04-12 RX ADMIN — SERTRALINE HYDROCHLORIDE 50 MG: 50 TABLET ORAL at 10:04

## 2024-04-12 RX ADMIN — FOLIC ACID 1 MG: 1 TABLET ORAL at 10:04

## 2024-04-12 RX ADMIN — MIDODRINE HYDROCHLORIDE 15 MG: 5 TABLET ORAL at 05:04

## 2024-04-12 RX ADMIN — MICONAZOLE NITRATE: 20 CREAM TOPICAL at 10:04

## 2024-04-12 RX ADMIN — AMPICILLIN AND SULBACTAM: 1; 2 INJECTION, POWDER, FOR SOLUTION INTRAMUSCULAR; INTRAVENOUS at 05:04

## 2024-04-12 RX ADMIN — OXYCODONE HYDROCHLORIDE 20 MG: 10 TABLET, FILM COATED, EXTENDED RELEASE ORAL at 10:04

## 2024-04-12 RX ADMIN — Medication 3 ML: at 05:04

## 2024-04-12 RX ADMIN — ERAVACYCLINE 47.6 MG: 50 INJECTION, POWDER, LYOPHILIZED, FOR SOLUTION INTRAVENOUS at 03:04

## 2024-04-12 RX ADMIN — LEVETIRACETAM 750 MG: 100 INJECTION, SOLUTION INTRAVENOUS at 10:04

## 2024-04-12 RX ADMIN — LEVETIRACETAM 750 MG: 100 INJECTION, SOLUTION INTRAVENOUS at 09:04

## 2024-04-12 RX ADMIN — MIDODRINE HYDROCHLORIDE 15 MG: 5 TABLET ORAL at 01:04

## 2024-04-12 RX ADMIN — Medication 3 ML: at 01:04

## 2024-04-12 RX ADMIN — AMPICILLIN AND SULBACTAM: 1; 2 INJECTION, POWDER, FOR SOLUTION INTRAMUSCULAR; INTRAVENOUS at 10:04

## 2024-04-12 RX ADMIN — AMPICILLIN AND SULBACTAM: 1; 2 INJECTION, POWDER, FOR SOLUTION INTRAMUSCULAR; INTRAVENOUS at 01:04

## 2024-04-12 NOTE — ASSESSMENT & PLAN NOTE
Patient with Hypoxic Respiratory failure which is Chronic.  he is  Supplemental oxygen was provided and noted-   via trach collar    Monitor SpO2, wean oxygen as able
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
## 2024-04-12 NOTE — ASSESSMENT & PLAN NOTE
Continue Synthroid  Most recent TSH improved to 13.009 several days ago, was previously 118.680 one month ago  Monitor TSH periodically

## 2024-04-12 NOTE — ASSESSMENT & PLAN NOTE
Patient with Hypoxic Respiratory failure which is Chronic.  he is  Supplemental oxygen was provided and noted- Oxygen Concentration (%):  [28] 28 via trach collar    Monitor SpO2, wean oxygen as able

## 2024-04-12 NOTE — PLAN OF CARE
[facility]  Discharge Final Note    Primary Care Provider: Jake Jackman MD    Expected Discharge Date: 4/13/2024    Final Discharge Note (most recent)       Final Note - 04/12/24 1006          Final Note    Assessment Type Final Discharge Note     Anticipated Discharge Disposition [facility] - [facility]      What phone number can be called within the next 1-3 days to see how you are doing after discharge? [phone]     Hospital Resources/Appts/Education Provided Provided patient/caregiver with written discharge plan information        Post-Acute Status    Post-Acute Authorization Placement     Post-Acute Placement Status Patient declined/refused     Coverage Medicaid - [employer] ([employer] [place])     Discharge Delays None known at this time                     Important Message from Medicare      Patient discharged back to [facility] [place] [place]  Family present for transportation.  Information given to EMS per medical staff for f/u and symptoms to notify provider.     Be Soni LMSW  [facility]  [phone]

## 2024-04-12 NOTE — ASSESSMENT & PLAN NOTE
Per review of records, patient has had worsening failure to thrive  Has had very poor p.o. intake, has refused medications at times, refuses blood draws at times  Refused palliative consult  Refused to participate psych consult  Supportive care

## 2024-04-12 NOTE — SUBJECTIVE & OBJECTIVE
Interval History: f/u need for antibiotics. Patient alert, awake, NAD. Patient is cachectic and ill-appearing. Patient anemic on morning labs- 1 unit of PRBCs ordered, will re-assess with morning labs. Morning hypotension noted, will continue PO midodrine. Patient did not refuse wound care this A.M.      Review of Systems  Objective:     Vital Signs (Most Recent):  Temp: 97.9 °F (36.6 °C) (04/12/24 0746)  Pulse: 88 (04/12/24 1315)  Resp: 16 (04/12/24 1315)  BP: (!) 92/50 (MD notified) (04/12/24 0739)  SpO2: 96 % (04/12/24 1315) Vital Signs (24h Range):  Temp:  [97.3 °F (36.3 °C)-97.9 °F (36.6 °C)] 97.9 °F (36.6 °C)  Pulse:  [] 88  Resp:  [10-20] 16  SpO2:  [91 %-100 %] 96 %  BP: ()/(50-89) 92/50     Weight: 47.6 kg (104 lb 15 oz)  Body mass index is 15.5 kg/m².    Intake/Output Summary (Last 24 hours) at 4/12/2024 1339  Last data filed at 4/12/2024 0855  Gross per 24 hour   Intake --   Output 1200 ml   Net -1200 ml         Physical Exam  Vitals and nursing note reviewed.   Constitutional:       General: He is not in acute distress.     Appearance: He is ill-appearing.   HENT:      Head: Normocephalic and atraumatic.      Mouth/Throat:      Mouth: Mucous membranes are dry.   Cardiovascular:      Rate and Rhythm: Normal rate and regular rhythm.      Heart sounds: No murmur heard.  Pulmonary:      Effort: Pulmonary effort is normal.      Breath sounds: Normal breath sounds.      Comments: Trach in place with passey fernandez   Abdominal:      General: Bowel sounds are normal.      Palpations: Abdomen is soft.      Tenderness: There is no abdominal tenderness.      Comments: Ostomy with stool in bag    Musculoskeletal:      Comments: Bilateral AKA. Amputation to LUE below the elbow.    Skin:     Capillary Refill: Capillary refill takes 2 to 3 seconds.      Coloration: Skin is pale.      Comments: To RUE    Neurological:      General: No focal deficit present.      Mental Status: He is alert.          "    Significant Labs: All pertinent labs within the past 24 hours have been reviewed.  BMP:   Recent Labs   Lab 04/12/24  0548   *      K 3.9      CO2 21*   BUN 38*   CREATININE 1.0   CALCIUM 8.9   MG 1.6     CBC:   Recent Labs   Lab 04/11/24  1003 04/11/24 2238 04/12/24  0548   WBC 18.11* 15.37* 12.79*   HGB 6.3* 7.2* 6.7*   HCT 20.4* 22.0* 20.6*   PLT 70* 58* 54*     Cardiac Markers: No results for input(s): "CKMB", "MYOGLOBIN", "BNP", "TROPISTAT" in the last 48 hours.  Lipid Panel: No results for input(s): "CHOL", "HDL", "LDLCALC", "TRIG", "CHOLHDL" in the last 48 hours.  Magnesium:   Recent Labs   Lab 04/11/24  1003 04/11/24 2238 04/12/24  0548   MG 1.6 1.8 1.6     Troponin: No results for input(s): "TROPONINI", "TROPONINIHS" in the last 48 hours.  TSH:   Recent Labs   Lab 04/12/24  0548   TSH 36.760*       Significant Imaging: I have reviewed all pertinent imaging results/findings within the past 24 hours.  "

## 2024-04-12 NOTE — PLAN OF CARE
New referral placed in [facility] for [facility] as patient is new admit to [facility].  Updated clinicals sent with wound care note and brief note from Dr. Cee.  Notified Geetha with [facility].       04/12/24 1500   Post-Acute Status   Post-Acute Authorization Placement   Post-Acute Placement Status Referrals Sent   Discharge Delays (!) Post-Acute Set-up   Discharge Plan   Discharge Plan A Long-term acute care facility (LTAC)

## 2024-04-12 NOTE — PLAN OF CARE
Awaiting Dr. Cee's recommendations for more cost effective IV antibiotics as patient is awaiting LTAC placement.  Dr. Cee is reviewing now is will send recommendations soon.

## 2024-04-12 NOTE — ASSESSMENT & PLAN NOTE
S/P bilat BKA and LUE below elbow amputation   ID recommendations for Unasyn 9 grams IV q.8 hours, Eravacycline 1 mg/kg every 12 hours--through 4/24/24 due to cultures + for  acinetobacter and pseudomonas

## 2024-04-12 NOTE — ASSESSMENT & PLAN NOTE
Midodrine 15 mg p.o. t.i.d.  Was weaned off norepinephrine earlier today  Has had lactic acidosis, we will check another lactic acid today  Has required fluid boluses due to elevated lactic acid

## 2024-04-12 NOTE — ASSESSMENT & PLAN NOTE
Patient's anemia is currently uncontrolled. Has received 2 units of PRBCs on 4/11/2024 . Etiology likely d/t chronic blood loss  Current CBC reviewed-   Lab Results   Component Value Date    HGB 6.3 (L) 04/11/2024    HCT 20.4 (L) 04/11/2024     Monitor serial CBC and transfuse if patient becomes hemodynamically unstable, symptomatic or H/H drops below 7/21.  STAT labs ordered/pending
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
## 2024-04-12 NOTE — NURSING
PICC line placement attempted unsuccessfully to RUE, unable to advance catheter beyond the approximate subclavian region. Unable to use LUE due to recent limb amputation. Midline placed to RUE per MD instruction.

## 2024-04-12 NOTE — ASSESSMENT & PLAN NOTE
Patient's anemia is currently uncontrolled. Has received 2 units of PRBCs on 4/11/2024 . Etiology likely d/t chronic blood loss  Current CBC reviewed-   Lab Results   Component Value Date    HGB 6.7 (L) 04/12/2024    HCT 20.6 (L) 04/12/2024     Monitor serial CBC and transfuse if patient becomes hemodynamically unstable, symptomatic or H/H drops below 7/21.  STAT labs ordered/pending
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
## 2024-04-12 NOTE — PLAN OF CARE
Pt oriented x 4 VSS  Plan of care reviewed. pt verbalizes understanding.  Patient moving/ turning turn Q2H.  Patient normal sinus on monitor  Bed low, side rails up x2, wheels locked, call light in reach.  Pt instructed to call for assistance
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
## 2024-04-12 NOTE — ASSESSMENT & PLAN NOTE
Had recent CT head due to encephalopathy which did not show any acute abnormality  Seems to wax and wane  Limited assessment due to his willingness to participate

## 2024-04-12 NOTE — PROGRESS NOTES
[facility] ([facility])  Wound Care    Patient Name:  Jyoti Mayberry   MRN:  [id number]  Date: 4/11/2024  Diagnosis: <principal problem not specified>    History:     No past medical history on file.    Social History     Socioeconomic History    Marital status: Single   Tobacco Use    Smoking status: Never    Smokeless tobacco: Never   Substance and Sexual Activity    Alcohol use: Not Currently    Drug use: Not Currently    Sexual activity: Not Currently     Social Determinants of Health     Financial Resource Strain: Low Risk  (3/7/2024)    Overall Financial Resource Strain (CARDIA)     Difficulty of Paying Living Expenses: Not hard at all   Food Insecurity: No Food Insecurity (3/7/2024)    Hunger Vital Sign     Worried About Running Out of Food in the Last Year: Never true     Ran Out of Food in the Last Year: Never true   Transportation Needs: No Transportation Needs (3/7/2024)    PRAPARE - Transportation     Lack of Transportation (Medical): No     Lack of Transportation (Non-Medical): No   Physical Activity: Inactive (3/7/2024)    Exercise Vital Sign     Days of Exercise per Week: 0 days     Minutes of Exercise per Session: 0 min   Stress: Stress Concern Present (3/7/2024)    [employer] - Occupational Stress Questionnaire     Feeling of Stress : Very much   Social Connections: Socially Isolated (3/7/2024)    Social Connection and Isolation Panel [NHANES]     Frequency of Communication with Friends and Family: More than three times a week     Frequency of Social Gatherings with Friends and Family: More than three times a week     Attends Voodoo Services: Never     Active Member of Clubs or Organizations: No     Attends Club or Organization Meetings: Never     Marital Status: Never    Housing Stability: Unknown (3/7/2024)    Housing Stability Vital Sign     Unable to Pay for Housing in the Last Year: No     Unstable Housing in the Last Year: No       Precautions:      Allergies as of 04/09/2024 - Reviewed 03/13/2024   Allergen Reaction Noted    Opioids - morphine analogues Nausea And Vomiting, Anxiety, and Other (See Comments) 03/11/2024       [facility] Assessment Details/Treatment   Follow up wound care for dressing changes to all wounds.  Wounds were cleaned with Vashe antimicrobial wound cleanser.    LUE--applied Aquacel Ag to open area- cover with Mepilex transfer dressing. secured with kerlix.     R hand/ R upper back/ thoracic spine/ sacrum/ B ischia: applied Hydrafera ready. secured with border foam dressings.      LLE- applied border foam dressing.    Photos in .   04/10/24 1430   WOCN Assessment   WOCN Total Time (mins) 60   Visit Date 04/10/24   Visit Time 1430   Consult Type Follow Up   WOCN Speciality Wound;Ostomy   WOCN List colostomy   Wound surgical   Continence Type Urinary;Fecal  (Condom cath/Colostomy)   Ostomy Type Colostomy   Intervention assessed;changed;applied;chart review   Teaching on-going     Trish Hodsgon RN, BSN, CWON  04/10/2024

## 2024-04-12 NOTE — HOSPITAL COURSE
29 y/o male with extensive past medical history was transferred from [facility] on 4/11 to complete course of IV antibiotics due to abx cost-awaiting LTAC placement.   Patient receiving IV antibiotics- Unasyn and Avycaz  ID consulted- Recommends per MD Jaye & note by Dr Bell (4/9/24)  - continue high dose amp-sulbactam for CRAB  - topical tobramycin for anti-pseudomonal coverage  - aggressive nutritional support and wound care  Plan - will use above regime ,stop Erava   Full note to follow   Can add Minocycline 100mg bid   Patient anemic requiring blood transfusions on 4/11 and 4/12  Wound care consulted to treat and eval numerous pressure injuries and surgical sites.   Per review of notes he has also appeared to be depressed, however did not participate in interview when psych was consulted. Per notes, he frequently refuses certain medications, lab draws, repositioning in bed, etc.   Overnight 4/13 patient refused PO and topical medications per night nursing staff.   Anemia improved to hemoglobin 8.2 and hematocrit 24.5 after transfusion on 4/12   Hypomagnesemia 1.4 improved after one time dose of IV Mag 2g, Mag level 1.8 on 4/14.     4/15- pt seen and examined, chart reviewed, seen with SW and d/w pt's mother. VSS Afeb, AAox3 but lethargic, cachectic, trach in place, wants Jello. Getting IV abx per ID and await LTAC placement. Explained to pt's mother his poor condition but she wants him to go to LTAC for IV abx completion and then she will take him home. Await LTAC authorization. WBC increased to 18.3, H/H 8.5/25, Plts 50 K. Bun down to 30 but Albumin only 1.3. [poor prognosis.     4/16- appears same, lethargic, trach in place. Eating sparingly. SW working on placement. He has been accepted at [facility] where his mother wants him to go, await Insurance Auth.     4/17- remains same, VSS, Afeb, PE unchanged. He has been accepted at [facility] for IV abx and wound care. He was seen and examined and  deemed stable for discharge to LTAC today.

## 2024-04-12 NOTE — ASSESSMENT & PLAN NOTE
Midodrine 15 mg p.o. t.i.d.  Was weaned off norepinephrine 4/11  Blood transfusions on 4/11 and 4/12  Continue to assess and treat

## 2024-04-12 NOTE — ASSESSMENT & PLAN NOTE
Status post bilateral AKA and left upper extremity below elbow amputations performed at [facility] on 03/13/24  Previous ID recommendations for Unasyn 9 grams IV q.8 hours, Eravacycline 1 mg/kg every 12 hours--through 4/24/24 due to cultures + for  acinetobacter and pseudomonas    Will repeat blood cultures   Consult orthopedic surgery team-- incisions with staples present  Consult ID
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
## 2024-04-12 NOTE — PLAN OF CARE
[facility] - Telemetry (Hospital)  Discharge Reassessment    Primary Care Provider: Jake Jackman MD    Expected Discharge Date:     Reassessment (most recent)       Discharge Reassessment - 04/12/24 1449          Discharge Reassessment    Assessment Type Discharge Planning Reassessment (P)      Did the patient's condition or plan change since previous assessment? Yes (P)      Discharge Plan A Long-term acute care facility (LTAC) (P)      DME Needed Upon Discharge  none (P)      Transition of Care Barriers Other (see comments) (P)    excessive IV antibiotic cost for post acute facility    Why the patient remains in the hospital Placement issues (P)         Post-Acute Status    Post-Acute Authorization Placement (P)      Post-Acute Placement Status Pending post-acute provider review/more information requested (P)      Discharge Delays Post-Acute Set-up (P)                  Patient received from [facility] for Post Acute set up.  [facility] is considering accepting the patient but IV antibiotics are cost prohibitive.  Dr. Cee, ID has been consulted for recommendations for more cost effective IV antibiotics.

## 2024-04-12 NOTE — ASSESSMENT & PLAN NOTE
Status post bilateral AKA and left upper extremity below elbow amputations performed at [facility] on 03/13/24  Previous ID recommendations for Unasyn 9 grams IV q.8 hours, Eravacycline 1 mg/kg every 12 hours--through 4/24/24 due to cultures + for  acinetobacter and pseudomonas    Will repeat blood cultures   Consult orthopedic surgery team-- incisions with staples present  Consult ID

## 2024-04-12 NOTE — CARE UPDATE
Last ID note -04/09/24 by Dr Bell     Recommendations     - continue high dose amp-sulbactam for CRAB  - topical tobramycin for anti-pseudomonal coverage  - aggressive nutritional support and wound care    Plan - will use above regime ,stop Erava   Full note to follow   Can add Minocycline 100mg bid   W

## 2024-04-13 LAB
ALBUMIN SERPL BCP-MCNC: 1.3 G/DL (ref 3.5–5.2)
ALP SERPL-CCNC: 150 U/L (ref 55–135)
ALT SERPL W/O P-5'-P-CCNC: 6 U/L (ref 10–44)
ANION GAP SERPL CALC-SCNC: 11 MMOL/L (ref 8–16)
AST SERPL-CCNC: 13 U/L (ref 10–40)
BASOPHILS # BLD AUTO: 0.06 K/UL (ref 0–0.2)
BASOPHILS NFR BLD: 0.3 % (ref 0–1.9)
BILIRUB SERPL-MCNC: 0.3 MG/DL (ref 0.1–1)
BUN SERPL-MCNC: 30 MG/DL (ref 6–20)
CALCIUM SERPL-MCNC: 8.3 MG/DL (ref 8.7–10.5)
CHLORIDE SERPL-SCNC: 109 MMOL/L (ref 95–110)
CO2 SERPL-SCNC: 22 MMOL/L (ref 23–29)
CREAT SERPL-MCNC: 1 MG/DL (ref 0.5–1.4)
DIFFERENTIAL METHOD BLD: ABNORMAL
EOSINOPHIL # BLD AUTO: 0.2 K/UL (ref 0–0.5)
EOSINOPHIL NFR BLD: 0.9 % (ref 0–8)
ERYTHROCYTE [DISTWIDTH] IN BLOOD BY AUTOMATED COUNT: 14.9 % (ref 11.5–14.5)
EST. GFR  (NO RACE VARIABLE): >60 ML/MIN/1.73 M^2
GLUCOSE SERPL-MCNC: 94 MG/DL (ref 70–110)
HCT VFR BLD AUTO: 24.5 % (ref 40–54)
HGB BLD-MCNC: 8.2 G/DL (ref 14–18)
IMM GRANULOCYTES # BLD AUTO: 0.07 K/UL (ref 0–0.04)
IMM GRANULOCYTES NFR BLD AUTO: 0.4 % (ref 0–0.5)
LYMPHOCYTES # BLD AUTO: 1.7 K/UL (ref 1–4.8)
LYMPHOCYTES NFR BLD: 10 % (ref 18–48)
MAGNESIUM SERPL-MCNC: 1.4 MG/DL (ref 1.6–2.6)
MCH RBC QN AUTO: 29.8 PG (ref 27–31)
MCHC RBC AUTO-ENTMCNC: 33.5 G/DL (ref 32–36)
MCV RBC AUTO: 89 FL (ref 82–98)
MONOCYTES # BLD AUTO: 2 K/UL (ref 0.3–1)
MONOCYTES NFR BLD: 11.6 % (ref 4–15)
NEUTROPHILS # BLD AUTO: 13.3 K/UL (ref 1.8–7.7)
NEUTROPHILS NFR BLD: 76.8 % (ref 38–73)
NRBC BLD-RTO: 0 /100 WBC
PLATELET # BLD AUTO: 44 K/UL (ref 150–450)
PMV BLD AUTO: 12.1 FL (ref 9.2–12.9)
POTASSIUM SERPL-SCNC: 3.9 MMOL/L (ref 3.5–5.1)
PROT SERPL-MCNC: 6.3 G/DL (ref 6–8.4)
RBC # BLD AUTO: 2.75 M/UL (ref 4.6–6.2)
SODIUM SERPL-SCNC: 142 MMOL/L (ref 136–145)
WBC # BLD AUTO: 17.29 K/UL (ref 3.9–12.7)

## 2024-04-13 PROCEDURE — C9113 INJ PANTOPRAZOLE SODIUM, VIA: HCPCS | Performed by: NURSE PRACTITIONER

## 2024-04-13 PROCEDURE — 27000207 HC ISOLATION

## 2024-04-13 PROCEDURE — 25000003 PHARM REV CODE 250: Performed by: INTERNAL MEDICINE

## 2024-04-13 PROCEDURE — 63600175 PHARM REV CODE 636 W HCPCS: Performed by: STUDENT IN AN ORGANIZED HEALTH CARE EDUCATION/TRAINING PROGRAM

## 2024-04-13 PROCEDURE — 21400001 HC TELEMETRY ROOM

## 2024-04-13 PROCEDURE — 94761 N-INVAS EAR/PLS OXIMETRY MLT: CPT

## 2024-04-13 PROCEDURE — 63600175 PHARM REV CODE 636 W HCPCS: Performed by: NURSE PRACTITIONER

## 2024-04-13 PROCEDURE — 25000003 PHARM REV CODE 250: Performed by: STUDENT IN AN ORGANIZED HEALTH CARE EDUCATION/TRAINING PROGRAM

## 2024-04-13 PROCEDURE — 99900035 HC TECH TIME PER 15 MIN (STAT)

## 2024-04-13 PROCEDURE — 25000003 PHARM REV CODE 250: Performed by: FAMILY MEDICINE

## 2024-04-13 PROCEDURE — 25000003 PHARM REV CODE 250: Performed by: NURSE PRACTITIONER

## 2024-04-13 PROCEDURE — 31720 CLEARANCE OF AIRWAYS: CPT

## 2024-04-13 PROCEDURE — 83735 ASSAY OF MAGNESIUM: CPT | Performed by: NURSE PRACTITIONER

## 2024-04-13 PROCEDURE — 99900026 HC AIRWAY MAINTENANCE (STAT)

## 2024-04-13 PROCEDURE — 63600175 PHARM REV CODE 636 W HCPCS

## 2024-04-13 PROCEDURE — A4216 STERILE WATER/SALINE, 10 ML: HCPCS | Performed by: NURSE PRACTITIONER

## 2024-04-13 PROCEDURE — 80053 COMPREHEN METABOLIC PANEL: CPT | Performed by: NURSE PRACTITIONER

## 2024-04-13 PROCEDURE — 85025 COMPLETE CBC W/AUTO DIFF WBC: CPT | Performed by: NURSE PRACTITIONER

## 2024-04-13 RX ORDER — SODIUM CHLORIDE 9 MG/ML
INJECTION, SOLUTION INTRAVENOUS
Status: DISCONTINUED | OUTPATIENT
Start: 2024-04-13 | End: 2024-04-17 | Stop reason: HOSPADM

## 2024-04-13 RX ORDER — MAGNESIUM SULFATE HEPTAHYDRATE 40 MG/ML
2 INJECTION, SOLUTION INTRAVENOUS ONCE
Status: COMPLETED | OUTPATIENT
Start: 2024-04-13 | End: 2024-04-13

## 2024-04-13 RX ADMIN — SERTRALINE HYDROCHLORIDE 50 MG: 50 TABLET ORAL at 09:04

## 2024-04-13 RX ADMIN — LEVETIRACETAM 750 MG: 100 INJECTION, SOLUTION INTRAVENOUS at 09:04

## 2024-04-13 RX ADMIN — FOLIC ACID 1 MG: 1 TABLET ORAL at 09:04

## 2024-04-13 RX ADMIN — MINOCYCLINE HYDROCHLORIDE 100 MG: 50 CAPSULE ORAL at 10:04

## 2024-04-13 RX ADMIN — MINOCYCLINE HYDROCHLORIDE 100 MG: 50 CAPSULE ORAL at 09:04

## 2024-04-13 RX ADMIN — OXYCODONE HYDROCHLORIDE 30 MG: 10 TABLET, FILM COATED, EXTENDED RELEASE ORAL at 10:04

## 2024-04-13 RX ADMIN — LEVETIRACETAM 750 MG: 100 INJECTION, SOLUTION INTRAVENOUS at 10:04

## 2024-04-13 RX ADMIN — Medication 3 ML: at 01:04

## 2024-04-13 RX ADMIN — ERYTHROMYCIN: 5 OINTMENT OPHTHALMIC at 10:04

## 2024-04-13 RX ADMIN — LEVOTHYROXINE SODIUM 137 MCG: 0.11 TABLET ORAL at 06:04

## 2024-04-13 RX ADMIN — MIDODRINE HYDROCHLORIDE 15 MG: 5 TABLET ORAL at 01:04

## 2024-04-13 RX ADMIN — AMPICILLIN AND SULBACTAM: 1; 2 INJECTION, POWDER, FOR SOLUTION INTRAMUSCULAR; INTRAVENOUS at 10:04

## 2024-04-13 RX ADMIN — MAGNESIUM SULFATE HEPTAHYDRATE 2 G: 40 INJECTION, SOLUTION INTRAVENOUS at 01:04

## 2024-04-13 RX ADMIN — AMPICILLIN AND SULBACTAM: 1; 2 INJECTION, POWDER, FOR SOLUTION INTRAMUSCULAR; INTRAVENOUS at 01:04

## 2024-04-13 RX ADMIN — HEPARIN SODIUM 5000 UNITS: 5000 INJECTION INTRAVENOUS; SUBCUTANEOUS at 10:04

## 2024-04-13 RX ADMIN — SODIUM CHLORIDE: 9 INJECTION, SOLUTION INTRAVENOUS at 09:04

## 2024-04-13 RX ADMIN — OXYCODONE HYDROCHLORIDE 20 MG: 10 TABLET, FILM COATED, EXTENDED RELEASE ORAL at 09:04

## 2024-04-13 RX ADMIN — Medication 3 ML: at 10:04

## 2024-04-13 RX ADMIN — AMPICILLIN AND SULBACTAM: 1; 2 INJECTION, POWDER, FOR SOLUTION INTRAMUSCULAR; INTRAVENOUS at 06:04

## 2024-04-13 RX ADMIN — MIDODRINE HYDROCHLORIDE 15 MG: 5 TABLET ORAL at 10:04

## 2024-04-13 RX ADMIN — PANTOPRAZOLE SODIUM 40 MG: 40 INJECTION, POWDER, FOR SOLUTION INTRAVENOUS at 09:04

## 2024-04-13 RX ADMIN — HEPARIN SODIUM 5000 UNITS: 5000 INJECTION INTRAVENOUS; SUBCUTANEOUS at 09:04
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
## 2024-04-13 NOTE — ASSESSMENT & PLAN NOTE
Patient's anemia is currently uncontrolled. Has received 2 units of PRBCs on 4/11/2024 . Etiology likely d/t chronic blood loss  Current CBC reviewed-   Lab Results   Component Value Date    HGB 8.2 (L) 04/13/2024    HCT 24.5 (L) 04/13/2024     Monitor serial CBC and transfuse if patient becomes hemodynamically unstable, symptomatic or H/H drops below 7/21.  STAT labs ordered/pending

## 2024-04-13 NOTE — ASSESSMENT & PLAN NOTE
Previous ID notes reviewed- previous wound cultures from [place] that had cefepime resistant kleb pneumo and dapto resistant enterococcus.   Last ID note-04/08-- continue high dose amp-sulbactam for CRAB  - topical tobramycin for anti-pseudomonal coverage      Will continue above regime and will add Minocycline - will treat for 2 weeks     EOC - 04/24/24

## 2024-04-13 NOTE — ASSESSMENT & PLAN NOTE
S/P bilat BKA and LUE below elbow amputation   Patient received IV antibiotics- Unasyn and Avycaz  ID consulted- Recommends per MD Jaye & note by Dr Bell (4/9/24)  - continue high dose amp-sulbactam for CRAB  - topical tobramycin for anti-pseudomonal coverage  - aggressive nutritional support and wound care  Plan - will use above regime ,stop Erava   Full note to follow   Can add Minocycline 100mg bid

## 2024-04-13 NOTE — NURSING
Patient refused all night time oral and topical medications. Also refused injections. IV meds not refused and given at correct time.
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
## 2024-04-13 NOTE — ASSESSMENT & PLAN NOTE
Status post bilateral AKA and left upper extremity below elbow amputations performed at [facility] on 03/13/24  Previous ID recommendations for Unasyn 9 grams IV q.8 hours, Eravacycline 1 mg/kg every 12 hours--through 4/24/24 due to cultures + for  acinetobacter and pseudomonas    Will repeat blood cultures   Consult orthopedic surgery team-- incisions with staples present  Consult ID   -same as above recommendations for chronic osteomyelitis

## 2024-04-14 LAB
ALBUMIN SERPL BCP-MCNC: 1.3 G/DL (ref 3.5–5.2)
ALP SERPL-CCNC: 159 U/L (ref 55–135)
ALT SERPL W/O P-5'-P-CCNC: 8 U/L (ref 10–44)
ANION GAP SERPL CALC-SCNC: 16 MMOL/L (ref 8–16)
ANISOCYTOSIS BLD QL SMEAR: SLIGHT
AST SERPL-CCNC: 13 U/L (ref 10–40)
BASOPHILS # BLD AUTO: 0.08 K/UL (ref 0–0.2)
BASOPHILS NFR BLD: 0.4 % (ref 0–1.9)
BILIRUB SERPL-MCNC: 0.2 MG/DL (ref 0.1–1)
BUN SERPL-MCNC: 30 MG/DL (ref 6–20)
CALCIUM SERPL-MCNC: 8.7 MG/DL (ref 8.7–10.5)
CHLORIDE SERPL-SCNC: 109 MMOL/L (ref 95–110)
CO2 SERPL-SCNC: 20 MMOL/L (ref 23–29)
CREAT SERPL-MCNC: 1 MG/DL (ref 0.5–1.4)
DIFFERENTIAL METHOD BLD: ABNORMAL
EOSINOPHIL # BLD AUTO: 0.3 K/UL (ref 0–0.5)
EOSINOPHIL NFR BLD: 1.8 % (ref 0–8)
ERYTHROCYTE [DISTWIDTH] IN BLOOD BY AUTOMATED COUNT: 14.8 % (ref 11.5–14.5)
EST. GFR  (NO RACE VARIABLE): >60 ML/MIN/1.73 M^2
GLUCOSE SERPL-MCNC: 77 MG/DL (ref 70–110)
HCT VFR BLD AUTO: 25.9 % (ref 40–54)
HGB BLD-MCNC: 8.5 G/DL (ref 14–18)
IMM GRANULOCYTES # BLD AUTO: 0.07 K/UL (ref 0–0.04)
IMM GRANULOCYTES NFR BLD AUTO: 0.4 % (ref 0–0.5)
LYMPHOCYTES # BLD AUTO: 2 K/UL (ref 1–4.8)
LYMPHOCYTES NFR BLD: 11 % (ref 18–48)
MAGNESIUM SERPL-MCNC: 1.8 MG/DL (ref 1.6–2.6)
MCH RBC QN AUTO: 29.5 PG (ref 27–31)
MCHC RBC AUTO-ENTMCNC: 32.8 G/DL (ref 32–36)
MCV RBC AUTO: 90 FL (ref 82–98)
MONOCYTES # BLD AUTO: 2.3 K/UL (ref 0.3–1)
MONOCYTES NFR BLD: 12.7 % (ref 4–15)
NEUTROPHILS # BLD AUTO: 13.5 K/UL (ref 1.8–7.7)
NEUTROPHILS NFR BLD: 73.7 % (ref 38–73)
NRBC BLD-RTO: 0 /100 WBC
PLATELET # BLD AUTO: 50 K/UL (ref 150–450)
PLATELET BLD QL SMEAR: ABNORMAL
PMV BLD AUTO: 12.2 FL (ref 9.2–12.9)
POTASSIUM SERPL-SCNC: 3.5 MMOL/L (ref 3.5–5.1)
PROT SERPL-MCNC: 6.5 G/DL (ref 6–8.4)
RBC # BLD AUTO: 2.88 M/UL (ref 4.6–6.2)
SODIUM SERPL-SCNC: 145 MMOL/L (ref 136–145)
WBC # BLD AUTO: 18.32 K/UL (ref 3.9–12.7)

## 2024-04-14 PROCEDURE — 99900035 HC TECH TIME PER 15 MIN (STAT)

## 2024-04-14 PROCEDURE — 27000207 HC ISOLATION

## 2024-04-14 PROCEDURE — 21400001 HC TELEMETRY ROOM

## 2024-04-14 PROCEDURE — 94761 N-INVAS EAR/PLS OXIMETRY MLT: CPT

## 2024-04-14 PROCEDURE — 83735 ASSAY OF MAGNESIUM: CPT | Performed by: NURSE PRACTITIONER

## 2024-04-14 PROCEDURE — 80053 COMPREHEN METABOLIC PANEL: CPT | Performed by: NURSE PRACTITIONER

## 2024-04-14 PROCEDURE — 63600175 PHARM REV CODE 636 W HCPCS: Performed by: NURSE PRACTITIONER

## 2024-04-14 PROCEDURE — 63600175 PHARM REV CODE 636 W HCPCS: Performed by: STUDENT IN AN ORGANIZED HEALTH CARE EDUCATION/TRAINING PROGRAM

## 2024-04-14 PROCEDURE — 31720 CLEARANCE OF AIRWAYS: CPT

## 2024-04-14 PROCEDURE — 94760 N-INVAS EAR/PLS OXIMETRY 1: CPT

## 2024-04-14 PROCEDURE — C9113 INJ PANTOPRAZOLE SODIUM, VIA: HCPCS | Performed by: NURSE PRACTITIONER

## 2024-04-14 PROCEDURE — A4216 STERILE WATER/SALINE, 10 ML: HCPCS | Performed by: NURSE PRACTITIONER

## 2024-04-14 PROCEDURE — 25000003 PHARM REV CODE 250: Performed by: NURSE PRACTITIONER

## 2024-04-14 PROCEDURE — 25000003 PHARM REV CODE 250: Performed by: INTERNAL MEDICINE

## 2024-04-14 PROCEDURE — 25000003 PHARM REV CODE 250: Performed by: STUDENT IN AN ORGANIZED HEALTH CARE EDUCATION/TRAINING PROGRAM

## 2024-04-14 PROCEDURE — 85025 COMPLETE CBC W/AUTO DIFF WBC: CPT | Performed by: NURSE PRACTITIONER

## 2024-04-14 RX ADMIN — LEVETIRACETAM 750 MG: 100 INJECTION, SOLUTION INTRAVENOUS at 08:04

## 2024-04-14 RX ADMIN — MIDODRINE HYDROCHLORIDE 15 MG: 5 TABLET ORAL at 01:04

## 2024-04-14 RX ADMIN — HEPARIN SODIUM 5000 UNITS: 5000 INJECTION INTRAVENOUS; SUBCUTANEOUS at 09:04

## 2024-04-14 RX ADMIN — HEPARIN SODIUM 5000 UNITS: 5000 INJECTION INTRAVENOUS; SUBCUTANEOUS at 08:04

## 2024-04-14 RX ADMIN — ERYTHROMYCIN: 5 OINTMENT OPHTHALMIC at 10:04

## 2024-04-14 RX ADMIN — MIDODRINE HYDROCHLORIDE 15 MG: 5 TABLET ORAL at 09:04

## 2024-04-14 RX ADMIN — LEVETIRACETAM 750 MG: 100 INJECTION, SOLUTION INTRAVENOUS at 09:04

## 2024-04-14 RX ADMIN — AMPICILLIN AND SULBACTAM: 1; 2 INJECTION, POWDER, FOR SOLUTION INTRAMUSCULAR; INTRAVENOUS at 06:04

## 2024-04-14 RX ADMIN — Medication 3 ML: at 01:04

## 2024-04-14 RX ADMIN — Medication 3 ML: at 06:04

## 2024-04-14 RX ADMIN — LEVOTHYROXINE SODIUM 137 MCG: 0.11 TABLET ORAL at 05:04

## 2024-04-14 RX ADMIN — Medication 3 ML: at 10:04

## 2024-04-14 RX ADMIN — OXYCODONE HYDROCHLORIDE 30 MG: 10 TABLET, FILM COATED, EXTENDED RELEASE ORAL at 09:04

## 2024-04-14 RX ADMIN — MINOCYCLINE HYDROCHLORIDE 100 MG: 50 CAPSULE ORAL at 09:04

## 2024-04-14 RX ADMIN — OXYCODONE HYDROCHLORIDE 20 MG: 10 TABLET, FILM COATED, EXTENDED RELEASE ORAL at 08:04

## 2024-04-14 RX ADMIN — FOLIC ACID 1 MG: 1 TABLET ORAL at 08:04

## 2024-04-14 RX ADMIN — PANTOPRAZOLE SODIUM 40 MG: 40 INJECTION, POWDER, FOR SOLUTION INTRAVENOUS at 08:04

## 2024-04-14 RX ADMIN — SERTRALINE HYDROCHLORIDE 50 MG: 50 TABLET ORAL at 08:04

## 2024-04-14 RX ADMIN — MIDODRINE HYDROCHLORIDE 15 MG: 5 TABLET ORAL at 05:04

## 2024-04-14 RX ADMIN — MINOCYCLINE HYDROCHLORIDE 100 MG: 50 CAPSULE ORAL at 08:04

## 2024-04-14 RX ADMIN — AMPICILLIN AND SULBACTAM: 1; 2 INJECTION, POWDER, FOR SOLUTION INTRAMUSCULAR; INTRAVENOUS at 09:04

## 2024-04-14 RX ADMIN — AMPICILLIN AND SULBACTAM: 1; 2 INJECTION, POWDER, FOR SOLUTION INTRAMUSCULAR; INTRAVENOUS at 01:04
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
## 2024-04-14 NOTE — ASSESSMENT & PLAN NOTE
Midodrine 15 mg p.o. t.i.d.  Was weaned off norepinephrine 4/11  Blood transfusions on 4/11 and 4/12  Continue to assess and treat as needed

## 2024-04-14 NOTE — ASSESSMENT & PLAN NOTE
Status post bilateral AKA and left upper extremity below elbow amputations performed at [facility] on 03/13/24  Previous ID recommendations for Unasyn 9 grams IV q.8 hours, Eravacycline 1 mg/kg every 12 hours--through 4/24/24 due to cultures + for  acinetobacter and pseudomonas    Will repeat blood cultures   Consult orthopedic surgery team-- incisions with staples present  Consult ID   -same as above recommendations for chronic osteomyelitis
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
## 2024-04-14 NOTE — PLAN OF CARE
AOx4. Able to verbalize needs. Delayed responses.  POC reviewed with pt. Interventions implemented as appropriate.    NAEON.   VS stable. NSR on tele-monitor, box # [id number].  On RA. Has trach w/ speaking valve in place. Oral and tracheal suction PRN. Small amount of yellow tinged secretions. Respiratory treatments as ordered.    22g PIV to RFA patent. OZIEL midline patent. Integrity maintained.    NS infusing at 10 mL/hr to midline and 5 mL/hr to PIV.  Receiving IV abx. No adverse reactions noted.  Receiving IV Keppra.   Regular diet.   Numerous wounds.  C/O 9/10 pain. Alleviated w/ Oxycodone 30 mg. Scheduled nightly.  Condom cath in place; meatal breakdown noted. Colostomy to LLQ. No s/sx of complications.  Heparin SQ for VTE.  Tetraplegic w/ recent BAKA and LBEA. Bed bound.     Educated on s/sx of pressure injury;  verbalized understanding.  Frequent position changes provided. Turn q2h.   NADN. Resting quietly in bed.   Free of falls. Hourly rounding complete.   All safety measures remain in place. SR up x2; bed low & locked. Call pad w/in reach.   Hourly monitoring continues throughout shift.   Chart check complete.

## 2024-04-14 NOTE — ASSESSMENT & PLAN NOTE
Patient's anemia is currently uncontrolled. Has received 2 units of PRBCs on 4/11/2024 . Etiology likely d/t chronic blood loss  Current CBC reviewed-   Lab Results   Component Value Date    HGB 8.5 (L) 04/14/2024    HCT 25.9 (L) 04/14/2024     Monitor serial CBC and transfuse if patient becomes hemodynamically unstable, symptomatic or H/H drops below 7/21.
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
## 2024-04-14 NOTE — SUBJECTIVE & OBJECTIVE
Interval History: f/u need for antibiotics. Patient awake, alert sitting propped up in bed. NAD. Patient cachectic and chronically ill appearing. Anemia stable post transfusion on 4/12 Hgb 8.5 and Hct 25.9, will continue to evaluate. Hypotension mildly improving, will continue PO Midodrine. Hypomagnesemia improved morning lab 1.8, will continue tele monitoring. Patient amenable to care and did not refuse overnight. Continue wound care. Pending placement to LTAC, will evaluate on Monday.   Review of Systems  Objective:     Vital Signs (Most Recent):  Temp: 96.5 °F (35.8 °C) (04/14/24 0740)  Pulse: 76 (04/14/24 0824)  Resp: 18 (04/14/24 0809)  BP: 109/65 (04/14/24 0740)  SpO2: (!) 94 % (04/14/24 0740) Vital Signs (24h Range):  Temp:  [96.5 °F (35.8 °C)-98.4 °F (36.9 °C)] 96.5 °F (35.8 °C)  Pulse:  [] 76  Resp:  [14-20] 18  SpO2:  [90 %-98 %] 94 %  BP: ()/(53-65) 109/65     Weight: 49.3 kg (108 lb 11 oz)  Body mass index is 16.05 kg/m².    Intake/Output Summary (Last 24 hours) at 4/14/2024 1014  Last data filed at 4/13/2024 1652  Gross per 24 hour   Intake 370.73 ml   Output 500 ml   Net -129.27 ml         Physical Exam  Vitals and nursing note reviewed.   Constitutional:       General: He is not in acute distress.     Appearance: He is ill-appearing (Chronically).   HENT:      Head: Normocephalic and atraumatic.      Mouth/Throat:      Mouth: Mucous membranes are dry.   Cardiovascular:      Rate and Rhythm: Normal rate and regular rhythm.      Heart sounds: No murmur heard.  Pulmonary:      Effort: Pulmonary effort is normal. No respiratory distress.      Breath sounds: Normal breath sounds.      Comments: Trach in place with passey fernandez valve   Abdominal:      General: Bowel sounds are normal.      Palpations: Abdomen is soft.      Comments: Ostomy in place    Genitourinary:     Comments: External urinary catheter   Musculoskeletal:      Comments: Bilateral AKA   Amputation to LUE below the elbow   "  Skin:     General: Skin is dry.      Capillary Refill: Capillary refill takes 2 to 3 seconds.      Coloration: Skin is pale.      Comments: See wound care note on 4/12 for details of wounds with images    Neurological:      General: No focal deficit present.      Mental Status: He is alert.             Significant Labs: All pertinent labs within the past 24 hours have been reviewed.  CBC:   Recent Labs   Lab 04/13/24  0806 04/14/24  0607   WBC 17.29* 18.32*   HGB 8.2* 8.5*   HCT 24.5* 25.9*   PLT 44* 50*     CMP:   Recent Labs   Lab 04/13/24  0806 04/14/24  0607    145   K 3.9 3.5    109   CO2 22* 20*   GLU 94 77   BUN 30* 30*   CREATININE 1.0 1.0   CALCIUM 8.3* 8.7   PROT 6.3 6.5   ALBUMIN 1.3* 1.3*   BILITOT 0.3 0.2   ALKPHOS 150* 159*   AST 13 13   ALT 6* 8*   ANIONGAP 11 16     Lactic Acid: No results for input(s): "LACTATE" in the last 48 hours.  Magnesium:   Recent Labs   Lab 04/13/24  0806 04/14/24  0607   MG 1.4* 1.8       Significant Imaging: I have reviewed all pertinent imaging results/findings within the past 24 hours.  "

## 2024-04-15 PROBLEM — E43 SEVERE MALNUTRITION: Status: ACTIVE | Noted: 2024-04-15

## 2024-04-15 PROCEDURE — 63600175 PHARM REV CODE 636 W HCPCS: Performed by: STUDENT IN AN ORGANIZED HEALTH CARE EDUCATION/TRAINING PROGRAM

## 2024-04-15 PROCEDURE — 25000003 PHARM REV CODE 250: Performed by: INTERNAL MEDICINE

## 2024-04-15 PROCEDURE — 25000003 PHARM REV CODE 250: Performed by: FAMILY MEDICINE

## 2024-04-15 PROCEDURE — A4216 STERILE WATER/SALINE, 10 ML: HCPCS | Performed by: NURSE PRACTITIONER

## 2024-04-15 PROCEDURE — 25000003 PHARM REV CODE 250: Performed by: NURSE PRACTITIONER

## 2024-04-15 PROCEDURE — 25000003 PHARM REV CODE 250: Performed by: STUDENT IN AN ORGANIZED HEALTH CARE EDUCATION/TRAINING PROGRAM

## 2024-04-15 PROCEDURE — 27000207 HC ISOLATION

## 2024-04-15 PROCEDURE — 21400001 HC TELEMETRY ROOM

## 2024-04-15 PROCEDURE — 63600175 PHARM REV CODE 636 W HCPCS: Performed by: NURSE PRACTITIONER

## 2024-04-15 PROCEDURE — 94761 N-INVAS EAR/PLS OXIMETRY MLT: CPT

## 2024-04-15 PROCEDURE — 99900035 HC TECH TIME PER 15 MIN (STAT)

## 2024-04-15 PROCEDURE — 31720 CLEARANCE OF AIRWAYS: CPT

## 2024-04-15 PROCEDURE — C9113 INJ PANTOPRAZOLE SODIUM, VIA: HCPCS | Performed by: NURSE PRACTITIONER

## 2024-04-15 RX ADMIN — LEVETIRACETAM 750 MG: 100 INJECTION, SOLUTION INTRAVENOUS at 09:04

## 2024-04-15 RX ADMIN — Medication 3 ML: at 02:04

## 2024-04-15 RX ADMIN — MINOCYCLINE HYDROCHLORIDE 100 MG: 50 CAPSULE ORAL at 10:04

## 2024-04-15 RX ADMIN — SODIUM CHLORIDE: 9 INJECTION, SOLUTION INTRAVENOUS at 09:04

## 2024-04-15 RX ADMIN — AMPICILLIN AND SULBACTAM: 1; 2 INJECTION, POWDER, FOR SOLUTION INTRAMUSCULAR; INTRAVENOUS at 01:04

## 2024-04-15 RX ADMIN — MIDODRINE HYDROCHLORIDE 15 MG: 5 TABLET ORAL at 10:04

## 2024-04-15 RX ADMIN — OXYCODONE HYDROCHLORIDE 30 MG: 10 TABLET, FILM COATED, EXTENDED RELEASE ORAL at 10:04

## 2024-04-15 RX ADMIN — MINOCYCLINE HYDROCHLORIDE 100 MG: 50 CAPSULE ORAL at 09:04

## 2024-04-15 RX ADMIN — AMPICILLIN AND SULBACTAM: 1; 2 INJECTION, POWDER, FOR SOLUTION INTRAMUSCULAR; INTRAVENOUS at 06:04

## 2024-04-15 RX ADMIN — AMPICILLIN AND SULBACTAM: 1; 2 INJECTION, POWDER, FOR SOLUTION INTRAMUSCULAR; INTRAVENOUS at 10:04

## 2024-04-15 RX ADMIN — LEVOTHYROXINE SODIUM 137 MCG: 0.11 TABLET ORAL at 06:04

## 2024-04-15 RX ADMIN — HEPARIN SODIUM 5000 UNITS: 5000 INJECTION INTRAVENOUS; SUBCUTANEOUS at 09:04

## 2024-04-15 RX ADMIN — PANTOPRAZOLE SODIUM 40 MG: 40 INJECTION, POWDER, FOR SOLUTION INTRAVENOUS at 09:04

## 2024-04-15 RX ADMIN — Medication 3 ML: at 06:04

## 2024-04-15 RX ADMIN — OXYCODONE HYDROCHLORIDE 20 MG: 10 TABLET, FILM COATED, EXTENDED RELEASE ORAL at 09:04

## 2024-04-15 RX ADMIN — MIDODRINE HYDROCHLORIDE 15 MG: 5 TABLET ORAL at 06:04

## 2024-04-15 RX ADMIN — HEPARIN SODIUM 5000 UNITS: 5000 INJECTION INTRAVENOUS; SUBCUTANEOUS at 10:04

## 2024-04-15 RX ADMIN — FOLIC ACID 1 MG: 1 TABLET ORAL at 09:04

## 2024-04-15 RX ADMIN — MIDODRINE HYDROCHLORIDE 15 MG: 5 TABLET ORAL at 02:04

## 2024-04-15 RX ADMIN — SERTRALINE HYDROCHLORIDE 50 MG: 50 TABLET ORAL at 09:04

## 2024-04-15 NOTE — PLAN OF CARE
AOx4. Able to verbalize needs. Delayed responses.  POC reviewed with pt. Interventions implemented as appropriate.    NAEON.   VS stable. NSR on tele-monitor, box # [id number].  On RA. Has trach w/ speaking valve in place. Oral and tracheal suction PRN. Small amount of yellow tinged secretions. Respiratory treatments as ordered.    22g PIV to RFA patent. OZIEL midline patent. Integrity maintained.    NS infusing at 10 mL/hr to midline and 5 mL/hr to PIV.  Receiving IV abx. No adverse reactions noted.  Receiving IV Keppra.   Regular diet.   Numerous wounds.  C/O 9/10 pain. Alleviated w/ Oxycodone 30 mg. Scheduled nightly.  Condom cath in place; meatal breakdown noted. Colostomy to LLQ. No s/sx of complications.  Heparin SQ for VTE.  Tetraplegic w/ recent BAKA and LBEA. Bed bound.     Educated on s/sx of pressure injury;  verbalized understanding.  Frequent position changes provided. Turn q2h. Refusing to allow repositioning.  NADN. Resting quietly in bed.   Free of falls. Hourly rounding complete.   All safety measures remain in place. SR up x2; bed low & locked. Call pad w/in reach.   Hourly monitoring continues throughout shift.   Chart check complete.

## 2024-04-15 NOTE — ASSESSMENT & PLAN NOTE
Nutrition consulted. Most recent weight and BMI monitored-     Measurements:  Wt Readings from Last 1 Encounters:   04/15/24 49.3 kg (108 lb 11 oz)   Body mass index is 16.05 kg/m².    Patient has been screened and assessed by RD.    Malnutrition Type:  Context: chronic illness  Level: severe    Malnutrition Characteristic Summary:  Weight Loss (Malnutrition): greater than 20% in 1 year (BMI: 16.05)  Energy Intake (Malnutrition): less than or equal to 75% for greater than or equal to 1 month    Interventions/Recommendations (treatment strategy):  1. Commercial beverages 2. Vitamin and mineral supplements 3. Collaboration with medical providers
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
## 2024-04-15 NOTE — ASSESSMENT & PLAN NOTE
Per review of records, patient has had worsening failure to thrive  Has had very poor p.o. intake, has refused medications at times, refuses blood draws at times  Refused palliative consult  Refused to participate psych consult  Supportive care    Appears moribund
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
## 2024-04-15 NOTE — PLAN OF CARE
Spoke to  mother regarding discharge plan from LTAC.  Mother plans to take patient home if she is able to handle his care.  Mother states she prefers patient to go to [facility] in .  Digna had contacted [facility] earlier.  Spoke with Beulah with [facility].  Referral sent in [facility].  They will review now that patient is off of Alvac[employer] and will submit for authorization.      Notified Geetha with [facility].  They still had not accepted patient due to extensive wounds and were awaiting mom's discharge plan.

## 2024-04-15 NOTE — ASSESSMENT & PLAN NOTE
Malnutrition Type:  Context: chronic illness  Level: severe    Related to (etiology):   Failure to thrive in adult medical diagnoses    Signs and Symptoms (as evidenced by):   Unhealing wounds  Decreased appetite   Weight loss     Malnutrition Characteristic Summary:  Weight Loss (Malnutrition): greater than 20% in 1 year (BMI: 16.05)  Energy Intake (Malnutrition): less than or equal to 75% for greater than or equal to 1 month      Interventions/Recommendations (treatment strategy):  1. Commercial beverages 2. Vitamin and mineral supplements 3. Collaboration with medical providers    Nutrition Diagnosis Status:   New

## 2024-04-15 NOTE — ASSESSMENT & PLAN NOTE
S/P bilat BKA and LUE below elbow amputation   Patient received IV antibiotics- Unasyn and Avycaz  ID consulted- Recommends per MD Jaye & note by Dr Bell (4/9/24)  - continue high dose amp-sulbactam for CRAB  - topical tobramycin for anti-pseudomonal coverage  - aggressive nutritional support and wound care  Plan - will use above regime ,stop Erava   Full note to follow   Can add Minocycline 100mg bid     WBC up to 18, cont present care  Await LTAC placement for IV abx, wound care  Prognosis guarded

## 2024-04-15 NOTE — PLAN OF CARE
Nutrition Plan of Care:       Expand All Collapse All    [facility] - Telemetry ([facility])  Adult Nutrition  Progress Note     SUMMARY         Recommendations  1. Encourage po intake  2. SLP evaluation if appropriate   3. Recommend commercial beverages   4. Recommend vitamin and mineral supplements (bar BID) to promote wound healing   5. Recommend to consider addition of appetite stimulant if appropriate   6. Collaboration with medical providers     Goals: Patient to consume >75% of estimated energy needs prior to RD follow.  Nutrition Goal Status: new  Communication of RD Recs: other (comment) (poc,)     Assessment and Plan     Nutrition Problem  Malnutrition      Related to (etiology):   Conditions associated with medical diagnoses     Signs and Symptoms (as evidenced by):   Unhealing wounds, Decreased po intake/appetite, weight loss.     Interventions/Recommendations (treatment strategy):  Commercial beverages  Vitamin and mineral supplements for wound healing   Collaboration with medical providers        Nutrition Diagnosis Status:   New        Malnutrition Assessment  Malnutrition Context: chronic illness  Malnutrition Level: severe  Skin (Micronutrient): wounds unhealed   Micronutrient Evaluation Summary: suspected deficiency  Micronutrient Evaluation Comments: anemia (Failure to Thrive in adult medical diagnosis)   Weight Loss (Malnutrition): greater than 20% in 1 year (BMI: 16.05)  Energy Intake (Malnutrition): less than or equal to 75% for greater than or equal to 1 month             Nadja Alicea MS, RDN, LDN

## 2024-04-15 NOTE — ASSESSMENT & PLAN NOTE
Continue Synthroid  Most recent TSH improved to 13.009 several days ago, was previously 118.680 one month ago  Monitor TSH periodically    Cont Synthyroid

## 2024-04-15 NOTE — SUBJECTIVE & OBJECTIVE
Interval History: pt seen and examined, chart reviewed, seen with SW and d/w pt's mother. VSS Afeb, AAox3 but lethargic, cachectic, trach in place, wants Jello. Getting IV abx per ID and await LTAC placement. Explained to pt's mother his poor condition but she wants him to go to LTAC for IV abx completion and then she will take him home. Await LTAC authorization. WBC increased to 18.3, H/H 8.5/25, Plts 50 K. Bun down to 30 but Albumin only 1.3. [poor prognosis.         Review of Systems   Reason unable to perform ROS: limited due to minimal participation by patient.   Constitutional:  Positive for activity change and appetite change. Negative for chills, diaphoresis and fever.   HENT: Negative.  Negative for trouble swallowing.    Eyes:  Positive for visual disturbance.   Respiratory: Negative.     Cardiovascular: Negative.    Gastrointestinal: Negative.  Negative for abdominal pain, nausea and vomiting.   Endocrine: Negative.    Genitourinary: Negative.    Musculoskeletal:  Positive for arthralgias.        Phantom pain   Skin:  Positive for wound.        Multiple wounds   Allergic/Immunologic: Negative.    Neurological:  Positive for weakness.   Psychiatric/Behavioral:  Positive for dysphoric mood and sleep disturbance.      Objective:     Vital Signs (Most Recent):  Temp: 97.9 °F (36.6 °C) (04/15/24 1429)  Pulse: 76 (04/15/24 1508)  Resp: 18 (04/15/24 1152)  BP: 116/68 (04/15/24 1152)  SpO2: (!) 93 % (04/15/24 1250) Vital Signs (24h Range):  Temp:  [96.6 °F (35.9 °C)-98.2 °F (36.8 °C)] 97.9 °F (36.6 °C)  Pulse:  [65-82] 76  Resp:  [16-18] 18  SpO2:  [93 %-98 %] 93 %  BP: (102-116)/(59-68) 116/68     Weight: 49.3 kg (108 lb 11 oz)  Body mass index is 16.05 kg/m².    Intake/Output Summary (Last 24 hours) at 4/15/2024 1640  Last data filed at 4/15/2024 0945  Gross per 24 hour   Intake 0 ml   Output 650 ml   Net -650 ml         Physical Exam  Vitals and nursing note reviewed.   Constitutional:       General: He is not  in acute distress.     Appearance: He is ill-appearing (Chronically).   HENT:      Head: Normocephalic and atraumatic.      Mouth/Throat:      Mouth: Mucous membranes are dry.   Cardiovascular:      Rate and Rhythm: Normal rate and regular rhythm.      Heart sounds: No murmur heard.  Pulmonary:      Effort: Pulmonary effort is normal. No respiratory distress.      Breath sounds: Normal breath sounds.      Comments: Trach in place with passey fernandez valve   Abdominal:      General: Bowel sounds are normal.      Palpations: Abdomen is soft.      Comments: Ostomy in place    Genitourinary:     Comments: External urinary catheter   Musculoskeletal:      Comments: Bilateral AKA   Amputation to LUE below the elbow    Skin:     General: Skin is dry.      Capillary Refill: Capillary refill takes 2 to 3 seconds.      Coloration: Skin is pale.      Comments: See wound care note on 4/12 for details of wounds with images    Neurological:      General: No focal deficit present.      Mental Status: He is alert.             Significant Labs: All pertinent labs within the past 24 hours have been reviewed.  CBC:   Recent Labs   Lab 04/14/24  0607   WBC 18.32*   HGB 8.5*   HCT 25.9*   PLT 50*     CMP:   Recent Labs   Lab 04/14/24  0607      K 3.5      CO2 20*   GLU 77   BUN 30*   CREATININE 1.0   CALCIUM 8.7   PROT 6.5   ALBUMIN 1.3*   BILITOT 0.2   ALKPHOS 159*   AST 13   ALT 8*   ANIONGAP 16       Significant Imaging: I have reviewed all pertinent imaging results/findings within the past 24 hours.

## 2024-04-16 PROCEDURE — 31720 CLEARANCE OF AIRWAYS: CPT

## 2024-04-16 PROCEDURE — 25000003 PHARM REV CODE 250: Performed by: STUDENT IN AN ORGANIZED HEALTH CARE EDUCATION/TRAINING PROGRAM

## 2024-04-16 PROCEDURE — 99900035 HC TECH TIME PER 15 MIN (STAT)

## 2024-04-16 PROCEDURE — 21400001 HC TELEMETRY ROOM

## 2024-04-16 PROCEDURE — A4216 STERILE WATER/SALINE, 10 ML: HCPCS | Performed by: NURSE PRACTITIONER

## 2024-04-16 PROCEDURE — 63600175 PHARM REV CODE 636 W HCPCS: Performed by: NURSE PRACTITIONER

## 2024-04-16 PROCEDURE — 25000003 PHARM REV CODE 250: Performed by: NURSE PRACTITIONER

## 2024-04-16 PROCEDURE — 94761 N-INVAS EAR/PLS OXIMETRY MLT: CPT

## 2024-04-16 PROCEDURE — C9113 INJ PANTOPRAZOLE SODIUM, VIA: HCPCS | Performed by: NURSE PRACTITIONER

## 2024-04-16 PROCEDURE — 27000207 HC ISOLATION

## 2024-04-16 PROCEDURE — 25000003 PHARM REV CODE 250: Performed by: INTERNAL MEDICINE

## 2024-04-16 PROCEDURE — 63600175 PHARM REV CODE 636 W HCPCS: Performed by: STUDENT IN AN ORGANIZED HEALTH CARE EDUCATION/TRAINING PROGRAM

## 2024-04-16 RX ADMIN — MINOCYCLINE HYDROCHLORIDE 100 MG: 50 CAPSULE ORAL at 09:04

## 2024-04-16 RX ADMIN — LEVETIRACETAM 750 MG: 100 INJECTION, SOLUTION INTRAVENOUS at 09:04

## 2024-04-16 RX ADMIN — AMPICILLIN AND SULBACTAM: 1; 2 INJECTION, POWDER, FOR SOLUTION INTRAMUSCULAR; INTRAVENOUS at 06:04

## 2024-04-16 RX ADMIN — Medication 3 ML: at 02:04

## 2024-04-16 RX ADMIN — FOLIC ACID 1 MG: 1 TABLET ORAL at 09:04

## 2024-04-16 RX ADMIN — HEPARIN SODIUM 5000 UNITS: 5000 INJECTION INTRAVENOUS; SUBCUTANEOUS at 09:04

## 2024-04-16 RX ADMIN — OXYCODONE HYDROCHLORIDE 20 MG: 10 TABLET, FILM COATED, EXTENDED RELEASE ORAL at 09:04

## 2024-04-16 RX ADMIN — Medication 3 ML: at 09:04

## 2024-04-16 RX ADMIN — PANTOPRAZOLE SODIUM 40 MG: 40 INJECTION, POWDER, FOR SOLUTION INTRAVENOUS at 09:04

## 2024-04-16 RX ADMIN — AMPICILLIN AND SULBACTAM: 1; 2 INJECTION, POWDER, FOR SOLUTION INTRAMUSCULAR; INTRAVENOUS at 01:04

## 2024-04-16 RX ADMIN — AMPICILLIN AND SULBACTAM: 1; 2 INJECTION, POWDER, FOR SOLUTION INTRAMUSCULAR; INTRAVENOUS at 09:04

## 2024-04-16 RX ADMIN — SERTRALINE HYDROCHLORIDE 50 MG: 50 TABLET ORAL at 09:04

## 2024-04-16 NOTE — PLAN OF CARE
AOx4. Able to verbalize needs. Delayed responses.  POC reviewed with pt. Interventions implemented as appropriate.    NAEON.   VS stable. NSR on tele-monitor, box # 9949.  On RA. Has trach w/ speaking valve in place. Oral and tracheal suction PRN. Small amount of yellow tinged secretions. Respiratory treatments as ordered.    22g PIV to RFA patent. OZIEL midline patent. Integrity maintained.    NS infusing at 10 mL/hr to midline and 5 mL/hr to PIV.  Receiving IV abx. No adverse reactions noted.  Receiving IV Keppra.   Regular diet.   Numerous wounds.  C/O 9/10 pain. Alleviated w/ Oxycodone 30 mg. Scheduled nightly.  Condom cath in place; meatal breakdown noted. Colostomy to LLQ. No s/sx of complications.  Heparin SQ for VTE.  Tetraplegic w/ recent BAKA and LBEA. Bed bound.     Educated on s/sx of pressure injury;  verbalized understanding.  Frequent position changes provided. Turn q2h. Refusing to allow repositioning.  NADN. Resting quietly in bed.   Free of falls. Hourly rounding complete.   All safety measures remain in place. SR up x2; bed low & locked. Call pad w/in reach.   Hourly monitoring continues throughout shift.   Chart check complete.
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
## 2024-04-16 NOTE — PLAN OF CARE
Accepted by [facility].  Submitting for authorization.       04/16/24 1001   Post-Acute Status   Post-Acute Authorization Placement   Post-Acute Placement Status Pending payor review/awaiting authorization (if required)   Discharge Plan   Discharge Plan A Long-term acute care facility (LTAC)

## 2024-04-17 VITALS
HEIGHT: 69 IN | OXYGEN SATURATION: 93 % | BODY MASS INDEX: 15.02 KG/M2 | DIASTOLIC BLOOD PRESSURE: 60 MMHG | WEIGHT: 101.44 LBS | SYSTOLIC BLOOD PRESSURE: 128 MMHG | TEMPERATURE: 97 F | RESPIRATION RATE: 18 BRPM | HEART RATE: 97 BPM

## 2024-04-17 PROBLEM — I96 GANGRENE: Status: RESOLVED | Noted: 2024-04-02 | Resolved: 2024-04-17

## 2024-04-17 PROBLEM — R65.21 SEPTIC SHOCK: Status: RESOLVED | Noted: 2024-02-21 | Resolved: 2024-04-17

## 2024-04-17 PROBLEM — A41.9 SEPTIC SHOCK: Status: RESOLVED | Noted: 2024-02-21 | Resolved: 2024-04-17

## 2024-04-17 PROBLEM — G93.40 ENCEPHALOPATHY: Status: RESOLVED | Noted: 2024-04-07 | Resolved: 2024-04-17

## 2024-04-17 PROBLEM — N17.9 AKI (ACUTE KIDNEY INJURY): Status: RESOLVED | Noted: 2024-04-07 | Resolved: 2024-04-17

## 2024-04-17 LAB — BACTERIA BLD CULT: NORMAL

## 2024-04-17 PROCEDURE — A4216 STERILE WATER/SALINE, 10 ML: HCPCS | Performed by: NURSE PRACTITIONER

## 2024-04-17 PROCEDURE — 27000221 HC OXYGEN, UP TO 24 HOURS

## 2024-04-17 PROCEDURE — 25000003 PHARM REV CODE 250: Performed by: INTERNAL MEDICINE

## 2024-04-17 PROCEDURE — 63600175 PHARM REV CODE 636 W HCPCS: Performed by: NURSE PRACTITIONER

## 2024-04-17 PROCEDURE — 94761 N-INVAS EAR/PLS OXIMETRY MLT: CPT

## 2024-04-17 PROCEDURE — 25000003 PHARM REV CODE 250: Performed by: STUDENT IN AN ORGANIZED HEALTH CARE EDUCATION/TRAINING PROGRAM

## 2024-04-17 PROCEDURE — 25000003 PHARM REV CODE 250: Performed by: NURSE PRACTITIONER

## 2024-04-17 PROCEDURE — 63600175 PHARM REV CODE 636 W HCPCS: Performed by: STUDENT IN AN ORGANIZED HEALTH CARE EDUCATION/TRAINING PROGRAM

## 2024-04-17 PROCEDURE — 99900026 HC AIRWAY MAINTENANCE (STAT)

## 2024-04-17 PROCEDURE — 99900035 HC TECH TIME PER 15 MIN (STAT)

## 2024-04-17 PROCEDURE — C9113 INJ PANTOPRAZOLE SODIUM, VIA: HCPCS | Performed by: NURSE PRACTITIONER

## 2024-04-17 PROCEDURE — 31720 CLEARANCE OF AIRWAYS: CPT

## 2024-04-17 RX ORDER — OXYCODONE HCL 20 MG/1
20 TABLET, FILM COATED, EXTENDED RELEASE ORAL DAILY
Start: 2024-04-18

## 2024-04-17 RX ORDER — ERYTHROMYCIN 5 MG/G
OINTMENT OPHTHALMIC NIGHTLY
Start: 2024-04-17

## 2024-04-17 RX ORDER — OXYCODONE HYDROCHLORIDE 10 MG/1
10 TABLET ORAL EVERY 6 HOURS PRN
Start: 2024-04-17

## 2024-04-17 RX ORDER — OXYCODONE HYDROCHLORIDE 5 MG/1
5 TABLET ORAL EVERY 6 HOURS PRN
Qty: 4 TABLET | Refills: 0 | Status: SHIPPED | OUTPATIENT
Start: 2024-04-17 | End: 2024-04-17 | Stop reason: HOSPADM

## 2024-04-17 RX ADMIN — FOLIC ACID 1 MG: 1 TABLET ORAL at 08:04

## 2024-04-17 RX ADMIN — SERTRALINE HYDROCHLORIDE 50 MG: 50 TABLET ORAL at 08:04

## 2024-04-17 RX ADMIN — Medication 3 ML: at 05:04

## 2024-04-17 RX ADMIN — LEVETIRACETAM 750 MG: 100 INJECTION, SOLUTION INTRAVENOUS at 09:04

## 2024-04-17 RX ADMIN — PANTOPRAZOLE SODIUM 40 MG: 40 INJECTION, POWDER, FOR SOLUTION INTRAVENOUS at 09:04

## 2024-04-17 RX ADMIN — AMPICILLIN AND SULBACTAM: 1; 2 INJECTION, POWDER, FOR SOLUTION INTRAMUSCULAR; INTRAVENOUS at 05:04

## 2024-04-17 RX ADMIN — LEVOTHYROXINE SODIUM 137 MCG: 0.11 TABLET ORAL at 08:04

## 2024-04-17 RX ADMIN — OXYCODONE HYDROCHLORIDE 20 MG: 10 TABLET, FILM COATED, EXTENDED RELEASE ORAL at 08:04

## 2024-04-17 RX ADMIN — MINOCYCLINE HYDROCHLORIDE 100 MG: 50 CAPSULE ORAL at 08:04

## 2024-04-17 RX ADMIN — HEPARIN SODIUM 5000 UNITS: 5000 INJECTION INTRAVENOUS; SUBCUTANEOUS at 08:04

## 2024-04-17 NOTE — ASSESSMENT & PLAN NOTE
Status post bilateral AKA and left upper extremity below elbow amputations performed at [facility] on 03/13/24  Previous ID recommendations for Unasyn 9 grams IV q.8 hours, Eravacycline 1 mg/kg every 12 hours--through 4/24/24 due to cultures + for  acinetobacter and pseudomonas    Will repeat blood cultures   Consult orthopedic surgery team-- incisions with staples present  Consult ID   -same as above recommendations for chronic osteomyelitis

## 2024-04-17 NOTE — ASSESSMENT & PLAN NOTE
Patient's anemia is currently uncontrolled. Has received 2 units of PRBCs on 4/11/2024 . Etiology likely d/t chronic blood loss  Current CBC reviewed-   Lab Results   Component Value Date    HGB 8.5 (L) 04/14/2024    HCT 25.9 (L) 04/14/2024     Monitor serial CBC and transfuse if patient becomes hemodynamically unstable, symptomatic or H/H drops below 7/21.    H/H stable

## 2024-04-17 NOTE — PLAN OF CARE
[facility] - Telemetry (Hospital)  Discharge Final Note    Primary Care Provider: Jake Jackman MD    Expected Discharge Date: 4/17/2024    Final Discharge Note (most recent)       Final Note - 04/17/24 1414          Final Note    Assessment Type Final Discharge Note (P)      Anticipated Discharge Disposition Long Term Acute Care (P)         Post-Acute Status    Post-Acute Authorization Placement (P)      Post-Acute Placement Status Set-up Complete/Auth obtained (P)    [facility]    Discharge Delays None known at this time (P)                      Important Message from Medicare

## 2024-04-17 NOTE — PLAN OF CARE
Problem: Adult Inpatient Plan of Care  Goal: Plan of Care Review  Outcome: Ongoing, Progressing  Goal: Patient-Specific Goal (Individualized)  Outcome: Ongoing, Progressing  Goal: Absence of Hospital-Acquired Illness or Injury  Outcome: Ongoing, Progressing  Goal: Optimal Comfort and Wellbeing  Outcome: Ongoing, Progressing  Goal: Readiness for Transition of Care  Outcome: Ongoing, Progressing     Problem: Adjustment to Illness (Sepsis/Septic Shock)  Goal: Optimal Coping  Outcome: Ongoing, Progressing     Problem: Bleeding (Sepsis/Septic Shock)  Goal: Absence of Bleeding  Outcome: Ongoing, Progressing     Problem: Glycemic Control Impaired (Sepsis/Septic Shock)  Goal: Blood Glucose Level Within Desired Range  Outcome: Ongoing, Progressing     Problem: Infection Progression (Sepsis/Septic Shock)  Goal: Absence of Infection Signs and Symptoms  Outcome: Ongoing, Progressing     Problem: Nutrition Impaired (Sepsis/Septic Shock)  Goal: Optimal Nutrition Intake  Outcome: Ongoing, Progressing     Problem: Oral Intake Inadequate (Acute Kidney Injury/Impairment)  Goal: Optimal Nutrition Intake  Outcome: Ongoing, Progressing     Problem: Renal Function Impairment (Acute Kidney Injury/Impairment)  Goal: Effective Renal Function  Outcome: Ongoing, Progressing     Problem: Fall Injury Risk  Goal: Absence of Fall and Fall-Related Injury  Outcome: Ongoing, Progressing

## 2024-04-17 NOTE — ASSESSMENT & PLAN NOTE
Nutrition consulted. Most recent weight and BMI monitored-     Measurements:  Wt Readings from Last 1 Encounters:   04/16/24 46.8 kg (103 lb 2.8 oz)   Body mass index is 15.24 kg/m².    Patient has been screened and assessed by RD.    Malnutrition Type:  Context: chronic illness  Level: severe    Malnutrition Characteristic Summary:  Weight Loss (Malnutrition): greater than 20% in 1 year (BMI: 16.05)  Energy Intake (Malnutrition): less than or equal to 75% for greater than or equal to 1 month    Interventions/Recommendations (treatment strategy):  1. Commercial beverages 2. Vitamin and mineral supplements 3. Collaboration with medical providers    Albumin very low and falling- due to chronic infection and poor intake

## 2024-04-17 NOTE — NURSING
Patient refused to take PO night meds, educated him on the importance of the med treatment and patient verbalized refusal again.
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
## 2024-04-17 NOTE — ASSESSMENT & PLAN NOTE
Had recent CT head due to encephalopathy which did not show any acute abnormality  Seems to wax and wane  Limited assessment due to his willingness to participate    Mental status appears same, lethargic

## 2024-04-17 NOTE — ASSESSMENT & PLAN NOTE
Midodrine 15 mg p.o. t.i.d.  Was weaned off norepinephrine 4/11  Blood transfusions on 4/11 and 4/12  Continue to assess and treat as needed     Prognosis poor
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
## 2024-04-17 NOTE — ASSESSMENT & PLAN NOTE
S/P bilat BKA and LUE below elbow amputation   Patient received IV antibiotics- Unasyn and Avycaz  ID consulted- Recommends per MD Jaye & note by Dr Bell (4/9/24)  - continue high dose amp-sulbactam for CRAB  - topical tobramycin for anti-pseudomonal coverage  - aggressive nutritional support and wound care  Plan - will use above regime ,stop Erava   Full note to follow   Can add Minocycline 100mg bid     Cont same Tx    WBC up to 18, cont present care  Await LTAC placement for IV abx, wound care  Prognosis guarded

## 2024-04-17 NOTE — SUBJECTIVE & OBJECTIVE
Interval History: appears same, lethargic, trach in place. Eating sparingly. SW working on placement. He has been accepted at [facility] where his mother wants him to go, await Insurance Auth.       Review of Systems   Reason unable to perform ROS: limited due to minimal participation by patient.   Constitutional:  Positive for activity change and appetite change. Negative for chills, diaphoresis and fever.   HENT: Negative.  Negative for trouble swallowing.    Eyes:  Positive for visual disturbance.   Respiratory: Negative.     Cardiovascular: Negative.    Gastrointestinal: Negative.  Negative for abdominal pain, nausea and vomiting.   Endocrine: Negative.    Genitourinary: Negative.    Musculoskeletal:  Positive for arthralgias.        Phantom pain   Skin:  Positive for wound.        Multiple wounds   Allergic/Immunologic: Negative.    Neurological:  Positive for weakness.   Psychiatric/Behavioral:  Positive for dysphoric mood and sleep disturbance.      Objective:     Vital Signs (Most Recent):  Temp: 98.9 °F (37.2 °C) (04/16/24 1945)  Pulse: 86 (04/16/24 2026)  Resp: 18 (04/16/24 2026)  BP: (!) 102/57 (04/16/24 1945)  SpO2: (!) 94 % (04/16/24 2026) Vital Signs (24h Range):  Temp:  [97.8 °F (36.6 °C)-98.9 °F (37.2 °C)] 98.9 °F (37.2 °C)  Pulse:  [] 86  Resp:  [17-20] 18  SpO2:  [94 %-99 %] 94 %  BP: (102-119)/(57-73) 102/57     Weight: 46.8 kg (103 lb 2.8 oz)  Body mass index is 15.24 kg/m².    Intake/Output Summary (Last 24 hours) at 4/16/2024 2228  Last data filed at 4/16/2024 1802  Gross per 24 hour   Intake 263.14 ml   Output 700 ml   Net -436.86 ml         Physical Exam  Vitals and nursing note reviewed.   Constitutional:       General: He is not in acute distress.     Appearance: He is ill-appearing (Chronically).   HENT:      Head: Normocephalic and atraumatic.      Mouth/Throat:      Mouth: Mucous membranes are dry.   Cardiovascular:      Rate and Rhythm: Normal rate and regular rhythm.      Heart  sounds: No murmur heard.  Pulmonary:      Effort: Pulmonary effort is normal. No respiratory distress.      Breath sounds: Normal breath sounds.      Comments: Trach in place with passey fernandez valve   Abdominal:      General: Bowel sounds are normal.      Palpations: Abdomen is soft.      Comments: Ostomy in place    Genitourinary:     Comments: External urinary catheter   Musculoskeletal:      Comments: Bilateral AKA   Amputation to LUE below the elbow    Skin:     General: Skin is dry.      Capillary Refill: Capillary refill takes 2 to 3 seconds.      Coloration: Skin is pale.      Comments: See wound care note on 4/12 for details of wounds with images    Neurological:      General: No focal deficit present.      Mental Status: He is alert.             Significant Labs: All pertinent labs within the past 24 hours have been reviewed.    Significant Imaging: I have reviewed all pertinent imaging results/findings within the past 24 hours.

## 2024-04-17 NOTE — PLAN OF CARE
Received authorization for patient to go to [facility].  Dr. Medrano notified to place discharge orders.    Left message for Mother, Ann regarding transfer to [facility] today on voicemail.    Return phone call received from FatherDiony and Mother, Ann.  They are aware of transfer and state they are coming to visit today.    Discharge orders sent via careport.    11:00am Call report to [phone] option 1. Transportation time still pending     12:30pm Transportation picking up at 3pm.           04/17/24 0904   Post-Acute Status   Post-Acute Authorization Placement   Post-Acute Placement Status Set-up Complete/Auth obtained   Discharge Plan   Discharge Plan A Long-term acute care facility (LTAC)
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
## 2024-07-15 PROBLEM — J96.11 CHRONIC HYPOXIC RESPIRATORY FAILURE: Status: RESOLVED | Noted: 2024-04-11 | Resolved: 2024-07-15

## 2024-10-30 ENCOUNTER — PATIENT MESSAGE (OUTPATIENT)
Dept: RESEARCH | Facility: HOSPITAL | Age: 29
End: 2024-10-30
Payer: MEDICAID

## 2025-02-12 ENCOUNTER — TELEPHONE (OUTPATIENT)
Dept: OPHTHALMOLOGY | Facility: CLINIC | Age: 30
End: 2025-02-12
Payer: MEDICAID

## 2025-02-12 NOTE — TELEPHONE ENCOUNTER
Call placed to patients mother regarding referral for Cataract surgery from Dr. Lang, noted that Dr. Lang had patient scheduled for surgery yesterday which was cancelled. Reviewed referral with Dr. Howard, reviewed notes from cancelled surgery, informed mother that Dr. Howard would like patient seen and cleared by PCP for Cataract surgery under general anesthesia prior to evaluation. Mrs. Juarez verbalized understanding, provided office information as well as this nurses name as a contact. Mother will call office once clearance is received to continue the referral process for cataract surgery.

## 2025-04-14 ENCOUNTER — HOSPITAL ENCOUNTER (INPATIENT)
Facility: HOSPITAL | Age: 30
LOS: 13 days | Discharge: HOME | DRG: 871 | End: 2025-04-27
Attending: STUDENT IN AN ORGANIZED HEALTH CARE EDUCATION/TRAINING PROGRAM | Admitting: INTERNAL MEDICINE
Payer: MEDICAID

## 2025-04-14 DIAGNOSIS — J96.01 ACUTE HYPOXEMIC RESPIRATORY FAILURE: Primary | ICD-10-CM

## 2025-04-14 DIAGNOSIS — G89.21 CHRONIC PAIN AFTER AMPUTATION: ICD-10-CM

## 2025-04-14 DIAGNOSIS — Z98.890 HISTORY OF TRACHEOSTOMY: ICD-10-CM

## 2025-04-14 DIAGNOSIS — J18.9 PNEUMONIA OF BOTH LUNGS DUE TO INFECTIOUS ORGANISM, UNSPECIFIED PART OF LUNG: ICD-10-CM

## 2025-04-14 DIAGNOSIS — R07.9 CHEST PAIN: ICD-10-CM

## 2025-04-14 DIAGNOSIS — R06.00 DYSPNEA: ICD-10-CM

## 2025-04-14 DIAGNOSIS — Z89.9 CHRONIC PAIN AFTER AMPUTATION: ICD-10-CM

## 2025-04-14 DIAGNOSIS — E20.9 HYPOPARATHYROIDISM, UNSPECIFIED HYPOPARATHYROIDISM TYPE: ICD-10-CM

## 2025-04-14 DIAGNOSIS — E03.9 HYPOTHYROIDISM, UNSPECIFIED TYPE: ICD-10-CM

## 2025-04-14 DIAGNOSIS — R94.31 PROLONGED Q-T INTERVAL ON ECG: ICD-10-CM

## 2025-04-14 DIAGNOSIS — R06.02 SHORTNESS OF BREATH: ICD-10-CM

## 2025-04-14 DIAGNOSIS — J18.9 PNEUMONIA: ICD-10-CM

## 2025-04-14 DIAGNOSIS — E83.51 HYPOCALCEMIA: ICD-10-CM

## 2025-04-14 LAB
ALBUMIN SERPL-MCNC: 2 G/DL (ref 3.5–5)
ALBUMIN/GLOB SERPL: 0.3 RATIO (ref 1.1–2)
ALLENS TEST BLOOD GAS (OHS): YES
ALP SERPL-CCNC: 306 UNIT/L (ref 40–150)
ALT SERPL-CCNC: 27 UNIT/L (ref 0–55)
ANION GAP SERPL CALC-SCNC: 17 MEQ/L
APTT PPP: 36 SECONDS (ref 23.2–33.7)
AST SERPL-CCNC: 46 UNIT/L (ref 11–45)
BACTERIA #/AREA URNS AUTO: ABNORMAL /HPF
BASE EXCESS BLD CALC-SCNC: -7 MMOL/L (ref -2–2)
BASOPHILS # BLD AUTO: 0.1 X10(3)/MCL
BASOPHILS NFR BLD AUTO: 0.5 %
BILIRUB SERPL-MCNC: 0.2 MG/DL
BILIRUB UR QL STRIP.AUTO: NEGATIVE
BLOOD GAS SAMPLE TYPE (OHS): ABNORMAL
BUN SERPL-MCNC: 47.7 MG/DL (ref 8.9–20.6)
CA-I BLD-SCNC: 0.51 MMOL/L (ref 1.12–1.23)
CALCIUM SERPL-MCNC: 4.5 MG/DL (ref 8.4–10.2)
CHLORIDE SERPL-SCNC: 91 MMOL/L (ref 98–107)
CLARITY UR: ABNORMAL
CO2 BLDA-SCNC: 19.7 MMOL/L
CO2 SERPL-SCNC: 21 MMOL/L (ref 22–29)
COHGB MFR BLDA: 1.1 % (ref 0.5–1.5)
COLOR UR AUTO: YELLOW
CREAT SERPL-MCNC: 1.66 MG/DL (ref 0.72–1.25)
CREAT/UREA NIT SERPL: 29
DRAWN BY BLOOD GAS (OHS): ABNORMAL
EOSINOPHIL # BLD AUTO: 0.05 X10(3)/MCL (ref 0–0.9)
EOSINOPHIL NFR BLD AUTO: 0.3 %
ERYTHROCYTE [DISTWIDTH] IN BLOOD BY AUTOMATED COUNT: 14.8 % (ref 11.5–17)
FLUAV AG UPPER RESP QL IA.RAPID: NOT DETECTED
FLUBV AG UPPER RESP QL IA.RAPID: NOT DETECTED
GFR SERPLBLD CREATININE-BSD FMLA CKD-EPI: 57 ML/MIN/1.73/M2
GLOBULIN SER-MCNC: 7.1 GM/DL (ref 2.4–3.5)
GLUCOSE SERPL-MCNC: 89 MG/DL (ref 74–100)
GLUCOSE UR QL STRIP: NORMAL
HCO3 BLDA-SCNC: 18.6 MMOL/L (ref 22–26)
HCT VFR BLD AUTO: 22.6 % (ref 42–52)
HGB BLD-MCNC: 7.5 G/DL (ref 14–18)
HGB UR QL STRIP: ABNORMAL
IMM GRANULOCYTES # BLD AUTO: 0.1 X10(3)/MCL (ref 0–0.04)
IMM GRANULOCYTES NFR BLD AUTO: 0.5 %
INHALED O2 CONCENTRATION: 100 %
INR PPP: 1.3
KETONES UR QL STRIP: NEGATIVE
LACTATE SERPL-SCNC: 1.2 MMOL/L (ref 0.5–2.2)
LEUKOCYTE ESTERASE UR QL STRIP: 500
LYMPHOCYTES # BLD AUTO: 1.05 X10(3)/MCL (ref 0.6–4.6)
LYMPHOCYTES NFR BLD AUTO: 5.4 %
MAGNESIUM SERPL-MCNC: 2 MG/DL (ref 1.6–2.6)
MCH RBC QN AUTO: 26.3 PG (ref 27–31)
MCHC RBC AUTO-ENTMCNC: 33.2 G/DL (ref 33–36)
MCV RBC AUTO: 79.3 FL (ref 80–94)
MECH RR (OHS): 18 B/MIN
METHGB MFR BLDA: 0.5 % (ref 0.4–1.5)
MODE (OHS): AC
MONOCYTES # BLD AUTO: 1.53 X10(3)/MCL (ref 0.1–1.3)
MONOCYTES NFR BLD AUTO: 7.8 %
MUCOUS THREADS URNS QL MICRO: ABNORMAL /LPF
NEUTROPHILS # BLD AUTO: 16.79 X10(3)/MCL (ref 2.1–9.2)
NEUTROPHILS NFR BLD AUTO: 85.5 %
NITRITE UR QL STRIP: NEGATIVE
NRBC BLD AUTO-RTO: 0 %
O2 HB BLOOD GAS (OHS): 97.4 % (ref 94–97)
OXYGEN DEVICE BLOOD GAS (OHS): ABNORMAL
OXYHGB MFR BLDA: 6.6 G/DL (ref 12–16)
PCO2 BLDA: 37 MMHG (ref 35–45)
PEEP RESPIRATORY: 5 CMH2O
PH BLDA: 7.31 [PH] (ref 7.35–7.45)
PH UR STRIP: 8 [PH]
PLATELET # BLD AUTO: 654 X10(3)/MCL (ref 130–400)
PMV BLD AUTO: 8.2 FL (ref 7.4–10.4)
PO2 BLDA: 227 MMHG (ref 80–100)
POTASSIUM BLOOD GAS (OHS): 4.8 MMOL/L (ref 3.5–5)
POTASSIUM SERPL-SCNC: 4.7 MMOL/L (ref 3.5–5.1)
PROT SERPL-MCNC: 9.1 GM/DL (ref 6.4–8.3)
PROT UR QL STRIP: ABNORMAL
PROTHROMBIN TIME: 16.2 SECONDS (ref 12.5–14.5)
RBC # BLD AUTO: 2.85 X10(6)/MCL (ref 4.7–6.1)
RBC #/AREA URNS AUTO: ABNORMAL /HPF
RSV A 5' UTR RNA NPH QL NAA+PROBE: NOT DETECTED
SAMPLE SITE BLOOD GAS (OHS): ABNORMAL
SAO2 % BLDA: 99.8 %
SARS-COV-2 RNA RESP QL NAA+PROBE: NOT DETECTED
SODIUM BLOOD GAS (OHS): 128 MMOL/L (ref 137–145)
SODIUM SERPL-SCNC: 129 MMOL/L (ref 136–145)
SP GR UR STRIP.AUTO: 1.02 (ref 1–1.03)
SPONT+MECH VT ON VENT: 450 ML
SQUAMOUS #/AREA URNS LPF: ABNORMAL /HPF
TROPONIN I SERPL-MCNC: <0.01 NG/ML (ref 0–0.04)
TSH SERPL-ACNC: 4.04 UIU/ML (ref 0.35–4.94)
UROBILINOGEN UR STRIP-ACNC: NORMAL
WBC # BLD AUTO: 19.62 X10(3)/MCL (ref 4.5–11.5)
WBC #/AREA URNS AUTO: ABNORMAL /HPF

## 2025-04-14 PROCEDURE — 96375 TX/PRO/DX INJ NEW DRUG ADDON: CPT

## 2025-04-14 PROCEDURE — 99900035 HC TECH TIME PER 15 MIN (STAT)

## 2025-04-14 PROCEDURE — 93005 ELECTROCARDIOGRAM TRACING: CPT

## 2025-04-14 PROCEDURE — 96365 THER/PROPH/DIAG IV INF INIT: CPT

## 2025-04-14 PROCEDURE — 84484 ASSAY OF TROPONIN QUANT: CPT | Performed by: STUDENT IN AN ORGANIZED HEALTH CARE EDUCATION/TRAINING PROGRAM

## 2025-04-14 PROCEDURE — 99900031 HC PATIENT EDUCATION (STAT)

## 2025-04-14 PROCEDURE — 87040 BLOOD CULTURE FOR BACTERIA: CPT | Performed by: STUDENT IN AN ORGANIZED HEALTH CARE EDUCATION/TRAINING PROGRAM

## 2025-04-14 PROCEDURE — 94761 N-INVAS EAR/PLS OXIMETRY MLT: CPT | Mod: XB

## 2025-04-14 PROCEDURE — 82962 GLUCOSE BLOOD TEST: CPT

## 2025-04-14 PROCEDURE — 85730 THROMBOPLASTIN TIME PARTIAL: CPT | Performed by: STUDENT IN AN ORGANIZED HEALTH CARE EDUCATION/TRAINING PROGRAM

## 2025-04-14 PROCEDURE — 36600 WITHDRAWAL OF ARTERIAL BLOOD: CPT

## 2025-04-14 PROCEDURE — 99900022

## 2025-04-14 PROCEDURE — 80053 COMPREHEN METABOLIC PANEL: CPT | Performed by: STUDENT IN AN ORGANIZED HEALTH CARE EDUCATION/TRAINING PROGRAM

## 2025-04-14 PROCEDURE — 25000003 PHARM REV CODE 250: Performed by: STUDENT IN AN ORGANIZED HEALTH CARE EDUCATION/TRAINING PROGRAM

## 2025-04-14 PROCEDURE — 94002 VENT MGMT INPAT INIT DAY: CPT

## 2025-04-14 PROCEDURE — 85025 COMPLETE CBC W/AUTO DIFF WBC: CPT | Performed by: STUDENT IN AN ORGANIZED HEALTH CARE EDUCATION/TRAINING PROGRAM

## 2025-04-14 PROCEDURE — 83735 ASSAY OF MAGNESIUM: CPT | Performed by: STUDENT IN AN ORGANIZED HEALTH CARE EDUCATION/TRAINING PROGRAM

## 2025-04-14 PROCEDURE — 81001 URINALYSIS AUTO W/SCOPE: CPT | Performed by: STUDENT IN AN ORGANIZED HEALTH CARE EDUCATION/TRAINING PROGRAM

## 2025-04-14 PROCEDURE — 94760 N-INVAS EAR/PLS OXIMETRY 1: CPT | Mod: XB

## 2025-04-14 PROCEDURE — 5A1935Z RESPIRATORY VENTILATION, LESS THAN 24 CONSECUTIVE HOURS: ICD-10-PCS | Performed by: HOSPITALIST

## 2025-04-14 PROCEDURE — 87086 URINE CULTURE/COLONY COUNT: CPT | Performed by: STUDENT IN AN ORGANIZED HEALTH CARE EDUCATION/TRAINING PROGRAM

## 2025-04-14 PROCEDURE — 27200966 HC CLOSED SUCTION SYSTEM

## 2025-04-14 PROCEDURE — 99285 EMERGENCY DEPT VISIT HI MDM: CPT | Mod: 25

## 2025-04-14 PROCEDURE — 82803 BLOOD GASES ANY COMBINATION: CPT

## 2025-04-14 PROCEDURE — 96367 TX/PROPH/DG ADDL SEQ IV INF: CPT

## 2025-04-14 PROCEDURE — 83605 ASSAY OF LACTIC ACID: CPT | Performed by: STUDENT IN AN ORGANIZED HEALTH CARE EDUCATION/TRAINING PROGRAM

## 2025-04-14 PROCEDURE — 5A0935A ASSISTANCE WITH RESPIRATORY VENTILATION, LESS THAN 24 CONSECUTIVE HOURS, HIGH NASAL FLOW/VELOCITY: ICD-10-PCS | Performed by: HOSPITALIST

## 2025-04-14 PROCEDURE — 63600175 PHARM REV CODE 636 W HCPCS: Performed by: STUDENT IN AN ORGANIZED HEALTH CARE EDUCATION/TRAINING PROGRAM

## 2025-04-14 PROCEDURE — 99900026 HC AIRWAY MAINTENANCE (STAT)

## 2025-04-14 PROCEDURE — 27100171 HC OXYGEN HIGH FLOW UP TO 24 HOURS

## 2025-04-14 PROCEDURE — 0241U COVID/RSV/FLU A&B PCR: CPT | Performed by: STUDENT IN AN ORGANIZED HEALTH CARE EDUCATION/TRAINING PROGRAM

## 2025-04-14 PROCEDURE — 11000001 HC ACUTE MED/SURG PRIVATE ROOM

## 2025-04-14 PROCEDURE — 84443 ASSAY THYROID STIM HORMONE: CPT | Performed by: STUDENT IN AN ORGANIZED HEALTH CARE EDUCATION/TRAINING PROGRAM

## 2025-04-14 PROCEDURE — 85610 PROTHROMBIN TIME: CPT | Performed by: STUDENT IN AN ORGANIZED HEALTH CARE EDUCATION/TRAINING PROGRAM

## 2025-04-14 RX ORDER — CEFTRIAXONE 1 G/1
1 INJECTION, POWDER, FOR SOLUTION INTRAMUSCULAR; INTRAVENOUS
Status: COMPLETED | OUTPATIENT
Start: 2025-04-14 | End: 2025-04-14

## 2025-04-14 RX ORDER — LORAZEPAM 2 MG/ML
1 INJECTION INTRAMUSCULAR
Status: COMPLETED | OUTPATIENT
Start: 2025-04-14 | End: 2025-04-14

## 2025-04-14 RX ORDER — CALCIUM GLUCONATE 20 MG/ML
1 INJECTION, SOLUTION INTRAVENOUS
Status: COMPLETED | OUTPATIENT
Start: 2025-04-14 | End: 2025-04-14

## 2025-04-14 RX ORDER — MAGNESIUM SULFATE HEPTAHYDRATE 40 MG/ML
2 INJECTION, SOLUTION INTRAVENOUS ONCE
Status: COMPLETED | OUTPATIENT
Start: 2025-04-14 | End: 2025-04-14

## 2025-04-14 RX ADMIN — AZITHROMYCIN MONOHYDRATE 500 MG: 500 INJECTION, POWDER, LYOPHILIZED, FOR SOLUTION INTRAVENOUS at 09:04

## 2025-04-14 RX ADMIN — CALCIUM GLUCONATE 1 G: 20 INJECTION, SOLUTION INTRAVENOUS at 10:04

## 2025-04-14 RX ADMIN — SODIUM CHLORIDE 1575 ML: 9 INJECTION, SOLUTION INTRAVENOUS at 09:04

## 2025-04-14 RX ADMIN — MAGNESIUM SULFATE HEPTAHYDRATE 2 G: 40 INJECTION, SOLUTION INTRAVENOUS at 10:04

## 2025-04-14 RX ADMIN — CEFTRIAXONE SODIUM 1 G: 1 INJECTION, POWDER, FOR SOLUTION INTRAMUSCULAR; INTRAVENOUS at 09:04

## 2025-04-14 RX ADMIN — LORAZEPAM 1 MG: 2 INJECTION INTRAMUSCULAR; INTRAVENOUS at 10:04

## 2025-04-14 NOTE — Clinical Note
Diagnosis: Pneumonia [191690]   Future Attending Provider: VIKKI SPRINGER [[id number]]   Admit to which facility:: [facility] [[id number]]   Reason for IP Medical Treatment  (Clinical interventions that can only be accomplished in the IP setting? ) :: IV abx, vent mgmt   Plans for Post-Acute care--if anticipated (pick the single best option):: A. No post acute care anticipated at this time

## 2025-04-15 LAB
ABS NEUT (OLG): 20.83 X10(3)/MCL (ref 2.1–9.2)
ALBUMIN SERPL-MCNC: 1.8 G/DL (ref 3.5–5)
ALBUMIN/GLOB SERPL: 0.3 RATIO (ref 1.1–2)
ALLENS TEST BLOOD GAS (OHS): YES
ALLENS TEST BLOOD GAS (OHS): YES
ALP SERPL-CCNC: 298 UNIT/L (ref 40–150)
ALT SERPL-CCNC: 25 UNIT/L (ref 0–55)
ANION GAP SERPL CALC-SCNC: 17 MEQ/L
AST SERPL-CCNC: 46 UNIT/L (ref 11–45)
BASE EXCESS BLD CALC-SCNC: -0.9 MMOL/L (ref -2–2)
BASE EXCESS BLD CALC-SCNC: -3.4 MMOL/L (ref -2–2)
BILIRUB SERPL-MCNC: 0.2 MG/DL
BLOOD GAS SAMPLE TYPE (OHS): ABNORMAL
BLOOD GAS SAMPLE TYPE (OHS): ABNORMAL
BUN SERPL-MCNC: 44.2 MG/DL (ref 8.9–20.6)
CA-I BLD-SCNC: 0.6 MMOL/L (ref 1.12–1.23)
CA-I BLD-SCNC: 0.66 MMOL/L (ref 1.12–1.23)
CALCIUM SERPL-MCNC: 4 MG/DL (ref 8.4–10.2)
CHLORIDE SERPL-SCNC: 96 MMOL/L (ref 98–107)
CO2 BLDA-SCNC: 19.4 MMOL/L
CO2 BLDA-SCNC: 21.6 MMOL/L
CO2 SERPL-SCNC: 17 MMOL/L (ref 22–29)
COHGB MFR BLDA: 3.6 % (ref 0.5–1.5)
CREAT SERPL-MCNC: 1.56 MG/DL (ref 0.72–1.25)
CREAT/UREA NIT SERPL: 28
DRAWN BY BLOOD GAS (OHS): ABNORMAL
DRAWN BY BLOOD GAS (OHS): ABNORMAL
ERYTHROCYTE [DISTWIDTH] IN BLOOD BY AUTOMATED COUNT: 14.9 % (ref 11.5–17)
GFR SERPLBLD CREATININE-BSD FMLA CKD-EPI: >60 ML/MIN/1.73/M2
GLOBULIN SER-MCNC: 6 GM/DL (ref 2.4–3.5)
GLUCOSE SERPL-MCNC: 87 MG/DL (ref 74–100)
HCO3 BLDA-SCNC: 18.8 MMOL/L (ref 22–26)
HCO3 BLDA-SCNC: 20.6 MMOL/L (ref 22–26)
HCT VFR BLD AUTO: 22 % (ref 42–52)
HGB BLD-MCNC: 7.4 G/DL (ref 14–18)
INHALED O2 CONCENTRATION: 30 %
INHALED O2 CONCENTRATION: 30 %
INSTRUMENT WBC (OLG): 22.64 X10(3)/MCL
LYMPHOCYTES NFR BLD MANUAL: 0.45 X10(3)/MCL (ref 0.6–4.6)
LYMPHOCYTES NFR BLD MANUAL: 2 %
MAGNESIUM SERPL-MCNC: 2.4 MG/DL (ref 1.6–2.6)
MCH RBC QN AUTO: 26.6 PG (ref 27–31)
MCHC RBC AUTO-ENTMCNC: 33.6 G/DL (ref 33–36)
MCV RBC AUTO: 79.1 FL (ref 80–94)
MECH RR (OHS): 16 B/MIN
METHGB MFR BLDA: 0 % (ref 0.4–1.5)
MODE (OHS): ABNORMAL
MODE (OHS): AC
MONOCYTES NFR BLD MANUAL: 1.36 X10(3)/MCL (ref 0.1–1.3)
MONOCYTES NFR BLD MANUAL: 6 %
NEUTROPHILS NFR BLD MANUAL: 92 %
O2 HB BLOOD GAS (OHS): 98.4 % (ref 94–97)
OHS QRS DURATION: 78 MS
OHS QTC CALCULATION: 565 MS
OXYGEN DEVICE BLOOD GAS (OHS): ABNORMAL
OXYHGB MFR BLDA: 6.5 G/DL (ref 12–16)
PCO2 BLDA: 20 MMHG (ref 35–45)
PCO2 BLDA: 31 MMHG (ref 35–45)
PEEP RESPIRATORY: 5 CMH2O
PEEP RESPIRATORY: 5 CMH2O
PH BLDA: 7.43 [PH] (ref 7.35–7.45)
PH BLDA: 7.58 [PH] (ref 7.35–7.45)
PHOSPHATE SERPL-MCNC: 10.5 MG/DL (ref 2.3–4.7)
PLATELET # BLD AUTO: 537 X10(3)/MCL (ref 130–400)
PLATELET # BLD EST: ABNORMAL 10*3/UL
PMV BLD AUTO: 8.1 FL (ref 7.4–10.4)
PO2 BLDA: 130 MMHG (ref 80–100)
PO2 BLDA: 133 MMHG (ref 80–100)
POCT GLUCOSE: 95 MG/DL (ref 70–110)
POTASSIUM BLOOD GAS (OHS): 4.7 MMOL/L (ref 3.5–5)
POTASSIUM BLOOD GAS (OHS): 4.8 MMOL/L (ref 3.5–5)
POTASSIUM SERPL-SCNC: 5.2 MMOL/L (ref 3.5–5.1)
PROT SERPL-MCNC: 7.8 GM/DL (ref 6.4–8.3)
PS (OHS): 8 CMH2O
RBC # BLD AUTO: 2.78 X10(6)/MCL (ref 4.7–6.1)
RBC MORPH BLD: ABNORMAL
ROULEAUX BLD QL SMEAR: ABNORMAL
SAMPLE SITE BLOOD GAS (OHS): ABNORMAL
SAMPLE SITE BLOOD GAS (OHS): ABNORMAL
SAO2 % BLDA: 99 %
SAO2 % BLDA: 99.4 %
SODIUM BLOOD GAS (OHS): 124 MMOL/L (ref 137–145)
SODIUM BLOOD GAS (OHS): 128 MMOL/L (ref 137–145)
SODIUM SERPL-SCNC: 130 MMOL/L (ref 136–145)
SPONT+MECH VT ON VENT: 450 ML
WBC # BLD AUTO: 22.64 X10(3)/MCL (ref 4.5–11.5)

## 2025-04-15 PROCEDURE — 83735 ASSAY OF MAGNESIUM: CPT

## 2025-04-15 PROCEDURE — 99900035 HC TECH TIME PER 15 MIN (STAT)

## 2025-04-15 PROCEDURE — C1751 CATH, INF, PER/CENT/MIDLINE: HCPCS

## 2025-04-15 PROCEDURE — 20000000 HC ICU ROOM

## 2025-04-15 PROCEDURE — 25000003 PHARM REV CODE 250

## 2025-04-15 PROCEDURE — 87077 CULTURE AEROBIC IDENTIFY: CPT

## 2025-04-15 PROCEDURE — 94760 N-INVAS EAR/PLS OXIMETRY 1: CPT | Mod: XB

## 2025-04-15 PROCEDURE — 82803 BLOOD GASES ANY COMBINATION: CPT

## 2025-04-15 PROCEDURE — 93010 ELECTROCARDIOGRAM REPORT: CPT | Mod: ,,, | Performed by: STUDENT IN AN ORGANIZED HEALTH CARE EDUCATION/TRAINING PROGRAM

## 2025-04-15 PROCEDURE — 94761 N-INVAS EAR/PLS OXIMETRY MLT: CPT

## 2025-04-15 PROCEDURE — 94003 VENT MGMT INPAT SUBQ DAY: CPT

## 2025-04-15 PROCEDURE — 27100171 HC OXYGEN HIGH FLOW UP TO 24 HOURS

## 2025-04-15 PROCEDURE — 36600 WITHDRAWAL OF ARTERIAL BLOOD: CPT

## 2025-04-15 PROCEDURE — 85025 COMPLETE CBC W/AUTO DIFF WBC: CPT

## 2025-04-15 PROCEDURE — 36415 COLL VENOUS BLD VENIPUNCTURE: CPT

## 2025-04-15 PROCEDURE — 25000003 PHARM REV CODE 250: Performed by: INTERNAL MEDICINE

## 2025-04-15 PROCEDURE — 63600175 PHARM REV CODE 636 W HCPCS

## 2025-04-15 PROCEDURE — 25500020 PHARM REV CODE 255: Performed by: INTERNAL MEDICINE

## 2025-04-15 PROCEDURE — 99900022

## 2025-04-15 PROCEDURE — 99900026 HC AIRWAY MAINTENANCE (STAT)

## 2025-04-15 PROCEDURE — 99900031 HC PATIENT EDUCATION (STAT)

## 2025-04-15 PROCEDURE — 80053 COMPREHEN METABOLIC PANEL: CPT

## 2025-04-15 PROCEDURE — 84100 ASSAY OF PHOSPHORUS: CPT

## 2025-04-15 PROCEDURE — 27200966 HC CLOSED SUCTION SYSTEM

## 2025-04-15 PROCEDURE — 93005 ELECTROCARDIOGRAM TRACING: CPT

## 2025-04-15 PROCEDURE — 02HV33Z INSERTION OF INFUSION DEVICE INTO SUPERIOR VENA CAVA, PERCUTANEOUS APPROACH: ICD-10-PCS | Performed by: HOSPITALIST

## 2025-04-15 PROCEDURE — 36569 INSJ PICC 5 YR+ W/O IMAGING: CPT

## 2025-04-15 RX ORDER — SODIUM,POTASSIUM PHOSPHATES 280-250MG
2 POWDER IN PACKET (EA) ORAL
Status: DISCONTINUED | OUTPATIENT
Start: 2025-04-15 | End: 2025-04-27 | Stop reason: HOSPADM

## 2025-04-15 RX ORDER — LEVETIRACETAM 100 MG/ML
750 SOLUTION ORAL 2 TIMES DAILY
Status: DISCONTINUED | OUTPATIENT
Start: 2025-04-15 | End: 2025-04-27 | Stop reason: HOSPADM

## 2025-04-15 RX ORDER — ENOXAPARIN SODIUM 100 MG/ML
40 INJECTION SUBCUTANEOUS EVERY 24 HOURS
Status: DISCONTINUED | OUTPATIENT
Start: 2025-04-15 | End: 2025-04-27 | Stop reason: HOSPADM

## 2025-04-15 RX ORDER — LANOLIN ALCOHOL/MO/W.PET/CERES
800 CREAM (GRAM) TOPICAL
Status: DISCONTINUED | OUTPATIENT
Start: 2025-04-15 | End: 2025-04-25

## 2025-04-15 RX ORDER — OXYCODONE AND ACETAMINOPHEN 10; 325 MG/1; MG/1
1 TABLET ORAL EVERY 8 HOURS PRN
Refills: 0 | Status: DISCONTINUED | OUTPATIENT
Start: 2025-04-15 | End: 2025-04-22

## 2025-04-15 RX ORDER — CALCIUM GLUCONATE 20 MG/ML
1 INJECTION, SOLUTION INTRAVENOUS
Status: COMPLETED | OUTPATIENT
Start: 2025-04-15 | End: 2025-04-15

## 2025-04-15 RX ORDER — LORAZEPAM 2 MG/ML
1 INJECTION INTRAMUSCULAR
Status: ACTIVE | OUTPATIENT
Start: 2025-04-15 | End: 2025-04-15

## 2025-04-15 RX ORDER — ALPRAZOLAM 0.5 MG/1
1 TABLET ORAL 3 TIMES DAILY PRN
Status: DISCONTINUED | OUTPATIENT
Start: 2025-04-15 | End: 2025-04-27 | Stop reason: HOSPADM

## 2025-04-15 RX ORDER — SODIUM CHLORIDE, SODIUM LACTATE, POTASSIUM CHLORIDE, CALCIUM CHLORIDE 600; 310; 30; 20 MG/100ML; MG/100ML; MG/100ML; MG/100ML
INJECTION, SOLUTION INTRAVENOUS CONTINUOUS
Status: DISCONTINUED | OUTPATIENT
Start: 2025-04-15 | End: 2025-04-15

## 2025-04-15 RX ORDER — SODIUM CHLORIDE 0.9 % (FLUSH) 0.9 %
10 SYRINGE (ML) INJECTION EVERY 12 HOURS PRN
Status: DISCONTINUED | OUTPATIENT
Start: 2025-04-15 | End: 2025-04-27 | Stop reason: HOSPADM

## 2025-04-15 RX ORDER — ACETAMINOPHEN 325 MG/1
650 TABLET ORAL EVERY 6 HOURS PRN
Status: DISCONTINUED | OUTPATIENT
Start: 2025-04-15 | End: 2025-04-27 | Stop reason: HOSPADM

## 2025-04-15 RX ORDER — CALCIUM GLUCONATE 20 MG/ML
1 INJECTION, SOLUTION INTRAVENOUS ONCE
Status: COMPLETED | OUTPATIENT
Start: 2025-04-15 | End: 2025-04-15

## 2025-04-15 RX ORDER — GABAPENTIN 300 MG/1
300 CAPSULE ORAL 3 TIMES DAILY
Status: DISCONTINUED | OUTPATIENT
Start: 2025-04-15 | End: 2025-04-27 | Stop reason: HOSPADM

## 2025-04-15 RX ORDER — CALCITRIOL 0.25 UG/1
0.25 CAPSULE ORAL DAILY
Status: DISCONTINUED | OUTPATIENT
Start: 2025-04-15 | End: 2025-04-19

## 2025-04-15 RX ORDER — LEVOTHYROXINE SODIUM 100 UG/1
100 TABLET ORAL
Status: DISCONTINUED | OUTPATIENT
Start: 2025-04-15 | End: 2025-04-27 | Stop reason: HOSPADM

## 2025-04-15 RX ORDER — SODIUM CHLORIDE 0.9 % (FLUSH) 0.9 %
10 SYRINGE (ML) INJECTION
Status: DISCONTINUED | OUTPATIENT
Start: 2025-04-15 | End: 2025-04-27 | Stop reason: HOSPADM

## 2025-04-15 RX ORDER — MUPIROCIN 20 MG/G
OINTMENT TOPICAL 2 TIMES DAILY
Status: COMPLETED | OUTPATIENT
Start: 2025-04-15 | End: 2025-04-20

## 2025-04-15 RX ORDER — SEVELAMER CARBONATE 800 MG/1
800 TABLET, FILM COATED ORAL ONCE
Status: DISCONTINUED | OUTPATIENT
Start: 2025-04-15 | End: 2025-04-15

## 2025-04-15 RX ORDER — ONDANSETRON HYDROCHLORIDE 2 MG/ML
4 INJECTION, SOLUTION INTRAVENOUS EVERY 6 HOURS PRN
Status: DISCONTINUED | OUTPATIENT
Start: 2025-04-15 | End: 2025-04-27 | Stop reason: HOSPADM

## 2025-04-15 RX ORDER — MIDODRINE HYDROCHLORIDE 5 MG/1
10 TABLET ORAL 3 TIMES DAILY
Status: DISCONTINUED | OUTPATIENT
Start: 2025-04-15 | End: 2025-04-27 | Stop reason: HOSPADM

## 2025-04-15 RX ORDER — SODIUM CHLORIDE 9 MG/ML
INJECTION, SOLUTION INTRAVENOUS CONTINUOUS
Status: ACTIVE | OUTPATIENT
Start: 2025-04-15 | End: 2025-04-15

## 2025-04-15 RX ADMIN — COLLAGENASE SANTYL: 250 OINTMENT TOPICAL at 11:04

## 2025-04-15 RX ADMIN — LEVETIRACETAM 750 MG: 500 SOLUTION ORAL at 08:04

## 2025-04-15 RX ADMIN — LEVOTHYROXINE SODIUM 100 MCG: 0.1 TABLET ORAL at 06:04

## 2025-04-15 RX ADMIN — PIPERACILLIN AND TAZOBACTAM 4.5 G: 4; .5 INJECTION, POWDER, LYOPHILIZED, FOR SOLUTION INTRAVENOUS; PARENTERAL at 01:04

## 2025-04-15 RX ADMIN — GABAPENTIN 300 MG: 300 CAPSULE ORAL at 02:04

## 2025-04-15 RX ADMIN — SODIUM CHLORIDE: 9 INJECTION, SOLUTION INTRAVENOUS at 08:04

## 2025-04-15 RX ADMIN — OXYCODONE HYDROCHLORIDE AND ACETAMINOPHEN 1 TABLET: 10; 325 TABLET ORAL at 08:04

## 2025-04-15 RX ADMIN — LEVETIRACETAM 750 MG: 500 SOLUTION ORAL at 07:04

## 2025-04-15 RX ADMIN — CALCIUM GLUCONATE 1 G: 20 INJECTION, SOLUTION INTRAVENOUS at 04:04

## 2025-04-15 RX ADMIN — IOHEXOL 150 ML: 350 INJECTION, SOLUTION INTRAVENOUS at 09:04

## 2025-04-15 RX ADMIN — OXYCODONE HYDROCHLORIDE AND ACETAMINOPHEN 1 TABLET: 10; 325 TABLET ORAL at 04:04

## 2025-04-15 RX ADMIN — PIPERACILLIN AND TAZOBACTAM 4.5 G: 4; .5 INJECTION, POWDER, LYOPHILIZED, FOR SOLUTION INTRAVENOUS; PARENTERAL at 08:04

## 2025-04-15 RX ADMIN — MIDODRINE HYDROCHLORIDE 10 MG: 5 TABLET ORAL at 08:04

## 2025-04-15 RX ADMIN — CALCIUM GLUCONATE 1 G: 20 INJECTION, SOLUTION INTRAVENOUS at 06:04

## 2025-04-15 RX ADMIN — SODIUM CHLORIDE, POTASSIUM CHLORIDE, SODIUM LACTATE AND CALCIUM CHLORIDE: 600; 310; 30; 20 INJECTION, SOLUTION INTRAVENOUS at 05:04

## 2025-04-15 RX ADMIN — ACETAMINOPHEN 650 MG: 325 TABLET ORAL at 07:04

## 2025-04-15 RX ADMIN — PIPERACILLIN AND TAZOBACTAM 4.5 G: 4; .5 INJECTION, POWDER, LYOPHILIZED, FOR SOLUTION INTRAVENOUS; PARENTERAL at 12:04

## 2025-04-15 RX ADMIN — GABAPENTIN 300 MG: 300 CAPSULE ORAL at 07:04

## 2025-04-15 RX ADMIN — SODIUM CHLORIDE: 9 INJECTION, SOLUTION INTRAVENOUS at 12:04

## 2025-04-15 RX ADMIN — MUPIROCIN: 20 OINTMENT TOPICAL at 08:04

## 2025-04-15 RX ADMIN — CALCIUM GLUCONATE 1 G: 20 INJECTION, SOLUTION INTRAVENOUS at 05:04

## 2025-04-15 RX ADMIN — MIDODRINE HYDROCHLORIDE 10 MG: 5 TABLET ORAL at 02:04

## 2025-04-15 RX ADMIN — ACETAMINOPHEN 650 MG: 325 TABLET ORAL at 02:04

## 2025-04-15 RX ADMIN — MIDODRINE HYDROCHLORIDE 10 MG: 5 TABLET ORAL at 07:04

## 2025-04-15 RX ADMIN — ENOXAPARIN SODIUM 40 MG: 40 INJECTION SUBCUTANEOUS at 04:04

## 2025-04-15 RX ADMIN — MUPIROCIN: 20 OINTMENT TOPICAL at 12:04

## 2025-04-15 RX ADMIN — ALPRAZOLAM 1 MG: 0.5 TABLET ORAL at 08:04

## 2025-04-15 RX ADMIN — CALCITRIOL CAPSULES 0.25 MCG 0.25 MCG: 0.25 CAPSULE ORAL at 12:04

## 2025-04-15 RX ADMIN — GABAPENTIN 300 MG: 300 CAPSULE ORAL at 08:04
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
## 2025-04-15 NOTE — PLAN OF CARE
Problem: Adult Inpatient Plan of Care  Goal: Plan of Care Review  Outcome: Progressing  Goal: Patient-Specific Goal (Individualized)  Outcome: Progressing  Flowsheets (Taken 4/15/2025 0140)  Individualized Care Needs: BP managment, pain management, comfort and wound care, nutrition, infection screening  Anxieties, Fears or Concerns: ventilator dependence, comfort and pain management, wound care, nutrition  Patient/Family-Specific Goals (Include Timeframe): downgrade from ICU before the end of the week.  transfer of care back home once acute issues resolved  Goal: Absence of Hospital-Acquired Illness or Injury  Outcome: Progressing  Goal: Optimal Comfort and Wellbeing  Outcome: Progressing  Goal: Readiness for Transition of Care  Outcome: Progressing     Problem: Infection  Goal: Absence of Infection Signs and Symptoms  Outcome: Progressing     Problem: Wound  Goal: Optimal Coping  Outcome: Progressing  Goal: Optimal Functional Ability  Outcome: Progressing  Goal: Absence of Infection Signs and Symptoms  Outcome: Progressing  Goal: Improved Oral Intake  Outcome: Progressing  Goal: Optimal Pain Control and Function  Outcome: Progressing  Goal: Skin Health and Integrity  Outcome: Progressing  Goal: Optimal Wound Healing  Outcome: Progressing     Problem: Skin Injury Risk Increased  Goal: Skin Health and Integrity  Outcome: Progressing
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
## 2025-04-15 NOTE — PROCEDURES
Jyoti Mayberry is a 29 y.o. male patient.    Temp: 98.5 °F (36.9 °C) (04/15/25 0045)  Pulse: 91 (04/15/25 0200)  Resp: (!) 21 (04/15/25 0200)  BP: 106/69 (04/15/25 0200)  SpO2: 100 % (04/15/25 0200)  Weight: 52.5 kg (115 lb 11.9 oz) (04/14/25 1958)    PICC  Date/Time: 4/15/2025 3:40 AM  Consent Done: Yes  Time out: Immediately prior to procedure a time out was called to verify the correct patient, procedure, equipment, support staff and site/side marked as required  Indications: med administration  Anesthesia: local infiltration  Local anesthetic: lidocaine 1% without epinephrine  Anesthetic Total (mL): 2  Preparation: skin prepped with ChloraPrep  Skin prep agent dried: skin prep agent completely dried prior to procedure  Sterile barriers: all five maximum sterile barriers used - cap, mask, sterile gown, sterile gloves, and large sterile sheet  Hand hygiene: hand hygiene performed prior to central venous catheter insertion  Location details: right basilic  Catheter type: triple lumen  Catheter size: 5 Fr  Catheter Length: 36cm    Ultrasound guidance: yes  Vessel Caliber: medium and patent, compressibility normal  Needle advanced into vessel with real time Ultrasound guidance.  Guidewire confirmed in vessel.  Image recorded and saved.  Sterile sheath used.  Number of attempts: 1  Post-procedure: blood return through all ports and sterile dressing applied    Assessment: placement verified by x-ray and successful placement  Complications: none          Name ama yanez  4/15/2025

## 2025-04-15 NOTE — PLAN OF CARE
Attempted to completed DC planning assessment. Patient unable to speak due to trach.  Unable to reach caregiver to obtain information.  Will continue to follow
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
## 2025-04-16 LAB
ABO + RH BLD: NORMAL
ALBUMIN SERPL-MCNC: 1.6 G/DL (ref 3.5–5)
ALBUMIN/GLOB SERPL: 0.3 RATIO (ref 1.1–2)
ALP SERPL-CCNC: 205 UNIT/L (ref 40–150)
ALT SERPL-CCNC: 22 UNIT/L (ref 0–55)
ANION GAP SERPL CALC-SCNC: 14 MEQ/L
AST SERPL-CCNC: 44 UNIT/L (ref 11–45)
BASOPHILS # BLD AUTO: 0.13 X10(3)/MCL
BASOPHILS NFR BLD AUTO: 1 %
BILIRUB SERPL-MCNC: 0.2 MG/DL
BLD PROD TYP BPU: NORMAL
BLOOD UNIT EXPIRATION DATE: NORMAL
BLOOD UNIT TYPE CODE: 5100
BUN SERPL-MCNC: 47.6 MG/DL (ref 8.9–20.6)
CALCIUM SERPL-MCNC: 4.7 MG/DL (ref 8.4–10.2)
CHLORIDE SERPL-SCNC: 101 MMOL/L (ref 98–107)
CO2 SERPL-SCNC: 19 MMOL/L (ref 22–29)
CREAT SERPL-MCNC: 1.31 MG/DL (ref 0.72–1.25)
CREAT/UREA NIT SERPL: 36
CROSSMATCH INTERPRETATION: NORMAL
DISPENSE STATUS: NORMAL
EOSINOPHIL # BLD AUTO: 0.52 X10(3)/MCL (ref 0–0.9)
EOSINOPHIL NFR BLD AUTO: 4.1 %
ERYTHROCYTE [DISTWIDTH] IN BLOOD BY AUTOMATED COUNT: 15.3 % (ref 11.5–17)
GFR SERPLBLD CREATININE-BSD FMLA CKD-EPI: >60 ML/MIN/1.73/M2
GLOBULIN SER-MCNC: 6 GM/DL (ref 2.4–3.5)
GLUCOSE SERPL-MCNC: 64 MG/DL (ref 74–100)
GROUP & RH: NORMAL
HCT VFR BLD AUTO: 20.5 % (ref 42–52)
HGB BLD-MCNC: 6.3 G/DL (ref 14–18)
IMM GRANULOCYTES # BLD AUTO: 0.12 X10(3)/MCL (ref 0–0.04)
IMM GRANULOCYTES NFR BLD AUTO: 0.9 %
INDIRECT COOMBS: NORMAL
LYMPHOCYTES # BLD AUTO: 1.47 X10(3)/MCL (ref 0.6–4.6)
LYMPHOCYTES NFR BLD AUTO: 11.5 %
MAGNESIUM SERPL-MCNC: 2.2 MG/DL (ref 1.6–2.6)
MCH RBC QN AUTO: 26.5 PG (ref 27–31)
MCHC RBC AUTO-ENTMCNC: 30.7 G/DL (ref 33–36)
MCV RBC AUTO: 86.1 FL (ref 80–94)
MONOCYTES # BLD AUTO: 1.08 X10(3)/MCL (ref 0.1–1.3)
MONOCYTES NFR BLD AUTO: 8.5 %
NEUTROPHILS # BLD AUTO: 9.44 X10(3)/MCL (ref 2.1–9.2)
NEUTROPHILS NFR BLD AUTO: 74 %
NRBC BLD AUTO-RTO: 0 %
PHOSPHATE SERPL-MCNC: 10.3 MG/DL (ref 2.3–4.7)
PLATELET # BLD AUTO: 648 X10(3)/MCL (ref 130–400)
PMV BLD AUTO: 8.4 FL (ref 7.4–10.4)
POTASSIUM SERPL-SCNC: 4.5 MMOL/L (ref 3.5–5.1)
PROT SERPL-MCNC: 7.6 GM/DL (ref 6.4–8.3)
RBC # BLD AUTO: 2.38 X10(6)/MCL (ref 4.7–6.1)
SODIUM SERPL-SCNC: 134 MMOL/L (ref 136–145)
SPECIMEN OUTDATE: NORMAL
UNIT NUMBER: NORMAL
WBC # BLD AUTO: 12.76 X10(3)/MCL (ref 4.5–11.5)

## 2025-04-16 PROCEDURE — 80053 COMPREHEN METABOLIC PANEL: CPT

## 2025-04-16 PROCEDURE — 63600175 PHARM REV CODE 636 W HCPCS

## 2025-04-16 PROCEDURE — 94761 N-INVAS EAR/PLS OXIMETRY MLT: CPT

## 2025-04-16 PROCEDURE — 86850 RBC ANTIBODY SCREEN: CPT

## 2025-04-16 PROCEDURE — 11000001 HC ACUTE MED/SURG PRIVATE ROOM

## 2025-04-16 PROCEDURE — 99900026 HC AIRWAY MAINTENANCE (STAT)

## 2025-04-16 PROCEDURE — 25000003 PHARM REV CODE 250: Performed by: INTERNAL MEDICINE

## 2025-04-16 PROCEDURE — 84100 ASSAY OF PHOSPHORUS: CPT

## 2025-04-16 PROCEDURE — 99900035 HC TECH TIME PER 15 MIN (STAT)

## 2025-04-16 PROCEDURE — 86923 COMPATIBILITY TEST ELECTRIC: CPT

## 2025-04-16 PROCEDURE — 27000221 HC OXYGEN, UP TO 24 HOURS

## 2025-04-16 PROCEDURE — 85025 COMPLETE CBC W/AUTO DIFF WBC: CPT

## 2025-04-16 PROCEDURE — 99900031 HC PATIENT EDUCATION (STAT)

## 2025-04-16 PROCEDURE — 83735 ASSAY OF MAGNESIUM: CPT

## 2025-04-16 PROCEDURE — 94760 N-INVAS EAR/PLS OXIMETRY 1: CPT

## 2025-04-16 PROCEDURE — 25000003 PHARM REV CODE 250

## 2025-04-16 PROCEDURE — P9016 RBC LEUKOCYTES REDUCED: HCPCS

## 2025-04-16 PROCEDURE — 30233N1 TRANSFUSION OF NONAUTOLOGOUS RED BLOOD CELLS INTO PERIPHERAL VEIN, PERCUTANEOUS APPROACH: ICD-10-PCS | Performed by: HOSPITALIST

## 2025-04-16 PROCEDURE — 36430 TRANSFUSION BLD/BLD COMPNT: CPT

## 2025-04-16 PROCEDURE — 36415 COLL VENOUS BLD VENIPUNCTURE: CPT

## 2025-04-16 RX ORDER — HYDROCODONE BITARTRATE AND ACETAMINOPHEN 500; 5 MG/1; MG/1
TABLET ORAL
Status: DISCONTINUED | OUTPATIENT
Start: 2025-04-16 | End: 2025-04-27 | Stop reason: HOSPADM

## 2025-04-16 RX ORDER — CALCIUM CARBONATE 1250 MG/5ML
1000 SUSPENSION ORAL 2 TIMES DAILY WITH MEALS
Status: DISCONTINUED | OUTPATIENT
Start: 2025-04-16 | End: 2025-04-16

## 2025-04-16 RX ORDER — CALCIUM CARBONATE 200(500)MG
1000 TABLET,CHEWABLE ORAL 2 TIMES DAILY WITH MEALS
Status: DISCONTINUED | OUTPATIENT
Start: 2025-04-16 | End: 2025-04-17

## 2025-04-16 RX ADMIN — CALCITRIOL CAPSULES 0.25 MCG 0.25 MCG: 0.25 CAPSULE ORAL at 09:04

## 2025-04-16 RX ADMIN — GABAPENTIN 300 MG: 300 CAPSULE ORAL at 03:04

## 2025-04-16 RX ADMIN — LEVETIRACETAM 750 MG: 500 SOLUTION ORAL at 08:04

## 2025-04-16 RX ADMIN — PIPERACILLIN AND TAZOBACTAM 4.5 G: 4; .5 INJECTION, POWDER, LYOPHILIZED, FOR SOLUTION INTRAVENOUS; PARENTERAL at 01:04

## 2025-04-16 RX ADMIN — GABAPENTIN 300 MG: 300 CAPSULE ORAL at 08:04

## 2025-04-16 RX ADMIN — ENOXAPARIN SODIUM 40 MG: 40 INJECTION SUBCUTANEOUS at 05:04

## 2025-04-16 RX ADMIN — CALCIUM CARBONATE 1000 MG: 500 TABLET, CHEWABLE ORAL at 08:04

## 2025-04-16 RX ADMIN — CALCIUM CARBONATE 1000 MG: 1250 SUSPENSION ORAL at 05:04

## 2025-04-16 RX ADMIN — MUPIROCIN: 20 OINTMENT TOPICAL at 08:04

## 2025-04-16 RX ADMIN — OXYCODONE HYDROCHLORIDE AND ACETAMINOPHEN 1 TABLET: 10; 325 TABLET ORAL at 03:04

## 2025-04-16 RX ADMIN — LEVOTHYROXINE SODIUM 100 MCG: 0.1 TABLET ORAL at 06:04

## 2025-04-16 RX ADMIN — OXYCODONE HYDROCHLORIDE AND ACETAMINOPHEN 1 TABLET: 10; 325 TABLET ORAL at 12:04

## 2025-04-16 RX ADMIN — PIPERACILLIN AND TAZOBACTAM 4.5 G: 4; .5 INJECTION, POWDER, LYOPHILIZED, FOR SOLUTION INTRAVENOUS; PARENTERAL at 04:04

## 2025-04-16 RX ADMIN — MIDODRINE HYDROCHLORIDE 10 MG: 5 TABLET ORAL at 08:04

## 2025-04-16 RX ADMIN — OXYCODONE HYDROCHLORIDE AND ACETAMINOPHEN 1 TABLET: 10; 325 TABLET ORAL at 11:04

## 2025-04-16 RX ADMIN — ALPRAZOLAM 1 MG: 0.5 TABLET ORAL at 06:04

## 2025-04-16 RX ADMIN — CALCIUM CARBONATE 1000 MG: 1250 SUSPENSION ORAL at 08:04

## 2025-04-16 RX ADMIN — COLLAGENASE SANTYL: 250 OINTMENT TOPICAL at 08:04

## 2025-04-16 RX ADMIN — MIDODRINE HYDROCHLORIDE 10 MG: 5 TABLET ORAL at 03:04

## 2025-04-16 RX ADMIN — PIPERACILLIN AND TAZOBACTAM 4.5 G: 4; .5 INJECTION, POWDER, LYOPHILIZED, FOR SOLUTION INTRAVENOUS; PARENTERAL at 08:04

## 2025-04-16 RX ADMIN — ALPRAZOLAM 1 MG: 0.5 TABLET ORAL at 08:04

## 2025-04-16 RX ADMIN — ACETAMINOPHEN 650 MG: 325 TABLET ORAL at 08:04

## 2025-04-16 NOTE — PT/OT/SLP PROGRESS
Orders received, chart reviewed, POC discussed with nursing.  SLP attempting speaking valve trials however pt lethargic secondary to medication administration as well as requring t-piece for suctioning of tracheal secretions.  SLP to follow.
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
## 2025-04-16 NOTE — PLAN OF CARE
04/16/25 1336   Discharge Assessment   Assessment Type Discharge Planning Assessment   Confirmed/corrected address, phone number and insurance Yes   Confirmed Demographics Correct on Facesheet   Source of Information family   When was your last doctors appointment? 04/04/25   Communicated ANDRÉS with patient/caregiver Date not available/Unable to determine   Reason For Admission hypocalcemia, pneumonia   People in Home parent(s)   Do you expect to return to your current living situation? Yes   Do you have help at home or someone to help you manage your care at home? Yes   Who are your caregiver(s) and their phone number(s)? mother and father   Prior to hospitilization cognitive status: Unable to Assess   Current cognitive status: Unable to Assess;Coma/Sedated/Intubated   Walking or Climbing Stairs Difficulty yes   Walking or Climbing Stairs transferring difficulty, requires equipment;ambulation difficulty, dependent   Mobility Management wheelchair, lift   Dressing/Bathing Difficulty yes   Dressing/Bathing bathing difficulty, dependent;dressing difficulty, dependent   Dressing/Bathing Management PCA 56 hours per week   Home Accessibility wheelchair accessible   Equipment Currently Used at Home respiratory supplies;lift device;oxygen;wheelchair;hospital bed   Patient currently being followed by outpatient case management? No   Do you currently have service(s) that help you manage your care at home? Yes   How Many hours does patient receive services 56   Name and Contact number of agency 56 hours per week   Is the pt/caregiver preference to resume services with current agency Yes   Do you take prescription medications? Yes   Do you have prescription coverage? Yes   Coverage Medicaid   Do you have any problems affording any of your prescribed medications? No   Who is going to help you get home at discharge? family transport   How do you get to doctors appointments? family or friend will provide   Are you on dialysis?  No   Do you take coumadin? No   Discharge Plan A Home with family;Community Services   Discharge Plan B Home with family;Community Services;Home Health   DME Needed Upon Discharge  none   Discharge Plan discussed with: Parent(s)   Name(s) and Number(s) Ann bryant   Transition of Care Barriers None   OTHER   Name(s) of People in Home mother and father     Lives with mother and father.  Has 56 hours per week PCA with [employer].  Wheelchair, lift, hospital bed  Oxygen and mother does not know company name.

## 2025-04-17 LAB
ALBUMIN SERPL-MCNC: 1.7 G/DL (ref 3.5–5)
ALBUMIN SERPL-MCNC: 1.7 G/DL (ref 3.5–5)
ALBUMIN/GLOB SERPL: 0.3 RATIO (ref 1.1–2)
ALP SERPL-CCNC: 230 UNIT/L (ref 40–150)
ALT SERPL-CCNC: 23 UNIT/L (ref 0–55)
ANION GAP SERPL CALC-SCNC: 13 MEQ/L
AST SERPL-CCNC: 32 UNIT/L (ref 11–45)
B PERT.PT PRMT NPH QL NAA+NON-PROBE: NOT DETECTED
BACTERIA UR CULT: ABNORMAL
BACTERIA UR CULT: ABNORMAL
BASOPHILS # BLD AUTO: 0.2 X10(3)/MCL
BASOPHILS NFR BLD AUTO: 1.5 %
BILIRUB SERPL-MCNC: 0.2 MG/DL
BUN SERPL-MCNC: 62 MG/DL (ref 8.9–20.6)
BUN SERPL-MCNC: 64.2 MG/DL (ref 8.9–20.6)
C PNEUM DNA NPH QL NAA+NON-PROBE: NOT DETECTED
CALCIUM SERPL-MCNC: 4.9 MG/DL (ref 8.4–10.2)
CALCIUM SERPL-MCNC: 6.2 MG/DL (ref 8.4–10.2)
CHLORIDE SERPL-SCNC: 100 MMOL/L (ref 98–107)
CHLORIDE SERPL-SCNC: 102 MMOL/L (ref 98–107)
CO2 SERPL-SCNC: 20 MMOL/L (ref 22–29)
CO2 SERPL-SCNC: 23 MMOL/L (ref 22–29)
CREAT SERPL-MCNC: 1.5 MG/DL (ref 0.72–1.25)
CREAT SERPL-MCNC: 1.52 MG/DL (ref 0.72–1.25)
CREAT/UREA NIT SERPL: 41
EOSINOPHIL # BLD AUTO: 0.74 X10(3)/MCL (ref 0–0.9)
EOSINOPHIL NFR BLD AUTO: 5.4 %
ERYTHROCYTE [DISTWIDTH] IN BLOOD BY AUTOMATED COUNT: 16.8 % (ref 11.5–17)
GFR SERPLBLD CREATININE-BSD FMLA CKD-EPI: >60 ML/MIN/1.73/M2
GFR SERPLBLD CREATININE-BSD FMLA CKD-EPI: >60 ML/MIN/1.73/M2
GLOBULIN SER-MCNC: 5.8 GM/DL (ref 2.4–3.5)
GLUCOSE SERPL-MCNC: 86 MG/DL (ref 74–100)
GLUCOSE SERPL-MCNC: 98 MG/DL (ref 74–100)
HADV DNA NPH QL NAA+NON-PROBE: NOT DETECTED
HCOV 229E RNA NPH QL NAA+NON-PROBE: NOT DETECTED
HCOV HKU1 RNA NPH QL NAA+NON-PROBE: NOT DETECTED
HCOV NL63 RNA NPH QL NAA+NON-PROBE: NOT DETECTED
HCOV OC43 RNA NPH QL NAA+NON-PROBE: NOT DETECTED
HCT VFR BLD AUTO: 27.1 % (ref 42–52)
HGB BLD-MCNC: 8.4 G/DL (ref 14–18)
HMPV RNA NPH QL NAA+NON-PROBE: NOT DETECTED
HPIV1 RNA NPH QL NAA+NON-PROBE: NOT DETECTED
HPIV2 RNA NPH QL NAA+NON-PROBE: NOT DETECTED
HPIV3 RNA NPH QL NAA+NON-PROBE: NOT DETECTED
HPIV4 RNA NPH QL NAA+NON-PROBE: NOT DETECTED
IMM GRANULOCYTES # BLD AUTO: 0.13 X10(3)/MCL (ref 0–0.04)
IMM GRANULOCYTES NFR BLD AUTO: 0.9 %
LACTATE SERPL-SCNC: 0.8 MMOL/L (ref 0.5–2.2)
LYMPHOCYTES # BLD AUTO: 1.51 X10(3)/MCL (ref 0.6–4.6)
LYMPHOCYTES NFR BLD AUTO: 11 %
M PNEUMO DNA NPH QL NAA+NON-PROBE: NOT DETECTED
MAGNESIUM SERPL-MCNC: 2.2 MG/DL (ref 1.6–2.6)
MAGNESIUM SERPL-MCNC: 2.2 MG/DL (ref 1.6–2.6)
MCH RBC QN AUTO: 26.6 PG (ref 27–31)
MCHC RBC AUTO-ENTMCNC: 31 G/DL (ref 33–36)
MCV RBC AUTO: 85.8 FL (ref 80–94)
MONOCYTES # BLD AUTO: 1.11 X10(3)/MCL (ref 0.1–1.3)
MONOCYTES NFR BLD AUTO: 8.1 %
NEUTROPHILS # BLD AUTO: 10.09 X10(3)/MCL (ref 2.1–9.2)
NEUTROPHILS NFR BLD AUTO: 73.1 %
NRBC BLD AUTO-RTO: 0 %
PHOSPHATE SERPL-MCNC: 10.3 MG/DL (ref 2.3–4.7)
PHOSPHATE SERPL-MCNC: 9.6 MG/DL (ref 2.3–4.7)
PLATELET # BLD AUTO: 655 X10(3)/MCL (ref 130–400)
PMV BLD AUTO: 8.4 FL (ref 7.4–10.4)
POTASSIUM SERPL-SCNC: 4.3 MMOL/L (ref 3.5–5.1)
POTASSIUM SERPL-SCNC: 4.9 MMOL/L (ref 3.5–5.1)
PROT SERPL-MCNC: 7.5 GM/DL (ref 6.4–8.3)
RBC # BLD AUTO: 3.16 X10(6)/MCL (ref 4.7–6.1)
RSV RNA NPH QL NAA+NON-PROBE: NOT DETECTED
RV+EV RNA NPH QL NAA+NON-PROBE: NOT DETECTED
SODIUM SERPL-SCNC: 134 MMOL/L (ref 136–145)
SODIUM SERPL-SCNC: 135 MMOL/L (ref 136–145)
WBC # BLD AUTO: 13.78 X10(3)/MCL (ref 4.5–11.5)

## 2025-04-17 PROCEDURE — 25000003 PHARM REV CODE 250: Performed by: INTERNAL MEDICINE

## 2025-04-17 PROCEDURE — 21400001 HC TELEMETRY ROOM

## 2025-04-17 PROCEDURE — 63600175 PHARM REV CODE 636 W HCPCS: Performed by: INTERNAL MEDICINE

## 2025-04-17 PROCEDURE — 99900035 HC TECH TIME PER 15 MIN (STAT)

## 2025-04-17 PROCEDURE — 83735 ASSAY OF MAGNESIUM: CPT

## 2025-04-17 PROCEDURE — 87798 DETECT AGENT NOS DNA AMP: CPT

## 2025-04-17 PROCEDURE — 84100 ASSAY OF PHOSPHORUS: CPT

## 2025-04-17 PROCEDURE — 36415 COLL VENOUS BLD VENIPUNCTURE: CPT

## 2025-04-17 PROCEDURE — 99900026 HC AIRWAY MAINTENANCE (STAT)

## 2025-04-17 PROCEDURE — 80069 RENAL FUNCTION PANEL: CPT | Performed by: INTERNAL MEDICINE

## 2025-04-17 PROCEDURE — 27200966 HC CLOSED SUCTION SYSTEM

## 2025-04-17 PROCEDURE — 11000001 HC ACUTE MED/SURG PRIVATE ROOM

## 2025-04-17 PROCEDURE — 63600175 PHARM REV CODE 636 W HCPCS

## 2025-04-17 PROCEDURE — 92597 ORAL SPEECH DEVICE EVAL: CPT

## 2025-04-17 PROCEDURE — 83605 ASSAY OF LACTIC ACID: CPT

## 2025-04-17 PROCEDURE — 80053 COMPREHEN METABOLIC PANEL: CPT

## 2025-04-17 PROCEDURE — 27000221 HC OXYGEN, UP TO 24 HOURS

## 2025-04-17 PROCEDURE — 25000003 PHARM REV CODE 250

## 2025-04-17 PROCEDURE — 94760 N-INVAS EAR/PLS OXIMETRY 1: CPT

## 2025-04-17 PROCEDURE — 83735 ASSAY OF MAGNESIUM: CPT | Performed by: INTERNAL MEDICINE

## 2025-04-17 PROCEDURE — 99223 1ST HOSP IP/OBS HIGH 75: CPT | Mod: ,,, | Performed by: INTERNAL MEDICINE

## 2025-04-17 PROCEDURE — 85025 COMPLETE CBC W/AUTO DIFF WBC: CPT

## 2025-04-17 PROCEDURE — 36415 COLL VENOUS BLD VENIPUNCTURE: CPT | Performed by: INTERNAL MEDICINE

## 2025-04-17 RX ORDER — MEROPENEM 500 MG/1
500 INJECTION, POWDER, FOR SOLUTION INTRAVENOUS
Status: DISCONTINUED | OUTPATIENT
Start: 2025-04-17 | End: 2025-04-21

## 2025-04-17 RX ORDER — GLUCAGON 1 MG
1 KIT INJECTION
Status: DISCONTINUED | OUTPATIENT
Start: 2025-04-17 | End: 2025-04-27 | Stop reason: HOSPADM

## 2025-04-17 RX ORDER — SODIUM CHLORIDE 0.9 % (FLUSH) 0.9 %
10 SYRINGE (ML) INJECTION EVERY 12 HOURS PRN
Status: DISCONTINUED | OUTPATIENT
Start: 2025-04-17 | End: 2025-04-27 | Stop reason: HOSPADM

## 2025-04-17 RX ORDER — IBUPROFEN 200 MG
16 TABLET ORAL
Status: DISCONTINUED | OUTPATIENT
Start: 2025-04-17 | End: 2025-04-27 | Stop reason: HOSPADM

## 2025-04-17 RX ORDER — CALCIUM CARBONATE 200(500)MG
1000 TABLET,CHEWABLE ORAL EVERY 6 HOURS
Status: DISCONTINUED | OUTPATIENT
Start: 2025-04-17 | End: 2025-04-19

## 2025-04-17 RX ORDER — IBUPROFEN 200 MG
24 TABLET ORAL
Status: DISCONTINUED | OUTPATIENT
Start: 2025-04-17 | End: 2025-04-27 | Stop reason: HOSPADM

## 2025-04-17 RX ORDER — NALOXONE HCL 0.4 MG/ML
0.02 VIAL (ML) INJECTION
Status: DISCONTINUED | OUTPATIENT
Start: 2025-04-17 | End: 2025-04-27 | Stop reason: HOSPADM

## 2025-04-17 RX ADMIN — GABAPENTIN 300 MG: 300 CAPSULE ORAL at 08:04

## 2025-04-17 RX ADMIN — CALCITRIOL CAPSULES 0.25 MCG 0.25 MCG: 0.25 CAPSULE ORAL at 08:04

## 2025-04-17 RX ADMIN — ALPRAZOLAM 1 MG: 0.5 TABLET ORAL at 09:04

## 2025-04-17 RX ADMIN — CALCIUM CARBONATE 1000 MG: 500 TABLET, CHEWABLE ORAL at 11:04

## 2025-04-17 RX ADMIN — MUPIROCIN: 20 OINTMENT TOPICAL at 08:04

## 2025-04-17 RX ADMIN — LEVETIRACETAM 750 MG: 500 SOLUTION ORAL at 08:04

## 2025-04-17 RX ADMIN — ENOXAPARIN SODIUM 40 MG: 40 INJECTION SUBCUTANEOUS at 06:04

## 2025-04-17 RX ADMIN — GABAPENTIN 300 MG: 300 CAPSULE ORAL at 03:04

## 2025-04-17 RX ADMIN — PIPERACILLIN AND TAZOBACTAM 4.5 G: 4; .5 INJECTION, POWDER, LYOPHILIZED, FOR SOLUTION INTRAVENOUS; PARENTERAL at 04:04

## 2025-04-17 RX ADMIN — MEROPENEM 500 MG: 500 INJECTION, POWDER, FOR SOLUTION INTRAVENOUS at 07:04

## 2025-04-17 RX ADMIN — ALPRAZOLAM 1 MG: 0.5 TABLET ORAL at 04:04

## 2025-04-17 RX ADMIN — OXYCODONE HYDROCHLORIDE AND ACETAMINOPHEN 1 TABLET: 10; 325 TABLET ORAL at 08:04

## 2025-04-17 RX ADMIN — MIDODRINE HYDROCHLORIDE 10 MG: 5 TABLET ORAL at 08:04

## 2025-04-17 RX ADMIN — MIDODRINE HYDROCHLORIDE 10 MG: 5 TABLET ORAL at 03:04

## 2025-04-17 RX ADMIN — LEVOTHYROXINE SODIUM 100 MCG: 0.1 TABLET ORAL at 04:04

## 2025-04-17 RX ADMIN — PIPERACILLIN AND TAZOBACTAM 4.5 G: 4; .5 INJECTION, POWDER, LYOPHILIZED, FOR SOLUTION INTRAVENOUS; PARENTERAL at 02:04

## 2025-04-17 RX ADMIN — CALCIUM CARBONATE 1000 MG: 500 TABLET, CHEWABLE ORAL at 08:04

## 2025-04-17 RX ADMIN — CALCIUM GLUCONATE: 98 INJECTION, SOLUTION INTRAVENOUS at 01:04

## 2025-04-17 RX ADMIN — COLLAGENASE SANTYL: 250 OINTMENT TOPICAL at 08:04

## 2025-04-17 RX ADMIN — CALCIUM CARBONATE 1000 MG: 500 TABLET, CHEWABLE ORAL at 12:04

## 2025-04-17 RX ADMIN — CALCIUM CARBONATE 1000 MG: 500 TABLET, CHEWABLE ORAL at 06:04
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
## 2025-04-17 NOTE — NURSING
I called patient's mother and father twice @ [phone] to notify of relocation to room 506. Both times the phones were unanswered.

## 2025-04-17 NOTE — PLAN OF CARE
LTG: To tolerate speaking valve trials with no signs/sx of respiratory distress/compromise for all waking hours.     ST. To tolerate speaking valve trials with no signs/sx of respiratory distress/compromise for x1 hour.
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
## 2025-04-18 LAB
25(OH)D3+25(OH)D2 SERPL-MCNC: 17 NG/ML (ref 30–80)
ALBUMIN SERPL-MCNC: 1.5 G/DL (ref 3.5–5)
ALBUMIN SERPL-MCNC: 1.7 G/DL (ref 3.5–5)
ALBUMIN/GLOB SERPL: 0.3 RATIO (ref 1.1–2)
ALP SERPL-CCNC: 247 UNIT/L (ref 40–150)
ALT SERPL-CCNC: 21 UNIT/L (ref 0–55)
ANION GAP SERPL CALC-SCNC: 12 MEQ/L
APICAL FOUR CHAMBER EJECTION FRACTION: 44 %
AST SERPL-CCNC: 28 UNIT/L (ref 11–45)
AV INDEX (PROSTH): 0.7
AV MEAN GRADIENT: 3 MMHG
AV PEAK GRADIENT: 5 MMHG
AV VALVE AREA BY VELOCITY RATIO: 2.3 CM²
AV VALVE AREA: 2.2 CM²
AV VELOCITY RATIO: 0.73
BACTERIA SPEC CULT: ABNORMAL
BACTERIA SPEC CULT: ABNORMAL
BASOPHILS # BLD AUTO: 0.19 X10(3)/MCL
BASOPHILS NFR BLD AUTO: 1.4 %
BILIRUB SERPL-MCNC: 0.2 MG/DL
BSA FOR ECHO PROCEDURE: 1.59 M2
BUN SERPL-MCNC: 60.7 MG/DL (ref 8.9–20.6)
BUN SERPL-MCNC: 62.4 MG/DL (ref 8.9–20.6)
CALCIUM SERPL-MCNC: 7.1 MG/DL (ref 8.4–10.2)
CALCIUM SERPL-MCNC: 7.2 MG/DL (ref 8.4–10.2)
CHLORIDE SERPL-SCNC: 100 MMOL/L (ref 98–107)
CHLORIDE SERPL-SCNC: 100 MMOL/L (ref 98–107)
CO2 SERPL-SCNC: 22 MMOL/L (ref 22–29)
CO2 SERPL-SCNC: 23 MMOL/L (ref 22–29)
CREAT SERPL-MCNC: 1.52 MG/DL (ref 0.72–1.25)
CREAT SERPL-MCNC: 1.54 MG/DL (ref 0.72–1.25)
CREAT/UREA NIT SERPL: 41
CV ECHO LV RWT: 0.3 CM
DOP CALC AO PEAK VEL: 1.1 M/S
DOP CALC AO VTI: 22.7 CM
DOP CALC LVOT AREA: 3.1 CM2
DOP CALC LVOT DIAMETER: 2 CM
DOP CALC LVOT PEAK VEL: 0.8 M/S
DOP CALC LVOT STROKE VOLUME: 49.9 CM3
DOP CALC MV VTI: 26.5 CM
DOP CALCLVOT PEAK VEL VTI: 15.9 CM
E WAVE DECELERATION TIME: 127 MSEC
E/A RATIO: 2.42
E/E' RATIO: 13 M/S
ECHO LV POSTERIOR WALL: 0.9 CM (ref 0.6–1.1)
EOSINOPHIL # BLD AUTO: 0.73 X10(3)/MCL (ref 0–0.9)
EOSINOPHIL NFR BLD AUTO: 5.2 %
ERYTHROCYTE [DISTWIDTH] IN BLOOD BY AUTOMATED COUNT: 17.1 % (ref 11.5–17)
FRACTIONAL SHORTENING: 15 % (ref 28–44)
GFR SERPLBLD CREATININE-BSD FMLA CKD-EPI: >60 ML/MIN/1.73/M2
GFR SERPLBLD CREATININE-BSD FMLA CKD-EPI: >60 ML/MIN/1.73/M2
GLOBULIN SER-MCNC: 6.4 GM/DL (ref 2.4–3.5)
GLUCOSE SERPL-MCNC: 83 MG/DL (ref 74–100)
GLUCOSE SERPL-MCNC: 97 MG/DL (ref 74–100)
GRAM STN SPEC: ABNORMAL
HCT VFR BLD AUTO: 27.7 % (ref 42–52)
HGB BLD-MCNC: 8.6 G/DL (ref 14–18)
HR MV ECHO: 75 BPM
IMM GRANULOCYTES # BLD AUTO: 0.17 X10(3)/MCL (ref 0–0.04)
IMM GRANULOCYTES NFR BLD AUTO: 1.2 %
INTERVENTRICULAR SEPTUM: 0.8 CM (ref 0.6–1.1)
LEFT ATRIUM AREA SYSTOLIC (APICAL 4 CHAMBER): 25.6 CM2
LEFT ATRIUM SIZE: 3.8 CM
LEFT INTERNAL DIMENSION IN SYSTOLE: 5.1 CM (ref 2.1–4)
LEFT VENTRICLE DIASTOLIC VOLUME INDEX: 110.43 ML/M2
LEFT VENTRICLE DIASTOLIC VOLUME: 180 ML
LEFT VENTRICLE END DIASTOLIC VOLUME APICAL 4 CHAMBER: 198 ML
LEFT VENTRICLE END SYSTOLIC VOLUME APICAL 4 CHAMBER: 78.5 ML
LEFT VENTRICLE MASS INDEX: 123.1 G/M2
LEFT VENTRICLE SYSTOLIC VOLUME INDEX: 76.1 ML/M2
LEFT VENTRICLE SYSTOLIC VOLUME: 124 ML
LEFT VENTRICULAR INTERNAL DIMENSION IN DIASTOLE: 6 CM (ref 3.5–6)
LEFT VENTRICULAR MASS: 200.7 G
LV LATERAL E/E' RATIO: 11.5 M/S
LV SEPTAL E/E' RATIO: 14 M/S
LVED V (TEICH): 180 ML
LVES V (TEICH): 124 ML
LVOT MG: 1 MMHG
LVOT MV: 0.52 CM/S
LYMPHOCYTES # BLD AUTO: 1.75 X10(3)/MCL (ref 0.6–4.6)
LYMPHOCYTES NFR BLD AUTO: 12.6 %
MAGNESIUM SERPL-MCNC: 2.1 MG/DL (ref 1.6–2.6)
MAGNESIUM SERPL-MCNC: 2.2 MG/DL (ref 1.6–2.6)
MCH RBC QN AUTO: 26.2 PG (ref 27–31)
MCHC RBC AUTO-ENTMCNC: 31 G/DL (ref 33–36)
MCV RBC AUTO: 84.5 FL (ref 80–94)
MONOCYTES # BLD AUTO: 0.98 X10(3)/MCL (ref 0.1–1.3)
MONOCYTES NFR BLD AUTO: 7 %
MV MEAN GRADIENT: 3 MMHG
MV PEAK A VEL: 0.52 M/S
MV PEAK E VEL: 1.26 M/S
MV PEAK GRADIENT: 6 MMHG
MV STENOSIS PRESSURE HALF TIME: 71 MS
MV VALVE AREA BY CONTINUITY EQUATION: 1.88 CM2
MV VALVE AREA P 1/2 METHOD: 3.1 CM2
NEUTROPHILS # BLD AUTO: 10.09 X10(3)/MCL (ref 2.1–9.2)
NEUTROPHILS NFR BLD AUTO: 72.6 %
NRBC BLD AUTO-RTO: 0 %
PHOSPHATE SERPL-MCNC: 9.2 MG/DL (ref 2.3–4.7)
PHOSPHATE SERPL-MCNC: 9.3 MG/DL (ref 2.3–4.7)
PISA TR MAX VEL: 2.9 M/S
PLATELET # BLD AUTO: 698 X10(3)/MCL (ref 130–400)
PMV BLD AUTO: 8.2 FL (ref 7.4–10.4)
POTASSIUM SERPL-SCNC: 4 MMOL/L (ref 3.5–5.1)
POTASSIUM SERPL-SCNC: 4.1 MMOL/L (ref 3.5–5.1)
PROT SERPL-MCNC: 8.1 GM/DL (ref 6.4–8.3)
PTH-INTACT SERPL-MCNC: <4 PG/ML (ref 8.7–77)
PV PEAK GRADIENT: 4 MMHG
PV PEAK VELOCITY: 1.06 M/S
RBC # BLD AUTO: 3.28 X10(6)/MCL (ref 4.7–6.1)
SODIUM SERPL-SCNC: 134 MMOL/L (ref 136–145)
SODIUM SERPL-SCNC: 136 MMOL/L (ref 136–145)
TDI LATERAL: 0.11 M/S
TDI SEPTAL: 0.09 M/S
TDI: 0.1 M/S
TR MAX PG: 34 MMHG
TRICUSPID ANNULAR PLANE SYSTOLIC EXCURSION: 2.53 CM
WBC # BLD AUTO: 13.91 X10(3)/MCL (ref 4.5–11.5)
Z-SCORE OF LEFT VENTRICULAR DIMENSION IN END DIASTOLE: 2.66
Z-SCORE OF LEFT VENTRICULAR DIMENSION IN END SYSTOLE: 4.6

## 2025-04-18 PROCEDURE — 63600175 PHARM REV CODE 636 W HCPCS

## 2025-04-18 PROCEDURE — 80069 RENAL FUNCTION PANEL: CPT | Performed by: INTERNAL MEDICINE

## 2025-04-18 PROCEDURE — 99900035 HC TECH TIME PER 15 MIN (STAT)

## 2025-04-18 PROCEDURE — 80053 COMPREHEN METABOLIC PANEL: CPT

## 2025-04-18 PROCEDURE — 83735 ASSAY OF MAGNESIUM: CPT

## 2025-04-18 PROCEDURE — 25000003 PHARM REV CODE 250

## 2025-04-18 PROCEDURE — 27000221 HC OXYGEN, UP TO 24 HOURS

## 2025-04-18 PROCEDURE — 36415 COLL VENOUS BLD VENIPUNCTURE: CPT | Performed by: INTERNAL MEDICINE

## 2025-04-18 PROCEDURE — 84100 ASSAY OF PHOSPHORUS: CPT

## 2025-04-18 PROCEDURE — 94761 N-INVAS EAR/PLS OXIMETRY MLT: CPT

## 2025-04-18 PROCEDURE — 99900026 HC AIRWAY MAINTENANCE (STAT)

## 2025-04-18 PROCEDURE — 25000003 PHARM REV CODE 250: Performed by: INTERNAL MEDICINE

## 2025-04-18 PROCEDURE — 83735 ASSAY OF MAGNESIUM: CPT | Performed by: INTERNAL MEDICINE

## 2025-04-18 PROCEDURE — 82306 VITAMIN D 25 HYDROXY: CPT

## 2025-04-18 PROCEDURE — 36415 COLL VENOUS BLD VENIPUNCTURE: CPT

## 2025-04-18 PROCEDURE — 85025 COMPLETE CBC W/AUTO DIFF WBC: CPT

## 2025-04-18 PROCEDURE — 94760 N-INVAS EAR/PLS OXIMETRY 1: CPT

## 2025-04-18 PROCEDURE — 11000001 HC ACUTE MED/SURG PRIVATE ROOM

## 2025-04-18 PROCEDURE — 63600175 PHARM REV CODE 636 W HCPCS: Performed by: INTERNAL MEDICINE

## 2025-04-18 PROCEDURE — 99900022

## 2025-04-18 PROCEDURE — 21400001 HC TELEMETRY ROOM

## 2025-04-18 PROCEDURE — 83970 ASSAY OF PARATHORMONE: CPT

## 2025-04-18 PROCEDURE — 92507 TX SP LANG VOICE COMM INDIV: CPT

## 2025-04-18 RX ORDER — ERGOCALCIFEROL 1.25 MG/1
50000 CAPSULE ORAL
Status: DISCONTINUED | OUTPATIENT
Start: 2025-04-18 | End: 2025-04-27 | Stop reason: HOSPADM

## 2025-04-18 RX ORDER — FUROSEMIDE 10 MG/ML
40 INJECTION INTRAMUSCULAR; INTRAVENOUS
Status: COMPLETED | OUTPATIENT
Start: 2025-04-18 | End: 2025-04-18

## 2025-04-18 RX ORDER — ERGOCALCIFEROL 1.25 MG/1
50000 CAPSULE ORAL
Status: DISCONTINUED | OUTPATIENT
Start: 2025-04-18 | End: 2025-04-18

## 2025-04-18 RX ADMIN — GABAPENTIN 300 MG: 300 CAPSULE ORAL at 09:04

## 2025-04-18 RX ADMIN — MIDODRINE HYDROCHLORIDE 10 MG: 5 TABLET ORAL at 03:04

## 2025-04-18 RX ADMIN — LEVETIRACETAM 750 MG: 500 SOLUTION ORAL at 08:04

## 2025-04-18 RX ADMIN — LEVOTHYROXINE SODIUM 100 MCG: 0.1 TABLET ORAL at 04:04

## 2025-04-18 RX ADMIN — ALPRAZOLAM 1 MG: 0.5 TABLET ORAL at 10:04

## 2025-04-18 RX ADMIN — MIDODRINE HYDROCHLORIDE 10 MG: 5 TABLET ORAL at 09:04

## 2025-04-18 RX ADMIN — ALPRAZOLAM 1 MG: 0.5 TABLET ORAL at 04:04

## 2025-04-18 RX ADMIN — CALCIUM CARBONATE 1000 MG: 500 TABLET, CHEWABLE ORAL at 04:04

## 2025-04-18 RX ADMIN — CALCITRIOL CAPSULES 0.25 MCG 0.25 MCG: 0.25 CAPSULE ORAL at 08:04

## 2025-04-18 RX ADMIN — MIDODRINE HYDROCHLORIDE 10 MG: 5 TABLET ORAL at 08:04

## 2025-04-18 RX ADMIN — LEVETIRACETAM 750 MG: 500 SOLUTION ORAL at 09:04

## 2025-04-18 RX ADMIN — MUPIROCIN: 20 OINTMENT TOPICAL at 08:04

## 2025-04-18 RX ADMIN — MEROPENEM 500 MG: 500 INJECTION, POWDER, FOR SOLUTION INTRAVENOUS at 02:04

## 2025-04-18 RX ADMIN — FUROSEMIDE 40 MG: 10 INJECTION, SOLUTION INTRAVENOUS at 11:04

## 2025-04-18 RX ADMIN — MEROPENEM 500 MG: 500 INJECTION, POWDER, FOR SOLUTION INTRAVENOUS at 10:04

## 2025-04-18 RX ADMIN — CALCIUM CARBONATE 1000 MG: 500 TABLET, CHEWABLE ORAL at 11:04

## 2025-04-18 RX ADMIN — FUROSEMIDE 40 MG: 10 INJECTION, SOLUTION INTRAVENOUS at 05:04

## 2025-04-18 RX ADMIN — ENOXAPARIN SODIUM 40 MG: 40 INJECTION SUBCUTANEOUS at 05:04

## 2025-04-18 RX ADMIN — ERGOCALCIFEROL 50000 UNITS: 1.25 CAPSULE ORAL at 08:04

## 2025-04-18 RX ADMIN — CALCIUM CARBONATE 1000 MG: 500 TABLET, CHEWABLE ORAL at 05:04

## 2025-04-18 RX ADMIN — MEROPENEM 500 MG: 500 INJECTION, POWDER, FOR SOLUTION INTRAVENOUS at 06:04

## 2025-04-18 RX ADMIN — GABAPENTIN 300 MG: 300 CAPSULE ORAL at 08:04

## 2025-04-18 RX ADMIN — GABAPENTIN 300 MG: 300 CAPSULE ORAL at 03:04

## 2025-04-18 NOTE — PT/OT/SLP PROGRESS
[facility]  Speech Language Pathology Department  Speaking Valve Progress Note      Patient Name:  Jyoti Mayberry   MRN:  [id number]  Admitting Diagnosis: episode of choking during eating    Caution: Tracheotomy Tube Cuff MUST ALWAYS BE DEFLATED when speaking valve is in use!    Recommendations     General recommendations:  ok to wear speaking valve while awake;  bedside swallow evaluation as appropriate  Communication strategies:  One way speaking valve and go to room if call light pushed    Discharge therapy intensity: Low Intensity Therapy  Barriers to safe discharge: past medical history    Subjective     Patient awake and calm.  Spiritual/Cultural/Jain Beliefs/Practices that affect care: no    Pain/Comfort: Pain Rating 1: 0/10    Upon SLP arrival, speaking valve at trach, appears to have been donned but fallen off.    Objective     Tracheostomy Tube Type: Shiley, cuffed  Tracheostomy Tube Size: 8.0  Speaking valve type: Purple     Dentition: own teeth  Secretion Management: adequate  Facial Movement: WFL     Sputum amount: Small  Sputum consistency: Thick  Sputum color: Clear     SpO2 before trial: 93  SpO2 during trial: 96  Time on valve: 15 minutes    Mild work of breathing before speaking valve trials, remained stable during trials.    Phonation with speech: good  Overall speech intelligibility: good  Vocal quality: WNL    Response to treatment: Patient tolerating speaking valve with stable O2 sats, no increased work of breathing, and no reports of discomfort.  Briefly removed, no back pressure noted.    Post treatment measures: Speaking valve left donned.  Call bell placed under hand as nursing staff reports patient has been able to use call bell (patient reporting he is unable to use, discussed soft touch call bell with charge nurse who will investigate).    Assessment     Pt continues to present with impaired verbal communication secondary to tracheostomy.    Goals      Multidisciplinary Problems       SLP Goals          Problem: SLP    Goal Priority Disciplines Outcome   SLP Goal     SLP    Description: LTG: To tolerate speaking valve trials with no signs/sx of respiratory distress/compromise for all waking hours.     ST. To tolerate speaking valve trials with no signs/sx of respiratory distress/compromise for x1 hour.                     Patient Educations     Patient provided with verbal education regarding recommendations.  Understanding was verbalized, however additional teaching warranted.    Plan     SLP Follow-Up:  Yes   Patient to be seen:  5 x/week   Plan of Care expires:  25  Plan of Care reviewed with:  patient      Time Tracking     SLP Treatment Date:   25  Speech Start Time:  1100  Speech Stop Time:  1115     Speech Total Time (min):  15 min    Billable minutes:   Speech/Language Therapy, 15 minutes      2025
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
## 2025-04-19 LAB
ALBUMIN SERPL-MCNC: 1.6 G/DL (ref 3.5–5)
ALBUMIN/GLOB SERPL: 0.3 RATIO (ref 1.1–2)
ALP SERPL-CCNC: 243 UNIT/L (ref 40–150)
ALT SERPL-CCNC: 19 UNIT/L (ref 0–55)
ANION GAP SERPL CALC-SCNC: 13 MEQ/L
AST SERPL-CCNC: 27 UNIT/L (ref 11–45)
BACTERIA BLD CULT: NORMAL
BACTERIA BLD CULT: NORMAL
BASOPHILS # BLD AUTO: 0.16 X10(3)/MCL
BASOPHILS NFR BLD AUTO: 1.2 %
BILIRUB SERPL-MCNC: 0.2 MG/DL
BUN SERPL-MCNC: 62.3 MG/DL (ref 8.9–20.6)
CALCIUM SERPL-MCNC: 6.4 MG/DL (ref 8.4–10.2)
CHLORIDE SERPL-SCNC: 100 MMOL/L (ref 98–107)
CO2 SERPL-SCNC: 22 MMOL/L (ref 22–29)
COLOR STL: NORMAL
CONSISTENCY STL: NORMAL
CREAT SERPL-MCNC: 1.58 MG/DL (ref 0.72–1.25)
CREAT/UREA NIT SERPL: 39
EOSINOPHIL # BLD AUTO: 0.69 X10(3)/MCL (ref 0–0.9)
EOSINOPHIL NFR BLD AUTO: 5.3 %
ERYTHROCYTE [DISTWIDTH] IN BLOOD BY AUTOMATED COUNT: 17.2 % (ref 11.5–17)
GFR SERPLBLD CREATININE-BSD FMLA CKD-EPI: >60 ML/MIN/1.73/M2
GLOBULIN SER-MCNC: 5.3 GM/DL (ref 2.4–3.5)
GLUCOSE SERPL-MCNC: 92 MG/DL (ref 74–100)
HCT VFR BLD AUTO: 22.5 % (ref 42–52)
HEMOCCULT SP1 STL QL: NEGATIVE
HGB BLD-MCNC: 7.1 G/DL (ref 14–18)
IMM GRANULOCYTES # BLD AUTO: 0.12 X10(3)/MCL (ref 0–0.04)
IMM GRANULOCYTES NFR BLD AUTO: 0.9 %
LYMPHOCYTES # BLD AUTO: 1.66 X10(3)/MCL (ref 0.6–4.6)
LYMPHOCYTES NFR BLD AUTO: 12.7 %
MAGNESIUM SERPL-MCNC: 2.1 MG/DL (ref 1.6–2.6)
MCH RBC QN AUTO: 26.2 PG (ref 27–31)
MCHC RBC AUTO-ENTMCNC: 31.6 G/DL (ref 33–36)
MCV RBC AUTO: 83 FL (ref 80–94)
MONOCYTES # BLD AUTO: 0.99 X10(3)/MCL (ref 0.1–1.3)
MONOCYTES NFR BLD AUTO: 7.6 %
NEUTROPHILS # BLD AUTO: 9.47 X10(3)/MCL (ref 2.1–9.2)
NEUTROPHILS NFR BLD AUTO: 72.3 %
NRBC BLD AUTO-RTO: 0 %
PHOSPHATE SERPL-MCNC: 9.4 MG/DL (ref 2.3–4.7)
PLATELET # BLD AUTO: 595 X10(3)/MCL (ref 130–400)
PMV BLD AUTO: 8.1 FL (ref 7.4–10.4)
POTASSIUM SERPL-SCNC: 4.2 MMOL/L (ref 3.5–5.1)
PROT SERPL-MCNC: 6.9 GM/DL (ref 6.4–8.3)
RBC # BLD AUTO: 2.71 X10(6)/MCL (ref 4.7–6.1)
SODIUM SERPL-SCNC: 135 MMOL/L (ref 136–145)
WBC # BLD AUTO: 13.09 X10(3)/MCL (ref 4.5–11.5)

## 2025-04-19 PROCEDURE — 99900035 HC TECH TIME PER 15 MIN (STAT)

## 2025-04-19 PROCEDURE — 11000001 HC ACUTE MED/SURG PRIVATE ROOM

## 2025-04-19 PROCEDURE — 63600175 PHARM REV CODE 636 W HCPCS: Performed by: INTERNAL MEDICINE

## 2025-04-19 PROCEDURE — 82272 OCCULT BLD FECES 1-3 TESTS: CPT

## 2025-04-19 PROCEDURE — 25000003 PHARM REV CODE 250: Performed by: INTERNAL MEDICINE

## 2025-04-19 PROCEDURE — 99900026 HC AIRWAY MAINTENANCE (STAT)

## 2025-04-19 PROCEDURE — 21400001 HC TELEMETRY ROOM

## 2025-04-19 PROCEDURE — 94760 N-INVAS EAR/PLS OXIMETRY 1: CPT

## 2025-04-19 PROCEDURE — 25000003 PHARM REV CODE 250

## 2025-04-19 PROCEDURE — 83735 ASSAY OF MAGNESIUM: CPT

## 2025-04-19 PROCEDURE — 85025 COMPLETE CBC W/AUTO DIFF WBC: CPT

## 2025-04-19 PROCEDURE — 63600175 PHARM REV CODE 636 W HCPCS

## 2025-04-19 PROCEDURE — 36415 COLL VENOUS BLD VENIPUNCTURE: CPT

## 2025-04-19 PROCEDURE — 80053 COMPREHEN METABOLIC PANEL: CPT

## 2025-04-19 PROCEDURE — 84100 ASSAY OF PHOSPHORUS: CPT

## 2025-04-19 PROCEDURE — 27000221 HC OXYGEN, UP TO 24 HOURS

## 2025-04-19 RX ORDER — CALCIUM GLUCONATE 20 MG/ML
1 INJECTION, SOLUTION INTRAVENOUS ONCE
Status: COMPLETED | OUTPATIENT
Start: 2025-04-19 | End: 2025-04-19

## 2025-04-19 RX ORDER — PANTOPRAZOLE SODIUM 40 MG/10ML
40 INJECTION, POWDER, LYOPHILIZED, FOR SOLUTION INTRAVENOUS DAILY
Status: DISCONTINUED | OUTPATIENT
Start: 2025-04-19 | End: 2025-04-27 | Stop reason: HOSPADM

## 2025-04-19 RX ORDER — CALCIUM CARBONATE 200(500)MG
1000 TABLET,CHEWABLE ORAL EVERY 4 HOURS
Status: DISCONTINUED | OUTPATIENT
Start: 2025-04-19 | End: 2025-04-21

## 2025-04-19 RX ORDER — FUROSEMIDE 10 MG/ML
80 INJECTION INTRAMUSCULAR; INTRAVENOUS ONCE
Status: COMPLETED | OUTPATIENT
Start: 2025-04-19 | End: 2025-04-19

## 2025-04-19 RX ORDER — CALCITRIOL 0.25 UG/1
0.25 CAPSULE ORAL 2 TIMES DAILY
Status: DISCONTINUED | OUTPATIENT
Start: 2025-04-19 | End: 2025-04-23

## 2025-04-19 RX ADMIN — LEVETIRACETAM 750 MG: 500 SOLUTION ORAL at 09:04

## 2025-04-19 RX ADMIN — GABAPENTIN 300 MG: 300 CAPSULE ORAL at 09:04

## 2025-04-19 RX ADMIN — OXYCODONE HYDROCHLORIDE AND ACETAMINOPHEN 1 TABLET: 10; 325 TABLET ORAL at 10:04

## 2025-04-19 RX ADMIN — MEROPENEM 500 MG: 500 INJECTION, POWDER, FOR SOLUTION INTRAVENOUS at 01:04

## 2025-04-19 RX ADMIN — MUPIROCIN: 20 OINTMENT TOPICAL at 09:04

## 2025-04-19 RX ADMIN — CALCIUM CARBONATE (ANTACID) CHEW TAB 500 MG 1000 MG: 500 CHEW TAB at 01:04

## 2025-04-19 RX ADMIN — PANTOPRAZOLE SODIUM 40 MG: 40 INJECTION, POWDER, FOR SOLUTION INTRAVENOUS at 10:04

## 2025-04-19 RX ADMIN — ALPRAZOLAM 1 MG: 0.5 TABLET ORAL at 09:04

## 2025-04-19 RX ADMIN — MIDODRINE HYDROCHLORIDE 10 MG: 5 TABLET ORAL at 01:04

## 2025-04-19 RX ADMIN — OXYCODONE HYDROCHLORIDE AND ACETAMINOPHEN 1 TABLET: 10; 325 TABLET ORAL at 01:04

## 2025-04-19 RX ADMIN — MIDODRINE HYDROCHLORIDE 10 MG: 5 TABLET ORAL at 09:04

## 2025-04-19 RX ADMIN — MEROPENEM 500 MG: 500 INJECTION, POWDER, FOR SOLUTION INTRAVENOUS at 06:04

## 2025-04-19 RX ADMIN — ALPRAZOLAM 1 MG: 0.5 TABLET ORAL at 01:04

## 2025-04-19 RX ADMIN — CALCITRIOL CAPSULES 0.25 MCG 0.25 MCG: 0.25 CAPSULE ORAL at 09:04

## 2025-04-19 RX ADMIN — CALCIUM CARBONATE 1000 MG: 500 TABLET, CHEWABLE ORAL at 04:04

## 2025-04-19 RX ADMIN — MEROPENEM 500 MG: 500 INJECTION, POWDER, FOR SOLUTION INTRAVENOUS at 10:04

## 2025-04-19 RX ADMIN — LEVOTHYROXINE SODIUM 100 MCG: 0.1 TABLET ORAL at 04:04

## 2025-04-19 RX ADMIN — CALCIUM GLUCONATE 1 G: 20 INJECTION, SOLUTION INTRAVENOUS at 11:04

## 2025-04-19 RX ADMIN — GABAPENTIN 300 MG: 300 CAPSULE ORAL at 01:04

## 2025-04-19 RX ADMIN — CALCIUM CARBONATE (ANTACID) CHEW TAB 500 MG 1000 MG: 500 CHEW TAB at 09:04

## 2025-04-19 RX ADMIN — FUROSEMIDE 80 MG: 10 INJECTION, SOLUTION INTRAVENOUS at 12:04

## 2025-04-19 RX ADMIN — COLLAGENASE SANTYL: 250 OINTMENT TOPICAL at 01:04

## 2025-04-19 RX ADMIN — CALCIUM CARBONATE (ANTACID) CHEW TAB 500 MG 1000 MG: 500 CHEW TAB at 06:04

## 2025-04-19 RX ADMIN — ENOXAPARIN SODIUM 40 MG: 40 INJECTION SUBCUTANEOUS at 06:04
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
## 2025-04-19 NOTE — PLAN OF CARE
Problem: Adult Inpatient Plan of Care  Goal: Plan of Care Review  Outcome: Progressing  Goal: Patient-Specific Goal (Individualized)  Outcome: Progressing  Goal: Absence of Hospital-Acquired Illness or Injury  Outcome: Progressing  Goal: Optimal Comfort and Wellbeing  Outcome: Progressing  Goal: Readiness for Transition of Care  Outcome: Progressing     Problem: Infection  Goal: Absence of Infection Signs and Symptoms  Outcome: Progressing     Problem: Wound  Goal: Optimal Coping  Outcome: Progressing  Goal: Optimal Functional Ability  Outcome: Progressing
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
## 2025-04-19 NOTE — NURSING
Pt started off on 10L and weaned down throughout the day. Pt is now on 1L and sats are above 95. Will continue to monitor.

## 2025-04-20 LAB
ABO + RH BLD: NORMAL
ALBUMIN SERPL-MCNC: 1.7 G/DL (ref 3.5–5)
ALBUMIN/GLOB SERPL: 0.3 RATIO (ref 1.1–2)
ALP SERPL-CCNC: 252 UNIT/L (ref 40–150)
ALT SERPL-CCNC: 18 UNIT/L (ref 0–55)
ANION GAP SERPL CALC-SCNC: 10 MEQ/L
AST SERPL-CCNC: 26 UNIT/L (ref 11–45)
BASOPHILS # BLD AUTO: 0.13 X10(3)/MCL
BASOPHILS NFR BLD AUTO: 1.1 %
BILIRUB SERPL-MCNC: 0.1 MG/DL
BLD PROD TYP BPU: NORMAL
BLOOD UNIT EXPIRATION DATE: NORMAL
BLOOD UNIT TYPE CODE: 9500
BUN SERPL-MCNC: 59.9 MG/DL (ref 8.9–20.6)
CALCIUM SERPL-MCNC: 6.3 MG/DL (ref 8.4–10.2)
CHLORIDE SERPL-SCNC: 99 MMOL/L (ref 98–107)
CO2 SERPL-SCNC: 26 MMOL/L (ref 22–29)
CREAT SERPL-MCNC: 1.63 MG/DL (ref 0.72–1.25)
CREAT/UREA NIT SERPL: 37
CROSSMATCH INTERPRETATION: NORMAL
DISPENSE STATUS: NORMAL
EOSINOPHIL # BLD AUTO: 0.99 X10(3)/MCL (ref 0–0.9)
EOSINOPHIL NFR BLD AUTO: 8.3 %
ERYTHROCYTE [DISTWIDTH] IN BLOOD BY AUTOMATED COUNT: 17.5 % (ref 11.5–17)
FERRITIN SERPL-MCNC: 1153.93 NG/ML (ref 21.81–274.66)
FOLATE SERPL-MCNC: 12.1 NG/ML (ref 7–31.4)
GFR SERPLBLD CREATININE-BSD FMLA CKD-EPI: 58 ML/MIN/1.73/M2
GLOBULIN SER-MCNC: 5.9 GM/DL (ref 2.4–3.5)
GLUCOSE SERPL-MCNC: 79 MG/DL (ref 74–100)
GROUP & RH: NORMAL
HCT VFR BLD AUTO: 21 % (ref 42–52)
HGB BLD-MCNC: 6.6 G/DL (ref 14–18)
IMM GRANULOCYTES # BLD AUTO: 0.08 X10(3)/MCL (ref 0–0.04)
IMM GRANULOCYTES NFR BLD AUTO: 0.7 %
INDIRECT COOMBS: NORMAL
IRON SATN MFR SERPL: 51 % (ref 20–50)
IRON SERPL-MCNC: 67 UG/DL (ref 65–175)
LYMPHOCYTES # BLD AUTO: 2.96 X10(3)/MCL (ref 0.6–4.6)
LYMPHOCYTES NFR BLD AUTO: 25 %
MAGNESIUM SERPL-MCNC: 2 MG/DL (ref 1.6–2.6)
MCH RBC QN AUTO: 26.5 PG (ref 27–31)
MCHC RBC AUTO-ENTMCNC: 31.4 G/DL (ref 33–36)
MCV RBC AUTO: 84.3 FL (ref 80–94)
MONOCYTES # BLD AUTO: 1.16 X10(3)/MCL (ref 0.1–1.3)
MONOCYTES NFR BLD AUTO: 9.8 %
NEUTROPHILS # BLD AUTO: 6.54 X10(3)/MCL (ref 2.1–9.2)
NEUTROPHILS NFR BLD AUTO: 55.1 %
NRBC BLD AUTO-RTO: 0 %
PHOSPHATE SERPL-MCNC: 8.4 MG/DL (ref 2.3–4.7)
PLATELET # BLD AUTO: 589 X10(3)/MCL (ref 130–400)
PMV BLD AUTO: 8.4 FL (ref 7.4–10.4)
POTASSIUM SERPL-SCNC: 3.9 MMOL/L (ref 3.5–5.1)
PROT SERPL-MCNC: 7.6 GM/DL (ref 6.4–8.3)
RBC # BLD AUTO: 2.49 X10(6)/MCL (ref 4.7–6.1)
SODIUM SERPL-SCNC: 135 MMOL/L (ref 136–145)
SPECIMEN OUTDATE: NORMAL
TIBC SERPL-MCNC: 132 UG/DL (ref 250–450)
TIBC SERPL-MCNC: 65 UG/DL (ref 60–240)
TRANSFERRIN SERPL-MCNC: 117 MG/DL (ref 174–364)
UNIT NUMBER: NORMAL
VIT B12 SERPL-MCNC: 1245 PG/ML (ref 213–816)
WBC # BLD AUTO: 11.86 X10(3)/MCL (ref 4.5–11.5)

## 2025-04-20 PROCEDURE — 25000003 PHARM REV CODE 250

## 2025-04-20 PROCEDURE — 82746 ASSAY OF FOLIC ACID SERUM: CPT

## 2025-04-20 PROCEDURE — 63600175 PHARM REV CODE 636 W HCPCS

## 2025-04-20 PROCEDURE — 83550 IRON BINDING TEST: CPT

## 2025-04-20 PROCEDURE — 84100 ASSAY OF PHOSPHORUS: CPT

## 2025-04-20 PROCEDURE — 36415 COLL VENOUS BLD VENIPUNCTURE: CPT

## 2025-04-20 PROCEDURE — 11000001 HC ACUTE MED/SURG PRIVATE ROOM

## 2025-04-20 PROCEDURE — 86923 COMPATIBILITY TEST ELECTRIC: CPT

## 2025-04-20 PROCEDURE — 82728 ASSAY OF FERRITIN: CPT

## 2025-04-20 PROCEDURE — 82607 VITAMIN B-12: CPT

## 2025-04-20 PROCEDURE — 36430 TRANSFUSION BLD/BLD COMPNT: CPT

## 2025-04-20 PROCEDURE — 83735 ASSAY OF MAGNESIUM: CPT

## 2025-04-20 PROCEDURE — 85025 COMPLETE CBC W/AUTO DIFF WBC: CPT

## 2025-04-20 PROCEDURE — 99900035 HC TECH TIME PER 15 MIN (STAT)

## 2025-04-20 PROCEDURE — 25000003 PHARM REV CODE 250: Performed by: INTERNAL MEDICINE

## 2025-04-20 PROCEDURE — 99900026 HC AIRWAY MAINTENANCE (STAT)

## 2025-04-20 PROCEDURE — 80053 COMPREHEN METABOLIC PANEL: CPT

## 2025-04-20 PROCEDURE — 94761 N-INVAS EAR/PLS OXIMETRY MLT: CPT

## 2025-04-20 PROCEDURE — P9040 RBC LEUKOREDUCED IRRADIATED: HCPCS

## 2025-04-20 PROCEDURE — 21400001 HC TELEMETRY ROOM

## 2025-04-20 PROCEDURE — 27000221 HC OXYGEN, UP TO 24 HOURS

## 2025-04-20 PROCEDURE — 86901 BLOOD TYPING SEROLOGIC RH(D): CPT

## 2025-04-20 RX ORDER — FUROSEMIDE 10 MG/ML
80 INJECTION INTRAMUSCULAR; INTRAVENOUS ONCE
Status: COMPLETED | OUTPATIENT
Start: 2025-04-20 | End: 2025-04-20

## 2025-04-20 RX ORDER — HYDROCODONE BITARTRATE AND ACETAMINOPHEN 500; 5 MG/1; MG/1
TABLET ORAL
Status: DISCONTINUED | OUTPATIENT
Start: 2025-04-20 | End: 2025-04-27 | Stop reason: HOSPADM

## 2025-04-20 RX ORDER — FUROSEMIDE 10 MG/ML
60 INJECTION INTRAMUSCULAR; INTRAVENOUS ONCE
Status: DISCONTINUED | OUTPATIENT
Start: 2025-04-20 | End: 2025-04-20

## 2025-04-20 RX ADMIN — CALCIUM CARBONATE (ANTACID) CHEW TAB 500 MG 1000 MG: 500 CHEW TAB at 08:04

## 2025-04-20 RX ADMIN — CALCIUM CARBONATE (ANTACID) CHEW TAB 500 MG 1000 MG: 500 CHEW TAB at 05:04

## 2025-04-20 RX ADMIN — GABAPENTIN 300 MG: 300 CAPSULE ORAL at 08:04

## 2025-04-20 RX ADMIN — CALCITRIOL CAPSULES 0.25 MCG 0.25 MCG: 0.25 CAPSULE ORAL at 08:04

## 2025-04-20 RX ADMIN — OXYCODONE HYDROCHLORIDE AND ACETAMINOPHEN 1 TABLET: 10; 325 TABLET ORAL at 08:04

## 2025-04-20 RX ADMIN — ENOXAPARIN SODIUM 40 MG: 40 INJECTION SUBCUTANEOUS at 03:04

## 2025-04-20 RX ADMIN — CALCIUM CARBONATE (ANTACID) CHEW TAB 500 MG 1000 MG: 500 CHEW TAB at 03:04

## 2025-04-20 RX ADMIN — MIDODRINE HYDROCHLORIDE 10 MG: 5 TABLET ORAL at 03:04

## 2025-04-20 RX ADMIN — LEVETIRACETAM 750 MG: 500 SOLUTION ORAL at 08:04

## 2025-04-20 RX ADMIN — LEVOTHYROXINE SODIUM 100 MCG: 0.1 TABLET ORAL at 05:04

## 2025-04-20 RX ADMIN — CALCIUM CARBONATE (ANTACID) CHEW TAB 500 MG 1000 MG: 500 CHEW TAB at 01:04

## 2025-04-20 RX ADMIN — CALCIUM CARBONATE (ANTACID) CHEW TAB 500 MG 1000 MG: 500 CHEW TAB at 11:04

## 2025-04-20 RX ADMIN — MUPIROCIN: 20 OINTMENT TOPICAL at 08:04

## 2025-04-20 RX ADMIN — FUROSEMIDE 80 MG: 10 INJECTION, SOLUTION INTRAVENOUS at 05:04

## 2025-04-20 RX ADMIN — COLLAGENASE SANTYL: 250 OINTMENT TOPICAL at 08:04

## 2025-04-20 RX ADMIN — MEROPENEM 500 MG: 500 INJECTION, POWDER, FOR SOLUTION INTRAVENOUS at 10:04

## 2025-04-20 RX ADMIN — ALPRAZOLAM 1 MG: 0.5 TABLET ORAL at 11:04

## 2025-04-20 RX ADMIN — MIDODRINE HYDROCHLORIDE 10 MG: 5 TABLET ORAL at 08:04

## 2025-04-20 RX ADMIN — GABAPENTIN 300 MG: 300 CAPSULE ORAL at 03:04

## 2025-04-20 RX ADMIN — MEROPENEM 500 MG: 500 INJECTION, POWDER, FOR SOLUTION INTRAVENOUS at 01:04

## 2025-04-20 RX ADMIN — MEROPENEM 500 MG: 500 INJECTION, POWDER, FOR SOLUTION INTRAVENOUS at 06:04

## 2025-04-20 RX ADMIN — ALPRAZOLAM 1 MG: 0.5 TABLET ORAL at 08:04

## 2025-04-20 RX ADMIN — ALPRAZOLAM 1 MG: 0.5 TABLET ORAL at 02:04

## 2025-04-20 RX ADMIN — OXYCODONE HYDROCHLORIDE AND ACETAMINOPHEN 1 TABLET: 10; 325 TABLET ORAL at 05:04

## 2025-04-20 RX ADMIN — PANTOPRAZOLE SODIUM 40 MG: 40 INJECTION, POWDER, FOR SOLUTION INTRAVENOUS at 10:04
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
## 2025-04-21 LAB
ALBUMIN SERPL-MCNC: 1.7 G/DL (ref 3.5–5)
ALBUMIN/GLOB SERPL: 0.3 RATIO (ref 1.1–2)
ALP SERPL-CCNC: 278 UNIT/L (ref 40–150)
ALT SERPL-CCNC: 20 UNIT/L (ref 0–55)
ANION GAP SERPL CALC-SCNC: 10 MEQ/L
AST SERPL-CCNC: 32 UNIT/L (ref 11–45)
BASOPHILS # BLD AUTO: 0.17 X10(3)/MCL
BASOPHILS NFR BLD AUTO: 1.3 %
BILIRUB SERPL-MCNC: 0.1 MG/DL
BUN SERPL-MCNC: 52.3 MG/DL (ref 8.9–20.6)
CALCIUM SERPL-MCNC: 6 MG/DL (ref 8.4–10.2)
CHLORIDE SERPL-SCNC: 97 MMOL/L (ref 98–107)
CO2 SERPL-SCNC: 29 MMOL/L (ref 22–29)
CREAT SERPL-MCNC: 1.12 MG/DL (ref 0.72–1.25)
CREAT/UREA NIT SERPL: 47
EOSINOPHIL # BLD AUTO: 1.31 X10(3)/MCL (ref 0–0.9)
EOSINOPHIL NFR BLD AUTO: 10.1 %
ERYTHROCYTE [DISTWIDTH] IN BLOOD BY AUTOMATED COUNT: 17.2 % (ref 11.5–17)
GFR SERPLBLD CREATININE-BSD FMLA CKD-EPI: >60 ML/MIN/1.73/M2
GLOBULIN SER-MCNC: 6.3 GM/DL (ref 2.4–3.5)
GLUCOSE SERPL-MCNC: 77 MG/DL (ref 74–100)
HCT VFR BLD AUTO: 24.1 % (ref 42–52)
HGB BLD-MCNC: 8 G/DL (ref 14–18)
IMM GRANULOCYTES # BLD AUTO: 0.1 X10(3)/MCL (ref 0–0.04)
IMM GRANULOCYTES NFR BLD AUTO: 0.8 %
LYMPHOCYTES # BLD AUTO: 2.4 X10(3)/MCL (ref 0.6–4.6)
LYMPHOCYTES NFR BLD AUTO: 18.6 %
MAGNESIUM SERPL-MCNC: 1.7 MG/DL (ref 1.6–2.6)
MCH RBC QN AUTO: 28.6 PG (ref 27–31)
MCHC RBC AUTO-ENTMCNC: 33.2 G/DL (ref 33–36)
MCV RBC AUTO: 86.1 FL (ref 80–94)
MONOCYTES # BLD AUTO: 1.19 X10(3)/MCL (ref 0.1–1.3)
MONOCYTES NFR BLD AUTO: 9.2 %
NEUTROPHILS # BLD AUTO: 7.74 X10(3)/MCL (ref 2.1–9.2)
NEUTROPHILS NFR BLD AUTO: 60 %
NRBC BLD AUTO-RTO: 0 %
PHOSPHATE SERPL-MCNC: 7.3 MG/DL (ref 2.3–4.7)
PLATELET # BLD AUTO: 550 X10(3)/MCL (ref 130–400)
PMV BLD AUTO: 8.1 FL (ref 7.4–10.4)
POTASSIUM SERPL-SCNC: 3.9 MMOL/L (ref 3.5–5.1)
PROT SERPL-MCNC: 8 GM/DL (ref 6.4–8.3)
RBC # BLD AUTO: 2.8 X10(6)/MCL (ref 4.7–6.1)
SODIUM SERPL-SCNC: 136 MMOL/L (ref 136–145)
WBC # BLD AUTO: 12.91 X10(3)/MCL (ref 4.5–11.5)

## 2025-04-21 PROCEDURE — 63600175 PHARM REV CODE 636 W HCPCS

## 2025-04-21 PROCEDURE — 27000207 HC ISOLATION

## 2025-04-21 PROCEDURE — 80053 COMPREHEN METABOLIC PANEL: CPT

## 2025-04-21 PROCEDURE — 99900031 HC PATIENT EDUCATION (STAT)

## 2025-04-21 PROCEDURE — 21400001 HC TELEMETRY ROOM

## 2025-04-21 PROCEDURE — 94760 N-INVAS EAR/PLS OXIMETRY 1: CPT

## 2025-04-21 PROCEDURE — 27000221 HC OXYGEN, UP TO 24 HOURS

## 2025-04-21 PROCEDURE — 99900026 HC AIRWAY MAINTENANCE (STAT)

## 2025-04-21 PROCEDURE — 11000001 HC ACUTE MED/SURG PRIVATE ROOM

## 2025-04-21 PROCEDURE — 36415 COLL VENOUS BLD VENIPUNCTURE: CPT

## 2025-04-21 PROCEDURE — 25000003 PHARM REV CODE 250: Performed by: INTERNAL MEDICINE

## 2025-04-21 PROCEDURE — 84100 ASSAY OF PHOSPHORUS: CPT

## 2025-04-21 PROCEDURE — 92611 MOTION FLUOROSCOPY/SWALLOW: CPT

## 2025-04-21 PROCEDURE — 85025 COMPLETE CBC W/AUTO DIFF WBC: CPT

## 2025-04-21 PROCEDURE — 94761 N-INVAS EAR/PLS OXIMETRY MLT: CPT

## 2025-04-21 PROCEDURE — 63600175 PHARM REV CODE 636 W HCPCS: Performed by: INTERNAL MEDICINE

## 2025-04-21 PROCEDURE — 83735 ASSAY OF MAGNESIUM: CPT

## 2025-04-21 PROCEDURE — 99900035 HC TECH TIME PER 15 MIN (STAT)

## 2025-04-21 PROCEDURE — 25000003 PHARM REV CODE 250

## 2025-04-21 PROCEDURE — 99900022

## 2025-04-21 RX ORDER — CALCIUM CARBONATE 200(500)MG
1500 TABLET,CHEWABLE ORAL NIGHTLY
Status: DISCONTINUED | OUTPATIENT
Start: 2025-04-21 | End: 2025-04-22

## 2025-04-21 RX ORDER — CALCIUM CARBONATE 200(500)MG
1500 TABLET,CHEWABLE ORAL
Status: DISCONTINUED | OUTPATIENT
Start: 2025-04-21 | End: 2025-04-22

## 2025-04-21 RX ORDER — MEROPENEM 1 G/1
1 INJECTION, POWDER, FOR SOLUTION INTRAVENOUS
Status: COMPLETED | OUTPATIENT
Start: 2025-04-21 | End: 2025-04-26

## 2025-04-21 RX ORDER — CALCIUM CARBONATE 200(500)MG
1500 TABLET,CHEWABLE ORAL EVERY 4 HOURS
Status: DISCONTINUED | OUTPATIENT
Start: 2025-04-21 | End: 2025-04-21

## 2025-04-21 RX ADMIN — MEROPENEM 500 MG: 500 INJECTION, POWDER, FOR SOLUTION INTRAVENOUS at 10:04

## 2025-04-21 RX ADMIN — CALCIUM CARBONATE (ANTACID) CHEW TAB 500 MG 1000 MG: 500 CHEW TAB at 01:04

## 2025-04-21 RX ADMIN — MEROPENEM 500 MG: 500 INJECTION, POWDER, FOR SOLUTION INTRAVENOUS at 01:04

## 2025-04-21 RX ADMIN — OXYCODONE HYDROCHLORIDE AND ACETAMINOPHEN 1 TABLET: 10; 325 TABLET ORAL at 05:04

## 2025-04-21 RX ADMIN — CALCIUM CARBONATE (ANTACID) CHEW TAB 500 MG 1500 MG: 500 CHEW TAB at 09:04

## 2025-04-21 RX ADMIN — METOPROLOL SUCCINATE 12.5 MG: 25 TABLET, EXTENDED RELEASE ORAL at 05:04

## 2025-04-21 RX ADMIN — ENOXAPARIN SODIUM 40 MG: 40 INJECTION SUBCUTANEOUS at 05:04

## 2025-04-21 RX ADMIN — MEROPENEM 1 G: 1 INJECTION, POWDER, FOR SOLUTION INTRAVENOUS at 05:04

## 2025-04-21 RX ADMIN — GABAPENTIN 300 MG: 300 CAPSULE ORAL at 05:04

## 2025-04-21 RX ADMIN — CALCITRIOL CAPSULES 0.25 MCG 0.25 MCG: 0.25 CAPSULE ORAL at 10:04

## 2025-04-21 RX ADMIN — CALCITRIOL CAPSULES 0.25 MCG 0.25 MCG: 0.25 CAPSULE ORAL at 09:04

## 2025-04-21 RX ADMIN — COLLAGENASE SANTYL: 250 OINTMENT TOPICAL at 09:04

## 2025-04-21 RX ADMIN — MIDODRINE HYDROCHLORIDE 10 MG: 5 TABLET ORAL at 05:04

## 2025-04-21 RX ADMIN — LEVETIRACETAM 750 MG: 500 SOLUTION ORAL at 10:04

## 2025-04-21 RX ADMIN — GABAPENTIN 300 MG: 300 CAPSULE ORAL at 11:04

## 2025-04-21 RX ADMIN — LEVETIRACETAM 750 MG: 500 SOLUTION ORAL at 09:04

## 2025-04-21 RX ADMIN — GABAPENTIN 300 MG: 300 CAPSULE ORAL at 10:04

## 2025-04-21 RX ADMIN — CALCIUM CARBONATE (ANTACID) CHEW TAB 500 MG 1000 MG: 500 CHEW TAB at 10:04

## 2025-04-21 RX ADMIN — OXYCODONE HYDROCHLORIDE AND ACETAMINOPHEN 1 TABLET: 10; 325 TABLET ORAL at 01:04

## 2025-04-21 RX ADMIN — ALPRAZOLAM 1 MG: 0.5 TABLET ORAL at 05:04

## 2025-04-21 RX ADMIN — MIDODRINE HYDROCHLORIDE 10 MG: 5 TABLET ORAL at 10:04

## 2025-04-21 RX ADMIN — MIDODRINE HYDROCHLORIDE 10 MG: 5 TABLET ORAL at 11:04

## 2025-04-21 RX ADMIN — ALPRAZOLAM 1 MG: 0.5 TABLET ORAL at 10:04

## 2025-04-21 RX ADMIN — CALCIUM CARBONATE (ANTACID) CHEW TAB 500 MG 1500 MG: 500 CHEW TAB at 05:04

## 2025-04-21 RX ADMIN — PANTOPRAZOLE SODIUM 40 MG: 40 INJECTION, POWDER, FOR SOLUTION INTRAVENOUS at 10:04

## 2025-04-21 RX ADMIN — OXYCODONE HYDROCHLORIDE AND ACETAMINOPHEN 1 TABLET: 10; 325 TABLET ORAL at 10:04

## 2025-04-21 RX ADMIN — ALPRAZOLAM 1 MG: 0.5 TABLET ORAL at 02:04

## 2025-04-21 RX ADMIN — CALCIUM CARBONATE (ANTACID) CHEW TAB 500 MG 1000 MG: 500 CHEW TAB at 05:04

## 2025-04-21 RX ADMIN — LEVOTHYROXINE SODIUM 100 MCG: 0.1 TABLET ORAL at 05:04
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
## 2025-04-21 NOTE — PT/OT/SLP PROGRESS
Pt seen this AM, tolerating speaking valve during all waking hours.  Patient reports he consumes PO and uses PEG for supplement at home.  Chart review reports recent choking episode at home.  SLP to proceed with MBS to assess current swallow function.
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
## 2025-04-22 LAB
ALBUMIN SERPL-MCNC: 1.8 G/DL (ref 3.5–5)
ALBUMIN/GLOB SERPL: 0.3 RATIO (ref 1.1–2)
ALP SERPL-CCNC: 342 UNIT/L (ref 40–150)
ALT SERPL-CCNC: 22 UNIT/L (ref 0–55)
ANION GAP SERPL CALC-SCNC: 9 MEQ/L
AST SERPL-CCNC: 37 UNIT/L (ref 11–45)
BASOPHILS # BLD AUTO: 0.13 X10(3)/MCL
BASOPHILS NFR BLD AUTO: 1 %
BILIRUB SERPL-MCNC: 0.2 MG/DL
BUN SERPL-MCNC: 46.4 MG/DL (ref 8.9–20.6)
CALCIUM SERPL-MCNC: 5.8 MG/DL (ref 8.4–10.2)
CHLORIDE SERPL-SCNC: 97 MMOL/L (ref 98–107)
CO2 SERPL-SCNC: 30 MMOL/L (ref 22–29)
CREAT SERPL-MCNC: 1.01 MG/DL (ref 0.72–1.25)
CREAT/UREA NIT SERPL: 46
EOSINOPHIL # BLD AUTO: 0.96 X10(3)/MCL (ref 0–0.9)
EOSINOPHIL NFR BLD AUTO: 7.2 %
ERYTHROCYTE [DISTWIDTH] IN BLOOD BY AUTOMATED COUNT: 18.6 % (ref 11.5–17)
GFR SERPLBLD CREATININE-BSD FMLA CKD-EPI: >60 ML/MIN/1.73/M2
GLOBULIN SER-MCNC: 6.3 GM/DL (ref 2.4–3.5)
GLUCOSE SERPL-MCNC: 99 MG/DL (ref 74–100)
HCT VFR BLD AUTO: 25.6 % (ref 42–52)
HGB BLD-MCNC: 8.2 G/DL (ref 14–18)
IMM GRANULOCYTES # BLD AUTO: 0.07 X10(3)/MCL (ref 0–0.04)
IMM GRANULOCYTES NFR BLD AUTO: 0.5 %
LYMPHOCYTES # BLD AUTO: 2.82 X10(3)/MCL (ref 0.6–4.6)
LYMPHOCYTES NFR BLD AUTO: 21.3 %
MAGNESIUM SERPL-MCNC: 1.5 MG/DL (ref 1.6–2.6)
MCH RBC QN AUTO: 27.8 PG (ref 27–31)
MCHC RBC AUTO-ENTMCNC: 32 G/DL (ref 33–36)
MCV RBC AUTO: 86.8 FL (ref 80–94)
MONOCYTES # BLD AUTO: 1.23 X10(3)/MCL (ref 0.1–1.3)
MONOCYTES NFR BLD AUTO: 9.3 %
NEUTROPHILS # BLD AUTO: 8.05 X10(3)/MCL (ref 2.1–9.2)
NEUTROPHILS NFR BLD AUTO: 60.7 %
NRBC BLD AUTO-RTO: 0 %
PHOSPHATE SERPL-MCNC: 6.2 MG/DL (ref 2.3–4.7)
PLATELET # BLD AUTO: 516 X10(3)/MCL (ref 130–400)
PMV BLD AUTO: 8.1 FL (ref 7.4–10.4)
POTASSIUM SERPL-SCNC: 4.2 MMOL/L (ref 3.5–5.1)
PROT SERPL-MCNC: 8.1 GM/DL (ref 6.4–8.3)
RBC # BLD AUTO: 2.95 X10(6)/MCL (ref 4.7–6.1)
SODIUM SERPL-SCNC: 136 MMOL/L (ref 136–145)
WBC # BLD AUTO: 13.26 X10(3)/MCL (ref 4.5–11.5)

## 2025-04-22 PROCEDURE — 27000207 HC ISOLATION

## 2025-04-22 PROCEDURE — 99900026 HC AIRWAY MAINTENANCE (STAT)

## 2025-04-22 PROCEDURE — 21400001 HC TELEMETRY ROOM

## 2025-04-22 PROCEDURE — 83735 ASSAY OF MAGNESIUM: CPT

## 2025-04-22 PROCEDURE — 84100 ASSAY OF PHOSPHORUS: CPT

## 2025-04-22 PROCEDURE — 85025 COMPLETE CBC W/AUTO DIFF WBC: CPT

## 2025-04-22 PROCEDURE — 36569 INSJ PICC 5 YR+ W/O IMAGING: CPT

## 2025-04-22 PROCEDURE — 25000003 PHARM REV CODE 250

## 2025-04-22 PROCEDURE — 63600175 PHARM REV CODE 636 W HCPCS: Performed by: INTERNAL MEDICINE

## 2025-04-22 PROCEDURE — 25000003 PHARM REV CODE 250: Performed by: INTERNAL MEDICINE

## 2025-04-22 PROCEDURE — 11000001 HC ACUTE MED/SURG PRIVATE ROOM

## 2025-04-22 PROCEDURE — 63600175 PHARM REV CODE 636 W HCPCS

## 2025-04-22 PROCEDURE — 80053 COMPREHEN METABOLIC PANEL: CPT

## 2025-04-22 PROCEDURE — 99900022

## 2025-04-22 PROCEDURE — 36415 COLL VENOUS BLD VENIPUNCTURE: CPT

## 2025-04-22 PROCEDURE — 99900035 HC TECH TIME PER 15 MIN (STAT)

## 2025-04-22 RX ORDER — CALCIUM CARBONATE 200(500)MG
2000 TABLET,CHEWABLE ORAL
Status: DISCONTINUED | OUTPATIENT
Start: 2025-04-22 | End: 2025-04-27 | Stop reason: HOSPADM

## 2025-04-22 RX ORDER — CALCIUM CARBONATE 200(500)MG
2000 TABLET,CHEWABLE ORAL NIGHTLY
Status: DISCONTINUED | OUTPATIENT
Start: 2025-04-22 | End: 2025-04-27 | Stop reason: HOSPADM

## 2025-04-22 RX ORDER — OXYCODONE AND ACETAMINOPHEN 10; 325 MG/1; MG/1
1 TABLET ORAL EVERY 6 HOURS PRN
Refills: 0 | Status: DISCONTINUED | OUTPATIENT
Start: 2025-04-22 | End: 2025-04-27 | Stop reason: HOSPADM

## 2025-04-22 RX ORDER — CALCIUM GLUCONATE 20 MG/ML
1 INJECTION, SOLUTION INTRAVENOUS ONCE
Status: COMPLETED | OUTPATIENT
Start: 2025-04-22 | End: 2025-04-22

## 2025-04-22 RX ORDER — MAGNESIUM SULFATE HEPTAHYDRATE 40 MG/ML
2 INJECTION, SOLUTION INTRAVENOUS ONCE
Status: COMPLETED | OUTPATIENT
Start: 2025-04-22 | End: 2025-04-22

## 2025-04-22 RX ADMIN — CALCIUM CARBONATE (ANTACID) CHEW TAB 500 MG 2000 MG: 500 CHEW TAB at 09:04

## 2025-04-22 RX ADMIN — OXYCODONE AND ACETAMINOPHEN 1 TABLET: 325; 10 TABLET ORAL at 11:04

## 2025-04-22 RX ADMIN — PANTOPRAZOLE SODIUM 40 MG: 40 INJECTION, POWDER, FOR SOLUTION INTRAVENOUS at 11:04

## 2025-04-22 RX ADMIN — OXYCODONE AND ACETAMINOPHEN 1 TABLET: 325; 10 TABLET ORAL at 10:04

## 2025-04-22 RX ADMIN — GABAPENTIN 300 MG: 300 CAPSULE ORAL at 09:04

## 2025-04-22 RX ADMIN — METOPROLOL SUCCINATE 12.5 MG: 25 TABLET, EXTENDED RELEASE ORAL at 11:04

## 2025-04-22 RX ADMIN — MIDODRINE HYDROCHLORIDE 10 MG: 5 TABLET ORAL at 11:04

## 2025-04-22 RX ADMIN — LEVETIRACETAM 750 MG: 500 SOLUTION ORAL at 11:04

## 2025-04-22 RX ADMIN — OXYCODONE AND ACETAMINOPHEN 1 TABLET: 325; 10 TABLET ORAL at 01:04

## 2025-04-22 RX ADMIN — MEROPENEM 1 G: 1 INJECTION, POWDER, FOR SOLUTION INTRAVENOUS at 03:04

## 2025-04-22 RX ADMIN — MEROPENEM 1 G: 1 INJECTION, POWDER, FOR SOLUTION INTRAVENOUS at 05:04

## 2025-04-22 RX ADMIN — MAGNESIUM SULFATE HEPTAHYDRATE 2 G: 40 INJECTION, SOLUTION INTRAVENOUS at 12:04

## 2025-04-22 RX ADMIN — ALPRAZOLAM 1 MG: 0.5 TABLET ORAL at 11:04

## 2025-04-22 RX ADMIN — MEROPENEM 1 G: 1 INJECTION, POWDER, FOR SOLUTION INTRAVENOUS at 11:04

## 2025-04-22 RX ADMIN — CALCITRIOL CAPSULES 0.25 MCG 0.25 MCG: 0.25 CAPSULE ORAL at 09:04

## 2025-04-22 RX ADMIN — GABAPENTIN 300 MG: 300 CAPSULE ORAL at 04:04

## 2025-04-22 RX ADMIN — CALCIUM CARBONATE (ANTACID) CHEW TAB 500 MG 1500 MG: 500 CHEW TAB at 06:04

## 2025-04-22 RX ADMIN — MIDODRINE HYDROCHLORIDE 10 MG: 5 TABLET ORAL at 09:04

## 2025-04-22 RX ADMIN — ALPRAZOLAM 1 MG: 0.5 TABLET ORAL at 01:04

## 2025-04-22 RX ADMIN — OXYCODONE AND ACETAMINOPHEN 1 TABLET: 325; 10 TABLET ORAL at 04:04

## 2025-04-22 RX ADMIN — CALCIUM GLUCONATE 1 G: 20 INJECTION, SOLUTION INTRAVENOUS at 11:04

## 2025-04-22 RX ADMIN — ACETAMINOPHEN 650 MG: 325 TABLET ORAL at 06:04

## 2025-04-22 RX ADMIN — LEVOTHYROXINE SODIUM 100 MCG: 0.1 TABLET ORAL at 06:04

## 2025-04-22 RX ADMIN — MIDODRINE HYDROCHLORIDE 10 MG: 5 TABLET ORAL at 04:04

## 2025-04-22 RX ADMIN — GABAPENTIN 300 MG: 300 CAPSULE ORAL at 11:04

## 2025-04-22 RX ADMIN — ALPRAZOLAM 1 MG: 0.5 TABLET ORAL at 04:04

## 2025-04-22 RX ADMIN — LEVETIRACETAM 750 MG: 500 SOLUTION ORAL at 09:04

## 2025-04-22 RX ADMIN — ENOXAPARIN SODIUM 40 MG: 40 INJECTION SUBCUTANEOUS at 04:04

## 2025-04-22 RX ADMIN — CALCITRIOL CAPSULES 0.25 MCG 0.25 MCG: 0.25 CAPSULE ORAL at 11:04
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
## 2025-04-22 NOTE — PT/OT/SLP DISCHARGE
[facility]  Speech Language Pathology Department  Diet Tolerance Follow-up    Patient Name:  Jyoti Mayberry   MRN:  [id number]  Admitting Diagnosis: shortness of breath     Recommendations     General recommendations:  SLP intervention not indicated  Solid texture recommendation:  Regular Diet - IDDSI Level 7  Liquid consistency recommendation: Thin liquids - IDDSI Level 0   Medications: crushed in thin liquid or puree   Swallow strategies/precautions:  Assist feed:  small bites/sips, remain upright for 60 minutes after PO, SPEAKING VALVE MUST BE DONNED FOR PO INTAKE   Precautions:      Diet Tolerance     Nursing reports no difficulty regarding diet tolerance.    Outcome Measures     Functional Oral Intake Scale: 7 - Total oral diet with no restrictions    Plan     Skilled SLP services no longer warranted.  Patient tolerating speaking valve during all waking hours and tolerating regular solids with thin liquids.  Please reconsult as needed.    04/22/2025

## 2025-04-22 NOTE — PROGRESS NOTES
Chief complaint: none    Interval History:  No acute c/o, no SOB/CP, tolerating po    Review of Systems   Constitutional: Negative.    Respiratory: Negative.     Cardiovascular: Negative.    Gastrointestinal: Negative.    Genitourinary: Negative.    Psychiatric/Behavioral: Negative.        all else Neg     calcitRIOL  0.25 mcg Per G Tube BID    calcium carbonate  1,500 mg Oral TIDWM    calcium carbonate  1,500 mg Oral QHS    calcium gluconate 1 g  1 g Intravenous Once    collagenase   Topical (Top) Daily    enoxparin  40 mg Subcutaneous Daily    ergocalciferol  50,000 Units Per G Tube Q7 Days    gabapentin  300 mg Per G Tube TID    levetiracetam  750 mg Per G Tube BID    levothyroxine  100 mcg Per G Tube Before breakfast    magnesium sulfate 2 g IVPB  2 g Intravenous Once    meropenem IV (PEDS and ADULTS)  1 g Intravenous Q8H    metoprolol succinate  12.5 mg Oral Daily    midodrine  10 mg Per G Tube TID    pantoprazole  40 mg Intravenous Daily       Objective     VITAL SIGNS: 24 HR MIN & MAX LAST    Temp  Min: 97.9 °F (36.6 °C)  Max: 99.4 °F (37.4 °C)  98.4 °F (36.9 °C)    BP  Min: 104/70  Max: 115/81  105/72     Pulse  Min: 86  Max: 99  99     Resp  Min: 16  Max: 18  16    SpO2  Min: 97 %  Max: 99 %  97 %      Wt Readings from Last 3 Encounters:   04/15/25 51.9 kg (114 lb 6.7 oz)   04/17/24 46 kg (101 lb 6.6 oz)   04/10/24 45.4 kg (100 lb 1.4 oz)       Intake/Output Summary (Last 24 hours) at 4/22/2025 1152  Last data filed at 4/22/2025 0500  Gross per 24 hour   Intake 1240 ml   Output 2800 ml   Net -1560 ml       Physical Exam  Constitutional:       General: He is not in acute distress.  Cardiovascular:      Rate and Rhythm: Normal rate.   Pulmonary:      Effort: Pulmonary effort is normal. No respiratory distress.   Abdominal:      General: Bowel sounds are normal.      Palpations: Abdomen is soft.      Tenderness: There is no abdominal tenderness.   Musculoskeletal:      Cervical back: Normal range of  motion.      Comments: B AKA   Neurological:      Mental Status: He is alert and oriented to person, place, and time.          Recent Labs     04/20/25 0319 04/21/25  0527 04/22/25  0351   * 136 136   K 3.9 3.9 4.2   CO2 26 29 30*   BUN 59.9* 52.3* 46.4*   CREATININE 1.63* 1.12 1.01   GLUCOSE 79 77 99   CALCIUM 6.3* 6.0* 5.8*   MG 2.00 1.70 1.50*   PHOS 8.4* 7.3* 6.2*   ALBUMIN 1.7* 1.7* 1.8*      Recent Labs     04/20/25 0319 04/21/25  0527 04/22/25  0351   WBC 11.86* 12.91* 13.26*   HGB 6.6* 8.0* 8.2*   HCT 21.0* 24.1* 25.6*   * 550* 516*         Assessment & Plan     SHARMAINE, s/p contrast, hypotension, SIRS, cardiorenal, CHF (EF 35-40%) - Resolved, Cr down 1.0, hold diuretics for now  HypoCa, hypoparathyroid - was on CaGluc gtt (11 g in 1 L NS @ 50 mL/hr). Mari-Ca down to 7.5, 1 gm IV Ca++ ordered, increase Tums 2000 mg PO q AC and HS, continue calcitriol 0.25 BID and monitor  HyperPhos - improving steadily, cont Tums as above  Vit D Def - cont ergo  Proteus pneumonia and UTI - atbx per ID  Anemia - s/p 1 U transfusion 4/20, Hb up 6.6 to 8.2  Thrombocytosis  Leukocytosis  Hypothyroid  Seizures  PEG (5/2024)  H/o RUE DVT (5/2024)  H/o Prakash Lal tear (5/2024)  H/o C-spine fracture 2020, quadriplegia, trach dependent  Chronic OM, bilateral AKA, L elbow amputation

## 2025-04-23 LAB
ALBUMIN SERPL-MCNC: 1.9 G/DL (ref 3.5–5)
ALBUMIN/GLOB SERPL: 0.3 RATIO (ref 1.1–2)
ALP SERPL-CCNC: 308 UNIT/L (ref 40–150)
ALT SERPL-CCNC: 21 UNIT/L (ref 0–55)
ANION GAP SERPL CALC-SCNC: 7 MEQ/L
ANION GAP SERPL CALC-SCNC: 8 MEQ/L
AST SERPL-CCNC: 40 UNIT/L (ref 11–45)
BASOPHILS # BLD AUTO: 0.11 X10(3)/MCL
BASOPHILS NFR BLD AUTO: 0.8 %
BILIRUB SERPL-MCNC: 0.2 MG/DL
BUN SERPL-MCNC: 31.2 MG/DL (ref 8.9–20.6)
BUN SERPL-MCNC: 32.2 MG/DL (ref 8.9–20.6)
CALCIUM SERPL-MCNC: 5.9 MG/DL (ref 8.4–10.2)
CALCIUM SERPL-MCNC: 6 MG/DL (ref 8.4–10.2)
CHLORIDE SERPL-SCNC: 97 MMOL/L (ref 98–107)
CHLORIDE SERPL-SCNC: 97 MMOL/L (ref 98–107)
CO2 SERPL-SCNC: 33 MMOL/L (ref 22–29)
CO2 SERPL-SCNC: 34 MMOL/L (ref 22–29)
CREAT SERPL-MCNC: 1.04 MG/DL (ref 0.72–1.25)
CREAT SERPL-MCNC: 1.06 MG/DL (ref 0.72–1.25)
CREAT/UREA NIT SERPL: 29
CREAT/UREA NIT SERPL: 31
EOSINOPHIL # BLD AUTO: 0.83 X10(3)/MCL (ref 0–0.9)
EOSINOPHIL NFR BLD AUTO: 6.2 %
ERYTHROCYTE [DISTWIDTH] IN BLOOD BY AUTOMATED COUNT: 19.7 % (ref 11.5–17)
GFR SERPLBLD CREATININE-BSD FMLA CKD-EPI: >60 ML/MIN/1.73/M2
GFR SERPLBLD CREATININE-BSD FMLA CKD-EPI: >60 ML/MIN/1.73/M2
GLOBULIN SER-MCNC: 6.7 GM/DL (ref 2.4–3.5)
GLUCOSE SERPL-MCNC: 81 MG/DL (ref 74–100)
GLUCOSE SERPL-MCNC: 86 MG/DL (ref 74–100)
HCT VFR BLD AUTO: 26.1 % (ref 42–52)
HGB BLD-MCNC: 8.4 G/DL (ref 14–18)
IMM GRANULOCYTES # BLD AUTO: 0.09 X10(3)/MCL (ref 0–0.04)
IMM GRANULOCYTES NFR BLD AUTO: 0.7 %
LYMPHOCYTES # BLD AUTO: 3.21 X10(3)/MCL (ref 0.6–4.6)
LYMPHOCYTES NFR BLD AUTO: 23.9 %
MAGNESIUM SERPL-MCNC: 1.6 MG/DL (ref 1.6–2.6)
MCH RBC QN AUTO: 28 PG (ref 27–31)
MCHC RBC AUTO-ENTMCNC: 32.2 G/DL (ref 33–36)
MCV RBC AUTO: 87 FL (ref 80–94)
MONOCYTES # BLD AUTO: 1.23 X10(3)/MCL (ref 0.1–1.3)
MONOCYTES NFR BLD AUTO: 9.2 %
NEUTROPHILS # BLD AUTO: 7.97 X10(3)/MCL (ref 2.1–9.2)
NEUTROPHILS NFR BLD AUTO: 59.2 %
NRBC BLD AUTO-RTO: 0 %
PHOSPHATE SERPL-MCNC: 6.5 MG/DL (ref 2.3–4.7)
PLATELET # BLD AUTO: 465 X10(3)/MCL (ref 130–400)
PMV BLD AUTO: 8.1 FL (ref 7.4–10.4)
POTASSIUM SERPL-SCNC: 5.2 MMOL/L (ref 3.5–5.1)
POTASSIUM SERPL-SCNC: 5.4 MMOL/L (ref 3.5–5.1)
PROT SERPL-MCNC: 8.6 GM/DL (ref 6.4–8.3)
RBC # BLD AUTO: 3 X10(6)/MCL (ref 4.7–6.1)
SODIUM SERPL-SCNC: 138 MMOL/L (ref 136–145)
SODIUM SERPL-SCNC: 138 MMOL/L (ref 136–145)
WBC # BLD AUTO: 13.44 X10(3)/MCL (ref 4.5–11.5)

## 2025-04-23 PROCEDURE — 25000003 PHARM REV CODE 250

## 2025-04-23 PROCEDURE — 36415 COLL VENOUS BLD VENIPUNCTURE: CPT

## 2025-04-23 PROCEDURE — 80053 COMPREHEN METABOLIC PANEL: CPT

## 2025-04-23 PROCEDURE — 25000003 PHARM REV CODE 250: Performed by: INTERNAL MEDICINE

## 2025-04-23 PROCEDURE — 83735 ASSAY OF MAGNESIUM: CPT

## 2025-04-23 PROCEDURE — 85025 COMPLETE CBC W/AUTO DIFF WBC: CPT

## 2025-04-23 PROCEDURE — 63600175 PHARM REV CODE 636 W HCPCS

## 2025-04-23 PROCEDURE — 84100 ASSAY OF PHOSPHORUS: CPT

## 2025-04-23 PROCEDURE — 27000207 HC ISOLATION

## 2025-04-23 PROCEDURE — 99900026 HC AIRWAY MAINTENANCE (STAT)

## 2025-04-23 PROCEDURE — 11000001 HC ACUTE MED/SURG PRIVATE ROOM

## 2025-04-23 PROCEDURE — 80048 BASIC METABOLIC PNL TOTAL CA: CPT

## 2025-04-23 PROCEDURE — 21400001 HC TELEMETRY ROOM

## 2025-04-23 PROCEDURE — 99900035 HC TECH TIME PER 15 MIN (STAT)

## 2025-04-23 PROCEDURE — 94761 N-INVAS EAR/PLS OXIMETRY MLT: CPT

## 2025-04-23 PROCEDURE — 99900022

## 2025-04-23 RX ORDER — CALCIUM GLUCONATE 20 MG/ML
1 INJECTION, SOLUTION INTRAVENOUS ONCE
Status: COMPLETED | OUTPATIENT
Start: 2025-04-23 | End: 2025-04-23

## 2025-04-23 RX ORDER — MAGNESIUM SULFATE 1 G/100ML
1 INJECTION INTRAVENOUS ONCE
Status: COMPLETED | OUTPATIENT
Start: 2025-04-23 | End: 2025-04-23

## 2025-04-23 RX ORDER — CALCITRIOL 0.5 UG/1
0.5 CAPSULE ORAL 2 TIMES DAILY
Status: DISCONTINUED | OUTPATIENT
Start: 2025-04-23 | End: 2025-04-27 | Stop reason: HOSPADM

## 2025-04-23 RX ADMIN — LEVETIRACETAM 750 MG: 500 SOLUTION ORAL at 10:04

## 2025-04-23 RX ADMIN — CALCIUM GLUCONATE 1 G: 20 INJECTION, SOLUTION INTRAVENOUS at 10:04

## 2025-04-23 RX ADMIN — OXYCODONE AND ACETAMINOPHEN 1 TABLET: 325; 10 TABLET ORAL at 05:04

## 2025-04-23 RX ADMIN — MEROPENEM 1 G: 1 INJECTION, POWDER, FOR SOLUTION INTRAVENOUS at 06:04

## 2025-04-23 RX ADMIN — GABAPENTIN 300 MG: 300 CAPSULE ORAL at 08:04

## 2025-04-23 RX ADMIN — MIDODRINE HYDROCHLORIDE 10 MG: 5 TABLET ORAL at 10:04

## 2025-04-23 RX ADMIN — CALCIUM CARBONATE (ANTACID) CHEW TAB 500 MG 2000 MG: 500 CHEW TAB at 10:04

## 2025-04-23 RX ADMIN — COLLAGENASE SANTYL: 250 OINTMENT TOPICAL at 11:04

## 2025-04-23 RX ADMIN — CALCITRIOL 0.5 MCG: 0.5 CAPSULE, LIQUID FILLED ORAL at 08:04

## 2025-04-23 RX ADMIN — METOPROLOL SUCCINATE 12.5 MG: 25 TABLET, EXTENDED RELEASE ORAL at 10:04

## 2025-04-23 RX ADMIN — GABAPENTIN 300 MG: 300 CAPSULE ORAL at 02:04

## 2025-04-23 RX ADMIN — GABAPENTIN 300 MG: 300 CAPSULE ORAL at 10:04

## 2025-04-23 RX ADMIN — MEROPENEM 1 G: 1 INJECTION, POWDER, FOR SOLUTION INTRAVENOUS at 03:04

## 2025-04-23 RX ADMIN — CALCIUM CARBONATE (ANTACID) CHEW TAB 500 MG 2000 MG: 500 CHEW TAB at 05:04

## 2025-04-23 RX ADMIN — CALCIUM CARBONATE (ANTACID) CHEW TAB 500 MG 2000 MG: 500 CHEW TAB at 08:04

## 2025-04-23 RX ADMIN — ALPRAZOLAM 1 MG: 0.5 TABLET ORAL at 12:04

## 2025-04-23 RX ADMIN — MIDODRINE HYDROCHLORIDE 10 MG: 5 TABLET ORAL at 08:04

## 2025-04-23 RX ADMIN — MAGNESIUM SULFATE IN DEXTROSE 1 G: 10 INJECTION, SOLUTION INTRAVENOUS at 11:04

## 2025-04-23 RX ADMIN — LEVOTHYROXINE SODIUM 100 MCG: 0.1 TABLET ORAL at 05:04

## 2025-04-23 RX ADMIN — CALCITRIOL 0.5 MCG: 0.5 CAPSULE, LIQUID FILLED ORAL at 12:04

## 2025-04-23 RX ADMIN — SODIUM ZIRCONIUM CYCLOSILICATE 10 G: 10 POWDER, FOR SUSPENSION ORAL at 11:04

## 2025-04-23 RX ADMIN — CALCITRIOL CAPSULES 0.25 MCG 0.25 MCG: 0.25 CAPSULE ORAL at 10:04

## 2025-04-23 RX ADMIN — MEROPENEM 1 G: 1 INJECTION, POWDER, FOR SOLUTION INTRAVENOUS at 10:04

## 2025-04-23 RX ADMIN — PANTOPRAZOLE SODIUM 40 MG: 40 INJECTION, POWDER, FOR SOLUTION INTRAVENOUS at 10:04

## 2025-04-23 RX ADMIN — MIDODRINE HYDROCHLORIDE 10 MG: 5 TABLET ORAL at 02:04

## 2025-04-23 RX ADMIN — OXYCODONE AND ACETAMINOPHEN 1 TABLET: 325; 10 TABLET ORAL at 11:04

## 2025-04-23 RX ADMIN — ALPRAZOLAM 1 MG: 0.5 TABLET ORAL at 05:04

## 2025-04-23 RX ADMIN — LEVETIRACETAM 750 MG: 500 SOLUTION ORAL at 08:04

## 2025-04-23 RX ADMIN — ENOXAPARIN SODIUM 40 MG: 40 INJECTION SUBCUTANEOUS at 05:04

## 2025-04-23 RX ADMIN — CALCIUM CARBONATE (ANTACID) CHEW TAB 500 MG 2000 MG: 500 CHEW TAB at 07:04

## 2025-04-23 NOTE — PROGRESS NOTES
Chief complaint: none    Interval History:  No acute c/o, chewing the tums, no SOB/CP/N/V    Review of Systems   Constitutional: Negative.    HENT: Negative.     Respiratory: Negative.     Cardiovascular: Negative.    Gastrointestinal: Negative.    Musculoskeletal: Negative.    Psychiatric/Behavioral: Negative.        all else Neg     calcitRIOL  0.5 mcg Per G Tube BID    calcium carbonate  2,000 mg Oral TIDWM    calcium carbonate  2,000 mg Oral QHS    collagenase   Topical (Top) Daily    enoxparin  40 mg Subcutaneous Daily    ergocalciferol  50,000 Units Per G Tube Q7 Days    gabapentin  300 mg Per G Tube TID    levetiracetam  750 mg Per G Tube BID    levothyroxine  100 mcg Per G Tube Before breakfast    magnesium sulfate 1 g IVPB  1 g Intravenous Once    meropenem IV (PEDS and ADULTS)  1 g Intravenous Q8H    metoprolol succinate  12.5 mg Oral Daily    midodrine  10 mg Per G Tube TID    pantoprazole  40 mg Intravenous Daily       Objective     VITAL SIGNS: 24 HR MIN & MAX LAST    Temp  Min: 97.9 °F (36.6 °C)  Max: 98.9 °F (37.2 °C)  98.3 °F (36.8 °C)    BP  Min: 114/81  Max: 131/88  123/87     Pulse  Min: 84  Max: 98  92     Resp  Min: 17  Max: 18  18    SpO2  Min: 93 %  Max: 99 %  98 %      Wt Readings from Last 3 Encounters:   04/15/25 51.9 kg (114 lb 6.7 oz)   04/17/24 46 kg (101 lb 6.6 oz)   04/10/24 45.4 kg (100 lb 1.4 oz)       Intake/Output Summary (Last 24 hours) at 4/23/2025 1157  Last data filed at 4/23/2025 0532  Gross per 24 hour   Intake 1360 ml   Output 1825 ml   Net -465 ml       Physical Exam  Constitutional:       General: He is not in acute distress.  Cardiovascular:      Rate and Rhythm: Normal rate.   Pulmonary:      Effort: Pulmonary effort is normal.      Breath sounds: Normal breath sounds.   Abdominal:      General: Bowel sounds are normal. There is no distension.      Palpations: Abdomen is soft.      Tenderness: There is no abdominal tenderness.      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 He presented with URI symptoms, decreased appetite, LLL pneumonia, intermittent low grade fevers, bilateral leg pain, pallor, severe anemia (Hgb 3.4), thrombocytopenia (plts 14K) and neutropenia with abnormal lymphocytes on CBC. 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 Diagnostic LP revealed CNS 1 status (negative). Day 8 PB MRD negative. Day 29 marrow was MRD negative making him low risk. He was treated on [facility] protocol [id number] for Induction therapy. Given accrual goals had been met, he is now being treated per standard of care which is the [facility] Arm. Anshu comes to [facility] with his mom for Day 29 of his 9th Maintenance cycle.      HPI:   Anshu has been feeling great and in his usual state of good health. His URI symptoms have completely resolved. He is having some mild nasal congestion and some sneezing when he is outside for extended periods of time related to seasonal allergies. He continues to have several active molluscum lesions including one on his lower back. He scratches them while he sleeps, but they are otherwise not causing him any issues. Mom denies that he has had fever, nasal drainage, cough, and decreased energy level. His appetite has been at baseline. Mom also denies diarrhea or constipation. Voiding per baseline. No c/o leg pains. He denies tinging and numbness in his hands and feet.     ROS: 10 point ROS neg other than the symptoms noted above in the HPI.     PMH:   Treated for strep pharyngitis and left posterior cervical LAD in July 2015  Viral URI w/ wheezing requiring albuterol in November 2015  ALL - Jan 2016  Human metapneumovirus - Jan 2016  Strength deficits, including drop foot - Feb 2016; attended PT from Feb-April 2016  RSV - March 2016  Vitamin D insufficiency- March 2016  Vitamin D    Genitourinary:     Comments: Shen good Uout  Musculoskeletal:      Cervical back: Normal range of motion.   Neurological:      Mental Status: He is alert and oriented to person, place, and time.          Recent Labs     04/22/25  0351 04/23/25  0333 04/23/25  0701    138 138   K 4.2 5.2* 5.4*   CO2 30* 33* 34*   BUN 46.4* 32.2* 31.2*   CREATININE 1.01 1.04 1.06   GLUCOSE 99 86 81   CALCIUM 5.8* 6.0* 5.9*   MG 1.50* 1.60  --    PHOS 6.2* 6.5*  --    ALBUMIN 1.8*  --  1.9*      Recent Labs     04/21/25  0527 04/22/25  0351 04/23/25  0333   WBC 12.91* 13.26* 13.44*   HGB 8.0* 8.2* 8.4*   HCT 24.1* 25.6* 26.1*   * 516* 465*         Assessment & Plan     SHARMAINE, s/p contrast, hypotension, SIRS, cardiorenal, CHF (EF 35-40%) - Resolved, Cr down 1.0, hold diuretics for now  HypoCa, hypoparathyroid - was on CaGluc gtt (11 g in 1 L NS @ 50 mL/hr). Mari-Ca now stable at 7.5, 1 gm IV Ca++ ordered again today, Tums increased to 2000 mg PO q AC and HS, will also increase calcitriol to 0.5 BID and monitor  HyperPhos - improved, cont Tums as above, he was counseled to stop the cokes  Vit D Def - cont ergo  Proteus pneumonia, sepsis and UTI - atbx per ID.  Hypotension resolved, consider reducing midodrine  Anemia - s/p 1 U transfusion 4/20, Hb up 6.6 to 8.4  Thrombocytosis  Leukocytosis  Hypothyroid  Seizures  PEG (5/2024)  H/o RUE DVT (5/2024)  H/o Prakash Lal tear (5/2024)  H/o C-spine fracture 2020, quadriplegia, trach dependent  Chronic OM, bilateral AKA, L elbow amputation     sufficiency achieved- July 2016  Vomiting with heparin flushes- July 2016  ADHD- August 2016  Right AOM- November 2016  Fine motor impairment- December 2016  Right AOM- December 2017  Strep pharyngitis-   Previously attended PT Feb-April 2016. OT recommended, but not attended due to cooperation.   Rare blasts noted on peripheral CBCdp x 1, smear normal, flow cytometry without leukemia- May 2018    PFMH: Unchanged    Social History: Anshu has several supportive family members, including his mom (Allyn), sister (Violet), maternal grandmother and maternal grandfather. Biological father is not involved. Anshu finished summer school in July and is participating in a [employer] program until school starts in the fall.    Current Medications:     Current Facility-Administered Medications on File Prior to Visit:  acetaminophen (TYLENOL) solution 325 mg   anticoagulant citrate flush 3 mL   dexmedetomidine (PRECEDEX) 4 mcg/mL bolus   heparin flush SOLN 500 Units   heparin lock flush 10 UNIT/ML injection 5 mL   [COMPLETED] lidocaine (LMX4) kit   [COMPLETED] methotrexate (PF) 12 mg in sodium chloride 0.9 % 6 mL Preservative Free CHEMOTHERAPY   vinCRIStine (ONCOVIN) 1.28 mg in sodium chloride 0.9 % 29 mL CHEMOTHERAPY     Current Outpatient Prescriptions on File Prior to Visit:  acetaminophen (TYLENOL) 160 MG/5ML oral liquid Take 7.5 mLs (240 mg) by mouth every 6 hours as needed for mild pain or fever   albuterol (2.5 MG/3ML) 0.083% neb solution Take 1 vial (2.5 mg) by nebulization every 6 hours as needed for shortness of breath / dyspnea or wheezing   cholecalciferol (VITAMIN D/ D-VI-SOL) 400 UNIT/ML LIQD liquid Take 2 mLs (800 Units) by mouth daily   diphenhydrAMINE (BENADRYL) 12.5 MG/5ML liquid Take 8.15 mLs (20.375 mg) by mouth every 6 hours as needed for itching   lidocaine-prilocaine (EMLA) cream Apply topically as needed for moderate pain (apply 30 minutes prior to port access)   LORazepam (ATIVAN) 0.5 MG tablet Take 2 tablets (1  "mg) by mouth once as needed for anxiety (Give 30 minutes prior to medical appointment) May attempt to administer pill with a bite of food or crush and mix with food to administer.   mercaptopurine (PURINETHOL) 50 MG tablet CHEMO Take 75mg (1.5 tabs) x 4 days/week and 50mg (1 tab) x 3 days/week.   methotrexate 2.5 MG tablet CHEMO Take 7 tablets (17.5 mg) by mouth once a week On Thursdays except weeks of lumbar puncture with IT chemo.   ondansetron (ZOFRAN ODT) 4 MG ODT tab Take 1 tablet (4 mg) by mouth every 8 hours as needed for nausea   polyethylene glycol (MIRALAX/GLYCOLAX) packet Take 17 g by mouth daily (Patient taking differently: Take 17 g by mouth daily as needed )   sulfamethoxazole-trimethoprim (BACTRIM/SEPTRA) suspension Take 6.25 mLs (50 mg) by mouth Every Mon, Tues two times daily Dose based on TMP component.   [DISCONTINUED] dexamethasone (DECADRON) 1 MG tablet Take 2.5 tablets (2.5 mg) by mouth 2 times daily (with meals) for 5 days     Above medications were reviewed with mom, reports no missed/forgotten doses.   6MP and methotrexate are at 100% full dose (since 5/17/18). Had previously been taking 125% full dose 6MP and 137.5% full dose methotrexate.    Physical Exam:  Temp:  [96.6  F (35.9  C)] 96.6  F (35.9  C)  Heart Rate:  [103] 103  Resp:  [21] 21  BP: (107)/(79) 107/79  SpO2:  [100 %] 100 %     Wt Readings from Last 4 Encounters:   08/16/18 22 kg (48 lb 6.4 oz) (33 %)*   07/19/18 21.9 kg (48 lb 4.5 oz) (35 %)*   07/19/18 21.7 kg (47 lb 12.8 oz) (32 %)*   06/21/18 22 kg (48 lb 8 oz) (38 %)*     * Growth percentiles are based on CDC 2-20 Years data.     Ht Readings from Last 2 Encounters:   08/16/18 1.216 m (3' 11.87\") (44 %)*   07/19/18 1.203 m (3' 11.36\") (38 %)*     * Growth percentiles are based on [facility] 2-20 Years data.     General: Anshu is alert, interactive and cooperative, and in no distress  HEENT: Skull is atraumatic and normocephalic. Black hair evenly distributed. PERRL, sclera are non " icteric and not injected, EOM are intact. Nares with thick clearish drainage and dry skin from excessive rubbing. Oropharynx is clear without exudate, erythema or lesions. TMs   Neck: soft, supple, full ROM, bilateral freely mobile shotty lymph nodes in the anterior cervical chains       Lymph:  No supraclavicular or axillary lymph nodes palpated.  Cardiovascular: HR is regular. Normal S1, S2, no murmur, gallop or rub. Capillary refill is < 2 seconds. Peripheral pulses 2+, strong and equal. There is no edema.  Respiratory: Respirations are easy. Lungs clear to auscultation. No crackles or wheezes. No increased WOB.  Gastrointestinal:  BS present in all quadrants.  Abdomen is soft and non-tender. No hepatosplenomegaly or masses are palpated.   Genitourinary: Normal testicle without masses  Skin: Port site is C/D/I, accessed, no lesions noted by port on right chest; left chest and midline abdomen with 8-10 papules consistent with molluscum without erythema and 1 papule in the lumbosacral area 2 cm to the right of midline and 3 cm below L3/L4 where his LP was obtained  Neuro: Ambulates independently. Sensation intact.  MSK: Muscle bulk and tone appropriate for age. No heel cord tightness. Strength 5/5 x 4.       Labs:  Recent Results (from the past 48 hour(s))   CBC with platelets differential    Collection Time: 08/16/18 10:10 AM   Result Value Ref Range    WBC 2.3 (L) 5.0 - 14.5 10e9/L    RBC Count 4.30 3.7 - 5.3 10e12/L    Hemoglobin 11.8 10.5 - 14.0 g/dL    Hematocrit 35.6 31.5 - 43.0 %    MCV 83 70 - 100 fl    MCH 27.4 26.5 - 33.0 pg    MCHC 33.1 31.5 - 36.5 g/dL    RDW 16.8 (H) 10.0 - 15.0 %    Platelet Count 305 150 - 450 10e9/L    Diff Method Automated Method     % Neutrophils 38.9 %    % Lymphocytes 49.6 %    % Monocytes 9.4 %    % Eosinophils 1.3 %    % Basophils 0.4 %    % Immature Granulocytes 0.4 %    Nucleated RBCs 0 0 /100    Absolute Neutrophil 0.9 (L) 1.3 - 8.1 10e9/L    Absolute Lymphocytes 1.2 1.1 -  8.6 10e9/L    Absolute Monocytes 0.2 0.0 - 1.1 10e9/L    Absolute Eosinophils 0.0 0.0 - 0.7 10e9/L    Absolute Basophils 0.0 0.0 - 0.2 10e9/L    Abs Immature Granulocytes 0.0 0 - 0.4 10e9/L    Absolute Nucleated RBC 0.0        Procedure Note:  A Lumbar Puncture was performed in the Pediatric Sedation Suite. Mother provided informed consent prior to the procedure. Anshu Root was identified by facial recognition and ID arm band. A time-out was performed. He was then placed in the left lateral decubitus position and the lumbosacral area was sterily prepped using chloraprep (the sterilized field did not include the molluscum lesion on his right lower back) followed by drape placement. Anatomic landmarks were identified by palpation. Then, a 22 gauge, 1.5 inch spinal needle was easily inserted into the L3-L4 interspace. On the first attempt approximately 1 mL of clear and colorless cerebrospinal fluid was obtained to be sent to the lab for cell count analysis and cytospin. Following that, 12mg of intrathecal methotrexate in 6 mL of preservative-free normal saline was infused without resistance. The needle was removed, pressure was applied, and a Band-Aid applied. Anshu Root tolerated this procedure well.     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 He was restarted on 100% full dosing 3 months ago and his doses of mercaptopurine and methotrexate were both increased 1 month ago to account for increase in BSA. Counts today continue to look great, ANC 0.9, hgb and platelet counts normal. Despite some seasonal allergies and ongoing molluscum rash, he is feeling well and tolerating therapy without any issues. He did undergo sedated LP with IT methotrexate today which was postponed from his day 1 visit due to URI symptoms and anesthesia's recommendation.      Plan:   1) LP  with IT methotrexate and IV vincristine in clinic  2) Restart dexamethasone 5 day burst (no change from prior dosing at 2.5 mg BID)  3) Continue oral 6MP (1.5 tabs x 4 days and 1 tab x 3 days) and methotrexate (7 tabs weekly) at 100% dosing for both  4) Refill provided for bactrim  5) Encouraged Anshu not to pick at his molluscum lesions and allow them to heal on their own. Given his non-volitional scratching at night, I recommended emollients (due to dry skin from swimming) and benadryl at bedtime.   6) Recommended OTC children's claritin or zyrtec for control of daytime seasonal allergy symptoms  7) RTC 4 weeks for Day 57 of Maintenance cycle 9      Brian Chatman MD  Pediatric Hematology/Oncology  [facility]  Pager [phone]

## 2025-04-23 NOTE — PLAN OF CARE
Dr. Welch rounded and states that pt could possibly d/c tomorrow without IVAB , if WBC do not increase.  If the WBC increase then will d/c to home with need for 3 days of IVAB.  Pt will need an endocrinology referral for his Hypoparathyroidism.  Pt also states wants a new PCP and Dr. Albert informed that pt will need to find a new PCP himself as he has Medicaid and PCP's are usually assigned to his plan.    Will await lab results in am and further orders re: IVAB , then discuss with pt choices of infusion providers etc.    Referral made to [facility] requesting an appt for Hypoparathyroidism- Clinic will contact with an appt. Date and time.
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
## 2025-04-24 LAB
ANION GAP SERPL CALC-SCNC: 9 MEQ/L
BASOPHILS # BLD AUTO: 0.12 X10(3)/MCL
BASOPHILS NFR BLD AUTO: 0.8 %
BUN SERPL-MCNC: 37.7 MG/DL (ref 8.9–20.6)
CALCIUM SERPL-MCNC: 6.6 MG/DL (ref 8.4–10.2)
CHLORIDE SERPL-SCNC: 96 MMOL/L (ref 98–107)
CO2 SERPL-SCNC: 29 MMOL/L (ref 22–29)
CREAT SERPL-MCNC: 0.91 MG/DL (ref 0.72–1.25)
CREAT/UREA NIT SERPL: 41
EOSINOPHIL # BLD AUTO: 1.09 X10(3)/MCL (ref 0–0.9)
EOSINOPHIL NFR BLD AUTO: 7.1 %
ERYTHROCYTE [DISTWIDTH] IN BLOOD BY AUTOMATED COUNT: 20 % (ref 11.5–17)
GFR SERPLBLD CREATININE-BSD FMLA CKD-EPI: >60 ML/MIN/1.73/M2
GLUCOSE SERPL-MCNC: 89 MG/DL (ref 74–100)
HCT VFR BLD AUTO: 28.8 % (ref 42–52)
HGB BLD-MCNC: 8.9 G/DL (ref 14–18)
IMM GRANULOCYTES # BLD AUTO: 0.14 X10(3)/MCL (ref 0–0.04)
IMM GRANULOCYTES NFR BLD AUTO: 0.9 %
LYMPHOCYTES # BLD AUTO: 3.38 X10(3)/MCL (ref 0.6–4.6)
LYMPHOCYTES NFR BLD AUTO: 22.1 %
MAGNESIUM SERPL-MCNC: 1.5 MG/DL (ref 1.6–2.6)
MCH RBC QN AUTO: 27.8 PG (ref 27–31)
MCHC RBC AUTO-ENTMCNC: 30.9 G/DL (ref 33–36)
MCV RBC AUTO: 90 FL (ref 80–94)
MONOCYTES # BLD AUTO: 1.22 X10(3)/MCL (ref 0.1–1.3)
MONOCYTES NFR BLD AUTO: 8 %
NEUTROPHILS # BLD AUTO: 9.32 X10(3)/MCL (ref 2.1–9.2)
NEUTROPHILS NFR BLD AUTO: 61.1 %
NRBC BLD AUTO-RTO: 0 %
PHOSPHATE SERPL-MCNC: 6.5 MG/DL (ref 2.3–4.7)
PLATELET # BLD AUTO: 502 X10(3)/MCL (ref 130–400)
PMV BLD AUTO: 8.2 FL (ref 7.4–10.4)
POTASSIUM SERPL-SCNC: 5.6 MMOL/L (ref 3.5–5.1)
RBC # BLD AUTO: 3.2 X10(6)/MCL (ref 4.7–6.1)
SODIUM SERPL-SCNC: 134 MMOL/L (ref 136–145)
WBC # BLD AUTO: 15.27 X10(3)/MCL (ref 4.5–11.5)

## 2025-04-24 PROCEDURE — 25000003 PHARM REV CODE 250

## 2025-04-24 PROCEDURE — 99900026 HC AIRWAY MAINTENANCE (STAT)

## 2025-04-24 PROCEDURE — 85025 COMPLETE CBC W/AUTO DIFF WBC: CPT

## 2025-04-24 PROCEDURE — 99900031 HC PATIENT EDUCATION (STAT)

## 2025-04-24 PROCEDURE — 25000003 PHARM REV CODE 250: Performed by: INTERNAL MEDICINE

## 2025-04-24 PROCEDURE — 27000207 HC ISOLATION

## 2025-04-24 PROCEDURE — 83735 ASSAY OF MAGNESIUM: CPT

## 2025-04-24 PROCEDURE — 99900022

## 2025-04-24 PROCEDURE — 31720 CLEARANCE OF AIRWAYS: CPT

## 2025-04-24 PROCEDURE — 11000001 HC ACUTE MED/SURG PRIVATE ROOM

## 2025-04-24 PROCEDURE — 84100 ASSAY OF PHOSPHORUS: CPT

## 2025-04-24 PROCEDURE — 99900035 HC TECH TIME PER 15 MIN (STAT)

## 2025-04-24 PROCEDURE — 63600175 PHARM REV CODE 636 W HCPCS

## 2025-04-24 PROCEDURE — 94760 N-INVAS EAR/PLS OXIMETRY 1: CPT

## 2025-04-24 PROCEDURE — 36415 COLL VENOUS BLD VENIPUNCTURE: CPT

## 2025-04-24 PROCEDURE — 80048 BASIC METABOLIC PNL TOTAL CA: CPT

## 2025-04-24 PROCEDURE — 21400001 HC TELEMETRY ROOM

## 2025-04-24 RX ADMIN — ENOXAPARIN SODIUM 40 MG: 40 INJECTION SUBCUTANEOUS at 03:04

## 2025-04-24 RX ADMIN — OXYCODONE AND ACETAMINOPHEN 1 TABLET: 325; 10 TABLET ORAL at 11:04

## 2025-04-24 RX ADMIN — METOPROLOL SUCCINATE 12.5 MG: 25 TABLET, EXTENDED RELEASE ORAL at 09:04

## 2025-04-24 RX ADMIN — GABAPENTIN 300 MG: 300 CAPSULE ORAL at 09:04

## 2025-04-24 RX ADMIN — ALPRAZOLAM 1 MG: 0.5 TABLET ORAL at 10:04

## 2025-04-24 RX ADMIN — MEROPENEM 1 G: 1 INJECTION, POWDER, FOR SOLUTION INTRAVENOUS at 10:04

## 2025-04-24 RX ADMIN — MIDODRINE HYDROCHLORIDE 10 MG: 5 TABLET ORAL at 09:04

## 2025-04-24 RX ADMIN — GABAPENTIN 300 MG: 300 CAPSULE ORAL at 02:04

## 2025-04-24 RX ADMIN — CALCIUM CARBONATE (ANTACID) CHEW TAB 500 MG 2000 MG: 500 CHEW TAB at 03:04

## 2025-04-24 RX ADMIN — PANTOPRAZOLE SODIUM 40 MG: 40 INJECTION, POWDER, FOR SOLUTION INTRAVENOUS at 10:04

## 2025-04-24 RX ADMIN — MIDODRINE HYDROCHLORIDE 10 MG: 5 TABLET ORAL at 10:04

## 2025-04-24 RX ADMIN — CALCIUM CARBONATE (ANTACID) CHEW TAB 500 MG 2000 MG: 500 CHEW TAB at 09:04

## 2025-04-24 RX ADMIN — MIDODRINE HYDROCHLORIDE 10 MG: 5 TABLET ORAL at 02:04

## 2025-04-24 RX ADMIN — MEROPENEM 1 G: 1 INJECTION, POWDER, FOR SOLUTION INTRAVENOUS at 01:04

## 2025-04-24 RX ADMIN — LEVOTHYROXINE SODIUM 100 MCG: 0.1 TABLET ORAL at 05:04

## 2025-04-24 RX ADMIN — LEVETIRACETAM 750 MG: 500 SOLUTION ORAL at 10:04

## 2025-04-24 RX ADMIN — ALPRAZOLAM 1 MG: 0.5 TABLET ORAL at 01:04

## 2025-04-24 RX ADMIN — CALCITRIOL 0.5 MCG: 0.5 CAPSULE, LIQUID FILLED ORAL at 09:04

## 2025-04-24 RX ADMIN — MEROPENEM 1 G: 1 INJECTION, POWDER, FOR SOLUTION INTRAVENOUS at 06:04

## 2025-04-24 RX ADMIN — OXYCODONE AND ACETAMINOPHEN 1 TABLET: 325; 10 TABLET ORAL at 05:04

## 2025-04-24 RX ADMIN — OXYCODONE AND ACETAMINOPHEN 1 TABLET: 325; 10 TABLET ORAL at 02:04

## 2025-04-24 RX ADMIN — LEVETIRACETAM 750 MG: 500 SOLUTION ORAL at 09:04

## 2025-04-24 RX ADMIN — GABAPENTIN 300 MG: 300 CAPSULE ORAL at 10:04
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
## 2025-04-24 NOTE — PLAN OF CARE
Problem: Adult Inpatient Plan of Care  Goal: Plan of Care Review  4/23/2025 2251 by Sahara Chun RN  Outcome: Progressing  4/23/2025 2250 by Sahara Chun RN  Outcome: Progressing  Goal: Patient-Specific Goal (Individualized)  4/23/2025 2251 by Sahara Chun RN  Outcome: Progressing  4/23/2025 2250 by Sahara Chun RN  Outcome: Progressing  Goal: Absence of Hospital-Acquired Illness or Injury  4/23/2025 2251 by Sahara Chun RN  Outcome: Progressing  4/23/2025 2250 by Sahara Chun RN  Outcome: Progressing  Goal: Optimal Comfort and Wellbeing  4/23/2025 2251 by Sahara Chun RN  Outcome: Progressing  4/23/2025 2250 by Sahara Chun RN  Outcome: Progressing  Goal: Readiness for Transition of Care  4/23/2025 2251 by Sahara Chun RN  Outcome: Progressing  4/23/2025 2250 by Sahara Chun RN  Outcome: Progressing     Problem: Infection  Goal: Absence of Infection Signs and Symptoms  4/23/2025 2251 by Sahara Chun RN  Outcome: Progressing  4/23/2025 2250 by Sahara Chun RN  Outcome: Progressing     Problem: Wound  Goal: Optimal Coping  4/23/2025 2251 by Sahara Chun RN  Outcome: Progressing  4/23/2025 2250 by Sahara Chun RN  Outcome: Progressing  Goal: Optimal Functional Ability  4/23/2025 2251 by Sahara Chun RN  Outcome: Progressing  4/23/2025 2250 by Sahara Chun RN  Outcome: Progressing  Goal: Absence of Infection Signs and Symptoms  4/23/2025 2251 by Sahara Chun RN  Outcome: Progressing  4/23/2025 2250 by Sahara Chun RN  Outcome: Progressing  Goal: Improved Oral Intake  4/23/2025 2251 by Sahara Chun RN  Outcome: Progressing  4/23/2025 2250 by Sahara Chun RN  Outcome: Progressing  Goal: Optimal Pain Control and Function  4/23/2025 2251 by Sahara Chun RN  Outcome: Progressing  4/23/2025 2250 by Sahara Chun RN  Outcome: Progressing  Goal: Skin Health and Integrity  4/23/2025 2251 by Sahara Chun RN  Outcome: Progressing  4/23/2025 2250 by Adiel  JANETT Shoemaker  Outcome: Progressing  Goal: Optimal Wound Healing  4/23/2025 2251 by Sahara Chnu RN  Outcome: Progressing  4/23/2025 2250 by Sahara Chun RN  Outcome: Progressing     Problem: Skin Injury Risk Increased  Goal: Skin Health and Integrity  4/23/2025 2251 by Sahara Chun RN  Outcome: Progressing  4/23/2025 2250 by Sahara Chun RN  Outcome: Progressing     Problem: Fall Injury Risk  Goal: Absence of Fall and Fall-Related Injury  4/23/2025 2251 by Sahara Chun RN  Outcome: Progressing  4/23/2025 2250 by Sahara Chun RN  Outcome: Progressing

## 2025-04-24 NOTE — PROGRESS NOTES
Chief complaint: none    Interval History:  No new c/o.  Nsg reported he refused Tums earlier, he denies noncompliance    Review of Systems   Constitutional:  Positive for fatigue.   HENT: Negative.     Respiratory: Negative.     Cardiovascular: Negative.    Gastrointestinal: Negative.    Genitourinary: Negative.    Musculoskeletal: Negative.    Psychiatric/Behavioral: Negative.        all else Neg     calcitRIOL  0.5 mcg Per G Tube BID    calcium carbonate  2,000 mg Oral TIDWM    calcium carbonate  2,000 mg Oral QHS    collagenase   Topical (Top) Daily    enoxparin  40 mg Subcutaneous Daily    ergocalciferol  50,000 Units Per G Tube Q7 Days    gabapentin  300 mg Per G Tube TID    levetiracetam  750 mg Per G Tube BID    levothyroxine  100 mcg Per G Tube Before breakfast    meropenem IV (PEDS and ADULTS)  1 g Intravenous Q8H    metoprolol succinate  12.5 mg Oral Daily    midodrine  10 mg Per G Tube TID    pantoprazole  40 mg Intravenous Daily       Objective     VITAL SIGNS: 24 HR MIN & MAX LAST    Temp  Min: 98.2 °F (36.8 °C)  Max: 98.6 °F (37 °C)  98.4 °F (36.9 °C)    BP  Min: 106/72  Max: 124/77  124/77     Pulse  Min: 74  Max: 94  85     Resp  Min: 18  Max: 29  19    SpO2  Min: 95 %  Max: 100 %  99 %      Wt Readings from Last 3 Encounters:   04/15/25 51.9 kg (114 lb 6.7 oz)   04/17/24 46 kg (101 lb 6.6 oz)   04/10/24 45.4 kg (100 lb 1.4 oz)       Intake/Output Summary (Last 24 hours) at 4/24/2025 1230  Last data filed at 4/24/2025 1142  Gross per 24 hour   Intake 420 ml   Output 2100 ml   Net -1680 ml       Physical Exam  Constitutional:       General: He is not in acute distress.  Cardiovascular:      Rate and Rhythm: Normal rate.   Pulmonary:      Effort: Pulmonary effort is normal. No respiratory distress.   Abdominal:      General: Bowel sounds are normal.      Palpations: Abdomen is soft.      Tenderness: There is no abdominal tenderness.   Genitourinary:     Comments: Shen good Uout  Musculoskeletal:       Cervical back: Normal range of motion.      Comments: B AKA   Neurological:      Mental Status: He is alert and oriented to person, place, and time.          Recent Labs     04/23/25  0333 04/23/25  0701 04/24/25  0545    138 134*   K 5.2* 5.4* 5.6*   CO2 33* 34* 29   BUN 32.2* 31.2* 37.7*   CREATININE 1.04 1.06 0.91   GLUCOSE 86 81 89   CALCIUM 6.0* 5.9* 6.6*   MG 1.60  --  1.50*   PHOS 6.5*  --  6.5*   ALBUMIN  --  1.9*  --       Recent Labs     04/22/25  0351 04/23/25  0333 04/24/25  0545   WBC 13.26* 13.44* 15.27*   HGB 8.2* 8.4* 8.9*   HCT 25.6* 26.1* 28.8*   * 465* 502*         Assessment & Plan     SHARMAINE, s/p contrast, hypotension, SIRS, cardiorenal, CHF (EF 35-40%) - Resolved, Cr down 0.9, hold diuretics for now  HypoCa, hypoparathyroid -  Mair-Ca improved to 8.2, recommend continue Tums 2000 mg PO q AC and HS, calcitriol 0.5 BID   HyperPhos - improved, recommend Tums with meals, he was counseled to avoid the cokes / sodas  Vit D Def - cont ergo  Proteus pneumonia, sepsis and UTI - atbx per ID.  Hypotension resolved, consider reducing midodrine  Anemia - s/p 1 U transfusion 4/20, Hb up 6.6 now stable 8.9  Thrombocytosis stable  Leukocytosis  Hypothyroid  Seizures  PEG (5/2024)  H/o RUE DVT (5/2024)  H/o Prakash Lal tear (5/2024)  H/o C-spine fracture 2020, quadriplegia, trach dependent  Chronic OM, bilateral AKA, L elbow amputation    Will sign off, please call for questions / concerns

## 2025-04-25 LAB
ALBUMIN SERPL-MCNC: 2 G/DL (ref 3.5–5)
ALBUMIN/GLOB SERPL: 0.3 RATIO (ref 1.1–2)
ALP SERPL-CCNC: 304 UNIT/L (ref 40–150)
ALT SERPL-CCNC: 19 UNIT/L (ref 0–55)
ANION GAP SERPL CALC-SCNC: 9 MEQ/L
AST SERPL-CCNC: 35 UNIT/L (ref 11–45)
BASOPHILS # BLD AUTO: 0.11 X10(3)/MCL
BASOPHILS NFR BLD AUTO: 0.9 %
BILIRUB SERPL-MCNC: 0.2 MG/DL
BUN SERPL-MCNC: 44.8 MG/DL (ref 8.9–20.6)
CA-I BLD-SCNC: 0.63 MMOL/L (ref 1.12–1.32)
CALCIUM SERPL-MCNC: 7 MG/DL (ref 8.4–10.2)
CHLORIDE SERPL-SCNC: 95 MMOL/L (ref 98–107)
CO2 SERPL-SCNC: 30 MMOL/L (ref 22–29)
CREAT SERPL-MCNC: 0.9 MG/DL (ref 0.72–1.25)
CREAT/UREA NIT SERPL: 50
EOSINOPHIL # BLD AUTO: 1.03 X10(3)/MCL (ref 0–0.9)
EOSINOPHIL NFR BLD AUTO: 8 %
ERYTHROCYTE [DISTWIDTH] IN BLOOD BY AUTOMATED COUNT: 19.9 % (ref 11.5–17)
GFR SERPLBLD CREATININE-BSD FMLA CKD-EPI: >60 ML/MIN/1.73/M2
GLOBULIN SER-MCNC: 7 GM/DL (ref 2.4–3.5)
GLUCOSE SERPL-MCNC: 84 MG/DL (ref 74–100)
HCT VFR BLD AUTO: 29 % (ref 42–52)
HGB BLD-MCNC: 9.3 G/DL (ref 14–18)
IMM GRANULOCYTES # BLD AUTO: 0.12 X10(3)/MCL (ref 0–0.04)
IMM GRANULOCYTES NFR BLD AUTO: 0.9 %
LYMPHOCYTES # BLD AUTO: 2.68 X10(3)/MCL (ref 0.6–4.6)
LYMPHOCYTES NFR BLD AUTO: 20.8 %
MAGNESIUM SERPL-MCNC: 1.3 MG/DL (ref 1.6–2.6)
MCH RBC QN AUTO: 28.2 PG (ref 27–31)
MCHC RBC AUTO-ENTMCNC: 32.1 G/DL (ref 33–36)
MCV RBC AUTO: 87.9 FL (ref 80–94)
MONOCYTES # BLD AUTO: 1.13 X10(3)/MCL (ref 0.1–1.3)
MONOCYTES NFR BLD AUTO: 8.8 %
NEUTROPHILS # BLD AUTO: 7.83 X10(3)/MCL (ref 2.1–9.2)
NEUTROPHILS NFR BLD AUTO: 60.6 %
NRBC BLD AUTO-RTO: 0 %
PHOSPHATE SERPL-MCNC: 6.5 MG/DL (ref 2.3–4.7)
PLATELET # BLD AUTO: 485 X10(3)/MCL (ref 130–400)
PMV BLD AUTO: 8.5 FL (ref 7.4–10.4)
POTASSIUM SERPL-SCNC: 5.1 MMOL/L (ref 3.5–5.1)
PROT SERPL-MCNC: 9 GM/DL (ref 6.4–8.3)
RBC # BLD AUTO: 3.3 X10(6)/MCL (ref 4.7–6.1)
SODIUM SERPL-SCNC: 134 MMOL/L (ref 136–145)
WBC # BLD AUTO: 12.9 X10(3)/MCL (ref 4.5–11.5)

## 2025-04-25 PROCEDURE — 99900026 HC AIRWAY MAINTENANCE (STAT)

## 2025-04-25 PROCEDURE — 25000003 PHARM REV CODE 250

## 2025-04-25 PROCEDURE — 85025 COMPLETE CBC W/AUTO DIFF WBC: CPT | Performed by: HOSPITALIST

## 2025-04-25 PROCEDURE — 25000003 PHARM REV CODE 250: Performed by: HOSPITALIST

## 2025-04-25 PROCEDURE — 25000003 PHARM REV CODE 250: Performed by: INTERNAL MEDICINE

## 2025-04-25 PROCEDURE — 27000207 HC ISOLATION

## 2025-04-25 PROCEDURE — 82803 BLOOD GASES ANY COMBINATION: CPT

## 2025-04-25 PROCEDURE — 94760 N-INVAS EAR/PLS OXIMETRY 1: CPT | Mod: XB

## 2025-04-25 PROCEDURE — 63600175 PHARM REV CODE 636 W HCPCS

## 2025-04-25 PROCEDURE — 99900035 HC TECH TIME PER 15 MIN (STAT)

## 2025-04-25 PROCEDURE — 83735 ASSAY OF MAGNESIUM: CPT | Performed by: HOSPITALIST

## 2025-04-25 PROCEDURE — 99900022

## 2025-04-25 PROCEDURE — 11000001 HC ACUTE MED/SURG PRIVATE ROOM

## 2025-04-25 PROCEDURE — 80053 COMPREHEN METABOLIC PANEL: CPT | Performed by: HOSPITALIST

## 2025-04-25 PROCEDURE — 84100 ASSAY OF PHOSPHORUS: CPT | Performed by: INTERNAL MEDICINE

## 2025-04-25 PROCEDURE — 36415 COLL VENOUS BLD VENIPUNCTURE: CPT | Performed by: HOSPITALIST

## 2025-04-25 PROCEDURE — 21400001 HC TELEMETRY ROOM

## 2025-04-25 RX ORDER — LANOLIN ALCOHOL/MO/W.PET/CERES
800 CREAM (GRAM) TOPICAL 2 TIMES DAILY
Status: COMPLETED | OUTPATIENT
Start: 2025-04-25 | End: 2025-04-25

## 2025-04-25 RX ADMIN — MIDODRINE HYDROCHLORIDE 10 MG: 5 TABLET ORAL at 08:04

## 2025-04-25 RX ADMIN — MEROPENEM 1 G: 1 INJECTION, POWDER, FOR SOLUTION INTRAVENOUS at 02:04

## 2025-04-25 RX ADMIN — LEVOTHYROXINE SODIUM 100 MCG: 0.1 TABLET ORAL at 05:04

## 2025-04-25 RX ADMIN — ALPRAZOLAM 1 MG: 0.5 TABLET ORAL at 08:04

## 2025-04-25 RX ADMIN — ERGOCALCIFEROL 50000 UNITS: 1.25 CAPSULE ORAL at 08:04

## 2025-04-25 RX ADMIN — CALCITRIOL 0.5 MCG: 0.5 CAPSULE, LIQUID FILLED ORAL at 08:04

## 2025-04-25 RX ADMIN — MEROPENEM 1 G: 1 INJECTION, POWDER, FOR SOLUTION INTRAVENOUS at 05:04

## 2025-04-25 RX ADMIN — GABAPENTIN 300 MG: 300 CAPSULE ORAL at 09:04

## 2025-04-25 RX ADMIN — CALCITRIOL 0.5 MCG: 0.5 CAPSULE, LIQUID FILLED ORAL at 09:04

## 2025-04-25 RX ADMIN — PANTOPRAZOLE SODIUM 40 MG: 40 INJECTION, POWDER, FOR SOLUTION INTRAVENOUS at 08:04

## 2025-04-25 RX ADMIN — ALPRAZOLAM 1 MG: 0.5 TABLET ORAL at 09:04

## 2025-04-25 RX ADMIN — GABAPENTIN 300 MG: 300 CAPSULE ORAL at 03:04

## 2025-04-25 RX ADMIN — MEROPENEM 1 G: 1 INJECTION, POWDER, FOR SOLUTION INTRAVENOUS at 10:04

## 2025-04-25 RX ADMIN — Medication 800 MG: at 09:04

## 2025-04-25 RX ADMIN — Medication 800 MG: at 10:04

## 2025-04-25 RX ADMIN — ENOXAPARIN SODIUM 40 MG: 40 INJECTION SUBCUTANEOUS at 05:04

## 2025-04-25 RX ADMIN — LEVETIRACETAM 750 MG: 500 SOLUTION ORAL at 09:04

## 2025-04-25 RX ADMIN — LEVETIRACETAM 750 MG: 500 SOLUTION ORAL at 08:04

## 2025-04-25 RX ADMIN — OXYCODONE AND ACETAMINOPHEN 1 TABLET: 325; 10 TABLET ORAL at 05:04

## 2025-04-25 RX ADMIN — MIDODRINE HYDROCHLORIDE 10 MG: 5 TABLET ORAL at 09:04

## 2025-04-25 RX ADMIN — OXYCODONE AND ACETAMINOPHEN 1 TABLET: 325; 10 TABLET ORAL at 08:04

## 2025-04-25 RX ADMIN — MIDODRINE HYDROCHLORIDE 10 MG: 5 TABLET ORAL at 03:04

## 2025-04-25 RX ADMIN — GABAPENTIN 300 MG: 300 CAPSULE ORAL at 08:04

## 2025-04-25 RX ADMIN — COLLAGENASE SANTYL: 250 OINTMENT TOPICAL at 08:04
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
## 2025-04-26 LAB
ALBUMIN SERPL-MCNC: 2 G/DL (ref 3.5–5)
ALBUMIN/GLOB SERPL: 0.3 RATIO (ref 1.1–2)
ALP SERPL-CCNC: 352 UNIT/L (ref 40–150)
ALT SERPL-CCNC: 29 UNIT/L (ref 0–55)
ANION GAP SERPL CALC-SCNC: 10 MEQ/L
AST SERPL-CCNC: 62 UNIT/L (ref 11–45)
BILIRUB SERPL-MCNC: 0.2 MG/DL
BUN SERPL-MCNC: 48.9 MG/DL (ref 8.9–20.6)
CALCIUM SERPL-MCNC: 7.1 MG/DL (ref 8.4–10.2)
CHLORIDE SERPL-SCNC: 97 MMOL/L (ref 98–107)
CO2 SERPL-SCNC: 28 MMOL/L (ref 22–29)
CREAT SERPL-MCNC: 0.97 MG/DL (ref 0.72–1.25)
CREAT/UREA NIT SERPL: 50
GFR SERPLBLD CREATININE-BSD FMLA CKD-EPI: >60 ML/MIN/1.73/M2
GLOBULIN SER-MCNC: 6.9 GM/DL (ref 2.4–3.5)
GLUCOSE SERPL-MCNC: 84 MG/DL (ref 74–100)
MAGNESIUM SERPL-MCNC: 1.4 MG/DL (ref 1.6–2.6)
POTASSIUM SERPL-SCNC: 5 MMOL/L (ref 3.5–5.1)
PROT SERPL-MCNC: 8.9 GM/DL (ref 6.4–8.3)
SODIUM SERPL-SCNC: 135 MMOL/L (ref 136–145)

## 2025-04-26 PROCEDURE — 21400001 HC TELEMETRY ROOM

## 2025-04-26 PROCEDURE — 83735 ASSAY OF MAGNESIUM: CPT | Performed by: HOSPITALIST

## 2025-04-26 PROCEDURE — 25000003 PHARM REV CODE 250

## 2025-04-26 PROCEDURE — 25000003 PHARM REV CODE 250: Performed by: INTERNAL MEDICINE

## 2025-04-26 PROCEDURE — 99900022

## 2025-04-26 PROCEDURE — 80053 COMPREHEN METABOLIC PANEL: CPT | Performed by: HOSPITALIST

## 2025-04-26 PROCEDURE — 63600175 PHARM REV CODE 636 W HCPCS: Performed by: HOSPITALIST

## 2025-04-26 PROCEDURE — 63600175 PHARM REV CODE 636 W HCPCS

## 2025-04-26 PROCEDURE — 36415 COLL VENOUS BLD VENIPUNCTURE: CPT | Performed by: HOSPITALIST

## 2025-04-26 PROCEDURE — 27000207 HC ISOLATION

## 2025-04-26 PROCEDURE — 11000001 HC ACUTE MED/SURG PRIVATE ROOM

## 2025-04-26 PROCEDURE — 99900026 HC AIRWAY MAINTENANCE (STAT)

## 2025-04-26 PROCEDURE — 99900035 HC TECH TIME PER 15 MIN (STAT)

## 2025-04-26 RX ORDER — MAGNESIUM SULFATE HEPTAHYDRATE 40 MG/ML
2 INJECTION, SOLUTION INTRAVENOUS ONCE
Status: COMPLETED | OUTPATIENT
Start: 2025-04-26 | End: 2025-04-26

## 2025-04-26 RX ORDER — DIPHENHYDRAMINE HCL 25 MG
25 CAPSULE ORAL EVERY 6 HOURS PRN
Status: DISCONTINUED | OUTPATIENT
Start: 2025-04-26 | End: 2025-04-27 | Stop reason: HOSPADM

## 2025-04-26 RX ADMIN — ENOXAPARIN SODIUM 40 MG: 40 INJECTION SUBCUTANEOUS at 06:04

## 2025-04-26 RX ADMIN — MIDODRINE HYDROCHLORIDE 10 MG: 5 TABLET ORAL at 04:04

## 2025-04-26 RX ADMIN — MEROPENEM 1 G: 1 INJECTION, POWDER, FOR SOLUTION INTRAVENOUS at 10:04

## 2025-04-26 RX ADMIN — MEROPENEM 1 G: 1 INJECTION, POWDER, FOR SOLUTION INTRAVENOUS at 06:04

## 2025-04-26 RX ADMIN — CALCITRIOL 0.5 MCG: 0.5 CAPSULE, LIQUID FILLED ORAL at 10:04

## 2025-04-26 RX ADMIN — LEVETIRACETAM 750 MG: 500 SOLUTION ORAL at 10:04

## 2025-04-26 RX ADMIN — OXYCODONE AND ACETAMINOPHEN 1 TABLET: 325; 10 TABLET ORAL at 10:04

## 2025-04-26 RX ADMIN — COLLAGENASE SANTYL: 250 OINTMENT TOPICAL at 09:04

## 2025-04-26 RX ADMIN — ALPRAZOLAM 1 MG: 0.5 TABLET ORAL at 10:04

## 2025-04-26 RX ADMIN — MIDODRINE HYDROCHLORIDE 10 MG: 5 TABLET ORAL at 10:04

## 2025-04-26 RX ADMIN — PANTOPRAZOLE SODIUM 40 MG: 40 INJECTION, POWDER, FOR SOLUTION INTRAVENOUS at 10:04

## 2025-04-26 RX ADMIN — GABAPENTIN 300 MG: 300 CAPSULE ORAL at 10:04

## 2025-04-26 RX ADMIN — MEROPENEM 1 G: 1 INJECTION, POWDER, FOR SOLUTION INTRAVENOUS at 02:04

## 2025-04-26 RX ADMIN — ALPRAZOLAM 1 MG: 0.5 TABLET ORAL at 04:04

## 2025-04-26 RX ADMIN — MAGNESIUM SULFATE HEPTAHYDRATE 2 G: 40 INJECTION, SOLUTION INTRAVENOUS at 04:04

## 2025-04-26 RX ADMIN — OXYCODONE AND ACETAMINOPHEN 1 TABLET: 325; 10 TABLET ORAL at 04:04

## 2025-04-26 RX ADMIN — LEVOTHYROXINE SODIUM 100 MCG: 0.1 TABLET ORAL at 04:04

## 2025-04-26 RX ADMIN — ALPRAZOLAM 1 MG: 0.5 TABLET ORAL at 12:04

## 2025-04-26 RX ADMIN — GABAPENTIN 300 MG: 300 CAPSULE ORAL at 04:04

## 2025-04-26 RX ADMIN — METOPROLOL SUCCINATE 12.5 MG: 25 TABLET, EXTENDED RELEASE ORAL at 10:04

## 2025-04-26 RX ADMIN — OXYCODONE AND ACETAMINOPHEN 1 TABLET: 325; 10 TABLET ORAL at 01:04
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
## 2025-04-27 VITALS
WEIGHT: 114.44 LBS | HEIGHT: 69 IN | TEMPERATURE: 98 F | OXYGEN SATURATION: 99 % | RESPIRATION RATE: 18 BRPM | DIASTOLIC BLOOD PRESSURE: 69 MMHG | HEART RATE: 93 BPM | SYSTOLIC BLOOD PRESSURE: 98 MMHG | BODY MASS INDEX: 16.95 KG/M2

## 2025-04-27 PROBLEM — J18.9 PNEUMONIA: Status: ACTIVE | Noted: 2025-04-27

## 2025-04-27 PROBLEM — Z98.890 HISTORY OF TRACHEOSTOMY: Status: ACTIVE | Noted: 2025-04-27

## 2025-04-27 PROBLEM — J96.01 ACUTE HYPOXEMIC RESPIRATORY FAILURE: Status: ACTIVE | Noted: 2025-04-27

## 2025-04-27 PROBLEM — R94.31 PROLONGED Q-T INTERVAL ON ECG: Status: ACTIVE | Noted: 2025-04-27

## 2025-04-27 PROBLEM — J18.9 PNEUMONIA OF BOTH LUNGS DUE TO INFECTIOUS ORGANISM: Status: ACTIVE | Noted: 2025-04-27

## 2025-04-27 PROBLEM — E83.51 HYPOCALCEMIA: Status: ACTIVE | Noted: 2025-04-27

## 2025-04-27 PROBLEM — R06.00 DYSPNEA: Status: ACTIVE | Noted: 2025-04-27

## 2025-04-27 LAB
ALBUMIN SERPL-MCNC: 2.2 G/DL (ref 3.5–5)
ALBUMIN/GLOB SERPL: 0.3 RATIO (ref 1.1–2)
ALP SERPL-CCNC: 330 UNIT/L (ref 40–150)
ALT SERPL-CCNC: 31 UNIT/L (ref 0–55)
ANION GAP SERPL CALC-SCNC: 7 MEQ/L
AST SERPL-CCNC: 49 UNIT/L (ref 11–45)
BILIRUB SERPL-MCNC: 0.2 MG/DL
BUN SERPL-MCNC: 35.2 MG/DL (ref 8.9–20.6)
CALCIUM SERPL-MCNC: 7.5 MG/DL (ref 8.4–10.2)
CHLORIDE SERPL-SCNC: 99 MMOL/L (ref 98–107)
CO2 SERPL-SCNC: 28 MMOL/L (ref 22–29)
CREAT SERPL-MCNC: 0.88 MG/DL (ref 0.72–1.25)
CREAT/UREA NIT SERPL: 40
GFR SERPLBLD CREATININE-BSD FMLA CKD-EPI: >60 ML/MIN/1.73/M2
GLOBULIN SER-MCNC: 6.6 GM/DL (ref 2.4–3.5)
GLUCOSE SERPL-MCNC: 83 MG/DL (ref 74–100)
MAGNESIUM SERPL-MCNC: 2.04 MG/DL (ref 1.6–2.6)
POTASSIUM SERPL-SCNC: 5.2 MMOL/L (ref 3.5–5.1)
PROT SERPL-MCNC: 8.8 GM/DL (ref 6.4–8.3)
SODIUM SERPL-SCNC: 134 MMOL/L (ref 136–145)

## 2025-04-27 PROCEDURE — 99900035 HC TECH TIME PER 15 MIN (STAT)

## 2025-04-27 PROCEDURE — 36415 COLL VENOUS BLD VENIPUNCTURE: CPT | Performed by: HOSPITALIST

## 2025-04-27 PROCEDURE — 63600175 PHARM REV CODE 636 W HCPCS

## 2025-04-27 PROCEDURE — 25000003 PHARM REV CODE 250: Performed by: INTERNAL MEDICINE

## 2025-04-27 PROCEDURE — 25000003 PHARM REV CODE 250

## 2025-04-27 PROCEDURE — 25000003 PHARM REV CODE 250: Performed by: HOSPITALIST

## 2025-04-27 PROCEDURE — 83735 ASSAY OF MAGNESIUM: CPT | Performed by: HOSPITALIST

## 2025-04-27 PROCEDURE — 80053 COMPREHEN METABOLIC PANEL: CPT | Performed by: HOSPITALIST

## 2025-04-27 PROCEDURE — 99900026 HC AIRWAY MAINTENANCE (STAT)

## 2025-04-27 RX ORDER — METOPROLOL SUCCINATE 25 MG/1
12.5 TABLET, EXTENDED RELEASE ORAL DAILY
Qty: 45 TABLET | Refills: 3 | Status: SHIPPED | OUTPATIENT
Start: 2025-04-27 | End: 2026-04-27

## 2025-04-27 RX ORDER — CALCITRIOL 0.5 UG/1
0.5 CAPSULE ORAL 2 TIMES DAILY
Qty: 60 CAPSULE | Refills: 2 | Status: SHIPPED | OUTPATIENT
Start: 2025-04-27

## 2025-04-27 RX ORDER — LANOLIN ALCOHOL/MO/W.PET/CERES
400 CREAM (GRAM) TOPICAL 2 TIMES DAILY
Status: DISCONTINUED | OUTPATIENT
Start: 2025-04-27 | End: 2025-04-27 | Stop reason: HOSPADM

## 2025-04-27 RX ORDER — MIDODRINE HYDROCHLORIDE 10 MG/1
10 TABLET ORAL 3 TIMES DAILY
Qty: 90 TABLET | Refills: 11 | Status: SHIPPED | OUTPATIENT
Start: 2025-04-27 | End: 2026-04-27

## 2025-04-27 RX ORDER — GABAPENTIN 300 MG/1
300 CAPSULE ORAL 3 TIMES DAILY
Qty: 90 CAPSULE | Refills: 11 | Status: SHIPPED | OUTPATIENT
Start: 2025-04-27 | End: 2026-04-27

## 2025-04-27 RX ORDER — LANOLIN ALCOHOL/MO/W.PET/CERES
400 CREAM (GRAM) TOPICAL 2 TIMES DAILY
Qty: 60 TABLET | Refills: 0 | Status: SHIPPED | OUTPATIENT
Start: 2025-04-27

## 2025-04-27 RX ORDER — LEVETIRACETAM 100 MG/ML
750 SOLUTION ORAL 2 TIMES DAILY
Qty: 600 ML | Refills: 11 | Status: SHIPPED | OUTPATIENT
Start: 2025-04-27 | End: 2026-04-27

## 2025-04-27 RX ORDER — OXYCODONE AND ACETAMINOPHEN 10; 325 MG/1; MG/1
1 TABLET ORAL EVERY 6 HOURS PRN
Qty: 28 TABLET | Refills: 0 | Status: SHIPPED | OUTPATIENT
Start: 2025-04-27 | End: 2025-05-04

## 2025-04-27 RX ORDER — ALPRAZOLAM 1 MG/1
1 TABLET ORAL 3 TIMES DAILY PRN
Qty: 21 TABLET | Refills: 0 | Status: SHIPPED | OUTPATIENT
Start: 2025-04-27 | End: 2025-05-04

## 2025-04-27 RX ORDER — LEVOTHYROXINE SODIUM 100 UG/1
100 TABLET ORAL
Qty: 30 TABLET | Refills: 11 | Status: SHIPPED | OUTPATIENT
Start: 2025-04-28 | End: 2026-04-28

## 2025-04-27 RX ADMIN — GABAPENTIN 300 MG: 300 CAPSULE ORAL at 03:04

## 2025-04-27 RX ADMIN — PANTOPRAZOLE SODIUM 40 MG: 40 INJECTION, POWDER, FOR SOLUTION INTRAVENOUS at 09:04

## 2025-04-27 RX ADMIN — CALCITRIOL 0.5 MCG: 0.5 CAPSULE, LIQUID FILLED ORAL at 09:04

## 2025-04-27 RX ADMIN — COLLAGENASE SANTYL: 250 OINTMENT TOPICAL at 09:04

## 2025-04-27 RX ADMIN — OXYCODONE AND ACETAMINOPHEN 1 TABLET: 325; 10 TABLET ORAL at 03:04

## 2025-04-27 RX ADMIN — ALPRAZOLAM 1 MG: 0.5 TABLET ORAL at 10:04

## 2025-04-27 RX ADMIN — LEVOTHYROXINE SODIUM 100 MCG: 0.1 TABLET ORAL at 05:04

## 2025-04-27 RX ADMIN — OXYCODONE AND ACETAMINOPHEN 1 TABLET: 325; 10 TABLET ORAL at 05:04

## 2025-04-27 RX ADMIN — Medication 400 MG: at 09:04

## 2025-04-27 RX ADMIN — MIDODRINE HYDROCHLORIDE 10 MG: 5 TABLET ORAL at 03:04

## 2025-04-27 RX ADMIN — MIDODRINE HYDROCHLORIDE 10 MG: 5 TABLET ORAL at 09:04

## 2025-04-27 RX ADMIN — GABAPENTIN 300 MG: 300 CAPSULE ORAL at 09:04

## 2025-04-27 RX ADMIN — METOPROLOL SUCCINATE 12.5 MG: 25 TABLET, EXTENDED RELEASE ORAL at 09:04

## 2025-04-27 RX ADMIN — LEVETIRACETAM 750 MG: 500 SOLUTION ORAL at 09:04
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
## 2025-04-27 NOTE — PLAN OF CARE
Problem: Adult Inpatient Plan of Care  Goal: Plan of Care Review  Outcome: Met  Goal: Patient-Specific Goal (Individualized)  Outcome: Met  Goal: Absence of Hospital-Acquired Illness or Injury  Outcome: Met  Goal: Optimal Comfort and Wellbeing  Outcome: Met  Goal: Readiness for Transition of Care  Outcome: Met     Problem: Infection  Goal: Absence of Infection Signs and Symptoms  Outcome: Met     Problem: Wound  Goal: Optimal Coping  Outcome: Met  Goal: Optimal Functional Ability  Outcome: Met  Goal: Absence of Infection Signs and Symptoms  Outcome: Met  Goal: Improved Oral Intake  Outcome: Met  Goal: Optimal Pain Control and Function  Outcome: Met  Goal: Skin Health and Integrity  Outcome: Met  Goal: Optimal Wound Healing  Outcome: Met     Problem: Skin Injury Risk Increased  Goal: Skin Health and Integrity  Outcome: Met     Problem: Fall Injury Risk  Goal: Absence of Fall and Fall-Related Injury  Outcome: Met     Problem: Pneumonia  Goal: Fluid Balance  Outcome: Met  Goal: Resolution of Infection Signs and Symptoms  Outcome: Met  Goal: Effective Oxygenation and Ventilation  Outcome: Met

## 2025-04-27 NOTE — NURSING
Pt left for discharge at appx. 1615. Pt left on stretcher with [facility] with a family member and pt belongings. Alert and oriented, no signs of distress. Pt left with sterling, colostomy bag, and trach intact. All questions were answered and discharge instructions were given.

## 2025-04-28 NOTE — PROGRESS NOTES
Detail Level: Zone [facility]  Wound Care    Patient Name:  Jyoti Mayberry   MRN:  [id number]  Date: 4/5/2024  Diagnosis: Gangrene    History:     History reviewed. No pertinent past medical history.    Social History     Socioeconomic History    Marital status: Single   Tobacco Use    Smoking status: Never    Smokeless tobacco: Never   Substance and Sexual Activity    Alcohol use: Not Currently    Drug use: Not Currently    Sexual activity: Not Currently     Social Determinants of Health     Financial Resource Strain: Low Risk  (3/7/2024)    Overall Financial Resource Strain (CARDIA)     Difficulty of Paying Living Expenses: Not hard at all   Food Insecurity: No Food Insecurity (3/7/2024)    Hunger Vital Sign     Worried About Running Out of Food in the Last Year: Never true     Ran Out of Food in the Last Year: Never true   Transportation Needs: No Transportation Needs (3/7/2024)    PRAPARE - Transportation     Lack of Transportation (Medical): No     Lack of Transportation (Non-Medical): No   Physical Activity: Inactive (3/7/2024)    Exercise Vital Sign     Days of Exercise per Week: 0 days     Minutes of Exercise per Session: 0 min   Stress: Stress Concern Present (3/7/2024)    [place] [employer] - Occupational Stress Questionnaire     Feeling of Stress : Very much   Social Connections: Socially Isolated (3/7/2024)    Social Connection and Isolation Panel [NHANES]     Frequency of Communication with Friends and Family: More than three times a week     Frequency of Social Gatherings with Friends and Family: More than three times a week     Attends Hindu Services: Never     Active Member of Clubs or Organizations: No     Attends Club or Organization Meetings: Never     Marital Status: Never    Housing Stability: Unknown (3/7/2024)    Housing Stability Vital Sign     Unable to Pay for Housing in the Last Year: No     Unstable Housing in the Last Year: No       Precautions:     Allergies as of  03/05/2024    (No Known Allergies)       [facility] Assessment Details/Treatment     Patient seen for FU request  by nursing for ostomy site care- unable to maintain seal this shift. Last changed by nurse x2 today.    Reviewed chart for this encounter.   See Flow Sheet for findings.    Pt awake/ alert- and agreeable to ostomy site care at this time. Stoma remains round/ pink and moist an protrudes slightly above skin level. Peristomal skin with partial thickness tissue loss- [place] no sting skin protectant applied to achieve dry surface for improved pouching. Coloplast ring placed around stoma- and 1pc Coloplast appliance cut to 29mm placed. Pt tolerated care without c/o discomfort- or s/s of distress.   RECOMMENDATIONS:    Discussed POC with patient and primary nurse updated.  See EMR for orders & patient education.    Bedside nursing to continue care & monitoring.  Bedside nursing to maintain pressure injury prevention interventions.  Current documented Gallito score is 10 with a nutrition sub scale score of 2.     04/05/24 1500   WOCN Assessment   WOCN Total Time (mins) 30   Visit Date 04/05/24   Visit Time 1500   Consult Type Follow Up   WOCN Speciality Ostomy   WOCN List colostomy   Ostomy Type Colostomy   Intervention assessed;changed;chart review   Teaching on-going  (pt- routine ostomy site care with specific peristomal skin care.)        Colostomy  LLQ   Placement Date: (c)    Present Prior to Hospital Arrival?: Yes  Location: LLQ   Wound Image    Stomal Appliance Leakage;Changed   Stoma Appearance round;moist;pink   Stoma Size (in) 29 mm   Site Assessment Pink;Moist   Peristomal Assessment Excoriation   Accessories/Skin Care cleansed w/ soap and water   Stoma Function stool   Treatment Bag change;Site care   Tolerance no signs/symptoms of discomfort   Output (mL) 100 mL     Will continue to follow as needed and/ or as directed until discharge.    Rae Jackson BSN, RN, CWOCN  04/05/2024

## 2025-05-26 ENCOUNTER — HOSPITAL ENCOUNTER (INPATIENT)
Facility: HOSPITAL | Age: 30
LOS: 11 days | Discharge: HOME | DRG: 296 | End: 2025-06-06
Admitting: HOSPITALIST
Payer: MEDICAID

## 2025-05-26 DIAGNOSIS — I26.99 PULMONARY EMBOLISM: ICD-10-CM

## 2025-05-26 DIAGNOSIS — R94.31 QT PROLONGATION: ICD-10-CM

## 2025-05-26 DIAGNOSIS — M86.68 CHRONIC OSTEOMYELITIS OF HIP: ICD-10-CM

## 2025-05-26 DIAGNOSIS — G89.21 CHRONIC PAIN AFTER AMPUTATION: ICD-10-CM

## 2025-05-26 DIAGNOSIS — L89.90 PRESSURE INJURY OF SKIN, UNSPECIFIED INJURY STAGE, UNSPECIFIED LOCATION: Primary | ICD-10-CM

## 2025-05-26 DIAGNOSIS — R62.7 FAILURE TO THRIVE IN ADULT: ICD-10-CM

## 2025-05-26 DIAGNOSIS — R00.0 TACHYCARDIA: ICD-10-CM

## 2025-05-26 DIAGNOSIS — I46.9 CARDIOPULMONARY ARREST WITH SUCCESSFUL RESUSCITATION: ICD-10-CM

## 2025-05-26 DIAGNOSIS — Z89.9 CHRONIC PAIN AFTER AMPUTATION: ICD-10-CM

## 2025-05-26 LAB
ABS NEUT CALC (OHS): 15.72 X10(3)/MCL (ref 2.1–9.2)
ALBUMIN SERPL-MCNC: 2 G/DL (ref 3.5–5)
ALBUMIN/GLOB SERPL: 0.3 RATIO (ref 1.1–2)
ALP SERPL-CCNC: 278 UNIT/L (ref 40–150)
ALT SERPL-CCNC: 43 UNIT/L (ref 0–55)
ANION GAP SERPL CALC-SCNC: 15 MEQ/L
AST SERPL-CCNC: 69 UNIT/L (ref 11–45)
BASOPHILS NFR BLD MANUAL: 0.6 X10(3)/MCL (ref 0–0.2)
BASOPHILS NFR BLD MANUAL: 3 % (ref 0–2)
BILIRUB SERPL-MCNC: 0.3 MG/DL
BUN SERPL-MCNC: 27.1 MG/DL (ref 8.9–20.6)
CALCIUM SERPL-MCNC: 8.7 MG/DL (ref 8.4–10.2)
CHLORIDE SERPL-SCNC: 102 MMOL/L (ref 98–107)
CO2 SERPL-SCNC: 18 MMOL/L (ref 22–29)
CREAT SERPL-MCNC: 0.9 MG/DL (ref 0.72–1.25)
CREAT/UREA NIT SERPL: 30
D DIMER PPP IA.FEU-MCNC: 2.63 UG/ML FEU (ref 0–0.5)
EOSINOPHIL NFR BLD MANUAL: 0.2 X10(3)/MCL (ref 0–0.9)
EOSINOPHIL NFR BLD MANUAL: 1 % (ref 0–8)
ERYTHROCYTE [DISTWIDTH] IN BLOOD BY AUTOMATED COUNT: 15.8 % (ref 11.5–17)
GFR SERPLBLD CREATININE-BSD FMLA CKD-EPI: >60 ML/MIN/1.73/M2
GLOBULIN SER-MCNC: 7.4 GM/DL (ref 2.4–3.5)
GLUCOSE SERPL-MCNC: 57 MG/DL (ref 74–100)
HCT VFR BLD AUTO: 27.9 % (ref 42–52)
HGB BLD-MCNC: 8.8 G/DL (ref 14–18)
HYPOCHROMIA BLD QL SMEAR: SLIGHT
LACTATE SERPL-SCNC: 1.1 MMOL/L (ref 0.5–2.2)
LYMPHOCYTES NFR BLD MANUAL: 12 % (ref 13–40)
LYMPHOCYTES NFR BLD MANUAL: 2.39 X10(3)/MCL (ref 0.6–4.6)
MAGNESIUM SERPL-MCNC: 1.8 MG/DL (ref 1.6–2.6)
MCH RBC QN AUTO: 28 PG (ref 27–31)
MCHC RBC AUTO-ENTMCNC: 31.5 G/DL (ref 33–36)
MCV RBC AUTO: 88.9 FL (ref 80–94)
MONOCYTES NFR BLD MANUAL: 0.99 X10(3)/MCL (ref 0.1–1.3)
MONOCYTES NFR BLD MANUAL: 5 % (ref 2–11)
NEUTROPHILS NFR BLD MANUAL: 63 % (ref 47–80)
NEUTS BAND NFR BLD MANUAL: 16 % (ref 0–11)
NRBC BLD AUTO-RTO: 0 %
PHOSPHATE SERPL-MCNC: 6.5 MG/DL (ref 2.3–4.7)
PLATELET # BLD AUTO: 765 X10(3)/MCL (ref 130–400)
PLATELET # BLD EST: ABNORMAL 10*3/UL
PMV BLD AUTO: 8.5 FL (ref 7.4–10.4)
POLYCHROMASIA BLD QL SMEAR: ABNORMAL
POTASSIUM SERPL-SCNC: 4.5 MMOL/L (ref 3.5–5.1)
PROT SERPL-MCNC: 9.4 GM/DL (ref 6.4–8.3)
RBC # BLD AUTO: 3.14 X10(6)/MCL (ref 4.7–6.1)
RBC MORPH BLD: ABNORMAL
SODIUM SERPL-SCNC: 135 MMOL/L (ref 136–145)
WBC # BLD AUTO: 19.9 X10(3)/MCL (ref 4.5–11.5)

## 2025-05-26 PROCEDURE — 85027 COMPLETE CBC AUTOMATED: CPT

## 2025-05-26 PROCEDURE — 83735 ASSAY OF MAGNESIUM: CPT

## 2025-05-26 PROCEDURE — 25000003 PHARM REV CODE 250

## 2025-05-26 PROCEDURE — 84100 ASSAY OF PHOSPHORUS: CPT

## 2025-05-26 PROCEDURE — 83605 ASSAY OF LACTIC ACID: CPT

## 2025-05-26 PROCEDURE — 36415 COLL VENOUS BLD VENIPUNCTURE: CPT

## 2025-05-26 PROCEDURE — 81001 URINALYSIS AUTO W/SCOPE: CPT

## 2025-05-26 PROCEDURE — 87070 CULTURE OTHR SPECIMN AEROBIC: CPT

## 2025-05-26 PROCEDURE — 27200966 HC CLOSED SUCTION SYSTEM

## 2025-05-26 PROCEDURE — 99223 1ST HOSP IP/OBS HIGH 75: CPT | Mod: ,,, | Performed by: HOSPITALIST

## 2025-05-26 PROCEDURE — 85379 FIBRIN DEGRADATION QUANT: CPT

## 2025-05-26 PROCEDURE — 93005 ELECTROCARDIOGRAM TRACING: CPT

## 2025-05-26 PROCEDURE — 20000000 HC ICU ROOM

## 2025-05-26 PROCEDURE — 87798 DETECT AGENT NOS DNA AMP: CPT

## 2025-05-26 PROCEDURE — 63600175 PHARM REV CODE 636 W HCPCS

## 2025-05-26 PROCEDURE — 87040 BLOOD CULTURE FOR BACTERIA: CPT

## 2025-05-26 PROCEDURE — 80053 COMPREHEN METABOLIC PANEL: CPT

## 2025-05-26 PROCEDURE — 87077 CULTURE AEROBIC IDENTIFY: CPT

## 2025-05-26 RX ORDER — CALCITRIOL 0.25 UG/1
0.5 CAPSULE ORAL DAILY
Status: DISCONTINUED | OUTPATIENT
Start: 2025-05-27 | End: 2025-06-06 | Stop reason: HOSPADM

## 2025-05-26 RX ORDER — MUPIROCIN 20 MG/G
OINTMENT TOPICAL 2 TIMES DAILY
Status: COMPLETED | OUTPATIENT
Start: 2025-05-26 | End: 2025-05-31

## 2025-05-26 RX ORDER — ONDANSETRON HYDROCHLORIDE 2 MG/ML
4 INJECTION, SOLUTION INTRAVENOUS EVERY 6 HOURS PRN
Status: DISCONTINUED | OUTPATIENT
Start: 2025-05-26 | End: 2025-06-06 | Stop reason: HOSPADM

## 2025-05-26 RX ORDER — GLUCAGON 1 MG
1 KIT INJECTION
Status: DISCONTINUED | OUTPATIENT
Start: 2025-05-26 | End: 2025-06-06 | Stop reason: HOSPADM

## 2025-05-26 RX ORDER — PANTOPRAZOLE SODIUM 40 MG/10ML
40 INJECTION, POWDER, LYOPHILIZED, FOR SOLUTION INTRAVENOUS DAILY
Status: DISCONTINUED | OUTPATIENT
Start: 2025-05-27 | End: 2025-06-06 | Stop reason: HOSPADM

## 2025-05-26 RX ORDER — GABAPENTIN 300 MG/1
300 CAPSULE ORAL 3 TIMES DAILY
Status: DISCONTINUED | OUTPATIENT
Start: 2025-05-26 | End: 2025-06-06 | Stop reason: HOSPADM

## 2025-05-26 RX ORDER — OXYCODONE AND ACETAMINOPHEN 10; 325 MG/1; MG/1
1 TABLET ORAL EVERY 6 HOURS PRN
Refills: 0 | Status: DISCONTINUED | OUTPATIENT
Start: 2025-05-26 | End: 2025-06-05

## 2025-05-26 RX ORDER — ACETYLCYSTEINE 100 MG/ML
4 SOLUTION ORAL; RESPIRATORY (INHALATION)
Status: DISCONTINUED | OUTPATIENT
Start: 2025-05-27 | End: 2025-06-06 | Stop reason: HOSPADM

## 2025-05-26 RX ORDER — SODIUM CHLORIDE 0.9 % (FLUSH) 0.9 %
10 SYRINGE (ML) INJECTION EVERY 12 HOURS PRN
Status: DISCONTINUED | OUTPATIENT
Start: 2025-05-26 | End: 2025-06-06 | Stop reason: HOSPADM

## 2025-05-26 RX ORDER — LEVOTHYROXINE SODIUM 100 UG/1
100 TABLET ORAL
Status: DISCONTINUED | OUTPATIENT
Start: 2025-05-27 | End: 2025-06-06 | Stop reason: HOSPADM

## 2025-05-26 RX ORDER — ACETAMINOPHEN 325 MG/1
650 TABLET ORAL EVERY 6 HOURS PRN
Status: DISCONTINUED | OUTPATIENT
Start: 2025-05-26 | End: 2025-06-06 | Stop reason: HOSPADM

## 2025-05-26 RX ORDER — MIDODRINE HYDROCHLORIDE 5 MG/1
10 TABLET ORAL 3 TIMES DAILY
Status: DISCONTINUED | OUTPATIENT
Start: 2025-05-26 | End: 2025-06-01

## 2025-05-26 RX ORDER — TALC
6 POWDER (GRAM) TOPICAL NIGHTLY PRN
Status: DISCONTINUED | OUTPATIENT
Start: 2025-05-26 | End: 2025-05-29

## 2025-05-26 RX ORDER — SERTRALINE HYDROCHLORIDE 50 MG/1
50 TABLET, FILM COATED ORAL DAILY
Status: DISCONTINUED | OUTPATIENT
Start: 2025-05-27 | End: 2025-05-29

## 2025-05-26 RX ORDER — ENOXAPARIN SODIUM 100 MG/ML
40 INJECTION SUBCUTANEOUS EVERY 24 HOURS
Status: DISCONTINUED | OUTPATIENT
Start: 2025-05-26 | End: 2025-05-27

## 2025-05-26 RX ORDER — FOLIC ACID 1 MG/1
1 TABLET ORAL DAILY
Status: DISCONTINUED | OUTPATIENT
Start: 2025-05-27 | End: 2025-06-06 | Stop reason: HOSPADM

## 2025-05-26 RX ORDER — NOREPINEPHRINE BITARTRATE/D5W 4MG/250ML
0-3 PLASTIC BAG, INJECTION (ML) INTRAVENOUS CONTINUOUS
Status: DISCONTINUED | OUTPATIENT
Start: 2025-05-26 | End: 2025-05-28

## 2025-05-26 RX ORDER — IBUPROFEN 200 MG
24 TABLET ORAL
Status: DISCONTINUED | OUTPATIENT
Start: 2025-05-26 | End: 2025-06-06 | Stop reason: HOSPADM

## 2025-05-26 RX ORDER — IBUPROFEN 200 MG
16 TABLET ORAL
Status: DISCONTINUED | OUTPATIENT
Start: 2025-05-26 | End: 2025-06-06 | Stop reason: HOSPADM

## 2025-05-26 RX ORDER — LANOLIN ALCOHOL/MO/W.PET/CERES
400 CREAM (GRAM) TOPICAL 2 TIMES DAILY
Status: DISCONTINUED | OUTPATIENT
Start: 2025-05-26 | End: 2025-05-29

## 2025-05-26 RX ORDER — ALPRAZOLAM 0.5 MG/1
1 TABLET ORAL 3 TIMES DAILY PRN
Status: DISCONTINUED | OUTPATIENT
Start: 2025-05-26 | End: 2025-06-06 | Stop reason: HOSPADM

## 2025-05-26 RX ORDER — LEVETIRACETAM 100 MG/ML
750 SOLUTION ORAL 2 TIMES DAILY
Status: DISCONTINUED | OUTPATIENT
Start: 2025-05-26 | End: 2025-06-06 | Stop reason: HOSPADM

## 2025-05-26 RX ADMIN — ALPRAZOLAM 1 MG: 0.5 TABLET ORAL at 11:05

## 2025-05-26 RX ADMIN — OXYCODONE AND ACETAMINOPHEN 1 TABLET: 10; 325 TABLET ORAL at 11:05

## 2025-05-26 RX ADMIN — MAGNESIUM OXIDE TAB 400 MG (241.3 MG ELEMENTAL MG) 400 MG: 400 (241.3 MG) TAB at 10:05

## 2025-05-26 RX ADMIN — MUPIROCIN: 20 OINTMENT TOPICAL at 10:05

## 2025-05-26 RX ADMIN — NOREPINEPHRINE BITARTRATE 0.02 MCG/KG/MIN: 4 INJECTION, SOLUTION INTRAVENOUS at 07:05

## 2025-05-26 RX ADMIN — ENOXAPARIN SODIUM 40 MG: 40 INJECTION SUBCUTANEOUS at 10:05

## 2025-05-26 RX ADMIN — MIDODRINE HYDROCHLORIDE 10 MG: 5 TABLET ORAL at 10:05

## 2025-05-26 RX ADMIN — LEVETIRACETAM 750 MG: 100 SOLUTION ORAL at 10:05

## 2025-05-26 RX ADMIN — GABAPENTIN 300 MG: 300 CAPSULE ORAL at 10:05

## 2025-05-27 PROBLEM — E44.0 MODERATE MALNUTRITION: Status: ACTIVE | Noted: 2025-05-27

## 2025-05-27 LAB
ALBUMIN SERPL-MCNC: 1.8 G/DL (ref 3.5–5)
ALBUMIN/GLOB SERPL: 0.3 RATIO (ref 1.1–2)
ALP SERPL-CCNC: 240 UNIT/L (ref 40–150)
ALT SERPL-CCNC: 35 UNIT/L (ref 0–55)
ANION GAP SERPL CALC-SCNC: 11 MEQ/L
APICAL FOUR CHAMBER EJECTION FRACTION: 44 %
APICAL TWO CHAMBER EJECTION FRACTION: 47 %
AST SERPL-CCNC: 52 UNIT/L (ref 11–45)
AV INDEX (PROSTH): 0.74
AV MEAN GRADIENT: 4 MMHG
AV PEAK GRADIENT: 7 MMHG
AV VALVE AREA BY VELOCITY RATIO: 2.7 CM²
AV VALVE AREA: 2.6 CM²
AV VELOCITY RATIO: 0.77
B PERT.PT PRMT NPH QL NAA+NON-PROBE: NOT DETECTED
BACTERIA #/AREA URNS AUTO: ABNORMAL /HPF
BASOPHILS # BLD AUTO: 0.05 X10(3)/MCL
BASOPHILS NFR BLD AUTO: 0.3 %
BILIRUB SERPL-MCNC: 0.2 MG/DL
BILIRUB UR QL STRIP.AUTO: NEGATIVE
BSA FOR ECHO PROCEDURE: 1.48 M2
BUN SERPL-MCNC: 25 MG/DL (ref 8.9–20.6)
C PNEUM DNA NPH QL NAA+NON-PROBE: NOT DETECTED
CALCIUM SERPL-MCNC: 8.1 MG/DL (ref 8.4–10.2)
CHLORIDE SERPL-SCNC: 105 MMOL/L (ref 98–107)
CLARITY UR: ABNORMAL
CO2 SERPL-SCNC: 19 MMOL/L (ref 22–29)
COLOR UR AUTO: ABNORMAL
CREAT SERPL-MCNC: 0.78 MG/DL (ref 0.72–1.25)
CREAT/UREA NIT SERPL: 32
CRP SERPL-MCNC: 307.2 MG/L
CV ECHO LV RWT: 0.35 CM
DOP CALC AO PEAK VEL: 1.3 M/S
DOP CALC AO VTI: 24.8 CM
DOP CALC LVOT AREA: 3.5 CM2
DOP CALC LVOT DIAMETER: 2.1 CM
DOP CALC LVOT PEAK VEL: 1 M/S
DOP CALC LVOT STROKE VOLUME: 63.7 CM3
DOP CALC MV VTI: 18.1 CM
DOP CALCLVOT PEAK VEL VTI: 18.4 CM
E WAVE DECELERATION TIME: 100 MSEC
E/A RATIO: 1.35
E/E' RATIO: 6 M/S
ECHO LV POSTERIOR WALL: 0.9 CM (ref 0.6–1.1)
EOSINOPHIL # BLD AUTO: 0.02 X10(3)/MCL (ref 0–0.9)
EOSINOPHIL NFR BLD AUTO: 0.1 %
ERYTHROCYTE [DISTWIDTH] IN BLOOD BY AUTOMATED COUNT: 15.8 % (ref 11.5–17)
FRACTIONAL SHORTENING: 21.2 % (ref 28–44)
GFR SERPLBLD CREATININE-BSD FMLA CKD-EPI: >60 ML/MIN/1.73/M2
GLOBULIN SER-MCNC: 6.4 GM/DL (ref 2.4–3.5)
GLUCOSE SERPL-MCNC: 64 MG/DL (ref 74–100)
GLUCOSE UR QL STRIP: NORMAL
HADV DNA NPH QL NAA+NON-PROBE: NOT DETECTED
HCOV 229E RNA NPH QL NAA+NON-PROBE: NOT DETECTED
HCOV HKU1 RNA NPH QL NAA+NON-PROBE: NOT DETECTED
HCOV NL63 RNA NPH QL NAA+NON-PROBE: NOT DETECTED
HCOV OC43 RNA NPH QL NAA+NON-PROBE: NOT DETECTED
HCT VFR BLD AUTO: 27.2 % (ref 42–52)
HGB BLD-MCNC: 8.4 G/DL (ref 14–18)
HGB UR QL STRIP: NEGATIVE
HMPV RNA NPH QL NAA+NON-PROBE: NOT DETECTED
HOLD SPECIMEN: NORMAL
HPIV1 RNA NPH QL NAA+NON-PROBE: NOT DETECTED
HPIV2 RNA NPH QL NAA+NON-PROBE: NOT DETECTED
HPIV3 RNA NPH QL NAA+NON-PROBE: NOT DETECTED
HPIV4 RNA NPH QL NAA+NON-PROBE: NOT DETECTED
HR MV ECHO: 80 BPM
HYALINE CASTS #/AREA URNS LPF: ABNORMAL /LPF
IMM GRANULOCYTES # BLD AUTO: 0.17 X10(3)/MCL (ref 0–0.04)
IMM GRANULOCYTES NFR BLD AUTO: 0.9 %
INTERVENTRICULAR SEPTUM: 0.9 CM (ref 0.6–1.1)
IVC DIAMETER: 1.7 CM
KETONES UR QL STRIP: ABNORMAL
LEFT ATRIUM SIZE: 2.5 CM
LEFT INTERNAL DIMENSION IN SYSTOLE: 4.1 CM (ref 2.1–4)
LEFT VENTRICLE DIASTOLIC VOLUME INDEX: 83.66 ML/M2
LEFT VENTRICLE DIASTOLIC VOLUME: 128 ML
LEFT VENTRICLE END DIASTOLIC VOLUME APICAL 2 CHAMBER: 117.11 ML
LEFT VENTRICLE END DIASTOLIC VOLUME APICAL 4 CHAMBER: 88.78 ML
LEFT VENTRICLE MASS INDEX: 110.5 G/M2
LEFT VENTRICLE SYSTOLIC VOLUME INDEX: 48.4 ML/M2
LEFT VENTRICLE SYSTOLIC VOLUME: 74 ML
LEFT VENTRICULAR INTERNAL DIMENSION IN DIASTOLE: 5.2 CM (ref 3.5–6)
LEFT VENTRICULAR MASS: 169 G
LEUKOCYTE ESTERASE UR QL STRIP: 500
LV LATERAL E/E' RATIO: 5.1 M/S
LV SEPTAL E/E' RATIO: 7.7 M/S
LVED V (TEICH): 128.09 ML
LVES V (TEICH): 73.52 ML
LVOT MG: 2.27 MMHG
LVOT MV: 0.71 CM/S
LYMPHOCYTES # BLD AUTO: 2.02 X10(3)/MCL (ref 0.6–4.6)
LYMPHOCYTES NFR BLD AUTO: 11.2 %
M PNEUMO DNA NPH QL NAA+NON-PROBE: NOT DETECTED
MAGNESIUM SERPL-MCNC: 1.6 MG/DL (ref 1.6–2.6)
MCH RBC QN AUTO: 27.3 PG (ref 27–31)
MCHC RBC AUTO-ENTMCNC: 30.9 G/DL (ref 33–36)
MCV RBC AUTO: 88.3 FL (ref 80–94)
MONOCYTES # BLD AUTO: 1.62 X10(3)/MCL (ref 0.1–1.3)
MONOCYTES NFR BLD AUTO: 9 %
MV MEAN GRADIENT: 2 MMHG
MV PEAK A VEL: 0.57 M/S
MV PEAK E VEL: 0.77 M/S
MV PEAK GRADIENT: 4 MMHG
MV STENOSIS PRESSURE HALF TIME: 28.99 MS
MV VALVE AREA BY CONTINUITY EQUATION: 3.52 CM2
MV VALVE AREA P 1/2 METHOD: 7.59 CM2
NEUTROPHILS # BLD AUTO: 14.15 X10(3)/MCL (ref 2.1–9.2)
NEUTROPHILS NFR BLD AUTO: 78.5 %
NITRITE UR QL STRIP: ABNORMAL
NRBC BLD AUTO-RTO: 0 %
OHS LV EJECTION FRACTION SIMPSONS BIPLANE MOD: 47 %
PH UR STRIP: 5 [PH]
PHOSPHATE SERPL-MCNC: 6.5 MG/DL (ref 2.3–4.7)
PISA MRMAX VEL: 3.28 M/S
PISA TR MAX VEL: 3.3 M/S
PLATELET # BLD AUTO: 678 X10(3)/MCL (ref 130–400)
PMV BLD AUTO: 8.4 FL (ref 7.4–10.4)
POTASSIUM SERPL-SCNC: 4.6 MMOL/L (ref 3.5–5.1)
PROT SERPL-MCNC: 8.2 GM/DL (ref 6.4–8.3)
PROT UR QL STRIP: NEGATIVE
RA MAJOR: 4.27 CM
RA PRESSURE ESTIMATED: 15 MMHG
RBC # BLD AUTO: 3.08 X10(6)/MCL (ref 4.7–6.1)
RBC #/AREA URNS AUTO: ABNORMAL /HPF
RSV RNA NPH QL NAA+NON-PROBE: NOT DETECTED
RV TB RVSP: 18 MMHG
RV+EV RNA NPH QL NAA+NON-PROBE: NOT DETECTED
SODIUM SERPL-SCNC: 135 MMOL/L (ref 136–145)
SP GR UR STRIP.AUTO: 1.01 (ref 1–1.03)
SQUAMOUS #/AREA URNS LPF: ABNORMAL /HPF
TDI LATERAL: 0.15 M/S
TDI SEPTAL: 0.1 M/S
TDI: 0.13 M/S
TR MAX PG: 43 MMHG
TRICUSPID ANNULAR PLANE SYSTOLIC EXCURSION: 2.8 CM
TV REST PULMONARY ARTERY PRESSURE: 59 MMHG
UROBILINOGEN UR STRIP-ACNC: NORMAL
WBC # BLD AUTO: 18.03 X10(3)/MCL (ref 4.5–11.5)
WBC #/AREA URNS AUTO: ABNORMAL /HPF
WBC CLUMPS UR QL AUTO: ABNORMAL
Z-SCORE OF LEFT VENTRICULAR DIMENSION IN END DIASTOLE: 1.53
Z-SCORE OF LEFT VENTRICULAR DIMENSION IN END SYSTOLE: 3.1

## 2025-05-27 PROCEDURE — 94640 AIRWAY INHALATION TREATMENT: CPT

## 2025-05-27 PROCEDURE — 63600175 PHARM REV CODE 636 W HCPCS

## 2025-05-27 PROCEDURE — 25000003 PHARM REV CODE 250: Performed by: HOSPITALIST

## 2025-05-27 PROCEDURE — 83735 ASSAY OF MAGNESIUM: CPT

## 2025-05-27 PROCEDURE — 02HV33Z INSERTION OF INFUSION DEVICE INTO SUPERIOR VENA CAVA, PERCUTANEOUS APPROACH: ICD-10-PCS | Performed by: INTERNAL MEDICINE

## 2025-05-27 PROCEDURE — 99900035 HC TECH TIME PER 15 MIN (STAT)

## 2025-05-27 PROCEDURE — 20000000 HC ICU ROOM

## 2025-05-27 PROCEDURE — 85025 COMPLETE CBC W/AUTO DIFF WBC: CPT

## 2025-05-27 PROCEDURE — 80053 COMPREHEN METABOLIC PANEL: CPT

## 2025-05-27 PROCEDURE — 36410 VNPNXR 3YR/> PHY/QHP DX/THER: CPT

## 2025-05-27 PROCEDURE — 94761 N-INVAS EAR/PLS OXIMETRY MLT: CPT

## 2025-05-27 PROCEDURE — 36415 COLL VENOUS BLD VENIPUNCTURE: CPT

## 2025-05-27 PROCEDURE — 25000242 PHARM REV CODE 250 ALT 637 W/ HCPCS

## 2025-05-27 PROCEDURE — 25500020 PHARM REV CODE 255

## 2025-05-27 PROCEDURE — 84100 ASSAY OF PHOSPHORUS: CPT

## 2025-05-27 PROCEDURE — 63600175 PHARM REV CODE 636 W HCPCS: Performed by: HOSPITALIST

## 2025-05-27 PROCEDURE — C1751 CATH, INF, PER/CENT/MIDLINE: HCPCS

## 2025-05-27 PROCEDURE — 92610 EVALUATE SWALLOWING FUNCTION: CPT

## 2025-05-27 PROCEDURE — 86140 C-REACTIVE PROTEIN: CPT

## 2025-05-27 PROCEDURE — 25000003 PHARM REV CODE 250

## 2025-05-27 PROCEDURE — A9577 INJ MULTIHANCE: HCPCS

## 2025-05-27 RX ORDER — ENOXAPARIN SODIUM 100 MG/ML
1 INJECTION SUBCUTANEOUS EVERY 24 HOURS
Status: DISCONTINUED | OUTPATIENT
Start: 2025-05-27 | End: 2025-05-27

## 2025-05-27 RX ORDER — ENOXAPARIN SODIUM 100 MG/ML
1 INJECTION SUBCUTANEOUS EVERY 12 HOURS
Status: DISCONTINUED | OUTPATIENT
Start: 2025-05-27 | End: 2025-06-06 | Stop reason: HOSPADM

## 2025-05-27 RX ORDER — IPRATROPIUM BROMIDE AND ALBUTEROL SULFATE 2.5; .5 MG/3ML; MG/3ML
3 SOLUTION RESPIRATORY (INHALATION) 3 TIMES DAILY
Status: DISCONTINUED | OUTPATIENT
Start: 2025-05-27 | End: 2025-06-06 | Stop reason: HOSPADM

## 2025-05-27 RX ORDER — MAGNESIUM SULFATE HEPTAHYDRATE 40 MG/ML
2 INJECTION, SOLUTION INTRAVENOUS ONCE
Status: COMPLETED | OUTPATIENT
Start: 2025-05-27 | End: 2025-05-27

## 2025-05-27 RX ORDER — IPRATROPIUM BROMIDE AND ALBUTEROL SULFATE 2.5; .5 MG/3ML; MG/3ML
3 SOLUTION RESPIRATORY (INHALATION)
Status: COMPLETED | OUTPATIENT
Start: 2025-05-27 | End: 2025-05-27

## 2025-05-27 RX ORDER — ENOXAPARIN SODIUM 100 MG/ML
1 INJECTION SUBCUTANEOUS EVERY 12 HOURS
Status: DISCONTINUED | OUTPATIENT
Start: 2025-05-27 | End: 2025-05-27

## 2025-05-27 RX ORDER — SODIUM CHLORIDE 0.9 % (FLUSH) 0.9 %
10 SYRINGE (ML) INJECTION EVERY 12 HOURS PRN
Status: DISCONTINUED | OUTPATIENT
Start: 2025-05-27 | End: 2025-06-06 | Stop reason: HOSPADM

## 2025-05-27 RX ADMIN — MAGNESIUM OXIDE TAB 400 MG (241.3 MG ELEMENTAL MG) 400 MG: 400 (241.3 MG) TAB at 08:05

## 2025-05-27 RX ADMIN — MUPIROCIN: 20 OINTMENT TOPICAL at 08:05

## 2025-05-27 RX ADMIN — ACETYLCYSTEINE 4 ML: 100 INHALANT RESPIRATORY (INHALATION) at 02:05

## 2025-05-27 RX ADMIN — PIPERACILLIN AND TAZOBACTAM 4.5 G: 4; .5 INJECTION, POWDER, LYOPHILIZED, FOR SOLUTION INTRAVENOUS; PARENTERAL at 01:05

## 2025-05-27 RX ADMIN — MIDODRINE HYDROCHLORIDE 10 MG: 5 TABLET ORAL at 08:05

## 2025-05-27 RX ADMIN — ENOXAPARIN SODIUM 50 MG: 60 INJECTION SUBCUTANEOUS at 06:05

## 2025-05-27 RX ADMIN — IPRATROPIUM BROMIDE AND ALBUTEROL SULFATE 3 ML: .5; 3 SOLUTION RESPIRATORY (INHALATION) at 08:05

## 2025-05-27 RX ADMIN — ALPRAZOLAM 1 MG: 0.5 TABLET ORAL at 08:05

## 2025-05-27 RX ADMIN — MIDODRINE HYDROCHLORIDE 10 MG: 5 TABLET ORAL at 03:05

## 2025-05-27 RX ADMIN — GADOBENATE DIMEGLUMINE 10 ML: 529 INJECTION, SOLUTION INTRAVENOUS at 12:05

## 2025-05-27 RX ADMIN — ACETYLCYSTEINE 4 ML: 100 INHALANT RESPIRATORY (INHALATION) at 08:05

## 2025-05-27 RX ADMIN — GABAPENTIN 300 MG: 300 CAPSULE ORAL at 08:05

## 2025-05-27 RX ADMIN — LEVETIRACETAM 750 MG: 100 SOLUTION ORAL at 08:05

## 2025-05-27 RX ADMIN — VANCOMYCIN HYDROCHLORIDE 1000 MG: 1 INJECTION, POWDER, LYOPHILIZED, FOR SOLUTION INTRAVENOUS at 01:05

## 2025-05-27 RX ADMIN — PIPERACILLIN AND TAZOBACTAM 4.5 G: 4; .5 INJECTION, POWDER, LYOPHILIZED, FOR SOLUTION INTRAVENOUS; PARENTERAL at 08:05

## 2025-05-27 RX ADMIN — IPRATROPIUM BROMIDE AND ALBUTEROL SULFATE 3 ML: .5; 3 SOLUTION RESPIRATORY (INHALATION) at 02:05

## 2025-05-27 RX ADMIN — PANTOPRAZOLE SODIUM 40 MG: 40 INJECTION, POWDER, FOR SOLUTION INTRAVENOUS at 08:05

## 2025-05-27 RX ADMIN — PIPERACILLIN AND TAZOBACTAM 4.5 G: 4; .5 INJECTION, POWDER, LYOPHILIZED, FOR SOLUTION INTRAVENOUS; PARENTERAL at 04:05

## 2025-05-27 RX ADMIN — OXYCODONE AND ACETAMINOPHEN 1 TABLET: 10; 325 TABLET ORAL at 10:05

## 2025-05-27 RX ADMIN — SERTRALINE HYDROCHLORIDE 50 MG: 50 TABLET ORAL at 08:05

## 2025-05-27 RX ADMIN — IOHEXOL 60 ML: 350 INJECTION, SOLUTION INTRAVENOUS at 02:05

## 2025-05-27 RX ADMIN — OXYCODONE AND ACETAMINOPHEN 1 TABLET: 10; 325 TABLET ORAL at 08:05

## 2025-05-27 RX ADMIN — ALPRAZOLAM 1 MG: 0.5 TABLET ORAL at 10:05

## 2025-05-27 RX ADMIN — FOLIC ACID 1 MG: 1 TABLET ORAL at 08:05

## 2025-05-27 RX ADMIN — COLLAGENASE SANTYL: 250 OINTMENT TOPICAL at 08:05

## 2025-05-27 RX ADMIN — LEVOTHYROXINE SODIUM 100 MCG: 100 TABLET ORAL at 06:05

## 2025-05-27 RX ADMIN — GABAPENTIN 300 MG: 300 CAPSULE ORAL at 03:05

## 2025-05-27 RX ADMIN — CALCITRIOL CAPSULES 0.25 MCG 0.5 MCG: 0.25 CAPSULE ORAL at 08:05

## 2025-05-27 RX ADMIN — MAGNESIUM SULFATE HEPTAHYDRATE 2 G: 40 INJECTION, SOLUTION INTRAVENOUS at 06:05
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
## 2025-05-27 NOTE — PROCEDURES
Jyoti Mayberry is a 29 y.o. male patient.    Temp: 98.1 °F (36.7 °C) (05/26/25 2000)  Pulse: 108 (05/26/25 2345)  Resp: (!) 28 (05/26/25 2345)  BP: 125/71 (05/26/25 2345)  SpO2: 99 % (05/27/25 0000)  Weight: 45 kg (99 lb 3.3 oz) (05/26/25 1910)    PICC  Date/Time: 5/27/2025 1:30 AM  Consent Done: Yes  Time out: Immediately prior to procedure a time out was called to verify the correct patient, procedure, equipment, support staff and site/side marked as required  Indications: vascular access  Anesthesia: local infiltration  Local anesthetic: lidocaine 1% without epinephrine  Anesthetic Total (mL): 3  Preparation: skin prepped with ChloraPrep  Skin prep agent dried: skin prep agent completely dried prior to procedure  Sterile barriers: all five maximum sterile barriers used - cap, mask, sterile gown, sterile gloves, and large sterile sheet  Hand hygiene: hand hygiene performed prior to central venous catheter insertion  Location details: right brachial  Catheter type: single lumen  Catheter size: 4 Fr  Catheter Length: 15cm    Ultrasound guidance: yes  Vessel Caliber: medium and patent, compressibility normal  Needle advanced into vessel with real time Ultrasound guidance.  Guidewire confirmed in vessel.  Image recorded and saved.  Sterile sheath used.  Number of attempts: 1  Post-procedure: blood return through all ports and sterile dressing applied    Complications: none          Name ama yanez RN  5/27/2025
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
## 2025-05-27 NOTE — PLAN OF CARE
Problem: Adult Inpatient Plan of Care  Goal: Plan of Care Review  Outcome: Progressing  Goal: Patient-Specific Goal (Individualized)  Outcome: Progressing  Goal: Absence of Hospital-Acquired Illness or Injury  Outcome: Progressing  Goal: Optimal Comfort and Wellbeing  Outcome: Progressing  Goal: Readiness for Transition of Care  Outcome: Progressing     Problem: Pneumonia  Goal: Fluid Balance  Outcome: Progressing  Goal: Resolution of Infection Signs and Symptoms  Outcome: Progressing  Goal: Effective Oxygenation and Ventilation  Outcome: Progressing     Problem: Wound  Goal: Optimal Coping  Outcome: Progressing  Goal: Optimal Functional Ability  Outcome: Progressing  Goal: Absence of Infection Signs and Symptoms  Outcome: Progressing  Goal: Improved Oral Intake  Outcome: Progressing  Goal: Optimal Pain Control and Function  Outcome: Progressing  Goal: Skin Health and Integrity  Outcome: Progressing  Goal: Optimal Wound Healing  Outcome: Progressing     Problem: Infection  Goal: Absence of Infection Signs and Symptoms  Outcome: Progressing     Problem: Fall Injury Risk  Goal: Absence of Fall and Fall-Related Injury  Outcome: Progressing     Problem: Skin Injury Risk Increased  Goal: Skin Health and Integrity  Outcome: Progressing
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
## 2025-05-27 NOTE — CARE UPDATE
Elevated ddimer, CTA with b/l PE and RLL consolidation. 4/15 respi culture reviewed, sensitive to merem and zosyn. Starting zosyn and FD lovenox at this time
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
## 2025-05-28 LAB
ALBUMIN SERPL-MCNC: 1.7 G/DL (ref 3.5–5)
ALBUMIN/GLOB SERPL: 0.3 RATIO (ref 1.1–2)
ALP SERPL-CCNC: 203 UNIT/L (ref 40–150)
ALT SERPL-CCNC: 30 UNIT/L (ref 0–55)
ANION GAP SERPL CALC-SCNC: 9 MEQ/L
AST SERPL-CCNC: 32 UNIT/L (ref 11–45)
BASOPHILS # BLD AUTO: 0.08 X10(3)/MCL
BASOPHILS NFR BLD AUTO: 0.5 %
BILIRUB SERPL-MCNC: 0.2 MG/DL
BUN SERPL-MCNC: 24.8 MG/DL (ref 8.9–20.6)
CALCIUM SERPL-MCNC: 8.2 MG/DL (ref 8.4–10.2)
CHLORIDE SERPL-SCNC: 106 MMOL/L (ref 98–107)
CO2 SERPL-SCNC: 22 MMOL/L (ref 22–29)
CREAT SERPL-MCNC: 0.77 MG/DL (ref 0.72–1.25)
CREAT/UREA NIT SERPL: 32
EOSINOPHIL # BLD AUTO: 0.3 X10(3)/MCL (ref 0–0.9)
EOSINOPHIL NFR BLD AUTO: 1.8 %
ERYTHROCYTE [DISTWIDTH] IN BLOOD BY AUTOMATED COUNT: 16.1 % (ref 11.5–17)
GFR SERPLBLD CREATININE-BSD FMLA CKD-EPI: >60 ML/MIN/1.73/M2
GLOBULIN SER-MCNC: 6.2 GM/DL (ref 2.4–3.5)
GLUCOSE SERPL-MCNC: 77 MG/DL (ref 74–100)
HAV IGM SERPL QL IA: NONREACTIVE
HBV CORE IGM SERPL QL IA: NONREACTIVE
HBV SURFACE AB SER-ACNC: 0.53 MIU/ML
HBV SURFACE AB SERPL IA-ACNC: NONREACTIVE M[IU]/ML
HBV SURFACE AG SERPL QL IA: NONREACTIVE
HCT VFR BLD AUTO: 23.6 % (ref 42–52)
HCV AB SERPL QL IA: NONREACTIVE
HGB BLD-MCNC: 7.5 G/DL (ref 14–18)
IMM GRANULOCYTES # BLD AUTO: 0.15 X10(3)/MCL (ref 0–0.04)
IMM GRANULOCYTES NFR BLD AUTO: 0.9 %
LYMPHOCYTES # BLD AUTO: 1.92 X10(3)/MCL (ref 0.6–4.6)
LYMPHOCYTES NFR BLD AUTO: 11.7 %
MAGNESIUM SERPL-MCNC: 2.2 MG/DL (ref 1.6–2.6)
MCH RBC QN AUTO: 28.1 PG (ref 27–31)
MCHC RBC AUTO-ENTMCNC: 31.8 G/DL (ref 33–36)
MCV RBC AUTO: 88.4 FL (ref 80–94)
MONOCYTES # BLD AUTO: 1.08 X10(3)/MCL (ref 0.1–1.3)
MONOCYTES NFR BLD AUTO: 6.6 %
MRSA PCR SCRN (OHS): NOT DETECTED
NEUTROPHILS # BLD AUTO: 12.86 X10(3)/MCL (ref 2.1–9.2)
NEUTROPHILS NFR BLD AUTO: 78.5 %
NRBC BLD AUTO-RTO: 0 %
OHS QRS DURATION: 66 MS
OHS QTC CALCULATION: 475 MS
PHOSPHATE SERPL-MCNC: 6.6 MG/DL (ref 2.3–4.7)
PLATELET # BLD AUTO: 727 X10(3)/MCL (ref 130–400)
PMV BLD AUTO: 8.5 FL (ref 7.4–10.4)
POTASSIUM SERPL-SCNC: 4.6 MMOL/L (ref 3.5–5.1)
PROT SERPL-MCNC: 7.9 GM/DL (ref 6.4–8.3)
RBC # BLD AUTO: 2.67 X10(6)/MCL (ref 4.7–6.1)
SODIUM SERPL-SCNC: 137 MMOL/L (ref 136–145)
VANCOMYCIN TROUGH SERPL-MCNC: 24.1 UG/ML (ref 15–20)
VANCOMYCIN TROUGH SERPL-MCNC: 30.9 UG/ML (ref 15–20)
WBC # BLD AUTO: 16.39 X10(3)/MCL (ref 4.5–11.5)

## 2025-05-28 PROCEDURE — 94664 DEMO&/EVAL PT USE INHALER: CPT

## 2025-05-28 PROCEDURE — 63600175 PHARM REV CODE 636 W HCPCS

## 2025-05-28 PROCEDURE — 83735 ASSAY OF MAGNESIUM: CPT

## 2025-05-28 PROCEDURE — 25000242 PHARM REV CODE 250 ALT 637 W/ HCPCS

## 2025-05-28 PROCEDURE — 94640 AIRWAY INHALATION TREATMENT: CPT

## 2025-05-28 PROCEDURE — 87641 MR-STAPH DNA AMP PROBE: CPT

## 2025-05-28 PROCEDURE — 87661 TRICHOMONAS VAGINALIS AMPLIF: CPT | Performed by: NURSE PRACTITIONER

## 2025-05-28 PROCEDURE — 97167 OT EVAL HIGH COMPLEX 60 MIN: CPT

## 2025-05-28 PROCEDURE — 84100 ASSAY OF PHOSPHORUS: CPT

## 2025-05-28 PROCEDURE — 97163 PT EVAL HIGH COMPLEX 45 MIN: CPT

## 2025-05-28 PROCEDURE — 21400001 HC TELEMETRY ROOM

## 2025-05-28 PROCEDURE — 99233 SBSQ HOSP IP/OBS HIGH 50: CPT | Mod: ,,, | Performed by: HOSPITALIST

## 2025-05-28 PROCEDURE — 36415 COLL VENOUS BLD VENIPUNCTURE: CPT | Performed by: HOSPITALIST

## 2025-05-28 PROCEDURE — 80202 ASSAY OF VANCOMYCIN: CPT | Performed by: HOSPITALIST

## 2025-05-28 PROCEDURE — 25000003 PHARM REV CODE 250

## 2025-05-28 PROCEDURE — 63600175 PHARM REV CODE 636 W HCPCS: Performed by: HOSPITALIST

## 2025-05-28 PROCEDURE — 36415 COLL VENOUS BLD VENIPUNCTURE: CPT

## 2025-05-28 PROCEDURE — 86706 HEP B SURFACE ANTIBODY: CPT | Performed by: NURSE PRACTITIONER

## 2025-05-28 PROCEDURE — 80053 COMPREHEN METABOLIC PANEL: CPT

## 2025-05-28 PROCEDURE — 85025 COMPLETE CBC W/AUTO DIFF WBC: CPT

## 2025-05-28 PROCEDURE — 25000003 PHARM REV CODE 250: Performed by: HOSPITALIST

## 2025-05-28 PROCEDURE — 27000173 HC ACAPELLA DEVICE DH OR DM

## 2025-05-28 PROCEDURE — 97165 OT EVAL LOW COMPLEX 30 MIN: CPT

## 2025-05-28 PROCEDURE — 94761 N-INVAS EAR/PLS OXIMETRY MLT: CPT

## 2025-05-28 PROCEDURE — 99900035 HC TECH TIME PER 15 MIN (STAT)

## 2025-05-28 PROCEDURE — 80074 ACUTE HEPATITIS PANEL: CPT | Performed by: NURSE PRACTITIONER

## 2025-05-28 RX ORDER — METOLAZONE 5 MG/1
5 TABLET ORAL ONCE
Status: COMPLETED | OUTPATIENT
Start: 2025-05-28 | End: 2025-05-28

## 2025-05-28 RX ORDER — SEVELAMER CARBONATE 800 MG/1
800 TABLET, FILM COATED ORAL
Status: COMPLETED | OUTPATIENT
Start: 2025-05-28 | End: 2025-05-28

## 2025-05-28 RX ADMIN — ALPRAZOLAM 1 MG: 0.5 TABLET ORAL at 12:05

## 2025-05-28 RX ADMIN — SEVELAMER CARBONATE 800 MG: 800 TABLET, FILM COATED ORAL at 04:05

## 2025-05-28 RX ADMIN — ACETYLCYSTEINE 4 ML: 100 INHALANT RESPIRATORY (INHALATION) at 02:05

## 2025-05-28 RX ADMIN — VANCOMYCIN HYDROCHLORIDE 750 MG: 750 INJECTION, POWDER, LYOPHILIZED, FOR SOLUTION INTRAVENOUS at 02:05

## 2025-05-28 RX ADMIN — MUPIROCIN: 20 OINTMENT TOPICAL at 08:05

## 2025-05-28 RX ADMIN — METOLAZONE 5 MG: 5 TABLET ORAL at 09:05

## 2025-05-28 RX ADMIN — SEVELAMER CARBONATE 800 MG: 800 TABLET, FILM COATED ORAL at 09:05

## 2025-05-28 RX ADMIN — PIPERACILLIN AND TAZOBACTAM 4.5 G: 4; .5 INJECTION, POWDER, LYOPHILIZED, FOR SOLUTION INTRAVENOUS; PARENTERAL at 01:05

## 2025-05-28 RX ADMIN — OXYCODONE AND ACETAMINOPHEN 1 TABLET: 10; 325 TABLET ORAL at 06:05

## 2025-05-28 RX ADMIN — GABAPENTIN 300 MG: 300 CAPSULE ORAL at 08:05

## 2025-05-28 RX ADMIN — LEVETIRACETAM 750 MG: 100 SOLUTION ORAL at 08:05

## 2025-05-28 RX ADMIN — MIDODRINE HYDROCHLORIDE 10 MG: 5 TABLET ORAL at 09:05

## 2025-05-28 RX ADMIN — LEVETIRACETAM 750 MG: 100 SOLUTION ORAL at 09:05

## 2025-05-28 RX ADMIN — ENOXAPARIN SODIUM 50 MG: 60 INJECTION SUBCUTANEOUS at 06:05

## 2025-05-28 RX ADMIN — ALPRAZOLAM 1 MG: 0.5 TABLET ORAL at 06:05

## 2025-05-28 RX ADMIN — IPRATROPIUM BROMIDE AND ALBUTEROL SULFATE 3 ML: .5; 3 SOLUTION RESPIRATORY (INHALATION) at 02:05

## 2025-05-28 RX ADMIN — IPRATROPIUM BROMIDE AND ALBUTEROL SULFATE 3 ML: .5; 3 SOLUTION RESPIRATORY (INHALATION) at 07:05

## 2025-05-28 RX ADMIN — COLLAGENASE SANTYL: 250 OINTMENT TOPICAL at 09:05

## 2025-05-28 RX ADMIN — PIPERACILLIN AND TAZOBACTAM 4.5 G: 4; .5 INJECTION, POWDER, LYOPHILIZED, FOR SOLUTION INTRAVENOUS; PARENTERAL at 08:05

## 2025-05-28 RX ADMIN — FOLIC ACID 1 MG: 1 TABLET ORAL at 09:05

## 2025-05-28 RX ADMIN — OXYCODONE AND ACETAMINOPHEN 1 TABLET: 10; 325 TABLET ORAL at 11:05

## 2025-05-28 RX ADMIN — OXYCODONE AND ACETAMINOPHEN 1 TABLET: 10; 325 TABLET ORAL at 12:05

## 2025-05-28 RX ADMIN — CALCITRIOL CAPSULES 0.25 MCG 0.5 MCG: 0.25 CAPSULE ORAL at 09:05

## 2025-05-28 RX ADMIN — MIDODRINE HYDROCHLORIDE 10 MG: 5 TABLET ORAL at 04:05

## 2025-05-28 RX ADMIN — GABAPENTIN 300 MG: 300 CAPSULE ORAL at 09:05

## 2025-05-28 RX ADMIN — ACETYLCYSTEINE 4 ML: 100 INHALANT RESPIRATORY (INHALATION) at 07:05

## 2025-05-28 RX ADMIN — GABAPENTIN 300 MG: 300 CAPSULE ORAL at 04:05

## 2025-05-28 RX ADMIN — MIDODRINE HYDROCHLORIDE 10 MG: 5 TABLET ORAL at 08:05

## 2025-05-28 RX ADMIN — PIPERACILLIN AND TAZOBACTAM 4.5 G: 4; .5 INJECTION, POWDER, LYOPHILIZED, FOR SOLUTION INTRAVENOUS; PARENTERAL at 04:05

## 2025-05-28 RX ADMIN — PANTOPRAZOLE SODIUM 40 MG: 40 INJECTION, POWDER, FOR SOLUTION INTRAVENOUS at 09:05

## 2025-05-28 RX ADMIN — LEVOTHYROXINE SODIUM 100 MCG: 100 TABLET ORAL at 06:05

## 2025-05-28 RX ADMIN — MAGNESIUM OXIDE TAB 400 MG (241.3 MG ELEMENTAL MG) 400 MG: 400 (241.3 MG) TAB at 08:05

## 2025-05-28 RX ADMIN — MAGNESIUM OXIDE TAB 400 MG (241.3 MG ELEMENTAL MG) 400 MG: 400 (241.3 MG) TAB at 09:05

## 2025-05-28 RX ADMIN — MUPIROCIN: 20 OINTMENT TOPICAL at 09:05

## 2025-05-28 RX ADMIN — SEVELAMER CARBONATE 800 MG: 800 TABLET, FILM COATED ORAL at 12:05

## 2025-05-28 NOTE — PROGRESS NOTES
Pharmacokinetic Assessment Follow Up: IV Vancomycin    Vancomycin serum concentration assessment(s):    The trough level was drawn correctly and can be used to guide therapy at this time. The measurement is above the desired definitive target range of 15 to 20 mcg/mL.    Vancomycin Regimen Plan:    Discontinue the scheduled vancomycin regimen and re-dose when the random level is less than 15 mcg/mL, next level to be drawn at 2000 on 5/28/2025..    Drug levels (last 3 results):  Recent Labs   Lab Result Units 05/28/25  1205   Vancomycin Trough ug/ml 30.9*       Pharmacy will continue to follow and monitor vancomycin.    Please contact pharmacy at extension [phone] for questions regarding this assessment.    Thank you for the consult,   Jeana Hazel       Patient brief summary:  Jyoti Mayberry is a 29 y.o. male initiated on antimicrobial therapy with IV Vancomycin for treatment of bone/joint infection    The patient's current regimen is Vancomycin 750 mg every 12 hours.     Drug Allergies:   Review of patient's allergies indicates:   Allergen Reactions    Fentanyl     Opioids - morphine analogues Nausea And Vomiting, Anxiety and Other (See Comments)       Actual Body Weight:   46.4 kg    Renal Function:   Estimated Creatinine Clearance: 93.7 mL/min (based on SCr of 0.77 mg/dL).,     Dialysis Method (if applicable):  N/A    CBC (last 72 hours):  Recent Labs   Lab Result Units 05/26/25 2152 05/27/25 0414 05/28/25 0418   WBC x10(3)/mcL 19.90* 18.03* 16.39*   Hgb g/dL 8.8* 8.4* 7.5*   Hct % 27.9* 27.2* 23.6*   Platelet x10(3)/mcL 765* 678* 727*   Mono % %  --  9.0 6.6   Monocytes % % 5  --   --    Eos % %  --  0.1 1.8   Eosinophils % % 1  --   --    Basophil % %  --  0.3 0.5   Basophils % % 3*  --   --        Metabolic Panel (last 72 hours):  Recent Labs   Lab Result Units 05/26/25 2152 05/26/25 2304 05/27/25 0414 05/28/25  0418   Sodium mmol/L 135*  --  135* 137   Potassium mmol/L 4.5  --  4.6 4.6   Chloride mmol/L  102  --  105 106   CO2 mmol/L 18*  --  19* 22   Glucose mg/dL 57*  --  64* 77   Glucose, UA   --  Normal  --   --    Blood Urea Nitrogen mg/dL 27.1*  --  25.0* 24.8*   Creatinine mg/dL 0.90  --  0.78 0.77   Albumin g/dL 2.0*  --  1.8* 1.7*   Bilirubin Total mg/dL 0.3  --  0.2 0.2   ALP unit/L 278*  --  240* 203*   AST unit/L 69*  --  52* 32   ALT unit/L 43  --  35 30   Magnesium Level mg/dL 1.80  --  1.60 2.20   Phosphorus Level mg/dL 6.5*  --  6.5* 6.6*       Vancomycin Administrations:  vancomycin given in the last 96 hours                     vancomycin 750 mg in 0.9% NaCl 250 mL IVPB (admixture device) (mg) 750 mg New Bag 05/28/25 0224    vancomycin (VANCOCIN) 1,000 mg in 0.9% NaCl 250 mL IVPB (admixture device) (mg) 1,000 mg New Bag 05/27/25 1328                    Microbiologic Results:  Microbiology Results (last 7 days)       Procedure Component Value Units Date/Time    Blood Culture [[id number]]  (Normal) Collected: 05/26/25 2152    Order Status: Completed Specimen: Blood Updated: 05/28/25 1101     Blood Culture No Growth At 24 Hours    Blood Culture [[id number]]  (Normal) Collected: 05/26/25 2205    Order Status: Completed Specimen: Blood Updated: 05/28/25 1101     Blood Culture No Growth At 24 Hours    Respiratory Culture [[id number]]     Order Status: Sent Specimen: Sputum     Respiratory Culture [[id number]]  (Abnormal) Collected: 05/26/25 2303    Order Status: Completed Specimen: Sputum from Transtrachael aspirate Updated: 05/28/25 0805     Respiratory Culture Moderate PRESUMPTIVE PROTEUS SPECIES     GRAM STAIN Quality 2+      Many Gram Negative Rods      Moderate Gram Positive Rods    Urine culture [[id number]]  (Abnormal) Collected: 05/26/25 2304    Order Status: Completed Specimen: Urine Updated: 05/28/25 0638     Urine Culture >/= 100,000 colonies/ml Gram-negative Rods
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
## 2025-05-28 NOTE — PROGRESS NOTES
Patient is seen at bedside to follow up on multiple wounds present on admission. Superficial open wound to left upper arm, stage 4 pressure injuries with exposed bone, right scrotal wound, and right scapula wound with exposed bone are noted with no change today. All wounds cleansed and well dressed. MRI pending. Ostomy appliance did not need to be changed today. Wound care to continue to follow.
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
## 2025-05-29 LAB
ALBUMIN SERPL-MCNC: 1.8 G/DL (ref 3.5–5)
ALBUMIN/GLOB SERPL: 0.3 RATIO (ref 1.1–2)
ALP SERPL-CCNC: 255 UNIT/L (ref 40–150)
ALT SERPL-CCNC: 23 UNIT/L (ref 0–55)
ANION GAP SERPL CALC-SCNC: 8 MEQ/L
AST SERPL-CCNC: 29 UNIT/L (ref 11–45)
BACTERIA UR CULT: ABNORMAL
BASOPHILS # BLD AUTO: 0.08 X10(3)/MCL
BASOPHILS NFR BLD AUTO: 0.5 %
BILIRUB SERPL-MCNC: 0.2 MG/DL
BUN SERPL-MCNC: 24.5 MG/DL (ref 8.9–20.6)
C TRACH RRNA UR QL NAA+PROBE: NOT DETECTED
CALCIUM SERPL-MCNC: 8.6 MG/DL (ref 8.4–10.2)
CHLORIDE SERPL-SCNC: 103 MMOL/L (ref 98–107)
CO2 SERPL-SCNC: 25 MMOL/L (ref 22–29)
CREAT SERPL-MCNC: 0.96 MG/DL (ref 0.72–1.25)
CREAT/UREA NIT SERPL: 26
CRP SERPL-MCNC: 234.7 MG/L
EOSINOPHIL # BLD AUTO: 0.51 X10(3)/MCL (ref 0–0.9)
EOSINOPHIL NFR BLD AUTO: 3.2 %
ERYTHROCYTE [DISTWIDTH] IN BLOOD BY AUTOMATED COUNT: 15.9 % (ref 11.5–17)
ERYTHROCYTE [SEDIMENTATION RATE] IN BLOOD: 62 MM/HR (ref 0–15)
GFR SERPLBLD CREATININE-BSD FMLA CKD-EPI: >60 ML/MIN/1.73/M2
GLOBULIN SER-MCNC: 6.6 GM/DL (ref 2.4–3.5)
GLUCOSE SERPL-MCNC: 88 MG/DL (ref 74–100)
HAV AB SER QL IA: NONREACTIVE
HCT VFR BLD AUTO: 24.8 % (ref 42–52)
HGB BLD-MCNC: 7.7 G/DL (ref 14–18)
HIV 1+2 AB+HIV1 P24 AG SERPL QL IA: NONREACTIVE
IMM GRANULOCYTES # BLD AUTO: 0.14 X10(3)/MCL (ref 0–0.04)
IMM GRANULOCYTES NFR BLD AUTO: 0.9 %
LYMPHOCYTES # BLD AUTO: 1.73 X10(3)/MCL (ref 0.6–4.6)
LYMPHOCYTES NFR BLD AUTO: 10.7 %
MAGNESIUM SERPL-MCNC: 1.9 MG/DL (ref 1.6–2.6)
MCH RBC QN AUTO: 27.5 PG (ref 27–31)
MCHC RBC AUTO-ENTMCNC: 31 G/DL (ref 33–36)
MCV RBC AUTO: 88.6 FL (ref 80–94)
MONOCYTES # BLD AUTO: 1.08 X10(3)/MCL (ref 0.1–1.3)
MONOCYTES NFR BLD AUTO: 6.7 %
N GONORRHOEA DNA UR QL NAA+PROBE: NOT DETECTED
NEUTROPHILS # BLD AUTO: 12.57 X10(3)/MCL (ref 2.1–9.2)
NEUTROPHILS NFR BLD AUTO: 78 %
NRBC BLD AUTO-RTO: 0 %
PHOSPHATE SERPL-MCNC: 6.7 MG/DL (ref 2.3–4.7)
PLATELET # BLD AUTO: 535 X10(3)/MCL (ref 130–400)
PMV BLD AUTO: 9.8 FL (ref 7.4–10.4)
POTASSIUM SERPL-SCNC: 5.5 MMOL/L (ref 3.5–5.1)
PROT SERPL-MCNC: 8.4 GM/DL (ref 6.4–8.3)
RBC # BLD AUTO: 2.8 X10(6)/MCL (ref 4.7–6.1)
SODIUM SERPL-SCNC: 136 MMOL/L (ref 136–145)
T PALLIDUM AB SER QL: NONREACTIVE
T VAGINALIS RRNA UR QL NAA+PROBE: NOT DETECTED
VANCOMYCIN TROUGH SERPL-MCNC: 17 UG/ML (ref 15–20)
WBC # BLD AUTO: 16.11 X10(3)/MCL (ref 4.5–11.5)

## 2025-05-29 PROCEDURE — 86780 TREPONEMA PALLIDUM: CPT | Performed by: NURSE PRACTITIONER

## 2025-05-29 PROCEDURE — 63600175 PHARM REV CODE 636 W HCPCS

## 2025-05-29 PROCEDURE — 27000207 HC ISOLATION

## 2025-05-29 PROCEDURE — 27000173 HC ACAPELLA DEVICE DH OR DM

## 2025-05-29 PROCEDURE — 94664 DEMO&/EVAL PT USE INHALER: CPT

## 2025-05-29 PROCEDURE — 25000003 PHARM REV CODE 250: Performed by: HOSPITALIST

## 2025-05-29 PROCEDURE — 36415 COLL VENOUS BLD VENIPUNCTURE: CPT

## 2025-05-29 PROCEDURE — 25000242 PHARM REV CODE 250 ALT 637 W/ HCPCS

## 2025-05-29 PROCEDURE — 25000003 PHARM REV CODE 250

## 2025-05-29 PROCEDURE — 94761 N-INVAS EAR/PLS OXIMETRY MLT: CPT

## 2025-05-29 PROCEDURE — 83735 ASSAY OF MAGNESIUM: CPT

## 2025-05-29 PROCEDURE — 85025 COMPLETE CBC W/AUTO DIFF WBC: CPT

## 2025-05-29 PROCEDURE — 80202 ASSAY OF VANCOMYCIN: CPT | Performed by: HOSPITALIST

## 2025-05-29 PROCEDURE — 94640 AIRWAY INHALATION TREATMENT: CPT

## 2025-05-29 PROCEDURE — 21400001 HC TELEMETRY ROOM

## 2025-05-29 PROCEDURE — 86140 C-REACTIVE PROTEIN: CPT | Performed by: NURSE PRACTITIONER

## 2025-05-29 PROCEDURE — 99900035 HC TECH TIME PER 15 MIN (STAT)

## 2025-05-29 PROCEDURE — 86708 HEPATITIS A ANTIBODY: CPT | Performed by: NURSE PRACTITIONER

## 2025-05-29 PROCEDURE — 84100 ASSAY OF PHOSPHORUS: CPT

## 2025-05-29 PROCEDURE — 80053 COMPREHEN METABOLIC PANEL: CPT

## 2025-05-29 PROCEDURE — 85652 RBC SED RATE AUTOMATED: CPT | Performed by: NURSE PRACTITIONER

## 2025-05-29 PROCEDURE — 87389 HIV-1 AG W/HIV-1&-2 AB AG IA: CPT | Performed by: NURSE PRACTITIONER

## 2025-05-29 RX ORDER — SERTRALINE HYDROCHLORIDE 50 MG/1
50 TABLET, FILM COATED ORAL DAILY
Status: DISCONTINUED | OUTPATIENT
Start: 2025-05-30 | End: 2025-06-06 | Stop reason: HOSPADM

## 2025-05-29 RX ORDER — LANOLIN ALCOHOL/MO/W.PET/CERES
400 CREAM (GRAM) TOPICAL 2 TIMES DAILY
Status: DISCONTINUED | OUTPATIENT
Start: 2025-05-29 | End: 2025-06-06 | Stop reason: HOSPADM

## 2025-05-29 RX ORDER — CARVEDILOL 3.12 MG/1
6.25 TABLET ORAL 2 TIMES DAILY
Status: DISCONTINUED | OUTPATIENT
Start: 2025-05-29 | End: 2025-05-31

## 2025-05-29 RX ORDER — TALC
6 POWDER (GRAM) TOPICAL NIGHTLY PRN
Status: DISCONTINUED | OUTPATIENT
Start: 2025-05-29 | End: 2025-06-06 | Stop reason: HOSPADM

## 2025-05-29 RX ADMIN — SODIUM ZIRCONIUM CYCLOSILICATE 10 G: 10 POWDER, FOR SUSPENSION ORAL at 06:05

## 2025-05-29 RX ADMIN — ACETYLCYSTEINE 4 ML: 100 INHALANT RESPIRATORY (INHALATION) at 07:05

## 2025-05-29 RX ADMIN — MAGNESIUM OXIDE TAB 400 MG (241.3 MG ELEMENTAL MG) 400 MG: 400 (241.3 MG) TAB at 08:05

## 2025-05-29 RX ADMIN — GABAPENTIN 300 MG: 300 CAPSULE ORAL at 04:05

## 2025-05-29 RX ADMIN — MIDODRINE HYDROCHLORIDE 10 MG: 5 TABLET ORAL at 08:05

## 2025-05-29 RX ADMIN — CARVEDILOL 6.25 MG: 3.12 TABLET, FILM COATED ORAL at 09:05

## 2025-05-29 RX ADMIN — MIDODRINE HYDROCHLORIDE 10 MG: 5 TABLET ORAL at 09:05

## 2025-05-29 RX ADMIN — OXYCODONE AND ACETAMINOPHEN 1 TABLET: 10; 325 TABLET ORAL at 05:05

## 2025-05-29 RX ADMIN — MAGNESIUM OXIDE TAB 400 MG (241.3 MG ELEMENTAL MG) 400 MG: 400 (241.3 MG) TAB at 09:05

## 2025-05-29 RX ADMIN — IPRATROPIUM BROMIDE AND ALBUTEROL SULFATE 3 ML: .5; 3 SOLUTION RESPIRATORY (INHALATION) at 08:05

## 2025-05-29 RX ADMIN — GABAPENTIN 300 MG: 300 CAPSULE ORAL at 09:05

## 2025-05-29 RX ADMIN — COLLAGENASE SANTYL: 250 OINTMENT TOPICAL at 08:05

## 2025-05-29 RX ADMIN — PIPERACILLIN AND TAZOBACTAM 4.5 G: 4; .5 INJECTION, POWDER, LYOPHILIZED, FOR SOLUTION INTRAVENOUS; PARENTERAL at 12:05

## 2025-05-29 RX ADMIN — PANTOPRAZOLE SODIUM 40 MG: 40 INJECTION, POWDER, FOR SOLUTION INTRAVENOUS at 08:05

## 2025-05-29 RX ADMIN — MIDODRINE HYDROCHLORIDE 10 MG: 5 TABLET ORAL at 04:05

## 2025-05-29 RX ADMIN — MUPIROCIN: 20 OINTMENT TOPICAL at 08:05

## 2025-05-29 RX ADMIN — ALPRAZOLAM 1 MG: 0.5 TABLET ORAL at 04:05

## 2025-05-29 RX ADMIN — IPRATROPIUM BROMIDE AND ALBUTEROL SULFATE 3 ML: .5; 3 SOLUTION RESPIRATORY (INHALATION) at 07:05

## 2025-05-29 RX ADMIN — SERTRALINE HYDROCHLORIDE 50 MG: 50 TABLET ORAL at 08:05

## 2025-05-29 RX ADMIN — SODIUM ZIRCONIUM CYCLOSILICATE 10 G: 10 POWDER, FOR SUSPENSION ORAL at 04:05

## 2025-05-29 RX ADMIN — CALCITRIOL CAPSULES 0.25 MCG 0.5 MCG: 0.25 CAPSULE ORAL at 08:05

## 2025-05-29 RX ADMIN — OXYCODONE AND ACETAMINOPHEN 1 TABLET: 10; 325 TABLET ORAL at 09:05

## 2025-05-29 RX ADMIN — ACETYLCYSTEINE 4 ML: 100 INHALANT RESPIRATORY (INHALATION) at 01:05

## 2025-05-29 RX ADMIN — FOLIC ACID 1 MG: 1 TABLET ORAL at 08:05

## 2025-05-29 RX ADMIN — ACETAMINOPHEN 325 MG: 325 TABLET ORAL at 12:05

## 2025-05-29 RX ADMIN — ENOXAPARIN SODIUM 50 MG: 60 INJECTION SUBCUTANEOUS at 05:05

## 2025-05-29 RX ADMIN — GABAPENTIN 300 MG: 300 CAPSULE ORAL at 08:05

## 2025-05-29 RX ADMIN — LEVOTHYROXINE SODIUM 100 MCG: 100 TABLET ORAL at 05:05

## 2025-05-29 RX ADMIN — OXYCODONE AND ACETAMINOPHEN 1 TABLET: 10; 325 TABLET ORAL at 12:05

## 2025-05-29 RX ADMIN — PIPERACILLIN AND TAZOBACTAM 4.5 G: 4; .5 INJECTION, POWDER, LYOPHILIZED, FOR SOLUTION INTRAVENOUS; PARENTERAL at 04:05

## 2025-05-29 RX ADMIN — LEVETIRACETAM 750 MG: 100 SOLUTION ORAL at 09:05

## 2025-05-29 RX ADMIN — ACETYLCYSTEINE 4 ML: 100 INHALANT RESPIRATORY (INHALATION) at 08:05

## 2025-05-29 RX ADMIN — LEVETIRACETAM 750 MG: 100 SOLUTION ORAL at 08:05

## 2025-05-29 RX ADMIN — MUPIROCIN: 20 OINTMENT TOPICAL at 09:05

## 2025-05-29 RX ADMIN — ALPRAZOLAM 1 MG: 0.5 TABLET ORAL at 09:05

## 2025-05-29 RX ADMIN — IPRATROPIUM BROMIDE AND ALBUTEROL SULFATE 3 ML: .5; 3 SOLUTION RESPIRATORY (INHALATION) at 01:05

## 2025-05-29 RX ADMIN — VANCOMYCIN HYDROCHLORIDE 500 MG: 500 INJECTION, POWDER, LYOPHILIZED, FOR SOLUTION INTRAVENOUS at 08:05

## 2025-05-29 RX ADMIN — ALPRAZOLAM 1 MG: 0.5 TABLET ORAL at 01:05

## 2025-05-29 RX ADMIN — ALPRAZOLAM 1 MG: 0.5 TABLET ORAL at 12:05
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
## 2025-05-29 NOTE — PROGRESS NOTES
VANCOMYCIN DOSING BY PHARMACY DISCONTINUATION NOTE    Jyoti Mayberry is a 29 y.o. male who had been consulted for vancomycin dosing.    The pharmacy consult for vancomycin dosing has been discontinued.     Vancomycin Dosing by Pharmacy Consult will sign-off. Please reconsult if necessary. Thank you for allowing us to participate in this patient's care.     @SIG@
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
## 2025-05-29 NOTE — PT/OT/SLP PROGRESS
Physical Therapy    Missed Treatment Session - cancel note & D/C note - 1132 - 05/29/2025    Patient Name:  Jyoti Mayberry   MRN:  [id number]      -patient not seen at this time secondary to order discontinued by physician Celine Han MD (secure chat) -per M.D.- you can cancel that  -order discontinued  -OT Viv notified of above concerning OT orders

## 2025-05-29 NOTE — PROGRESS NOTES
Pharmacokinetic Assessment Follow Up: IV Vancomycin    Vancomycin serum concentration assessment(s):    The random level was drawn correctly and can be used to guide therapy at this time. The measurement is within the desired definitive target range of 15 to 20 mcg/mL.    Vancomycin Regimen Plan:    Re-dose when the random level is less than 15 mcg/mL, next level to be drawn at 1900 on 05/29/25    Drug levels (last 3 results):  Recent Labs   Lab Result Units 05/28/25  1205 05/28/25  1937 05/29/25  0502   Vancomycin Trough ug/ml 30.9* 24.1* 17.0       Pharmacy will continue to follow and monitor vancomycin.    Please contact pharmacy at extension [phone] for questions regarding this assessment.    Thank you for the consult,   Della Roberts       Patient brief summary:  Jyoti Mayberry is a 29 y.o. male initiated on antimicrobial therapy with IV Vancomycin for treatment of skin & soft tissue infection and lower respiratory infection        Drug Allergies:   Review of patient's allergies indicates:   Allergen Reactions    Fentanyl     Opioids - morphine analogues Nausea And Vomiting, Anxiety and Other (See Comments)       Actual Body Weight:   46.4 kg    Renal Function:   Estimated Creatinine Clearance: 75.2 mL/min (based on SCr of 0.96 mg/dL).,     Dialysis Method (if applicable):  N/A    CBC (last 72 hours):  Recent Labs   Lab Result Units 05/26/25 2152 05/27/25 0414 05/28/25 0418   WBC x10(3)/mcL 19.90* 18.03* 16.39*   Hgb g/dL 8.8* 8.4* 7.5*   Hct % 27.9* 27.2* 23.6*   Platelet x10(3)/mcL 765* 678* 727*   Mono % %  --  9.0 6.6   Monocytes % % 5  --   --    Eos % %  --  0.1 1.8   Eosinophils % % 1  --   --    Basophil % %  --  0.3 0.5   Basophils % % 3*  --   --        Metabolic Panel (last 72 hours):  Recent Labs   Lab Result Units 05/26/25 2152 05/26/25  2304 05/27/25  0414 05/28/25  0418 05/29/25  0502   Sodium mmol/L 135*  --  135* 137 136   Potassium mmol/L 4.5  --  4.6 4.6 5.5*   Chloride  mmol/L 102  --  105 106 103   CO2 mmol/L 18*  --  19* 22 25   Glucose mg/dL 57*  --  64* 77 88   Glucose, UA   --  Normal  --   --   --    Blood Urea Nitrogen mg/dL 27.1*  --  25.0* 24.8* 24.5*   Creatinine mg/dL 0.90  --  0.78 0.77 0.96   Albumin g/dL 2.0*  --  1.8* 1.7* 1.8*   Bilirubin Total mg/dL 0.3  --  0.2 0.2 0.2   ALP unit/L 278*  --  240* 203* 255*   AST unit/L 69*  --  52* 32 29   ALT unit/L 43  --  35 30 23   Magnesium Level mg/dL 1.80  --  1.60 2.20 1.90   Phosphorus Level mg/dL 6.5*  --  6.5* 6.6* 6.7*       Vancomycin Administrations:  vancomycin given in the last 96 hours                     vancomycin 750 mg in 0.9% NaCl 250 mL IVPB (admixture device) (mg) 750 mg New Bag 05/28/25 0224    vancomycin (VANCOCIN) 1,000 mg in 0.9% NaCl 250 mL IVPB (admixture device) (mg) 1,000 mg New Bag 05/27/25 1328                    Microbiologic Results:  Microbiology Results (last 7 days)       Procedure Component Value Units Date/Time    Blood Culture [[id number]]  (Normal) Collected: 05/26/25 2152    Order Status: Completed Specimen: Blood Updated: 05/28/25 1101     Blood Culture No Growth At 24 Hours    Blood Culture [[id number]]  (Normal) Collected: 05/26/25 2205    Order Status: Completed Specimen: Blood Updated: 05/28/25 1101     Blood Culture No Growth At 24 Hours    Respiratory Culture [[id number]]     Order Status: Sent Specimen: Sputum     Respiratory Culture [[id number]]  (Abnormal) Collected: 05/26/25 2303    Order Status: Completed Specimen: Sputum from Transtrachael aspirate Updated: 05/28/25 0805     Respiratory Culture Moderate PRESUMPTIVE PROTEUS SPECIES     GRAM STAIN Quality 2+      Many Gram Negative Rods      Moderate Gram Positive Rods    Urine culture [[id number]]  (Abnormal) Collected: 05/26/25 2304    Order Status: Completed Specimen: Urine Updated: 05/28/25 0638     Urine Culture >/= 100,000 colonies/ml Gram-negative Rods
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
## 2025-05-30 LAB
ALBUMIN SERPL-MCNC: 1.8 G/DL (ref 3.5–5)
ALBUMIN/GLOB SERPL: 0.3 RATIO (ref 1.1–2)
ALP SERPL-CCNC: 313 UNIT/L (ref 40–150)
ALT SERPL-CCNC: 24 UNIT/L (ref 0–55)
ANION GAP SERPL CALC-SCNC: 11 MEQ/L
AST SERPL-CCNC: 27 UNIT/L (ref 11–45)
BASOPHILS # BLD AUTO: 0.06 X10(3)/MCL
BASOPHILS NFR BLD AUTO: 0.4 %
BILIRUB SERPL-MCNC: 0.2 MG/DL
BUN SERPL-MCNC: 23.9 MG/DL (ref 8.9–20.6)
CALCIUM SERPL-MCNC: 8.8 MG/DL (ref 8.4–10.2)
CHLORIDE SERPL-SCNC: 99 MMOL/L (ref 98–107)
CO2 SERPL-SCNC: 25 MMOL/L (ref 22–29)
CREAT SERPL-MCNC: 0.85 MG/DL (ref 0.72–1.25)
CREAT/UREA NIT SERPL: 28
EOSINOPHIL # BLD AUTO: 0.82 X10(3)/MCL (ref 0–0.9)
EOSINOPHIL NFR BLD AUTO: 6 %
ERYTHROCYTE [DISTWIDTH] IN BLOOD BY AUTOMATED COUNT: 15.9 % (ref 11.5–17)
GFR SERPLBLD CREATININE-BSD FMLA CKD-EPI: >60 ML/MIN/1.73/M2
GLOBULIN SER-MCNC: 6.9 GM/DL (ref 2.4–3.5)
GLUCOSE SERPL-MCNC: 70 MG/DL (ref 74–100)
HCT VFR BLD AUTO: 26 % (ref 42–52)
HGB BLD-MCNC: 8.2 G/DL (ref 14–18)
IMM GRANULOCYTES # BLD AUTO: 0.16 X10(3)/MCL (ref 0–0.04)
IMM GRANULOCYTES NFR BLD AUTO: 1.2 %
LYMPHOCYTES # BLD AUTO: 1.51 X10(3)/MCL (ref 0.6–4.6)
LYMPHOCYTES NFR BLD AUTO: 11.1 %
MAGNESIUM SERPL-MCNC: 1.6 MG/DL (ref 1.6–2.6)
MCH RBC QN AUTO: 27.5 PG (ref 27–31)
MCHC RBC AUTO-ENTMCNC: 31.5 G/DL (ref 33–36)
MCV RBC AUTO: 87.2 FL (ref 80–94)
MONOCYTES # BLD AUTO: 1.08 X10(3)/MCL (ref 0.1–1.3)
MONOCYTES NFR BLD AUTO: 8 %
NEUTROPHILS # BLD AUTO: 9.95 X10(3)/MCL (ref 2.1–9.2)
NEUTROPHILS NFR BLD AUTO: 73.3 %
NRBC BLD AUTO-RTO: 0.1 %
PHOSPHATE SERPL-MCNC: 6.6 MG/DL (ref 2.3–4.7)
PLATELET # BLD AUTO: 772 X10(3)/MCL (ref 130–400)
PMV BLD AUTO: 8.3 FL (ref 7.4–10.4)
POTASSIUM SERPL-SCNC: 5.2 MMOL/L (ref 3.5–5.1)
PROT SERPL-MCNC: 8.7 GM/DL (ref 6.4–8.3)
RBC # BLD AUTO: 2.98 X10(6)/MCL (ref 4.7–6.1)
SODIUM SERPL-SCNC: 135 MMOL/L (ref 136–145)
WBC # BLD AUTO: 13.58 X10(3)/MCL (ref 4.5–11.5)

## 2025-05-30 PROCEDURE — 83735 ASSAY OF MAGNESIUM: CPT

## 2025-05-30 PROCEDURE — 25000242 PHARM REV CODE 250 ALT 637 W/ HCPCS

## 2025-05-30 PROCEDURE — 27000173 HC ACAPELLA DEVICE DH OR DM

## 2025-05-30 PROCEDURE — 63600175 PHARM REV CODE 636 W HCPCS

## 2025-05-30 PROCEDURE — 94640 AIRWAY INHALATION TREATMENT: CPT

## 2025-05-30 PROCEDURE — 63600175 PHARM REV CODE 636 W HCPCS: Mod: TB

## 2025-05-30 PROCEDURE — 25000003 PHARM REV CODE 250

## 2025-05-30 PROCEDURE — 84100 ASSAY OF PHOSPHORUS: CPT

## 2025-05-30 PROCEDURE — 80053 COMPREHEN METABOLIC PANEL: CPT

## 2025-05-30 PROCEDURE — 31720 CLEARANCE OF AIRWAYS: CPT

## 2025-05-30 PROCEDURE — 36415 COLL VENOUS BLD VENIPUNCTURE: CPT

## 2025-05-30 PROCEDURE — 85025 COMPLETE CBC W/AUTO DIFF WBC: CPT

## 2025-05-30 PROCEDURE — 21400001 HC TELEMETRY ROOM

## 2025-05-30 PROCEDURE — 25000003 PHARM REV CODE 250: Performed by: INTERNAL MEDICINE

## 2025-05-30 PROCEDURE — 99900035 HC TECH TIME PER 15 MIN (STAT)

## 2025-05-30 PROCEDURE — 94761 N-INVAS EAR/PLS OXIMETRY MLT: CPT

## 2025-05-30 PROCEDURE — 94664 DEMO&/EVAL PT USE INHALER: CPT

## 2025-05-30 PROCEDURE — 27000207 HC ISOLATION

## 2025-05-30 PROCEDURE — 25000003 PHARM REV CODE 250: Performed by: HOSPITALIST

## 2025-05-30 RX ORDER — GLYCOPYRROLATE 0.2 MG/ML
0.1 INJECTION INTRAMUSCULAR; INTRAVENOUS 3 TIMES DAILY PRN
Status: DISCONTINUED | OUTPATIENT
Start: 2025-05-30 | End: 2025-06-06 | Stop reason: HOSPADM

## 2025-05-30 RX ADMIN — SODIUM CHLORIDE 2 G: 9 INJECTION, SOLUTION INTRAVENOUS at 09:05

## 2025-05-30 RX ADMIN — SODIUM ZIRCONIUM CYCLOSILICATE 10 G: 10 POWDER, FOR SUSPENSION ORAL at 09:05

## 2025-05-30 RX ADMIN — MIDODRINE HYDROCHLORIDE 10 MG: 5 TABLET ORAL at 02:05

## 2025-05-30 RX ADMIN — MUPIROCIN: 20 OINTMENT TOPICAL at 09:05

## 2025-05-30 RX ADMIN — ACETYLCYSTEINE 4 ML: 100 INHALANT RESPIRATORY (INHALATION) at 01:05

## 2025-05-30 RX ADMIN — IPRATROPIUM BROMIDE AND ALBUTEROL SULFATE 3 ML: .5; 3 SOLUTION RESPIRATORY (INHALATION) at 07:05

## 2025-05-30 RX ADMIN — OXYCODONE AND ACETAMINOPHEN 1 TABLET: 10; 325 TABLET ORAL at 09:05

## 2025-05-30 RX ADMIN — GABAPENTIN 300 MG: 300 CAPSULE ORAL at 09:05

## 2025-05-30 RX ADMIN — CARVEDILOL 6.25 MG: 3.12 TABLET, FILM COATED ORAL at 09:05

## 2025-05-30 RX ADMIN — GABAPENTIN 300 MG: 300 CAPSULE ORAL at 02:05

## 2025-05-30 RX ADMIN — SODIUM CHLORIDE 2 G: 9 INJECTION, SOLUTION INTRAVENOUS at 12:05

## 2025-05-30 RX ADMIN — CALCITRIOL CAPSULES 0.25 MCG 0.5 MCG: 0.25 CAPSULE ORAL at 09:05

## 2025-05-30 RX ADMIN — Medication 6 MG: at 09:05

## 2025-05-30 RX ADMIN — IPRATROPIUM BROMIDE AND ALBUTEROL SULFATE 3 ML: .5; 3 SOLUTION RESPIRATORY (INHALATION) at 01:05

## 2025-05-30 RX ADMIN — LEVOTHYROXINE SODIUM 100 MCG: 100 TABLET ORAL at 05:05

## 2025-05-30 RX ADMIN — ACETYLCYSTEINE 4 ML: 100 INHALANT RESPIRATORY (INHALATION) at 07:05

## 2025-05-30 RX ADMIN — ACETAMINOPHEN 650 MG: 325 TABLET ORAL at 04:05

## 2025-05-30 RX ADMIN — ALPRAZOLAM 1 MG: 0.5 TABLET ORAL at 09:05

## 2025-05-30 RX ADMIN — LEVETIRACETAM 750 MG: 100 SOLUTION ORAL at 09:05

## 2025-05-30 RX ADMIN — MAGNESIUM OXIDE TAB 400 MG (241.3 MG ELEMENTAL MG) 400 MG: 400 (241.3 MG) TAB at 09:05

## 2025-05-30 RX ADMIN — SODIUM CHLORIDE 2 G: 9 INJECTION, SOLUTION INTRAVENOUS at 11:05

## 2025-05-30 RX ADMIN — MIDODRINE HYDROCHLORIDE 10 MG: 5 TABLET ORAL at 09:05

## 2025-05-30 RX ADMIN — ENOXAPARIN SODIUM 50 MG: 60 INJECTION SUBCUTANEOUS at 05:05

## 2025-05-30 RX ADMIN — SERTRALINE HYDROCHLORIDE 50 MG: 50 TABLET ORAL at 09:05

## 2025-05-30 RX ADMIN — SODIUM CHLORIDE 2 G: 9 INJECTION, SOLUTION INTRAVENOUS at 04:05

## 2025-05-30 RX ADMIN — GLYCOPYRROLATE 0.1 MG: 0.2 INJECTION INTRAMUSCULAR; INTRAVENOUS at 06:05

## 2025-05-30 RX ADMIN — PANTOPRAZOLE SODIUM 40 MG: 40 INJECTION, POWDER, FOR SOLUTION INTRAVENOUS at 08:05

## 2025-05-30 RX ADMIN — OXYCODONE AND ACETAMINOPHEN 1 TABLET: 10; 325 TABLET ORAL at 05:05

## 2025-05-30 RX ADMIN — FOLIC ACID 1 MG: 1 TABLET ORAL at 09:05

## 2025-05-30 RX ADMIN — OXYCODONE AND ACETAMINOPHEN 1 TABLET: 10; 325 TABLET ORAL at 02:05

## 2025-05-30 RX ADMIN — ALPRAZOLAM 1 MG: 0.5 TABLET ORAL at 02:05

## 2025-05-30 NOTE — CONSULTS
Spoke with pt's mother Ann, agrees with home health. No preference indicated. Referral submitted to [facility] via [facility].    [facility] Referral submitted to [facility] via [facility].
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
## 2025-05-30 NOTE — PROGRESS NOTES
[facility] [place] Med Surg Telemetry  Wound Care    Patient Name: Jyoti Mayberry  MRN: [id number]  Date: 5/30/2025  Diagnosis: No chief complaint on file.      Subjective:     Patient ID: Jyoti Mayberry is a 29 y.o. male.    Chief Complaint: No chief complaint on file.      Past Medical History:     1. Cardiopulmonary arrest with successful resuscitation    2. Tachycardia    3. Pulmonary embolism      Wound Assessment:   Left arm wound with minimal improvement, less fibrinous tissue. Chronic pressure injuries to right scapula, sacrum and bilateral ischium essentially unchanged. Exposed bone remains. ID deferring osteo treatment due to treatment a year ago. Pt and family counseled at length about the need for offloading. PT is resistant to turning due to reported respiratory issues vs anxiety. Family asking about an upgraded air mattress at home, this was relayed to CM.                 Plan:   Daily wound care over the weekend per nursing. Wound care to follow up on Monday.   Completed wound care f/u. Pt tolerated dressing change well. No other needs at this time. Remainder of care per primary team.      Gabriella MALIK-BC, WCC, DWC

## 2025-05-31 LAB
ALBUMIN SERPL-MCNC: 1.8 G/DL (ref 3.5–5)
ALBUMIN/GLOB SERPL: 0.3 RATIO (ref 1.1–2)
ALP SERPL-CCNC: 313 UNIT/L (ref 40–150)
ALT SERPL-CCNC: 22 UNIT/L (ref 0–55)
ANION GAP SERPL CALC-SCNC: 7 MEQ/L
AST SERPL-CCNC: 27 UNIT/L (ref 11–45)
BACTERIA SPEC CULT: ABNORMAL
BACTERIA SPEC CULT: ABNORMAL
BASOPHILS # BLD AUTO: 0.07 X10(3)/MCL
BASOPHILS NFR BLD AUTO: 0.6 %
BILIRUB SERPL-MCNC: 0.2 MG/DL
BUN SERPL-MCNC: 24 MG/DL (ref 8.9–20.6)
CALCIUM SERPL-MCNC: 8.8 MG/DL (ref 8.4–10.2)
CHLORIDE SERPL-SCNC: 99 MMOL/L (ref 98–107)
CO2 SERPL-SCNC: 29 MMOL/L (ref 22–29)
CREAT SERPL-MCNC: 0.79 MG/DL (ref 0.72–1.25)
CREAT/UREA NIT SERPL: 30
CRP SERPL-MCNC: 173.1 MG/L
EOSINOPHIL # BLD AUTO: 0.75 X10(3)/MCL (ref 0–0.9)
EOSINOPHIL NFR BLD AUTO: 6.6 %
ERYTHROCYTE [DISTWIDTH] IN BLOOD BY AUTOMATED COUNT: 16.1 % (ref 11.5–17)
ERYTHROCYTE [SEDIMENTATION RATE] IN BLOOD: 79 MM/HR (ref 0–15)
GFR SERPLBLD CREATININE-BSD FMLA CKD-EPI: >60 ML/MIN/1.73/M2
GLOBULIN SER-MCNC: 7 GM/DL (ref 2.4–3.5)
GLUCOSE SERPL-MCNC: 78 MG/DL (ref 74–100)
GRAM STN SPEC: ABNORMAL
HCT VFR BLD AUTO: 27.1 % (ref 42–52)
HGB BLD-MCNC: 8.5 G/DL (ref 14–18)
HOLD SPECIMEN: NORMAL
IMM GRANULOCYTES # BLD AUTO: 0.24 X10(3)/MCL (ref 0–0.04)
IMM GRANULOCYTES NFR BLD AUTO: 2.1 %
LYMPHOCYTES # BLD AUTO: 1.72 X10(3)/MCL (ref 0.6–4.6)
LYMPHOCYTES NFR BLD AUTO: 15.1 %
MAGNESIUM SERPL-MCNC: 1.6 MG/DL (ref 1.6–2.6)
MCH RBC QN AUTO: 27.6 PG (ref 27–31)
MCHC RBC AUTO-ENTMCNC: 31.4 G/DL (ref 33–36)
MCV RBC AUTO: 88 FL (ref 80–94)
MONOCYTES # BLD AUTO: 1.21 X10(3)/MCL (ref 0.1–1.3)
MONOCYTES NFR BLD AUTO: 10.6 %
NEUTROPHILS # BLD AUTO: 7.41 X10(3)/MCL (ref 2.1–9.2)
NEUTROPHILS NFR BLD AUTO: 65 %
NRBC BLD AUTO-RTO: 0 %
PHOSPHATE SERPL-MCNC: 6.6 MG/DL (ref 2.3–4.7)
PLATELET # BLD AUTO: 763 X10(3)/MCL (ref 130–400)
PMV BLD AUTO: 8.3 FL (ref 7.4–10.4)
POTASSIUM SERPL-SCNC: 5.4 MMOL/L (ref 3.5–5.1)
PROT SERPL-MCNC: 8.8 GM/DL (ref 6.4–8.3)
RBC # BLD AUTO: 3.08 X10(6)/MCL (ref 4.7–6.1)
SODIUM SERPL-SCNC: 135 MMOL/L (ref 136–145)
WBC # BLD AUTO: 11.4 X10(3)/MCL (ref 4.5–11.5)

## 2025-05-31 PROCEDURE — 85652 RBC SED RATE AUTOMATED: CPT | Performed by: NURSE PRACTITIONER

## 2025-05-31 PROCEDURE — 94761 N-INVAS EAR/PLS OXIMETRY MLT: CPT

## 2025-05-31 PROCEDURE — 84100 ASSAY OF PHOSPHORUS: CPT

## 2025-05-31 PROCEDURE — 25000003 PHARM REV CODE 250: Performed by: INTERNAL MEDICINE

## 2025-05-31 PROCEDURE — 99900026 HC AIRWAY MAINTENANCE (STAT)

## 2025-05-31 PROCEDURE — 63600175 PHARM REV CODE 636 W HCPCS

## 2025-05-31 PROCEDURE — 94664 DEMO&/EVAL PT USE INHALER: CPT

## 2025-05-31 PROCEDURE — 25000242 PHARM REV CODE 250 ALT 637 W/ HCPCS

## 2025-05-31 PROCEDURE — 27000173 HC ACAPELLA DEVICE DH OR DM

## 2025-05-31 PROCEDURE — 25000003 PHARM REV CODE 250

## 2025-05-31 PROCEDURE — 21400001 HC TELEMETRY ROOM

## 2025-05-31 PROCEDURE — 27000207 HC ISOLATION

## 2025-05-31 PROCEDURE — 93005 ELECTROCARDIOGRAM TRACING: CPT

## 2025-05-31 PROCEDURE — 94640 AIRWAY INHALATION TREATMENT: CPT

## 2025-05-31 PROCEDURE — 31720 CLEARANCE OF AIRWAYS: CPT

## 2025-05-31 PROCEDURE — 63600175 PHARM REV CODE 636 W HCPCS: Mod: JZ,TB | Performed by: INTERNAL MEDICINE

## 2025-05-31 PROCEDURE — 36415 COLL VENOUS BLD VENIPUNCTURE: CPT

## 2025-05-31 PROCEDURE — 99900035 HC TECH TIME PER 15 MIN (STAT)

## 2025-05-31 PROCEDURE — 83735 ASSAY OF MAGNESIUM: CPT

## 2025-05-31 PROCEDURE — 80053 COMPREHEN METABOLIC PANEL: CPT

## 2025-05-31 PROCEDURE — 86140 C-REACTIVE PROTEIN: CPT | Performed by: NURSE PRACTITIONER

## 2025-05-31 PROCEDURE — 25000003 PHARM REV CODE 250: Performed by: HOSPITALIST

## 2025-05-31 PROCEDURE — 63600175 PHARM REV CODE 636 W HCPCS: Mod: JZ,TB

## 2025-05-31 PROCEDURE — 85025 COMPLETE CBC W/AUTO DIFF WBC: CPT

## 2025-05-31 RX ORDER — KETOROLAC TROMETHAMINE 30 MG/ML
15 INJECTION, SOLUTION INTRAMUSCULAR; INTRAVENOUS ONCE
Status: COMPLETED | OUTPATIENT
Start: 2025-05-31 | End: 2025-05-31

## 2025-05-31 RX ADMIN — PANTOPRAZOLE SODIUM 40 MG: 40 INJECTION, POWDER, FOR SOLUTION INTRAVENOUS at 09:05

## 2025-05-31 RX ADMIN — FOLIC ACID 1 MG: 1 TABLET ORAL at 09:05

## 2025-05-31 RX ADMIN — MIDODRINE HYDROCHLORIDE 10 MG: 5 TABLET ORAL at 02:05

## 2025-05-31 RX ADMIN — MAGNESIUM OXIDE TAB 400 MG (241.3 MG ELEMENTAL MG) 400 MG: 400 (241.3 MG) TAB at 09:05

## 2025-05-31 RX ADMIN — OXYCODONE AND ACETAMINOPHEN 1 TABLET: 10; 325 TABLET ORAL at 04:05

## 2025-05-31 RX ADMIN — CALCITRIOL CAPSULES 0.25 MCG 0.5 MCG: 0.25 CAPSULE ORAL at 09:05

## 2025-05-31 RX ADMIN — SODIUM CHLORIDE 2 G: 9 INJECTION, SOLUTION INTRAVENOUS at 09:05

## 2025-05-31 RX ADMIN — LEVETIRACETAM 750 MG: 100 SOLUTION ORAL at 08:05

## 2025-05-31 RX ADMIN — IPRATROPIUM BROMIDE AND ALBUTEROL SULFATE 3 ML: .5; 3 SOLUTION RESPIRATORY (INHALATION) at 07:05

## 2025-05-31 RX ADMIN — ENOXAPARIN SODIUM 50 MG: 60 INJECTION SUBCUTANEOUS at 05:05

## 2025-05-31 RX ADMIN — SODIUM ZIRCONIUM CYCLOSILICATE 10 G: 10 POWDER, FOR SUSPENSION ORAL at 12:05

## 2025-05-31 RX ADMIN — LEVETIRACETAM 750 MG: 100 SOLUTION ORAL at 09:05

## 2025-05-31 RX ADMIN — LEVOTHYROXINE SODIUM 100 MCG: 100 TABLET ORAL at 05:05

## 2025-05-31 RX ADMIN — GABAPENTIN 300 MG: 300 CAPSULE ORAL at 09:05

## 2025-05-31 RX ADMIN — CARVEDILOL 6.25 MG: 3.12 TABLET, FILM COATED ORAL at 09:05

## 2025-05-31 RX ADMIN — MUPIROCIN: 20 OINTMENT TOPICAL at 09:05

## 2025-05-31 RX ADMIN — ACETYLCYSTEINE 4 ML: 100 INHALANT RESPIRATORY (INHALATION) at 07:05

## 2025-05-31 RX ADMIN — ALPRAZOLAM 1 MG: 0.5 TABLET ORAL at 08:05

## 2025-05-31 RX ADMIN — MAGNESIUM OXIDE TAB 400 MG (241.3 MG ELEMENTAL MG) 400 MG: 400 (241.3 MG) TAB at 08:05

## 2025-05-31 RX ADMIN — KETOROLAC TROMETHAMINE 15 MG: 30 INJECTION, SOLUTION INTRAMUSCULAR; INTRAVENOUS at 12:05

## 2025-05-31 RX ADMIN — GLYCOPYRROLATE 0.1 MG: 0.2 INJECTION INTRAMUSCULAR; INTRAVENOUS at 09:05

## 2025-05-31 RX ADMIN — SODIUM CHLORIDE 2 G: 9 INJECTION, SOLUTION INTRAVENOUS at 11:05

## 2025-05-31 RX ADMIN — SODIUM CHLORIDE 2 G: 9 INJECTION, SOLUTION INTRAVENOUS at 04:05

## 2025-05-31 RX ADMIN — OXYCODONE AND ACETAMINOPHEN 1 TABLET: 10; 325 TABLET ORAL at 11:05

## 2025-05-31 RX ADMIN — ALPRAZOLAM 1 MG: 0.5 TABLET ORAL at 12:05

## 2025-05-31 RX ADMIN — ACETYLCYSTEINE 4 ML: 100 INHALANT RESPIRATORY (INHALATION) at 01:05

## 2025-05-31 RX ADMIN — GABAPENTIN 300 MG: 300 CAPSULE ORAL at 02:05

## 2025-05-31 RX ADMIN — COLLAGENASE SANTYL: 250 OINTMENT TOPICAL at 10:05

## 2025-05-31 RX ADMIN — MIDODRINE HYDROCHLORIDE 10 MG: 5 TABLET ORAL at 08:05

## 2025-05-31 RX ADMIN — SERTRALINE HYDROCHLORIDE 50 MG: 50 TABLET ORAL at 09:05

## 2025-05-31 RX ADMIN — MIDODRINE HYDROCHLORIDE 10 MG: 5 TABLET ORAL at 09:05

## 2025-05-31 RX ADMIN — ACETAMINOPHEN 650 MG: 325 TABLET ORAL at 05:05

## 2025-05-31 RX ADMIN — GABAPENTIN 300 MG: 300 CAPSULE ORAL at 08:05

## 2025-05-31 RX ADMIN — IPRATROPIUM BROMIDE AND ALBUTEROL SULFATE 3 ML: .5; 3 SOLUTION RESPIRATORY (INHALATION) at 01:05
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
## 2025-06-01 LAB
ALBUMIN SERPL-MCNC: 1.9 G/DL (ref 3.5–5)
ALBUMIN/GLOB SERPL: 0.3 RATIO (ref 1.1–2)
ALP SERPL-CCNC: 297 UNIT/L (ref 40–150)
ALT SERPL-CCNC: 20 UNIT/L (ref 0–55)
ANION GAP SERPL CALC-SCNC: 10 MEQ/L
AST SERPL-CCNC: 24 UNIT/L (ref 11–45)
BACTERIA BLD CULT: NORMAL
BACTERIA BLD CULT: NORMAL
BASOPHILS # BLD AUTO: 0.08 X10(3)/MCL
BASOPHILS NFR BLD AUTO: 0.6 %
BILIRUB SERPL-MCNC: 0.1 MG/DL
BUN SERPL-MCNC: 30 MG/DL (ref 8.9–20.6)
CALCIUM SERPL-MCNC: 8.7 MG/DL (ref 8.4–10.2)
CHLORIDE SERPL-SCNC: 97 MMOL/L (ref 98–107)
CO2 SERPL-SCNC: 27 MMOL/L (ref 22–29)
CREAT SERPL-MCNC: 0.98 MG/DL (ref 0.72–1.25)
CREAT/UREA NIT SERPL: 31
EOSINOPHIL # BLD AUTO: 1.05 X10(3)/MCL (ref 0–0.9)
EOSINOPHIL NFR BLD AUTO: 8 %
ERYTHROCYTE [DISTWIDTH] IN BLOOD BY AUTOMATED COUNT: 16.1 % (ref 11.5–17)
GFR SERPLBLD CREATININE-BSD FMLA CKD-EPI: >60 ML/MIN/1.73/M2
GLOBULIN SER-MCNC: 7.1 GM/DL (ref 2.4–3.5)
GLUCOSE SERPL-MCNC: 86 MG/DL (ref 74–100)
HCT VFR BLD AUTO: 27.7 % (ref 42–52)
HGB BLD-MCNC: 8.8 G/DL (ref 14–18)
IMM GRANULOCYTES # BLD AUTO: 0.18 X10(3)/MCL (ref 0–0.04)
IMM GRANULOCYTES NFR BLD AUTO: 1.4 %
LYMPHOCYTES # BLD AUTO: 2.53 X10(3)/MCL (ref 0.6–4.6)
LYMPHOCYTES NFR BLD AUTO: 19.4 %
MAGNESIUM SERPL-MCNC: 1.8 MG/DL (ref 1.6–2.6)
MCH RBC QN AUTO: 28.2 PG (ref 27–31)
MCHC RBC AUTO-ENTMCNC: 31.8 G/DL (ref 33–36)
MCV RBC AUTO: 88.8 FL (ref 80–94)
MONOCYTES # BLD AUTO: 1.39 X10(3)/MCL (ref 0.1–1.3)
MONOCYTES NFR BLD AUTO: 10.7 %
NEUTROPHILS # BLD AUTO: 7.82 X10(3)/MCL (ref 2.1–9.2)
NEUTROPHILS NFR BLD AUTO: 59.9 %
NRBC BLD AUTO-RTO: 0 %
PHOSPHATE SERPL-MCNC: 6.3 MG/DL (ref 2.3–4.7)
PLATELET # BLD AUTO: 767 X10(3)/MCL (ref 130–400)
PMV BLD AUTO: 8 FL (ref 7.4–10.4)
POTASSIUM SERPL-SCNC: 5.7 MMOL/L (ref 3.5–5.1)
PROT SERPL-MCNC: 9 GM/DL (ref 6.4–8.3)
RBC # BLD AUTO: 3.12 X10(6)/MCL (ref 4.7–6.1)
SODIUM SERPL-SCNC: 134 MMOL/L (ref 136–145)
WBC # BLD AUTO: 13.05 X10(3)/MCL (ref 4.5–11.5)

## 2025-06-01 PROCEDURE — 25000242 PHARM REV CODE 250 ALT 637 W/ HCPCS

## 2025-06-01 PROCEDURE — 94664 DEMO&/EVAL PT USE INHALER: CPT

## 2025-06-01 PROCEDURE — 83735 ASSAY OF MAGNESIUM: CPT

## 2025-06-01 PROCEDURE — 94640 AIRWAY INHALATION TREATMENT: CPT

## 2025-06-01 PROCEDURE — 80053 COMPREHEN METABOLIC PANEL: CPT

## 2025-06-01 PROCEDURE — 25000003 PHARM REV CODE 250

## 2025-06-01 PROCEDURE — 85025 COMPLETE CBC W/AUTO DIFF WBC: CPT

## 2025-06-01 PROCEDURE — 84100 ASSAY OF PHOSPHORUS: CPT

## 2025-06-01 PROCEDURE — 99900026 HC AIRWAY MAINTENANCE (STAT)

## 2025-06-01 PROCEDURE — 21400001 HC TELEMETRY ROOM

## 2025-06-01 PROCEDURE — 27000207 HC ISOLATION

## 2025-06-01 PROCEDURE — 63600175 PHARM REV CODE 636 W HCPCS

## 2025-06-01 PROCEDURE — 27000173 HC ACAPELLA DEVICE DH OR DM

## 2025-06-01 PROCEDURE — 99900035 HC TECH TIME PER 15 MIN (STAT)

## 2025-06-01 PROCEDURE — 36415 COLL VENOUS BLD VENIPUNCTURE: CPT

## 2025-06-01 PROCEDURE — 25000003 PHARM REV CODE 250: Performed by: HOSPITALIST

## 2025-06-01 PROCEDURE — 63600175 PHARM REV CODE 636 W HCPCS: Mod: TB

## 2025-06-01 PROCEDURE — 31720 CLEARANCE OF AIRWAYS: CPT

## 2025-06-01 PROCEDURE — 94761 N-INVAS EAR/PLS OXIMETRY MLT: CPT

## 2025-06-01 RX ORDER — MIDODRINE HYDROCHLORIDE 5 MG/1
5 TABLET ORAL 3 TIMES DAILY
Status: DISCONTINUED | OUTPATIENT
Start: 2025-06-01 | End: 2025-06-04

## 2025-06-01 RX ADMIN — SODIUM ZIRCONIUM CYCLOSILICATE 10 G: 10 POWDER, FOR SUSPENSION ORAL at 08:06

## 2025-06-01 RX ADMIN — PANTOPRAZOLE SODIUM 40 MG: 40 INJECTION, POWDER, FOR SOLUTION INTRAVENOUS at 09:06

## 2025-06-01 RX ADMIN — MAGNESIUM OXIDE TAB 400 MG (241.3 MG ELEMENTAL MG) 400 MG: 400 (241.3 MG) TAB at 09:06

## 2025-06-01 RX ADMIN — ACETYLCYSTEINE 4 ML: 100 INHALANT RESPIRATORY (INHALATION) at 07:06

## 2025-06-01 RX ADMIN — FOLIC ACID 1 MG: 1 TABLET ORAL at 09:06

## 2025-06-01 RX ADMIN — IPRATROPIUM BROMIDE AND ALBUTEROL SULFATE 3 ML: .5; 3 SOLUTION RESPIRATORY (INHALATION) at 07:06

## 2025-06-01 RX ADMIN — MIDODRINE HYDROCHLORIDE 5 MG: 5 TABLET ORAL at 02:06

## 2025-06-01 RX ADMIN — IPRATROPIUM BROMIDE AND ALBUTEROL SULFATE 3 ML: .5; 3 SOLUTION RESPIRATORY (INHALATION) at 01:06

## 2025-06-01 RX ADMIN — ALPRAZOLAM 1 MG: 0.5 TABLET ORAL at 08:06

## 2025-06-01 RX ADMIN — LEVETIRACETAM 750 MG: 100 SOLUTION ORAL at 08:06

## 2025-06-01 RX ADMIN — MIDODRINE HYDROCHLORIDE 5 MG: 5 TABLET ORAL at 08:06

## 2025-06-01 RX ADMIN — MIDODRINE HYDROCHLORIDE 5 MG: 5 TABLET ORAL at 09:06

## 2025-06-01 RX ADMIN — ENOXAPARIN SODIUM 50 MG: 60 INJECTION SUBCUTANEOUS at 05:06

## 2025-06-01 RX ADMIN — OXYCODONE AND ACETAMINOPHEN 1 TABLET: 10; 325 TABLET ORAL at 08:06

## 2025-06-01 RX ADMIN — GABAPENTIN 300 MG: 300 CAPSULE ORAL at 02:06

## 2025-06-01 RX ADMIN — SODIUM ZIRCONIUM CYCLOSILICATE 10 G: 10 POWDER, FOR SUSPENSION ORAL at 02:06

## 2025-06-01 RX ADMIN — GABAPENTIN 300 MG: 300 CAPSULE ORAL at 09:06

## 2025-06-01 RX ADMIN — ALPRAZOLAM 1 MG: 0.5 TABLET ORAL at 01:06

## 2025-06-01 RX ADMIN — ACETYLCYSTEINE 4 ML: 100 INHALANT RESPIRATORY (INHALATION) at 01:06

## 2025-06-01 RX ADMIN — SODIUM ZIRCONIUM CYCLOSILICATE 10 G: 10 POWDER, FOR SUSPENSION ORAL at 09:06

## 2025-06-01 RX ADMIN — MAGNESIUM OXIDE TAB 400 MG (241.3 MG ELEMENTAL MG) 400 MG: 400 (241.3 MG) TAB at 08:06

## 2025-06-01 RX ADMIN — COLLAGENASE SANTYL: 250 OINTMENT TOPICAL at 09:06

## 2025-06-01 RX ADMIN — GLYCOPYRROLATE 0.1 MG: 0.2 INJECTION INTRAMUSCULAR; INTRAVENOUS at 09:06

## 2025-06-01 RX ADMIN — ALPRAZOLAM 1 MG: 0.5 TABLET ORAL at 05:06

## 2025-06-01 RX ADMIN — SODIUM CHLORIDE 2 G: 9 INJECTION, SOLUTION INTRAVENOUS at 09:06

## 2025-06-01 RX ADMIN — CALCITRIOL CAPSULES 0.25 MCG 0.5 MCG: 0.25 CAPSULE ORAL at 09:06

## 2025-06-01 RX ADMIN — LEVOTHYROXINE SODIUM 100 MCG: 100 TABLET ORAL at 05:06

## 2025-06-01 RX ADMIN — OXYCODONE AND ACETAMINOPHEN 1 TABLET: 10; 325 TABLET ORAL at 01:06

## 2025-06-01 RX ADMIN — LEVETIRACETAM 750 MG: 100 SOLUTION ORAL at 09:06

## 2025-06-01 RX ADMIN — SERTRALINE HYDROCHLORIDE 50 MG: 50 TABLET ORAL at 09:06

## 2025-06-01 RX ADMIN — SODIUM CHLORIDE 2 G: 9 INJECTION, SOLUTION INTRAVENOUS at 05:06

## 2025-06-01 RX ADMIN — GABAPENTIN 300 MG: 300 CAPSULE ORAL at 08:06
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
## 2025-06-02 PROBLEM — J69.0 ASPIRATION PNEUMONIA OF BOTH LOWER LOBES: Status: ACTIVE | Noted: 2025-04-27

## 2025-06-02 PROBLEM — I46.9 PEA (PULSELESS ELECTRICAL ACTIVITY): Status: ACTIVE | Noted: 2025-06-02

## 2025-06-02 PROBLEM — M86.68 CHRONIC OSTEOMYELITIS OF HIP: Status: ACTIVE | Noted: 2023-05-18

## 2025-06-02 LAB
ALBUMIN SERPL-MCNC: 1.9 G/DL (ref 3.5–5)
ALBUMIN/GLOB SERPL: 0.3 RATIO (ref 1.1–2)
ALP SERPL-CCNC: 259 UNIT/L (ref 40–150)
ALT SERPL-CCNC: 17 UNIT/L (ref 0–55)
ANION GAP SERPL CALC-SCNC: 9 MEQ/L
AST SERPL-CCNC: 27 UNIT/L (ref 11–45)
BILIRUB SERPL-MCNC: 0.1 MG/DL
BUN SERPL-MCNC: 29.3 MG/DL (ref 8.9–20.6)
CALCIUM SERPL-MCNC: 8.6 MG/DL (ref 8.4–10.2)
CHLORIDE SERPL-SCNC: 98 MMOL/L (ref 98–107)
CO2 SERPL-SCNC: 26 MMOL/L (ref 22–29)
CREAT SERPL-MCNC: 0.88 MG/DL (ref 0.72–1.25)
CREAT/UREA NIT SERPL: 33
CRP SERPL-MCNC: 100.5 MG/L
ERYTHROCYTE [SEDIMENTATION RATE] IN BLOOD: 87 MM/HR (ref 0–15)
GFR SERPLBLD CREATININE-BSD FMLA CKD-EPI: >60 ML/MIN/1.73/M2
GLOBULIN SER-MCNC: 7 GM/DL (ref 2.4–3.5)
GLUCOSE SERPL-MCNC: 91 MG/DL (ref 74–100)
MAGNESIUM SERPL-MCNC: 1.7 MG/DL (ref 1.6–2.6)
OHS QRS DURATION: 82 MS
OHS QTC CALCULATION: 458 MS
PHOSPHATE SERPL-MCNC: 6.7 MG/DL (ref 2.3–4.7)
POTASSIUM SERPL-SCNC: 4.8 MMOL/L (ref 3.5–5.1)
PROT SERPL-MCNC: 8.9 GM/DL (ref 6.4–8.3)
SODIUM SERPL-SCNC: 133 MMOL/L (ref 136–145)

## 2025-06-02 PROCEDURE — 80053 COMPREHEN METABOLIC PANEL: CPT

## 2025-06-02 PROCEDURE — 94664 DEMO&/EVAL PT USE INHALER: CPT

## 2025-06-02 PROCEDURE — 25000242 PHARM REV CODE 250 ALT 637 W/ HCPCS

## 2025-06-02 PROCEDURE — 36415 COLL VENOUS BLD VENIPUNCTURE: CPT | Performed by: NURSE PRACTITIONER

## 2025-06-02 PROCEDURE — 85652 RBC SED RATE AUTOMATED: CPT | Performed by: NURSE PRACTITIONER

## 2025-06-02 PROCEDURE — 27000207 HC ISOLATION

## 2025-06-02 PROCEDURE — 84100 ASSAY OF PHOSPHORUS: CPT

## 2025-06-02 PROCEDURE — 25000003 PHARM REV CODE 250

## 2025-06-02 PROCEDURE — 99900035 HC TECH TIME PER 15 MIN (STAT)

## 2025-06-02 PROCEDURE — 63600175 PHARM REV CODE 636 W HCPCS

## 2025-06-02 PROCEDURE — 83735 ASSAY OF MAGNESIUM: CPT

## 2025-06-02 PROCEDURE — 31720 CLEARANCE OF AIRWAYS: CPT

## 2025-06-02 PROCEDURE — 27000173 HC ACAPELLA DEVICE DH OR DM

## 2025-06-02 PROCEDURE — 86140 C-REACTIVE PROTEIN: CPT | Performed by: NURSE PRACTITIONER

## 2025-06-02 PROCEDURE — 21400001 HC TELEMETRY ROOM

## 2025-06-02 PROCEDURE — 94761 N-INVAS EAR/PLS OXIMETRY MLT: CPT

## 2025-06-02 PROCEDURE — 25000003 PHARM REV CODE 250: Performed by: HOSPITALIST

## 2025-06-02 PROCEDURE — 94640 AIRWAY INHALATION TREATMENT: CPT

## 2025-06-02 RX ORDER — ACETYLCYSTEINE 100 MG/ML
4 SOLUTION ORAL; RESPIRATORY (INHALATION) 3 TIMES DAILY
Qty: 1080 ML | Refills: 3 | Status: SHIPPED | OUTPATIENT
Start: 2025-06-02 | End: 2026-06-02

## 2025-06-02 RX ADMIN — SODIUM CHLORIDE 2 G: 9 INJECTION, SOLUTION INTRAVENOUS at 05:06

## 2025-06-02 RX ADMIN — SODIUM ZIRCONIUM CYCLOSILICATE 10 G: 10 POWDER, FOR SUSPENSION ORAL at 02:06

## 2025-06-02 RX ADMIN — GABAPENTIN 300 MG: 300 CAPSULE ORAL at 02:06

## 2025-06-02 RX ADMIN — SERTRALINE HYDROCHLORIDE 50 MG: 50 TABLET ORAL at 09:06

## 2025-06-02 RX ADMIN — LEVOTHYROXINE SODIUM 100 MCG: 100 TABLET ORAL at 05:06

## 2025-06-02 RX ADMIN — ALPRAZOLAM 1 MG: 0.5 TABLET ORAL at 08:06

## 2025-06-02 RX ADMIN — COLLAGENASE SANTYL: 250 OINTMENT TOPICAL at 09:06

## 2025-06-02 RX ADMIN — SODIUM CHLORIDE 2 G: 9 INJECTION, SOLUTION INTRAVENOUS at 12:06

## 2025-06-02 RX ADMIN — GABAPENTIN 300 MG: 300 CAPSULE ORAL at 09:06

## 2025-06-02 RX ADMIN — OXYCODONE AND ACETAMINOPHEN 1 TABLET: 10; 325 TABLET ORAL at 09:06

## 2025-06-02 RX ADMIN — IPRATROPIUM BROMIDE AND ALBUTEROL SULFATE 3 ML: .5; 3 SOLUTION RESPIRATORY (INHALATION) at 07:06

## 2025-06-02 RX ADMIN — MIDODRINE HYDROCHLORIDE 5 MG: 5 TABLET ORAL at 09:06

## 2025-06-02 RX ADMIN — IPRATROPIUM BROMIDE AND ALBUTEROL SULFATE 3 ML: .5; 3 SOLUTION RESPIRATORY (INHALATION) at 01:06

## 2025-06-02 RX ADMIN — ALPRAZOLAM 1 MG: 0.5 TABLET ORAL at 02:06

## 2025-06-02 RX ADMIN — OXYCODONE AND ACETAMINOPHEN 1 TABLET: 10; 325 TABLET ORAL at 02:06

## 2025-06-02 RX ADMIN — ACETYLCYSTEINE 4 ML: 100 INHALANT RESPIRATORY (INHALATION) at 07:06

## 2025-06-02 RX ADMIN — LEVETIRACETAM 750 MG: 100 SOLUTION ORAL at 08:06

## 2025-06-02 RX ADMIN — IPRATROPIUM BROMIDE AND ALBUTEROL SULFATE 3 ML: .5; 3 SOLUTION RESPIRATORY (INHALATION) at 08:06

## 2025-06-02 RX ADMIN — ENOXAPARIN SODIUM 50 MG: 60 INJECTION SUBCUTANEOUS at 05:06

## 2025-06-02 RX ADMIN — GABAPENTIN 300 MG: 300 CAPSULE ORAL at 08:06

## 2025-06-02 RX ADMIN — MIDODRINE HYDROCHLORIDE 5 MG: 5 TABLET ORAL at 08:06

## 2025-06-02 RX ADMIN — OXYCODONE AND ACETAMINOPHEN 1 TABLET: 10; 325 TABLET ORAL at 03:06

## 2025-06-02 RX ADMIN — SODIUM CHLORIDE 2 G: 9 INJECTION, SOLUTION INTRAVENOUS at 09:06

## 2025-06-02 RX ADMIN — CALCITRIOL CAPSULES 0.25 MCG 0.5 MCG: 0.25 CAPSULE ORAL at 09:06

## 2025-06-02 RX ADMIN — MAGNESIUM OXIDE TAB 400 MG (241.3 MG ELEMENTAL MG) 400 MG: 400 (241.3 MG) TAB at 08:06

## 2025-06-02 RX ADMIN — ALPRAZOLAM 1 MG: 0.5 TABLET ORAL at 05:06

## 2025-06-02 RX ADMIN — FOLIC ACID 1 MG: 1 TABLET ORAL at 09:06

## 2025-06-02 RX ADMIN — ACETYLCYSTEINE 4 ML: 100 INHALANT RESPIRATORY (INHALATION) at 08:06

## 2025-06-02 RX ADMIN — MAGNESIUM OXIDE TAB 400 MG (241.3 MG ELEMENTAL MG) 400 MG: 400 (241.3 MG) TAB at 09:06

## 2025-06-02 RX ADMIN — MIDODRINE HYDROCHLORIDE 5 MG: 5 TABLET ORAL at 02:06

## 2025-06-02 RX ADMIN — ACETYLCYSTEINE 4 ML: 100 INHALANT RESPIRATORY (INHALATION) at 01:06

## 2025-06-02 RX ADMIN — LEVETIRACETAM 750 MG: 100 SOLUTION ORAL at 09:06

## 2025-06-02 RX ADMIN — PANTOPRAZOLE SODIUM 40 MG: 40 INJECTION, POWDER, FOR SOLUTION INTRAVENOUS at 09:06

## 2025-06-02 RX ADMIN — SODIUM ZIRCONIUM CYCLOSILICATE 10 G: 10 POWDER, FOR SUSPENSION ORAL at 09:06
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
## 2025-06-02 NOTE — PROGRESS NOTES
[facility] Telemetry  Wound Care    Patient Name: Jyoti Mayberry  MRN: [id number]  Date: 6/2/2025  Diagnosis: No chief complaint on file.      Subjective:     Patient ID: Jyoti Mayberry is a 30 y.o. male.    Chief Complaint: No chief complaint on file.      Past Medical History:     1. Cardiopulmonary arrest with successful resuscitation    2. Tachycardia    3. Pulmonary embolism    4. QT prolongation      Wound Assessment:   Chronic pressure wounds to sacrum, bilateral ischium and right scapula unchanged. Per nursing pt has not allowed them to turn him. CM working to assist with upgraded air mattress at home.     Plan:     Completed wound care f/u. Pt tolerated dressing change well. No other needs at this time. Remainder of care per primary team.      Gabriella MALIK-BC, WCC, DWC
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
## 2025-06-03 LAB
ALBUMIN SERPL-MCNC: 1.9 G/DL (ref 3.5–5)
ALBUMIN/GLOB SERPL: 0.3 RATIO (ref 1.1–2)
ALP SERPL-CCNC: 249 UNIT/L (ref 40–150)
ALT SERPL-CCNC: 19 UNIT/L (ref 0–55)
ANION GAP SERPL CALC-SCNC: 11 MEQ/L
AST SERPL-CCNC: 32 UNIT/L (ref 11–45)
BASOPHILS # BLD AUTO: 0.09 X10(3)/MCL
BASOPHILS NFR BLD AUTO: 0.4 %
BILIRUB SERPL-MCNC: 0.2 MG/DL
BUN SERPL-MCNC: 28.5 MG/DL (ref 8.9–20.6)
CALCIUM SERPL-MCNC: 8.7 MG/DL (ref 8.4–10.2)
CHLORIDE SERPL-SCNC: 98 MMOL/L (ref 98–107)
CO2 SERPL-SCNC: 26 MMOL/L (ref 22–29)
CREAT SERPL-MCNC: 0.91 MG/DL (ref 0.72–1.25)
CREAT/UREA NIT SERPL: 31
EOSINOPHIL # BLD AUTO: 1.06 X10(3)/MCL (ref 0–0.9)
EOSINOPHIL NFR BLD AUTO: 4.8 %
ERYTHROCYTE [DISTWIDTH] IN BLOOD BY AUTOMATED COUNT: 16.3 % (ref 11.5–17)
GFR SERPLBLD CREATININE-BSD FMLA CKD-EPI: >60 ML/MIN/1.73/M2
GLOBULIN SER-MCNC: 7.3 GM/DL (ref 2.4–3.5)
GLUCOSE SERPL-MCNC: 80 MG/DL (ref 74–100)
HCT VFR BLD AUTO: 24.5 % (ref 42–52)
HGB BLD-MCNC: 7.9 G/DL (ref 14–18)
HOLD SPECIMEN: NORMAL
HOLD SPECIMEN: NORMAL
IMM GRANULOCYTES # BLD AUTO: 0.2 X10(3)/MCL (ref 0–0.04)
IMM GRANULOCYTES NFR BLD AUTO: 0.9 %
LYMPHOCYTES # BLD AUTO: 2.38 X10(3)/MCL (ref 0.6–4.6)
LYMPHOCYTES NFR BLD AUTO: 10.8 %
MAGNESIUM SERPL-MCNC: 1.7 MG/DL (ref 1.6–2.6)
MCH RBC QN AUTO: 28.5 PG (ref 27–31)
MCHC RBC AUTO-ENTMCNC: 32.2 G/DL (ref 33–36)
MCV RBC AUTO: 88.4 FL (ref 80–94)
MONOCYTES # BLD AUTO: 1.57 X10(3)/MCL (ref 0.1–1.3)
MONOCYTES NFR BLD AUTO: 7.1 %
NEUTROPHILS # BLD AUTO: 16.71 X10(3)/MCL (ref 2.1–9.2)
NEUTROPHILS NFR BLD AUTO: 76 %
NRBC BLD AUTO-RTO: 0 %
PHOSPHATE SERPL-MCNC: 6.8 MG/DL (ref 2.3–4.7)
PLATELET # BLD AUTO: 754 X10(3)/MCL (ref 130–400)
PMV BLD AUTO: 8.2 FL (ref 7.4–10.4)
POTASSIUM SERPL-SCNC: 4.8 MMOL/L (ref 3.5–5.1)
PROT SERPL-MCNC: 9.2 GM/DL (ref 6.4–8.3)
RBC # BLD AUTO: 2.77 X10(6)/MCL (ref 4.7–6.1)
SODIUM SERPL-SCNC: 135 MMOL/L (ref 136–145)
WBC # BLD AUTO: 20.66 X10(3)/MCL (ref 4.5–11.5)
WBC # BLD AUTO: 22.01 X10(3)/MCL (ref 4.5–11.5)

## 2025-06-03 PROCEDURE — 36415 COLL VENOUS BLD VENIPUNCTURE: CPT

## 2025-06-03 PROCEDURE — 27000207 HC ISOLATION

## 2025-06-03 PROCEDURE — 63600175 PHARM REV CODE 636 W HCPCS

## 2025-06-03 PROCEDURE — 94640 AIRWAY INHALATION TREATMENT: CPT

## 2025-06-03 PROCEDURE — 94664 DEMO&/EVAL PT USE INHALER: CPT

## 2025-06-03 PROCEDURE — 36415 COLL VENOUS BLD VENIPUNCTURE: CPT | Performed by: INTERNAL MEDICINE

## 2025-06-03 PROCEDURE — 85048 AUTOMATED LEUKOCYTE COUNT: CPT | Performed by: INTERNAL MEDICINE

## 2025-06-03 PROCEDURE — 25000242 PHARM REV CODE 250 ALT 637 W/ HCPCS

## 2025-06-03 PROCEDURE — 84100 ASSAY OF PHOSPHORUS: CPT

## 2025-06-03 PROCEDURE — 85025 COMPLETE CBC W/AUTO DIFF WBC: CPT

## 2025-06-03 PROCEDURE — 25000003 PHARM REV CODE 250

## 2025-06-03 PROCEDURE — 31720 CLEARANCE OF AIRWAYS: CPT

## 2025-06-03 PROCEDURE — 83735 ASSAY OF MAGNESIUM: CPT

## 2025-06-03 PROCEDURE — 63600175 PHARM REV CODE 636 W HCPCS: Mod: TB

## 2025-06-03 PROCEDURE — 80053 COMPREHEN METABOLIC PANEL: CPT

## 2025-06-03 PROCEDURE — 25000003 PHARM REV CODE 250: Performed by: HOSPITALIST

## 2025-06-03 PROCEDURE — 21400001 HC TELEMETRY ROOM

## 2025-06-03 PROCEDURE — 99900035 HC TECH TIME PER 15 MIN (STAT)

## 2025-06-03 PROCEDURE — 94761 N-INVAS EAR/PLS OXIMETRY MLT: CPT

## 2025-06-03 RX ADMIN — OXYCODONE AND ACETAMINOPHEN 1 TABLET: 10; 325 TABLET ORAL at 04:06

## 2025-06-03 RX ADMIN — SODIUM CHLORIDE 2 G: 9 INJECTION, SOLUTION INTRAVENOUS at 09:06

## 2025-06-03 RX ADMIN — OXYCODONE AND ACETAMINOPHEN 1 TABLET: 10; 325 TABLET ORAL at 11:06

## 2025-06-03 RX ADMIN — ENOXAPARIN SODIUM 50 MG: 60 INJECTION SUBCUTANEOUS at 05:06

## 2025-06-03 RX ADMIN — GLYCOPYRROLATE 0.1 MG: 0.2 INJECTION INTRAMUSCULAR; INTRAVENOUS at 03:06

## 2025-06-03 RX ADMIN — ACETYLCYSTEINE 4 ML: 100 INHALANT RESPIRATORY (INHALATION) at 07:06

## 2025-06-03 RX ADMIN — MIDODRINE HYDROCHLORIDE 5 MG: 5 TABLET ORAL at 08:06

## 2025-06-03 RX ADMIN — MAGNESIUM OXIDE TAB 400 MG (241.3 MG ELEMENTAL MG) 400 MG: 400 (241.3 MG) TAB at 08:06

## 2025-06-03 RX ADMIN — CALCITRIOL CAPSULES 0.25 MCG 0.5 MCG: 0.25 CAPSULE ORAL at 08:06

## 2025-06-03 RX ADMIN — LEVETIRACETAM 750 MG: 100 SOLUTION ORAL at 08:06

## 2025-06-03 RX ADMIN — SODIUM CHLORIDE 2 G: 9 INJECTION, SOLUTION INTRAVENOUS at 05:06

## 2025-06-03 RX ADMIN — ALPRAZOLAM 1 MG: 0.5 TABLET ORAL at 08:06

## 2025-06-03 RX ADMIN — SODIUM CHLORIDE 2 G: 9 INJECTION, SOLUTION INTRAVENOUS at 11:06

## 2025-06-03 RX ADMIN — IPRATROPIUM BROMIDE AND ALBUTEROL SULFATE 3 ML: .5; 3 SOLUTION RESPIRATORY (INHALATION) at 07:06

## 2025-06-03 RX ADMIN — ALPRAZOLAM 1 MG: 0.5 TABLET ORAL at 09:06

## 2025-06-03 RX ADMIN — GABAPENTIN 300 MG: 300 CAPSULE ORAL at 08:06

## 2025-06-03 RX ADMIN — IPRATROPIUM BROMIDE AND ALBUTEROL SULFATE 3 ML: .5; 3 SOLUTION RESPIRATORY (INHALATION) at 08:06

## 2025-06-03 RX ADMIN — MIDODRINE HYDROCHLORIDE 5 MG: 5 TABLET ORAL at 03:06

## 2025-06-03 RX ADMIN — GABAPENTIN 300 MG: 300 CAPSULE ORAL at 03:06

## 2025-06-03 RX ADMIN — LEVOTHYROXINE SODIUM 100 MCG: 100 TABLET ORAL at 05:06

## 2025-06-03 RX ADMIN — SERTRALINE HYDROCHLORIDE 50 MG: 50 TABLET ORAL at 08:06

## 2025-06-03 RX ADMIN — PANTOPRAZOLE SODIUM 40 MG: 40 INJECTION, POWDER, FOR SOLUTION INTRAVENOUS at 10:06

## 2025-06-03 RX ADMIN — IPRATROPIUM BROMIDE AND ALBUTEROL SULFATE 3 ML: .5; 3 SOLUTION RESPIRATORY (INHALATION) at 12:06

## 2025-06-03 RX ADMIN — OXYCODONE AND ACETAMINOPHEN 1 TABLET: 10; 325 TABLET ORAL at 08:06

## 2025-06-03 RX ADMIN — ACETYLCYSTEINE 4 ML: 100 INHALANT RESPIRATORY (INHALATION) at 12:06

## 2025-06-03 RX ADMIN — COLLAGENASE SANTYL: 250 OINTMENT TOPICAL at 09:06

## 2025-06-03 RX ADMIN — SODIUM CHLORIDE 2 G: 9 INJECTION, SOLUTION INTRAVENOUS at 12:06

## 2025-06-03 RX ADMIN — ACETYLCYSTEINE 4 ML: 100 INHALANT RESPIRATORY (INHALATION) at 08:06

## 2025-06-03 RX ADMIN — FOLIC ACID 1 MG: 1 TABLET ORAL at 08:06
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
## 2025-06-04 LAB
ALBUMIN SERPL-MCNC: 1.9 G/DL (ref 3.5–5)
ALBUMIN/GLOB SERPL: 0.3 RATIO (ref 1.1–2)
ALP SERPL-CCNC: 256 UNIT/L (ref 40–150)
ALT SERPL-CCNC: 16 UNIT/L (ref 0–55)
ANION GAP SERPL CALC-SCNC: 10 MEQ/L
AST SERPL-CCNC: 30 UNIT/L (ref 11–45)
BASOPHILS # BLD AUTO: 0.11 X10(3)/MCL
BASOPHILS NFR BLD AUTO: 0.6 %
BILIRUB SERPL-MCNC: 0.2 MG/DL
BUN SERPL-MCNC: 29.3 MG/DL (ref 8.9–20.6)
CALCIUM SERPL-MCNC: 8.8 MG/DL (ref 8.4–10.2)
CHLORIDE SERPL-SCNC: 97 MMOL/L (ref 98–107)
CO2 SERPL-SCNC: 27 MMOL/L (ref 22–29)
CREAT SERPL-MCNC: 0.89 MG/DL (ref 0.72–1.25)
CREAT/UREA NIT SERPL: 33
CRP SERPL-MCNC: 158.4 MG/L
EOSINOPHIL # BLD AUTO: 1.17 X10(3)/MCL (ref 0–0.9)
EOSINOPHIL NFR BLD AUTO: 6.6 %
ERYTHROCYTE [DISTWIDTH] IN BLOOD BY AUTOMATED COUNT: 16.6 % (ref 11.5–17)
ERYTHROCYTE [SEDIMENTATION RATE] IN BLOOD: 85 MM/HR (ref 0–15)
GFR SERPLBLD CREATININE-BSD FMLA CKD-EPI: >60 ML/MIN/1.73/M2
GLOBULIN SER-MCNC: 7.5 GM/DL (ref 2.4–3.5)
GLUCOSE SERPL-MCNC: 82 MG/DL (ref 74–100)
HCT VFR BLD AUTO: 25.8 % (ref 42–52)
HGB BLD-MCNC: 8.1 G/DL (ref 14–18)
IMM GRANULOCYTES # BLD AUTO: 0.17 X10(3)/MCL (ref 0–0.04)
IMM GRANULOCYTES NFR BLD AUTO: 1 %
LYMPHOCYTES # BLD AUTO: 2.98 X10(3)/MCL (ref 0.6–4.6)
LYMPHOCYTES NFR BLD AUTO: 16.7 %
MAGNESIUM SERPL-MCNC: 1.8 MG/DL (ref 1.6–2.6)
MCH RBC QN AUTO: 27.6 PG (ref 27–31)
MCHC RBC AUTO-ENTMCNC: 31.4 G/DL (ref 33–36)
MCV RBC AUTO: 88.1 FL (ref 80–94)
MONOCYTES # BLD AUTO: 1.38 X10(3)/MCL (ref 0.1–1.3)
MONOCYTES NFR BLD AUTO: 7.7 %
NEUTROPHILS # BLD AUTO: 12 X10(3)/MCL (ref 2.1–9.2)
NEUTROPHILS NFR BLD AUTO: 67.4 %
NRBC BLD AUTO-RTO: 0 %
PHOSPHATE SERPL-MCNC: 5.8 MG/DL (ref 2.3–4.7)
PLATELET # BLD AUTO: 725 X10(3)/MCL (ref 130–400)
PMV BLD AUTO: 8.2 FL (ref 7.4–10.4)
POTASSIUM SERPL-SCNC: 5.1 MMOL/L (ref 3.5–5.1)
PROT SERPL-MCNC: 9.4 GM/DL (ref 6.4–8.3)
RBC # BLD AUTO: 2.93 X10(6)/MCL (ref 4.7–6.1)
SODIUM SERPL-SCNC: 134 MMOL/L (ref 136–145)
WBC # BLD AUTO: 17.81 X10(3)/MCL (ref 4.5–11.5)

## 2025-06-04 PROCEDURE — 25000003 PHARM REV CODE 250: Performed by: INTERNAL MEDICINE

## 2025-06-04 PROCEDURE — 86140 C-REACTIVE PROTEIN: CPT | Performed by: NURSE PRACTITIONER

## 2025-06-04 PROCEDURE — 25000242 PHARM REV CODE 250 ALT 637 W/ HCPCS

## 2025-06-04 PROCEDURE — 21400001 HC TELEMETRY ROOM

## 2025-06-04 PROCEDURE — 80053 COMPREHEN METABOLIC PANEL: CPT

## 2025-06-04 PROCEDURE — 84100 ASSAY OF PHOSPHORUS: CPT

## 2025-06-04 PROCEDURE — 83735 ASSAY OF MAGNESIUM: CPT

## 2025-06-04 PROCEDURE — 27000173 HC ACAPELLA DEVICE DH OR DM

## 2025-06-04 PROCEDURE — 31720 CLEARANCE OF AIRWAYS: CPT

## 2025-06-04 PROCEDURE — 94664 DEMO&/EVAL PT USE INHALER: CPT

## 2025-06-04 PROCEDURE — 94761 N-INVAS EAR/PLS OXIMETRY MLT: CPT

## 2025-06-04 PROCEDURE — 85025 COMPLETE CBC W/AUTO DIFF WBC: CPT

## 2025-06-04 PROCEDURE — 63600175 PHARM REV CODE 636 W HCPCS: Mod: TB

## 2025-06-04 PROCEDURE — 25000003 PHARM REV CODE 250

## 2025-06-04 PROCEDURE — 63600175 PHARM REV CODE 636 W HCPCS

## 2025-06-04 PROCEDURE — 94640 AIRWAY INHALATION TREATMENT: CPT

## 2025-06-04 PROCEDURE — 36415 COLL VENOUS BLD VENIPUNCTURE: CPT | Performed by: NURSE PRACTITIONER

## 2025-06-04 PROCEDURE — 25000003 PHARM REV CODE 250: Performed by: HOSPITALIST

## 2025-06-04 PROCEDURE — 99900035 HC TECH TIME PER 15 MIN (STAT)

## 2025-06-04 PROCEDURE — 27000207 HC ISOLATION

## 2025-06-04 PROCEDURE — 85652 RBC SED RATE AUTOMATED: CPT | Performed by: NURSE PRACTITIONER

## 2025-06-04 RX ORDER — MIDODRINE HYDROCHLORIDE 2.5 MG/1
2.5 TABLET ORAL 3 TIMES DAILY
Status: DISCONTINUED | OUTPATIENT
Start: 2025-06-04 | End: 2025-06-06

## 2025-06-04 RX ADMIN — MAGNESIUM OXIDE TAB 400 MG (241.3 MG ELEMENTAL MG) 400 MG: 400 (241.3 MG) TAB at 08:06

## 2025-06-04 RX ADMIN — ENOXAPARIN SODIUM 50 MG: 60 INJECTION SUBCUTANEOUS at 05:06

## 2025-06-04 RX ADMIN — FOLIC ACID 1 MG: 1 TABLET ORAL at 09:06

## 2025-06-04 RX ADMIN — LEVOTHYROXINE SODIUM 100 MCG: 100 TABLET ORAL at 05:06

## 2025-06-04 RX ADMIN — GABAPENTIN 300 MG: 300 CAPSULE ORAL at 08:06

## 2025-06-04 RX ADMIN — SERTRALINE HYDROCHLORIDE 50 MG: 50 TABLET ORAL at 09:06

## 2025-06-04 RX ADMIN — CALCITRIOL CAPSULES 0.25 MCG 0.5 MCG: 0.25 CAPSULE ORAL at 09:06

## 2025-06-04 RX ADMIN — GABAPENTIN 300 MG: 300 CAPSULE ORAL at 09:06

## 2025-06-04 RX ADMIN — OXYCODONE AND ACETAMINOPHEN 1 TABLET: 10; 325 TABLET ORAL at 03:06

## 2025-06-04 RX ADMIN — OXYCODONE AND ACETAMINOPHEN 1 TABLET: 10; 325 TABLET ORAL at 08:06

## 2025-06-04 RX ADMIN — ACETYLCYSTEINE 4 ML: 100 INHALANT RESPIRATORY (INHALATION) at 08:06

## 2025-06-04 RX ADMIN — ALPRAZOLAM 1 MG: 0.5 TABLET ORAL at 08:06

## 2025-06-04 RX ADMIN — LEVETIRACETAM 750 MG: 100 SOLUTION ORAL at 08:06

## 2025-06-04 RX ADMIN — IPRATROPIUM BROMIDE AND ALBUTEROL SULFATE 3 ML: .5; 3 SOLUTION RESPIRATORY (INHALATION) at 08:06

## 2025-06-04 RX ADMIN — PANTOPRAZOLE SODIUM 40 MG: 40 INJECTION, POWDER, FOR SOLUTION INTRAVENOUS at 10:06

## 2025-06-04 RX ADMIN — GABAPENTIN 300 MG: 300 CAPSULE ORAL at 02:06

## 2025-06-04 RX ADMIN — ALPRAZOLAM 1 MG: 0.5 TABLET ORAL at 03:06

## 2025-06-04 RX ADMIN — SODIUM CHLORIDE 2 G: 9 INJECTION, SOLUTION INTRAVENOUS at 09:06

## 2025-06-04 RX ADMIN — MIDODRINE HYDROCHLORIDE 2.5 MG: 2.5 TABLET ORAL at 02:06

## 2025-06-04 RX ADMIN — GLYCOPYRROLATE 0.1 MG: 0.2 INJECTION INTRAMUSCULAR; INTRAVENOUS at 09:06

## 2025-06-04 RX ADMIN — ALPRAZOLAM 1 MG: 0.5 TABLET ORAL at 09:06

## 2025-06-04 RX ADMIN — ACETYLCYSTEINE 4 ML: 100 INHALANT RESPIRATORY (INHALATION) at 02:06

## 2025-06-04 RX ADMIN — COLLAGENASE SANTYL: 250 OINTMENT TOPICAL at 09:06

## 2025-06-04 RX ADMIN — IPRATROPIUM BROMIDE AND ALBUTEROL SULFATE 3 ML: .5; 3 SOLUTION RESPIRATORY (INHALATION) at 02:06

## 2025-06-04 RX ADMIN — OXYCODONE AND ACETAMINOPHEN 1 TABLET: 10; 325 TABLET ORAL at 09:06

## 2025-06-04 RX ADMIN — MAGNESIUM OXIDE TAB 400 MG (241.3 MG ELEMENTAL MG) 400 MG: 400 (241.3 MG) TAB at 09:06

## 2025-06-04 RX ADMIN — LEVETIRACETAM 750 MG: 100 SOLUTION ORAL at 09:06

## 2025-06-04 RX ADMIN — SODIUM CHLORIDE 2 G: 9 INJECTION, SOLUTION INTRAVENOUS at 04:06

## 2025-06-04 RX ADMIN — MIDODRINE HYDROCHLORIDE 5 MG: 5 TABLET ORAL at 09:06

## 2025-06-04 RX ADMIN — MIDODRINE HYDROCHLORIDE 2.5 MG: 2.5 TABLET ORAL at 08:06
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
## 2025-06-05 LAB
ALBUMIN SERPL-MCNC: 1.9 G/DL (ref 3.5–5)
ALBUMIN/GLOB SERPL: 0.3 RATIO (ref 1.1–2)
ALP SERPL-CCNC: 244 UNIT/L (ref 40–150)
ALT SERPL-CCNC: 15 UNIT/L (ref 0–55)
ANION GAP SERPL CALC-SCNC: 8 MEQ/L
AST SERPL-CCNC: 30 UNIT/L (ref 11–45)
BASOPHILS # BLD AUTO: 0.09 X10(3)/MCL
BASOPHILS NFR BLD AUTO: 0.7 %
BILIRUB SERPL-MCNC: 0.1 MG/DL
BUN SERPL-MCNC: 29.6 MG/DL (ref 8.9–20.6)
CALCIUM SERPL-MCNC: 8.9 MG/DL (ref 8.4–10.2)
CHLORIDE SERPL-SCNC: 98 MMOL/L (ref 98–107)
CO2 SERPL-SCNC: 28 MMOL/L (ref 22–29)
CREAT SERPL-MCNC: 0.87 MG/DL (ref 0.72–1.25)
CREAT/UREA NIT SERPL: 34
EOSINOPHIL # BLD AUTO: 0.83 X10(3)/MCL (ref 0–0.9)
EOSINOPHIL NFR BLD AUTO: 6.4 %
ERYTHROCYTE [DISTWIDTH] IN BLOOD BY AUTOMATED COUNT: 16.5 % (ref 11.5–17)
GFR SERPLBLD CREATININE-BSD FMLA CKD-EPI: >60 ML/MIN/1.73/M2
GLOBULIN SER-MCNC: 7.2 GM/DL (ref 2.4–3.5)
GLUCOSE SERPL-MCNC: 86 MG/DL (ref 74–100)
HCT VFR BLD AUTO: 25.8 % (ref 42–52)
HGB BLD-MCNC: 8.1 G/DL (ref 14–18)
IMM GRANULOCYTES # BLD AUTO: 0.19 X10(3)/MCL (ref 0–0.04)
IMM GRANULOCYTES NFR BLD AUTO: 1.5 %
LYMPHOCYTES # BLD AUTO: 2.43 X10(3)/MCL (ref 0.6–4.6)
LYMPHOCYTES NFR BLD AUTO: 18.8 %
MAGNESIUM SERPL-MCNC: 1.6 MG/DL (ref 1.6–2.6)
MCH RBC QN AUTO: 27.7 PG (ref 27–31)
MCHC RBC AUTO-ENTMCNC: 31.4 G/DL (ref 33–36)
MCV RBC AUTO: 88.4 FL (ref 80–94)
MONOCYTES # BLD AUTO: 1.35 X10(3)/MCL (ref 0.1–1.3)
MONOCYTES NFR BLD AUTO: 10.5 %
NEUTROPHILS # BLD AUTO: 8.01 X10(3)/MCL (ref 2.1–9.2)
NEUTROPHILS NFR BLD AUTO: 62.1 %
NRBC BLD AUTO-RTO: 0 %
PHOSPHATE SERPL-MCNC: 5.8 MG/DL (ref 2.3–4.7)
PLATELET # BLD AUTO: 725 X10(3)/MCL (ref 130–400)
PMV BLD AUTO: 8.3 FL (ref 7.4–10.4)
POTASSIUM SERPL-SCNC: 4.8 MMOL/L (ref 3.5–5.1)
PROT SERPL-MCNC: 9.1 GM/DL (ref 6.4–8.3)
RBC # BLD AUTO: 2.92 X10(6)/MCL (ref 4.7–6.1)
SODIUM SERPL-SCNC: 134 MMOL/L (ref 136–145)
WBC # BLD AUTO: 12.9 X10(3)/MCL (ref 4.5–11.5)

## 2025-06-05 PROCEDURE — 21400001 HC TELEMETRY ROOM

## 2025-06-05 PROCEDURE — 36415 COLL VENOUS BLD VENIPUNCTURE: CPT

## 2025-06-05 PROCEDURE — 25000242 PHARM REV CODE 250 ALT 637 W/ HCPCS

## 2025-06-05 PROCEDURE — 25000003 PHARM REV CODE 250

## 2025-06-05 PROCEDURE — 94664 DEMO&/EVAL PT USE INHALER: CPT

## 2025-06-05 PROCEDURE — 25000003 PHARM REV CODE 250: Performed by: HOSPITALIST

## 2025-06-05 PROCEDURE — 94761 N-INVAS EAR/PLS OXIMETRY MLT: CPT

## 2025-06-05 PROCEDURE — 63600175 PHARM REV CODE 636 W HCPCS

## 2025-06-05 PROCEDURE — 83735 ASSAY OF MAGNESIUM: CPT

## 2025-06-05 PROCEDURE — 27000207 HC ISOLATION

## 2025-06-05 PROCEDURE — 99900026 HC AIRWAY MAINTENANCE (STAT)

## 2025-06-05 PROCEDURE — 25000003 PHARM REV CODE 250: Performed by: INTERNAL MEDICINE

## 2025-06-05 PROCEDURE — 94640 AIRWAY INHALATION TREATMENT: CPT

## 2025-06-05 PROCEDURE — 31720 CLEARANCE OF AIRWAYS: CPT

## 2025-06-05 PROCEDURE — 99900035 HC TECH TIME PER 15 MIN (STAT)

## 2025-06-05 PROCEDURE — 85025 COMPLETE CBC W/AUTO DIFF WBC: CPT

## 2025-06-05 PROCEDURE — 80053 COMPREHEN METABOLIC PANEL: CPT

## 2025-06-05 PROCEDURE — 84100 ASSAY OF PHOSPHORUS: CPT

## 2025-06-05 RX ORDER — METHOCARBAMOL 500 MG/1
500 TABLET, FILM COATED ORAL ONCE
Status: CANCELLED | OUTPATIENT
Start: 2025-06-05

## 2025-06-05 RX ORDER — OXYCODONE AND ACETAMINOPHEN 10; 325 MG/1; MG/1
1 TABLET ORAL EVERY 4 HOURS PRN
Status: DISCONTINUED | OUTPATIENT
Start: 2025-06-05 | End: 2025-06-06 | Stop reason: HOSPADM

## 2025-06-05 RX ORDER — HYDROMORPHONE HYDROCHLORIDE 2 MG/ML
INJECTION, SOLUTION INTRAMUSCULAR; INTRAVENOUS; SUBCUTANEOUS EVERY 6 HOURS PRN
Refills: 0 | Status: CANCELLED | OUTPATIENT
Start: 2025-06-05

## 2025-06-05 RX ADMIN — SODIUM CHLORIDE 2 G: 9 INJECTION, SOLUTION INTRAVENOUS at 04:06

## 2025-06-05 RX ADMIN — ALPRAZOLAM 1 MG: 0.5 TABLET ORAL at 03:06

## 2025-06-05 RX ADMIN — GABAPENTIN 300 MG: 300 CAPSULE ORAL at 08:06

## 2025-06-05 RX ADMIN — LEVETIRACETAM 750 MG: 100 SOLUTION ORAL at 08:06

## 2025-06-05 RX ADMIN — ACETYLCYSTEINE 4 ML: 100 INHALANT RESPIRATORY (INHALATION) at 07:06

## 2025-06-05 RX ADMIN — ENOXAPARIN SODIUM 50 MG: 60 INJECTION SUBCUTANEOUS at 06:06

## 2025-06-05 RX ADMIN — ALPRAZOLAM 1 MG: 0.5 TABLET ORAL at 08:06

## 2025-06-05 RX ADMIN — ACETYLCYSTEINE 4 ML: 100 INHALANT RESPIRATORY (INHALATION) at 01:06

## 2025-06-05 RX ADMIN — OXYCODONE AND ACETAMINOPHEN 1 TABLET: 10; 325 TABLET ORAL at 08:06

## 2025-06-05 RX ADMIN — CALCITRIOL CAPSULES 0.25 MCG 0.5 MCG: 0.25 CAPSULE ORAL at 08:06

## 2025-06-05 RX ADMIN — FOLIC ACID 1 MG: 1 TABLET ORAL at 08:06

## 2025-06-05 RX ADMIN — ACETYLCYSTEINE 4 ML: 100 INHALANT RESPIRATORY (INHALATION) at 08:06

## 2025-06-05 RX ADMIN — SODIUM CHLORIDE 2 G: 9 INJECTION, SOLUTION INTRAVENOUS at 12:06

## 2025-06-05 RX ADMIN — PANTOPRAZOLE SODIUM 40 MG: 40 INJECTION, POWDER, FOR SOLUTION INTRAVENOUS at 08:06

## 2025-06-05 RX ADMIN — MIDODRINE HYDROCHLORIDE 2.5 MG: 2.5 TABLET ORAL at 08:06

## 2025-06-05 RX ADMIN — MAGNESIUM OXIDE TAB 400 MG (241.3 MG ELEMENTAL MG) 400 MG: 400 (241.3 MG) TAB at 08:06

## 2025-06-05 RX ADMIN — SODIUM CHLORIDE 2 G: 9 INJECTION, SOLUTION INTRAVENOUS at 08:06

## 2025-06-05 RX ADMIN — Medication 6 MG: at 08:06

## 2025-06-05 RX ADMIN — LEVOTHYROXINE SODIUM 100 MCG: 100 TABLET ORAL at 06:06

## 2025-06-05 RX ADMIN — SERTRALINE HYDROCHLORIDE 50 MG: 50 TABLET ORAL at 08:06

## 2025-06-05 RX ADMIN — OXYCODONE AND ACETAMINOPHEN 1 TABLET: 10; 325 TABLET ORAL at 03:06

## 2025-06-05 RX ADMIN — MIDODRINE HYDROCHLORIDE 2.5 MG: 2.5 TABLET ORAL at 03:06

## 2025-06-05 RX ADMIN — IPRATROPIUM BROMIDE AND ALBUTEROL SULFATE 3 ML: .5; 3 SOLUTION RESPIRATORY (INHALATION) at 07:06

## 2025-06-05 RX ADMIN — IPRATROPIUM BROMIDE AND ALBUTEROL SULFATE 3 ML: .5; 3 SOLUTION RESPIRATORY (INHALATION) at 08:06

## 2025-06-05 RX ADMIN — GABAPENTIN 300 MG: 300 CAPSULE ORAL at 03:06

## 2025-06-05 RX ADMIN — OXYCODONE AND ACETAMINOPHEN 1 TABLET: 10; 325 TABLET ORAL at 02:06

## 2025-06-05 RX ADMIN — IPRATROPIUM BROMIDE AND ALBUTEROL SULFATE 3 ML: .5; 3 SOLUTION RESPIRATORY (INHALATION) at 01:06
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
## 2025-06-05 NOTE — CARE UPDATE
Spoke with Jaylon with [facility], they do not have a NP on staff with their palliative program. Referral submitted to [facility] for palliative care via email.

## 2025-06-06 VITALS
OXYGEN SATURATION: 96 % | RESPIRATION RATE: 18 BRPM | HEIGHT: 69 IN | SYSTOLIC BLOOD PRESSURE: 88 MMHG | HEART RATE: 94 BPM | WEIGHT: 92.56 LBS | DIASTOLIC BLOOD PRESSURE: 57 MMHG | BODY MASS INDEX: 13.71 KG/M2 | TEMPERATURE: 98 F

## 2025-06-06 LAB
ALBUMIN SERPL-MCNC: 1.9 G/DL (ref 3.5–5)
ALBUMIN/GLOB SERPL: 0.3 RATIO (ref 1.1–2)
ALP SERPL-CCNC: 244 UNIT/L (ref 40–150)
ALT SERPL-CCNC: 15 UNIT/L (ref 0–55)
ANION GAP SERPL CALC-SCNC: 8 MEQ/L
AST SERPL-CCNC: 27 UNIT/L (ref 11–45)
BASOPHILS # BLD AUTO: 0.11 X10(3)/MCL
BASOPHILS NFR BLD AUTO: 0.8 %
BILIRUB SERPL-MCNC: 0.2 MG/DL
BUN SERPL-MCNC: 29.2 MG/DL (ref 8.9–20.6)
CALCIUM SERPL-MCNC: 8.9 MG/DL (ref 8.4–10.2)
CHLORIDE SERPL-SCNC: 98 MMOL/L (ref 98–107)
CO2 SERPL-SCNC: 28 MMOL/L (ref 22–29)
CREAT SERPL-MCNC: 0.79 MG/DL (ref 0.72–1.25)
CREAT/UREA NIT SERPL: 37
CRP SERPL-MCNC: 119.7 MG/L
EOSINOPHIL # BLD AUTO: 1.14 X10(3)/MCL (ref 0–0.9)
EOSINOPHIL NFR BLD AUTO: 8.1 %
ERYTHROCYTE [DISTWIDTH] IN BLOOD BY AUTOMATED COUNT: 16.7 % (ref 11.5–17)
ERYTHROCYTE [SEDIMENTATION RATE] IN BLOOD: 90 MM/HR (ref 0–15)
GFR SERPLBLD CREATININE-BSD FMLA CKD-EPI: >60 ML/MIN/1.73/M2
GLOBULIN SER-MCNC: 7.2 GM/DL (ref 2.4–3.5)
GLUCOSE SERPL-MCNC: 96 MG/DL (ref 74–100)
HCT VFR BLD AUTO: 24.7 % (ref 42–52)
HGB BLD-MCNC: 7.6 G/DL (ref 14–18)
IMM GRANULOCYTES # BLD AUTO: 0.13 X10(3)/MCL (ref 0–0.04)
IMM GRANULOCYTES NFR BLD AUTO: 0.9 %
LYMPHOCYTES # BLD AUTO: 2.44 X10(3)/MCL (ref 0.6–4.6)
LYMPHOCYTES NFR BLD AUTO: 17.3 %
MAGNESIUM SERPL-MCNC: 1.6 MG/DL (ref 1.6–2.6)
MCH RBC QN AUTO: 27.4 PG (ref 27–31)
MCHC RBC AUTO-ENTMCNC: 30.8 G/DL (ref 33–36)
MCV RBC AUTO: 89.2 FL (ref 80–94)
MONOCYTES # BLD AUTO: 1.36 X10(3)/MCL (ref 0.1–1.3)
MONOCYTES NFR BLD AUTO: 9.6 %
NEUTROPHILS # BLD AUTO: 8.93 X10(3)/MCL (ref 2.1–9.2)
NEUTROPHILS NFR BLD AUTO: 63.3 %
NRBC BLD AUTO-RTO: 0 %
PHOSPHATE SERPL-MCNC: 5.9 MG/DL (ref 2.3–4.7)
PLATELET # BLD AUTO: 690 X10(3)/MCL (ref 130–400)
PMV BLD AUTO: 8.3 FL (ref 7.4–10.4)
POTASSIUM SERPL-SCNC: 4.7 MMOL/L (ref 3.5–5.1)
PROT SERPL-MCNC: 9.1 GM/DL (ref 6.4–8.3)
RBC # BLD AUTO: 2.77 X10(6)/MCL (ref 4.7–6.1)
SODIUM SERPL-SCNC: 134 MMOL/L (ref 136–145)
WBC # BLD AUTO: 14.11 X10(3)/MCL (ref 4.5–11.5)

## 2025-06-06 PROCEDURE — 83735 ASSAY OF MAGNESIUM: CPT

## 2025-06-06 PROCEDURE — 85652 RBC SED RATE AUTOMATED: CPT | Performed by: NURSE PRACTITIONER

## 2025-06-06 PROCEDURE — 94760 N-INVAS EAR/PLS OXIMETRY 1: CPT

## 2025-06-06 PROCEDURE — 63600175 PHARM REV CODE 636 W HCPCS

## 2025-06-06 PROCEDURE — 99900035 HC TECH TIME PER 15 MIN (STAT)

## 2025-06-06 PROCEDURE — 94761 N-INVAS EAR/PLS OXIMETRY MLT: CPT

## 2025-06-06 PROCEDURE — 86140 C-REACTIVE PROTEIN: CPT | Performed by: NURSE PRACTITIONER

## 2025-06-06 PROCEDURE — 25000242 PHARM REV CODE 250 ALT 637 W/ HCPCS

## 2025-06-06 PROCEDURE — 25000003 PHARM REV CODE 250: Performed by: HOSPITALIST

## 2025-06-06 PROCEDURE — 36415 COLL VENOUS BLD VENIPUNCTURE: CPT

## 2025-06-06 PROCEDURE — 25000003 PHARM REV CODE 250

## 2025-06-06 PROCEDURE — 25000003 PHARM REV CODE 250: Performed by: INTERNAL MEDICINE

## 2025-06-06 PROCEDURE — 94640 AIRWAY INHALATION TREATMENT: CPT

## 2025-06-06 PROCEDURE — 94664 DEMO&/EVAL PT USE INHALER: CPT

## 2025-06-06 PROCEDURE — 85025 COMPLETE CBC W/AUTO DIFF WBC: CPT

## 2025-06-06 PROCEDURE — 80053 COMPREHEN METABOLIC PANEL: CPT

## 2025-06-06 PROCEDURE — 27000173 HC ACAPELLA DEVICE DH OR DM

## 2025-06-06 PROCEDURE — 84100 ASSAY OF PHOSPHORUS: CPT

## 2025-06-06 RX ORDER — OXYCODONE AND ACETAMINOPHEN 10; 325 MG/1; MG/1
1 TABLET ORAL EVERY 6 HOURS PRN
Qty: 30 TABLET | Refills: 0 | Status: SHIPPED | OUTPATIENT
Start: 2025-06-06

## 2025-06-06 RX ORDER — MIDODRINE HYDROCHLORIDE 5 MG/1
5 TABLET ORAL 3 TIMES DAILY
Status: DISCONTINUED | OUTPATIENT
Start: 2025-06-06 | End: 2025-06-06 | Stop reason: HOSPADM

## 2025-06-06 RX ORDER — MIDODRINE HYDROCHLORIDE 10 MG/1
5 TABLET ORAL 3 TIMES DAILY
Qty: 45 TABLET | Refills: 11 | Status: SHIPPED | OUTPATIENT
Start: 2025-06-06 | End: 2026-06-06

## 2025-06-06 RX ORDER — MIDODRINE HYDROCHLORIDE 2.5 MG/1
2.5 TABLET ORAL ONCE
Status: COMPLETED | OUTPATIENT
Start: 2025-06-06 | End: 2025-06-06

## 2025-06-06 RX ADMIN — MIDODRINE HYDROCHLORIDE 2.5 MG: 2.5 TABLET ORAL at 11:06

## 2025-06-06 RX ADMIN — LEVOTHYROXINE SODIUM 100 MCG: 100 TABLET ORAL at 06:06

## 2025-06-06 RX ADMIN — ENOXAPARIN SODIUM 50 MG: 60 INJECTION SUBCUTANEOUS at 06:06

## 2025-06-06 RX ADMIN — ALPRAZOLAM 1 MG: 0.5 TABLET ORAL at 06:06

## 2025-06-06 RX ADMIN — OXYCODONE HYDROCHLORIDE AND ACETAMINOPHEN 1 TABLET: 10; 325 TABLET ORAL at 06:06

## 2025-06-06 RX ADMIN — PANTOPRAZOLE SODIUM 40 MG: 40 INJECTION, POWDER, FOR SOLUTION INTRAVENOUS at 09:06

## 2025-06-06 RX ADMIN — FOLIC ACID 1 MG: 1 TABLET ORAL at 09:06

## 2025-06-06 RX ADMIN — MAGNESIUM OXIDE TAB 400 MG (241.3 MG ELEMENTAL MG) 400 MG: 400 (241.3 MG) TAB at 09:06

## 2025-06-06 RX ADMIN — MIDODRINE HYDROCHLORIDE 2.5 MG: 2.5 TABLET ORAL at 09:06

## 2025-06-06 RX ADMIN — CALCITRIOL CAPSULES 0.25 MCG 0.5 MCG: 0.25 CAPSULE ORAL at 09:06

## 2025-06-06 RX ADMIN — SODIUM CHLORIDE 2 G: 9 INJECTION, SOLUTION INTRAVENOUS at 09:06

## 2025-06-06 RX ADMIN — OXYCODONE HYDROCHLORIDE AND ACETAMINOPHEN 1 TABLET: 10; 325 TABLET ORAL at 01:06

## 2025-06-06 RX ADMIN — GABAPENTIN 300 MG: 300 CAPSULE ORAL at 09:06

## 2025-06-06 RX ADMIN — ACETYLCYSTEINE 4 ML: 100 INHALANT RESPIRATORY (INHALATION) at 08:06

## 2025-06-06 RX ADMIN — OXYCODONE HYDROCHLORIDE AND ACETAMINOPHEN 1 TABLET: 10; 325 TABLET ORAL at 11:06

## 2025-06-06 RX ADMIN — SODIUM CHLORIDE 2 G: 9 INJECTION, SOLUTION INTRAVENOUS at 01:06

## 2025-06-06 RX ADMIN — LEVETIRACETAM 750 MG: 100 SOLUTION ORAL at 09:06

## 2025-06-06 RX ADMIN — IPRATROPIUM BROMIDE AND ALBUTEROL SULFATE 3 ML: .5; 3 SOLUTION RESPIRATORY (INHALATION) at 08:06

## 2025-06-06 RX ADMIN — SERTRALINE HYDROCHLORIDE 50 MG: 50 TABLET ORAL at 09:06

## 2025-06-06 NOTE — PLAN OF CARE
Problem: Adult Inpatient Plan of Care  Goal: Plan of Care Review  Outcome: Adequate for Care Transition  Goal: Patient-Specific Goal (Individualized)  Outcome: Adequate for Care Transition  Goal: Absence of Hospital-Acquired Illness or Injury  Outcome: Adequate for Care Transition  Goal: Optimal Comfort and Wellbeing  Outcome: Adequate for Care Transition  Goal: Readiness for Transition of Care  Outcome: Adequate for Care Transition     Problem: Pneumonia  Goal: Fluid Balance  Outcome: Adequate for Care Transition  Goal: Resolution of Infection Signs and Symptoms  Outcome: Adequate for Care Transition  Goal: Effective Oxygenation and Ventilation  Outcome: Adequate for Care Transition     Problem: Wound  Goal: Optimal Coping  Outcome: Adequate for Care Transition  Goal: Optimal Functional Ability  Outcome: Adequate for Care Transition  Goal: Absence of Infection Signs and Symptoms  Outcome: Adequate for Care Transition  Goal: Improved Oral Intake  Outcome: Adequate for Care Transition  Goal: Optimal Pain Control and Function  Outcome: Adequate for Care Transition  Goal: Skin Health and Integrity  Outcome: Adequate for Care Transition  Goal: Optimal Wound Healing  Outcome: Adequate for Care Transition     Problem: Infection  Goal: Absence of Infection Signs and Symptoms  Outcome: Adequate for Care Transition     Problem: Fall Injury Risk  Goal: Absence of Fall and Fall-Related Injury  Outcome: Adequate for Care Transition     Problem: Skin Injury Risk Increased  Goal: Skin Health and Integrity  Outcome: Adequate for Care Transition
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
## 2025-06-06 NOTE — DISCHARGE INSTRUCTIONS
Our goal at [facility] is to always give you outstanding care and exceptional service. You may receive a survey from [employer] by mail, text or e-mail in the next 24-48 hours asking about the care you received with us. The survey should only take 5-10 minutes to complete and is very important to us.     Your feedback provides us with a way to recognize our staff who work tirelessly to provide the best care! Also, your responses help us learn how to improve when your experience was below our aspiration of excellence. We are always looking for ways to improve your stay. We WILL use your feedback to continue making improvements to help us provide the highest quality care. We keep your personal information and feedback confidential. We appreciate your time completing this survey and can't wait to hear from you!!!    We look forward to your continued care with us! Thanks so much for choosing [facility] for your healthcare needs!
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
## 2025-06-06 NOTE — CARE UPDATE
Spoke to Wilma with [facility], they do not have a palliative care program. Referral submitted to [facility] for palliative care via GitHub.

## 2025-06-06 NOTE — PROGRESS NOTES
AVS virtually reviewed with Jyoti Mayberry and his mom at the bedside in its entirety with emphasis on diet, medications, follow-up appointments and reasons to return to the ED. Patient also encouraged to utilize their patient portal. Ease and convenience of use reiterated. Education complete and patient voiced understanding. All questions answered. Discharge teaching complete.

## 2025-06-06 NOTE — CARE UPDATE
[facility] will reach out to pt & family after discharge for palliative care.
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
## 2025-06-10 NOTE — PHYSICIAN QUERY
Please document your best medical opinion regarding the etiology of diagnosis:  Other etiology (please specify): unknown

## 2025-08-04 ENCOUNTER — HOSPITAL ENCOUNTER (INPATIENT)
Facility: HOSPITAL | Age: 30
LOS: 16 days | Discharge: HOME HEALTH | DRG: 853 | End: 2025-08-20
Attending: INTERNAL MEDICINE | Admitting: INTERNAL MEDICINE
Payer: MEDICAID

## 2025-08-04 DIAGNOSIS — L89.93 PRESSURE INJURY, STAGE 3, UNSPECIFIED LOCATION: ICD-10-CM

## 2025-08-04 DIAGNOSIS — M86.68 CHRONIC OSTEOMYELITIS OF HIP: ICD-10-CM

## 2025-08-04 DIAGNOSIS — M25.511 ACUTE PAIN OF RIGHT SHOULDER: ICD-10-CM

## 2025-08-04 DIAGNOSIS — R65.20 SEVERE SEPSIS: Primary | ICD-10-CM

## 2025-08-04 DIAGNOSIS — R60.9 SWELLING: ICD-10-CM

## 2025-08-04 DIAGNOSIS — R65.21 SEPTIC SHOCK: ICD-10-CM

## 2025-08-04 DIAGNOSIS — A41.9 SEVERE SEPSIS: Primary | ICD-10-CM

## 2025-08-04 DIAGNOSIS — Z98.890 HISTORY OF TRACHEOSTOMY: ICD-10-CM

## 2025-08-04 DIAGNOSIS — A41.9 SEPTIC SHOCK: ICD-10-CM

## 2025-08-04 DIAGNOSIS — E83.51 HYPOCALCEMIA: ICD-10-CM

## 2025-08-04 DIAGNOSIS — Z13.6 SCREENING FOR CARDIOVASCULAR CONDITION: ICD-10-CM

## 2025-08-04 DIAGNOSIS — K92.2 UGIB (UPPER GASTROINTESTINAL BLEED): ICD-10-CM

## 2025-08-04 LAB
A-ADO2 BLOOD GAS (OHS): 576 MMHG
ABO + RH BLD: NORMAL
ABO + RH BLD: NORMAL
ABS NEUT CALC (OHS): 17.55 X10(3)/MCL (ref 2.1–9.2)
ABS NEUT CALC (OHS): 18.8 X10(3)/MCL (ref 2.1–9.2)
ACB COMPLEX DNA BLD POS QL NAA+NON-PROBE: NOT DETECTED
ALBUMIN SERPL-MCNC: 1.3 G/DL (ref 3.5–5)
ALBUMIN SERPL-MCNC: 1.7 G/DL (ref 3.5–5)
ALBUMIN/GLOB SERPL: 0.2 RATIO (ref 1.1–2)
ALBUMIN/GLOB SERPL: 0.2 RATIO (ref 1.1–2)
ALLENS TEST BLOOD GAS (OHS): YES
ALP SERPL-CCNC: 238 UNIT/L (ref 40–150)
ALP SERPL-CCNC: 326 UNIT/L (ref 40–150)
ALT SERPL-CCNC: 28 UNIT/L (ref 0–55)
ALT SERPL-CCNC: 37 UNIT/L (ref 0–55)
ANION GAP SERPL CALC-SCNC: 13 MEQ/L
ANION GAP SERPL CALC-SCNC: 9 MEQ/L
ANISOCYTOSIS BLD QL SMEAR: ABNORMAL
ANISOCYTOSIS BLD QL SMEAR: ABNORMAL
APICAL FOUR CHAMBER EJECTION FRACTION: 50 %
APICAL TWO CHAMBER EJECTION FRACTION: 39 %
APTT PPP: 36.3 SECONDS (ref 23.2–33.7)
AST SERPL-CCNC: 38 UNIT/L (ref 11–45)
AST SERPL-CCNC: 44 UNIT/L (ref 11–45)
AV INDEX (PROSTH): 0.81
AV MEAN GRADIENT: 3 MMHG
AV PEAK GRADIENT: 5 MMHG
AV VALVE AREA BY VELOCITY RATIO: 4.5 CM²
AV VALVE AREA: 4 CM²
AV VELOCITY RATIO: 0.91
B FRAGILIS DNA BLD POS QL NAA+PROBE: NOT DETECTED
B PERT.PT PRMT NPH QL NAA+NON-PROBE: NOT DETECTED
BACTERIA #/AREA URNS AUTO: ABNORMAL /HPF
BASE EXCESS BLD CALC-SCNC: -1.7 MMOL/L (ref -2–2)
BASOPHILS NFR BLD MANUAL: 0.21 X10(3)/MCL (ref 0–0.2)
BASOPHILS NFR BLD MANUAL: 1 % (ref 0–2)
BILIRUB SERPL-MCNC: 0.3 MG/DL
BILIRUB SERPL-MCNC: 0.4 MG/DL
BILIRUB UR QL STRIP.AUTO: NEGATIVE
BLD PROD TYP BPU: NORMAL
BLD PROD TYP BPU: NORMAL
BLOOD GAS SAMPLE TYPE (OHS): ABNORMAL
BLOOD UNIT EXPIRATION DATE: NORMAL
BLOOD UNIT EXPIRATION DATE: NORMAL
BLOOD UNIT TYPE CODE: 9500
BLOOD UNIT TYPE CODE: 9500
BSA FOR ECHO PROCEDURE: 1.39 M2
BUN SERPL-MCNC: 39.4 MG/DL (ref 8.9–20.6)
BUN SERPL-MCNC: 45.4 MG/DL (ref 8.9–20.6)
C ALBICANS DNA BLD POS QL NAA+PROBE: NOT DETECTED
C AURIS DNA BLD POS QL NAA+NON-PROBE: NOT DETECTED
C GATTII+NEOFOR DNA CSF QL NAA+NON-PROBE: NOT DETECTED
C GLABRATA DNA BLD POS QL NAA+PROBE: NOT DETECTED
C KRUSEI DNA BLD POS QL NAA+PROBE: NOT DETECTED
C PARAP DNA BLD POS QL NAA+PROBE: NOT DETECTED
C PNEUM DNA NPH QL NAA+NON-PROBE: DETECTED
C TROPICLS DNA BLD POS QL NAA+PROBE: NOT DETECTED
CALCIUM SERPL-MCNC: 10.4 MG/DL (ref 8.4–10.2)
CALCIUM SERPL-MCNC: 12.8 MG/DL (ref 8.4–10.2)
CHLORIDE SERPL-SCNC: 102 MMOL/L (ref 98–107)
CHLORIDE SERPL-SCNC: 103 MMOL/L (ref 98–107)
CLARITY UR: CLEAR
CO2 BLDA-SCNC: 28.9 MMOL/L (ref 22–26)
CO2 SERPL-SCNC: 24 MMOL/L (ref 22–29)
CO2 SERPL-SCNC: 25 MMOL/L (ref 22–29)
COLISTIN RES MCR-1 ISLT/SPM QL: ABNORMAL
COLOR UR AUTO: COLORLESS
CREAT SERPL-MCNC: 0.96 MG/DL (ref 0.72–1.25)
CREAT SERPL-MCNC: 1.16 MG/DL (ref 0.72–1.25)
CREAT/UREA NIT SERPL: 39
CREAT/UREA NIT SERPL: 41
CROSSMATCH INTERPRETATION: NORMAL
CROSSMATCH INTERPRETATION: NORMAL
CV ECHO LV RWT: 0.43 CM
DISPENSE STATUS: NORMAL
DISPENSE STATUS: NORMAL
DOP CALC AO PEAK VEL: 1.1 M/S
DOP CALC AO VTI: 17.7 CM
DOP CALC LVOT AREA: 4.9 CM2
DOP CALC LVOT DIAMETER: 2.5 CM
DOP CALC LVOT PEAK VEL: 1 M/S
DOP CALC MV VTI: 17.6 CM
DOP CALCLVOT PEAK VEL VTI: 14.3 CM
DRAWN BY BLOOD GAS (OHS): ABNORMAL
E CLOAC COMP DNA BLD POS QL NAA+PROBE: NOT DETECTED
E COLI DNA BLD POS QL NAA+PROBE: NOT DETECTED
E FAECALIS+OTHR E SP RRNA BLD POS FISH: NOT DETECTED
E FAECIUM HSP60 BLD POS QL PROBE: NOT DETECTED
E WAVE DECELERATION TIME: 96 MSEC
E/A RATIO: 1.64
E/E' RATIO: 10 M/S
ECHO LV POSTERIOR WALL: 0.9 CM (ref 0.6–1.1)
ENTEROBACTERALES DNA BLD POS NAA+N-PRB: NOT DETECTED
EOSINOPHIL NFR BLD MANUAL: 0.21 X10(3)/MCL (ref 0–0.9)
EOSINOPHIL NFR BLD MANUAL: 1 % (ref 0–8)
ERYTHROCYTE [DISTWIDTH] IN BLOOD BY AUTOMATED COUNT: 15.5 % (ref 11.5–17)
ERYTHROCYTE [DISTWIDTH] IN BLOOD BY AUTOMATED COUNT: 16.8 % (ref 11.5–17)
ESBL CFT TO CFT-CLAV IC RTO BD POS IMP: ABNORMAL
FERRITIN SERPL-MCNC: 3334.79 NG/ML (ref 21.81–274.66)
FLUAV AG UPPER RESP QL IA.RAPID: NOT DETECTED
FLUBV AG UPPER RESP QL IA.RAPID: NOT DETECTED
FRACTIONAL SHORTENING: 23.8 % (ref 28–44)
GAS PNL BLD: 634 MMHG
GFR SERPLBLD CREATININE-BSD FMLA CKD-EPI: >60 ML/MIN/1.73/M2
GFR SERPLBLD CREATININE-BSD FMLA CKD-EPI: >60 ML/MIN/1.73/M2
GGT SERPL-CCNC: 141 U/L (ref 12–64)
GLOBULIN SER-MCNC: 5.9 GM/DL (ref 2.4–3.5)
GLOBULIN SER-MCNC: 8.1 GM/DL (ref 2.4–3.5)
GLUCOSE SERPL-MCNC: 138 MG/DL (ref 74–100)
GLUCOSE SERPL-MCNC: 71 MG/DL (ref 74–100)
GLUCOSE UR QL STRIP: NORMAL
GP B STREP DNA CSF QL NAA+NON-PROBE: DETECTED
GROUP & RH: NORMAL
HADV DNA NPH QL NAA+NON-PROBE: NOT DETECTED
HAEM INFLU DNA CSF QL NAA+NON-PROBE: NOT DETECTED
HCO3 BLDA-SCNC: 27 MMOL/L (ref 22–26)
HCOV 229E RNA NPH QL NAA+NON-PROBE: NOT DETECTED
HCOV HKU1 RNA NPH QL NAA+NON-PROBE: NOT DETECTED
HCOV NL63 RNA NPH QL NAA+NON-PROBE: NOT DETECTED
HCOV OC43 RNA NPH QL NAA+NON-PROBE: NOT DETECTED
HCT VFR BLD AUTO: 21.6 % (ref 42–52)
HCT VFR BLD AUTO: 26.1 % (ref 42–52)
HEMOCCULT SP1 STL QL: POSITIVE
HGB BLD-MCNC: 6.4 G/DL (ref 14–18)
HGB BLD-MCNC: 8.2 G/DL (ref 14–18)
HGB UR QL STRIP: NEGATIVE
HMPV RNA NPH QL NAA+NON-PROBE: NOT DETECTED
HPIV1 RNA NPH QL NAA+NON-PROBE: NOT DETECTED
HPIV2 RNA NPH QL NAA+NON-PROBE: NOT DETECTED
HPIV3 RNA NPH QL NAA+NON-PROBE: NOT DETECTED
HPIV4 RNA NPH QL NAA+NON-PROBE: NOT DETECTED
HYALINE CASTS #/AREA URNS LPF: ABNORMAL /LPF
IMP CARBAPENEMASE ISLT QL IA.RAPID: ABNORMAL
INDIRECT COOMBS: NORMAL
INHALED O2 CONCENTRATION: 100 %
INR PPP: 1.2
INR PPP: 1.3
INTERVENTRICULAR SEPTUM: 0.7 CM (ref 0.6–1.1)
IRON SATN MFR SERPL: 10 % (ref 20–50)
IRON SERPL-MCNC: 11 UG/DL (ref 65–175)
K OXYTOCA OMPA BLD POS QL PROBE: NOT DETECTED
KETONES UR QL STRIP: NEGATIVE
KLEBSIELLA SP DNA BLD POS QL NAA+NON-PRB: NOT DETECTED
KLEBSIELLA SP DNA BLD POS QL NAA+NON-PRB: NOT DETECTED
KPC CARBAPENEMASE ISLT QL IA.RAPID: ABNORMAL
L MONOCYTOG DNA CSF QL NAA+NON-PROBE: NOT DETECTED
LACTATE SERPL-SCNC: 1.2 MMOL/L (ref 0.5–2.2)
LACTATE SERPL-SCNC: 2 MMOL/L (ref 0.5–2.2)
LEFT ATRIUM SIZE: 1.7 CM
LEFT INTERNAL DIMENSION IN SYSTOLE: 3.2 CM (ref 2.1–4)
LEFT VENTRICLE DIASTOLIC VOLUME INDEX: 55.24 ML/M2
LEFT VENTRICLE DIASTOLIC VOLUME: 79 ML
LEFT VENTRICLE END DIASTOLIC VOLUME APICAL 2 CHAMBER: 54.27 ML
LEFT VENTRICLE END DIASTOLIC VOLUME APICAL 4 CHAMBER INDEX BSA: 40.93 ML/M2
LEFT VENTRICLE END DIASTOLIC VOLUME APICAL 4 CHAMBER: 58.53 ML
LEFT VENTRICLE MASS INDEX: 70.8 G/M2
LEFT VENTRICLE SYSTOLIC VOLUME INDEX: 28.7 ML/M2
LEFT VENTRICLE SYSTOLIC VOLUME: 41 ML
LEFT VENTRICULAR INTERNAL DIMENSION IN DIASTOLE: 4.2 CM (ref 3.5–6)
LEFT VENTRICULAR MASS: 101.3 G
LEUKOCYTE ESTERASE UR QL STRIP: 75
LPM (OHS): 15
LV LATERAL E/E' RATIO: 10.3 M/S
LV SEPTAL E/E' RATIO: 10.3 M/S
LVED V (TEICH): 79.23 ML
LVES V (TEICH): 40.78 ML
LVOT MG: 2.15 MMHG
LVOT MV: 0.68 CM/S
LYMPHOCYTES NFR BLD MANUAL: 0.42 X10(3)/MCL (ref 0.6–4.6)
LYMPHOCYTES NFR BLD MANUAL: 1.9 X10(3)/MCL (ref 0.6–4.6)
LYMPHOCYTES NFR BLD MANUAL: 2 % (ref 13–40)
LYMPHOCYTES NFR BLD MANUAL: 9 % (ref 13–40)
M PNEUMO DNA NPH QL NAA+NON-PROBE: NOT DETECTED
MAGNESIUM SERPL-MCNC: 1.6 MG/DL (ref 1.6–2.6)
MAGNESIUM SERPL-MCNC: 2 MG/DL (ref 1.6–2.6)
MCH RBC QN AUTO: 26.3 PG (ref 27–31)
MCH RBC QN AUTO: 27.2 PG (ref 27–31)
MCHC RBC AUTO-ENTMCNC: 29.6 G/DL (ref 33–36)
MCHC RBC AUTO-ENTMCNC: 31.4 G/DL (ref 33–36)
MCV RBC AUTO: 86.4 FL (ref 80–94)
MCV RBC AUTO: 88.9 FL (ref 80–94)
MECA+MECC NOSE QL NAA+PROBE: ABNORMAL
MECA+MECC+MREJ ISLT/SPM QL: ABNORMAL
METAMYELOCYTES NFR BLD MANUAL: 1 %
METAMYELOCYTES NFR BLD MANUAL: 2 %
MONOCYTES NFR BLD MANUAL: 0.85 X10(3)/MCL (ref 0.1–1.3)
MONOCYTES NFR BLD MANUAL: 1.46 X10(3)/MCL (ref 0.1–1.3)
MONOCYTES NFR BLD MANUAL: 4 % (ref 2–11)
MONOCYTES NFR BLD MANUAL: 7 % (ref 2–11)
MRSA PCR SCRN (OHS): DETECTED
MV MEAN GRADIENT: 5 MMHG
MV PEAK A VEL: 0.75 M/S
MV PEAK E VEL: 1.23 M/S
MV PEAK GRADIENT: 8 MMHG
MV STENOSIS PRESSURE HALF TIME: 27.79 MS
MV VALVE AREA BY CONTINUITY EQUATION: 3.99 CM2
MV VALVE AREA P 1/2 METHOD: 7.92 CM2
N MEN DNA CSF QL NAA+NON-PROBE: NOT DETECTED
NDM CARBAPENEMASE ISLT QL IA.RAPID: ABNORMAL
NEUTROPHILS NFR BLD MANUAL: 60 % (ref 47–80)
NEUTROPHILS NFR BLD MANUAL: 73 % (ref 47–80)
NEUTS BAND NFR BLD MANUAL: 17 % (ref 0–11)
NEUTS BAND NFR BLD MANUAL: 23 % (ref 0–11)
NITRITE UR QL STRIP: NEGATIVE
NRBC BLD AUTO-RTO: 0 %
NRBC BLD AUTO-RTO: 0 %
OHS CV CPX PATIENT HEIGHT IN: 66
OHS CV RV/LV RATIO: 0.57 CM
OHS LV EJECTION FRACTION SIMPSONS BIPLANE MOD: 50 %
OXA-48-LIKE CRBPNASE ISLT QL IA.RAPID: ABNORMAL
OXYGEN DEVICE BLOOD GAS (OHS): ABNORMAL
OXYHGB MFR BLDA: 6.3 G/DL (ref 12–18)
P AERUGINOSA DNA BLD POS QL NAA+PROBE: NOT DETECTED
PCO2 BLDA: 63 MMHG (ref 35–45)
PH BLDA: 7.24 [PH] (ref 7.35–7.45)
PH UR STRIP: 7.5 [PH]
PHOSPHATE SERPL-MCNC: 4.5 MG/DL (ref 2.3–4.7)
PHOSPHATE SERPL-MCNC: 4.9 MG/DL (ref 2.3–4.7)
PISA TR MAX VEL: 2.6 M/S
PLATELET # BLD AUTO: 570 X10(3)/MCL (ref 130–400)
PLATELET # BLD AUTO: 603 X10(3)/MCL (ref 130–400)
PLATELET # BLD EST: ABNORMAL 10*3/UL
PLATELET # BLD EST: ABNORMAL 10*3/UL
PMV BLD AUTO: 8.2 FL (ref 7.4–10.4)
PMV BLD AUTO: 9.6 FL (ref 7.4–10.4)
PO2 BLDA: 58 MMHG (ref 75–100)
POCT GLUCOSE: 104 MG/DL (ref 70–110)
POLYCHROMASIA BLD QL SMEAR: ABNORMAL
POLYCHROMASIA BLD QL SMEAR: ABNORMAL
POTASSIUM SERPL-SCNC: 4.1 MMOL/L (ref 3.5–5.1)
POTASSIUM SERPL-SCNC: 4.6 MMOL/L (ref 3.5–5.1)
PROT SERPL-MCNC: 7.2 GM/DL (ref 6.4–8.3)
PROT SERPL-MCNC: 9.8 GM/DL (ref 6.4–8.3)
PROT UR QL STRIP: NEGATIVE
PROTEUS SP DNA BLD POS QL NAA+PROBE: NOT DETECTED
PROTHROMBIN TIME: 15.4 SECONDS (ref 11.4–14)
PROTHROMBIN TIME: 16.1 SECONDS (ref 11.4–14)
RA PRESSURE ESTIMATED: 3 MMHG
RBC # BLD AUTO: 2.43 X10(6)/MCL (ref 4.7–6.1)
RBC # BLD AUTO: 3.02 X10(6)/MCL (ref 4.7–6.1)
RBC #/AREA URNS AUTO: ABNORMAL /HPF
RBC MORPH BLD: ABNORMAL
RBC MORPH BLD: ABNORMAL
RIGHT VENTRICLE DIASTOLIC BASEL DIMENSION: 2.4 CM
RIGHT VENTRICULAR END-DIASTOLIC DIMENSION: 2.41 CM
RSV A 5' UTR RNA NPH QL NAA+PROBE: NOT DETECTED
RSV RNA NPH QL NAA+NON-PROBE: NOT DETECTED
RV TB RVSP: 6 MMHG
RV+EV RNA NPH QL NAA+NON-PROBE: NOT DETECTED
S AUREUS DNA BLD POS QL NAA+PROBE: NOT DETECTED
S ENT+BONG DNA STL QL NAA+NON-PROBE: NOT DETECTED
S EPIDERMIDIS HSP60 BLD POS QL PROBE: NOT DETECTED
S LUGDUNENSIS SODA BLD POS QL PROBE: NOT DETECTED
S MALTOPH DNA BLD POS QL NAA+PROBE: NOT DETECTED
S MARCESCENS DNA BLD POS QL NAA+PROBE: NOT DETECTED
S PNEUM DNA CSF QL NAA+NON-PROBE: NOT DETECTED
S PYOGENES HSP60 BLD POS QL PROBE: NOT DETECTED
SAMPLE SITE BLOOD GAS (OHS): ABNORMAL
SARS-COV-2 RNA RESP QL NAA+PROBE: NOT DETECTED
SODIUM SERPL-SCNC: 137 MMOL/L (ref 136–145)
SODIUM SERPL-SCNC: 139 MMOL/L (ref 136–145)
SP GR UR STRIP.AUTO: 1.01 (ref 1–1.03)
SPECIMEN OUTDATE: NORMAL
SQUAMOUS #/AREA URNS LPF: ABNORMAL /HPF
STAPH SP TUF BLD POS QL PROBE: NOT DETECTED
STREPTOCOCCUS SP TUF BLD POS QL PROBE: ABNORMAL
TDI LATERAL: 0.12 M/S
TDI SEPTAL: 0.12 M/S
TDI: 0.12 M/S
TIBC SERPL-MCNC: 106 UG/DL (ref 250–450)
TIBC SERPL-MCNC: 95 UG/DL (ref 60–240)
TR MAX PG: 26 MMHG
TRANSFERRIN SERPL-MCNC: 89 MG/DL (ref 174–364)
TRANSFERRIN SERPL-MCNC: 89 MG/DL (ref 174–364)
TRICUSPID ANNULAR PLANE SYSTOLIC EXCURSION: 2.1 CM
TV REST PULMONARY ARTERY PRESSURE: 30 MMHG
UNIT NUMBER: NORMAL
UNIT NUMBER: NORMAL
UROBILINOGEN UR STRIP-ACNC: NORMAL
VAN(A+B+C1+C2) GENES ISLT/SPM: ABNORMAL
VIM CARBAPENEMASE ISLT QL IA.RAPID: ABNORMAL
WBC # BLD AUTO: 20.89 X10(3)/MCL (ref 4.5–11.5)
WBC # BLD AUTO: 21.15 X10(3)/MCL (ref 4.5–11.5)
WBC #/AREA URNS AUTO: ABNORMAL /HPF
WBC VACUOLES (OHS): ABNORMAL
Z-SCORE OF LEFT VENTRICULAR DIMENSION IN END DIASTOLE: -0.39
Z-SCORE OF LEFT VENTRICULAR DIMENSION IN END SYSTOLE: 1.31

## 2025-08-04 PROCEDURE — 87641 MR-STAPH DNA AMP PROBE: CPT

## 2025-08-04 PROCEDURE — 36600 WITHDRAWAL OF ARTERIAL BLOOD: CPT

## 2025-08-04 PROCEDURE — 25000003 PHARM REV CODE 250: Performed by: INTERNAL MEDICINE

## 2025-08-04 PROCEDURE — 63600175 PHARM REV CODE 636 W HCPCS

## 2025-08-04 PROCEDURE — 83605 ASSAY OF LACTIC ACID: CPT | Performed by: INTERNAL MEDICINE

## 2025-08-04 PROCEDURE — 31720 CLEARANCE OF AIRWAYS: CPT

## 2025-08-04 PROCEDURE — 02HV33Z INSERTION OF INFUSION DEVICE INTO SUPERIOR VENA CAVA, PERCUTANEOUS APPROACH: ICD-10-PCS | Performed by: STUDENT IN AN ORGANIZED HEALTH CARE EDUCATION/TRAINING PROGRAM

## 2025-08-04 PROCEDURE — 84466 ASSAY OF TRANSFERRIN: CPT

## 2025-08-04 PROCEDURE — 76937 US GUIDE VASCULAR ACCESS: CPT

## 2025-08-04 PROCEDURE — 27000221 HC OXYGEN, UP TO 24 HOURS

## 2025-08-04 PROCEDURE — 80053 COMPREHEN METABOLIC PANEL: CPT | Performed by: INTERNAL MEDICINE

## 2025-08-04 PROCEDURE — 82728 ASSAY OF FERRITIN: CPT

## 2025-08-04 PROCEDURE — 83735 ASSAY OF MAGNESIUM: CPT | Performed by: INTERNAL MEDICINE

## 2025-08-04 PROCEDURE — 25500020 PHARM REV CODE 255: Performed by: INTERNAL MEDICINE

## 2025-08-04 PROCEDURE — 36415 COLL VENOUS BLD VENIPUNCTURE: CPT

## 2025-08-04 PROCEDURE — 99900035 HC TECH TIME PER 15 MIN (STAT)

## 2025-08-04 PROCEDURE — 85610 PROTHROMBIN TIME: CPT | Performed by: INTERNAL MEDICINE

## 2025-08-04 PROCEDURE — 84100 ASSAY OF PHOSPHORUS: CPT | Performed by: INTERNAL MEDICINE

## 2025-08-04 PROCEDURE — 99223 1ST HOSP IP/OBS HIGH 75: CPT | Mod: ,,,

## 2025-08-04 PROCEDURE — 87186 SC STD MICRODIL/AGAR DIL: CPT

## 2025-08-04 PROCEDURE — 84100 ASSAY OF PHOSPHORUS: CPT

## 2025-08-04 PROCEDURE — 87075 CULTR BACTERIA EXCEPT BLOOD: CPT

## 2025-08-04 PROCEDURE — 36573 INSJ PICC RS&I 5 YR+: CPT

## 2025-08-04 PROCEDURE — 82803 BLOOD GASES ANY COMBINATION: CPT

## 2025-08-04 PROCEDURE — 82977 ASSAY OF GGT: CPT

## 2025-08-04 PROCEDURE — 63600175 PHARM REV CODE 636 W HCPCS: Performed by: INTERNAL MEDICINE

## 2025-08-04 PROCEDURE — 94640 AIRWAY INHALATION TREATMENT: CPT

## 2025-08-04 PROCEDURE — 87077 CULTURE AEROBIC IDENTIFY: CPT | Performed by: STUDENT IN AN ORGANIZED HEALTH CARE EDUCATION/TRAINING PROGRAM

## 2025-08-04 PROCEDURE — 87070 CULTURE OTHR SPECIMN AEROBIC: CPT

## 2025-08-04 PROCEDURE — 20000000 HC ICU ROOM

## 2025-08-04 PROCEDURE — 25000242 PHARM REV CODE 250 ALT 637 W/ HCPCS

## 2025-08-04 PROCEDURE — 25000003 PHARM REV CODE 250

## 2025-08-04 PROCEDURE — 85610 PROTHROMBIN TIME: CPT

## 2025-08-04 PROCEDURE — 87154 CUL TYP ID BLD PTHGN 6+ TRGT: CPT | Performed by: INTERNAL MEDICINE

## 2025-08-04 PROCEDURE — 81001 URINALYSIS AUTO W/SCOPE: CPT

## 2025-08-04 PROCEDURE — 80053 COMPREHEN METABOLIC PANEL: CPT

## 2025-08-04 PROCEDURE — 85007 BL SMEAR W/DIFF WBC COUNT: CPT | Performed by: INTERNAL MEDICINE

## 2025-08-04 PROCEDURE — 94761 N-INVAS EAR/PLS OXIMETRY MLT: CPT | Mod: XB

## 2025-08-04 PROCEDURE — 83735 ASSAY OF MAGNESIUM: CPT

## 2025-08-04 PROCEDURE — 85007 BL SMEAR W/DIFF WBC COUNT: CPT

## 2025-08-04 PROCEDURE — C1751 CATH, INF, PER/CENT/MIDLINE: HCPCS

## 2025-08-04 PROCEDURE — 86900 BLOOD TYPING SEROLOGIC ABO: CPT | Performed by: INTERNAL MEDICINE

## 2025-08-04 PROCEDURE — 87077 CULTURE AEROBIC IDENTIFY: CPT | Performed by: INTERNAL MEDICINE

## 2025-08-04 PROCEDURE — 85730 THROMBOPLASTIN TIME PARTIAL: CPT

## 2025-08-04 PROCEDURE — 36430 TRANSFUSION BLD/BLD COMPNT: CPT | Performed by: INTERNAL MEDICINE

## 2025-08-04 PROCEDURE — 87077 CULTURE AEROBIC IDENTIFY: CPT

## 2025-08-04 PROCEDURE — 83550 IRON BINDING TEST: CPT

## 2025-08-04 PROCEDURE — 93005 ELECTROCARDIOGRAM TRACING: CPT

## 2025-08-04 PROCEDURE — 51702 INSERT TEMP BLADDER CATH: CPT

## 2025-08-04 PROCEDURE — 86923 COMPATIBILITY TEST ELECTRIC: CPT | Performed by: INTERNAL MEDICINE

## 2025-08-04 PROCEDURE — 82272 OCCULT BLD FECES 1-3 TESTS: CPT

## 2025-08-04 PROCEDURE — 87637 SARSCOV2&INF A&B&RSV AMP PRB: CPT

## 2025-08-04 PROCEDURE — 87798 DETECT AGENT NOS DNA AMP: CPT

## 2025-08-04 PROCEDURE — P9016 RBC LEUKOCYTES REDUCED: HCPCS | Performed by: INTERNAL MEDICINE

## 2025-08-04 RX ORDER — ALPRAZOLAM 0.5 MG/1
0.5 TABLET ORAL 3 TIMES DAILY PRN
Status: DISCONTINUED | OUTPATIENT
Start: 2025-08-04 | End: 2025-08-04

## 2025-08-04 RX ORDER — PANTOPRAZOLE SODIUM 40 MG/10ML
80 INJECTION, POWDER, LYOPHILIZED, FOR SOLUTION INTRAVENOUS 2 TIMES DAILY
Status: DISCONTINUED | OUTPATIENT
Start: 2025-08-04 | End: 2025-08-08

## 2025-08-04 RX ORDER — PANTOPRAZOLE SODIUM 40 MG/10ML
40 INJECTION, POWDER, LYOPHILIZED, FOR SOLUTION INTRAVENOUS
Status: COMPLETED | OUTPATIENT
Start: 2025-08-04 | End: 2025-08-04

## 2025-08-04 RX ORDER — ACETAMINOPHEN 160 MG/5ML
650 SOLUTION ORAL EVERY 6 HOURS
Status: DISCONTINUED | OUTPATIENT
Start: 2025-08-04 | End: 2025-08-08

## 2025-08-04 RX ORDER — HYDROCODONE BITARTRATE AND ACETAMINOPHEN 500; 5 MG/1; MG/1
TABLET ORAL
Status: DISCONTINUED | OUTPATIENT
Start: 2025-08-04 | End: 2025-08-20 | Stop reason: HOSPADM

## 2025-08-04 RX ORDER — OXYCODONE HYDROCHLORIDE 5 MG/1
10 TABLET ORAL EVERY 6 HOURS PRN
Refills: 0 | Status: DISCONTINUED | OUTPATIENT
Start: 2025-08-04 | End: 2025-08-09

## 2025-08-04 RX ORDER — SODIUM CHLORIDE 0.9 % (FLUSH) 0.9 %
10 SYRINGE (ML) INJECTION EVERY 12 HOURS PRN
Status: DISCONTINUED | OUTPATIENT
Start: 2025-08-04 | End: 2025-08-20 | Stop reason: HOSPADM

## 2025-08-04 RX ORDER — PROCHLORPERAZINE EDISYLATE 5 MG/ML
5 INJECTION INTRAMUSCULAR; INTRAVENOUS EVERY 6 HOURS PRN
Status: DISCONTINUED | OUTPATIENT
Start: 2025-08-04 | End: 2025-08-20 | Stop reason: HOSPADM

## 2025-08-04 RX ORDER — SERTRALINE HYDROCHLORIDE 50 MG/1
50 TABLET, FILM COATED ORAL DAILY
Status: DISCONTINUED | OUTPATIENT
Start: 2025-08-04 | End: 2025-08-04

## 2025-08-04 RX ORDER — MIDODRINE HYDROCHLORIDE 5 MG/1
10 TABLET ORAL 3 TIMES DAILY
Status: DISCONTINUED | OUTPATIENT
Start: 2025-08-04 | End: 2025-08-20 | Stop reason: HOSPADM

## 2025-08-04 RX ORDER — ACETYLCYSTEINE 100 MG/ML
4 SOLUTION ORAL; RESPIRATORY (INHALATION)
Status: DISCONTINUED | OUTPATIENT
Start: 2025-08-04 | End: 2025-08-20 | Stop reason: HOSPADM

## 2025-08-04 RX ORDER — NOREPINEPHRINE BITARTRATE/D5W 4MG/250ML
0-3 PLASTIC BAG, INJECTION (ML) INTRAVENOUS CONTINUOUS
Status: DISCONTINUED | OUTPATIENT
Start: 2025-08-04 | End: 2025-08-07

## 2025-08-04 RX ORDER — SODIUM CHLORIDE, SODIUM LACTATE, POTASSIUM CHLORIDE, CALCIUM CHLORIDE 600; 310; 30; 20 MG/100ML; MG/100ML; MG/100ML; MG/100ML
INJECTION, SOLUTION INTRAVENOUS CONTINUOUS
Status: DISCONTINUED | OUTPATIENT
Start: 2025-08-04 | End: 2025-08-13

## 2025-08-04 RX ORDER — LEVOTHYROXINE SODIUM 100 UG/1
100 TABLET ORAL
Status: DISCONTINUED | OUTPATIENT
Start: 2025-08-04 | End: 2025-08-20 | Stop reason: HOSPADM

## 2025-08-04 RX ORDER — SERTRALINE HYDROCHLORIDE 50 MG/1
50 TABLET, FILM COATED ORAL DAILY
Status: DISCONTINUED | OUTPATIENT
Start: 2025-08-04 | End: 2025-08-15

## 2025-08-04 RX ORDER — OXYCODONE HYDROCHLORIDE 5 MG/1
5 TABLET ORAL EVERY 6 HOURS PRN
Refills: 0 | Status: DISCONTINUED | OUTPATIENT
Start: 2025-08-04 | End: 2025-08-09

## 2025-08-04 RX ORDER — LEVETIRACETAM 100 MG/ML
750 SOLUTION ORAL 2 TIMES DAILY
Status: DISCONTINUED | OUTPATIENT
Start: 2025-08-04 | End: 2025-08-20 | Stop reason: HOSPADM

## 2025-08-04 RX ORDER — ACETAMINOPHEN 160 MG/5ML
650 SOLUTION ORAL EVERY 6 HOURS
Status: DISCONTINUED | OUTPATIENT
Start: 2025-08-04 | End: 2025-08-04

## 2025-08-04 RX ORDER — ALPRAZOLAM 0.25 MG/1
0.25 TABLET ORAL 3 TIMES DAILY PRN
Status: DISCONTINUED | OUTPATIENT
Start: 2025-08-04 | End: 2025-08-20 | Stop reason: HOSPADM

## 2025-08-04 RX ORDER — ATROPINE SULFATE 0.1 MG/ML
1 INJECTION INTRAVENOUS
Status: COMPLETED | OUTPATIENT
Start: 2025-08-04 | End: 2025-08-04

## 2025-08-04 RX ORDER — EPINEPHRINE 1 MG/ML
1 INJECTION, SOLUTION, CONCENTRATE INTRAVENOUS
Status: COMPLETED | OUTPATIENT
Start: 2025-08-04 | End: 2025-08-04

## 2025-08-04 RX ORDER — FOLIC ACID 1 MG/1
1 TABLET ORAL DAILY
Status: DISCONTINUED | OUTPATIENT
Start: 2025-08-05 | End: 2025-08-20 | Stop reason: HOSPADM

## 2025-08-04 RX ORDER — PANTOPRAZOLE SODIUM 40 MG/10ML
40 INJECTION, POWDER, LYOPHILIZED, FOR SOLUTION INTRAVENOUS
Status: DISCONTINUED | OUTPATIENT
Start: 2025-08-04 | End: 2025-08-04

## 2025-08-04 RX ORDER — SODIUM CHLORIDE 0.9 % (FLUSH) 0.9 %
10 SYRINGE (ML) INJECTION
Status: DISCONTINUED | OUTPATIENT
Start: 2025-08-04 | End: 2025-08-20 | Stop reason: HOSPADM

## 2025-08-04 RX ORDER — MUPIROCIN 20 MG/G
OINTMENT TOPICAL 2 TIMES DAILY
Status: COMPLETED | OUTPATIENT
Start: 2025-08-04 | End: 2025-08-08

## 2025-08-04 RX ORDER — MEROPENEM 1 G/1
1 INJECTION, POWDER, FOR SOLUTION INTRAVENOUS
Status: DISCONTINUED | OUTPATIENT
Start: 2025-08-04 | End: 2025-08-05

## 2025-08-04 RX ORDER — ONDANSETRON HYDROCHLORIDE 2 MG/ML
4 INJECTION, SOLUTION INTRAVENOUS EVERY 6 HOURS PRN
Status: DISCONTINUED | OUTPATIENT
Start: 2025-08-04 | End: 2025-08-20 | Stop reason: HOSPADM

## 2025-08-04 RX ADMIN — VANCOMYCIN HYDROCHLORIDE 750 MG: 750 INJECTION, POWDER, LYOPHILIZED, FOR SOLUTION INTRAVENOUS at 02:08

## 2025-08-04 RX ADMIN — SERTRALINE HYDROCHLORIDE 50 MG: 50 TABLET ORAL at 09:08

## 2025-08-04 RX ADMIN — ACETAMINOPHEN 649.6 MG: 650 SOLUTION ORAL at 11:08

## 2025-08-04 RX ADMIN — HYDROCORTISONE SODIUM SUCCINATE 50 MG: 100 INJECTION, POWDER, FOR SOLUTION INTRAMUSCULAR; INTRAVENOUS at 05:08

## 2025-08-04 RX ADMIN — MIDODRINE HYDROCHLORIDE 10 MG: 5 TABLET ORAL at 09:08

## 2025-08-04 RX ADMIN — LEVETIRACETAM 750 MG: 100 SOLUTION ORAL at 09:08

## 2025-08-04 RX ADMIN — NOREPINEPHRINE BITARTRATE 0.1 MCG/KG/MIN: 4 INJECTION, SOLUTION INTRAVENOUS at 01:08

## 2025-08-04 RX ADMIN — PANTOPRAZOLE SODIUM 40 MG: 40 INJECTION, POWDER, FOR SOLUTION INTRAVENOUS at 03:08

## 2025-08-04 RX ADMIN — SODIUM CHLORIDE, POTASSIUM CHLORIDE, SODIUM LACTATE AND CALCIUM CHLORIDE 1000 ML: 600; 310; 30; 20 INJECTION, SOLUTION INTRAVENOUS at 03:08

## 2025-08-04 RX ADMIN — OXYCODONE HYDROCHLORIDE 10 MG: 5 TABLET ORAL at 11:08

## 2025-08-04 RX ADMIN — VANCOMYCIN HYDROCHLORIDE 500 MG: 500 INJECTION, POWDER, LYOPHILIZED, FOR SOLUTION INTRAVENOUS at 01:08

## 2025-08-04 RX ADMIN — ACETYLCYSTEINE 4 ML: 100 INHALANT RESPIRATORY (INHALATION) at 07:08

## 2025-08-04 RX ADMIN — ACETAMINOPHEN 649.6 MG: 650 SOLUTION ORAL at 05:08

## 2025-08-04 RX ADMIN — MIDODRINE HYDROCHLORIDE 10 MG: 5 TABLET ORAL at 02:08

## 2025-08-04 RX ADMIN — ACETYLCYSTEINE 4 ML: 100 INHALANT RESPIRATORY (INHALATION) at 01:08

## 2025-08-04 RX ADMIN — IOHEXOL 100 ML: 350 INJECTION, SOLUTION INTRAVENOUS at 04:08

## 2025-08-04 RX ADMIN — OXYCODONE HYDROCHLORIDE 10 MG: 5 TABLET ORAL at 05:08

## 2025-08-04 RX ADMIN — MEROPENEM 1 G: 1 INJECTION, POWDER, FOR SOLUTION INTRAVENOUS at 02:08

## 2025-08-04 RX ADMIN — PIPERACILLIN SODIUM AND TAZOBACTAM SODIUM 4.5 G: 4; .5 INJECTION, POWDER, LYOPHILIZED, FOR SOLUTION INTRAVENOUS at 09:08

## 2025-08-04 RX ADMIN — PIPERACILLIN AND TAZOBACTAM 4.5 G: 4; .5 INJECTION, POWDER, LYOPHILIZED, FOR SOLUTION INTRAVENOUS; PARENTERAL at 03:08

## 2025-08-04 RX ADMIN — NOREPINEPHRINE BITARTRATE 0.02 MCG/KG/MIN: 4 INJECTION, SOLUTION INTRAVENOUS at 02:08

## 2025-08-04 RX ADMIN — AZITHROMYCIN 500 MG: 500 INJECTION, POWDER, LYOPHILIZED, FOR SOLUTION INTRAVENOUS at 02:08

## 2025-08-04 RX ADMIN — ATROPINE SULFATE 1 MG: 0.1 INJECTION INTRAVENOUS at 02:08

## 2025-08-04 RX ADMIN — ACETYLCYSTEINE 4 ML: 100 INHALANT RESPIRATORY (INHALATION) at 09:08

## 2025-08-04 RX ADMIN — LEVOTHYROXINE SODIUM 100 MCG: 0.05 TABLET ORAL at 09:08

## 2025-08-04 RX ADMIN — HYDROCORTISONE SODIUM SUCCINATE 50 MG: 100 INJECTION, POWDER, FOR SOLUTION INTRAMUSCULAR; INTRAVENOUS at 09:08

## 2025-08-04 RX ADMIN — SODIUM CHLORIDE, POTASSIUM CHLORIDE, SODIUM LACTATE AND CALCIUM CHLORIDE: 600; 310; 30; 20 INJECTION, SOLUTION INTRAVENOUS at 06:08

## 2025-08-04 RX ADMIN — SODIUM CHLORIDE, POTASSIUM CHLORIDE, SODIUM LACTATE AND CALCIUM CHLORIDE: 600; 310; 30; 20 INJECTION, SOLUTION INTRAVENOUS at 04:08

## 2025-08-04 RX ADMIN — MEROPENEM 1 G: 1 INJECTION, POWDER, FOR SOLUTION INTRAVENOUS at 09:08

## 2025-08-04 RX ADMIN — MUPIROCIN: 20 OINTMENT TOPICAL at 09:08

## 2025-08-04 RX ADMIN — PANTOPRAZOLE SODIUM 80 MG: 40 INJECTION, POWDER, FOR SOLUTION INTRAVENOUS at 09:08

## 2025-08-04 RX ADMIN — SODIUM CHLORIDE, POTASSIUM CHLORIDE, SODIUM LACTATE AND CALCIUM CHLORIDE 1239 ML: 600; 310; 30; 20 INJECTION, SOLUTION INTRAVENOUS at 01:08

## 2025-08-04 RX ADMIN — OXYCODONE HYDROCHLORIDE 5 MG: 5 TABLET ORAL at 10:08

## 2025-08-04 RX ADMIN — EPINEPHRINE 1 MG: 1 INJECTION, SOLUTION, CONCENTRATE INTRAVENOUS at 02:08

## 2025-08-05 LAB
A-ADO2 BLOOD GAS (OHS): 574 MMHG
ABS NEUT CALC (OHS): 22.33 X10(3)/MCL (ref 2.1–9.2)
ALBUMIN SERPL-MCNC: 1.4 G/DL (ref 3.5–5)
ALBUMIN/GLOB SERPL: 0.2 RATIO (ref 1.1–2)
ALLENS TEST BLOOD GAS (OHS): YES
ALP SERPL-CCNC: 223 UNIT/L (ref 40–150)
ALT SERPL-CCNC: 25 UNIT/L (ref 0–55)
ANION GAP SERPL CALC-SCNC: 10 MEQ/L
AST SERPL-CCNC: 26 UNIT/L (ref 11–45)
BASE EXCESS BLD CALC-SCNC: 6 MMOL/L (ref -2–2)
BILIRUB SERPL-MCNC: 0.2 MG/DL
BLOOD GAS SAMPLE TYPE (OHS): ABNORMAL
BUN SERPL-MCNC: 31.7 MG/DL (ref 8.9–20.6)
CALCIUM SERPL-MCNC: 9.2 MG/DL (ref 8.4–10.2)
CHLORIDE SERPL-SCNC: 103 MMOL/L (ref 98–107)
CO2 BLDA-SCNC: 32 MMOL/L (ref 22–26)
CO2 SERPL-SCNC: 23 MMOL/L (ref 22–29)
COHGB MFR BLDA: 1.4 % (ref 0.5–1.5)
CREAT SERPL-MCNC: 0.91 MG/DL (ref 0.72–1.25)
CREAT/UREA NIT SERPL: 35
DRAWN BY BLOOD GAS (OHS): ABNORMAL
ERYTHROCYTE [DISTWIDTH] IN BLOOD BY AUTOMATED COUNT: 17.8 % (ref 11.5–17)
GAS PNL BLD: 658 MMHG
GFR SERPLBLD CREATININE-BSD FMLA CKD-EPI: >60 ML/MIN/1.73/M2
GLOBULIN SER-MCNC: 6.3 GM/DL (ref 2.4–3.5)
GLUCOSE SERPL-MCNC: 102 MG/DL (ref 74–100)
HCO3 BLDA-SCNC: 30.6 MMOL/L (ref 22–26)
HCT VFR BLD AUTO: 26.8 % (ref 42–52)
HGB BLD-MCNC: 8.3 G/DL (ref 14–18)
INHALED O2 CONCENTRATION: 100 %
LYMPHOCYTES NFR BLD MANUAL: 0.71 X10(3)/MCL (ref 0.6–4.6)
LYMPHOCYTES NFR BLD MANUAL: 3 % (ref 13–40)
MAGNESIUM SERPL-MCNC: 1.8 MG/DL (ref 1.6–2.6)
MCH RBC QN AUTO: 26 PG (ref 27–31)
MCHC RBC AUTO-ENTMCNC: 31 G/DL (ref 33–36)
MCV RBC AUTO: 84 FL (ref 80–94)
METAMYELOCYTES NFR BLD MANUAL: 2 %
METHGB MFR BLDA: 0.6 % (ref 0–1.5)
NEUTROPHILS NFR BLD MANUAL: 95 % (ref 47–80)
NRBC BLD AUTO-RTO: 0 %
O2 HB BLOOD GAS (OHS): 95.9 % (ref 94–100)
OHS QRS DURATION: 70 MS
OHS QTC CALCULATION: 399 MS
OXYGEN DEVICE BLOOD GAS (OHS): ABNORMAL
OXYHGB MFR BLDA: 9.2 G/DL (ref 12–18)
PCO2 BLDA: 44 MMHG (ref 35–45)
PH BLDA: 7.45 [PH] (ref 7.35–7.45)
PHOSPHATE SERPL-MCNC: 3.8 MG/DL (ref 2.3–4.7)
PLATELET # BLD AUTO: 711 X10(3)/MCL (ref 130–400)
PLATELET # BLD EST: ABNORMAL 10*3/UL
PMV BLD AUTO: 8.3 FL (ref 7.4–10.4)
PO2 BLDA: 84 MMHG (ref 75–100)
POTASSIUM SERPL-SCNC: 4.6 MMOL/L (ref 3.5–5.1)
PREALB SERPL-MCNC: 7 MG/DL (ref 18–45)
PROT SERPL-MCNC: 7.7 GM/DL (ref 6.4–8.3)
RBC # BLD AUTO: 3.19 X10(6)/MCL (ref 4.7–6.1)
RBC MORPH BLD: NORMAL
SAMPLE SITE BLOOD GAS (OHS): ABNORMAL
SAO2 % BLDA: 97.9 %
SODIUM SERPL-SCNC: 136 MMOL/L (ref 136–145)
VANCOMYCIN TROUGH SERPL-MCNC: 21.9 UG/ML (ref 15–20)
WBC # BLD AUTO: 23.51 X10(3)/MCL (ref 4.5–11.5)

## 2025-08-05 PROCEDURE — 31720 CLEARANCE OF AIRWAYS: CPT

## 2025-08-05 PROCEDURE — 0JB70ZZ EXCISION OF BACK SUBCUTANEOUS TISSUE AND FASCIA, OPEN APPROACH: ICD-10-PCS | Performed by: STUDENT IN AN ORGANIZED HEALTH CARE EDUCATION/TRAINING PROGRAM

## 2025-08-05 PROCEDURE — 84100 ASSAY OF PHOSPHORUS: CPT

## 2025-08-05 PROCEDURE — 25000003 PHARM REV CODE 250

## 2025-08-05 PROCEDURE — 25000242 PHARM REV CODE 250 ALT 637 W/ HCPCS

## 2025-08-05 PROCEDURE — 63600175 PHARM REV CODE 636 W HCPCS: Performed by: INTERNAL MEDICINE

## 2025-08-05 PROCEDURE — 99900035 HC TECH TIME PER 15 MIN (STAT)

## 2025-08-05 PROCEDURE — 99233 SBSQ HOSP IP/OBS HIGH 50: CPT | Mod: 25,,,

## 2025-08-05 PROCEDURE — 82803 BLOOD GASES ANY COMBINATION: CPT

## 2025-08-05 PROCEDURE — 80202 ASSAY OF VANCOMYCIN: CPT | Performed by: INTERNAL MEDICINE

## 2025-08-05 PROCEDURE — 80053 COMPREHEN METABOLIC PANEL: CPT

## 2025-08-05 PROCEDURE — 85027 COMPLETE CBC AUTOMATED: CPT

## 2025-08-05 PROCEDURE — 94761 N-INVAS EAR/PLS OXIMETRY MLT: CPT | Mod: XB

## 2025-08-05 PROCEDURE — 27100171 HC OXYGEN HIGH FLOW UP TO 24 HOURS

## 2025-08-05 PROCEDURE — 20000000 HC ICU ROOM

## 2025-08-05 PROCEDURE — 84134 ASSAY OF PREALBUMIN: CPT

## 2025-08-05 PROCEDURE — 63600175 PHARM REV CODE 636 W HCPCS

## 2025-08-05 PROCEDURE — 36600 WITHDRAWAL OF ARTERIAL BLOOD: CPT

## 2025-08-05 PROCEDURE — 36415 COLL VENOUS BLD VENIPUNCTURE: CPT

## 2025-08-05 PROCEDURE — 25000003 PHARM REV CODE 250: Performed by: INTERNAL MEDICINE

## 2025-08-05 PROCEDURE — 11042 DBRDMT SUBQ TIS 1ST 20SQCM/<: CPT | Mod: ,,,

## 2025-08-05 PROCEDURE — 36415 COLL VENOUS BLD VENIPUNCTURE: CPT | Performed by: INTERNAL MEDICINE

## 2025-08-05 PROCEDURE — 83735 ASSAY OF MAGNESIUM: CPT

## 2025-08-05 PROCEDURE — 94640 AIRWAY INHALATION TREATMENT: CPT

## 2025-08-05 RX ORDER — IPRATROPIUM BROMIDE AND ALBUTEROL SULFATE 2.5; .5 MG/3ML; MG/3ML
SOLUTION RESPIRATORY (INHALATION)
Status: COMPLETED
Start: 2025-08-05 | End: 2025-08-05

## 2025-08-05 RX ORDER — IPRATROPIUM BROMIDE AND ALBUTEROL SULFATE 2.5; .5 MG/3ML; MG/3ML
3 SOLUTION RESPIRATORY (INHALATION) EVERY 6 HOURS PRN
Status: DISCONTINUED | OUTPATIENT
Start: 2025-08-05 | End: 2025-08-20 | Stop reason: HOSPADM

## 2025-08-05 RX ORDER — TRIPROLIDINE/PSEUDOEPHEDRINE 2.5MG-60MG
400 TABLET ORAL EVERY 6 HOURS PRN
Status: DISCONTINUED | OUTPATIENT
Start: 2025-08-05 | End: 2025-08-20 | Stop reason: HOSPADM

## 2025-08-05 RX ORDER — IPRATROPIUM BROMIDE AND ALBUTEROL SULFATE 2.5; .5 MG/3ML; MG/3ML
3 SOLUTION RESPIRATORY (INHALATION) 3 TIMES DAILY
Status: DISCONTINUED | OUTPATIENT
Start: 2025-08-05 | End: 2025-08-20 | Stop reason: HOSPADM

## 2025-08-05 RX ADMIN — OXYCODONE HYDROCHLORIDE 10 MG: 5 TABLET ORAL at 03:08

## 2025-08-05 RX ADMIN — MUPIROCIN: 20 OINTMENT TOPICAL at 09:08

## 2025-08-05 RX ADMIN — SODIUM CHLORIDE, POTASSIUM CHLORIDE, SODIUM LACTATE AND CALCIUM CHLORIDE 500 ML: 600; 310; 30; 20 INJECTION, SOLUTION INTRAVENOUS at 11:08

## 2025-08-05 RX ADMIN — AZITHROMYCIN 500 MG: 500 INJECTION, POWDER, LYOPHILIZED, FOR SOLUTION INTRAVENOUS at 01:08

## 2025-08-05 RX ADMIN — LEVETIRACETAM 750 MG: 100 SOLUTION ORAL at 09:08

## 2025-08-05 RX ADMIN — PANTOPRAZOLE SODIUM 80 MG: 40 INJECTION, POWDER, FOR SOLUTION INTRAVENOUS at 08:08

## 2025-08-05 RX ADMIN — SERTRALINE HYDROCHLORIDE 50 MG: 50 TABLET ORAL at 08:08

## 2025-08-05 RX ADMIN — ACETYLCYSTEINE 4 ML: 100 INHALANT RESPIRATORY (INHALATION) at 07:08

## 2025-08-05 RX ADMIN — ACETYLCYSTEINE 4 ML: 100 INHALANT RESPIRATORY (INHALATION) at 01:08

## 2025-08-05 RX ADMIN — ACETAMINOPHEN 649.6 MG: 650 SOLUTION ORAL at 05:08

## 2025-08-05 RX ADMIN — ACETAMINOPHEN 649.6 MG: 650 SOLUTION ORAL at 12:08

## 2025-08-05 RX ADMIN — MEROPENEM 2 G: 2 INJECTION INTRAVENOUS at 06:08

## 2025-08-05 RX ADMIN — MIDODRINE HYDROCHLORIDE 10 MG: 5 TABLET ORAL at 09:08

## 2025-08-05 RX ADMIN — MUPIROCIN: 20 OINTMENT TOPICAL at 08:08

## 2025-08-05 RX ADMIN — ACETAMINOPHEN 649.6 MG: 650 SOLUTION ORAL at 11:08

## 2025-08-05 RX ADMIN — MULTIVIT AND MINERALS-FERROUS GLUCONATE 9 MG IRON/15 ML ORAL LIQUID 5 ML: at 10:08

## 2025-08-05 RX ADMIN — IBUPROFEN 400 MG: 100 SUSPENSION ORAL at 02:08

## 2025-08-05 RX ADMIN — MIDODRINE HYDROCHLORIDE 10 MG: 5 TABLET ORAL at 08:08

## 2025-08-05 RX ADMIN — NOREPINEPHRINE BITARTRATE 0.08 MCG/KG/MIN: 4 INJECTION, SOLUTION INTRAVENOUS at 06:08

## 2025-08-05 RX ADMIN — MEROPENEM 2 G: 2 INJECTION INTRAVENOUS at 09:08

## 2025-08-05 RX ADMIN — PANTOPRAZOLE SODIUM 80 MG: 40 INJECTION, POWDER, FOR SOLUTION INTRAVENOUS at 09:08

## 2025-08-05 RX ADMIN — OXYCODONE HYDROCHLORIDE 10 MG: 5 TABLET ORAL at 08:08

## 2025-08-05 RX ADMIN — IPRATROPIUM BROMIDE AND ALBUTEROL SULFATE 3 ML: 2.5; .5 SOLUTION RESPIRATORY (INHALATION) at 01:08

## 2025-08-05 RX ADMIN — HYDROCORTISONE SODIUM SUCCINATE 50 MG: 100 INJECTION, POWDER, FOR SOLUTION INTRAMUSCULAR; INTRAVENOUS at 05:08

## 2025-08-05 RX ADMIN — HYDROCORTISONE SODIUM SUCCINATE 50 MG: 100 INJECTION, POWDER, FOR SOLUTION INTRAMUSCULAR; INTRAVENOUS at 11:08

## 2025-08-05 RX ADMIN — ALPRAZOLAM 0.25 MG: 0.25 TABLET ORAL at 05:08

## 2025-08-05 RX ADMIN — SODIUM CHLORIDE, POTASSIUM CHLORIDE, SODIUM LACTATE AND CALCIUM CHLORIDE: 600; 310; 30; 20 INJECTION, SOLUTION INTRAVENOUS at 02:08

## 2025-08-05 RX ADMIN — MEROPENEM 2 G: 2 INJECTION INTRAVENOUS at 01:08

## 2025-08-05 RX ADMIN — LEVOTHYROXINE SODIUM 100 MCG: 0.05 TABLET ORAL at 05:08

## 2025-08-05 RX ADMIN — LEVETIRACETAM 750 MG: 100 SOLUTION ORAL at 08:08

## 2025-08-05 RX ADMIN — HYDROCORTISONE SODIUM SUCCINATE 50 MG: 100 INJECTION, POWDER, FOR SOLUTION INTRAMUSCULAR; INTRAVENOUS at 12:08

## 2025-08-05 RX ADMIN — VANCOMYCIN HYDROCHLORIDE 750 MG: 750 INJECTION, POWDER, LYOPHILIZED, FOR SOLUTION INTRAVENOUS at 06:08

## 2025-08-05 RX ADMIN — IPRATROPIUM BROMIDE AND ALBUTEROL SULFATE: 2.5; .5 SOLUTION RESPIRATORY (INHALATION) at 07:08

## 2025-08-05 RX ADMIN — OXYCODONE HYDROCHLORIDE 10 MG: 5 TABLET ORAL at 09:08

## 2025-08-05 RX ADMIN — IPRATROPIUM BROMIDE AND ALBUTEROL SULFATE 3 ML: .5; 3 SOLUTION RESPIRATORY (INHALATION) at 07:08

## 2025-08-05 RX ADMIN — MIDODRINE HYDROCHLORIDE 10 MG: 5 TABLET ORAL at 03:08

## 2025-08-05 RX ADMIN — FOLIC ACID 1 MG: 1 TABLET ORAL at 08:08

## 2025-08-06 LAB
ABS NEUT CALC (OHS): 27.86 X10(3)/MCL (ref 2.1–9.2)
ALBUMIN SERPL-MCNC: 1.4 G/DL (ref 3.5–5)
ALBUMIN/GLOB SERPL: 0.2 RATIO (ref 1.1–2)
ALP SERPL-CCNC: 194 UNIT/L (ref 40–150)
ALT SERPL-CCNC: 20 UNIT/L (ref 0–55)
ANION GAP SERPL CALC-SCNC: 7 MEQ/L
ANISOCYTOSIS BLD QL SMEAR: ABNORMAL
AST SERPL-CCNC: 20 UNIT/L (ref 11–45)
BACTERIA BLD CULT: ABNORMAL
BACTERIA BLD CULT: ABNORMAL
BILIRUB SERPL-MCNC: 0.1 MG/DL
BUN SERPL-MCNC: 26.8 MG/DL (ref 8.9–20.6)
CALCIUM SERPL-MCNC: 7.9 MG/DL (ref 8.4–10.2)
CHLORIDE SERPL-SCNC: 104 MMOL/L (ref 98–107)
CO2 SERPL-SCNC: 25 MMOL/L (ref 22–29)
CREAT SERPL-MCNC: 0.76 MG/DL (ref 0.72–1.25)
CREAT/UREA NIT SERPL: 35
ERYTHROCYTE [DISTWIDTH] IN BLOOD BY AUTOMATED COUNT: 17.9 % (ref 11.5–17)
GFR SERPLBLD CREATININE-BSD FMLA CKD-EPI: >60 ML/MIN/1.73/M2
GLOBULIN SER-MCNC: 5.7 GM/DL (ref 2.4–3.5)
GLUCOSE SERPL-MCNC: 85 MG/DL (ref 74–100)
GRAM STN SPEC: ABNORMAL
HCT VFR BLD AUTO: 27.7 % (ref 42–52)
HGB BLD-MCNC: 8.7 G/DL (ref 14–18)
HOLD SPECIMEN: NORMAL
LYMPHOCYTES NFR BLD MANUAL: 1.47 X10(3)/MCL (ref 0.6–4.6)
LYMPHOCYTES NFR BLD MANUAL: 5 % (ref 13–40)
MAGNESIUM SERPL-MCNC: 1.5 MG/DL (ref 1.6–2.6)
MCH RBC QN AUTO: 26.4 PG (ref 27–31)
MCHC RBC AUTO-ENTMCNC: 31.4 G/DL (ref 33–36)
MCV RBC AUTO: 83.9 FL (ref 80–94)
NEUTROPHILS NFR BLD MANUAL: 93 % (ref 47–80)
NEUTS BAND NFR BLD MANUAL: 2 % (ref 0–11)
NRBC BLD AUTO-RTO: 0.1 %
PHOSPHATE SERPL-MCNC: 4 MG/DL (ref 2.3–4.7)
PLATELET # BLD AUTO: 706 X10(3)/MCL (ref 130–400)
PLATELET # BLD EST: ABNORMAL 10*3/UL
PMV BLD AUTO: 8.1 FL (ref 7.4–10.4)
POLYCHROMASIA BLD QL SMEAR: ABNORMAL
POTASSIUM SERPL-SCNC: 3.3 MMOL/L (ref 3.5–5.1)
PROT SERPL-MCNC: 7.1 GM/DL (ref 6.4–8.3)
RBC # BLD AUTO: 3.3 X10(6)/MCL (ref 4.7–6.1)
RBC MORPH BLD: ABNORMAL
SODIUM SERPL-SCNC: 136 MMOL/L (ref 136–145)
VANCOMYCIN TROUGH SERPL-MCNC: 19.5 UG/ML (ref 15–20)
WBC # BLD AUTO: 29.33 X10(3)/MCL (ref 4.5–11.5)

## 2025-08-06 PROCEDURE — 31720 CLEARANCE OF AIRWAYS: CPT

## 2025-08-06 PROCEDURE — 25000003 PHARM REV CODE 250: Performed by: INTERNAL MEDICINE

## 2025-08-06 PROCEDURE — 84100 ASSAY OF PHOSPHORUS: CPT

## 2025-08-06 PROCEDURE — 80202 ASSAY OF VANCOMYCIN: CPT | Performed by: INTERNAL MEDICINE

## 2025-08-06 PROCEDURE — 25000242 PHARM REV CODE 250 ALT 637 W/ HCPCS

## 2025-08-06 PROCEDURE — 36415 COLL VENOUS BLD VENIPUNCTURE: CPT

## 2025-08-06 PROCEDURE — 27000221 HC OXYGEN, UP TO 24 HOURS

## 2025-08-06 PROCEDURE — 85027 COMPLETE CBC AUTOMATED: CPT

## 2025-08-06 PROCEDURE — 63600175 PHARM REV CODE 636 W HCPCS: Performed by: INTERNAL MEDICINE

## 2025-08-06 PROCEDURE — 83735 ASSAY OF MAGNESIUM: CPT

## 2025-08-06 PROCEDURE — 80053 COMPREHEN METABOLIC PANEL: CPT

## 2025-08-06 PROCEDURE — 63600175 PHARM REV CODE 636 W HCPCS

## 2025-08-06 PROCEDURE — 20000000 HC ICU ROOM

## 2025-08-06 PROCEDURE — 94640 AIRWAY INHALATION TREATMENT: CPT

## 2025-08-06 PROCEDURE — 25000003 PHARM REV CODE 250

## 2025-08-06 PROCEDURE — 94761 N-INVAS EAR/PLS OXIMETRY MLT: CPT

## 2025-08-06 PROCEDURE — 87040 BLOOD CULTURE FOR BACTERIA: CPT

## 2025-08-06 RX ORDER — MAGNESIUM SULFATE HEPTAHYDRATE 40 MG/ML
2 INJECTION, SOLUTION INTRAVENOUS ONCE
Status: COMPLETED | OUTPATIENT
Start: 2025-08-06 | End: 2025-08-06

## 2025-08-06 RX ORDER — DIAZEPAM 10 MG/2ML
1 INJECTION INTRAMUSCULAR ONCE AS NEEDED
Status: DISCONTINUED | OUTPATIENT
Start: 2025-08-06 | End: 2025-08-20 | Stop reason: HOSPADM

## 2025-08-06 RX ADMIN — SODIUM CHLORIDE, POTASSIUM CHLORIDE, SODIUM LACTATE AND CALCIUM CHLORIDE: 600; 310; 30; 20 INJECTION, SOLUTION INTRAVENOUS at 03:08

## 2025-08-06 RX ADMIN — SODIUM CHLORIDE, POTASSIUM CHLORIDE, SODIUM LACTATE AND CALCIUM CHLORIDE: 600; 310; 30; 20 INJECTION, SOLUTION INTRAVENOUS at 02:08

## 2025-08-06 RX ADMIN — ACETYLCYSTEINE 4 ML: 100 INHALANT RESPIRATORY (INHALATION) at 06:08

## 2025-08-06 RX ADMIN — ACETAMINOPHEN 649.6 MG: 650 SOLUTION ORAL at 06:08

## 2025-08-06 RX ADMIN — HYDROCORTISONE SODIUM SUCCINATE 50 MG: 100 INJECTION, POWDER, FOR SOLUTION INTRAMUSCULAR; INTRAVENOUS at 12:08

## 2025-08-06 RX ADMIN — IPRATROPIUM BROMIDE AND ALBUTEROL SULFATE 3 ML: 2.5; .5 SOLUTION RESPIRATORY (INHALATION) at 07:08

## 2025-08-06 RX ADMIN — ACETAMINOPHEN 649.6 MG: 650 SOLUTION ORAL at 12:08

## 2025-08-06 RX ADMIN — OXYCODONE HYDROCHLORIDE 10 MG: 5 TABLET ORAL at 12:08

## 2025-08-06 RX ADMIN — MUPIROCIN: 20 OINTMENT TOPICAL at 08:08

## 2025-08-06 RX ADMIN — HYDROCORTISONE SODIUM SUCCINATE 50 MG: 100 INJECTION, POWDER, FOR SOLUTION INTRAMUSCULAR; INTRAVENOUS at 05:08

## 2025-08-06 RX ADMIN — ACETAMINOPHEN 649.6 MG: 650 SOLUTION ORAL at 05:08

## 2025-08-06 RX ADMIN — ACETYLCYSTEINE 4 ML: 100 INHALANT RESPIRATORY (INHALATION) at 07:08

## 2025-08-06 RX ADMIN — POTASSIUM BICARBONATE 50 MEQ: 977.5 TABLET, EFFERVESCENT ORAL at 08:08

## 2025-08-06 RX ADMIN — ACETYLCYSTEINE 4 ML: 100 INHALANT RESPIRATORY (INHALATION) at 01:08

## 2025-08-06 RX ADMIN — OXYCODONE HYDROCHLORIDE 10 MG: 5 TABLET ORAL at 08:08

## 2025-08-06 RX ADMIN — MIDODRINE HYDROCHLORIDE 10 MG: 5 TABLET ORAL at 08:08

## 2025-08-06 RX ADMIN — PANTOPRAZOLE SODIUM 80 MG: 40 INJECTION, POWDER, FOR SOLUTION INTRAVENOUS at 08:08

## 2025-08-06 RX ADMIN — MEROPENEM 2 G: 2 INJECTION INTRAVENOUS at 09:08

## 2025-08-06 RX ADMIN — VANCOMYCIN HYDROCHLORIDE 500 MG: 500 INJECTION, POWDER, LYOPHILIZED, FOR SOLUTION INTRAVENOUS at 11:08

## 2025-08-06 RX ADMIN — MAGNESIUM SULFATE HEPTAHYDRATE 2 G: 40 INJECTION, SOLUTION INTRAVENOUS at 11:08

## 2025-08-06 RX ADMIN — MEROPENEM 2 G: 2 INJECTION INTRAVENOUS at 02:08

## 2025-08-06 RX ADMIN — IPRATROPIUM BROMIDE AND ALBUTEROL SULFATE 3 ML: 2.5; .5 SOLUTION RESPIRATORY (INHALATION) at 01:08

## 2025-08-06 RX ADMIN — MEROPENEM 2 G: 2 INJECTION INTRAVENOUS at 05:08

## 2025-08-06 RX ADMIN — IPRATROPIUM BROMIDE AND ALBUTEROL SULFATE 3 ML: 2.5; .5 SOLUTION RESPIRATORY (INHALATION) at 06:08

## 2025-08-06 RX ADMIN — HYDROCORTISONE SODIUM SUCCINATE 50 MG: 100 INJECTION, POWDER, FOR SOLUTION INTRAMUSCULAR; INTRAVENOUS at 06:08

## 2025-08-06 RX ADMIN — LEVOTHYROXINE SODIUM 100 MCG: 0.05 TABLET ORAL at 05:08

## 2025-08-06 RX ADMIN — AZITHROMYCIN 500 MG: 500 INJECTION, POWDER, LYOPHILIZED, FOR SOLUTION INTRAVENOUS at 02:08

## 2025-08-06 RX ADMIN — ALPRAZOLAM 0.25 MG: 0.25 TABLET ORAL at 08:08

## 2025-08-06 RX ADMIN — LEVETIRACETAM 750 MG: 100 SOLUTION ORAL at 08:08

## 2025-08-06 RX ADMIN — MULTIVIT AND MINERALS-FERROUS GLUCONATE 9 MG IRON/15 ML ORAL LIQUID 5 ML: at 08:08

## 2025-08-06 RX ADMIN — MUPIROCIN: 20 OINTMENT TOPICAL at 09:08

## 2025-08-06 RX ADMIN — FOLIC ACID 1 MG: 1 TABLET ORAL at 08:08

## 2025-08-06 RX ADMIN — SERTRALINE HYDROCHLORIDE 50 MG: 50 TABLET ORAL at 08:08

## 2025-08-06 RX ADMIN — MAGNESIUM SULFATE HEPTAHYDRATE 2 G: 40 INJECTION, SOLUTION INTRAVENOUS at 08:08

## 2025-08-06 RX ADMIN — MIDODRINE HYDROCHLORIDE 10 MG: 5 TABLET ORAL at 02:08

## 2025-08-07 LAB
ALBUMIN SERPL-MCNC: 1.3 G/DL (ref 3.5–5)
ALBUMIN/GLOB SERPL: 0.3 RATIO (ref 1.1–2)
ALP SERPL-CCNC: 179 UNIT/L (ref 40–150)
ALT SERPL-CCNC: 18 UNIT/L (ref 0–55)
ANION GAP SERPL CALC-SCNC: 7 MEQ/L
AST SERPL-CCNC: 17 UNIT/L (ref 11–45)
BACTERIA SPEC ANAEROBE CULT: ABNORMAL
BACTERIA TISS AEROBE CULT: ABNORMAL
BACTERIA UR CULT: ABNORMAL
BASOPHILS # BLD AUTO: 0.01 X10(3)/MCL
BASOPHILS NFR BLD AUTO: 0.1 %
BILIRUB SERPL-MCNC: 0.1 MG/DL
BUN SERPL-MCNC: 23.9 MG/DL (ref 8.9–20.6)
CALCIUM SERPL-MCNC: 7.3 MG/DL (ref 8.4–10.2)
CHLORIDE SERPL-SCNC: 105 MMOL/L (ref 98–107)
CO2 SERPL-SCNC: 25 MMOL/L (ref 22–29)
CREAT SERPL-MCNC: 0.63 MG/DL (ref 0.72–1.25)
CREAT/UREA NIT SERPL: 38
EOSINOPHIL # BLD AUTO: 0 X10(3)/MCL (ref 0–0.9)
EOSINOPHIL NFR BLD AUTO: 0 %
ERYTHROCYTE [DISTWIDTH] IN BLOOD BY AUTOMATED COUNT: 17.9 % (ref 11.5–17)
GFR SERPLBLD CREATININE-BSD FMLA CKD-EPI: >60 ML/MIN/1.73/M2
GLOBULIN SER-MCNC: 5.1 GM/DL (ref 2.4–3.5)
GLUCOSE SERPL-MCNC: 85 MG/DL (ref 74–100)
HCT VFR BLD AUTO: 31.4 % (ref 42–52)
HGB BLD-MCNC: 10.1 G/DL (ref 14–18)
IMM GRANULOCYTES # BLD AUTO: 0.16 X10(3)/MCL (ref 0–0.04)
IMM GRANULOCYTES NFR BLD AUTO: 1.4 %
LYMPHOCYTES # BLD AUTO: 1.48 X10(3)/MCL (ref 0.6–4.6)
LYMPHOCYTES NFR BLD AUTO: 12.9 %
MAGNESIUM SERPL-MCNC: 2.3 MG/DL (ref 1.6–2.6)
MCH RBC QN AUTO: 26.6 PG (ref 27–31)
MCHC RBC AUTO-ENTMCNC: 32.2 G/DL (ref 33–36)
MCV RBC AUTO: 82.8 FL (ref 80–94)
MONOCYTES # BLD AUTO: 0.55 X10(3)/MCL (ref 0.1–1.3)
MONOCYTES NFR BLD AUTO: 4.8 %
NEUTROPHILS # BLD AUTO: 9.27 X10(3)/MCL (ref 2.1–9.2)
NEUTROPHILS NFR BLD AUTO: 80.8 %
NRBC BLD AUTO-RTO: 0.2 %
PHOSPHATE SERPL-MCNC: 3.6 MG/DL (ref 2.3–4.7)
PLATELET # BLD AUTO: 638 X10(3)/MCL (ref 130–400)
PLATELETS.RETICULATED NFR BLD AUTO: 2.4 % (ref 0.9–11.2)
PMV BLD AUTO: 9.1 FL (ref 7.4–10.4)
POCT GLUCOSE: 124 MG/DL (ref 70–110)
POTASSIUM SERPL-SCNC: 3.4 MMOL/L (ref 3.5–5.1)
PROT SERPL-MCNC: 6.4 GM/DL (ref 6.4–8.3)
RBC # BLD AUTO: 3.79 X10(6)/MCL (ref 4.7–6.1)
SODIUM SERPL-SCNC: 137 MMOL/L (ref 136–145)
VANCOMYCIN TROUGH SERPL-MCNC: 17.9 UG/ML (ref 15–20)
WBC # BLD AUTO: 11.47 X10(3)/MCL (ref 4.5–11.5)

## 2025-08-07 PROCEDURE — 25000242 PHARM REV CODE 250 ALT 637 W/ HCPCS

## 2025-08-07 PROCEDURE — 25000003 PHARM REV CODE 250: Performed by: INTERNAL MEDICINE

## 2025-08-07 PROCEDURE — 25000003 PHARM REV CODE 250

## 2025-08-07 PROCEDURE — 63600175 PHARM REV CODE 636 W HCPCS

## 2025-08-07 PROCEDURE — 80053 COMPREHEN METABOLIC PANEL: CPT

## 2025-08-07 PROCEDURE — 31720 CLEARANCE OF AIRWAYS: CPT

## 2025-08-07 PROCEDURE — 94640 AIRWAY INHALATION TREATMENT: CPT

## 2025-08-07 PROCEDURE — 94799 UNLISTED PULMONARY SVC/PX: CPT

## 2025-08-07 PROCEDURE — 21400001 HC TELEMETRY ROOM

## 2025-08-07 PROCEDURE — 99233 SBSQ HOSP IP/OBS HIGH 50: CPT | Mod: ,,,

## 2025-08-07 PROCEDURE — 36415 COLL VENOUS BLD VENIPUNCTURE: CPT | Performed by: INTERNAL MEDICINE

## 2025-08-07 PROCEDURE — 80202 ASSAY OF VANCOMYCIN: CPT | Performed by: INTERNAL MEDICINE

## 2025-08-07 PROCEDURE — 85025 COMPLETE CBC W/AUTO DIFF WBC: CPT

## 2025-08-07 PROCEDURE — 92610 EVALUATE SWALLOWING FUNCTION: CPT

## 2025-08-07 PROCEDURE — 27000207 HC ISOLATION

## 2025-08-07 PROCEDURE — 83735 ASSAY OF MAGNESIUM: CPT

## 2025-08-07 PROCEDURE — 36415 COLL VENOUS BLD VENIPUNCTURE: CPT

## 2025-08-07 PROCEDURE — 11000001 HC ACUTE MED/SURG PRIVATE ROOM

## 2025-08-07 PROCEDURE — 63600175 PHARM REV CODE 636 W HCPCS: Performed by: INTERNAL MEDICINE

## 2025-08-07 PROCEDURE — 94761 N-INVAS EAR/PLS OXIMETRY MLT: CPT

## 2025-08-07 PROCEDURE — 84100 ASSAY OF PHOSPHORUS: CPT

## 2025-08-07 PROCEDURE — 27000221 HC OXYGEN, UP TO 24 HOURS

## 2025-08-07 RX ORDER — METRONIDAZOLE 500 MG/100ML
500 INJECTION, SOLUTION INTRAVENOUS
Status: DISCONTINUED | OUTPATIENT
Start: 2025-08-07 | End: 2025-08-20 | Stop reason: HOSPADM

## 2025-08-07 RX ORDER — CEFEPIME HYDROCHLORIDE 2 G/1
2 INJECTION, POWDER, FOR SOLUTION INTRAVENOUS
Status: DISCONTINUED | OUTPATIENT
Start: 2025-08-07 | End: 2025-08-20 | Stop reason: HOSPADM

## 2025-08-07 RX ADMIN — PANTOPRAZOLE SODIUM 80 MG: 40 INJECTION, POWDER, FOR SOLUTION INTRAVENOUS at 09:08

## 2025-08-07 RX ADMIN — CEFEPIME 2 G: 2 INJECTION, POWDER, FOR SOLUTION INTRAVENOUS at 10:08

## 2025-08-07 RX ADMIN — FOLIC ACID 1 MG: 1 TABLET ORAL at 09:08

## 2025-08-07 RX ADMIN — VANCOMYCIN HYDROCHLORIDE 500 MG: 500 INJECTION, POWDER, LYOPHILIZED, FOR SOLUTION INTRAVENOUS at 01:08

## 2025-08-07 RX ADMIN — ACETAMINOPHEN 649.6 MG: 650 SOLUTION ORAL at 12:08

## 2025-08-07 RX ADMIN — ALPRAZOLAM 0.25 MG: 0.25 TABLET ORAL at 09:08

## 2025-08-07 RX ADMIN — HYDROCORTISONE SODIUM SUCCINATE 50 MG: 100 INJECTION, POWDER, FOR SOLUTION INTRAMUSCULAR; INTRAVENOUS at 12:08

## 2025-08-07 RX ADMIN — MIDODRINE HYDROCHLORIDE 10 MG: 5 TABLET ORAL at 09:08

## 2025-08-07 RX ADMIN — ACETYLCYSTEINE 4 ML: 100 INHALANT RESPIRATORY (INHALATION) at 07:08

## 2025-08-07 RX ADMIN — IPRATROPIUM BROMIDE AND ALBUTEROL SULFATE 3 ML: 2.5; .5 SOLUTION RESPIRATORY (INHALATION) at 12:08

## 2025-08-07 RX ADMIN — CEFEPIME 2 G: 2 INJECTION, POWDER, FOR SOLUTION INTRAVENOUS at 03:08

## 2025-08-07 RX ADMIN — SODIUM CHLORIDE, POTASSIUM CHLORIDE, SODIUM LACTATE AND CALCIUM CHLORIDE: 600; 310; 30; 20 INJECTION, SOLUTION INTRAVENOUS at 12:08

## 2025-08-07 RX ADMIN — HYDROCORTISONE SODIUM SUCCINATE 50 MG: 100 INJECTION, POWDER, FOR SOLUTION INTRAMUSCULAR; INTRAVENOUS at 06:08

## 2025-08-07 RX ADMIN — AZITHROMYCIN 500 MG: 500 INJECTION, POWDER, LYOPHILIZED, FOR SOLUTION INTRAVENOUS at 03:08

## 2025-08-07 RX ADMIN — MUPIROCIN: 20 OINTMENT TOPICAL at 09:08

## 2025-08-07 RX ADMIN — ACETYLCYSTEINE 4 ML: 100 INHALANT RESPIRATORY (INHALATION) at 12:08

## 2025-08-07 RX ADMIN — MIDODRINE HYDROCHLORIDE 10 MG: 5 TABLET ORAL at 03:08

## 2025-08-07 RX ADMIN — OXYCODONE HYDROCHLORIDE 10 MG: 5 TABLET ORAL at 09:08

## 2025-08-07 RX ADMIN — MULTIVIT AND MINERALS-FERROUS GLUCONATE 9 MG IRON/15 ML ORAL LIQUID 5 ML: at 09:08

## 2025-08-07 RX ADMIN — SODIUM CHLORIDE, POTASSIUM CHLORIDE, SODIUM LACTATE AND CALCIUM CHLORIDE: 600; 310; 30; 20 INJECTION, SOLUTION INTRAVENOUS at 10:08

## 2025-08-07 RX ADMIN — IPRATROPIUM BROMIDE AND ALBUTEROL SULFATE 3 ML: 2.5; .5 SOLUTION RESPIRATORY (INHALATION) at 07:08

## 2025-08-07 RX ADMIN — METRONIDAZOLE 500 MG: 5 INJECTION, SOLUTION INTRAVENOUS at 11:08

## 2025-08-07 RX ADMIN — LEVETIRACETAM 750 MG: 100 SOLUTION ORAL at 09:08

## 2025-08-07 RX ADMIN — MEROPENEM 2 G: 2 INJECTION INTRAVENOUS at 06:08

## 2025-08-07 RX ADMIN — ACETAMINOPHEN 649.6 MG: 650 SOLUTION ORAL at 06:08

## 2025-08-07 RX ADMIN — OXYCODONE HYDROCHLORIDE 10 MG: 5 TABLET ORAL at 06:08

## 2025-08-07 RX ADMIN — METRONIDAZOLE 500 MG: 5 INJECTION, SOLUTION INTRAVENOUS at 04:08

## 2025-08-08 LAB
ALBUMIN SERPL-MCNC: 1.4 G/DL (ref 3.5–5)
ALBUMIN/GLOB SERPL: 0.3 RATIO (ref 1.1–2)
ALP SERPL-CCNC: 176 UNIT/L (ref 40–150)
ALT SERPL-CCNC: 17 UNIT/L (ref 0–55)
ANION GAP SERPL CALC-SCNC: 9 MEQ/L
AST SERPL-CCNC: 20 UNIT/L (ref 11–45)
BACTERIA WND CULT: ABNORMAL
BASOPHILS # BLD AUTO: 0.03 X10(3)/MCL
BASOPHILS NFR BLD AUTO: 0.1 %
BILIRUB SERPL-MCNC: 0.1 MG/DL
BUN SERPL-MCNC: 32.7 MG/DL (ref 8.9–20.6)
CALCIUM SERPL-MCNC: 7.1 MG/DL (ref 8.4–10.2)
CHLORIDE SERPL-SCNC: 105 MMOL/L (ref 98–107)
CO2 SERPL-SCNC: 23 MMOL/L (ref 22–29)
CREAT SERPL-MCNC: 0.76 MG/DL (ref 0.72–1.25)
CREAT/UREA NIT SERPL: 43
EOSINOPHIL # BLD AUTO: 0 X10(3)/MCL (ref 0–0.9)
EOSINOPHIL NFR BLD AUTO: 0 %
ERYTHROCYTE [DISTWIDTH] IN BLOOD BY AUTOMATED COUNT: 18 % (ref 11.5–17)
GFR SERPLBLD CREATININE-BSD FMLA CKD-EPI: >60 ML/MIN/1.73/M2
GLOBULIN SER-MCNC: 5 GM/DL (ref 2.4–3.5)
GLUCOSE SERPL-MCNC: 117 MG/DL (ref 74–100)
HCT VFR BLD AUTO: 31.4 % (ref 42–52)
HGB BLD-MCNC: 9.6 G/DL (ref 14–18)
HOLD SPECIMEN: NORMAL
IMM GRANULOCYTES # BLD AUTO: 0.27 X10(3)/MCL (ref 0–0.04)
IMM GRANULOCYTES NFR BLD AUTO: 1.2 %
LYMPHOCYTES # BLD AUTO: 1.46 X10(3)/MCL (ref 0.6–4.6)
LYMPHOCYTES NFR BLD AUTO: 6.3 %
MAGNESIUM SERPL-MCNC: 1.6 MG/DL (ref 1.6–2.6)
MCH RBC QN AUTO: 26 PG (ref 27–31)
MCHC RBC AUTO-ENTMCNC: 30.6 G/DL (ref 33–36)
MCV RBC AUTO: 85.1 FL (ref 80–94)
MONOCYTES # BLD AUTO: 0.83 X10(3)/MCL (ref 0.1–1.3)
MONOCYTES NFR BLD AUTO: 3.6 %
NEUTROPHILS # BLD AUTO: 20.56 X10(3)/MCL (ref 2.1–9.2)
NEUTROPHILS NFR BLD AUTO: 88.8 %
NRBC BLD AUTO-RTO: 0.1 %
PHOSPHATE SERPL-MCNC: 3.9 MG/DL (ref 2.3–4.7)
PLATELET # BLD AUTO: 700 X10(3)/MCL (ref 130–400)
PMV BLD AUTO: 8.2 FL (ref 7.4–10.4)
POCT GLUCOSE: 137 MG/DL (ref 70–110)
POTASSIUM SERPL-SCNC: 3.3 MMOL/L (ref 3.5–5.1)
PROT SERPL-MCNC: 6.4 GM/DL (ref 6.4–8.3)
RBC # BLD AUTO: 3.69 X10(6)/MCL (ref 4.7–6.1)
SODIUM SERPL-SCNC: 137 MMOL/L (ref 136–145)
VANCOMYCIN TROUGH SERPL-MCNC: 16.8 UG/ML (ref 15–20)
WBC # BLD AUTO: 23.15 X10(3)/MCL (ref 4.5–11.5)

## 2025-08-08 PROCEDURE — 27000207 HC ISOLATION

## 2025-08-08 PROCEDURE — 83735 ASSAY OF MAGNESIUM: CPT

## 2025-08-08 PROCEDURE — 31720 CLEARANCE OF AIRWAYS: CPT

## 2025-08-08 PROCEDURE — 21400001 HC TELEMETRY ROOM

## 2025-08-08 PROCEDURE — 25000003 PHARM REV CODE 250: Performed by: INTERNAL MEDICINE

## 2025-08-08 PROCEDURE — 99231 SBSQ HOSP IP/OBS SF/LOW 25: CPT | Mod: ,,,

## 2025-08-08 PROCEDURE — 94640 AIRWAY INHALATION TREATMENT: CPT

## 2025-08-08 PROCEDURE — 80202 ASSAY OF VANCOMYCIN: CPT | Performed by: STUDENT IN AN ORGANIZED HEALTH CARE EDUCATION/TRAINING PROGRAM

## 2025-08-08 PROCEDURE — 27000221 HC OXYGEN, UP TO 24 HOURS

## 2025-08-08 PROCEDURE — 25000242 PHARM REV CODE 250 ALT 637 W/ HCPCS

## 2025-08-08 PROCEDURE — 94761 N-INVAS EAR/PLS OXIMETRY MLT: CPT

## 2025-08-08 PROCEDURE — 25000003 PHARM REV CODE 250

## 2025-08-08 PROCEDURE — 97535 SELF CARE MNGMENT TRAINING: CPT

## 2025-08-08 PROCEDURE — 80053 COMPREHEN METABOLIC PANEL: CPT

## 2025-08-08 PROCEDURE — 63600175 PHARM REV CODE 636 W HCPCS

## 2025-08-08 PROCEDURE — 25500020 PHARM REV CODE 255: Performed by: STUDENT IN AN ORGANIZED HEALTH CARE EDUCATION/TRAINING PROGRAM

## 2025-08-08 PROCEDURE — 36415 COLL VENOUS BLD VENIPUNCTURE: CPT | Performed by: STUDENT IN AN ORGANIZED HEALTH CARE EDUCATION/TRAINING PROGRAM

## 2025-08-08 PROCEDURE — 94760 N-INVAS EAR/PLS OXIMETRY 1: CPT

## 2025-08-08 PROCEDURE — 11000001 HC ACUTE MED/SURG PRIVATE ROOM

## 2025-08-08 PROCEDURE — 36415 COLL VENOUS BLD VENIPUNCTURE: CPT

## 2025-08-08 PROCEDURE — 85025 COMPLETE CBC W/AUTO DIFF WBC: CPT

## 2025-08-08 PROCEDURE — 84100 ASSAY OF PHOSPHORUS: CPT

## 2025-08-08 PROCEDURE — 63600175 PHARM REV CODE 636 W HCPCS: Performed by: INTERNAL MEDICINE

## 2025-08-08 RX ORDER — PANTOPRAZOLE SODIUM 40 MG/10ML
40 INJECTION, POWDER, LYOPHILIZED, FOR SOLUTION INTRAVENOUS DAILY
Status: DISCONTINUED | OUTPATIENT
Start: 2025-08-08 | End: 2025-08-20 | Stop reason: HOSPADM

## 2025-08-08 RX ORDER — POTASSIUM CHLORIDE 20 MEQ/1
40 TABLET, EXTENDED RELEASE ORAL ONCE
Status: COMPLETED | OUTPATIENT
Start: 2025-08-08 | End: 2025-08-08

## 2025-08-08 RX ORDER — LIDOCAINE HYDROCHLORIDE 10 MG/ML
10 INJECTION, SOLUTION INFILTRATION; PERINEURAL ONCE
Status: DISCONTINUED | OUTPATIENT
Start: 2025-08-08 | End: 2025-08-20 | Stop reason: HOSPADM

## 2025-08-08 RX ORDER — MAGNESIUM SULFATE HEPTAHYDRATE 40 MG/ML
2 INJECTION, SOLUTION INTRAVENOUS ONCE
Status: COMPLETED | OUTPATIENT
Start: 2025-08-08 | End: 2025-08-08

## 2025-08-08 RX ORDER — HYDROCORTISONE 10 MG/1
50 TABLET ORAL 2 TIMES DAILY
Status: DISCONTINUED | OUTPATIENT
Start: 2025-08-08 | End: 2025-08-09

## 2025-08-08 RX ADMIN — HYDROCORTISONE SODIUM SUCCINATE 50 MG: 100 INJECTION, POWDER, FOR SOLUTION INTRAMUSCULAR; INTRAVENOUS at 01:08

## 2025-08-08 RX ADMIN — SODIUM CHLORIDE, POTASSIUM CHLORIDE, SODIUM LACTATE AND CALCIUM CHLORIDE: 600; 310; 30; 20 INJECTION, SOLUTION INTRAVENOUS at 08:08

## 2025-08-08 RX ADMIN — LEVETIRACETAM 750 MG: 100 SOLUTION ORAL at 09:08

## 2025-08-08 RX ADMIN — ACETAMINOPHEN 649.6 MG: 650 SOLUTION ORAL at 12:08

## 2025-08-08 RX ADMIN — MAGNESIUM SULFATE HEPTAHYDRATE 2 G: 40 INJECTION, SOLUTION INTRAVENOUS at 10:08

## 2025-08-08 RX ADMIN — ACETYLCYSTEINE 4 ML: 100 INHALANT RESPIRATORY (INHALATION) at 02:08

## 2025-08-08 RX ADMIN — LEVOTHYROXINE SODIUM 100 MCG: 0.05 TABLET ORAL at 06:08

## 2025-08-08 RX ADMIN — VANCOMYCIN HYDROCHLORIDE 500 MG: 500 INJECTION, POWDER, LYOPHILIZED, FOR SOLUTION INTRAVENOUS at 03:08

## 2025-08-08 RX ADMIN — MUPIROCIN: 20 OINTMENT TOPICAL at 08:08

## 2025-08-08 RX ADMIN — HYDROCORTISONE SODIUM SUCCINATE 50 MG: 100 INJECTION, POWDER, FOR SOLUTION INTRAMUSCULAR; INTRAVENOUS at 06:08

## 2025-08-08 RX ADMIN — IPRATROPIUM BROMIDE AND ALBUTEROL SULFATE 3 ML: 2.5; .5 SOLUTION RESPIRATORY (INHALATION) at 07:08

## 2025-08-08 RX ADMIN — HYDROCORTISONE SODIUM SUCCINATE 50 MG: 100 INJECTION, POWDER, FOR SOLUTION INTRAMUSCULAR; INTRAVENOUS at 12:08

## 2025-08-08 RX ADMIN — MIDODRINE HYDROCHLORIDE 10 MG: 5 TABLET ORAL at 02:08

## 2025-08-08 RX ADMIN — POTASSIUM CHLORIDE 40 MEQ: 1500 TABLET, EXTENDED RELEASE ORAL at 09:08

## 2025-08-08 RX ADMIN — ACETYLCYSTEINE 4 ML: 100 INHALANT RESPIRATORY (INHALATION) at 08:08

## 2025-08-08 RX ADMIN — OXYCODONE HYDROCHLORIDE 10 MG: 5 TABLET ORAL at 08:08

## 2025-08-08 RX ADMIN — MIDODRINE HYDROCHLORIDE 10 MG: 5 TABLET ORAL at 08:08

## 2025-08-08 RX ADMIN — ACETYLCYSTEINE 4 ML: 100 INHALANT RESPIRATORY (INHALATION) at 07:08

## 2025-08-08 RX ADMIN — METRONIDAZOLE 500 MG: 5 INJECTION, SOLUTION INTRAVENOUS at 03:08

## 2025-08-08 RX ADMIN — MUPIROCIN: 20 OINTMENT TOPICAL at 09:08

## 2025-08-08 RX ADMIN — IPRATROPIUM BROMIDE AND ALBUTEROL SULFATE 3 ML: 2.5; .5 SOLUTION RESPIRATORY (INHALATION) at 01:08

## 2025-08-08 RX ADMIN — MAGNESIUM SULFATE HEPTAHYDRATE 2 G: 40 INJECTION, SOLUTION INTRAVENOUS at 08:08

## 2025-08-08 RX ADMIN — METRONIDAZOLE 500 MG: 5 INJECTION, SOLUTION INTRAVENOUS at 10:08

## 2025-08-08 RX ADMIN — SODIUM CHLORIDE, POTASSIUM CHLORIDE, SODIUM LACTATE AND CALCIUM CHLORIDE: 600; 310; 30; 20 INJECTION, SOLUTION INTRAVENOUS at 07:08

## 2025-08-08 RX ADMIN — OXYCODONE HYDROCHLORIDE 10 MG: 5 TABLET ORAL at 11:08

## 2025-08-08 RX ADMIN — IPRATROPIUM BROMIDE AND ALBUTEROL SULFATE 3 ML: 2.5; .5 SOLUTION RESPIRATORY (INHALATION) at 08:08

## 2025-08-08 RX ADMIN — CEFEPIME 2 G: 2 INJECTION, POWDER, FOR SOLUTION INTRAVENOUS at 06:08

## 2025-08-08 RX ADMIN — LEVETIRACETAM 750 MG: 100 SOLUTION ORAL at 08:08

## 2025-08-08 RX ADMIN — CEFEPIME 2 G: 2 INJECTION, POWDER, FOR SOLUTION INTRAVENOUS at 10:08

## 2025-08-08 RX ADMIN — ALPRAZOLAM 0.25 MG: 0.25 TABLET ORAL at 09:08

## 2025-08-08 RX ADMIN — MIDODRINE HYDROCHLORIDE 10 MG: 5 TABLET ORAL at 09:08

## 2025-08-08 RX ADMIN — PANTOPRAZOLE SODIUM 40 MG: 40 INJECTION, POWDER, FOR SOLUTION INTRAVENOUS at 09:08

## 2025-08-08 RX ADMIN — ALPRAZOLAM 0.25 MG: 0.25 TABLET ORAL at 08:08

## 2025-08-08 RX ADMIN — MULTIVIT AND MINERALS-FERROUS GLUCONATE 9 MG IRON/15 ML ORAL LIQUID 5 ML: at 09:08

## 2025-08-08 RX ADMIN — FOLIC ACID 1 MG: 1 TABLET ORAL at 09:08

## 2025-08-08 RX ADMIN — ACETAMINOPHEN 649.6 MG: 650 SOLUTION ORAL at 06:08

## 2025-08-08 RX ADMIN — HYDROCORTISONE 50 MG: 10 TABLET ORAL at 08:08

## 2025-08-08 RX ADMIN — IOHEXOL 100 ML: 350 INJECTION, SOLUTION INTRAVENOUS at 12:08

## 2025-08-08 RX ADMIN — OXYCODONE HYDROCHLORIDE 10 MG: 5 TABLET ORAL at 06:08

## 2025-08-08 RX ADMIN — CEFEPIME 2 G: 2 INJECTION, POWDER, FOR SOLUTION INTRAVENOUS at 02:08

## 2025-08-08 RX ADMIN — METRONIDAZOLE 500 MG: 5 INJECTION, SOLUTION INTRAVENOUS at 08:08

## 2025-08-09 LAB
ALBUMIN SERPL-MCNC: 1.4 G/DL (ref 3.5–5)
ALBUMIN/GLOB SERPL: 0.3 RATIO (ref 1.1–2)
ALP SERPL-CCNC: 168 UNIT/L (ref 40–150)
ALT SERPL-CCNC: 15 UNIT/L (ref 0–55)
ANION GAP SERPL CALC-SCNC: 8 MEQ/L
AST SERPL-CCNC: 19 UNIT/L (ref 11–45)
BASOPHILS # BLD AUTO: 0.01 X10(3)/MCL
BASOPHILS NFR BLD AUTO: 0.1 %
BILIRUB SERPL-MCNC: 0.1 MG/DL
BUN SERPL-MCNC: 23 MG/DL (ref 8.9–20.6)
CALCIUM SERPL-MCNC: 6.3 MG/DL (ref 8.4–10.2)
CHLORIDE SERPL-SCNC: 105 MMOL/L (ref 98–107)
CO2 SERPL-SCNC: 25 MMOL/L (ref 22–29)
CREAT SERPL-MCNC: 0.6 MG/DL (ref 0.72–1.25)
CREAT/UREA NIT SERPL: 38
EOSINOPHIL # BLD AUTO: 0 X10(3)/MCL (ref 0–0.9)
EOSINOPHIL NFR BLD AUTO: 0 %
ERYTHROCYTE [DISTWIDTH] IN BLOOD BY AUTOMATED COUNT: 18 % (ref 11.5–17)
GFR SERPLBLD CREATININE-BSD FMLA CKD-EPI: >60 ML/MIN/1.73/M2
GLOBULIN SER-MCNC: 5 GM/DL (ref 2.4–3.5)
GLUCOSE SERPL-MCNC: 93 MG/DL (ref 74–100)
HCT VFR BLD AUTO: 30.1 % (ref 42–52)
HGB BLD-MCNC: 9.6 G/DL (ref 14–18)
IMM GRANULOCYTES # BLD AUTO: 0.11 X10(3)/MCL (ref 0–0.04)
IMM GRANULOCYTES NFR BLD AUTO: 0.8 %
LYMPHOCYTES # BLD AUTO: 1.47 X10(3)/MCL (ref 0.6–4.6)
LYMPHOCYTES NFR BLD AUTO: 10.1 %
MAGNESIUM SERPL-MCNC: 1.8 MG/DL (ref 1.6–2.6)
MCH RBC QN AUTO: 26.9 PG (ref 27–31)
MCHC RBC AUTO-ENTMCNC: 31.9 G/DL (ref 33–36)
MCV RBC AUTO: 84.3 FL (ref 80–94)
MONOCYTES # BLD AUTO: 0.59 X10(3)/MCL (ref 0.1–1.3)
MONOCYTES NFR BLD AUTO: 4 %
NEUTROPHILS # BLD AUTO: 12.41 X10(3)/MCL (ref 2.1–9.2)
NEUTROPHILS NFR BLD AUTO: 85 %
NRBC BLD AUTO-RTO: 0 %
PHOSPHATE SERPL-MCNC: 3.1 MG/DL (ref 2.3–4.7)
PLATELET # BLD AUTO: 590 X10(3)/MCL (ref 130–400)
PMV BLD AUTO: 8.4 FL (ref 7.4–10.4)
POTASSIUM SERPL-SCNC: 2.7 MMOL/L (ref 3.5–5.1)
PROT SERPL-MCNC: 6.4 GM/DL (ref 6.4–8.3)
RBC # BLD AUTO: 3.57 X10(6)/MCL (ref 4.7–6.1)
SODIUM SERPL-SCNC: 138 MMOL/L (ref 136–145)
VANCOMYCIN TROUGH SERPL-MCNC: 11.8 UG/ML (ref 15–20)
WBC # BLD AUTO: 14.59 X10(3)/MCL (ref 4.5–11.5)

## 2025-08-09 PROCEDURE — 85025 COMPLETE CBC W/AUTO DIFF WBC: CPT

## 2025-08-09 PROCEDURE — 63600175 PHARM REV CODE 636 W HCPCS

## 2025-08-09 PROCEDURE — 25000242 PHARM REV CODE 250 ALT 637 W/ HCPCS

## 2025-08-09 PROCEDURE — 84100 ASSAY OF PHOSPHORUS: CPT

## 2025-08-09 PROCEDURE — 63600175 PHARM REV CODE 636 W HCPCS: Performed by: STUDENT IN AN ORGANIZED HEALTH CARE EDUCATION/TRAINING PROGRAM

## 2025-08-09 PROCEDURE — 11000001 HC ACUTE MED/SURG PRIVATE ROOM

## 2025-08-09 PROCEDURE — 21400001 HC TELEMETRY ROOM

## 2025-08-09 PROCEDURE — 80053 COMPREHEN METABOLIC PANEL: CPT

## 2025-08-09 PROCEDURE — 31720 CLEARANCE OF AIRWAYS: CPT

## 2025-08-09 PROCEDURE — 80202 ASSAY OF VANCOMYCIN: CPT | Performed by: INTERNAL MEDICINE

## 2025-08-09 PROCEDURE — 27000207 HC ISOLATION

## 2025-08-09 PROCEDURE — 99900026 HC AIRWAY MAINTENANCE (STAT)

## 2025-08-09 PROCEDURE — 25000003 PHARM REV CODE 250

## 2025-08-09 PROCEDURE — 25000003 PHARM REV CODE 250: Performed by: STUDENT IN AN ORGANIZED HEALTH CARE EDUCATION/TRAINING PROGRAM

## 2025-08-09 PROCEDURE — 94761 N-INVAS EAR/PLS OXIMETRY MLT: CPT

## 2025-08-09 PROCEDURE — 36415 COLL VENOUS BLD VENIPUNCTURE: CPT

## 2025-08-09 PROCEDURE — 94760 N-INVAS EAR/PLS OXIMETRY 1: CPT

## 2025-08-09 PROCEDURE — 99900031 HC PATIENT EDUCATION (STAT)

## 2025-08-09 PROCEDURE — 83735 ASSAY OF MAGNESIUM: CPT

## 2025-08-09 PROCEDURE — 36415 COLL VENOUS BLD VENIPUNCTURE: CPT | Performed by: INTERNAL MEDICINE

## 2025-08-09 PROCEDURE — 27000221 HC OXYGEN, UP TO 24 HOURS

## 2025-08-09 PROCEDURE — 94640 AIRWAY INHALATION TREATMENT: CPT

## 2025-08-09 RX ORDER — HYDROCORTISONE 10 MG/1
50 TABLET ORAL 2 TIMES DAILY
Status: DISCONTINUED | OUTPATIENT
Start: 2025-08-09 | End: 2025-08-11

## 2025-08-09 RX ORDER — OXYCODONE HYDROCHLORIDE 5 MG/1
10 TABLET ORAL EVERY 6 HOURS PRN
Refills: 0 | Status: DISCONTINUED | OUTPATIENT
Start: 2025-08-09 | End: 2025-08-10

## 2025-08-09 RX ORDER — POTASSIUM CHLORIDE 7.45 MG/ML
10 INJECTION INTRAVENOUS
Status: COMPLETED | OUTPATIENT
Start: 2025-08-09 | End: 2025-08-09

## 2025-08-09 RX ORDER — MAGNESIUM SULFATE HEPTAHYDRATE 40 MG/ML
2 INJECTION, SOLUTION INTRAVENOUS ONCE
Status: COMPLETED | OUTPATIENT
Start: 2025-08-09 | End: 2025-08-09

## 2025-08-09 RX ORDER — HYDROMORPHONE HYDROCHLORIDE 1 MG/ML
1 INJECTION, SOLUTION INTRAMUSCULAR; INTRAVENOUS; SUBCUTANEOUS EVERY 6 HOURS PRN
Status: DISCONTINUED | OUTPATIENT
Start: 2025-08-09 | End: 2025-08-10

## 2025-08-09 RX ORDER — MORPHINE SULFATE 30 MG/1
30 TABLET, FILM COATED, EXTENDED RELEASE ORAL EVERY 12 HOURS
Refills: 0 | Status: DISCONTINUED | OUTPATIENT
Start: 2025-08-09 | End: 2025-08-10

## 2025-08-09 RX ADMIN — POTASSIUM CHLORIDE 10 MEQ: 10 INJECTION, SOLUTION INTRAVENOUS at 11:08

## 2025-08-09 RX ADMIN — IPRATROPIUM BROMIDE AND ALBUTEROL SULFATE 3 ML: 2.5; .5 SOLUTION RESPIRATORY (INHALATION) at 07:08

## 2025-08-09 RX ADMIN — ALPRAZOLAM 0.25 MG: 0.25 TABLET ORAL at 08:08

## 2025-08-09 RX ADMIN — MIDODRINE HYDROCHLORIDE 10 MG: 5 TABLET ORAL at 08:08

## 2025-08-09 RX ADMIN — POTASSIUM BICARBONATE 50 MEQ: 977.5 TABLET, EFFERVESCENT ORAL at 06:08

## 2025-08-09 RX ADMIN — OXYCODONE HYDROCHLORIDE 10 MG: 5 TABLET ORAL at 08:08

## 2025-08-09 RX ADMIN — HYDROCORTISONE 50 MG: 10 TABLET ORAL at 08:08

## 2025-08-09 RX ADMIN — MULTIVIT AND MINERALS-FERROUS GLUCONATE 9 MG IRON/15 ML ORAL LIQUID 5 ML: at 08:08

## 2025-08-09 RX ADMIN — CEFEPIME 2 G: 2 INJECTION, POWDER, FOR SOLUTION INTRAVENOUS at 11:08

## 2025-08-09 RX ADMIN — OXYCODONE HYDROCHLORIDE 10 MG: 5 TABLET ORAL at 02:08

## 2025-08-09 RX ADMIN — LEVETIRACETAM 750 MG: 100 SOLUTION ORAL at 08:08

## 2025-08-09 RX ADMIN — SODIUM CHLORIDE, POTASSIUM CHLORIDE, SODIUM LACTATE AND CALCIUM CHLORIDE: 600; 310; 30; 20 INJECTION, SOLUTION INTRAVENOUS at 05:08

## 2025-08-09 RX ADMIN — ACETYLCYSTEINE 4 ML: 100 INHALANT RESPIRATORY (INHALATION) at 06:08

## 2025-08-09 RX ADMIN — CEFEPIME 2 G: 2 INJECTION, POWDER, FOR SOLUTION INTRAVENOUS at 02:08

## 2025-08-09 RX ADMIN — SERTRALINE HYDROCHLORIDE 50 MG: 50 TABLET ORAL at 08:08

## 2025-08-09 RX ADMIN — METRONIDAZOLE 500 MG: 5 INJECTION, SOLUTION INTRAVENOUS at 02:08

## 2025-08-09 RX ADMIN — CEFEPIME 2 G: 2 INJECTION, POWDER, FOR SOLUTION INTRAVENOUS at 05:08

## 2025-08-09 RX ADMIN — METRONIDAZOLE 500 MG: 5 INJECTION, SOLUTION INTRAVENOUS at 11:08

## 2025-08-09 RX ADMIN — VANCOMYCIN HYDROCHLORIDE 750 MG: 750 INJECTION, POWDER, LYOPHILIZED, FOR SOLUTION INTRAVENOUS at 02:08

## 2025-08-09 RX ADMIN — ALPRAZOLAM 0.25 MG: 0.25 TABLET ORAL at 02:08

## 2025-08-09 RX ADMIN — MAGNESIUM SULFATE HEPTAHYDRATE 2 G: 40 INJECTION, SOLUTION INTRAVENOUS at 06:08

## 2025-08-09 RX ADMIN — ACETYLCYSTEINE 4 ML: 100 INHALANT RESPIRATORY (INHALATION) at 12:08

## 2025-08-09 RX ADMIN — METRONIDAZOLE 500 MG: 5 INJECTION, SOLUTION INTRAVENOUS at 08:08

## 2025-08-09 RX ADMIN — FOLIC ACID 1 MG: 1 TABLET ORAL at 08:08

## 2025-08-09 RX ADMIN — POTASSIUM CHLORIDE 10 MEQ: 10 INJECTION, SOLUTION INTRAVENOUS at 09:08

## 2025-08-09 RX ADMIN — POTASSIUM CHLORIDE 10 MEQ: 10 INJECTION, SOLUTION INTRAVENOUS at 08:08

## 2025-08-09 RX ADMIN — POTASSIUM CHLORIDE 10 MEQ: 10 INJECTION, SOLUTION INTRAVENOUS at 10:08

## 2025-08-09 RX ADMIN — PANTOPRAZOLE SODIUM 40 MG: 40 INJECTION, POWDER, FOR SOLUTION INTRAVENOUS at 08:08

## 2025-08-09 RX ADMIN — IPRATROPIUM BROMIDE AND ALBUTEROL SULFATE 3 ML: 2.5; .5 SOLUTION RESPIRATORY (INHALATION) at 06:08

## 2025-08-09 RX ADMIN — IPRATROPIUM BROMIDE AND ALBUTEROL SULFATE 3 ML: 2.5; .5 SOLUTION RESPIRATORY (INHALATION) at 12:08

## 2025-08-09 RX ADMIN — ACETYLCYSTEINE 4 ML: 100 INHALANT RESPIRATORY (INHALATION) at 07:08

## 2025-08-09 RX ADMIN — LEVOTHYROXINE SODIUM 100 MCG: 0.05 TABLET ORAL at 05:08

## 2025-08-09 RX ADMIN — MIDODRINE HYDROCHLORIDE 10 MG: 5 TABLET ORAL at 02:08

## 2025-08-10 LAB
ALBUMIN SERPL-MCNC: 1.4 G/DL (ref 3.5–5)
ALBUMIN/GLOB SERPL: 0.3 RATIO (ref 1.1–2)
ALP SERPL-CCNC: 192 UNIT/L (ref 40–150)
ALT SERPL-CCNC: 15 UNIT/L (ref 0–55)
ANION GAP SERPL CALC-SCNC: 8 MEQ/L
AST SERPL-CCNC: 22 UNIT/L (ref 11–45)
BASOPHILS # BLD AUTO: 0.02 X10(3)/MCL
BASOPHILS NFR BLD AUTO: 0.1 %
BILIRUB SERPL-MCNC: 0.1 MG/DL
BUN SERPL-MCNC: 18.6 MG/DL (ref 8.9–20.6)
CALCIUM SERPL-MCNC: 6 MG/DL (ref 8.4–10.2)
CHLORIDE SERPL-SCNC: 104 MMOL/L (ref 98–107)
CO2 SERPL-SCNC: 24 MMOL/L (ref 22–29)
CREAT SERPL-MCNC: 0.61 MG/DL (ref 0.72–1.25)
CREAT/UREA NIT SERPL: 30
EOSINOPHIL # BLD AUTO: 0.1 X10(3)/MCL (ref 0–0.9)
EOSINOPHIL NFR BLD AUTO: 0.5 %
ERYTHROCYTE [DISTWIDTH] IN BLOOD BY AUTOMATED COUNT: 17.9 % (ref 11.5–17)
GFR SERPLBLD CREATININE-BSD FMLA CKD-EPI: >60 ML/MIN/1.73/M2
GLOBULIN SER-MCNC: 4.5 GM/DL (ref 2.4–3.5)
GLUCOSE SERPL-MCNC: 88 MG/DL (ref 74–100)
HCT VFR BLD AUTO: 32.3 % (ref 42–52)
HGB BLD-MCNC: 10.2 G/DL (ref 14–18)
IMM GRANULOCYTES # BLD AUTO: 0.22 X10(3)/MCL (ref 0–0.04)
IMM GRANULOCYTES NFR BLD AUTO: 1 %
LYMPHOCYTES # BLD AUTO: 3.96 X10(3)/MCL (ref 0.6–4.6)
LYMPHOCYTES NFR BLD AUTO: 18.5 %
MAGNESIUM SERPL-MCNC: 1.3 MG/DL (ref 1.6–2.6)
MCH RBC QN AUTO: 26.2 PG (ref 27–31)
MCHC RBC AUTO-ENTMCNC: 31.6 G/DL (ref 33–36)
MCV RBC AUTO: 83 FL (ref 80–94)
MONOCYTES # BLD AUTO: 1.38 X10(3)/MCL (ref 0.1–1.3)
MONOCYTES NFR BLD AUTO: 6.4 %
NEUTROPHILS # BLD AUTO: 15.74 X10(3)/MCL (ref 2.1–9.2)
NEUTROPHILS NFR BLD AUTO: 73.5 %
NRBC BLD AUTO-RTO: 0 %
PHOSPHATE SERPL-MCNC: 2.6 MG/DL (ref 2.3–4.7)
PLATELET # BLD AUTO: 541 X10(3)/MCL (ref 130–400)
PMV BLD AUTO: 8.2 FL (ref 7.4–10.4)
POTASSIUM SERPL-SCNC: 3.1 MMOL/L (ref 3.5–5.1)
PROT SERPL-MCNC: 5.9 GM/DL (ref 6.4–8.3)
RBC # BLD AUTO: 3.89 X10(6)/MCL (ref 4.7–6.1)
SODIUM SERPL-SCNC: 136 MMOL/L (ref 136–145)
VANCOMYCIN TROUGH SERPL-MCNC: 13 UG/ML (ref 15–20)
WBC # BLD AUTO: 21.42 X10(3)/MCL (ref 4.5–11.5)

## 2025-08-10 PROCEDURE — 31720 CLEARANCE OF AIRWAYS: CPT

## 2025-08-10 PROCEDURE — 36415 COLL VENOUS BLD VENIPUNCTURE: CPT

## 2025-08-10 PROCEDURE — 27000207 HC ISOLATION

## 2025-08-10 PROCEDURE — 25000003 PHARM REV CODE 250

## 2025-08-10 PROCEDURE — 99900026 HC AIRWAY MAINTENANCE (STAT)

## 2025-08-10 PROCEDURE — 27000221 HC OXYGEN, UP TO 24 HOURS

## 2025-08-10 PROCEDURE — 11000001 HC ACUTE MED/SURG PRIVATE ROOM

## 2025-08-10 PROCEDURE — 21400001 HC TELEMETRY ROOM

## 2025-08-10 PROCEDURE — 63600175 PHARM REV CODE 636 W HCPCS

## 2025-08-10 PROCEDURE — 80202 ASSAY OF VANCOMYCIN: CPT | Performed by: STUDENT IN AN ORGANIZED HEALTH CARE EDUCATION/TRAINING PROGRAM

## 2025-08-10 PROCEDURE — 63600175 PHARM REV CODE 636 W HCPCS: Performed by: STUDENT IN AN ORGANIZED HEALTH CARE EDUCATION/TRAINING PROGRAM

## 2025-08-10 PROCEDURE — 94761 N-INVAS EAR/PLS OXIMETRY MLT: CPT

## 2025-08-10 PROCEDURE — 63600175 PHARM REV CODE 636 W HCPCS: Mod: JZ,TB

## 2025-08-10 PROCEDURE — 94760 N-INVAS EAR/PLS OXIMETRY 1: CPT

## 2025-08-10 PROCEDURE — 83735 ASSAY OF MAGNESIUM: CPT

## 2025-08-10 PROCEDURE — 25000242 PHARM REV CODE 250 ALT 637 W/ HCPCS

## 2025-08-10 PROCEDURE — 84100 ASSAY OF PHOSPHORUS: CPT

## 2025-08-10 PROCEDURE — 99900031 HC PATIENT EDUCATION (STAT)

## 2025-08-10 PROCEDURE — 25000003 PHARM REV CODE 250: Performed by: STUDENT IN AN ORGANIZED HEALTH CARE EDUCATION/TRAINING PROGRAM

## 2025-08-10 PROCEDURE — 94640 AIRWAY INHALATION TREATMENT: CPT

## 2025-08-10 PROCEDURE — 36415 COLL VENOUS BLD VENIPUNCTURE: CPT | Performed by: STUDENT IN AN ORGANIZED HEALTH CARE EDUCATION/TRAINING PROGRAM

## 2025-08-10 PROCEDURE — 85025 COMPLETE CBC W/AUTO DIFF WBC: CPT

## 2025-08-10 PROCEDURE — 80053 COMPREHEN METABOLIC PANEL: CPT

## 2025-08-10 PROCEDURE — 99900035 HC TECH TIME PER 15 MIN (STAT)

## 2025-08-10 RX ORDER — OXYCODONE HYDROCHLORIDE 5 MG/1
5 TABLET ORAL EVERY 4 HOURS PRN
Refills: 0 | Status: DISCONTINUED | OUTPATIENT
Start: 2025-08-10 | End: 2025-08-20 | Stop reason: HOSPADM

## 2025-08-10 RX ORDER — ELECTROLYTES/DEXTROSE
SOLUTION, ORAL ORAL
Status: CANCELLED | OUTPATIENT
Start: 2025-08-10

## 2025-08-10 RX ORDER — MAGNESIUM SULFATE HEPTAHYDRATE 40 MG/ML
2 INJECTION, SOLUTION INTRAVENOUS ONCE
Status: COMPLETED | OUTPATIENT
Start: 2025-08-10 | End: 2025-08-10

## 2025-08-10 RX ORDER — GLYCOPYRROLATE 0.2 MG/ML
0.2 INJECTION INTRAMUSCULAR; INTRAVENOUS EVERY 6 HOURS
Status: DISCONTINUED | OUTPATIENT
Start: 2025-08-10 | End: 2025-08-20 | Stop reason: HOSPADM

## 2025-08-10 RX ORDER — CALCIUM GLUCONATE 20 MG/ML
1 INJECTION, SOLUTION INTRAVENOUS ONCE
Status: COMPLETED | OUTPATIENT
Start: 2025-08-10 | End: 2025-08-10

## 2025-08-10 RX ORDER — OXYCODONE HYDROCHLORIDE 5 MG/1
10 TABLET ORAL EVERY 6 HOURS PRN
Refills: 0 | Status: DISCONTINUED | OUTPATIENT
Start: 2025-08-10 | End: 2025-08-20 | Stop reason: HOSPADM

## 2025-08-10 RX ADMIN — CEFEPIME 2 G: 2 INJECTION, POWDER, FOR SOLUTION INTRAVENOUS at 03:08

## 2025-08-10 RX ADMIN — SODIUM CHLORIDE, POTASSIUM CHLORIDE, SODIUM LACTATE AND CALCIUM CHLORIDE: 600; 310; 30; 20 INJECTION, SOLUTION INTRAVENOUS at 01:08

## 2025-08-10 RX ADMIN — HYDROCORTISONE 50 MG: 10 TABLET ORAL at 08:08

## 2025-08-10 RX ADMIN — IPRATROPIUM BROMIDE AND ALBUTEROL SULFATE 3 ML: 2.5; .5 SOLUTION RESPIRATORY (INHALATION) at 07:08

## 2025-08-10 RX ADMIN — GLYCOPYRROLATE 0.2 MG: 0.2 INJECTION INTRAMUSCULAR; INTRAVENOUS at 01:08

## 2025-08-10 RX ADMIN — MULTIVIT AND MINERALS-FERROUS GLUCONATE 9 MG IRON/15 ML ORAL LIQUID 5 ML: at 08:08

## 2025-08-10 RX ADMIN — MAGNESIUM SULFATE HEPTAHYDRATE 2 G: 40 INJECTION, SOLUTION INTRAVENOUS at 09:08

## 2025-08-10 RX ADMIN — CEFEPIME 2 G: 2 INJECTION, POWDER, FOR SOLUTION INTRAVENOUS at 11:08

## 2025-08-10 RX ADMIN — CEFEPIME 2 G: 2 INJECTION, POWDER, FOR SOLUTION INTRAVENOUS at 05:08

## 2025-08-10 RX ADMIN — CALCIUM GLUCONATE 1 G: 20 INJECTION, SOLUTION INTRAVENOUS at 09:08

## 2025-08-10 RX ADMIN — MIDODRINE HYDROCHLORIDE 10 MG: 5 TABLET ORAL at 09:08

## 2025-08-10 RX ADMIN — LEVETIRACETAM 750 MG: 100 SOLUTION ORAL at 08:08

## 2025-08-10 RX ADMIN — POTASSIUM BICARBONATE 50 MEQ: 977.5 TABLET, EFFERVESCENT ORAL at 08:08

## 2025-08-10 RX ADMIN — LEVETIRACETAM 750 MG: 100 SOLUTION ORAL at 09:08

## 2025-08-10 RX ADMIN — MAGNESIUM SULFATE HEPTAHYDRATE 2 G: 40 INJECTION, SOLUTION INTRAVENOUS at 11:08

## 2025-08-10 RX ADMIN — OXYCODONE HYDROCHLORIDE 10 MG: 5 TABLET ORAL at 04:08

## 2025-08-10 RX ADMIN — ALPRAZOLAM 0.25 MG: 0.25 TABLET ORAL at 04:08

## 2025-08-10 RX ADMIN — ACETYLCYSTEINE 4 ML: 100 INHALANT RESPIRATORY (INHALATION) at 07:08

## 2025-08-10 RX ADMIN — HYDROCORTISONE 50 MG: 10 TABLET ORAL at 09:08

## 2025-08-10 RX ADMIN — METRONIDAZOLE 500 MG: 5 INJECTION, SOLUTION INTRAVENOUS at 08:08

## 2025-08-10 RX ADMIN — GLYCOPYRROLATE 0.2 MG: 0.2 INJECTION INTRAMUSCULAR; INTRAVENOUS at 06:08

## 2025-08-10 RX ADMIN — GLYCOPYRROLATE 0.2 MG: 0.2 INJECTION INTRAMUSCULAR; INTRAVENOUS at 11:08

## 2025-08-10 RX ADMIN — SODIUM CHLORIDE, POTASSIUM CHLORIDE, SODIUM LACTATE AND CALCIUM CHLORIDE: 600; 310; 30; 20 INJECTION, SOLUTION INTRAVENOUS at 11:08

## 2025-08-10 RX ADMIN — SERTRALINE HYDROCHLORIDE 50 MG: 50 TABLET ORAL at 08:08

## 2025-08-10 RX ADMIN — MIDODRINE HYDROCHLORIDE 10 MG: 5 TABLET ORAL at 03:08

## 2025-08-10 RX ADMIN — VANCOMYCIN HYDROCHLORIDE 750 MG: 750 INJECTION, POWDER, LYOPHILIZED, FOR SOLUTION INTRAVENOUS at 03:08

## 2025-08-10 RX ADMIN — IPRATROPIUM BROMIDE AND ALBUTEROL SULFATE 3 ML: 2.5; .5 SOLUTION RESPIRATORY (INHALATION) at 01:08

## 2025-08-10 RX ADMIN — ACETYLCYSTEINE 4 ML: 100 INHALANT RESPIRATORY (INHALATION) at 01:08

## 2025-08-10 RX ADMIN — FOLIC ACID 1 MG: 1 TABLET ORAL at 08:08

## 2025-08-10 RX ADMIN — SODIUM CHLORIDE, POTASSIUM CHLORIDE, SODIUM LACTATE AND CALCIUM CHLORIDE: 600; 310; 30; 20 INJECTION, SOLUTION INTRAVENOUS at 02:08

## 2025-08-10 RX ADMIN — LEVOTHYROXINE SODIUM 100 MCG: 0.05 TABLET ORAL at 05:08

## 2025-08-10 RX ADMIN — PANTOPRAZOLE SODIUM 40 MG: 40 INJECTION, POWDER, FOR SOLUTION INTRAVENOUS at 09:08

## 2025-08-10 RX ADMIN — METRONIDAZOLE 500 MG: 5 INJECTION, SOLUTION INTRAVENOUS at 11:08

## 2025-08-10 RX ADMIN — OXYCODONE HYDROCHLORIDE 10 MG: 5 TABLET ORAL at 10:08

## 2025-08-10 RX ADMIN — GLYCOPYRROLATE 0.2 MG: 0.2 INJECTION INTRAMUSCULAR; INTRAVENOUS at 05:08

## 2025-08-10 RX ADMIN — MIDODRINE HYDROCHLORIDE 10 MG: 5 TABLET ORAL at 08:08

## 2025-08-10 RX ADMIN — METRONIDAZOLE 500 MG: 5 INJECTION, SOLUTION INTRAVENOUS at 03:08

## 2025-08-11 LAB
ALBUMIN SERPL-MCNC: 1.3 G/DL (ref 3.5–5)
ALBUMIN/GLOB SERPL: 0.3 RATIO (ref 1.1–2)
ALP SERPL-CCNC: 193 UNIT/L (ref 40–150)
ALT SERPL-CCNC: 20 UNIT/L (ref 0–55)
ANION GAP SERPL CALC-SCNC: 7 MEQ/L
AST SERPL-CCNC: 32 UNIT/L (ref 11–45)
BACTERIA BLD CULT: NORMAL
BACTERIA BLD CULT: NORMAL
BASOPHILS # BLD AUTO: 0.01 X10(3)/MCL
BASOPHILS NFR BLD AUTO: 0.1 %
BILIRUB SERPL-MCNC: 0.1 MG/DL
BUN SERPL-MCNC: 15.5 MG/DL (ref 8.9–20.6)
CALCIUM SERPL-MCNC: 6 MG/DL (ref 8.4–10.2)
CHLORIDE SERPL-SCNC: 100 MMOL/L (ref 98–107)
CO2 SERPL-SCNC: 27 MMOL/L (ref 22–29)
CREAT SERPL-MCNC: 0.56 MG/DL (ref 0.72–1.25)
CREAT/UREA NIT SERPL: 28
EOSINOPHIL # BLD AUTO: 0.05 X10(3)/MCL (ref 0–0.9)
EOSINOPHIL NFR BLD AUTO: 0.4 %
ERYTHROCYTE [DISTWIDTH] IN BLOOD BY AUTOMATED COUNT: 18 % (ref 11.5–17)
GFR SERPLBLD CREATININE-BSD FMLA CKD-EPI: >60 ML/MIN/1.73/M2
GLOBULIN SER-MCNC: 4.6 GM/DL (ref 2.4–3.5)
GLUCOSE SERPL-MCNC: 101 MG/DL (ref 74–100)
HCT VFR BLD AUTO: 30.7 % (ref 42–52)
HGB BLD-MCNC: 9.7 G/DL (ref 14–18)
IMM GRANULOCYTES # BLD AUTO: 0.12 X10(3)/MCL (ref 0–0.04)
IMM GRANULOCYTES NFR BLD AUTO: 0.9 %
LYMPHOCYTES # BLD AUTO: 2.07 X10(3)/MCL (ref 0.6–4.6)
LYMPHOCYTES NFR BLD AUTO: 14.9 %
MAGNESIUM SERPL-MCNC: 1.6 MG/DL (ref 1.6–2.6)
MCH RBC QN AUTO: 26.6 PG (ref 27–31)
MCHC RBC AUTO-ENTMCNC: 31.6 G/DL (ref 33–36)
MCV RBC AUTO: 84.1 FL (ref 80–94)
MONOCYTES # BLD AUTO: 0.6 X10(3)/MCL (ref 0.1–1.3)
MONOCYTES NFR BLD AUTO: 4.3 %
NEUTROPHILS # BLD AUTO: 11.08 X10(3)/MCL (ref 2.1–9.2)
NEUTROPHILS NFR BLD AUTO: 79.4 %
NRBC BLD AUTO-RTO: 0 %
PHOSPHATE SERPL-MCNC: 3.5 MG/DL (ref 2.3–4.7)
PLATELET # BLD AUTO: 652 X10(3)/MCL (ref 130–400)
PMV BLD AUTO: 8.2 FL (ref 7.4–10.4)
POTASSIUM SERPL-SCNC: 3.2 MMOL/L (ref 3.5–5.1)
PROT SERPL-MCNC: 5.9 GM/DL (ref 6.4–8.3)
RBC # BLD AUTO: 3.65 X10(6)/MCL (ref 4.7–6.1)
SODIUM SERPL-SCNC: 134 MMOL/L (ref 136–145)
VANCOMYCIN TROUGH SERPL-MCNC: 13.5 UG/ML (ref 15–20)
WBC # BLD AUTO: 13.93 X10(3)/MCL (ref 4.5–11.5)

## 2025-08-11 PROCEDURE — 25000003 PHARM REV CODE 250

## 2025-08-11 PROCEDURE — 11000001 HC ACUTE MED/SURG PRIVATE ROOM

## 2025-08-11 PROCEDURE — 27000221 HC OXYGEN, UP TO 24 HOURS

## 2025-08-11 PROCEDURE — 36415 COLL VENOUS BLD VENIPUNCTURE: CPT | Performed by: STUDENT IN AN ORGANIZED HEALTH CARE EDUCATION/TRAINING PROGRAM

## 2025-08-11 PROCEDURE — 80053 COMPREHEN METABOLIC PANEL: CPT

## 2025-08-11 PROCEDURE — 31720 CLEARANCE OF AIRWAYS: CPT

## 2025-08-11 PROCEDURE — 85025 COMPLETE CBC W/AUTO DIFF WBC: CPT

## 2025-08-11 PROCEDURE — 94761 N-INVAS EAR/PLS OXIMETRY MLT: CPT

## 2025-08-11 PROCEDURE — 83735 ASSAY OF MAGNESIUM: CPT

## 2025-08-11 PROCEDURE — 25000242 PHARM REV CODE 250 ALT 637 W/ HCPCS

## 2025-08-11 PROCEDURE — 94640 AIRWAY INHALATION TREATMENT: CPT

## 2025-08-11 PROCEDURE — 36415 COLL VENOUS BLD VENIPUNCTURE: CPT

## 2025-08-11 PROCEDURE — 21400001 HC TELEMETRY ROOM

## 2025-08-11 PROCEDURE — 99233 SBSQ HOSP IP/OBS HIGH 50: CPT | Mod: ,,,

## 2025-08-11 PROCEDURE — 25000003 PHARM REV CODE 250: Performed by: STUDENT IN AN ORGANIZED HEALTH CARE EDUCATION/TRAINING PROGRAM

## 2025-08-11 PROCEDURE — 80202 ASSAY OF VANCOMYCIN: CPT | Performed by: STUDENT IN AN ORGANIZED HEALTH CARE EDUCATION/TRAINING PROGRAM

## 2025-08-11 PROCEDURE — 63600175 PHARM REV CODE 636 W HCPCS: Mod: JZ,TB

## 2025-08-11 PROCEDURE — 36410 VNPNXR 3YR/> PHY/QHP DX/THER: CPT

## 2025-08-11 PROCEDURE — C1751 CATH, INF, PER/CENT/MIDLINE: HCPCS

## 2025-08-11 PROCEDURE — 63600175 PHARM REV CODE 636 W HCPCS

## 2025-08-11 PROCEDURE — 84100 ASSAY OF PHOSPHORUS: CPT

## 2025-08-11 PROCEDURE — 27000207 HC ISOLATION

## 2025-08-11 RX ORDER — MAGNESIUM SULFATE HEPTAHYDRATE 40 MG/ML
2 INJECTION, SOLUTION INTRAVENOUS ONCE
Status: COMPLETED | OUTPATIENT
Start: 2025-08-11 | End: 2025-08-11

## 2025-08-11 RX ORDER — HYDROCORTISONE 10 MG/1
50 TABLET ORAL DAILY
Status: DISCONTINUED | OUTPATIENT
Start: 2025-08-12 | End: 2025-08-12

## 2025-08-11 RX ORDER — SODIUM CHLORIDE 0.9 % (FLUSH) 0.9 %
10 SYRINGE (ML) INJECTION EVERY 12 HOURS PRN
Status: DISCONTINUED | OUTPATIENT
Start: 2025-08-11 | End: 2025-08-20 | Stop reason: HOSPADM

## 2025-08-11 RX ORDER — MICONAZOLE NITRATE 2 G/100G
POWDER TOPICAL 2 TIMES DAILY
Status: DISCONTINUED | OUTPATIENT
Start: 2025-08-11 | End: 2025-08-20 | Stop reason: HOSPADM

## 2025-08-11 RX ORDER — POTASSIUM CHLORIDE 7.45 MG/ML
10 INJECTION INTRAVENOUS
Status: COMPLETED | OUTPATIENT
Start: 2025-08-11 | End: 2025-08-11

## 2025-08-11 RX ADMIN — POTASSIUM CHLORIDE 10 MEQ: 7.46 INJECTION, SOLUTION INTRAVENOUS at 12:08

## 2025-08-11 RX ADMIN — POTASSIUM BICARBONATE 50 MEQ: 977.5 TABLET, EFFERVESCENT ORAL at 07:08

## 2025-08-11 RX ADMIN — MAGNESIUM SULFATE HEPTAHYDRATE 2 G: 40 INJECTION, SOLUTION INTRAVENOUS at 08:08

## 2025-08-11 RX ADMIN — CEFEPIME 2 G: 2 INJECTION, POWDER, FOR SOLUTION INTRAVENOUS at 10:08

## 2025-08-11 RX ADMIN — POTASSIUM CHLORIDE 10 MEQ: 7.46 INJECTION, SOLUTION INTRAVENOUS at 02:08

## 2025-08-11 RX ADMIN — MIDODRINE HYDROCHLORIDE 10 MG: 5 TABLET ORAL at 08:08

## 2025-08-11 RX ADMIN — ALPRAZOLAM 0.25 MG: 0.25 TABLET ORAL at 12:08

## 2025-08-11 RX ADMIN — GLYCOPYRROLATE 0.2 MG: 0.2 INJECTION INTRAMUSCULAR; INTRAVENOUS at 04:08

## 2025-08-11 RX ADMIN — MIDODRINE HYDROCHLORIDE 10 MG: 5 TABLET ORAL at 09:08

## 2025-08-11 RX ADMIN — METRONIDAZOLE 500 MG: 5 INJECTION, SOLUTION INTRAVENOUS at 02:08

## 2025-08-11 RX ADMIN — IPRATROPIUM BROMIDE AND ALBUTEROL SULFATE 3 ML: 2.5; .5 SOLUTION RESPIRATORY (INHALATION) at 07:08

## 2025-08-11 RX ADMIN — ALPRAZOLAM 0.25 MG: 0.25 TABLET ORAL at 10:08

## 2025-08-11 RX ADMIN — OXYCODONE HYDROCHLORIDE 10 MG: 5 TABLET ORAL at 09:08

## 2025-08-11 RX ADMIN — PANTOPRAZOLE SODIUM 40 MG: 40 INJECTION, POWDER, FOR SOLUTION INTRAVENOUS at 08:08

## 2025-08-11 RX ADMIN — LEVETIRACETAM 750 MG: 100 SOLUTION ORAL at 09:08

## 2025-08-11 RX ADMIN — LEVETIRACETAM 750 MG: 100 SOLUTION ORAL at 08:08

## 2025-08-11 RX ADMIN — LEVOTHYROXINE SODIUM 100 MCG: 0.05 TABLET ORAL at 05:08

## 2025-08-11 RX ADMIN — HYDROCORTISONE 50 MG: 10 TABLET ORAL at 08:08

## 2025-08-11 RX ADMIN — MICONAZOLE NITRATE: 20 POWDER TOPICAL at 12:08

## 2025-08-11 RX ADMIN — SERTRALINE HYDROCHLORIDE 50 MG: 50 TABLET ORAL at 08:08

## 2025-08-11 RX ADMIN — ACETYLCYSTEINE 4 ML: 100 INHALANT RESPIRATORY (INHALATION) at 07:08

## 2025-08-11 RX ADMIN — POTASSIUM BICARBONATE 25 MEQ: 977.5 TABLET, EFFERVESCENT ORAL at 08:08

## 2025-08-11 RX ADMIN — CEFEPIME 2 G: 2 INJECTION, POWDER, FOR SOLUTION INTRAVENOUS at 02:08

## 2025-08-11 RX ADMIN — MIDODRINE HYDROCHLORIDE 10 MG: 5 TABLET ORAL at 02:08

## 2025-08-11 RX ADMIN — MICONAZOLE NITRATE: 20 POWDER TOPICAL at 09:08

## 2025-08-11 RX ADMIN — SODIUM CHLORIDE, POTASSIUM CHLORIDE, SODIUM LACTATE AND CALCIUM CHLORIDE: 600; 310; 30; 20 INJECTION, SOLUTION INTRAVENOUS at 10:08

## 2025-08-11 RX ADMIN — IPRATROPIUM BROMIDE AND ALBUTEROL SULFATE 3 ML: 2.5; .5 SOLUTION RESPIRATORY (INHALATION) at 12:08

## 2025-08-11 RX ADMIN — SODIUM CHLORIDE, POTASSIUM CHLORIDE, SODIUM LACTATE AND CALCIUM CHLORIDE: 600; 310; 30; 20 INJECTION, SOLUTION INTRAVENOUS at 01:08

## 2025-08-11 RX ADMIN — IPRATROPIUM BROMIDE AND ALBUTEROL SULFATE 3 ML: .5; 3 SOLUTION RESPIRATORY (INHALATION) at 07:08

## 2025-08-11 RX ADMIN — MAGNESIUM SULFATE HEPTAHYDRATE 2 G: 40 INJECTION, SOLUTION INTRAVENOUS at 10:08

## 2025-08-11 RX ADMIN — CEFEPIME 2 G: 2 INJECTION, POWDER, FOR SOLUTION INTRAVENOUS at 06:08

## 2025-08-11 RX ADMIN — MULTIVIT AND MINERALS-FERROUS GLUCONATE 9 MG IRON/15 ML ORAL LIQUID 5 ML: at 08:08

## 2025-08-11 RX ADMIN — GLYCOPYRROLATE 0.2 MG: 0.2 INJECTION INTRAMUSCULAR; INTRAVENOUS at 06:08

## 2025-08-11 RX ADMIN — ACETYLCYSTEINE 4 ML: 100 INHALANT RESPIRATORY (INHALATION) at 12:08

## 2025-08-11 RX ADMIN — FOLIC ACID 1 MG: 1 TABLET ORAL at 08:08

## 2025-08-11 RX ADMIN — OXYCODONE HYDROCHLORIDE 10 MG: 5 TABLET ORAL at 02:08

## 2025-08-11 RX ADMIN — ALPRAZOLAM 0.25 MG: 0.25 TABLET ORAL at 09:08

## 2025-08-11 RX ADMIN — METRONIDAZOLE 500 MG: 5 INJECTION, SOLUTION INTRAVENOUS at 10:08

## 2025-08-11 RX ADMIN — METRONIDAZOLE 500 MG: 5 INJECTION, SOLUTION INTRAVENOUS at 08:08

## 2025-08-11 RX ADMIN — GLYCOPYRROLATE 0.2 MG: 0.2 INJECTION INTRAMUSCULAR; INTRAVENOUS at 12:08

## 2025-08-11 RX ADMIN — VANCOMYCIN HYDROCHLORIDE 1000 MG: 1 INJECTION, POWDER, LYOPHILIZED, FOR SOLUTION INTRAVENOUS at 04:08

## 2025-08-12 LAB
25(OH)D3+25(OH)D2 SERPL-MCNC: 24 NG/ML (ref 30–80)
ALBUMIN SERPL-MCNC: 1.4 G/DL (ref 3.5–5)
ALBUMIN SERPL-MCNC: 1.4 G/DL (ref 3.5–5)
ALBUMIN/GLOB SERPL: 0.3 RATIO (ref 1.1–2)
ALBUMIN/GLOB SERPL: 0.3 RATIO (ref 1.1–2)
ALP SERPL-CCNC: 158 UNIT/L (ref 40–150)
ALP SERPL-CCNC: 172 UNIT/L (ref 40–150)
ALT SERPL-CCNC: 16 UNIT/L (ref 0–55)
ALT SERPL-CCNC: 17 UNIT/L (ref 0–55)
ANION GAP SERPL CALC-SCNC: 5 MEQ/L
ANION GAP SERPL CALC-SCNC: 6 MEQ/L
AST SERPL-CCNC: 24 UNIT/L (ref 11–45)
AST SERPL-CCNC: 24 UNIT/L (ref 11–45)
BACTERIA SPEC CULT: ABNORMAL
BACTERIA SPT CULT: ABNORMAL
BASOPHILS # BLD AUTO: 0.02 X10(3)/MCL
BASOPHILS NFR BLD AUTO: 0.1 %
BILIRUB SERPL-MCNC: 0.2 MG/DL
BILIRUB SERPL-MCNC: 0.2 MG/DL
BUN SERPL-MCNC: 13 MG/DL (ref 8.9–20.6)
BUN SERPL-MCNC: 13.2 MG/DL (ref 8.9–20.6)
CALCIUM SERPL-MCNC: 6 MG/DL (ref 8.4–10.2)
CALCIUM SERPL-MCNC: 6.3 MG/DL (ref 8.4–10.2)
CHLORIDE SERPL-SCNC: 102 MMOL/L (ref 98–107)
CHLORIDE SERPL-SCNC: 103 MMOL/L (ref 98–107)
CO2 SERPL-SCNC: 27 MMOL/L (ref 22–29)
CO2 SERPL-SCNC: 27 MMOL/L (ref 22–29)
CREAT SERPL-MCNC: 0.54 MG/DL (ref 0.72–1.25)
CREAT SERPL-MCNC: 0.55 MG/DL (ref 0.72–1.25)
CREAT/UREA NIT SERPL: 24
CREAT/UREA NIT SERPL: 24
EOSINOPHIL # BLD AUTO: 0.21 X10(3)/MCL (ref 0–0.9)
EOSINOPHIL NFR BLD AUTO: 1.1 %
ERYTHROCYTE [DISTWIDTH] IN BLOOD BY AUTOMATED COUNT: 18.5 % (ref 11.5–17)
GFR SERPLBLD CREATININE-BSD FMLA CKD-EPI: >60 ML/MIN/1.73/M2
GFR SERPLBLD CREATININE-BSD FMLA CKD-EPI: >60 ML/MIN/1.73/M2
GLOBULIN SER-MCNC: 4.5 GM/DL (ref 2.4–3.5)
GLOBULIN SER-MCNC: 4.7 GM/DL (ref 2.4–3.5)
GLUCOSE SERPL-MCNC: 104 MG/DL (ref 74–100)
GLUCOSE SERPL-MCNC: 67 MG/DL (ref 74–100)
GRAM STN SPEC: ABNORMAL
HCT VFR BLD AUTO: 29.3 % (ref 42–52)
HGB BLD-MCNC: 9.4 G/DL (ref 14–18)
IMM GRANULOCYTES # BLD AUTO: 0.15 X10(3)/MCL (ref 0–0.04)
IMM GRANULOCYTES NFR BLD AUTO: 0.8 %
LYMPHOCYTES # BLD AUTO: 2.85 X10(3)/MCL (ref 0.6–4.6)
LYMPHOCYTES NFR BLD AUTO: 15.5 %
MAGNESIUM SERPL-MCNC: 1.9 MG/DL (ref 1.6–2.6)
MAGNESIUM SERPL-MCNC: 2 MG/DL (ref 1.6–2.6)
MAYO GENERIC ORDERABLE RESULT: ABNORMAL
MCH RBC QN AUTO: 27 PG (ref 27–31)
MCHC RBC AUTO-ENTMCNC: 32.1 G/DL (ref 33–36)
MCV RBC AUTO: 84.2 FL (ref 80–94)
MONOCYTES # BLD AUTO: 0.77 X10(3)/MCL (ref 0.1–1.3)
MONOCYTES NFR BLD AUTO: 4.2 %
NEUTROPHILS # BLD AUTO: 14.41 X10(3)/MCL (ref 2.1–9.2)
NEUTROPHILS NFR BLD AUTO: 78.3 %
NRBC BLD AUTO-RTO: 0 %
OHS QRS DURATION: 66 MS
OHS QTC CALCULATION: 456 MS
PHOSPHATE SERPL-MCNC: 3.8 MG/DL (ref 2.3–4.7)
PLATELET # BLD AUTO: 678 X10(3)/MCL (ref 130–400)
PMV BLD AUTO: 8.2 FL (ref 7.4–10.4)
POCT GLUCOSE: 119 MG/DL (ref 70–110)
POTASSIUM SERPL-SCNC: 3.6 MMOL/L (ref 3.5–5.1)
POTASSIUM SERPL-SCNC: 3.9 MMOL/L (ref 3.5–5.1)
PROT SERPL-MCNC: 5.9 GM/DL (ref 6.4–8.3)
PROT SERPL-MCNC: 6.1 GM/DL (ref 6.4–8.3)
RBC # BLD AUTO: 3.48 X10(6)/MCL (ref 4.7–6.1)
SODIUM SERPL-SCNC: 135 MMOL/L (ref 136–145)
SODIUM SERPL-SCNC: 135 MMOL/L (ref 136–145)
WBC # BLD AUTO: 18.41 X10(3)/MCL (ref 4.5–11.5)

## 2025-08-12 PROCEDURE — 63600175 PHARM REV CODE 636 W HCPCS

## 2025-08-12 PROCEDURE — 83735 ASSAY OF MAGNESIUM: CPT

## 2025-08-12 PROCEDURE — 36415 COLL VENOUS BLD VENIPUNCTURE: CPT

## 2025-08-12 PROCEDURE — 25000242 PHARM REV CODE 250 ALT 637 W/ HCPCS

## 2025-08-12 PROCEDURE — 99233 SBSQ HOSP IP/OBS HIGH 50: CPT | Mod: ,,,

## 2025-08-12 PROCEDURE — 84100 ASSAY OF PHOSPHORUS: CPT

## 2025-08-12 PROCEDURE — 21400001 HC TELEMETRY ROOM

## 2025-08-12 PROCEDURE — 85025 COMPLETE CBC W/AUTO DIFF WBC: CPT

## 2025-08-12 PROCEDURE — 80053 COMPREHEN METABOLIC PANEL: CPT

## 2025-08-12 PROCEDURE — 63600175 PHARM REV CODE 636 W HCPCS: Mod: JZ,TB

## 2025-08-12 PROCEDURE — 25000003 PHARM REV CODE 250

## 2025-08-12 PROCEDURE — 94640 AIRWAY INHALATION TREATMENT: CPT

## 2025-08-12 PROCEDURE — 27000207 HC ISOLATION

## 2025-08-12 PROCEDURE — 99900035 HC TECH TIME PER 15 MIN (STAT)

## 2025-08-12 PROCEDURE — 11000001 HC ACUTE MED/SURG PRIVATE ROOM

## 2025-08-12 PROCEDURE — 93005 ELECTROCARDIOGRAM TRACING: CPT

## 2025-08-12 PROCEDURE — 31720 CLEARANCE OF AIRWAYS: CPT

## 2025-08-12 PROCEDURE — 82306 VITAMIN D 25 HYDROXY: CPT

## 2025-08-12 PROCEDURE — 94761 N-INVAS EAR/PLS OXIMETRY MLT: CPT

## 2025-08-12 RX ORDER — CALCIUM GLUCONATE 20 MG/ML
1 INJECTION, SOLUTION INTRAVENOUS ONCE
Status: COMPLETED | OUTPATIENT
Start: 2025-08-12 | End: 2025-08-12

## 2025-08-12 RX ORDER — MAGNESIUM SULFATE HEPTAHYDRATE 40 MG/ML
2 INJECTION, SOLUTION INTRAVENOUS ONCE
Status: COMPLETED | OUTPATIENT
Start: 2025-08-12 | End: 2025-08-12

## 2025-08-12 RX ORDER — CALCITRIOL 0.25 UG/1
0.5 CAPSULE ORAL 2 TIMES DAILY
Status: DISCONTINUED | OUTPATIENT
Start: 2025-08-12 | End: 2025-08-20 | Stop reason: HOSPADM

## 2025-08-12 RX ADMIN — MULTIVIT AND MINERALS-FERROUS GLUCONATE 9 MG IRON/15 ML ORAL LIQUID 5 ML: at 09:08

## 2025-08-12 RX ADMIN — MIDODRINE HYDROCHLORIDE 10 MG: 5 TABLET ORAL at 03:08

## 2025-08-12 RX ADMIN — IPRATROPIUM BROMIDE AND ALBUTEROL SULFATE 3 ML: 2.5; .5 SOLUTION RESPIRATORY (INHALATION) at 07:08

## 2025-08-12 RX ADMIN — METRONIDAZOLE 500 MG: 5 INJECTION, SOLUTION INTRAVENOUS at 11:08

## 2025-08-12 RX ADMIN — CEFEPIME 2 G: 2 INJECTION, POWDER, FOR SOLUTION INTRAVENOUS at 03:08

## 2025-08-12 RX ADMIN — HYDROCORTISONE 50 MG: 10 TABLET ORAL at 09:08

## 2025-08-12 RX ADMIN — GLYCOPYRROLATE 0.2 MG: 0.2 INJECTION INTRAMUSCULAR; INTRAVENOUS at 11:08

## 2025-08-12 RX ADMIN — MIDODRINE HYDROCHLORIDE 10 MG: 5 TABLET ORAL at 08:08

## 2025-08-12 RX ADMIN — PANTOPRAZOLE SODIUM 40 MG: 40 INJECTION, POWDER, FOR SOLUTION INTRAVENOUS at 09:08

## 2025-08-12 RX ADMIN — MAGNESIUM SULFATE HEPTAHYDRATE 2 G: 40 INJECTION, SOLUTION INTRAVENOUS at 11:08

## 2025-08-12 RX ADMIN — MICONAZOLE NITRATE: 20 POWDER TOPICAL at 08:08

## 2025-08-12 RX ADMIN — LEVETIRACETAM 750 MG: 100 SOLUTION ORAL at 08:08

## 2025-08-12 RX ADMIN — CEFEPIME 2 G: 2 INJECTION, POWDER, FOR SOLUTION INTRAVENOUS at 11:08

## 2025-08-12 RX ADMIN — CALCITRIOL CAPSULES 0.25 MCG 0.5 MCG: 0.25 CAPSULE ORAL at 08:08

## 2025-08-12 RX ADMIN — LEVETIRACETAM 750 MG: 100 SOLUTION ORAL at 09:08

## 2025-08-12 RX ADMIN — ACETYLCYSTEINE 4 ML: 100 INHALANT RESPIRATORY (INHALATION) at 07:08

## 2025-08-12 RX ADMIN — CALCIUM GLUCONATE 1 G: 20 INJECTION, SOLUTION INTRAVENOUS at 12:08

## 2025-08-12 RX ADMIN — METRONIDAZOLE 500 MG: 5 INJECTION, SOLUTION INTRAVENOUS at 09:08

## 2025-08-12 RX ADMIN — CEFEPIME 2 G: 2 INJECTION, POWDER, FOR SOLUTION INTRAVENOUS at 07:08

## 2025-08-12 RX ADMIN — OXYCODONE HYDROCHLORIDE 10 MG: 5 TABLET ORAL at 07:08

## 2025-08-12 RX ADMIN — VANCOMYCIN HYDROCHLORIDE 1000 MG: 1 INJECTION, POWDER, LYOPHILIZED, FOR SOLUTION INTRAVENOUS at 05:08

## 2025-08-12 RX ADMIN — CALCIUM GLUCONATE 1 G: 20 INJECTION, SOLUTION INTRAVENOUS at 09:08

## 2025-08-12 RX ADMIN — MIDODRINE HYDROCHLORIDE 10 MG: 5 TABLET ORAL at 09:08

## 2025-08-12 RX ADMIN — SERTRALINE HYDROCHLORIDE 50 MG: 50 TABLET ORAL at 09:08

## 2025-08-12 RX ADMIN — OXYCODONE HYDROCHLORIDE 10 MG: 5 TABLET ORAL at 11:08

## 2025-08-12 RX ADMIN — MICONAZOLE NITRATE: 20 POWDER TOPICAL at 09:08

## 2025-08-12 RX ADMIN — ALPRAZOLAM 0.25 MG: 0.25 TABLET ORAL at 11:08

## 2025-08-12 RX ADMIN — METRONIDAZOLE 500 MG: 5 INJECTION, SOLUTION INTRAVENOUS at 03:08

## 2025-08-12 RX ADMIN — FOLIC ACID 1 MG: 1 TABLET ORAL at 09:08

## 2025-08-13 LAB
ALBUMIN SERPL-MCNC: 1.4 G/DL (ref 3.5–5)
ALBUMIN/GLOB SERPL: 0.3 RATIO (ref 1.1–2)
ALP SERPL-CCNC: 184 UNIT/L (ref 40–150)
ALT SERPL-CCNC: 14 UNIT/L (ref 0–55)
ANION GAP SERPL CALC-SCNC: 7 MEQ/L
AST SERPL-CCNC: 22 UNIT/L (ref 11–45)
BASOPHILS # BLD AUTO: 0.02 X10(3)/MCL
BASOPHILS NFR BLD AUTO: 0.1 %
BILIRUB SERPL-MCNC: 0.1 MG/DL
BUN SERPL-MCNC: 21 MG/DL (ref 8.9–20.6)
CALCIUM SERPL-MCNC: 6.4 MG/DL (ref 8.4–10.2)
CHLORIDE SERPL-SCNC: 103 MMOL/L (ref 98–107)
CO2 SERPL-SCNC: 26 MMOL/L (ref 22–29)
CREAT SERPL-MCNC: 0.62 MG/DL (ref 0.72–1.25)
CREAT/UREA NIT SERPL: 34
EOSINOPHIL # BLD AUTO: 0.21 X10(3)/MCL (ref 0–0.9)
EOSINOPHIL NFR BLD AUTO: 1.1 %
ERYTHROCYTE [DISTWIDTH] IN BLOOD BY AUTOMATED COUNT: 18.8 % (ref 11.5–17)
GFR SERPLBLD CREATININE-BSD FMLA CKD-EPI: >60 ML/MIN/1.73/M2
GLOBULIN SER-MCNC: 4.5 GM/DL (ref 2.4–3.5)
GLUCOSE SERPL-MCNC: 78 MG/DL (ref 74–100)
HCT VFR BLD AUTO: 28.9 % (ref 42–52)
HGB BLD-MCNC: 9.1 G/DL (ref 14–18)
IMM GRANULOCYTES # BLD AUTO: 0.15 X10(3)/MCL (ref 0–0.04)
IMM GRANULOCYTES NFR BLD AUTO: 0.8 %
LYMPHOCYTES # BLD AUTO: 2.53 X10(3)/MCL (ref 0.6–4.6)
LYMPHOCYTES NFR BLD AUTO: 13.4 %
MAGNESIUM SERPL-MCNC: 1.8 MG/DL (ref 1.6–2.6)
MAYO GENERIC ORDERABLE RESULT: ABNORMAL
MAYO GENERIC ORDERABLE RESULT: NORMAL
MCH RBC QN AUTO: 26.5 PG (ref 27–31)
MCHC RBC AUTO-ENTMCNC: 31.5 G/DL (ref 33–36)
MCV RBC AUTO: 84.3 FL (ref 80–94)
MONOCYTES # BLD AUTO: 1.02 X10(3)/MCL (ref 0.1–1.3)
MONOCYTES NFR BLD AUTO: 5.4 %
NEUTROPHILS # BLD AUTO: 14.95 X10(3)/MCL (ref 2.1–9.2)
NEUTROPHILS NFR BLD AUTO: 79.2 %
NRBC BLD AUTO-RTO: 0 %
PHOSPHATE SERPL-MCNC: 3.9 MG/DL (ref 2.3–4.7)
PLATELET # BLD AUTO: 742 X10(3)/MCL (ref 130–400)
PMV BLD AUTO: 8.3 FL (ref 7.4–10.4)
POTASSIUM SERPL-SCNC: 3.6 MMOL/L (ref 3.5–5.1)
PREALB SERPL-MCNC: 3 MG/DL (ref 18–45)
PROT SERPL-MCNC: 5.9 GM/DL (ref 6.4–8.3)
RBC # BLD AUTO: 3.43 X10(6)/MCL (ref 4.7–6.1)
SODIUM SERPL-SCNC: 136 MMOL/L (ref 136–145)
VANCOMYCIN TROUGH SERPL-MCNC: 22.5 UG/ML (ref 15–20)
WBC # BLD AUTO: 18.88 X10(3)/MCL (ref 4.5–11.5)

## 2025-08-13 PROCEDURE — 36415 COLL VENOUS BLD VENIPUNCTURE: CPT | Performed by: STUDENT IN AN ORGANIZED HEALTH CARE EDUCATION/TRAINING PROGRAM

## 2025-08-13 PROCEDURE — 63600175 PHARM REV CODE 636 W HCPCS

## 2025-08-13 PROCEDURE — 99900026 HC AIRWAY MAINTENANCE (STAT)

## 2025-08-13 PROCEDURE — 25000242 PHARM REV CODE 250 ALT 637 W/ HCPCS

## 2025-08-13 PROCEDURE — 83735 ASSAY OF MAGNESIUM: CPT

## 2025-08-13 PROCEDURE — 31720 CLEARANCE OF AIRWAYS: CPT

## 2025-08-13 PROCEDURE — 84134 ASSAY OF PREALBUMIN: CPT

## 2025-08-13 PROCEDURE — 25000003 PHARM REV CODE 250

## 2025-08-13 PROCEDURE — 87077 CULTURE AEROBIC IDENTIFY: CPT

## 2025-08-13 PROCEDURE — 27000207 HC ISOLATION

## 2025-08-13 PROCEDURE — 11000001 HC ACUTE MED/SURG PRIVATE ROOM

## 2025-08-13 PROCEDURE — 80053 COMPREHEN METABOLIC PANEL: CPT

## 2025-08-13 PROCEDURE — 94640 AIRWAY INHALATION TREATMENT: CPT

## 2025-08-13 PROCEDURE — 80202 ASSAY OF VANCOMYCIN: CPT | Performed by: STUDENT IN AN ORGANIZED HEALTH CARE EDUCATION/TRAINING PROGRAM

## 2025-08-13 PROCEDURE — 36415 COLL VENOUS BLD VENIPUNCTURE: CPT

## 2025-08-13 PROCEDURE — 85025 COMPLETE CBC W/AUTO DIFF WBC: CPT

## 2025-08-13 PROCEDURE — 21400001 HC TELEMETRY ROOM

## 2025-08-13 PROCEDURE — 87798 DETECT AGENT NOS DNA AMP: CPT

## 2025-08-13 PROCEDURE — 63600175 PHARM REV CODE 636 W HCPCS: Mod: JZ,TB

## 2025-08-13 PROCEDURE — 94761 N-INVAS EAR/PLS OXIMETRY MLT: CPT

## 2025-08-13 PROCEDURE — 84100 ASSAY OF PHOSPHORUS: CPT

## 2025-08-13 RX ADMIN — RIVAROXABAN 20 MG: 10 TABLET, FILM COATED ORAL at 06:08

## 2025-08-13 RX ADMIN — MULTIVIT AND MINERALS-FERROUS GLUCONATE 9 MG IRON/15 ML ORAL LIQUID 5 ML: at 09:08

## 2025-08-13 RX ADMIN — METRONIDAZOLE 500 MG: 5 INJECTION, SOLUTION INTRAVENOUS at 02:08

## 2025-08-13 RX ADMIN — SODIUM CHLORIDE, POTASSIUM CHLORIDE, SODIUM LACTATE AND CALCIUM CHLORIDE: 600; 310; 30; 20 INJECTION, SOLUTION INTRAVENOUS at 05:08

## 2025-08-13 RX ADMIN — CEFEPIME 2 G: 2 INJECTION, POWDER, FOR SOLUTION INTRAVENOUS at 02:08

## 2025-08-13 RX ADMIN — IPRATROPIUM BROMIDE AND ALBUTEROL SULFATE 3 ML: 2.5; .5 SOLUTION RESPIRATORY (INHALATION) at 07:08

## 2025-08-13 RX ADMIN — IPRATROPIUM BROMIDE AND ALBUTEROL SULFATE 3 ML: 2.5; .5 SOLUTION RESPIRATORY (INHALATION) at 12:08

## 2025-08-13 RX ADMIN — MICONAZOLE NITRATE: 20 POWDER TOPICAL at 09:08

## 2025-08-13 RX ADMIN — METRONIDAZOLE 500 MG: 5 INJECTION, SOLUTION INTRAVENOUS at 11:08

## 2025-08-13 RX ADMIN — GLYCOPYRROLATE 0.2 MG: 0.2 INJECTION INTRAMUSCULAR; INTRAVENOUS at 11:08

## 2025-08-13 RX ADMIN — MICONAZOLE NITRATE: 20 POWDER TOPICAL at 11:08

## 2025-08-13 RX ADMIN — LEVOTHYROXINE SODIUM 100 MCG: 0.05 TABLET ORAL at 06:08

## 2025-08-13 RX ADMIN — MIDODRINE HYDROCHLORIDE 10 MG: 5 TABLET ORAL at 09:08

## 2025-08-13 RX ADMIN — METRONIDAZOLE 500 MG: 5 INJECTION, SOLUTION INTRAVENOUS at 09:08

## 2025-08-13 RX ADMIN — CEFEPIME 2 G: 2 INJECTION, POWDER, FOR SOLUTION INTRAVENOUS at 06:08

## 2025-08-13 RX ADMIN — OXYCODONE HYDROCHLORIDE 10 MG: 5 TABLET ORAL at 02:08

## 2025-08-13 RX ADMIN — GLYCOPYRROLATE 0.2 MG: 0.2 INJECTION INTRAMUSCULAR; INTRAVENOUS at 06:08

## 2025-08-13 RX ADMIN — LEVETIRACETAM 750 MG: 100 SOLUTION ORAL at 09:08

## 2025-08-13 RX ADMIN — MIDODRINE HYDROCHLORIDE 10 MG: 5 TABLET ORAL at 08:08

## 2025-08-13 RX ADMIN — ACETYLCYSTEINE 4 ML: 100 INHALANT RESPIRATORY (INHALATION) at 12:08

## 2025-08-13 RX ADMIN — PANTOPRAZOLE SODIUM 40 MG: 40 INJECTION, POWDER, FOR SOLUTION INTRAVENOUS at 09:08

## 2025-08-13 RX ADMIN — MIDODRINE HYDROCHLORIDE 10 MG: 5 TABLET ORAL at 02:08

## 2025-08-13 RX ADMIN — CALCITRIOL CAPSULES 0.25 MCG 0.5 MCG: 0.25 CAPSULE ORAL at 08:08

## 2025-08-13 RX ADMIN — ALPRAZOLAM 0.25 MG: 0.25 TABLET ORAL at 08:08

## 2025-08-13 RX ADMIN — FOLIC ACID 1 MG: 1 TABLET ORAL at 09:08

## 2025-08-13 RX ADMIN — LEVETIRACETAM 750 MG: 100 SOLUTION ORAL at 08:08

## 2025-08-13 RX ADMIN — SERTRALINE HYDROCHLORIDE 50 MG: 50 TABLET ORAL at 09:08

## 2025-08-13 RX ADMIN — ACETYLCYSTEINE 4 ML: 100 INHALANT RESPIRATORY (INHALATION) at 07:08

## 2025-08-13 RX ADMIN — CEFEPIME 2 G: 2 INJECTION, POWDER, FOR SOLUTION INTRAVENOUS at 11:08

## 2025-08-13 RX ADMIN — CALCITRIOL CAPSULES 0.25 MCG 0.5 MCG: 0.25 CAPSULE ORAL at 09:08

## 2025-08-13 RX ADMIN — OXYCODONE HYDROCHLORIDE 10 MG: 5 TABLET ORAL at 08:08

## 2025-08-14 LAB
ALBUMIN SERPL-MCNC: 1.4 G/DL (ref 3.5–5)
ALBUMIN/GLOB SERPL: 0.3 RATIO (ref 1.1–2)
ALP SERPL-CCNC: 150 UNIT/L (ref 40–150)
ALT SERPL-CCNC: 14 UNIT/L (ref 0–55)
ANION GAP SERPL CALC-SCNC: 8 MEQ/L
AST SERPL-CCNC: 53 UNIT/L (ref 11–45)
B PERT.PT PRMT NPH QL NAA+NON-PROBE: NOT DETECTED
BASOPHILS # BLD AUTO: 0.03 X10(3)/MCL
BASOPHILS NFR BLD AUTO: 0.2 %
BILIRUB SERPL-MCNC: 0.3 MG/DL
BUN SERPL-MCNC: 27.4 MG/DL (ref 8.9–20.6)
C PNEUM DNA NPH QL NAA+NON-PROBE: NOT DETECTED
CALCIUM SERPL-MCNC: 6.9 MG/DL (ref 8.4–10.2)
CHLORIDE SERPL-SCNC: 104 MMOL/L (ref 98–107)
CO2 SERPL-SCNC: 23 MMOL/L (ref 22–29)
CREAT SERPL-MCNC: 0.57 MG/DL (ref 0.72–1.25)
CREAT/UREA NIT SERPL: 48
EOSINOPHIL # BLD AUTO: 0.33 X10(3)/MCL (ref 0–0.9)
EOSINOPHIL NFR BLD AUTO: 1.7 %
ERYTHROCYTE [DISTWIDTH] IN BLOOD BY AUTOMATED COUNT: 18.7 % (ref 11.5–17)
GFR SERPLBLD CREATININE-BSD FMLA CKD-EPI: >60 ML/MIN/1.73/M2
GLOBULIN SER-MCNC: 4.8 GM/DL (ref 2.4–3.5)
GLUCOSE SERPL-MCNC: 66 MG/DL (ref 74–100)
HADV DNA NPH QL NAA+NON-PROBE: NOT DETECTED
HCOV 229E RNA NPH QL NAA+NON-PROBE: NOT DETECTED
HCOV HKU1 RNA NPH QL NAA+NON-PROBE: NOT DETECTED
HCOV NL63 RNA NPH QL NAA+NON-PROBE: NOT DETECTED
HCOV OC43 RNA NPH QL NAA+NON-PROBE: NOT DETECTED
HCT VFR BLD AUTO: 27.4 % (ref 42–52)
HGB BLD-MCNC: 8.6 G/DL (ref 14–18)
HMPV RNA NPH QL NAA+NON-PROBE: NOT DETECTED
HOLD SPECIMEN: NORMAL
HPIV1 RNA NPH QL NAA+NON-PROBE: NOT DETECTED
HPIV2 RNA NPH QL NAA+NON-PROBE: NOT DETECTED
HPIV3 RNA NPH QL NAA+NON-PROBE: NOT DETECTED
HPIV4 RNA NPH QL NAA+NON-PROBE: NOT DETECTED
IMM GRANULOCYTES # BLD AUTO: 0.1 X10(3)/MCL (ref 0–0.04)
IMM GRANULOCYTES NFR BLD AUTO: 0.5 %
LYMPHOCYTES # BLD AUTO: 2.37 X10(3)/MCL (ref 0.6–4.6)
LYMPHOCYTES NFR BLD AUTO: 12.2 %
M PNEUMO DNA NPH QL NAA+NON-PROBE: NOT DETECTED
MAGNESIUM SERPL-MCNC: 1.3 MG/DL (ref 1.6–2.6)
MCH RBC QN AUTO: 26.7 PG (ref 27–31)
MCHC RBC AUTO-ENTMCNC: 31.4 G/DL (ref 33–36)
MCV RBC AUTO: 85.1 FL (ref 80–94)
MONOCYTES # BLD AUTO: 1.21 X10(3)/MCL (ref 0.1–1.3)
MONOCYTES NFR BLD AUTO: 6.2 %
NEUTROPHILS # BLD AUTO: 15.42 X10(3)/MCL (ref 2.1–9.2)
NEUTROPHILS NFR BLD AUTO: 79.2 %
NRBC BLD AUTO-RTO: 0 %
PHOSPHATE SERPL-MCNC: 3.8 MG/DL (ref 2.3–4.7)
PLATELET # BLD AUTO: 796 X10(3)/MCL (ref 130–400)
PMV BLD AUTO: 8.5 FL (ref 7.4–10.4)
POCT GLUCOSE: 199 MG/DL (ref 70–110)
POTASSIUM SERPL-SCNC: 4.2 MMOL/L (ref 3.5–5.1)
PROT SERPL-MCNC: 6.2 GM/DL (ref 6.4–8.3)
RBC # BLD AUTO: 3.22 X10(6)/MCL (ref 4.7–6.1)
RSV RNA NPH QL NAA+NON-PROBE: NOT DETECTED
RV+EV RNA NPH QL NAA+NON-PROBE: NOT DETECTED
SARS-COV-2 RDRP RESP QL NAA+PROBE: NEGATIVE
SODIUM SERPL-SCNC: 135 MMOL/L (ref 136–145)
WBC # BLD AUTO: 19.46 X10(3)/MCL (ref 4.5–11.5)

## 2025-08-14 PROCEDURE — 25000242 PHARM REV CODE 250 ALT 637 W/ HCPCS

## 2025-08-14 PROCEDURE — 11000001 HC ACUTE MED/SURG PRIVATE ROOM

## 2025-08-14 PROCEDURE — 31720 CLEARANCE OF AIRWAYS: CPT

## 2025-08-14 PROCEDURE — 25000003 PHARM REV CODE 250

## 2025-08-14 PROCEDURE — 99900035 HC TECH TIME PER 15 MIN (STAT)

## 2025-08-14 PROCEDURE — 80053 COMPREHEN METABOLIC PANEL: CPT

## 2025-08-14 PROCEDURE — 36415 COLL VENOUS BLD VENIPUNCTURE: CPT

## 2025-08-14 PROCEDURE — 63600175 PHARM REV CODE 636 W HCPCS

## 2025-08-14 PROCEDURE — 21400001 HC TELEMETRY ROOM

## 2025-08-14 PROCEDURE — 94640 AIRWAY INHALATION TREATMENT: CPT

## 2025-08-14 PROCEDURE — 84100 ASSAY OF PHOSPHORUS: CPT

## 2025-08-14 PROCEDURE — 85025 COMPLETE CBC W/AUTO DIFF WBC: CPT

## 2025-08-14 PROCEDURE — 80202 ASSAY OF VANCOMYCIN: CPT

## 2025-08-14 PROCEDURE — 27000207 HC ISOLATION

## 2025-08-14 PROCEDURE — 83735 ASSAY OF MAGNESIUM: CPT

## 2025-08-14 PROCEDURE — U0002 COVID-19 LAB TEST NON-CDC: HCPCS

## 2025-08-14 PROCEDURE — 94761 N-INVAS EAR/PLS OXIMETRY MLT: CPT

## 2025-08-14 PROCEDURE — 63600175 PHARM REV CODE 636 W HCPCS: Mod: JZ,TB

## 2025-08-14 RX ORDER — MAGNESIUM SULFATE HEPTAHYDRATE 40 MG/ML
2 INJECTION, SOLUTION INTRAVENOUS ONCE
Status: COMPLETED | OUTPATIENT
Start: 2025-08-14 | End: 2025-08-14

## 2025-08-14 RX ADMIN — IPRATROPIUM BROMIDE AND ALBUTEROL SULFATE 3 ML: 2.5; .5 SOLUTION RESPIRATORY (INHALATION) at 01:08

## 2025-08-14 RX ADMIN — METRONIDAZOLE 500 MG: 5 INJECTION, SOLUTION INTRAVENOUS at 08:08

## 2025-08-14 RX ADMIN — OXYCODONE HYDROCHLORIDE 10 MG: 5 TABLET ORAL at 03:08

## 2025-08-14 RX ADMIN — ACETYLCYSTEINE 4 ML: 100 INHALANT RESPIRATORY (INHALATION) at 07:08

## 2025-08-14 RX ADMIN — MAGNESIUM SULFATE HEPTAHYDRATE 2 G: 40 INJECTION, SOLUTION INTRAVENOUS at 08:08

## 2025-08-14 RX ADMIN — MIDODRINE HYDROCHLORIDE 10 MG: 5 TABLET ORAL at 02:08

## 2025-08-14 RX ADMIN — ACETYLCYSTEINE 4 ML: 100 INHALANT RESPIRATORY (INHALATION) at 08:08

## 2025-08-14 RX ADMIN — OXYCODONE HYDROCHLORIDE 10 MG: 5 TABLET ORAL at 08:08

## 2025-08-14 RX ADMIN — RIVAROXABAN 20 MG: 10 TABLET, FILM COATED ORAL at 04:08

## 2025-08-14 RX ADMIN — GLYCOPYRROLATE 0.2 MG: 0.2 INJECTION INTRAMUSCULAR; INTRAVENOUS at 11:08

## 2025-08-14 RX ADMIN — MIDODRINE HYDROCHLORIDE 10 MG: 5 TABLET ORAL at 08:08

## 2025-08-14 RX ADMIN — MULTIVIT AND MINERALS-FERROUS GLUCONATE 9 MG IRON/15 ML ORAL LIQUID 5 ML: at 08:08

## 2025-08-14 RX ADMIN — PANTOPRAZOLE SODIUM 40 MG: 40 INJECTION, POWDER, FOR SOLUTION INTRAVENOUS at 08:08

## 2025-08-14 RX ADMIN — IPRATROPIUM BROMIDE AND ALBUTEROL SULFATE 3 ML: 2.5; .5 SOLUTION RESPIRATORY (INHALATION) at 08:08

## 2025-08-14 RX ADMIN — LEVETIRACETAM 750 MG: 100 SOLUTION ORAL at 08:08

## 2025-08-14 RX ADMIN — CEFEPIME 2 G: 2 INJECTION, POWDER, FOR SOLUTION INTRAVENOUS at 06:08

## 2025-08-14 RX ADMIN — SERTRALINE HYDROCHLORIDE 50 MG: 50 TABLET ORAL at 08:08

## 2025-08-14 RX ADMIN — LEVOTHYROXINE SODIUM 100 MCG: 0.05 TABLET ORAL at 06:08

## 2025-08-14 RX ADMIN — MAGNESIUM SULFATE HEPTAHYDRATE 2 G: 40 INJECTION, SOLUTION INTRAVENOUS at 09:08

## 2025-08-14 RX ADMIN — MICONAZOLE NITRATE: 20 POWDER TOPICAL at 08:08

## 2025-08-14 RX ADMIN — ACETYLCYSTEINE 4 ML: 100 INHALANT RESPIRATORY (INHALATION) at 01:08

## 2025-08-14 RX ADMIN — METRONIDAZOLE 500 MG: 5 INJECTION, SOLUTION INTRAVENOUS at 11:08

## 2025-08-14 RX ADMIN — CEFEPIME 2 G: 2 INJECTION, POWDER, FOR SOLUTION INTRAVENOUS at 10:08

## 2025-08-14 RX ADMIN — IPRATROPIUM BROMIDE AND ALBUTEROL SULFATE 3 ML: 2.5; .5 SOLUTION RESPIRATORY (INHALATION) at 07:08

## 2025-08-14 RX ADMIN — METRONIDAZOLE 500 MG: 5 INJECTION, SOLUTION INTRAVENOUS at 03:08

## 2025-08-14 RX ADMIN — CALCITRIOL CAPSULES 0.25 MCG 0.5 MCG: 0.25 CAPSULE ORAL at 08:08

## 2025-08-14 RX ADMIN — VANCOMYCIN HYDROCHLORIDE 750 MG: 750 INJECTION, POWDER, LYOPHILIZED, FOR SOLUTION INTRAVENOUS at 12:08

## 2025-08-14 RX ADMIN — ALPRAZOLAM 0.25 MG: 0.25 TABLET ORAL at 08:08

## 2025-08-14 RX ADMIN — GLYCOPYRROLATE 0.2 MG: 0.2 INJECTION INTRAMUSCULAR; INTRAVENOUS at 06:08

## 2025-08-14 RX ADMIN — FOLIC ACID 1 MG: 1 TABLET ORAL at 08:08

## 2025-08-14 RX ADMIN — CEFEPIME 2 G: 2 INJECTION, POWDER, FOR SOLUTION INTRAVENOUS at 02:08

## 2025-08-15 LAB
ALBUMIN SERPL-MCNC: 1.3 G/DL (ref 3.5–5)
ALBUMIN/GLOB SERPL: 0.3 RATIO (ref 1.1–2)
ALP SERPL-CCNC: 191 UNIT/L (ref 40–150)
ALT SERPL-CCNC: 8 UNIT/L (ref 0–55)
ANION GAP SERPL CALC-SCNC: 6 MEQ/L
AST SERPL-CCNC: 18 UNIT/L (ref 11–45)
BASOPHILS # BLD AUTO: 0.09 X10(3)/MCL
BASOPHILS NFR BLD AUTO: 0.4 %
BILIRUB SERPL-MCNC: 0.1 MG/DL
BUN SERPL-MCNC: 25.5 MG/DL (ref 8.9–20.6)
CALCIUM SERPL-MCNC: 7.4 MG/DL (ref 8.4–10.2)
CHLORIDE SERPL-SCNC: 104 MMOL/L (ref 98–107)
CO2 SERPL-SCNC: 23 MMOL/L (ref 22–29)
CREAT SERPL-MCNC: 0.58 MG/DL (ref 0.72–1.25)
CREAT/UREA NIT SERPL: 44
EOSINOPHIL # BLD AUTO: 0.31 X10(3)/MCL (ref 0–0.9)
EOSINOPHIL NFR BLD AUTO: 1.5 %
ERYTHROCYTE [DISTWIDTH] IN BLOOD BY AUTOMATED COUNT: 19 % (ref 11.5–17)
GFR SERPLBLD CREATININE-BSD FMLA CKD-EPI: >60 ML/MIN/1.73/M2
GLOBULIN SER-MCNC: 4.6 GM/DL (ref 2.4–3.5)
GLUCOSE SERPL-MCNC: 83 MG/DL (ref 74–100)
HCT VFR BLD AUTO: 24.7 % (ref 42–52)
HGB BLD-MCNC: 8.1 G/DL (ref 14–18)
IMM GRANULOCYTES # BLD AUTO: 0.11 X10(3)/MCL (ref 0–0.04)
IMM GRANULOCYTES NFR BLD AUTO: 0.5 %
LYMPHOCYTES # BLD AUTO: 1.68 X10(3)/MCL (ref 0.6–4.6)
LYMPHOCYTES NFR BLD AUTO: 7.9 %
MAGNESIUM SERPL-MCNC: 1.6 MG/DL (ref 1.6–2.6)
MCH RBC QN AUTO: 27.5 PG (ref 27–31)
MCHC RBC AUTO-ENTMCNC: 32.8 G/DL (ref 33–36)
MCV RBC AUTO: 83.7 FL (ref 80–94)
MONOCYTES # BLD AUTO: 1.29 X10(3)/MCL (ref 0.1–1.3)
MONOCYTES NFR BLD AUTO: 6 %
NEUTROPHILS # BLD AUTO: 17.86 X10(3)/MCL (ref 2.1–9.2)
NEUTROPHILS NFR BLD AUTO: 83.7 %
NRBC BLD AUTO-RTO: 0 %
PHOSPHATE SERPL-MCNC: 3.3 MG/DL (ref 2.3–4.7)
PLATELET # BLD AUTO: 583 X10(3)/MCL (ref 130–400)
PMV BLD AUTO: 8.7 FL (ref 7.4–10.4)
POTASSIUM SERPL-SCNC: 4.1 MMOL/L (ref 3.5–5.1)
PROT SERPL-MCNC: 5.9 GM/DL (ref 6.4–8.3)
RBC # BLD AUTO: 2.95 X10(6)/MCL (ref 4.7–6.1)
SODIUM SERPL-SCNC: 133 MMOL/L (ref 136–145)
VANCOMYCIN TROUGH SERPL-MCNC: 18.7 UG/ML (ref 15–20)
WBC # BLD AUTO: 21.34 X10(3)/MCL (ref 4.5–11.5)

## 2025-08-15 PROCEDURE — 31720 CLEARANCE OF AIRWAYS: CPT

## 2025-08-15 PROCEDURE — 25000003 PHARM REV CODE 250

## 2025-08-15 PROCEDURE — 94640 AIRWAY INHALATION TREATMENT: CPT

## 2025-08-15 PROCEDURE — 36415 COLL VENOUS BLD VENIPUNCTURE: CPT

## 2025-08-15 PROCEDURE — 63600175 PHARM REV CODE 636 W HCPCS

## 2025-08-15 PROCEDURE — 25000242 PHARM REV CODE 250 ALT 637 W/ HCPCS

## 2025-08-15 PROCEDURE — 94761 N-INVAS EAR/PLS OXIMETRY MLT: CPT

## 2025-08-15 PROCEDURE — 27000207 HC ISOLATION

## 2025-08-15 PROCEDURE — 84100 ASSAY OF PHOSPHORUS: CPT

## 2025-08-15 PROCEDURE — 85025 COMPLETE CBC W/AUTO DIFF WBC: CPT

## 2025-08-15 PROCEDURE — 63600175 PHARM REV CODE 636 W HCPCS: Mod: JZ,TB

## 2025-08-15 PROCEDURE — 80053 COMPREHEN METABOLIC PANEL: CPT

## 2025-08-15 PROCEDURE — 11000001 HC ACUTE MED/SURG PRIVATE ROOM

## 2025-08-15 PROCEDURE — 99233 SBSQ HOSP IP/OBS HIGH 50: CPT | Mod: ,,,

## 2025-08-15 PROCEDURE — 99900035 HC TECH TIME PER 15 MIN (STAT)

## 2025-08-15 PROCEDURE — 94760 N-INVAS EAR/PLS OXIMETRY 1: CPT

## 2025-08-15 PROCEDURE — 83735 ASSAY OF MAGNESIUM: CPT

## 2025-08-15 PROCEDURE — 21400001 HC TELEMETRY ROOM

## 2025-08-15 RX ORDER — MIRTAZAPINE 15 MG/1
15 TABLET, FILM COATED ORAL NIGHTLY
Status: DISCONTINUED | OUTPATIENT
Start: 2025-08-15 | End: 2025-08-20 | Stop reason: HOSPADM

## 2025-08-15 RX ORDER — MAGNESIUM SULFATE 1 G/100ML
1 INJECTION INTRAVENOUS ONCE
Status: COMPLETED | OUTPATIENT
Start: 2025-08-15 | End: 2025-08-15

## 2025-08-15 RX ORDER — MAGNESIUM SULFATE HEPTAHYDRATE 40 MG/ML
2 INJECTION, SOLUTION INTRAVENOUS ONCE
Status: COMPLETED | OUTPATIENT
Start: 2025-08-15 | End: 2025-08-15

## 2025-08-15 RX ADMIN — MICONAZOLE NITRATE: 20 POWDER TOPICAL at 09:08

## 2025-08-15 RX ADMIN — MIDODRINE HYDROCHLORIDE 10 MG: 5 TABLET ORAL at 08:08

## 2025-08-15 RX ADMIN — LEVETIRACETAM 750 MG: 100 SOLUTION ORAL at 09:08

## 2025-08-15 RX ADMIN — OXYCODONE HYDROCHLORIDE 10 MG: 5 TABLET ORAL at 02:08

## 2025-08-15 RX ADMIN — RIVAROXABAN 20 MG: 10 TABLET, FILM COATED ORAL at 04:08

## 2025-08-15 RX ADMIN — ACETYLCYSTEINE 4 ML: 100 INHALANT RESPIRATORY (INHALATION) at 01:08

## 2025-08-15 RX ADMIN — VANCOMYCIN HYDROCHLORIDE 750 MG: 750 INJECTION, POWDER, LYOPHILIZED, FOR SOLUTION INTRAVENOUS at 01:08

## 2025-08-15 RX ADMIN — OXYCODONE HYDROCHLORIDE 10 MG: 5 TABLET ORAL at 09:08

## 2025-08-15 RX ADMIN — CEFEPIME 2 G: 2 INJECTION, POWDER, FOR SOLUTION INTRAVENOUS at 11:08

## 2025-08-15 RX ADMIN — MICONAZOLE NITRATE: 20 POWDER TOPICAL at 08:08

## 2025-08-15 RX ADMIN — MULTIVIT AND MINERALS-FERROUS GLUCONATE 9 MG IRON/15 ML ORAL LIQUID 5 ML: at 08:08

## 2025-08-15 RX ADMIN — GLYCOPYRROLATE 0.2 MG: 0.2 INJECTION INTRAMUSCULAR; INTRAVENOUS at 12:08

## 2025-08-15 RX ADMIN — SERTRALINE HYDROCHLORIDE 50 MG: 50 TABLET ORAL at 08:08

## 2025-08-15 RX ADMIN — GLYCOPYRROLATE 0.2 MG: 0.2 INJECTION INTRAMUSCULAR; INTRAVENOUS at 11:08

## 2025-08-15 RX ADMIN — LEVOTHYROXINE SODIUM 100 MCG: 0.05 TABLET ORAL at 06:08

## 2025-08-15 RX ADMIN — IPRATROPIUM BROMIDE AND ALBUTEROL SULFATE 3 ML: 2.5; .5 SOLUTION RESPIRATORY (INHALATION) at 08:08

## 2025-08-15 RX ADMIN — MIDODRINE HYDROCHLORIDE 10 MG: 5 TABLET ORAL at 02:08

## 2025-08-15 RX ADMIN — CALCITRIOL CAPSULES 0.25 MCG 0.5 MCG: 0.25 CAPSULE ORAL at 09:08

## 2025-08-15 RX ADMIN — MAGNESIUM SULFATE IN DEXTROSE 1 G: 10 INJECTION, SOLUTION INTRAVENOUS at 08:08

## 2025-08-15 RX ADMIN — METRONIDAZOLE 500 MG: 5 INJECTION, SOLUTION INTRAVENOUS at 08:08

## 2025-08-15 RX ADMIN — METRONIDAZOLE 500 MG: 5 INJECTION, SOLUTION INTRAVENOUS at 03:08

## 2025-08-15 RX ADMIN — MIDODRINE HYDROCHLORIDE 10 MG: 5 TABLET ORAL at 09:08

## 2025-08-15 RX ADMIN — FOLIC ACID 1 MG: 1 TABLET ORAL at 08:08

## 2025-08-15 RX ADMIN — ACETYLCYSTEINE 4 ML: 100 INHALANT RESPIRATORY (INHALATION) at 08:08

## 2025-08-15 RX ADMIN — CEFEPIME 2 G: 2 INJECTION, POWDER, FOR SOLUTION INTRAVENOUS at 02:08

## 2025-08-15 RX ADMIN — IPRATROPIUM BROMIDE AND ALBUTEROL SULFATE 3 ML: 2.5; .5 SOLUTION RESPIRATORY (INHALATION) at 01:08

## 2025-08-15 RX ADMIN — CEFEPIME 2 G: 2 INJECTION, POWDER, FOR SOLUTION INTRAVENOUS at 06:08

## 2025-08-15 RX ADMIN — MIRTAZAPINE 15 MG: 15 TABLET, FILM COATED ORAL at 09:08

## 2025-08-15 RX ADMIN — GLYCOPYRROLATE 0.2 MG: 0.2 INJECTION INTRAMUSCULAR; INTRAVENOUS at 05:08

## 2025-08-15 RX ADMIN — ALPRAZOLAM 0.25 MG: 0.25 TABLET ORAL at 09:08

## 2025-08-15 RX ADMIN — PANTOPRAZOLE SODIUM 40 MG: 40 INJECTION, POWDER, FOR SOLUTION INTRAVENOUS at 08:08

## 2025-08-15 RX ADMIN — MAGNESIUM SULFATE HEPTAHYDRATE 2 G: 40 INJECTION, SOLUTION INTRAVENOUS at 09:08

## 2025-08-15 RX ADMIN — GLYCOPYRROLATE 0.2 MG: 0.2 INJECTION INTRAMUSCULAR; INTRAVENOUS at 06:08

## 2025-08-15 RX ADMIN — METRONIDAZOLE 500 MG: 5 INJECTION, SOLUTION INTRAVENOUS at 11:08

## 2025-08-15 RX ADMIN — OXYCODONE HYDROCHLORIDE 10 MG: 5 TABLET ORAL at 08:08

## 2025-08-15 RX ADMIN — LEVETIRACETAM 750 MG: 100 SOLUTION ORAL at 08:08

## 2025-08-15 RX ADMIN — CALCITRIOL CAPSULES 0.25 MCG 0.5 MCG: 0.25 CAPSULE ORAL at 08:08

## 2025-08-16 LAB
ALBUMIN SERPL-MCNC: 1.3 G/DL (ref 3.5–5)
ALBUMIN/GLOB SERPL: 0.3 RATIO (ref 1.1–2)
ALP SERPL-CCNC: 154 UNIT/L (ref 40–150)
ALT SERPL-CCNC: 11 UNIT/L (ref 0–55)
ANION GAP SERPL CALC-SCNC: 4 MEQ/L
AST SERPL-CCNC: 25 UNIT/L (ref 11–45)
BASOPHILS # BLD AUTO: 0.05 X10(3)/MCL
BASOPHILS NFR BLD AUTO: 0.2 %
BILIRUB SERPL-MCNC: 0.2 MG/DL
BUN SERPL-MCNC: 22.7 MG/DL (ref 8.9–20.6)
CALCIUM SERPL-MCNC: 7.7 MG/DL (ref 8.4–10.2)
CHLORIDE SERPL-SCNC: 105 MMOL/L (ref 98–107)
CO2 SERPL-SCNC: 25 MMOL/L (ref 22–29)
CREAT SERPL-MCNC: 0.65 MG/DL (ref 0.72–1.25)
CREAT/UREA NIT SERPL: 35
EOSINOPHIL # BLD AUTO: 0.36 X10(3)/MCL (ref 0–0.9)
EOSINOPHIL NFR BLD AUTO: 1.5 %
ERYTHROCYTE [DISTWIDTH] IN BLOOD BY AUTOMATED COUNT: 19.2 % (ref 11.5–17)
GFR SERPLBLD CREATININE-BSD FMLA CKD-EPI: >60 ML/MIN/1.73/M2
GLOBULIN SER-MCNC: 4.8 GM/DL (ref 2.4–3.5)
GLUCOSE SERPL-MCNC: 65 MG/DL (ref 74–100)
HCT VFR BLD AUTO: 23.3 % (ref 42–52)
HGB BLD-MCNC: 7.6 G/DL (ref 14–18)
HOLD SPECIMEN: NORMAL
IMM GRANULOCYTES # BLD AUTO: 0.16 X10(3)/MCL (ref 0–0.04)
IMM GRANULOCYTES NFR BLD AUTO: 0.7 %
LYMPHOCYTES # BLD AUTO: 1.91 X10(3)/MCL (ref 0.6–4.6)
LYMPHOCYTES NFR BLD AUTO: 7.8 %
MAGNESIUM SERPL-MCNC: 1.7 MG/DL (ref 1.6–2.6)
MCH RBC QN AUTO: 27.6 PG (ref 27–31)
MCHC RBC AUTO-ENTMCNC: 32.6 G/DL (ref 33–36)
MCV RBC AUTO: 84.7 FL (ref 80–94)
MONOCYTES # BLD AUTO: 1.78 X10(3)/MCL (ref 0.1–1.3)
MONOCYTES NFR BLD AUTO: 7.3 %
NEUTROPHILS # BLD AUTO: 20.14 X10(3)/MCL (ref 2.1–9.2)
NEUTROPHILS NFR BLD AUTO: 82.5 %
NRBC BLD AUTO-RTO: 0 %
PHOSPHATE SERPL-MCNC: 3.7 MG/DL (ref 2.3–4.7)
PLATELET # BLD AUTO: 724 X10(3)/MCL (ref 130–400)
PMV BLD AUTO: 8.4 FL (ref 7.4–10.4)
POTASSIUM SERPL-SCNC: 4.4 MMOL/L (ref 3.5–5.1)
PROT SERPL-MCNC: 6.1 GM/DL (ref 6.4–8.3)
RBC # BLD AUTO: 2.75 X10(6)/MCL (ref 4.7–6.1)
SODIUM SERPL-SCNC: 134 MMOL/L (ref 136–145)
VANCOMYCIN TROUGH SERPL-MCNC: 19.8 UG/ML (ref 15–20)
WBC # BLD AUTO: 24.4 X10(3)/MCL (ref 4.5–11.5)

## 2025-08-16 PROCEDURE — 84100 ASSAY OF PHOSPHORUS: CPT

## 2025-08-16 PROCEDURE — 94760 N-INVAS EAR/PLS OXIMETRY 1: CPT

## 2025-08-16 PROCEDURE — 36415 COLL VENOUS BLD VENIPUNCTURE: CPT

## 2025-08-16 PROCEDURE — 25000003 PHARM REV CODE 250

## 2025-08-16 PROCEDURE — 85025 COMPLETE CBC W/AUTO DIFF WBC: CPT

## 2025-08-16 PROCEDURE — 94761 N-INVAS EAR/PLS OXIMETRY MLT: CPT

## 2025-08-16 PROCEDURE — 80202 ASSAY OF VANCOMYCIN: CPT

## 2025-08-16 PROCEDURE — 63600175 PHARM REV CODE 636 W HCPCS

## 2025-08-16 PROCEDURE — 80053 COMPREHEN METABOLIC PANEL: CPT

## 2025-08-16 PROCEDURE — 21400001 HC TELEMETRY ROOM

## 2025-08-16 PROCEDURE — 31720 CLEARANCE OF AIRWAYS: CPT

## 2025-08-16 PROCEDURE — 63600175 PHARM REV CODE 636 W HCPCS: Mod: JZ,TB

## 2025-08-16 PROCEDURE — 11000001 HC ACUTE MED/SURG PRIVATE ROOM

## 2025-08-16 PROCEDURE — 99900035 HC TECH TIME PER 15 MIN (STAT)

## 2025-08-16 PROCEDURE — 83735 ASSAY OF MAGNESIUM: CPT

## 2025-08-16 PROCEDURE — 27000207 HC ISOLATION

## 2025-08-16 RX ORDER — MAGNESIUM SULFATE HEPTAHYDRATE 40 MG/ML
2 INJECTION, SOLUTION INTRAVENOUS ONCE
Status: COMPLETED | OUTPATIENT
Start: 2025-08-16 | End: 2025-08-16

## 2025-08-16 RX ORDER — MAGNESIUM SULFATE 1 G/100ML
1 INJECTION INTRAVENOUS ONCE
Status: COMPLETED | OUTPATIENT
Start: 2025-08-16 | End: 2025-08-16

## 2025-08-16 RX ADMIN — METRONIDAZOLE 500 MG: 5 INJECTION, SOLUTION INTRAVENOUS at 07:08

## 2025-08-16 RX ADMIN — OXYCODONE HYDROCHLORIDE 10 MG: 5 TABLET ORAL at 04:08

## 2025-08-16 RX ADMIN — CEFEPIME 2 G: 2 INJECTION, POWDER, FOR SOLUTION INTRAVENOUS at 10:08

## 2025-08-16 RX ADMIN — RIVAROXABAN 20 MG: 10 TABLET, FILM COATED ORAL at 04:08

## 2025-08-16 RX ADMIN — GLYCOPYRROLATE 0.2 MG: 0.2 INJECTION INTRAMUSCULAR; INTRAVENOUS at 06:08

## 2025-08-16 RX ADMIN — MIDODRINE HYDROCHLORIDE 10 MG: 5 TABLET ORAL at 03:08

## 2025-08-16 RX ADMIN — METRONIDAZOLE 500 MG: 5 INJECTION, SOLUTION INTRAVENOUS at 11:08

## 2025-08-16 RX ADMIN — LEVETIRACETAM 750 MG: 100 SOLUTION ORAL at 09:08

## 2025-08-16 RX ADMIN — PANTOPRAZOLE SODIUM 40 MG: 40 INJECTION, POWDER, FOR SOLUTION INTRAVENOUS at 08:08

## 2025-08-16 RX ADMIN — GLYCOPYRROLATE 0.2 MG: 0.2 INJECTION INTRAMUSCULAR; INTRAVENOUS at 01:08

## 2025-08-16 RX ADMIN — MIDODRINE HYDROCHLORIDE 10 MG: 5 TABLET ORAL at 08:08

## 2025-08-16 RX ADMIN — MIDODRINE HYDROCHLORIDE 10 MG: 5 TABLET ORAL at 09:08

## 2025-08-16 RX ADMIN — MICONAZOLE NITRATE: 20 POWDER TOPICAL at 09:08

## 2025-08-16 RX ADMIN — MULTIVIT AND MINERALS-FERROUS GLUCONATE 9 MG IRON/15 ML ORAL LIQUID 5 ML: at 08:08

## 2025-08-16 RX ADMIN — LEVETIRACETAM 750 MG: 100 SOLUTION ORAL at 08:08

## 2025-08-16 RX ADMIN — ALPRAZOLAM 0.25 MG: 0.25 TABLET ORAL at 09:08

## 2025-08-16 RX ADMIN — LEVOTHYROXINE SODIUM 100 MCG: 0.05 TABLET ORAL at 06:08

## 2025-08-16 RX ADMIN — VANCOMYCIN HYDROCHLORIDE 500 MG: 500 INJECTION, POWDER, LYOPHILIZED, FOR SOLUTION INTRAVENOUS at 02:08

## 2025-08-16 RX ADMIN — CALCITRIOL CAPSULES 0.25 MCG 0.5 MCG: 0.25 CAPSULE ORAL at 08:08

## 2025-08-16 RX ADMIN — MAGNESIUM SULFATE HEPTAHYDRATE 2 G: 40 INJECTION, SOLUTION INTRAVENOUS at 07:08

## 2025-08-16 RX ADMIN — MICONAZOLE NITRATE: 20 POWDER TOPICAL at 08:08

## 2025-08-16 RX ADMIN — FOLIC ACID 1 MG: 1 TABLET ORAL at 08:08

## 2025-08-16 RX ADMIN — CALCITRIOL CAPSULES 0.25 MCG 0.5 MCG: 0.25 CAPSULE ORAL at 09:08

## 2025-08-16 RX ADMIN — MAGNESIUM SULFATE IN DEXTROSE 1 G: 10 INJECTION, SOLUTION INTRAVENOUS at 06:08

## 2025-08-16 RX ADMIN — OXYCODONE HYDROCHLORIDE 10 MG: 5 TABLET ORAL at 09:08

## 2025-08-16 RX ADMIN — CEFEPIME 2 G: 2 INJECTION, POWDER, FOR SOLUTION INTRAVENOUS at 01:08

## 2025-08-16 RX ADMIN — MIRTAZAPINE 15 MG: 15 TABLET, FILM COATED ORAL at 09:08

## 2025-08-16 RX ADMIN — CEFEPIME 2 G: 2 INJECTION, POWDER, FOR SOLUTION INTRAVENOUS at 06:08

## 2025-08-16 RX ADMIN — GLYCOPYRROLATE 0.2 MG: 0.2 INJECTION INTRAMUSCULAR; INTRAVENOUS at 05:08

## 2025-08-16 RX ADMIN — GLYCOPYRROLATE 0.2 MG: 0.2 INJECTION INTRAMUSCULAR; INTRAVENOUS at 11:08

## 2025-08-16 RX ADMIN — METRONIDAZOLE 500 MG: 5 INJECTION, SOLUTION INTRAVENOUS at 03:08

## 2025-08-17 LAB
ALBUMIN SERPL-MCNC: 1.4 G/DL (ref 3.5–5)
ALBUMIN/GLOB SERPL: 0.3 RATIO (ref 1.1–2)
ALP SERPL-CCNC: 158 UNIT/L (ref 40–150)
ALT SERPL-CCNC: 8 UNIT/L (ref 0–55)
ANION GAP SERPL CALC-SCNC: 5 MEQ/L
AST SERPL-CCNC: 23 UNIT/L (ref 11–45)
BASOPHILS # BLD AUTO: 0.06 X10(3)/MCL
BASOPHILS NFR BLD AUTO: 0.3 %
BILIRUB SERPL-MCNC: 0.2 MG/DL
BUN SERPL-MCNC: 22.9 MG/DL (ref 8.9–20.6)
CALCIUM SERPL-MCNC: 7.6 MG/DL (ref 8.4–10.2)
CHLORIDE SERPL-SCNC: 107 MMOL/L (ref 98–107)
CO2 SERPL-SCNC: 23 MMOL/L (ref 22–29)
CREAT SERPL-MCNC: 0.64 MG/DL (ref 0.72–1.25)
CREAT/UREA NIT SERPL: 36
EOSINOPHIL # BLD AUTO: 0.5 X10(3)/MCL (ref 0–0.9)
EOSINOPHIL NFR BLD AUTO: 2.5 %
ERYTHROCYTE [DISTWIDTH] IN BLOOD BY AUTOMATED COUNT: 19.8 % (ref 11.5–17)
GFR SERPLBLD CREATININE-BSD FMLA CKD-EPI: >60 ML/MIN/1.73/M2
GLOBULIN SER-MCNC: 5.1 GM/DL (ref 2.4–3.5)
GLUCOSE SERPL-MCNC: 78 MG/DL (ref 74–100)
HCT VFR BLD AUTO: 24.4 % (ref 42–52)
HGB BLD-MCNC: 7.7 G/DL (ref 14–18)
IMM GRANULOCYTES # BLD AUTO: 0.1 X10(3)/MCL (ref 0–0.04)
IMM GRANULOCYTES NFR BLD AUTO: 0.5 %
LYMPHOCYTES # BLD AUTO: 2.4 X10(3)/MCL (ref 0.6–4.6)
LYMPHOCYTES NFR BLD AUTO: 12 %
MAGNESIUM SERPL-MCNC: 2.2 MG/DL (ref 1.6–2.6)
MCH RBC QN AUTO: 27 PG (ref 27–31)
MCHC RBC AUTO-ENTMCNC: 31.6 G/DL (ref 33–36)
MCV RBC AUTO: 85.6 FL (ref 80–94)
MONOCYTES # BLD AUTO: 2.03 X10(3)/MCL (ref 0.1–1.3)
MONOCYTES NFR BLD AUTO: 10.2 %
NEUTROPHILS # BLD AUTO: 14.88 X10(3)/MCL (ref 2.1–9.2)
NEUTROPHILS NFR BLD AUTO: 74.5 %
NRBC BLD AUTO-RTO: 0 %
PHOSPHATE SERPL-MCNC: 3.8 MG/DL (ref 2.3–4.7)
PLATELET # BLD AUTO: 736 X10(3)/MCL (ref 130–400)
PMV BLD AUTO: 8.4 FL (ref 7.4–10.4)
POTASSIUM SERPL-SCNC: 4.8 MMOL/L (ref 3.5–5.1)
PROT SERPL-MCNC: 6.5 GM/DL (ref 6.4–8.3)
RBC # BLD AUTO: 2.85 X10(6)/MCL (ref 4.7–6.1)
SODIUM SERPL-SCNC: 135 MMOL/L (ref 136–145)
WBC # BLD AUTO: 19.97 X10(3)/MCL (ref 4.5–11.5)

## 2025-08-17 PROCEDURE — 63600175 PHARM REV CODE 636 W HCPCS

## 2025-08-17 PROCEDURE — 63600175 PHARM REV CODE 636 W HCPCS: Mod: JZ,TB

## 2025-08-17 PROCEDURE — 94760 N-INVAS EAR/PLS OXIMETRY 1: CPT

## 2025-08-17 PROCEDURE — 27000207 HC ISOLATION

## 2025-08-17 PROCEDURE — 25000003 PHARM REV CODE 250

## 2025-08-17 PROCEDURE — 85025 COMPLETE CBC W/AUTO DIFF WBC: CPT

## 2025-08-17 PROCEDURE — 84100 ASSAY OF PHOSPHORUS: CPT

## 2025-08-17 PROCEDURE — 99900035 HC TECH TIME PER 15 MIN (STAT)

## 2025-08-17 PROCEDURE — 36415 COLL VENOUS BLD VENIPUNCTURE: CPT

## 2025-08-17 PROCEDURE — 25000242 PHARM REV CODE 250 ALT 637 W/ HCPCS

## 2025-08-17 PROCEDURE — 21400001 HC TELEMETRY ROOM

## 2025-08-17 PROCEDURE — 83735 ASSAY OF MAGNESIUM: CPT

## 2025-08-17 PROCEDURE — 11000001 HC ACUTE MED/SURG PRIVATE ROOM

## 2025-08-17 PROCEDURE — 94761 N-INVAS EAR/PLS OXIMETRY MLT: CPT

## 2025-08-17 PROCEDURE — 94640 AIRWAY INHALATION TREATMENT: CPT

## 2025-08-17 PROCEDURE — 31720 CLEARANCE OF AIRWAYS: CPT

## 2025-08-17 PROCEDURE — 80053 COMPREHEN METABOLIC PANEL: CPT

## 2025-08-17 RX ADMIN — CEFEPIME 2 G: 2 INJECTION, POWDER, FOR SOLUTION INTRAVENOUS at 12:08

## 2025-08-17 RX ADMIN — MULTIVIT AND MINERALS-FERROUS GLUCONATE 9 MG IRON/15 ML ORAL LIQUID 5 ML: at 10:08

## 2025-08-17 RX ADMIN — METRONIDAZOLE 500 MG: 5 INJECTION, SOLUTION INTRAVENOUS at 08:08

## 2025-08-17 RX ADMIN — MIDODRINE HYDROCHLORIDE 10 MG: 5 TABLET ORAL at 10:08

## 2025-08-17 RX ADMIN — METRONIDAZOLE 500 MG: 5 INJECTION, SOLUTION INTRAVENOUS at 04:08

## 2025-08-17 RX ADMIN — GLYCOPYRROLATE 0.2 MG: 0.2 INJECTION INTRAMUSCULAR; INTRAVENOUS at 12:08

## 2025-08-17 RX ADMIN — GLYCOPYRROLATE 0.2 MG: 0.2 INJECTION INTRAMUSCULAR; INTRAVENOUS at 05:08

## 2025-08-17 RX ADMIN — CALCITRIOL CAPSULES 0.25 MCG 0.5 MCG: 0.25 CAPSULE ORAL at 09:08

## 2025-08-17 RX ADMIN — RIVAROXABAN 20 MG: 10 TABLET, FILM COATED ORAL at 04:08

## 2025-08-17 RX ADMIN — MICONAZOLE NITRATE: 20 POWDER TOPICAL at 10:08

## 2025-08-17 RX ADMIN — SODIUM CHLORIDE, POTASSIUM CHLORIDE, SODIUM LACTATE AND CALCIUM CHLORIDE 1000 ML: 600; 310; 30; 20 INJECTION, SOLUTION INTRAVENOUS at 01:08

## 2025-08-17 RX ADMIN — PANTOPRAZOLE SODIUM 40 MG: 40 INJECTION, POWDER, FOR SOLUTION INTRAVENOUS at 08:08

## 2025-08-17 RX ADMIN — OXYCODONE HYDROCHLORIDE 10 MG: 5 TABLET ORAL at 05:08

## 2025-08-17 RX ADMIN — MIDODRINE HYDROCHLORIDE 10 MG: 5 TABLET ORAL at 04:08

## 2025-08-17 RX ADMIN — OXYCODONE HYDROCHLORIDE 10 MG: 5 TABLET ORAL at 01:08

## 2025-08-17 RX ADMIN — ALPRAZOLAM 0.25 MG: 0.25 TABLET ORAL at 11:08

## 2025-08-17 RX ADMIN — CALCITRIOL CAPSULES 0.25 MCG 0.5 MCG: 0.25 CAPSULE ORAL at 10:08

## 2025-08-17 RX ADMIN — IPRATROPIUM BROMIDE AND ALBUTEROL SULFATE 3 ML: .5; 3 SOLUTION RESPIRATORY (INHALATION) at 06:08

## 2025-08-17 RX ADMIN — METRONIDAZOLE 500 MG: 5 INJECTION, SOLUTION INTRAVENOUS at 11:08

## 2025-08-17 RX ADMIN — LEVOTHYROXINE SODIUM 100 MCG: 0.05 TABLET ORAL at 06:08

## 2025-08-17 RX ADMIN — CEFEPIME 2 G: 2 INJECTION, POWDER, FOR SOLUTION INTRAVENOUS at 10:08

## 2025-08-17 RX ADMIN — MIRTAZAPINE 15 MG: 15 TABLET, FILM COATED ORAL at 09:08

## 2025-08-17 RX ADMIN — MIDODRINE HYDROCHLORIDE 10 MG: 5 TABLET ORAL at 09:08

## 2025-08-17 RX ADMIN — LEVETIRACETAM 750 MG: 100 SOLUTION ORAL at 09:08

## 2025-08-17 RX ADMIN — VANCOMYCIN HYDROCHLORIDE 500 MG: 500 INJECTION, POWDER, LYOPHILIZED, FOR SOLUTION INTRAVENOUS at 02:08

## 2025-08-17 RX ADMIN — GLYCOPYRROLATE 0.2 MG: 0.2 INJECTION INTRAMUSCULAR; INTRAVENOUS at 11:08

## 2025-08-17 RX ADMIN — MICONAZOLE NITRATE: 20 POWDER TOPICAL at 09:08

## 2025-08-17 RX ADMIN — CEFEPIME 2 G: 2 INJECTION, POWDER, FOR SOLUTION INTRAVENOUS at 05:08

## 2025-08-17 RX ADMIN — FOLIC ACID 1 MG: 1 TABLET ORAL at 10:08

## 2025-08-18 LAB
ALBUMIN SERPL-MCNC: 1.5 G/DL (ref 3.5–5)
ALBUMIN/GLOB SERPL: 0.3 RATIO (ref 1.1–2)
ALP SERPL-CCNC: 184 UNIT/L (ref 40–150)
ALT SERPL-CCNC: 9 UNIT/L (ref 0–55)
ANION GAP SERPL CALC-SCNC: 6 MEQ/L
AST SERPL-CCNC: 21 UNIT/L (ref 11–45)
BASOPHILS # BLD AUTO: 0.08 X10(3)/MCL
BASOPHILS NFR BLD AUTO: 0.4 %
BILIRUB SERPL-MCNC: 0.2 MG/DL
BUN SERPL-MCNC: 22.3 MG/DL (ref 8.9–20.6)
CALCIUM SERPL-MCNC: 8.4 MG/DL (ref 8.4–10.2)
CHLORIDE SERPL-SCNC: 105 MMOL/L (ref 98–107)
CO2 SERPL-SCNC: 24 MMOL/L (ref 22–29)
CREAT SERPL-MCNC: 0.62 MG/DL (ref 0.72–1.25)
CREAT/UREA NIT SERPL: 36
EOSINOPHIL # BLD AUTO: 0.42 X10(3)/MCL (ref 0–0.9)
EOSINOPHIL NFR BLD AUTO: 2.2 %
ERYTHROCYTE [DISTWIDTH] IN BLOOD BY AUTOMATED COUNT: 19.5 % (ref 11.5–17)
GFR SERPLBLD CREATININE-BSD FMLA CKD-EPI: >60 ML/MIN/1.73/M2
GLOBULIN SER-MCNC: 5.6 GM/DL (ref 2.4–3.5)
GLUCOSE SERPL-MCNC: 89 MG/DL (ref 74–100)
HCT VFR BLD AUTO: 25.6 % (ref 42–52)
HGB BLD-MCNC: 8.1 G/DL (ref 14–18)
IMM GRANULOCYTES # BLD AUTO: 0.11 X10(3)/MCL (ref 0–0.04)
IMM GRANULOCYTES NFR BLD AUTO: 0.6 %
LYMPHOCYTES # BLD AUTO: 2.27 X10(3)/MCL (ref 0.6–4.6)
LYMPHOCYTES NFR BLD AUTO: 11.7 %
MAGNESIUM SERPL-MCNC: 1.4 MG/DL (ref 1.6–2.6)
MCH RBC QN AUTO: 26.9 PG (ref 27–31)
MCHC RBC AUTO-ENTMCNC: 31.6 G/DL (ref 33–36)
MCV RBC AUTO: 85 FL (ref 80–94)
MONOCYTES # BLD AUTO: 2.01 X10(3)/MCL (ref 0.1–1.3)
MONOCYTES NFR BLD AUTO: 10.4 %
NEUTROPHILS # BLD AUTO: 14.47 X10(3)/MCL (ref 2.1–9.2)
NEUTROPHILS NFR BLD AUTO: 74.7 %
NRBC BLD AUTO-RTO: 0 %
PHOSPHATE SERPL-MCNC: 3.3 MG/DL (ref 2.3–4.7)
PLATELET # BLD AUTO: 758 X10(3)/MCL (ref 130–400)
PMV BLD AUTO: 8.5 FL (ref 7.4–10.4)
POTASSIUM SERPL-SCNC: 5 MMOL/L (ref 3.5–5.1)
PROT SERPL-MCNC: 7.1 GM/DL (ref 6.4–8.3)
RBC # BLD AUTO: 3.01 X10(6)/MCL (ref 4.7–6.1)
SODIUM SERPL-SCNC: 135 MMOL/L (ref 136–145)
VANCOMYCIN TROUGH SERPL-MCNC: 14.9 UG/ML (ref 15–20)
WBC # BLD AUTO: 19.36 X10(3)/MCL (ref 4.5–11.5)

## 2025-08-18 PROCEDURE — 25000242 PHARM REV CODE 250 ALT 637 W/ HCPCS

## 2025-08-18 PROCEDURE — 94761 N-INVAS EAR/PLS OXIMETRY MLT: CPT

## 2025-08-18 PROCEDURE — 83735 ASSAY OF MAGNESIUM: CPT

## 2025-08-18 PROCEDURE — 21400001 HC TELEMETRY ROOM

## 2025-08-18 PROCEDURE — 63600175 PHARM REV CODE 636 W HCPCS

## 2025-08-18 PROCEDURE — 31720 CLEARANCE OF AIRWAYS: CPT

## 2025-08-18 PROCEDURE — 94760 N-INVAS EAR/PLS OXIMETRY 1: CPT

## 2025-08-18 PROCEDURE — 63600175 PHARM REV CODE 636 W HCPCS: Mod: JZ,TB

## 2025-08-18 PROCEDURE — 25000003 PHARM REV CODE 250

## 2025-08-18 PROCEDURE — 94640 AIRWAY INHALATION TREATMENT: CPT

## 2025-08-18 PROCEDURE — 27000207 HC ISOLATION

## 2025-08-18 PROCEDURE — 80202 ASSAY OF VANCOMYCIN: CPT

## 2025-08-18 PROCEDURE — 85025 COMPLETE CBC W/AUTO DIFF WBC: CPT

## 2025-08-18 PROCEDURE — 36415 COLL VENOUS BLD VENIPUNCTURE: CPT

## 2025-08-18 PROCEDURE — 84100 ASSAY OF PHOSPHORUS: CPT

## 2025-08-18 PROCEDURE — 80053 COMPREHEN METABOLIC PANEL: CPT

## 2025-08-18 PROCEDURE — 99233 SBSQ HOSP IP/OBS HIGH 50: CPT | Mod: ,,,

## 2025-08-18 RX ADMIN — FOLIC ACID 1 MG: 1 TABLET ORAL at 09:08

## 2025-08-18 RX ADMIN — IPRATROPIUM BROMIDE AND ALBUTEROL SULFATE 3 ML: .5; 3 SOLUTION RESPIRATORY (INHALATION) at 09:08

## 2025-08-18 RX ADMIN — CEFEPIME 2 G: 2 INJECTION, POWDER, FOR SOLUTION INTRAVENOUS at 09:08

## 2025-08-18 RX ADMIN — MIDODRINE HYDROCHLORIDE 10 MG: 5 TABLET ORAL at 09:08

## 2025-08-18 RX ADMIN — MICONAZOLE NITRATE: 20 POWDER TOPICAL at 09:08

## 2025-08-18 RX ADMIN — ACETYLCYSTEINE 4 ML: 100 INHALANT RESPIRATORY (INHALATION) at 09:08

## 2025-08-18 RX ADMIN — METRONIDAZOLE 500 MG: 5 INJECTION, SOLUTION INTRAVENOUS at 11:08

## 2025-08-18 RX ADMIN — MIRTAZAPINE 15 MG: 15 TABLET, FILM COATED ORAL at 09:08

## 2025-08-18 RX ADMIN — PANTOPRAZOLE SODIUM 40 MG: 40 INJECTION, POWDER, FOR SOLUTION INTRAVENOUS at 10:08

## 2025-08-18 RX ADMIN — LEVETIRACETAM 750 MG: 100 SOLUTION ORAL at 09:08

## 2025-08-18 RX ADMIN — IPRATROPIUM BROMIDE AND ALBUTEROL SULFATE 3 ML: 2.5; .5 SOLUTION RESPIRATORY (INHALATION) at 01:08

## 2025-08-18 RX ADMIN — CALCITRIOL CAPSULES 0.25 MCG 0.5 MCG: 0.25 CAPSULE ORAL at 09:08

## 2025-08-18 RX ADMIN — GLYCOPYRROLATE 0.2 MG: 0.2 INJECTION INTRAMUSCULAR; INTRAVENOUS at 01:08

## 2025-08-18 RX ADMIN — ALPRAZOLAM 0.25 MG: 0.25 TABLET ORAL at 12:08

## 2025-08-18 RX ADMIN — VANCOMYCIN HYDROCHLORIDE 500 MG: 500 INJECTION, POWDER, LYOPHILIZED, FOR SOLUTION INTRAVENOUS at 01:08

## 2025-08-18 RX ADMIN — LEVOTHYROXINE SODIUM 100 MCG: 0.05 TABLET ORAL at 06:08

## 2025-08-18 RX ADMIN — GLYCOPYRROLATE 0.2 MG: 0.2 INJECTION INTRAMUSCULAR; INTRAVENOUS at 07:08

## 2025-08-18 RX ADMIN — CEFEPIME 2 G: 2 INJECTION, POWDER, FOR SOLUTION INTRAVENOUS at 06:08

## 2025-08-18 RX ADMIN — ACETYLCYSTEINE 4 ML: 100 INHALANT RESPIRATORY (INHALATION) at 01:08

## 2025-08-18 RX ADMIN — GLYCOPYRROLATE 0.2 MG: 0.2 INJECTION INTRAMUSCULAR; INTRAVENOUS at 06:08

## 2025-08-18 RX ADMIN — METRONIDAZOLE 500 MG: 5 INJECTION, SOLUTION INTRAVENOUS at 02:08

## 2025-08-18 RX ADMIN — OXYCODONE HYDROCHLORIDE 10 MG: 5 TABLET ORAL at 09:08

## 2025-08-18 RX ADMIN — CEFEPIME 2 G: 2 INJECTION, POWDER, FOR SOLUTION INTRAVENOUS at 02:08

## 2025-08-18 RX ADMIN — MIDODRINE HYDROCHLORIDE 10 MG: 5 TABLET ORAL at 02:08

## 2025-08-18 RX ADMIN — METRONIDAZOLE 500 MG: 5 INJECTION, SOLUTION INTRAVENOUS at 09:08

## 2025-08-18 RX ADMIN — GLYCOPYRROLATE 0.2 MG: 0.2 INJECTION INTRAMUSCULAR; INTRAVENOUS at 11:08

## 2025-08-18 RX ADMIN — MULTIVIT AND MINERALS-FERROUS GLUCONATE 9 MG IRON/15 ML ORAL LIQUID 5 ML: at 09:08

## 2025-08-19 LAB
ALBUMIN SERPL-MCNC: 1.4 G/DL (ref 3.5–5)
ALBUMIN/GLOB SERPL: 0.2 RATIO (ref 1.1–2)
ALP SERPL-CCNC: 167 UNIT/L (ref 40–150)
ALT SERPL-CCNC: 10 UNIT/L (ref 0–55)
ANION GAP SERPL CALC-SCNC: 4 MEQ/L
ANION GAP SERPL CALC-SCNC: 7 MEQ/L
AST SERPL-CCNC: 27 UNIT/L (ref 11–45)
BACTERIA SPEC CULT: ABNORMAL
BASOPHILS # BLD AUTO: 0.12 X10(3)/MCL
BASOPHILS NFR BLD AUTO: 0.6 %
BILIRUB SERPL-MCNC: 0.2 MG/DL
BUN SERPL-MCNC: 21.2 MG/DL (ref 8.9–20.6)
BUN SERPL-MCNC: 22.4 MG/DL (ref 8.9–20.6)
CALCIUM SERPL-MCNC: 8.2 MG/DL (ref 8.4–10.2)
CALCIUM SERPL-MCNC: 8.6 MG/DL (ref 8.4–10.2)
CHLORIDE SERPL-SCNC: 103 MMOL/L (ref 98–107)
CHLORIDE SERPL-SCNC: 104 MMOL/L (ref 98–107)
CO2 SERPL-SCNC: 22 MMOL/L (ref 22–29)
CO2 SERPL-SCNC: 23 MMOL/L (ref 22–29)
CREAT SERPL-MCNC: 0.69 MG/DL (ref 0.72–1.25)
CREAT SERPL-MCNC: 0.71 MG/DL (ref 0.72–1.25)
CREAT/UREA NIT SERPL: 30
CREAT/UREA NIT SERPL: 32
EOSINOPHIL # BLD AUTO: 0.27 X10(3)/MCL (ref 0–0.9)
EOSINOPHIL NFR BLD AUTO: 1.4 %
ERYTHROCYTE [DISTWIDTH] IN BLOOD BY AUTOMATED COUNT: 19.7 % (ref 11.5–17)
GFR SERPLBLD CREATININE-BSD FMLA CKD-EPI: >60 ML/MIN/1.73/M2
GFR SERPLBLD CREATININE-BSD FMLA CKD-EPI: >60 ML/MIN/1.73/M2
GLOBULIN SER-MCNC: 5.8 GM/DL (ref 2.4–3.5)
GLUCOSE SERPL-MCNC: 135 MG/DL (ref 74–100)
GLUCOSE SERPL-MCNC: 85 MG/DL (ref 74–100)
GRAM STN SPEC: ABNORMAL
GRAM STN SPEC: ABNORMAL
HCT VFR BLD AUTO: 24 % (ref 42–52)
HCT VFR BLD AUTO: 26.2 % (ref 42–52)
HGB BLD-MCNC: 7.6 G/DL (ref 14–18)
HGB BLD-MCNC: 8.3 G/DL (ref 14–18)
HOLD SPECIMEN: NORMAL
HOLD SPECIMEN: NORMAL
IMM GRANULOCYTES # BLD AUTO: 0.08 X10(3)/MCL (ref 0–0.04)
IMM GRANULOCYTES NFR BLD AUTO: 0.4 %
LYMPHOCYTES # BLD AUTO: 1.8 X10(3)/MCL (ref 0.6–4.6)
LYMPHOCYTES NFR BLD AUTO: 9.6 %
MAGNESIUM SERPL-MCNC: 1 MG/DL (ref 1.6–2.6)
MCH RBC QN AUTO: 27.2 PG (ref 27–31)
MCHC RBC AUTO-ENTMCNC: 31.7 G/DL (ref 33–36)
MCV RBC AUTO: 86 FL (ref 80–94)
MONOCYTES # BLD AUTO: 2.23 X10(3)/MCL (ref 0.1–1.3)
MONOCYTES NFR BLD AUTO: 11.9 %
NEUTROPHILS # BLD AUTO: 14.2 X10(3)/MCL (ref 2.1–9.2)
NEUTROPHILS NFR BLD AUTO: 76.1 %
NRBC BLD AUTO-RTO: 0 %
PHOSPHATE SERPL-MCNC: 3.4 MG/DL (ref 2.3–4.7)
PLATELET # BLD AUTO: 660 X10(3)/MCL (ref 130–400)
PMV BLD AUTO: 8.5 FL (ref 7.4–10.4)
POCT GLUCOSE: 95 MG/DL (ref 70–110)
POTASSIUM SERPL-SCNC: 4.7 MMOL/L (ref 3.5–5.1)
POTASSIUM SERPL-SCNC: 5.3 MMOL/L (ref 3.5–5.1)
PROT SERPL-MCNC: 7.2 GM/DL (ref 6.4–8.3)
RBC # BLD AUTO: 2.79 X10(6)/MCL (ref 4.7–6.1)
SODIUM SERPL-SCNC: 130 MMOL/L (ref 136–145)
SODIUM SERPL-SCNC: 133 MMOL/L (ref 136–145)
WBC # BLD AUTO: 18.7 X10(3)/MCL (ref 4.5–11.5)

## 2025-08-19 PROCEDURE — 25000003 PHARM REV CODE 250

## 2025-08-19 PROCEDURE — 83735 ASSAY OF MAGNESIUM: CPT

## 2025-08-19 PROCEDURE — 25000242 PHARM REV CODE 250 ALT 637 W/ HCPCS

## 2025-08-19 PROCEDURE — 63600175 PHARM REV CODE 636 W HCPCS: Mod: JZ,TB

## 2025-08-19 PROCEDURE — 36415 COLL VENOUS BLD VENIPUNCTURE: CPT

## 2025-08-19 PROCEDURE — 99900035 HC TECH TIME PER 15 MIN (STAT)

## 2025-08-19 PROCEDURE — 85014 HEMATOCRIT: CPT

## 2025-08-19 PROCEDURE — 94761 N-INVAS EAR/PLS OXIMETRY MLT: CPT

## 2025-08-19 PROCEDURE — 94640 AIRWAY INHALATION TREATMENT: CPT

## 2025-08-19 PROCEDURE — 84100 ASSAY OF PHOSPHORUS: CPT

## 2025-08-19 PROCEDURE — 27000221 HC OXYGEN, UP TO 24 HOURS

## 2025-08-19 PROCEDURE — 63600175 PHARM REV CODE 636 W HCPCS

## 2025-08-19 PROCEDURE — 27000207 HC ISOLATION

## 2025-08-19 PROCEDURE — 31720 CLEARANCE OF AIRWAYS: CPT

## 2025-08-19 PROCEDURE — 85025 COMPLETE CBC W/AUTO DIFF WBC: CPT

## 2025-08-19 PROCEDURE — 80053 COMPREHEN METABOLIC PANEL: CPT

## 2025-08-19 PROCEDURE — 21400001 HC TELEMETRY ROOM

## 2025-08-19 RX ORDER — MAGNESIUM SULFATE HEPTAHYDRATE 40 MG/ML
2 INJECTION, SOLUTION INTRAVENOUS ONCE
Status: COMPLETED | OUTPATIENT
Start: 2025-08-19 | End: 2025-08-19

## 2025-08-19 RX ORDER — MAGNESIUM SULFATE 1 G/100ML
1 INJECTION INTRAVENOUS ONCE
Status: COMPLETED | OUTPATIENT
Start: 2025-08-19 | End: 2025-08-19

## 2025-08-19 RX ADMIN — MICONAZOLE NITRATE: 20 POWDER TOPICAL at 09:08

## 2025-08-19 RX ADMIN — METRONIDAZOLE 500 MG: 5 INJECTION, SOLUTION INTRAVENOUS at 02:08

## 2025-08-19 RX ADMIN — METRONIDAZOLE 500 MG: 5 INJECTION, SOLUTION INTRAVENOUS at 11:08

## 2025-08-19 RX ADMIN — PANTOPRAZOLE SODIUM 40 MG: 40 INJECTION, POWDER, FOR SOLUTION INTRAVENOUS at 09:08

## 2025-08-19 RX ADMIN — MIRTAZAPINE 15 MG: 15 TABLET, FILM COATED ORAL at 08:08

## 2025-08-19 RX ADMIN — MAGNESIUM SULFATE IN DEXTROSE 1 G: 10 INJECTION, SOLUTION INTRAVENOUS at 08:08

## 2025-08-19 RX ADMIN — MIDODRINE HYDROCHLORIDE 10 MG: 5 TABLET ORAL at 03:08

## 2025-08-19 RX ADMIN — LEVOTHYROXINE SODIUM 100 MCG: 0.05 TABLET ORAL at 06:08

## 2025-08-19 RX ADMIN — MAGNESIUM SULFATE HEPTAHYDRATE 2 G: 40 INJECTION, SOLUTION INTRAVENOUS at 09:08

## 2025-08-19 RX ADMIN — METRONIDAZOLE 500 MG: 5 INJECTION, SOLUTION INTRAVENOUS at 09:08

## 2025-08-19 RX ADMIN — CALCITRIOL CAPSULES 0.25 MCG 0.5 MCG: 0.25 CAPSULE ORAL at 08:08

## 2025-08-19 RX ADMIN — CEFEPIME 2 G: 2 INJECTION, POWDER, FOR SOLUTION INTRAVENOUS at 11:08

## 2025-08-19 RX ADMIN — FOLIC ACID 1 MG: 1 TABLET ORAL at 09:08

## 2025-08-19 RX ADMIN — MIDODRINE HYDROCHLORIDE 10 MG: 5 TABLET ORAL at 09:08

## 2025-08-19 RX ADMIN — SODIUM ZIRCONIUM CYCLOSILICATE 10 G: 10 POWDER, FOR SUSPENSION ORAL at 08:08

## 2025-08-19 RX ADMIN — IPRATROPIUM BROMIDE AND ALBUTEROL SULFATE 3 ML: 2.5; .5 SOLUTION RESPIRATORY (INHALATION) at 07:08

## 2025-08-19 RX ADMIN — CALCITRIOL CAPSULES 0.25 MCG 0.5 MCG: 0.25 CAPSULE ORAL at 09:08

## 2025-08-19 RX ADMIN — GLYCOPYRROLATE 0.2 MG: 0.2 INJECTION INTRAMUSCULAR; INTRAVENOUS at 06:08

## 2025-08-19 RX ADMIN — ALPRAZOLAM 0.25 MG: 0.25 TABLET ORAL at 02:08

## 2025-08-19 RX ADMIN — CEFEPIME 2 G: 2 INJECTION, POWDER, FOR SOLUTION INTRAVENOUS at 06:08

## 2025-08-19 RX ADMIN — VANCOMYCIN HYDROCHLORIDE 500 MG: 500 INJECTION, POWDER, LYOPHILIZED, FOR SOLUTION INTRAVENOUS at 02:08

## 2025-08-19 RX ADMIN — MIDODRINE HYDROCHLORIDE 10 MG: 5 TABLET ORAL at 08:08

## 2025-08-19 RX ADMIN — MULTIVIT AND MINERALS-FERROUS GLUCONATE 9 MG IRON/15 ML ORAL LIQUID 5 ML: at 09:08

## 2025-08-19 RX ADMIN — CEFEPIME 2 G: 2 INJECTION, POWDER, FOR SOLUTION INTRAVENOUS at 02:08

## 2025-08-19 RX ADMIN — GLYCOPYRROLATE 0.2 MG: 0.2 INJECTION INTRAMUSCULAR; INTRAVENOUS at 11:08

## 2025-08-19 RX ADMIN — LEVETIRACETAM 750 MG: 100 SOLUTION ORAL at 09:08

## 2025-08-19 RX ADMIN — SODIUM ZIRCONIUM CYCLOSILICATE 10 G: 10 POWDER, FOR SUSPENSION ORAL at 06:08

## 2025-08-19 RX ADMIN — ACETYLCYSTEINE 4 ML: 100 INHALANT RESPIRATORY (INHALATION) at 07:08

## 2025-08-19 RX ADMIN — GLYCOPYRROLATE 0.2 MG: 0.2 INJECTION INTRAMUSCULAR; INTRAVENOUS at 01:08

## 2025-08-19 RX ADMIN — LEVETIRACETAM 750 MG: 100 SOLUTION ORAL at 08:08

## 2025-08-20 VITALS
BODY MASS INDEX: 19.81 KG/M2 | HEART RATE: 102 BPM | TEMPERATURE: 98 F | SYSTOLIC BLOOD PRESSURE: 129 MMHG | DIASTOLIC BLOOD PRESSURE: 63 MMHG | HEIGHT: 66 IN | RESPIRATION RATE: 20 BRPM | OXYGEN SATURATION: 95 % | WEIGHT: 123.25 LBS

## 2025-08-20 LAB
ALBUMIN SERPL-MCNC: 1.5 G/DL (ref 3.5–5)
ALBUMIN/GLOB SERPL: 0.3 RATIO (ref 1.1–2)
ALP SERPL-CCNC: 173 UNIT/L (ref 40–150)
ALT SERPL-CCNC: 10 UNIT/L (ref 0–55)
ANION GAP SERPL CALC-SCNC: 4 MEQ/L
AST SERPL-CCNC: 20 UNIT/L (ref 11–45)
BASOPHILS # BLD AUTO: 0.12 X10(3)/MCL
BASOPHILS NFR BLD AUTO: 0.8 %
BILIRUB SERPL-MCNC: 0.2 MG/DL
BUN SERPL-MCNC: 36.9 MG/DL (ref 8.9–20.6)
CALCIUM SERPL-MCNC: 9.4 MG/DL (ref 8.4–10.2)
CHLORIDE SERPL-SCNC: 100 MMOL/L (ref 98–107)
CO2 SERPL-SCNC: 24 MMOL/L (ref 22–29)
CREAT SERPL-MCNC: 0.67 MG/DL (ref 0.72–1.25)
CREAT/UREA NIT SERPL: 55
EOSINOPHIL # BLD AUTO: 0.47 X10(3)/MCL (ref 0–0.9)
EOSINOPHIL NFR BLD AUTO: 3.1 %
ERYTHROCYTE [DISTWIDTH] IN BLOOD BY AUTOMATED COUNT: 19 % (ref 11.5–17)
GFR SERPLBLD CREATININE-BSD FMLA CKD-EPI: >60 ML/MIN/1.73/M2
GLOBULIN SER-MCNC: 5.8 GM/DL (ref 2.4–3.5)
GLUCOSE SERPL-MCNC: 74 MG/DL (ref 74–100)
HCT VFR BLD AUTO: 24.1 % (ref 42–52)
HGB BLD-MCNC: 7.9 G/DL (ref 14–18)
IMM GRANULOCYTES # BLD AUTO: 0.08 X10(3)/MCL (ref 0–0.04)
IMM GRANULOCYTES NFR BLD AUTO: 0.5 %
LYMPHOCYTES # BLD AUTO: 2.59 X10(3)/MCL (ref 0.6–4.6)
LYMPHOCYTES NFR BLD AUTO: 17.3 %
MAGNESIUM SERPL-MCNC: 1.4 MG/DL (ref 1.6–2.6)
MCH RBC QN AUTO: 27.3 PG (ref 27–31)
MCHC RBC AUTO-ENTMCNC: 32.8 G/DL (ref 33–36)
MCV RBC AUTO: 83.4 FL (ref 80–94)
MONOCYTES # BLD AUTO: 2.17 X10(3)/MCL (ref 0.1–1.3)
MONOCYTES NFR BLD AUTO: 14.5 %
NEUTROPHILS # BLD AUTO: 9.54 X10(3)/MCL (ref 2.1–9.2)
NEUTROPHILS NFR BLD AUTO: 63.8 %
NRBC BLD AUTO-RTO: 0 %
PHOSPHATE SERPL-MCNC: 3.8 MG/DL (ref 2.3–4.7)
PLATELET # BLD AUTO: 616 X10(3)/MCL (ref 130–400)
PMV BLD AUTO: 8.7 FL (ref 7.4–10.4)
POTASSIUM SERPL-SCNC: 4.6 MMOL/L (ref 3.5–5.1)
PROT SERPL-MCNC: 7.3 GM/DL (ref 6.4–8.3)
RBC # BLD AUTO: 2.89 X10(6)/MCL (ref 4.7–6.1)
SODIUM SERPL-SCNC: 128 MMOL/L (ref 136–145)
WBC # BLD AUTO: 14.97 X10(3)/MCL (ref 4.5–11.5)

## 2025-08-20 PROCEDURE — 84100 ASSAY OF PHOSPHORUS: CPT

## 2025-08-20 PROCEDURE — 25000003 PHARM REV CODE 250

## 2025-08-20 PROCEDURE — 83735 ASSAY OF MAGNESIUM: CPT

## 2025-08-20 PROCEDURE — 36415 COLL VENOUS BLD VENIPUNCTURE: CPT

## 2025-08-20 PROCEDURE — 85025 COMPLETE CBC W/AUTO DIFF WBC: CPT

## 2025-08-20 PROCEDURE — 94761 N-INVAS EAR/PLS OXIMETRY MLT: CPT

## 2025-08-20 PROCEDURE — 63600175 PHARM REV CODE 636 W HCPCS: Mod: JZ,TB

## 2025-08-20 PROCEDURE — 99900035 HC TECH TIME PER 15 MIN (STAT)

## 2025-08-20 PROCEDURE — 25000242 PHARM REV CODE 250 ALT 637 W/ HCPCS

## 2025-08-20 PROCEDURE — 27000221 HC OXYGEN, UP TO 24 HOURS

## 2025-08-20 PROCEDURE — 63600175 PHARM REV CODE 636 W HCPCS

## 2025-08-20 PROCEDURE — 25000003 PHARM REV CODE 250: Performed by: STUDENT IN AN ORGANIZED HEALTH CARE EDUCATION/TRAINING PROGRAM

## 2025-08-20 PROCEDURE — 80053 COMPREHEN METABOLIC PANEL: CPT

## 2025-08-20 PROCEDURE — 63600175 PHARM REV CODE 636 W HCPCS: Performed by: STUDENT IN AN ORGANIZED HEALTH CARE EDUCATION/TRAINING PROGRAM

## 2025-08-20 PROCEDURE — 94640 AIRWAY INHALATION TREATMENT: CPT

## 2025-08-20 RX ORDER — DOXYCYCLINE 100 MG/1
100 CAPSULE ORAL EVERY 12 HOURS
Qty: 56 CAPSULE | Refills: 0 | Status: SHIPPED | OUTPATIENT
Start: 2025-08-20 | End: 2025-09-17

## 2025-08-20 RX ORDER — LEVOFLOXACIN 750 MG/1
750 TABLET, FILM COATED ORAL DAILY
Qty: 28 TABLET | Refills: 0 | Status: SHIPPED | OUTPATIENT
Start: 2025-08-20 | End: 2025-09-17

## 2025-08-20 RX ORDER — MAGNESIUM SULFATE HEPTAHYDRATE 40 MG/ML
2 INJECTION, SOLUTION INTRAVENOUS ONCE
Status: COMPLETED | OUTPATIENT
Start: 2025-08-20 | End: 2025-08-20

## 2025-08-20 RX ORDER — METRONIDAZOLE 500 MG/1
500 TABLET ORAL EVERY 8 HOURS
Qty: 84 TABLET | Refills: 0 | Status: SHIPPED | OUTPATIENT
Start: 2025-08-20 | End: 2025-08-22 | Stop reason: SDUPTHER

## 2025-08-20 RX ADMIN — ACETYLCYSTEINE 4 ML: 100 INHALANT RESPIRATORY (INHALATION) at 01:08

## 2025-08-20 RX ADMIN — ALPRAZOLAM 0.25 MG: 0.25 TABLET ORAL at 12:08

## 2025-08-20 RX ADMIN — FOLIC ACID 1 MG: 1 TABLET ORAL at 08:08

## 2025-08-20 RX ADMIN — METRONIDAZOLE 500 MG: 5 INJECTION, SOLUTION INTRAVENOUS at 08:08

## 2025-08-20 RX ADMIN — MIDODRINE HYDROCHLORIDE 10 MG: 5 TABLET ORAL at 08:08

## 2025-08-20 RX ADMIN — LEVETIRACETAM 750 MG: 100 SOLUTION ORAL at 08:08

## 2025-08-20 RX ADMIN — SODIUM CHLORIDE, POTASSIUM CHLORIDE, SODIUM LACTATE AND CALCIUM CHLORIDE 500 ML: 600; 310; 30; 20 INJECTION, SOLUTION INTRAVENOUS at 04:08

## 2025-08-20 RX ADMIN — MIDODRINE HYDROCHLORIDE 10 MG: 5 TABLET ORAL at 04:08

## 2025-08-20 RX ADMIN — MULTIVIT AND MINERALS-FERROUS GLUCONATE 9 MG IRON/15 ML ORAL LIQUID 5 ML: at 08:08

## 2025-08-20 RX ADMIN — MICONAZOLE NITRATE: 20 POWDER TOPICAL at 01:08

## 2025-08-20 RX ADMIN — ACETYLCYSTEINE 4 ML: 100 INHALANT RESPIRATORY (INHALATION) at 08:08

## 2025-08-20 RX ADMIN — LEVOTHYROXINE SODIUM 100 MCG: 0.05 TABLET ORAL at 06:08

## 2025-08-20 RX ADMIN — CEFEPIME 2 G: 2 INJECTION, POWDER, FOR SOLUTION INTRAVENOUS at 06:08

## 2025-08-20 RX ADMIN — PANTOPRAZOLE SODIUM 40 MG: 40 INJECTION, POWDER, FOR SOLUTION INTRAVENOUS at 08:08

## 2025-08-20 RX ADMIN — VANCOMYCIN HYDROCHLORIDE 500 MG: 500 INJECTION, POWDER, LYOPHILIZED, FOR SOLUTION INTRAVENOUS at 02:08

## 2025-08-20 RX ADMIN — IPRATROPIUM BROMIDE AND ALBUTEROL SULFATE 3 ML: 2.5; .5 SOLUTION RESPIRATORY (INHALATION) at 08:08

## 2025-08-20 RX ADMIN — IPRATROPIUM BROMIDE AND ALBUTEROL SULFATE 3 ML: 2.5; .5 SOLUTION RESPIRATORY (INHALATION) at 01:08

## 2025-08-20 RX ADMIN — GLYCOPYRROLATE 0.2 MG: 0.2 INJECTION INTRAMUSCULAR; INTRAVENOUS at 06:08

## 2025-08-20 RX ADMIN — CEFEPIME 2 G: 2 INJECTION, POWDER, FOR SOLUTION INTRAVENOUS at 02:08

## 2025-08-20 RX ADMIN — METRONIDAZOLE 500 MG: 5 INJECTION, SOLUTION INTRAVENOUS at 04:08

## 2025-08-20 RX ADMIN — OXYCODONE HYDROCHLORIDE 10 MG: 5 TABLET ORAL at 01:08

## 2025-08-20 RX ADMIN — CALCITRIOL CAPSULES 0.25 MCG 0.5 MCG: 0.25 CAPSULE ORAL at 08:08

## 2025-08-20 RX ADMIN — MAGNESIUM SULFATE HEPTAHYDRATE 2 G: 40 INJECTION, SOLUTION INTRAVENOUS at 06:08

## 2025-08-20 RX ADMIN — OXYCODONE HYDROCHLORIDE 10 MG: 5 TABLET ORAL at 12:08

## 2025-08-22 DIAGNOSIS — M86.9 OSTEOMYELITIS, UNSPECIFIED SITE, UNSPECIFIED TYPE: Primary | ICD-10-CM

## 2025-08-22 RX ORDER — METRONIDAZOLE 500 MG/1
500 TABLET ORAL EVERY 8 HOURS
Qty: 84 TABLET | Refills: 0 | Status: SHIPPED | OUTPATIENT
Start: 2025-08-22 | End: 2025-08-22

## 2025-08-22 RX ORDER — METRONIDAZOLE 500 MG/1
500 TABLET ORAL EVERY 8 HOURS
Qty: 84 TABLET | Refills: 0 | Status: SHIPPED | OUTPATIENT
Start: 2025-08-22 | End: 2025-09-19

## 2025-09-02 DIAGNOSIS — Z98.890 H/O TRACHEOSTOMY: Primary | ICD-10-CM

## (undated) DEVICE — STAPLER SKIN PROXIMATE WIDE

## (undated) DEVICE — COVER EQUIPMENT 36X25

## (undated) DEVICE — GLOVE PROTEXIS HYDROGEL SZ7.5

## (undated) DEVICE — TRAY MINOR GEN SURG OMC

## (undated) DEVICE — TRAY SKIN SCRUB WET PREMIUM

## (undated) DEVICE — SUT VICRYL PLUS 2-0 CT1 18

## (undated) DEVICE — SOL IRRI STRL WATER 1000ML

## (undated) DEVICE — ELECTRODE PATIENT RETURN DISP

## (undated) DEVICE — SUT SILK 2-0 SH 18IN BLACK

## (undated) DEVICE — TUBING O2 FEMALE CONN 13FT

## (undated) DEVICE — KIT SURGICAL TURNOVER

## (undated) DEVICE — SOL NACL IRR 1000ML BTL

## (undated) DEVICE — TIP SUCTION YANKAUER

## (undated) DEVICE — SUT 3-0 12-18IN SILK

## (undated) DEVICE — BLADE SAW GUIDE GIG 20IN 10/BX

## (undated) DEVICE — Device

## (undated) DEVICE — KIT CANIST SUCTION 1200CC

## (undated) DEVICE — ELECTRODE REM PLYHSV RETURN 9

## (undated) DEVICE — KIT SURGICAL COLON .25 1.1OZ

## (undated) DEVICE — COLLECTION SPECIMEN NEPTUNE

## (undated) DEVICE — KIT PEG PULL SAFETY 24FR

## (undated) DEVICE — DRESSING TELFA N ADH 3X8IN